# Patient Record
Sex: FEMALE | Race: WHITE | NOT HISPANIC OR LATINO | Employment: OTHER | URBAN - METROPOLITAN AREA
[De-identification: names, ages, dates, MRNs, and addresses within clinical notes are randomized per-mention and may not be internally consistent; named-entity substitution may affect disease eponyms.]

---

## 2017-01-12 ENCOUNTER — OFFICE VISIT (OUTPATIENT)
Dept: CARDIOLOGY | Facility: CLINIC | Age: 67
End: 2017-01-12

## 2017-01-12 VITALS
WEIGHT: 212 LBS | SYSTOLIC BLOOD PRESSURE: 167 MMHG | HEIGHT: 64 IN | DIASTOLIC BLOOD PRESSURE: 83 MMHG | HEART RATE: 72 BPM | BODY MASS INDEX: 36.19 KG/M2

## 2017-01-12 DIAGNOSIS — I10 ESSENTIAL HYPERTENSION: Primary | ICD-10-CM

## 2017-01-12 PROCEDURE — 99212 OFFICE O/P EST SF 10 MIN: CPT | Performed by: INTERNAL MEDICINE

## 2017-01-12 RX ORDER — LISINOPRIL 20 MG/1
20 TABLET ORAL 2 TIMES DAILY
Qty: 60 TABLET | Refills: 11 | Status: SHIPPED | OUTPATIENT
Start: 2017-01-12 | End: 2017-07-25

## 2017-01-12 RX ORDER — DARIFENACIN HYDROBROMIDE 15 MG/1
15 TABLET, EXTENDED RELEASE ORAL DAILY
COMMUNITY
End: 2017-07-25

## 2017-01-12 RX ORDER — FLUOXETINE HYDROCHLORIDE 20 MG/1
20 CAPSULE ORAL DAILY
COMMUNITY
End: 2017-07-25

## 2017-01-12 NOTE — LETTER
"2017     Michael Mays MD  [address]  [facility] [address]    Patient: Vidhi Lopez   YOB: 1950   Date of Visit: 2017       Dear Dr. Davon MD:    Thank you for referring Vidhi Lopez to me for evaluation. Below are the relevant portions of my assessment and plan of care.    If you have questions, please do not hesitate to call me. I look forward to following Vidhi along with you.         Sincerely,        Lito Flores MD        CC: No Recipients  Lito Flores MD  2017  4:26 PM  Incomplete    OFFICE FOLLOW UP     Date of Encounter:2017     Name: Vidhi Lopez  : 1950  Address: [address] [place] [address]  Phone: [phone]    PCP: MD Anand Owens   [facility] [address]    Vidhi Lopez is a 66 y.o. female.      Chief Complaint: PCP referral for hypertension    PROBLEM LIST:   1. Carotid artery disease:  a. Remote CVA, IDB.  b. Left hemispheric TIA, 2011.  c. Emergent LICA stent, 2011.  d. \"Last\" DUS negative,  (SJE).  2. History of myocardial infarction, remote IDB:  a. Normal nuclear myocardial perfusion study, 2011.  b. Echocardiogram,  revealing normal EF.  3. Hypertension.  4. Tobacco use.  5. Diabetes mellitus type 2.  6. Obesity.  7. Dyslipidemia.  8. Sinus arrhythmia.  9. Fibromyalgia.  10. Renal insufficiency, felt secondary to NSAIDS.  11. Interstitial cystitis.  12. Gastroesophageal reflux disease.  13. Status post operations, remote.  14. Anxiety disorder.  15. \"New onset\" lower extremity edema, May 2015:  a. Treated with Lasix 20 mg daily with resolution.    Allergies   Allergen Reactions   • Amlodipine    • Reglan [Metoclopramide]          Current Outpatient Prescriptions:   •  Xanax 0.5 mg po twice daily.  •  atorvastatin 80 mg po daily at bedtime.   •  darifenacin (ENABLEX) 15 MG po daily.   •  Fluoxetine 20 mg po daily.   •  gabapentin 800 " "mg po twice daily.   •  linagliptin (TRADJENTA) 5 mg po daily.   •  lisinopril 10 mg po twice daily.   •  metoprolol succinate XL 25 mg twice daily.   •  Multiple Vitamins po daily.  •  pentosan polysulfate (ELMIRON) 100 mg po three times daily.   •  PREMARIN 0.625 MG/GM vaginal cream as directed  •  Seroquel 25 mg po daily at bedtime.    •  terconazole (TERAZOL 3) 0.8 % vaginal cream as directed.  •  tizanidine 4 mg po twice daily  •  vitamin d 5000 UNITS capsule po daily.    History of Present Illness:         Patient returns today for follow-up.  She is overall doing well.  She has had no symptoms of cardiac or cerebrovascular disease.  Home blood pressures a been trending upward.  Systolic blood pressure is usually in the range of 140-165.       The following portions of the patient's history were reviewed and updated as appropriate: allergies, current medications and problem list.    HPI: Pertinent positives as listed in the HPI.  All other systems reviewed and negative.      Objective:    Vitals:    01/12/17 1539 01/12/17 1541   BP: 151/79 167/83   BP Location: Left arm Left arm   Patient Position: Sitting Standing   Pulse: 73 72   Weight: 212 lb (96.2 kg)    Height: 64\" (162.6 cm)        Physical Exam   Constitutional: She is oriented to person, place, and time.   Neck: No JVD present. No thyromegaly present.   Cardiovascular: Intact distal pulses.    Exam reveals no gallop, murmur, or rub.   Pulmonary/Chest:   No wheezes, crackles, or rhonchi.   Musculoskeletal:   No peripheral edema, no clubbing, or cyanosis    Neurological: She is alert and oriented to person, place, and time.   No focal abnormalities in sensation or strength    Vitals reviewed.     Diagnostic Data:  BUN 28, Cr. 1.00 at       Procedures      Assessment and Plan: At this time we will asked the patient to continue to monitor blood pressures closely and to increase her lisinopril from 10-20 mg by mouth twice daily.  If this is not effective " in keeping systolic blood pressures under about 1 40 mmHg she will call us.  She will otherwise titrate her blood pressure with lisinopril and continue close follow-up with her primary care physician in terms of monitoring renal function.  I will see her back in 6 months, sooner when necessary basis

## 2017-01-12 NOTE — LETTER
"2017     Taran Mcginnis MD  [address]  [address]  [facility] [address]    Patient: Vidhi Lopez   YOB: 1950   Date of Visit: 2017       Dear Dr. Ras MD:    Thank you for referring Vidhi Lopez to me for evaluation. Below are the relevant portions of my assessment and plan of care.    If you have questions, please do not hesitate to call me. I look forward to following Vidhi along with you.         Sincerely,        Lito Flores MD        CC: No Recipients  Lito Flores MD  2017  4:26 PM  Incomplete    OFFICE FOLLOW UP     Date of Encounter:2017     Name: Vidhi Lopez  : 1950  Address: [address] [place], [place] [address]  Phone: [phone]    PCP: Michael Mays MD  [address]  [facility] [address]    Vidhi Lopez is a 66 y.o. female.      Chief Complaint: PCP referral for hypertension    PROBLEM LIST:   1. Carotid artery disease:  a. Remote CVA, IDB.  b. Left hemispheric TIA, 2011.  c. Emergent LICA stent, 2011.  d. \"Last\" DUS negative,  (SJE).  2. History of myocardial infarction, remote IDB:  a. Normal nuclear myocardial perfusion study, 2011.  b. Echocardiogram,  revealing normal EF.  3. Hypertension.  4. Tobacco use.  5. Diabetes mellitus type 2.  6. Obesity.  7. Dyslipidemia.  8. Sinus arrhythmia.  9. Fibromyalgia.  10. Renal insufficiency, felt secondary to NSAIDS.  11. Interstitial cystitis.  12. Gastroesophageal reflux disease.  13. Status post operations, remote.  14. Anxiety disorder.  15. \"New onset\" lower extremity edema, May 2015:  a. Treated with Lasix 20 mg daily with resolution.    Allergies   Allergen Reactions   • Amlodipine    • Reglan [Metoclopramide]          Current Outpatient Prescriptions:   •  Xanax 0.5 mg po twice daily.  •  atorvastatin 80 mg po daily at bedtime.   •  darifenacin (ENABLEX) 15 MG po daily.   •  Fluoxetine 20 mg po daily.   •  " "gabapentin 800 mg po twice daily.   •  linagliptin (TRADJENTA) 5 mg po daily.   •  lisinopril 10 mg po twice daily.   •  metoprolol succinate XL 25 mg twice daily.   •  Multiple Vitamins po daily.  •  pentosan polysulfate (ELMIRON) 100 mg po three times daily.   •  PREMARIN 0.625 MG/GM vaginal cream as directed  •  Seroquel 25 mg po daily at bedtime.    •  terconazole (TERAZOL 3) 0.8 % vaginal cream as directed.  •  tizanidine 4 mg po twice daily  •  vitamin d 5000 UNITS capsule po daily.    History of Present Illness:         Patient returns today for follow-up.  She is overall doing well.  She has had no symptoms of cardiac or cerebrovascular disease.  Home blood pressures a been trending upward.  Systolic blood pressure is usually in the range of 140-165.       The following portions of the patient's history were reviewed and updated as appropriate: allergies, current medications and problem list.    HPI: Pertinent positives as listed in the HPI.  All other systems reviewed and negative.      Objective:    Vitals:    01/12/17 1539 01/12/17 1541   BP: 151/79 167/83   BP Location: Left arm Left arm   Patient Position: Sitting Standing   Pulse: 73 72   Weight: 212 lb (96.2 kg)    Height: 64\" (162.6 cm)        Physical Exam   Constitutional: She is oriented to person, place, and time.   Neck: No JVD present. No thyromegaly present.   Cardiovascular: Intact distal pulses.    Exam reveals no gallop, murmur, or rub.   Pulmonary/Chest:   No wheezes, crackles, or rhonchi.   Musculoskeletal:   No peripheral edema, no clubbing, or cyanosis    Neurological: She is alert and oriented to person, place, and time.   No focal abnormalities in sensation or strength    Vitals reviewed.     Diagnostic Data:  BUN 28, Cr. 1.00 at       Procedures      Assessment and Plan: At this time we will asked the patient to continue to monitor blood pressures closely and to increase her lisinopril from 10-20 mg by mouth twice daily.  If this is " not effective in keeping systolic blood pressures under about 1 40 mmHg she will call us.  She will otherwise titrate her blood pressure with lisinopril and continue close follow-up with her primary care physician in terms of monitoring renal function.  I will see her back in 6 months, sooner when necessary basis

## 2017-01-12 NOTE — PROGRESS NOTES
"  OFFICE FOLLOW UP     Date of Encounter:2017     Name: Vidhi Lopez  : 1950  Address: [facility] [address] [address]  Phone: [phone]    PCP: Michael Mays MD  [address]  [facility] [address]    Vidhi Lopez is a 66 y.o. female.      Chief Complaint: PCP referral for hypertension    PROBLEM LIST:   1. Carotid artery disease:  a. Remote CVA, IDB.  b. Left hemispheric TIA, 2011.  c. Emergent LICA stent, 2011.  d. \"Last\" DUS negative,  (SJE).  2. History of myocardial infarction, remote IDB:  a. Normal nuclear myocardial perfusion study, 2011.  b. Echocardiogram,  revealing normal EF.  3. Hypertension.  4. Tobacco use.  5. Diabetes mellitus type 2.  6. Obesity.  7. Dyslipidemia.  8. Sinus arrhythmia.  9. Fibromyalgia.  10. Renal insufficiency, felt secondary to NSAIDS.  11. Interstitial cystitis.  12. Gastroesophageal reflux disease.  13. Status post operations, remote.  14. Anxiety disorder.  15. \"New onset\" lower extremity edema, May 2015:  a. Treated with Lasix 20 mg daily with resolution.    Allergies   Allergen Reactions   • Amlodipine    • Reglan [Metoclopramide]          Current Outpatient Prescriptions:   •  Xanax 0.5 mg po twice daily.  •  atorvastatin 80 mg po daily at bedtime.   •  darifenacin (ENABLEX) 15 MG po daily.   •  Fluoxetine 20 mg po daily.   •  gabapentin 800 mg po twice daily.   •  linagliptin (TRADJENTA) 5 mg po daily.   •  lisinopril 10 mg po twice daily.   •  metoprolol succinate XL 25 mg twice daily.   •  Multiple Vitamins po daily.  •  pentosan polysulfate (ELMIRON) 100 mg po three times daily.   •  PREMARIN 0.625 MG/GM vaginal cream as directed  •  Seroquel 25 mg po daily at bedtime.    •  terconazole (TERAZOL 3) 0.8 % vaginal cream as directed.  •  tizanidine 4 mg po twice daily  •  vitamin d 5000 UNITS capsule po daily.    History of Present Illness:         Patient returns today for follow-up.  She is " "overall doing well.  She has had no symptoms of cardiac or cerebrovascular disease.  Home blood pressures a been trending upward.  Systolic blood pressure is usually in the range of 140-165.     The following portions of the patient's history were reviewed and updated as appropriate: allergies, current medications and problem list.    HPI: Pertinent positives as listed in the HPI.  All other systems reviewed and negative.    Objective:    Vitals:    01/12/17 1539 01/12/17 1541   BP: 151/79 167/83   BP Location: Left arm Left arm   Patient Position: Sitting Standing   Pulse: 73 72   Weight: 212 lb (96.2 kg)    Height: 64\" (162.6 cm)        Physical Exam   Constitutional: She is oriented to person, place, and time.   Neck: No JVD present. No thyromegaly present.   Cardiovascular: Intact distal pulses.    Exam reveals no gallop, murmur, or rub.   Pulmonary/Chest:   No wheezes, crackles, or rhonchi.   Musculoskeletal:   No peripheral edema, no clubbing, or cyanosis    Neurological: She is alert and oriented to person, place, and time.   No focal abnormalities in sensation or strength    Vitals reviewed.     Diagnostic Data:  BUN 28, Cr. 1.00 in 9/2016 per patient report. She is due to see nephrology again soon.     Procedures    Assessment and Plan: At this time we will asked the patient to continue to monitor blood pressures closely and to increase her lisinopril from 10-20 mg by mouth twice daily.  If this is not effective in keeping systolic blood pressures under about 1 40 mmHg she will call us.  She will otherwise titrate her blood pressure with lisinopril and continue close follow-up with her primary care physician in terms of monitoring renal function.  I will see her back in 6 months, sooner when necessary basis    Scribed for Lito Flores MD by Belkis Meyer RN. 1/12/2017  4:28 PM    I, Lito Flores MD, [facility], [facility], personally performed the services described in this documentation as scribed by " the above named individual in my presence, and it is both accurate and complete. At 4:29 PM on 01/12/2017

## 2017-02-07 ENCOUNTER — TELEPHONE (OUTPATIENT)
Dept: CARDIOLOGY | Facility: CLINIC | Age: 67
End: 2017-02-07

## 2017-02-07 NOTE — TELEPHONE ENCOUNTER
Pt called c/o htn with BP ranging from 180-210/80-98.  HR in the upper 80's to 90's.  She states she has increased her metoprolol to 50 mg BID on her own.  I asked that she increase this to 100 mg BID and please call us back with a BP log in 3 days.  She verbalized understanding at this time.

## 2017-02-17 ENCOUNTER — TELEPHONE (OUTPATIENT)
Dept: CARDIOLOGY | Facility: CLINIC | Age: 67
End: 2017-02-17

## 2017-02-17 NOTE — TELEPHONE ENCOUNTER
Spoke with pt and she continues to have elevated BP readings. She is taking metoprolol tartrate 100 mg BID, lisinopril 20 mg BID and [facility] prescribed triamterene/HCTZ 37.5 mg/25 mg daily. Received med list from [facility] note from 10/2016. Per [facility] have pt reduce metoprolol to 50 mg BID, continue lisinopril and triamterene/HCTZ and schedule f/u with Nephrology for BP control and labs. She has been on Chlorthalidone and amlodipine in the past however she has had hypotensive episodes. Last creatinine was 1.0 in 9/2016. Left message on pt voicemail with these instructions. THAIS

## 2017-02-20 DIAGNOSIS — I10 ESSENTIAL HYPERTENSION: Primary | ICD-10-CM

## 2017-02-21 ENCOUNTER — LAB (OUTPATIENT)
Dept: LAB | Facility: HOSPITAL | Age: 67
End: 2017-02-21

## 2017-02-21 DIAGNOSIS — I10 ESSENTIAL HYPERTENSION: ICD-10-CM

## 2017-02-21 LAB
ALBUMIN SERPL-MCNC: 4.3 G/DL (ref 3.2–4.8)
ANION GAP SERPL CALCULATED.3IONS-SCNC: 4 MMOL/L (ref 3–11)
BUN BLD-MCNC: 18 MG/DL (ref 9–23)
BUN/CREAT SERPL: 25.7 (ref 7–25)
CALCIUM SPEC-SCNC: 10.3 MG/DL (ref 8.7–10.4)
CHLORIDE SERPL-SCNC: 99 MMOL/L (ref 99–109)
CO2 SERPL-SCNC: 36 MMOL/L (ref 20–31)
CREAT BLD-MCNC: 0.7 MG/DL (ref 0.6–1.3)
GFR SERPL CREATININE-BSD FRML MDRD: 84 ML/MIN/1.73
GLUCOSE BLD-MCNC: 144 MG/DL (ref 70–100)
PHOSPHATE SERPL-MCNC: 4.6 MG/DL (ref 2.4–5.1)
POTASSIUM BLD-SCNC: 4.6 MMOL/L (ref 3.5–5.5)
SODIUM BLD-SCNC: 139 MMOL/L (ref 132–146)

## 2017-02-21 PROCEDURE — 80069 RENAL FUNCTION PANEL: CPT | Performed by: INTERNAL MEDICINE

## 2017-02-21 PROCEDURE — 36415 COLL VENOUS BLD VENIPUNCTURE: CPT

## 2017-03-15 ENCOUNTER — APPOINTMENT (OUTPATIENT)
Dept: LAB | Facility: HOSPITAL | Age: 67
End: 2017-03-15

## 2017-03-15 ENCOUNTER — TRANSCRIBE ORDERS (OUTPATIENT)
Dept: LAB | Facility: HOSPITAL | Age: 67
End: 2017-03-15

## 2017-03-15 DIAGNOSIS — I10 ESSENTIAL HYPERTENSION, BENIGN: Primary | ICD-10-CM

## 2017-03-15 DIAGNOSIS — E11.9 TYPE 2 DIABETES MELLITUS WITHOUT COMPLICATION, WITHOUT LONG-TERM CURRENT USE OF INSULIN (HCC): ICD-10-CM

## 2017-03-15 DIAGNOSIS — E78.5 HYPERLIPIDEMIA, UNSPECIFIED HYPERLIPIDEMIA TYPE: ICD-10-CM

## 2017-03-15 LAB
ALBUMIN SERPL-MCNC: 4.3 G/DL (ref 3.2–4.8)
ALBUMIN/GLOB SERPL: 1.4 G/DL (ref 1.5–2.5)
ALP SERPL-CCNC: 122 U/L (ref 25–100)
ALT SERPL W P-5'-P-CCNC: 32 U/L (ref 7–40)
ANION GAP SERPL CALCULATED.3IONS-SCNC: 9 MMOL/L (ref 3–11)
ARTICHOKE IGE QN: 148 MG/DL (ref 0–130)
AST SERPL-CCNC: 16 U/L (ref 0–33)
BASOPHILS # BLD AUTO: 0.04 10*3/MM3 (ref 0–0.2)
BASOPHILS NFR BLD AUTO: 0.4 % (ref 0–1)
BILIRUB SERPL-MCNC: 0.4 MG/DL (ref 0.3–1.2)
BUN BLD-MCNC: 26 MG/DL (ref 9–23)
BUN/CREAT SERPL: 20 (ref 7–25)
CALCIUM SPEC-SCNC: 10.5 MG/DL (ref 8.7–10.4)
CHLORIDE SERPL-SCNC: 94 MMOL/L (ref 99–109)
CHOLEST SERPL-MCNC: 217 MG/DL (ref 0–200)
CO2 SERPL-SCNC: 36 MMOL/L (ref 20–31)
CREAT BLD-MCNC: 1.3 MG/DL (ref 0.6–1.3)
DEPRECATED RDW RBC AUTO: 53.8 FL (ref 37–54)
EOSINOPHIL # BLD AUTO: 0.15 10*3/MM3 (ref 0.1–0.3)
EOSINOPHIL NFR BLD AUTO: 1.3 % (ref 0–3)
ERYTHROCYTE [DISTWIDTH] IN BLOOD BY AUTOMATED COUNT: 14.9 % (ref 11.3–14.5)
GFR SERPL CREATININE-BSD FRML MDRD: 41 ML/MIN/1.73
GLOBULIN UR ELPH-MCNC: 3 GM/DL
GLUCOSE BLD-MCNC: 127 MG/DL (ref 70–100)
HBA1C MFR BLD: 7.6 % (ref 4.8–5.6)
HCT VFR BLD AUTO: 45.4 % (ref 34.5–44)
HDLC SERPL-MCNC: 44 MG/DL (ref 40–60)
HGB BLD-MCNC: 14.3 G/DL (ref 11.5–15.5)
IMM GRANULOCYTES # BLD: 0.05 10*3/MM3 (ref 0–0.03)
IMM GRANULOCYTES NFR BLD: 0.4 % (ref 0–0.6)
LYMPHOCYTES # BLD AUTO: 1.86 10*3/MM3 (ref 0.6–4.8)
LYMPHOCYTES NFR BLD AUTO: 16.7 % (ref 24–44)
MCH RBC QN AUTO: 31 PG (ref 27–31)
MCHC RBC AUTO-ENTMCNC: 31.5 G/DL (ref 32–36)
MCV RBC AUTO: 98.3 FL (ref 80–99)
MONOCYTES # BLD AUTO: 0.57 10*3/MM3 (ref 0–1)
MONOCYTES NFR BLD AUTO: 5.1 % (ref 0–12)
NEUTROPHILS # BLD AUTO: 8.47 10*3/MM3 (ref 1.5–8.3)
NEUTROPHILS NFR BLD AUTO: 76.1 % (ref 41–71)
PLATELET # BLD AUTO: 260 10*3/MM3 (ref 150–450)
PMV BLD AUTO: 9.8 FL (ref 6–12)
POTASSIUM BLD-SCNC: 4.1 MMOL/L (ref 3.5–5.5)
PROT SERPL-MCNC: 7.3 G/DL (ref 5.7–8.2)
RBC # BLD AUTO: 4.62 10*6/MM3 (ref 3.89–5.14)
SODIUM BLD-SCNC: 139 MMOL/L (ref 132–146)
TRIGL SERPL-MCNC: 241 MG/DL (ref 0–150)
WBC NRBC COR # BLD: 11.14 10*3/MM3 (ref 3.5–10.8)

## 2017-03-15 PROCEDURE — 80061 LIPID PANEL: CPT | Performed by: GENERAL PRACTICE

## 2017-03-15 PROCEDURE — 83036 HEMOGLOBIN GLYCOSYLATED A1C: CPT | Performed by: GENERAL PRACTICE

## 2017-03-15 PROCEDURE — 85025 COMPLETE CBC W/AUTO DIFF WBC: CPT | Performed by: GENERAL PRACTICE

## 2017-03-15 PROCEDURE — 80053 COMPREHEN METABOLIC PANEL: CPT | Performed by: GENERAL PRACTICE

## 2017-03-15 PROCEDURE — 36415 COLL VENOUS BLD VENIPUNCTURE: CPT | Performed by: GENERAL PRACTICE

## 2017-04-28 ENCOUNTER — APPOINTMENT (OUTPATIENT)
Dept: LAB | Facility: HOSPITAL | Age: 67
End: 2017-04-28

## 2017-04-28 ENCOUNTER — TRANSCRIBE ORDERS (OUTPATIENT)
Dept: LAB | Facility: HOSPITAL | Age: 67
End: 2017-04-28

## 2017-04-28 DIAGNOSIS — IMO0002 UNCONTROLLED TYPE 2 DIABETES MELLITUS WITH COMPLICATION, WITHOUT LONG-TERM CURRENT USE OF INSULIN: Primary | ICD-10-CM

## 2017-04-28 LAB
ALBUMIN SERPL-MCNC: 4.4 G/DL (ref 3.2–4.8)
ALBUMIN/GLOB SERPL: 1.6 G/DL (ref 1.5–2.5)
ALP SERPL-CCNC: 104 U/L (ref 25–100)
ALT SERPL W P-5'-P-CCNC: 22 U/L (ref 7–40)
ANION GAP SERPL CALCULATED.3IONS-SCNC: 8 MMOL/L (ref 3–11)
AST SERPL-CCNC: 15 U/L (ref 0–33)
BILIRUB SERPL-MCNC: 0.5 MG/DL (ref 0.3–1.2)
BUN BLD-MCNC: 25 MG/DL (ref 9–23)
BUN/CREAT SERPL: 25 (ref 7–25)
CALCIUM SPEC-SCNC: 10.3 MG/DL (ref 8.7–10.4)
CHLORIDE SERPL-SCNC: 98 MMOL/L (ref 99–109)
CO2 SERPL-SCNC: 32 MMOL/L (ref 20–31)
CREAT BLD-MCNC: 1 MG/DL (ref 0.6–1.3)
GFR SERPL CREATININE-BSD FRML MDRD: 55 ML/MIN/1.73
GLOBULIN UR ELPH-MCNC: 2.8 GM/DL
GLUCOSE BLD-MCNC: 146 MG/DL (ref 70–100)
POTASSIUM BLD-SCNC: 4.3 MMOL/L (ref 3.5–5.5)
PROT SERPL-MCNC: 7.2 G/DL (ref 5.7–8.2)
SODIUM BLD-SCNC: 138 MMOL/L (ref 132–146)

## 2017-04-28 PROCEDURE — 80053 COMPREHEN METABOLIC PANEL: CPT | Performed by: INTERNAL MEDICINE

## 2017-04-28 PROCEDURE — 84681 ASSAY OF C-PEPTIDE: CPT | Performed by: INTERNAL MEDICINE

## 2017-04-28 PROCEDURE — 36415 COLL VENOUS BLD VENIPUNCTURE: CPT | Performed by: INTERNAL MEDICINE

## 2017-04-30 LAB — C PEPTIDE SERPL-MCNC: 18.1 NG/ML (ref 1.1–4.4)

## 2017-05-03 ENCOUNTER — TELEPHONE (OUTPATIENT)
Dept: URGENT CARE | Facility: CLINIC | Age: 67
End: 2017-05-03

## 2017-05-30 ENCOUNTER — TELEPHONE (OUTPATIENT)
Dept: CARDIOLOGY | Facility: CLINIC | Age: 67
End: 2017-05-30

## 2017-05-31 RX ORDER — FUROSEMIDE 20 MG/1
20 TABLET ORAL EVERY OTHER DAY
Qty: 45 TABLET | Refills: 0 | Status: SHIPPED | OUTPATIENT
Start: 2017-05-31 | End: 2017-09-19

## 2017-07-03 ENCOUNTER — OFFICE VISIT (OUTPATIENT)
Dept: NEUROLOGY | Facility: CLINIC | Age: 67
End: 2017-07-03

## 2017-07-03 ENCOUNTER — APPOINTMENT (OUTPATIENT)
Dept: LAB | Facility: HOSPITAL | Age: 67
End: 2017-07-03

## 2017-07-03 VITALS
WEIGHT: 200 LBS | SYSTOLIC BLOOD PRESSURE: 110 MMHG | HEIGHT: 64 IN | BODY MASS INDEX: 34.15 KG/M2 | DIASTOLIC BLOOD PRESSURE: 78 MMHG

## 2017-07-03 DIAGNOSIS — R25.1 TREMOR: ICD-10-CM

## 2017-07-03 DIAGNOSIS — R27.9 UNSPECIFIED LACK OF COORDINATION: Primary | ICD-10-CM

## 2017-07-03 LAB
FOLATE SERPL-MCNC: 6.04 NG/ML (ref 3.2–20)
MAGNESIUM SERPL-MCNC: 2 MG/DL (ref 1.3–2.7)
VIT B12 BLD-MCNC: 360 PG/ML (ref 211–911)

## 2017-07-03 PROCEDURE — 82746 ASSAY OF FOLIC ACID SERUM: CPT | Performed by: PHYSICIAN ASSISTANT

## 2017-07-03 PROCEDURE — 36415 COLL VENOUS BLD VENIPUNCTURE: CPT | Performed by: PHYSICIAN ASSISTANT

## 2017-07-03 PROCEDURE — 82607 VITAMIN B-12: CPT | Performed by: PHYSICIAN ASSISTANT

## 2017-07-03 PROCEDURE — 83735 ASSAY OF MAGNESIUM: CPT | Performed by: PHYSICIAN ASSISTANT

## 2017-07-03 PROCEDURE — 99204 OFFICE O/P NEW MOD 45 MIN: CPT | Performed by: PHYSICIAN ASSISTANT

## 2017-07-03 NOTE — PROGRESS NOTES
"Subjective     History of Present Illness   Vidhi Lopez is a 67 y.o. female with a history of diabetes who comes to clinic today for evaluation of episodes of incoordination. She has had three episodes over the last three months during which she notes incoordination in her hands bilaterally. During these episodes, she is more prone to dropping items. She denies any clear associated weakness. She may note some unsteadiness, though denies any ataxia. She also denies any associated numbness, paresthesias, slurred speech, or dysphagia.     She also notes an intermittent mild intentional tremor which is worse in the morning. This is not always associated with the incoordination.       The following portions of the patient's history were reviewed and updated as appropriate: allergies, current medications, past family history, past medical history, past social history, past surgical history and problem list.    Review of Systems   Constitutional: Negative.    HENT: Negative.    Eyes: Negative.    Respiratory: Negative.    Cardiovascular: Negative.    Gastrointestinal: Negative.    Endocrine: Negative.    Genitourinary: Negative.    Musculoskeletal: Negative.    Skin: Negative.    Allergic/Immunologic: Negative.    Neurological:        As noted in HPI   Hematological: Negative.    Psychiatric/Behavioral: Negative.        Objective     /78  Ht 64\" (162.6 cm)  Wt 200 lb (90.7 kg)  BMI 34.33 kg/m2    General appearance today is normal.   Peripheral pulses were present and symmetric.   The ophthalmoscopic exam today is unremarkable. The discs and posterior elements are unremarkable.      Physical Exam   Neurological: She has normal strength. She has a normal Finger-Nose-Finger Test. Gait normal.   Reflex Scores:       Tricep reflexes are 1+ on the right side and 1+ on the left side.       Bicep reflexes are 1+ on the right side and 1+ on the left side.       Brachioradialis reflexes are 1+ on the right side and 1+ " on the left side.       Patellar reflexes are 1+ on the right side and 1+ on the left side.  Psychiatric: Her speech is normal.        Neurologic Exam     Mental Status   Speech: speech is normal   Level of consciousness: alert  Knowledge: good.   Normal comprehension.     Cranial Nerves   Cranial nerves II through XII intact.     Motor Exam   Muscle bulk: normal  Overall muscle tone: normal    Strength   Strength 5/5 throughout.     Sensory Exam   Light touch normal.     Gait, Coordination, and Reflexes     Gait  Gait: normal    Coordination   Finger to nose coordination: normal    Tremor   Resting tremor: absent    Reflexes   Right brachioradialis: 1+  Left brachioradialis: 1+  Right biceps: 1+  Left biceps: 1+  Right triceps: 1+  Left triceps: 1+  Right patellar: 1+  Left patellar: 1+          Assessment/Plan   Vidhi was seen today for tremors, extremity weakness and gait problem.    Diagnoses and all orders for this visit:    Unspecified lack of coordination  -     Vitamin B12  -     Folate  -     Magnesium  -     MRI Brain Without Contrast    Tremor  -     MRI Brain Without Contrast          Discussion/Summary   Vidhi Lopez comes to clinic today for evaluation of episodes of incoordination and tremor. The etiology of her symptoms is unclear. Her neurological examination today is reassuringly normal, which was discussed in detail. I did not see any objective evidence of Parkinson's Disease. It was elected to obtain an MRI of the brain to rule our any structural abnormalities as well as screening bloodwork. She will then follow up in our clinic on an as needed basis.      I spent 35 minutes out of 45 minutes face to face with the patient and discussing diagnosis, prognosis, diagnostic testing, evaluation and current status.    As part of this visit I discussed the history with the patient .      Eusebia Yanez PA-C

## 2017-07-11 ENCOUNTER — HOSPITAL ENCOUNTER (OUTPATIENT)
Dept: MRI IMAGING | Facility: HOSPITAL | Age: 67
End: 2017-07-11

## 2017-07-18 ENCOUNTER — TELEPHONE (OUTPATIENT)
Dept: NEUROLOGY | Facility: CLINIC | Age: 67
End: 2017-07-18

## 2017-07-18 NOTE — TELEPHONE ENCOUNTER
----- Message from Janine Yanez PA-C sent at 7/18/2017  3:44 PM EDT -----  Contact: VISHNU  If it is severe or worsens, she should go to the ED. Otherwise, let's try to wait for the MRI as I am not sure what else I have to offer before this is done. Hope that makes sense. Thanks!     ----- Message -----     From: Aarti Tucker MA     Sent: 7/18/2017   3:34 PM       To: Janine Yanez PA-C    What do you suggest, hasnt had mri  yet   ----- Message -----     From: Ciara Navarro     Sent: 7/18/2017   3:26 PM       To: [facility]    JANINE    THERE WAS A MESSAGE LEFT ON THE ANSWERING MACHINE FROM VISHNU.   VISHNU SAID THAT SHE HAS A MRI Wednesday.  SHE SAID JANINE TOLD HER TO CALL HER IF SHE'S HAVING ISSUES WITH WEAKNESS IN THE HANDS AND DIFFICULTY HOLDING THINGS AGAIN. THIS WAS ON Monday. IT'S NOT AS BAD AS BEFORE, BUT STILL THERE.    VISHNU REQUESTED A CALL BACK  [phone]

## 2017-07-19 ENCOUNTER — HOSPITAL ENCOUNTER (OUTPATIENT)
Dept: MRI IMAGING | Facility: HOSPITAL | Age: 67
Discharge: HOME | End: 2017-07-19
Admitting: PHYSICIAN ASSISTANT

## 2017-07-19 PROCEDURE — 70551 MRI BRAIN STEM W/O DYE: CPT

## 2017-07-21 DIAGNOSIS — Z86.73 HISTORY OF STROKE: Primary | ICD-10-CM

## 2017-07-24 DIAGNOSIS — I63.89 OTHER CEREBRAL INFARCTION (HCC): ICD-10-CM

## 2017-07-24 DIAGNOSIS — I63.9 CEREBROVASCULAR ACCIDENT (CVA), UNSPECIFIED MECHANISM (HCC): Primary | ICD-10-CM

## 2017-07-25 ENCOUNTER — OFFICE VISIT (OUTPATIENT)
Dept: CARDIOLOGY | Facility: CLINIC | Age: 67
End: 2017-07-25

## 2017-07-25 ENCOUNTER — APPOINTMENT (OUTPATIENT)
Dept: LAB | Facility: HOSPITAL | Age: 67
End: 2017-07-25

## 2017-07-25 VITALS
WEIGHT: 221.8 LBS | SYSTOLIC BLOOD PRESSURE: 90 MMHG | OXYGEN SATURATION: 88 % | HEART RATE: 73 BPM | BODY MASS INDEX: 37.87 KG/M2 | DIASTOLIC BLOOD PRESSURE: 62 MMHG | HEIGHT: 64 IN

## 2017-07-25 DIAGNOSIS — E78.5 DYSLIPIDEMIA: ICD-10-CM

## 2017-07-25 DIAGNOSIS — R60.1 GENERALIZED EDEMA: Primary | ICD-10-CM

## 2017-07-25 DIAGNOSIS — I10 ESSENTIAL HYPERTENSION: ICD-10-CM

## 2017-07-25 LAB
ANION GAP SERPL CALCULATED.3IONS-SCNC: 7 MMOL/L (ref 3–11)
BNP SERPL-MCNC: 86 PG/ML (ref 0–100)
BUN BLD-MCNC: 60 MG/DL (ref 9–23)
BUN/CREAT SERPL: 33.3 (ref 7–25)
CALCIUM SPEC-SCNC: 9.7 MG/DL (ref 8.7–10.4)
CHLORIDE SERPL-SCNC: 95 MMOL/L (ref 99–109)
CO2 SERPL-SCNC: 30 MMOL/L (ref 20–31)
CREAT BLD-MCNC: 1.8 MG/DL (ref 0.6–1.3)
GFR SERPL CREATININE-BSD FRML MDRD: 28 ML/MIN/1.73
GLUCOSE BLD-MCNC: 88 MG/DL (ref 70–100)
POTASSIUM BLD-SCNC: 5.9 MMOL/L (ref 3.5–5.5)
SODIUM BLD-SCNC: 132 MMOL/L (ref 132–146)

## 2017-07-25 PROCEDURE — 99214 OFFICE O/P EST MOD 30 MIN: CPT | Performed by: PHYSICIAN ASSISTANT

## 2017-07-25 PROCEDURE — 36415 COLL VENOUS BLD VENIPUNCTURE: CPT | Performed by: PHYSICIAN ASSISTANT

## 2017-07-25 PROCEDURE — 80048 BASIC METABOLIC PNL TOTAL CA: CPT | Performed by: PHYSICIAN ASSISTANT

## 2017-07-25 PROCEDURE — 83880 ASSAY OF NATRIURETIC PEPTIDE: CPT | Performed by: PHYSICIAN ASSISTANT

## 2017-07-25 RX ORDER — TRIAMTERENE AND HYDROCHLOROTHIAZIDE 37.5; 25 MG/1; MG/1
1 TABLET ORAL DAILY
COMMUNITY
End: 2017-09-19

## 2017-07-26 ENCOUNTER — TELEPHONE (OUTPATIENT)
Dept: CARDIOLOGY | Facility: CLINIC | Age: 67
End: 2017-07-26

## 2017-07-26 DIAGNOSIS — I63.9 CEREBROVASCULAR ACCIDENT (CVA), UNSPECIFIED MECHANISM (HCC): Primary | ICD-10-CM

## 2017-07-26 NOTE — TELEPHONE ENCOUNTER
Referral made for followup with nephrology associates ASAP. Appt given to pt for 7/27/17 @ 1045. Pt aware and agreeable. Told to hold BP meds until appt tomorrow. May be related to hypotension. Verified Nephrology received notes and labs. Echo pending. Left message with scheduling to do ASAP. KH

## 2017-07-31 ENCOUNTER — TRANSCRIBE ORDERS (OUTPATIENT)
Dept: LAB | Facility: HOSPITAL | Age: 67
End: 2017-07-31

## 2017-07-31 ENCOUNTER — APPOINTMENT (OUTPATIENT)
Dept: LAB | Facility: HOSPITAL | Age: 67
End: 2017-07-31

## 2017-07-31 DIAGNOSIS — N18.2 CHRONIC KIDNEY DISEASE, STAGE II (MILD): Primary | ICD-10-CM

## 2017-07-31 LAB
ALBUMIN SERPL-MCNC: 3.7 G/DL (ref 3.2–4.8)
ANION GAP SERPL CALCULATED.3IONS-SCNC: 7 MMOL/L (ref 3–11)
BUN BLD-MCNC: 21 MG/DL (ref 9–23)
BUN/CREAT SERPL: 26.3 (ref 7–25)
CALCIUM SPEC-SCNC: 9.5 MG/DL (ref 8.7–10.4)
CHLORIDE SERPL-SCNC: 99 MMOL/L (ref 99–109)
CO2 SERPL-SCNC: 32 MMOL/L (ref 20–31)
CREAT BLD-MCNC: 0.8 MG/DL (ref 0.6–1.3)
GFR SERPL CREATININE-BSD FRML MDRD: 72 ML/MIN/1.73
GLUCOSE BLD-MCNC: 134 MG/DL (ref 70–100)
PHOSPHATE SERPL-MCNC: 4.2 MG/DL (ref 2.4–5.1)
POTASSIUM BLD-SCNC: 4.3 MMOL/L (ref 3.5–5.5)
SODIUM BLD-SCNC: 138 MMOL/L (ref 132–146)

## 2017-07-31 PROCEDURE — 36415 COLL VENOUS BLD VENIPUNCTURE: CPT | Performed by: INTERNAL MEDICINE

## 2017-07-31 PROCEDURE — 80069 RENAL FUNCTION PANEL: CPT | Performed by: INTERNAL MEDICINE

## 2017-08-11 ENCOUNTER — HOSPITAL ENCOUNTER (OUTPATIENT)
Dept: MRI IMAGING | Facility: HOSPITAL | Age: 67
End: 2017-08-11

## 2017-08-11 ENCOUNTER — APPOINTMENT (OUTPATIENT)
Dept: CARDIOLOGY | Facility: HOSPITAL | Age: 67
End: 2017-08-11

## 2017-08-11 ENCOUNTER — APPOINTMENT (OUTPATIENT)
Dept: MRI IMAGING | Facility: HOSPITAL | Age: 67
End: 2017-08-11

## 2017-08-11 ENCOUNTER — HOSPITAL ENCOUNTER (EMERGENCY)
Facility: HOSPITAL | Age: 67
Discharge: HOME | End: 2017-08-11
Attending: EMERGENCY MEDICINE | Admitting: EMERGENCY MEDICINE

## 2017-08-11 VITALS
BODY MASS INDEX: 43.78 KG/M2 | TEMPERATURE: 97.9 F | SYSTOLIC BLOOD PRESSURE: 187 MMHG | HEIGHT: 60 IN | OXYGEN SATURATION: 91 % | RESPIRATION RATE: 17 BRPM | WEIGHT: 223 LBS | HEART RATE: 68 BPM | DIASTOLIC BLOOD PRESSURE: 77 MMHG

## 2017-08-11 DIAGNOSIS — S30.811A ABRASION OF ABDOMINAL WALL, INITIAL ENCOUNTER: ICD-10-CM

## 2017-08-11 DIAGNOSIS — R14.0 DISTENDED ABDOMEN: Primary | ICD-10-CM

## 2017-08-11 PROCEDURE — 25010000002 CEFTRIAXONE PER 250 MG: Performed by: NURSE PRACTITIONER

## 2017-08-11 PROCEDURE — 96372 THER/PROPH/DIAG INJ SC/IM: CPT

## 2017-08-11 PROCEDURE — 99282 EMERGENCY DEPT VISIT SF MDM: CPT

## 2017-08-11 RX ORDER — CEPHALEXIN 500 MG/1
500 CAPSULE ORAL 3 TIMES DAILY
Qty: 30 CAPSULE | Refills: 0 | Status: SHIPPED | OUTPATIENT
Start: 2017-08-11 | End: 2017-09-19

## 2017-08-11 RX ORDER — LIDOCAINE HYDROCHLORIDE 10 MG/ML
2.1 INJECTION, SOLUTION EPIDURAL; INFILTRATION; INTRACAUDAL; PERINEURAL ONCE
Status: COMPLETED | OUTPATIENT
Start: 2017-08-11 | End: 2017-08-11

## 2017-08-11 RX ORDER — CEFTRIAXONE 1 G/1
1000 INJECTION, POWDER, FOR SOLUTION INTRAMUSCULAR; INTRAVENOUS ONCE
Status: COMPLETED | OUTPATIENT
Start: 2017-08-11 | End: 2017-08-11

## 2017-08-11 RX ORDER — BUMETANIDE 0.5 MG/1
1 TABLET ORAL 2 TIMES DAILY
Status: ON HOLD | COMMUNITY
End: 2018-02-12

## 2017-08-11 RX ADMIN — CEFTRIAXONE SODIUM 1000 MG: 1 INJECTION, POWDER, FOR SOLUTION INTRAMUSCULAR; INTRAVENOUS at 18:23

## 2017-08-11 RX ADMIN — LIDOCAINE HYDROCHLORIDE 2.1 ML: 10 INJECTION, SOLUTION EPIDURAL; INFILTRATION; INTRACAUDAL; PERINEURAL at 18:27

## 2017-08-12 NOTE — ED PROVIDER NOTES
Subjective   History of Present Illness  Is a 66 y/o female with multiple chronic comorbidities who presents due to some open sores on her abdomen.  She states she's been having difficulties with edema hanging fluid for the last 6 weeks.  She follows with a nephrologist, neurologist, and cardiologist.  She saw her cardiologist a few weeks ago and they did not feel that it was cardiac in nature.  They did order an echocardiogram which she was supposed to have today but she canceled the appointment because she is on extra doses of Bumex from her nephrologist and she was afraid that she would not be able to hold her urine.  He saw her nephrologist this week and he started her on Bumex which she has taken today and tomorrow she is supposed to start on Metolazone.  She is then supposed to follow up on Monday with lab work.  States she took the Bumex this morning and has had some increased urinary output.  She comes to the emergency Department because due to the extra fluid in her abdomen, she has developed some weeping sores on her lower abdomen. Her Nephrologist was concerned about the development of infection. She had a Rocephin injection at her PCP office this week. If she does not have improvement in her fluid status by Monday, she may be admitted in [place].  Denies chest pain.  She does have increased shortness of breath.  Ice fever or chills.  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  Review of Systems   All other systems reviewed and are negative.      Past Medical History:   Diagnosis Date   • Allergic rhinitis    • Asthma with COPD     Asthma/COPD   • Bilateral ovarian cysts    • Coronary artery disease    • Depression with anxiety     Depression/Anxiety   • Diabetes     pre   • Endometriosis     Dr. Jin; estradiol    • Fatigue    • Headache     Headaches   • High cholesterol    • History of gallstones    • History of myocardial infarction    • Hypertension    • Thyroid  disorder    • Urinary leakage    • UTI (urinary tract infection)    • Yeast dermatitis        Allergies   Allergen Reactions   • Amlodipine Swelling   • Reglan [Metoclopramide]        Past Surgical History:   Procedure Laterality Date   • BREAST BIOPSY Right 1991   • CAROTID STENT      Transcath    • CAROTID STENT Left    • CHOLECYSTECTOMY     • CYSTOSTOMY W/ BLADDER BIOPSY     • DILATATION AND CURETTAGE     • LAPAROSCOPIC CHOLECYSTECTOMY  1994    Lap rolando   • OOPHORECTOMY     • PAP SMEAR  05/10/2016   • PARTIAL HYSTERECTOMY         Family History   Problem Relation Age of Onset   • Cancer Other    • Diabetes Other    • Heart attack Other    • Hyperlipidemia Other    • Hypertension Other    • Osteoporosis Other    • Stroke Other    • Breast cancer Mother    • Osteoporosis Mother    • Colon cancer Father        Social History     Social History   • Marital status:      Spouse name: N/A   • Number of children: N/A   • Years of education: N/A     Social History Main Topics   • Smoking status: Current Every Day Smoker     Packs/day: 0.50     Types: Cigarettes   • Smokeless tobacco: Never Used   • Alcohol use 0.6 oz/week     1 Glasses of wine per week      Comment: occasional   • Drug use: No   • Sexual activity: Defer     Other Topics Concern   • None     Social History Narrative           Objective   Physical Exam   Constitutional: She appears well-developed. No distress.   Appears stated age.   HENT:   Head: Normocephalic and atraumatic.   Eyes: EOM are normal. Pupils are equal, round, and reactive to light.   Neck: Normal range of motion.   Cardiovascular: Normal rate, regular rhythm, normal heart sounds and intact distal pulses.    Pulmonary/Chest: Effort normal and breath sounds normal.   Abdominal: Bowel sounds are normal. She exhibits distension.   Musculoskeletal: Normal range of motion. She exhibits edema.   Significant abdominal distention, 2+ bilateral pitting edema in the lower extremities  bilaterally.   Neurological: She is alert.   Skin: Skin is warm and dry.   Three areas of open sores on the lower abdomen. No evidence of infection.   Psychiatric: She has a normal mood and affect. Her behavior is normal. Judgment and thought content normal.   Nursing note and vitals reviewed.      Procedures         ED Course  ED Course      Given Rocephin 1 gram IM. Will send her home with Cephalexin 500 mg tid for 10 days. She has plenty of dressing supplies to cover the wounds. If her symptoms worsen over the weekend, she will return to the ER.            MDM    Final diagnoses:   Distended abdomen   Abrasion of abdominal wall, initial encounter            Taylor Roa, APRN  08/11/17 2015

## 2017-08-24 ENCOUNTER — HOSPITAL ENCOUNTER (OUTPATIENT)
Dept: MRI IMAGING | Facility: HOSPITAL | Age: 67
Discharge: HOME | End: 2017-08-24

## 2017-08-24 ENCOUNTER — HOSPITAL ENCOUNTER (OUTPATIENT)
Dept: CARDIOLOGY | Facility: HOSPITAL | Age: 67
Discharge: HOME | End: 2017-08-24
Admitting: PHYSICIAN ASSISTANT

## 2017-08-24 ENCOUNTER — HOSPITAL ENCOUNTER (OUTPATIENT)
Dept: CARDIOLOGY | Facility: HOSPITAL | Age: 67
Discharge: HOME | End: 2017-08-24

## 2017-08-24 ENCOUNTER — TRANSCRIBE ORDERS (OUTPATIENT)
Dept: PHYSICAL THERAPY | Facility: HOSPITAL | Age: 67
End: 2017-08-24

## 2017-08-24 DIAGNOSIS — I63.9 CEREBROVASCULAR ACCIDENT (CVA), UNSPECIFIED MECHANISM (HCC): ICD-10-CM

## 2017-08-24 DIAGNOSIS — L30.4 INTERTRIGO: Primary | ICD-10-CM

## 2017-08-24 LAB
BH CV ECHO MEAS - AO MAX PG (FULL): 9.4 MMHG
BH CV ECHO MEAS - AO MAX PG: 16 MMHG
BH CV ECHO MEAS - AO MEAN PG (FULL): 4.7 MMHG
BH CV ECHO MEAS - AO MEAN PG: 8.2 MMHG
BH CV ECHO MEAS - AO V2 MAX: 199.8 CM/SEC
BH CV ECHO MEAS - AO V2 MEAN: 133 CM/SEC
BH CV ECHO MEAS - AO V2 VTI: 40.5 CM
BH CV ECHO MEAS - AVA(I,A): 1.8 CM^2
BH CV ECHO MEAS - AVA(I,D): 1.8 CM^2
BH CV ECHO MEAS - AVA(V,A): 1.6 CM^2
BH CV ECHO MEAS - AVA(V,D): 1.6 CM^2
BH CV ECHO MEAS - BSA(HAYCOCK): 2.1 M^2
BH CV ECHO MEAS - BSA(HAYCOCK): 2.2 M^2
BH CV ECHO MEAS - BSA: 2 M^2
BH CV ECHO MEAS - BSA: 2 M^2
BH CV ECHO MEAS - BZI_BMI: 40.8 KILOGRAMS/M^2
BH CV ECHO MEAS - BZI_BMI: 43.6 KILOGRAMS/M^2
BH CV ECHO MEAS - BZI_METRIC_HEIGHT: 152.4 CM
BH CV ECHO MEAS - BZI_METRIC_HEIGHT: 157.5 CM
BH CV ECHO MEAS - BZI_METRIC_WEIGHT: 101.2 KG
BH CV ECHO MEAS - BZI_METRIC_WEIGHT: 101.2 KG
BH CV ECHO MEAS - CONTRAST EF (2CH): 63 ML/M^2
BH CV ECHO MEAS - CONTRAST EF 4CH: 76.9 ML/M^2
BH CV ECHO MEAS - EDV(CUBED): 62.8 ML
BH CV ECHO MEAS - EDV(MOD-SP2): 81 ML
BH CV ECHO MEAS - EDV(MOD-SP4): 65 ML
BH CV ECHO MEAS - EDV(TEICH): 69 ML
BH CV ECHO MEAS - EF(CUBED): 74.7 %
BH CV ECHO MEAS - EF(MOD-SP2): 63 %
BH CV ECHO MEAS - EF(MOD-SP4): 76.9 %
BH CV ECHO MEAS - EF(TEICH): 67.1 %
BH CV ECHO MEAS - ESV(CUBED): 15.9 ML
BH CV ECHO MEAS - ESV(MOD-SP2): 30 ML
BH CV ECHO MEAS - ESV(MOD-SP4): 15 ML
BH CV ECHO MEAS - ESV(TEICH): 22.7 ML
BH CV ECHO MEAS - FS: 36.7 %
BH CV ECHO MEAS - IVS/LVPW: 0.9
BH CV ECHO MEAS - IVSD: 1.1 CM
BH CV ECHO MEAS - LA DIMENSION: 3.9 CM
BH CV ECHO MEAS - LAT PEAK E' VEL: 7.7 CM/SEC
BH CV ECHO MEAS - LV DIASTOLIC VOL/BSA (35-75): 33.2 ML/M^2
BH CV ECHO MEAS - LV MASS(C)D: 162.5 GRAMS
BH CV ECHO MEAS - LV MASS(C)DI: 83.1 GRAMS/M^2
BH CV ECHO MEAS - LV MAX PG: 6.5 MMHG
BH CV ECHO MEAS - LV MEAN PG: 3.5 MMHG
BH CV ECHO MEAS - LV SYSTOLIC VOL/BSA (12-30): 7.7 ML/M^2
BH CV ECHO MEAS - LV V1 MAX: 127.7 CM/SEC
BH CV ECHO MEAS - LV V1 MEAN: 86.3 CM/SEC
BH CV ECHO MEAS - LV V1 VTI: 28.2 CM
BH CV ECHO MEAS - LVIDD: 4 CM
BH CV ECHO MEAS - LVIDS: 2.5 CM
BH CV ECHO MEAS - LVLD AP2: 7.3 CM
BH CV ECHO MEAS - LVLD AP4: 6.6 CM
BH CV ECHO MEAS - LVLS AP2: 6 CM
BH CV ECHO MEAS - LVLS AP4: 4.9 CM
BH CV ECHO MEAS - LVOT AREA (M): 2.5 CM^2
BH CV ECHO MEAS - LVOT AREA: 2.5 CM^2
BH CV ECHO MEAS - LVOT DIAM: 1.8 CM
BH CV ECHO MEAS - LVPWD: 1.3 CM
BH CV ECHO MEAS - MED PEAK E' VEL: 5.54 CM/SEC
BH CV ECHO MEAS - MV A MAX VEL: 100 CM/SEC
BH CV ECHO MEAS - MV E MAX VEL: 87 CM/SEC
BH CV ECHO MEAS - MV E/A: 0.87
BH CV ECHO MEAS - PA ACC SLOPE: 1937 CM/SEC^2
BH CV ECHO MEAS - PA ACC TIME: 0.07 SEC
BH CV ECHO MEAS - PA MAX PG: 6.7 MMHG
BH CV ECHO MEAS - PA PR(ACCEL): 48.9 MMHG
BH CV ECHO MEAS - PA V2 MAX: 129.2 CM/SEC
BH CV ECHO MEAS - SI(CUBED): 24 ML/M^2
BH CV ECHO MEAS - SI(LVOT): 36.8 ML/M^2
BH CV ECHO MEAS - SI(MOD-SP2): 26.1 ML/M^2
BH CV ECHO MEAS - SI(MOD-SP4): 25.6 ML/M^2
BH CV ECHO MEAS - SI(TEICH): 23.7 ML/M^2
BH CV ECHO MEAS - SV(CUBED): 46.9 ML
BH CV ECHO MEAS - SV(LVOT): 72 ML
BH CV ECHO MEAS - SV(MOD-SP2): 51 ML
BH CV ECHO MEAS - SV(MOD-SP4): 50 ML
BH CV ECHO MEAS - SV(TEICH): 46.3 ML
BH CV ECHO MEAS - TAPSE (>1.6): 2.9 CM2
BH CV VAS BP RIGHT ARM: NORMAL MMHG
BH CV XLRA - RV BASE: 2.8 CM
BH CV XLRA - RV LENGTH: 8.3 CM
BH CV XLRA - RV MID: 2.5 CM
BH CV XLRA - TDI S': 13.3 CM/SEC
BH CV XLRA MEAS LEFT CCA RATIO VEL: 145 CM/SEC
BH CV XLRA MEAS LEFT DIST CCA EDV: 53.4 CM/SEC
BH CV XLRA MEAS LEFT DIST CCA PSV: 145.4 CM/SEC
BH CV XLRA MEAS LEFT DIST ICA EDV: 57.9 CM/SEC
BH CV XLRA MEAS LEFT DIST ICA PSV: 172.9 CM/SEC
BH CV XLRA MEAS LEFT ICA RATIO VEL: 183 CM/SEC
BH CV XLRA MEAS LEFT ICA/CCA RATIO: 1.3
BH CV XLRA MEAS LEFT MID ICA EDV: 65.8 CM/SEC
BH CV XLRA MEAS LEFT MID ICA PSV: 183.7 CM/SEC
BH CV XLRA MEAS LEFT PROX CCA EDV: 34.6 CM/SEC
BH CV XLRA MEAS LEFT PROX CCA PSV: 84.9 CM/SEC
BH CV XLRA MEAS LEFT PROX ECA PSV: 33.3 CM/SEC
BH CV XLRA MEAS LEFT PROX ICA EDV: 38.5 CM/SEC
BH CV XLRA MEAS LEFT PROX ICA PSV: 126.5 CM/SEC
BH CV XLRA MEAS LEFT PROX SCLA PSV: 175.8 CM/SEC
BH CV XLRA MEAS LEFT VERTEBRAL A EDV: 20.6 CM/SEC
BH CV XLRA MEAS LEFT VERTEBRAL A PSV: 87.4 CM/SEC
BH CV XLRA MEAS RIGHT CCA RATIO VEL: 90.4 CM/SEC
BH CV XLRA MEAS RIGHT DIST CCA EDV: 22.8 CM/SEC
BH CV XLRA MEAS RIGHT DIST CCA PSV: 91.1 CM/SEC
BH CV XLRA MEAS RIGHT DIST ICA EDV: 32.4 CM/SEC
BH CV XLRA MEAS RIGHT DIST ICA PSV: 102.1 CM/SEC
BH CV XLRA MEAS RIGHT ICA RATIO VEL: 258 CM/SEC
BH CV XLRA MEAS RIGHT ICA/CCA RATIO: 2.9
BH CV XLRA MEAS RIGHT MID ICA EDV: 53.4 CM/SEC
BH CV XLRA MEAS RIGHT MID ICA PSV: 161.9 CM/SEC
BH CV XLRA MEAS RIGHT PROX CCA EDV: 21.2 CM/SEC
BH CV XLRA MEAS RIGHT PROX CCA PSV: 135.9 CM/SEC
BH CV XLRA MEAS RIGHT PROX ECA PSV: 135.1 CM/SEC
BH CV XLRA MEAS RIGHT PROX ICA EDV: 83.3 CM/SEC
BH CV XLRA MEAS RIGHT PROX ICA PSV: 259.3 CM/SEC
BH CV XLRA MEAS RIGHT PROX SCLA PSV: 135.1 CM/SEC
BH CV XLRA MEAS RIGHT VERTEBRAL A EDV: 24.6 CM/SEC
BH CV XLRA MEAS RIGHT VERTEBRAL A PSV: 76.6 CM/SEC
E/E' RATIO: 11.3
LEFT ATRIUM VOLUME INDEX: 24.6 ML/M2

## 2017-08-24 PROCEDURE — C8929 TTE W OR WO FOL WCON,DOPPLER: HCPCS

## 2017-08-24 PROCEDURE — 93880 EXTRACRANIAL BILAT STUDY: CPT | Performed by: INTERNAL MEDICINE

## 2017-08-24 PROCEDURE — 70547 MR ANGIOGRAPHY NECK W/O DYE: CPT

## 2017-08-24 PROCEDURE — 70544 MR ANGIOGRAPHY HEAD W/O DYE: CPT

## 2017-08-24 PROCEDURE — 93880 EXTRACRANIAL BILAT STUDY: CPT

## 2017-08-24 PROCEDURE — 93306 TTE W/DOPPLER COMPLETE: CPT | Performed by: INTERNAL MEDICINE

## 2017-08-24 PROCEDURE — 25010000002 SULFUR HEXAFLUORIDE MICROSPH 60.7-25 MG RECONSTITUTED SUSPENSION: Performed by: PHYSICIAN ASSISTANT

## 2017-08-24 RX ADMIN — SULFUR HEXAFLUORIDE 2 ML: KIT at 16:15

## 2017-08-25 ENCOUNTER — TELEPHONE (OUTPATIENT)
Dept: NEUROLOGY | Facility: CLINIC | Age: 67
End: 2017-08-25

## 2017-08-25 NOTE — TELEPHONE ENCOUNTER
----- Message from Eusebia Yanez PA-C sent at 8/25/2017  3:21 PM EDT -----  Elizabeth,    Would you care to let Ms. Lopez know that we received the results of her echo and carotids. Her echo was unremarkable. Her carotids were notable for some narrowing. However, we do not feel that this is correlated with her MRI findings or her symptoms. Given this, I will forward the results to her PCP who may or may not want to recheck the carotids every few years. Otherwise, I do not have any new recommendations. Hope that makes sense. Thanks!       ----- Message -----     From: Lito Flores MD     Sent: 8/24/2017   6:39 PM       To: Eusebia Yanez PA-C

## 2017-08-29 DIAGNOSIS — I65.22 INTRACRANIAL CAROTID STENOSIS, LEFT: Primary | ICD-10-CM

## 2017-08-31 ENCOUNTER — HOSPITAL ENCOUNTER (OUTPATIENT)
Dept: PHYSICAL THERAPY | Facility: HOSPITAL | Age: 67
Setting detail: THERAPIES SERIES
Discharge: HOME | End: 2017-08-31

## 2017-08-31 DIAGNOSIS — T81.30XD: Primary | ICD-10-CM

## 2017-08-31 PROCEDURE — 97597 DBRDMT OPN WND 1ST 20 CM/<: CPT

## 2017-08-31 PROCEDURE — G8990 OTHER PT/OT CURRENT STATUS: HCPCS

## 2017-08-31 PROCEDURE — G8991 OTHER PT/OT GOAL STATUS: HCPCS

## 2017-08-31 PROCEDURE — 97162 PT EVAL MOD COMPLEX 30 MIN: CPT

## 2017-09-01 ENCOUNTER — APPOINTMENT (OUTPATIENT)
Dept: LAB | Facility: HOSPITAL | Age: 67
End: 2017-09-01

## 2017-09-01 ENCOUNTER — TRANSCRIBE ORDERS (OUTPATIENT)
Dept: LAB | Facility: HOSPITAL | Age: 67
End: 2017-09-01

## 2017-09-01 DIAGNOSIS — R60.9 EDEMA, UNSPECIFIED TYPE: Primary | ICD-10-CM

## 2017-09-01 LAB
ALBUMIN SERPL-MCNC: 4 G/DL (ref 3.2–4.8)
ALP SERPL-CCNC: 111 U/L (ref 25–100)
ALT SERPL W P-5'-P-CCNC: 25 U/L (ref 7–40)
ANION GAP SERPL CALCULATED.3IONS-SCNC: 11 MMOL/L (ref 3–11)
AST SERPL-CCNC: 25 U/L (ref 0–33)
BACTERIA UR QL AUTO: ABNORMAL /HPF
BILIRUB CONJ SERPL-MCNC: 0.1 MG/DL (ref 0–0.2)
BILIRUB INDIRECT SERPL-MCNC: 0.1 MG/DL (ref 0.1–1.1)
BILIRUB SERPL-MCNC: 0.2 MG/DL (ref 0.3–1.2)
BILIRUB UR QL STRIP: NEGATIVE
BUN BLD-MCNC: 61 MG/DL (ref 9–23)
BUN/CREAT SERPL: 33.9 (ref 7–25)
CALCIUM SPEC-SCNC: 9.6 MG/DL (ref 8.7–10.4)
CHLORIDE SERPL-SCNC: 87 MMOL/L (ref 99–109)
CLARITY UR: ABNORMAL
CO2 SERPL-SCNC: 39 MMOL/L (ref 20–31)
COLOR UR: YELLOW
CREAT BLD-MCNC: 1.8 MG/DL (ref 0.6–1.3)
CREAT UR-MCNC: 77.7 MG/DL
GFR SERPL CREATININE-BSD FRML MDRD: 28 ML/MIN/1.73
GLUCOSE BLD-MCNC: 132 MG/DL (ref 70–100)
GLUCOSE UR STRIP-MCNC: NEGATIVE MG/DL
HGB UR QL STRIP.AUTO: NEGATIVE
HYALINE CASTS UR QL AUTO: ABNORMAL /LPF
KETONES UR QL STRIP: NEGATIVE
LEUKOCYTE ESTERASE UR QL STRIP.AUTO: ABNORMAL
NITRITE UR QL STRIP: NEGATIVE
PH UR STRIP.AUTO: 5.5 [PH] (ref 5–8)
PHOSPHATE SERPL-MCNC: 4.3 MG/DL (ref 2.4–5.1)
POTASSIUM BLD-SCNC: 3.1 MMOL/L (ref 3.5–5.5)
PROT SERPL-MCNC: 6.7 G/DL (ref 5.7–8.2)
PROT UR QL STRIP: NEGATIVE
RBC # UR: ABNORMAL /HPF
REF LAB TEST METHOD: ABNORMAL
SODIUM BLD-SCNC: 137 MMOL/L (ref 132–146)
SP GR UR STRIP: 1.02 (ref 1–1.03)
SQUAMOUS #/AREA URNS HPF: ABNORMAL /HPF
UROBILINOGEN UR QL STRIP: ABNORMAL
WBC UR QL AUTO: ABNORMAL /HPF

## 2017-09-01 PROCEDURE — 87077 CULTURE AEROBIC IDENTIFY: CPT | Performed by: INTERNAL MEDICINE

## 2017-09-01 PROCEDURE — 87086 URINE CULTURE/COLONY COUNT: CPT | Performed by: INTERNAL MEDICINE

## 2017-09-01 PROCEDURE — 87186 SC STD MICRODIL/AGAR DIL: CPT | Performed by: INTERNAL MEDICINE

## 2017-09-01 PROCEDURE — 36415 COLL VENOUS BLD VENIPUNCTURE: CPT | Performed by: INTERNAL MEDICINE

## 2017-09-01 PROCEDURE — 84100 ASSAY OF PHOSPHORUS: CPT | Performed by: INTERNAL MEDICINE

## 2017-09-01 PROCEDURE — 80048 BASIC METABOLIC PNL TOTAL CA: CPT | Performed by: INTERNAL MEDICINE

## 2017-09-01 PROCEDURE — 82043 UR ALBUMIN QUANTITATIVE: CPT | Performed by: INTERNAL MEDICINE

## 2017-09-01 PROCEDURE — 80076 HEPATIC FUNCTION PANEL: CPT | Performed by: INTERNAL MEDICINE

## 2017-09-01 PROCEDURE — 81001 URINALYSIS AUTO W/SCOPE: CPT | Performed by: INTERNAL MEDICINE

## 2017-09-01 PROCEDURE — 82570 ASSAY OF URINE CREATININE: CPT | Performed by: INTERNAL MEDICINE

## 2017-09-03 LAB — MICROALBUMIN UR-MCNC: 8.5 UG/ML

## 2017-09-04 LAB — BACTERIA SPEC AEROBE CULT: ABNORMAL

## 2017-09-06 ENCOUNTER — HOSPITAL ENCOUNTER (OUTPATIENT)
Dept: PHYSICAL THERAPY | Facility: HOSPITAL | Age: 67
Setting detail: THERAPIES SERIES
Discharge: HOME | End: 2017-09-06

## 2017-09-06 DIAGNOSIS — T81.30XD: Primary | ICD-10-CM

## 2017-09-06 PROCEDURE — 97597 DBRDMT OPN WND 1ST 20 CM/<: CPT

## 2017-09-06 NOTE — THERAPY WOUND CARE TREATMENT
Outpatient Rehabilitation - Wound/Debridement Treatment Note  [facility]     Patient Name: Vidhi Lopez  : 1950  MRN: [id number]  Today's Date: 2017            R abdomen      L abdomen      Admit Date: 2017    Visit Dx:    ICD-10-CM ICD-9-CM   1. Abdominal wall dehiscence, subsequent encounter T81.30XD V58.89     998.30       Patient Active Problem List   Diagnosis   • Dyslipidemia   • Hypertension   • Anxiety   • Insomnia   • GERD (gastroesophageal reflux disease)   • Diabetes mellitus   • Fibromyalgia   • RLS (restless legs syndrome)   • Migraines   • Asthma   • COPD (chronic obstructive pulmonary disease)   • Interstitial cystitis   • History of chronic bronchitis   • History of acute myocardial infarction   • History of cardiac murmur   • History of stroke   • CAD (coronary artery disease)   • Essential hypertension        Past Medical History:   Diagnosis Date   • Allergic rhinitis    • Asthma with COPD     Asthma/COPD   • Bilateral ovarian cysts    • Coronary artery disease    • Depression with anxiety     Depression/Anxiety   • Diabetes     pre   • Endometriosis     Dr. Jin; estradiol    • Fatigue    • Headache     Headaches   • High cholesterol    • History of gallstones    • History of myocardial infarction    • Hypertension    • Thyroid disorder    • Urinary leakage    • UTI (urinary tract infection)    • Yeast dermatitis         Past Surgical History:   Procedure Laterality Date   • BREAST BIOPSY Right    • CAROTID STENT      Transcath    • CAROTID STENT Left    • CHOLECYSTECTOMY     • CYSTOSTOMY W/ BLADDER BIOPSY     • DILATATION AND CURETTAGE     • LAPAROSCOPIC CHOLECYSTECTOMY      Lap rolando   • OOPHORECTOMY     • PAP SMEAR  05/10/2016   • PARTIAL HYSTERECTOMY           EVALUATION        PT Ortho       17 1020    Subjective Comments    Subjective Comments Pt reports she didn't change the bandages because she could feel the wounds getting better and they never got  wet. Also reports she has regained most of the fluid retention weight and her doctors don't know why. They did find blockage in both carotid arteries and other arteries in her head/neck via MRA, but they do not consider this related to her fluid retention.  -MC    Subjective Pain    Able to rate subjective pain? yes  -MC    Pre-Treatment Pain Level 0  -MC    Post-Treatment Pain Level 0  -MC    Transfers    Transfers, Sit-Stand [place] independent  -MC    Transfers, Stand-Sit [place] independent  -MC    Transfer, Comment supine on stretcher for tx  -MC    Gait Assessment/Treatment    Gait, [place] Level independent  -      User Key  (r) = Recorded By, (t) = Taken By, (c) = Cosigned By    Initials Name Provider Type     Miracle Jeff, PT Physical Therapist                    [facility] Wound       09/06/17 1020          Wound 08/31/17 0950 Right lateral abdomen skin tear;other (see comments)    Wound - Properties Group Date first assessed: 08/31/17  - Time first assessed: 0950  -MC Side: Right  -MC Orientation: lateral  -MC Location: abdomen  -MC Type: skin tear;other (see comments)  - Additional Comments: dehisced stretch emile  -MC    Wound WDL WDL  -      Dressing Appearance dry;intact  -MC      Base dry;pink;closed/resurfaced   appears closed today  -      Periwound Area intact;pink;dry;redness  -MC      Drainage Amount none  -      Picture taken yes  -      Wound Cleaning cleansed with;other (see comments)   phase one  -      Dressing open to air  -      Periwound Care cleansed with pH balanced cleanser;dry periwound area maintained  -      Wound 08/31/17 0950 Right lower abdomen skin tear;other (see comments)    Wound - Properties Group Date first assessed: 08/31/17  - Time first assessed: 0950  -MC Side: Right  -MC Orientation: lower  -MC Location: abdomen  -MC Type: skin tear;other (see comments)  - Additional Comments: dehisced stretch emile  -MC    Wound WDL ex   periwound  "redness, scarring  -MC      Dressing Appearance dry;intact  -MC      Base clean;moist;pink;white   white fascia v. connective tissue  -MC      Periwound Area intact;pink;dry;redness   scarring, small satellite area with redness  -MC      Edges open  -MC      Length (cm) 0.7  -MC      Width (cm) 1.3  -MC      Depth (cm) 0.1  -MC      Drainage Characteristics/Odor serosanguineous;no odor  -MC      Drainage Amount small  -MC      Picture taken yes  -MC      Wound Cleaning cleansed with;other (see comments)   phase one  -      Wound Interventions debrided  -MC      Dressing Dressing applied;other (see comments);low-adherent;foam   hydrofera blue ready, 6\" optifoam  -MC      Periwound Care cleansed with pH balanced cleanser;dry periwound area maintained  -MC      Wound 08/31/17 0950 Left medial abdomen skin tear;other (see comments)    Wound - Properties Group Date first assessed: 08/31/17  - Time first assessed: 0950  - Side: Left  - Orientation: medial  - Location: abdomen  - Type: skin tear;other (see comments)  - Additional Comments: dehisced stretch emile  -MC    Wound WDL ex   periwound redness, scarring  -MC      Dressing Appearance dry;intact  -MC      Base clean;moist;pink  -MC      Periwound Area intact;pink;dry;redness   scarring  -MC      Edges open;jagged  -MC      Length (cm) 0.2  -MC      Width (cm) 0.9  -MC      Depth (cm) 0.1  -MC      Drainage Characteristics/Odor serosanguineous;no odor  -MC      Drainage Amount scant  -MC      Picture taken yes  -MC      Wound Cleaning cleansed with;other (see comments)   phase one  -      Wound Interventions debrided  -MC      Dressing Dressing applied;other (see comments);low-adherent   hydrofera blue ready, 6\" optifoam gentle both L wounds  -MC      Periwound Care cleansed with pH balanced cleanser;dry periwound area maintained  -MC      Wound 08/31/17 0950 Left lower abdomen skin tear;other (see comments)    Wound - Properties Group Date first " "assessed: 08/31/17  - Time first assessed: 0950  - Side: Left  - Orientation: lower  - Location: abdomen  -MC Type: skin tear;other (see comments)  - Additional Comments: dehisced stretch emile  -MC    Wound WDL ex   periwound redness, scarring  -MC      Dressing Appearance dry;intact  -MC      Base clean;moist;pink;white   connective tissue  -MC      Periwound Area intact;pink;dry;redness   scarring  -MC      Edges jagged;open  -MC      Length (cm) 0.6  -MC      Width (cm) 0.6  -MC      Depth (cm) 0.1  -MC      Drainage Characteristics/Odor serosanguineous;no odor  -MC      Drainage Amount scant  -MC      Picture taken yes  -MC      Wound Cleaning cleansed with;other (see comments)   phase one  -MC      Wound Interventions debrided  -MC      Dressing Dressing applied;other (see comments)   hydrofera blue ready, 6\" optifoam  -MC      Periwound Care cleansed with pH balanced cleanser;dry periwound area maintained  -MC      Wound 08/31/17 0950 Left lateral abdomen skin tear;other (see comments)    Wound - Properties Group Date first assessed: 08/31/17  [facility] [place] first assessed: 0950  - Side: Left  - Orientation: lateral  - Location: abdomen  -MC Type: skin tear;other (see comments)  - Additional Comments: dehisced stretch emile  -MC    Wound WDL WDL  -MC      Dressing Appearance dry;intact  -MC      Base dry;epithelialization;pink;closed/resurfaced   appears closed today  -      Periwound Area intact;pink;dry;redness   scarring  -MC      Edges open  -MC      Drainage Amount none  -MC      Wound Cleaning cleansed with;other (see comments)   phase one  -      Dressing open to air  -MC      Periwound Care cleansed with pH balanced cleanser;dry periwound area maintained  -        User Key  (r) = Recorded By, (t) = Taken By, (c) = Cosigned By    Initials Name Provider Type    FUNMILAYO Jeff PT Physical Therapist            WOUND DEBRIDEMENT  Debridement Site 1  Location- Site 1: ABD " wounds  Selective Debridement- Site 1: Wound Surface <20cmsq (about 3 cm2 total)  Instruments- Site 1: tweezers  Excised Tissue Description- Site 1: minimum, slough, other (comment) (dried exudate)  Bleeding- Site 1: none                   Therapy Education       09/06/17 1020          Therapy Education    Education Details Pt may leave dressings in place until next treatment if not disrupted or soiled. Discussed potential need for MIST, and expectation of slow healing d/t diabetes and fluid retention placing stress on surrounding tissue.  -      Given Symptoms/condition management;Bandaging/dressing change  -      Program Reinforced  -      How Provided Verbal;Demonstration  -MC      Provided to Patient  -      Level of Understanding Verbalized  -        User Key  (r) = Recorded By, (t) = Taken By, (c) = Cosigned By    Initials Name Provider Type     Miracle Jeff, PT Physical Therapist          Recommendation and Plan        PT Assessment/Plan       09/06/17 1020       PT Assessment    Functional Limitations Other (comment)   wound mgmt  -MC     Impairments Edema;Integumentary integrity  -     Assessment Comments Pt demonstrates some improvements, with the most lateral wounds on both sides appearing closed today. The R abd wound has stable measurements, while the L abd wounds have decreased slightly since treatment last week. The lower open wounds both have connective tissue in the base of the wound, which may require MIST to increase blood flow, promote granulation, and promote healing. Pt understands that her diabetes may slow the healing process, and that the retained fluid may place stress on the abd tissue and slow healing as well.  -     Rehab Potential Good  -     Patient/caregiver participated in establishment of treatment plan and goals Yes  -MC     Patient would benefit from skilled therapy intervention Yes  -MC     PT Plan    PT Frequency 1x/week   adjust if MIST initiated  -      Physical Therapy Interventions (Optional Details) patient/family education;wound care  -     PT Plan Comments debridement, dressings management, MIST if clinically warranted  -       User Key  (r) = Recorded By, (t) = Taken By, (c) = Cosigned By    Initials Name Provider Type    FUNMILAYO Jeff, PT Physical Therapist          Goals        PT OP Goals       09/06/17 1020       Time Calculation    PT Goal Re-Cert Due Date 09/30/17  -       User Key  (r) = Recorded By, (t) = Taken By, (c) = Cosigned By    Initials Name Provider Type    FUNMILAYO Jeff, PT Physical Therapist          PT Goal Re-Cert Due Date: 09/30/17            Time Calculation: Start Time: 1020    Therapy Charges for Today     Code Description Service Date Service Provider Modifiers Qty    [id number] HC M[facility] DEBRIDE OPEN WOUND UP TO 20CM 9/6/2017 Miracle Jeff, PT GP 1                Miracle Jeff, PT  9/6/2017

## 2017-09-07 ENCOUNTER — APPOINTMENT (OUTPATIENT)
Dept: LAB | Facility: HOSPITAL | Age: 67
End: 2017-09-07

## 2017-09-07 ENCOUNTER — TRANSCRIBE ORDERS (OUTPATIENT)
Dept: LAB | Facility: HOSPITAL | Age: 67
End: 2017-09-07

## 2017-09-07 DIAGNOSIS — E87.6 HYPOKALEMIA: Primary | ICD-10-CM

## 2017-09-07 LAB
ANION GAP SERPL CALCULATED.3IONS-SCNC: 9 MMOL/L (ref 3–11)
BUN BLD-MCNC: 31 MG/DL (ref 9–23)
BUN/CREAT SERPL: 28.2 (ref 7–25)
CALCIUM SPEC-SCNC: 9.9 MG/DL (ref 8.7–10.4)
CHLORIDE SERPL-SCNC: 90 MMOL/L (ref 99–109)
CO2 SERPL-SCNC: 36 MMOL/L (ref 20–31)
CREAT BLD-MCNC: 1.1 MG/DL (ref 0.6–1.3)
GFR SERPL CREATININE-BSD FRML MDRD: 50 ML/MIN/1.73
GLUCOSE BLD-MCNC: 189 MG/DL (ref 70–100)
POTASSIUM BLD-SCNC: 4.1 MMOL/L (ref 3.5–5.5)
SODIUM BLD-SCNC: 135 MMOL/L (ref 132–146)

## 2017-09-07 PROCEDURE — 36415 COLL VENOUS BLD VENIPUNCTURE: CPT | Performed by: INTERNAL MEDICINE

## 2017-09-07 PROCEDURE — 80048 BASIC METABOLIC PNL TOTAL CA: CPT | Performed by: INTERNAL MEDICINE

## 2017-09-13 ENCOUNTER — HOSPITAL ENCOUNTER (OUTPATIENT)
Dept: PHYSICAL THERAPY | Facility: HOSPITAL | Age: 67
Setting detail: THERAPIES SERIES
Discharge: HOME | End: 2017-09-13

## 2017-09-13 DIAGNOSIS — T81.30XD: Primary | ICD-10-CM

## 2017-09-13 DIAGNOSIS — N28.9 RENAL INSUFFICIENCY: ICD-10-CM

## 2017-09-13 DIAGNOSIS — R60.1 GENERALIZED EDEMA: Primary | ICD-10-CM

## 2017-09-13 PROCEDURE — 97610 LOW FREQUENCY NON-THERMAL US: CPT

## 2017-09-13 PROCEDURE — 97597 DBRDMT OPN WND 1ST 20 CM/<: CPT

## 2017-09-19 ENCOUNTER — OFFICE VISIT (OUTPATIENT)
Dept: CARDIOLOGY | Facility: CLINIC | Age: 67
End: 2017-09-19

## 2017-09-19 VITALS
SYSTOLIC BLOOD PRESSURE: 123 MMHG | WEIGHT: 224.8 LBS | HEART RATE: 68 BPM | DIASTOLIC BLOOD PRESSURE: 68 MMHG | BODY MASS INDEX: 38.38 KG/M2 | HEIGHT: 64 IN

## 2017-09-19 DIAGNOSIS — R60.1 GENERALIZED EDEMA: Primary | ICD-10-CM

## 2017-09-19 DIAGNOSIS — I65.23 BILATERAL CAROTID ARTERY STENOSIS: ICD-10-CM

## 2017-09-19 DIAGNOSIS — N18.2 CKD (CHRONIC KIDNEY DISEASE) STAGE 2, GFR 60-89 ML/MIN: ICD-10-CM

## 2017-09-19 PROCEDURE — 99214 OFFICE O/P EST MOD 30 MIN: CPT | Performed by: INTERNAL MEDICINE

## 2017-09-19 RX ORDER — CLOTRIMAZOLE AND BETAMETHASONE DIPROPIONATE 10; .64 MG/G; MG/G
1 CREAM TOPICAL 2 TIMES DAILY
Status: ON HOLD | COMMUNITY
End: 2018-02-12

## 2017-09-19 NOTE — PROGRESS NOTES
"  OFFICE FOLLOW UP     Date of Encounter:2017     Name: Vidhi Lopez  : 1950  Address: [facility] [address] [place] [id number]  Phone: [phone]    PCP: Michael Mays MD  [address]  [facility] [address]    Vidhi Lopez is a 67 y.o. female.      Chief Complaint:  Edema/kidney disease    Problem List:   1. Carotid artery disease:  a. Remote CVA, IDB.  b. Left hemispheric TIA, 2011.  c. Emergent LICA stent, 2011.  d. \"Last\" DUS negative, 2016 ([facility]).  e. MRA head/neck, 2017: \"severe\" LICA proximal stenosis, moderate AURE stenosis  f. Duplex, 17: 50-69% AURE stenosis, <50% LICA stenosis with stent.  2. History of myocardial infarction, remote IDB:  a. Normal nuclear myocardial perfusion study, 2011.  b. Echocardiogram,  revealing normal EF.  c. Echo, normal LV, no evidence of pulm HTN, 17.  3. Hypertension.  4. Tobacco use.  5. Diabetes mellitus type 2.  6. Obesity.  7. Dyslipidemia.  8. Sinus arrhythmia.  9. Fibromyalgia.  10. Gastroesophageal reflux disease.  11. Status post operations, remote.  12. Anxiety disorder.  13. Chronic kidney disease, Stage 2  a. Chronic cystitis  b. ?Secondary to NSAIDS  c. Diuretic induced per RAMYA Mcginnis, 17.  14. \"New onset\" lower extremity edema, May 2015:    a. Treated with Lasix 20 mg daily with resolution.                          b. Recurrent LE edema summer 2017                                                                                              Allergies:  Allergies   Allergen Reactions   • Amlodipine Swelling   • Reglan [Metoclopramide]        Current Medications:  •  Alprazolam 0.5 MG by mouth 2 (Two) Times a Day As Needed.  •  atorvastatin 80 MG by mouth daily  •  bumetanide 0.5 MG by mouth 2 (Two) Times a Day :   •  gabapentin 800 MG by mouth 2 (two) times a day  •  linagliptin 5 MG by mouth daily.  •  metformin 500 MG by mouth 2 (Two) Times a Day With Meals.   •  Methenamine 1 G " "tablet, Take 1 g by mouth 2 (Two) Times a Day With Meals.  •  metoprolol succinate XL 50 MG by mouth 2 (Two) Times a Day.  •  Multiple Vitamins-Minerals by mouth daily  •  Quetiapine 25 MG  by mouth every night  •  tizanidine 4 MG- 2 mg by mouth 2 (Two) Times a Day As Needed.,  •  vitamin D3 5000 UNITS -10,000 Units by mouth Daily.     History of Present Illness: Over the last several months/weeks the patient has had progressively severe lower extremity edema as well as increasing abdominal girth with weight gain.  She has as well had intermittent elevations in serum creatinine that have temporally been related to trials of diuretics and uncertain doses.  She saw Taran Mcginnis M.D. who asked us to do further studies on her carotids, a right heart catheterization, and renal angiography.  He is well ordered a CT of the abdomen that is not done.    The following portions of the patient's history were reviewed and updated as appropriate: allergies, current medications and problem list.    HPI: Pertinent positives as listed in the HPI.  All other systems reviewed and negative.    Objective:    Vitals:    09/19/17 1101 09/19/17 1103   BP: 109/68 123/68   BP Location: Right arm Right arm   Patient Position: Sitting Standing   Pulse: 72 68   Weight: 224 lb 12.8 oz (102 kg)    Height: 64\" (162.6 cm)        Physical Exam:  GENERAL: Alert, cooperative, in no acute distress.   HEENT: Fundoscopic deferred, otherwise unremarkable.  NECK:  Cannot assess jugular venous distention (neck anatomy); no adenopathy, or thyromegaly noted.   HEART: Regular rhythm, normal rate, and no murmurs, gallops, or rubs.   LUNGS: Clear to auscultation bilaterally. No wheezing, rales or ronchi.  NEUROLOGIC: No focal abnormalities involving strength or sensation are noted.   EXTREMITIES: No clubbing, cyanosis noted. +3 LE edema present.  ABDOMEN: TIGHT with difficult examination but no definite fluid wave or organomegaly. No tenderness.    Diagnostic " "Data:    Lab Results   Component Value Date    GLUCOSE 189 (H) 09/07/2017    CALCIUM 9.9 09/07/2017     09/07/2017    K 4.1 09/07/2017    CO2 36.0 (H) 09/07/2017    CL 90 (L) 09/07/2017    BUN 31 (H) 09/07/2017    CREATININE 1.10 09/07/2017    EGFRIFNONA 50 (L) 09/07/2017    BCR 28.2 (H) 09/07/2017    ANIONGAP 9.0 09/07/2017     Lab Results   Component Value Date    HGBA1C 7.60 (H) 03/15/2017     7/25/2017:  BNP 86    Procedures      Assessment and Plan:       #1: In reference to pulmonary hypertension, the patient's echocardiogram predicts that this is not present.  The echocardiogram is, overall, \"normal\".  She does not have evidence of \"heart failure\" by a BNP or her current echocardiogram.  #2: In reference to the patient's carotid disease, her duplex does not suggest stenosis of her left internal carotid artery.  She remains asymptomatic.  Her radiology report suggest \"severe\" stenosis but does not mention this stent in place in the left internal carotid artery.  We need to review all of this data since it is possible that there has been an error in transcription or patient identification.  #3: I do think that it would be worthwhile to do a right heart catheterization to assess the patient's right heart pressures and left heart filling pressures.  Because of unexplained changes in serum creatinine, a renal angiogram will be carried out to assess renal artery stenosis.  In the meantime we can also obtain a CT of the abdomen is requested by Dr. Mcginnis.    Scribed for Lito Flores MD by Belkis Meyer RN. 9/19/2017  11:42 AM     I, Lito Flores MD, [facility], [facility], personally performed the services described in this documentation as scribed by the above named individual in my presence, and it is both accurate and complete. At 2:10 PM on 09/19/2017    EMR Dragon/Transcription Disclaimer:  Much of this encounter note is an electronic transcription/translation of spoken language to printed text.  " The electronic translation of spoken language may permit erroneous, or at times, nonsensical words or phrases to be inadvertently transcribed.  Although I have reviewed the note for such errors, some may still exist.

## 2017-09-20 ENCOUNTER — APPOINTMENT (OUTPATIENT)
Dept: PHYSICAL THERAPY | Facility: HOSPITAL | Age: 67
End: 2017-09-20

## 2017-09-20 ENCOUNTER — HOSPITAL ENCOUNTER (OUTPATIENT)
Facility: HOSPITAL | Age: 67
Setting detail: HOSPITAL OUTPATIENT SURGERY
End: 2017-09-20
Attending: INTERNAL MEDICINE | Admitting: INTERNAL MEDICINE

## 2017-09-20 DIAGNOSIS — R60.1 GENERALIZED EDEMA: Primary | ICD-10-CM

## 2017-09-20 DIAGNOSIS — I65.23 BILATERAL CAROTID ARTERY STENOSIS: ICD-10-CM

## 2017-09-20 DIAGNOSIS — N18.2 CKD (CHRONIC KIDNEY DISEASE) STAGE 2, GFR 60-89 ML/MIN: ICD-10-CM

## 2017-09-20 DIAGNOSIS — R60.1 GENERALIZED EDEMA: ICD-10-CM

## 2017-09-22 ENCOUNTER — HOSPITAL ENCOUNTER (OUTPATIENT)
Dept: PHYSICAL THERAPY | Facility: HOSPITAL | Age: 67
Setting detail: THERAPIES SERIES
Discharge: HOME | End: 2017-09-22

## 2017-09-22 DIAGNOSIS — T81.30XD: Primary | ICD-10-CM

## 2017-09-22 PROCEDURE — 97597 DBRDMT OPN WND 1ST 20 CM/<: CPT

## 2017-09-22 PROCEDURE — 97610 LOW FREQUENCY NON-THERMAL US: CPT

## 2017-09-22 NOTE — THERAPY WOUND CARE TREATMENT
Outpatient Rehabilitation - Wound/Debridement Treatment Note   [place]     Patient Name: Vidhi Lopez  : 1950  MRN: [id number]  Today's Date: 2017                Admit Date: 2017    Visit Dx:    ICD-10-CM ICD-9-CM   1. Abdominal wall dehiscence, subsequent encounter T81.30XD V58.89     998.30       Patient Active Problem List   Diagnosis   • Dyslipidemia   • Hypertension   • Anxiety   • Insomnia   • GERD (gastroesophageal reflux disease)   • Diabetes mellitus   • Fibromyalgia   • RLS (restless legs syndrome)   • Migraines   • Asthma   • COPD (chronic obstructive pulmonary disease)   • Interstitial cystitis   • History of chronic bronchitis   • History of acute myocardial infarction   • History of cardiac murmur   • History of stroke   • CAD (coronary artery disease)   • Essential hypertension   • Generalized edema   • CKD (chronic kidney disease) stage 2, GFR 60-89 ml/min   • Bilateral carotid artery stenosis        Past Medical History:   Diagnosis Date   • Allergic rhinitis    • Asthma with COPD     Asthma/COPD   • Bilateral ovarian cysts    • Coronary artery disease    • Depression with anxiety     Depression/Anxiety   • Diabetes     pre   • Endometriosis     Dr. Jin; estradiol    • Fatigue    • Headache     Headaches   • High cholesterol    • History of gallstones    • History of myocardial infarction    • Hypertension    • Thyroid disorder    • Urinary leakage    • UTI (urinary tract infection)    • Yeast dermatitis         Past Surgical History:   Procedure Laterality Date   • BREAST BIOPSY Right    • CAROTID STENT      Transcath    • CAROTID STENT Left    • CHOLECYSTECTOMY     • CYSTOSTOMY W/ BLADDER BIOPSY     • DILATATION AND CURETTAGE     • LAPAROSCOPIC CHOLECYSTECTOMY      Lap [place]   • OOPHORECTOMY     • PAP SMEAR  05/10/2016   • PARTIAL HYSTERECTOMY           EVALUATION        PT Ortho       17 [id number]    Subjective Comments    Subjective Comments Pt reports she  "now has 25+ pounds of fluid on her, which is increasing her work of breathing and making her feel more SOA. Pt has a heart cath and another procedure scheduled for Wednesday, but is concerned she may need to be admitted sooner d/t her worsening status.  -MC    Subjective Pain    Able to rate subjective pain? yes  -MC    Pre-Treatment Pain Level 0  -MC    Post-Treatment Pain Level 0  -MC    Transfers    Transfers, Sit-Stand [place] independent  -    Transfers, Stand-Sit [place] independent  -    Transfer, Comment supine on stretcher for tx  -MC    Gait Assessment/Treatment    Gait, [place] Level independent  -      User Key  (r) = Recorded By, (t) = Taken By, (c) = Cosigned By    Initials Name Provider Type     Miracle Jeff, PT Physical Therapist                    [facility] Wound       09/22/17 1445          Wound 08/31/17 0950 Right lower abdomen skin tear;other (see comments)    Wound - Properties Group Date first assessed: 08/31/17  - Time first assessed: 0950  - Side: Right  - Orientation: lower  - Location: abdomen  - Type: skin tear;other (see comments)  - Additional Comments: dehisced stretch emile  -    Wound WDL ex   periwound redness, scarring  -      Dressing Appearance dry;intact  -      Base clean;moist;pink;white   white fascia v. connective tissue  -      Periwound Area intact;pink;dry;redness   scarring, small satellite area with redness  -      Edges open  -      Length (cm) 0.3  -MC      Width (cm) 0.5  -MC      Depth (cm) 0.1  -MC      Drainage Characteristics/Odor serosanguineous;no odor  -      Drainage Amount small  -      Picture taken yes  -MC      Wound Cleaning cleansed with;other (see comments)   phase one  -      Wound Interventions debrided;other (see comments)   MIST 6 minutes  -      Dressing Dressing applied;other (see comments);low-adherent;foam   hydrofera blue ready, 6\" optifoam  -      Periwound Care cleansed with pH balanced " "cleanser;dry periwound area maintained  -      Wound 08/31/17 0950 Left medial abdomen skin tear;other (see comments)    Wound - Properties Group Date first assessed: 08/31/17  - Time first assessed: 0950  - Side: Left  - Orientation: medial  - Location: abdomen  - Type: skin tear;other (see comments)  - Additional Comments: dehisced stretch emile  -    Wound WDL ex   periwound redness, scarring  -      Dressing Appearance dry;intact  -      Base clean;pink;dry;closed/resurfaced  -      Periwound Area intact;pink;dry;redness   scarring  -      Drainage Amount none  -      Dressing Dressing applied;low-adherent   6\" optifoam over bulging stretch marks/previously open areas  -        User Key  (r) = Recorded By, (t) = Taken By, (c) = Cosigned By    Initials Name Provider Type    FUNMILAYO Jeff, PT Physical Therapist            WOUND DEBRIDEMENT  Debridement Site 1  Location- Site 1: ABD wounds  Selective Debridement- Site 1: Wound Surface <20cmsq (1 cm2)  Instruments- Site 1: tweezers  Excised Tissue Description- Site 1: minimum, slough  Bleeding- Site 1: none                   Therapy Education       09/22/17 1445          Therapy Education    Education Details Heavily encouraged pt to seek medical help sooner than Wednesday, given her worsening condition and continual SOA/MOORE. Otherwise continue with POC.  -      Given Symptoms/condition management;Bandaging/dressing change  -      Program Reinforced  -      How Provided Verbal;Demonstration  -MC      Provided to Patient  -      Level of Understanding Verbalized  -        User Key  (r) = Recorded By, (t) = Taken By, (c) = Cosigned By    Initials Name Provider Type    FUNMILAYO Jeff, PT Physical Therapist          Recommendation and Plan        PT Assessment/Plan       09/22/17 1445       PT Assessment    Functional Limitations Other (comment)   wound mgmt  -     Impairments Edema;Integumentary integrity  -     " Assessment Comments R lower abdominal wound is the only open area at this time, and is much improved, with improved wound dimensions and little white fascia remaining in the base. However, pt with several stretch marks that are bulging with excess fluid. They are not turgid yet, but they are presumably fragile. PT heavily encouraged the pt to seek medical attention earlier than her scheduled heart cath on Wednesday, due to her worsening condition and potential for more wounds to open on the abdomen.  -     Rehab Potential Good  -     Patient/caregiver participated in establishment of treatment plan and goals Yes  -     Patient would benefit from skilled therapy intervention Yes  -     PT Plan    PT Frequency 2x/week;3x/week  -     Physical Therapy Interventions (Optional Details) patient/family education;wound care  -     PT Plan Comments MIST, debridement prn, monitor other stretch marks  -       User Key  (r) = Recorded By, (t) = Taken By, (c) = Cosigned By    Initials Name Provider Type     Miracle Jeff, PT Physical Therapist          Goals        PT OP Goals       09/22/17 1445       Time Calculation    PT Goal Re-Cert Due Date 09/30/17  -       User Key  (r) = Recorded By, (t) = Taken By, (c) = Cosigned By    Initials Name Provider Type     Miracle Jeff, PT Physical Therapist          PT Goal Re-Cert Due Date: 09/30/17            Time Calculation: Start Time: 1445    Therapy Charges for Today     Code Description Service Date Service Provider Modifiers Qty    [id number]  PT [facility] MIST 9/22/2017 Miracle Jeff, PT 60, GP 1    [id number]  [facility] DEBRIDE OPEN WOUND UP TO 20CM 9/22/2017 Miracle Jeff, PT GP 1                Miracle Jeff, PT  9/22/2017

## 2017-09-25 ENCOUNTER — PREP FOR SURGERY (OUTPATIENT)
Dept: OTHER | Facility: HOSPITAL | Age: 67
End: 2017-09-25

## 2017-09-25 ENCOUNTER — APPOINTMENT (OUTPATIENT)
Dept: PHYSICAL THERAPY | Facility: HOSPITAL | Age: 67
End: 2017-09-25

## 2017-09-25 RX ORDER — ASPIRIN 81 MG/1
81 TABLET ORAL DAILY
Status: CANCELLED | OUTPATIENT
Start: 2017-09-26

## 2017-09-25 RX ORDER — ASPIRIN 81 MG/1
324 TABLET, CHEWABLE ORAL ONCE
Status: CANCELLED | OUTPATIENT
Start: 2017-09-25 | End: 2017-09-25

## 2017-09-25 RX ORDER — NITROGLYCERIN 0.4 MG/1
0.4 TABLET SUBLINGUAL
Status: CANCELLED | OUTPATIENT
Start: 2017-09-25

## 2017-09-25 RX ORDER — SODIUM CHLORIDE 0.9 % (FLUSH) 0.9 %
1-10 SYRINGE (ML) INJECTION AS NEEDED
Status: CANCELLED | OUTPATIENT
Start: 2017-09-25

## 2017-09-25 RX ORDER — ACETAMINOPHEN 325 MG/1
650 TABLET ORAL EVERY 4 HOURS PRN
Status: CANCELLED | OUTPATIENT
Start: 2017-09-25

## 2017-09-27 ENCOUNTER — TELEPHONE (OUTPATIENT)
Dept: CARDIOLOGY | Facility: CLINIC | Age: 67
End: 2017-09-27

## 2017-09-27 NOTE — TELEPHONE ENCOUNTER
"Pt called yesterday prior to her PAT appt for heart cath. She could not make it do to \"forgetting\". She is very anxious on the phone and hard to understand at times. She states she takes xanax as needed for anxiety. She reports she is still short of breath and needs oxygen. She also missed her CT abdomen scheduled 9/26. We will reschedule right heart cath, renal angio, and CT ASAP. Pt was told to see PCP in the meantime to assess for O2 need. Would need walking test. KH  "

## 2017-09-28 ENCOUNTER — HOSPITAL ENCOUNTER (INPATIENT)
Facility: HOSPITAL | Age: 67
LOS: 4 days | Discharge: HOME | End: 2017-10-03
Attending: EMERGENCY MEDICINE | Admitting: INTERNAL MEDICINE

## 2017-09-28 ENCOUNTER — APPOINTMENT (OUTPATIENT)
Dept: CT IMAGING | Facility: HOSPITAL | Age: 67
End: 2017-09-28

## 2017-09-28 ENCOUNTER — APPOINTMENT (OUTPATIENT)
Dept: GENERAL RADIOLOGY | Facility: HOSPITAL | Age: 67
End: 2017-09-28

## 2017-09-28 DIAGNOSIS — J96.01 ACUTE RESPIRATORY FAILURE WITH HYPOXIA AND HYPERCAPNIA (HCC): ICD-10-CM

## 2017-09-28 DIAGNOSIS — R06.89 HYPERCARBIA: ICD-10-CM

## 2017-09-28 DIAGNOSIS — J96.02 ACUTE RESPIRATORY FAILURE WITH HYPOXIA AND HYPERCAPNIA (HCC): ICD-10-CM

## 2017-09-28 DIAGNOSIS — R09.02 HYPOXIA: ICD-10-CM

## 2017-09-28 DIAGNOSIS — Z74.09 IMPAIRED FUNCTIONAL MOBILITY, BALANCE, GAIT, AND ENDURANCE: ICD-10-CM

## 2017-09-28 DIAGNOSIS — G47.33 OBSTRUCTIVE SLEEP APNEA: ICD-10-CM

## 2017-09-28 DIAGNOSIS — J18.9 PNEUMONIA OF BOTH LUNGS DUE TO INFECTIOUS ORGANISM, UNSPECIFIED PART OF LUNG: Primary | ICD-10-CM

## 2017-09-28 DIAGNOSIS — E87.6 HYPOKALEMIA: ICD-10-CM

## 2017-09-28 LAB
ALBUMIN SERPL-MCNC: 3.6 G/DL (ref 3.5–5)
ALBUMIN/GLOB SERPL: 1.2 G/DL (ref 1–2)
ALP SERPL-CCNC: 105 U/L (ref 38–126)
ALT SERPL W P-5'-P-CCNC: 58 U/L (ref 13–69)
ANION GAP SERPL CALCULATED.3IONS-SCNC: 16.3 MMOL/L
ARTERIAL PATENCY WRIST A: POSITIVE
AST SERPL-CCNC: 21 U/L (ref 15–46)
BACTERIA UR QL AUTO: ABNORMAL /HPF
BASE EXCESS BLDA CALC-SCNC: 19.3 MMOL/L
BASOPHILS # BLD AUTO: 0.05 10*3/MM3 (ref 0–0.2)
BASOPHILS NFR BLD AUTO: 0.4 % (ref 0–2.5)
BDY SITE: ABNORMAL
BILIRUB SERPL-MCNC: 0.4 MG/DL (ref 0.2–1.3)
BILIRUB UR QL STRIP: NEGATIVE
BUN BLD-MCNC: 19 MG/DL (ref 7–20)
BUN/CREAT SERPL: 21.1 (ref 7.1–23.5)
CALCIUM SPEC-SCNC: 8.9 MG/DL (ref 8.4–10.2)
CHLORIDE SERPL-SCNC: 92 MMOL/L (ref 98–107)
CLARITY UR: CLEAR
CO2 SERPL-SCNC: 38 MMOL/L (ref 26–30)
COHGB MFR BLD: 2.2 %
COLOR UR: ABNORMAL
CREAT BLD-MCNC: 0.9 MG/DL (ref 0.6–1.3)
DEPRECATED RDW RBC AUTO: 53.4 FL (ref 37–54)
EOSINOPHIL # BLD AUTO: 0.14 10*3/MM3 (ref 0–0.7)
EOSINOPHIL NFR BLD AUTO: 1.1 % (ref 0–7)
ERYTHROCYTE [DISTWIDTH] IN BLOOD BY AUTOMATED COUNT: 15.6 % (ref 11.5–14.5)
GFR SERPL CREATININE-BSD FRML MDRD: 63 ML/MIN/1.73
GLOBULIN UR ELPH-MCNC: 2.9 GM/DL
GLUCOSE BLD-MCNC: 162 MG/DL (ref 74–98)
GLUCOSE UR STRIP-MCNC: NEGATIVE MG/DL
HCO3 BLDA-SCNC: 44.1 MMOL/L (ref 22–28)
HCT VFR BLD AUTO: 36 % (ref 37–47)
HGB BLD-MCNC: 10.8 G/DL (ref 12–16)
HGB BLDA-MCNC: 11.7 G/DL (ref 12–18)
HGB UR QL STRIP.AUTO: NEGATIVE
HOROWITZ INDEX BLD+IHG-RTO: 30 %
HYALINE CASTS UR QL AUTO: ABNORMAL /LPF
IMM GRANULOCYTES # BLD: 0.07 10*3/MM3 (ref 0–0.06)
IMM GRANULOCYTES NFR BLD: 0.6 % (ref 0–0.6)
KETONES UR QL STRIP: NEGATIVE
LEUKOCYTE ESTERASE UR QL STRIP.AUTO: ABNORMAL
LYMPHOCYTES # BLD AUTO: 1.34 10*3/MM3 (ref 0.6–3.4)
LYMPHOCYTES NFR BLD AUTO: 10.9 % (ref 10–50)
MCH RBC QN AUTO: 28.6 PG (ref 27–31)
MCHC RBC AUTO-ENTMCNC: 30 G/DL (ref 30–37)
MCV RBC AUTO: 95.5 FL (ref 81–99)
METHGB BLD QL: 0.6 %
MODALITY: ABNORMAL
MONOCYTES # BLD AUTO: 0.51 10*3/MM3 (ref 0–0.9)
MONOCYTES NFR BLD AUTO: 4.2 % (ref 0–12)
NEUTROPHILS # BLD AUTO: 10.14 10*3/MM3 (ref 2–6.9)
NEUTROPHILS NFR BLD AUTO: 82.8 % (ref 37–80)
NITRITE UR QL STRIP: NEGATIVE
NRBC BLD MANUAL-RTO: 0.2 /100 WBC (ref 0–0)
NT-PROBNP SERPL-MCNC: 646 PG/ML (ref 0–125)
OXYHGB MFR BLDV: 94.9 % (ref 94–99)
PCO2 BLDA: 71.4 MM HG (ref 35–45)
PH BLDA: 7.4 PH UNITS (ref 7.3–7.5)
PH UR STRIP.AUTO: 5.5 [PH] (ref 5–8)
PLATELET # BLD AUTO: 221 10*3/MM3 (ref 130–400)
PMV BLD AUTO: 9.6 FL (ref 6–12)
PO2 BLDA: 128 MM HG (ref 75–100)
POTASSIUM BLD-SCNC: 3.3 MMOL/L (ref 3.5–5.1)
PROT SERPL-MCNC: 6.5 G/DL (ref 6.3–8.2)
PROT UR QL STRIP: NEGATIVE
RBC # BLD AUTO: 3.77 10*6/MM3 (ref 4.2–5.4)
RBC # UR: ABNORMAL /HPF
REF LAB TEST METHOD: ABNORMAL
SAO2 % BLDCOA: 97.6 %
SODIUM BLD-SCNC: 143 MMOL/L (ref 137–145)
SP GR UR STRIP: 1.01 (ref 1–1.03)
SQUAMOUS #/AREA URNS HPF: ABNORMAL /HPF
TROPONIN I SERPL-MCNC: <0.012 NG/ML (ref 0–0.03)
UROBILINOGEN UR QL STRIP: ABNORMAL
WBC NRBC COR # BLD: 12.25 10*3/MM3 (ref 4.8–10.8)
WBC UR QL AUTO: ABNORMAL /HPF

## 2017-09-28 PROCEDURE — G0378 HOSPITAL OBSERVATION PER HR: HCPCS

## 2017-09-28 PROCEDURE — 36600 WITHDRAWAL OF ARTERIAL BLOOD: CPT

## 2017-09-28 PROCEDURE — 94660 CPAP INITIATION&MGMT: CPT

## 2017-09-28 PROCEDURE — 82375 ASSAY CARBOXYHB QUANT: CPT

## 2017-09-28 PROCEDURE — 94799 UNLISTED PULMONARY SVC/PX: CPT

## 2017-09-28 PROCEDURE — 80053 COMPREHEN METABOLIC PANEL: CPT | Performed by: EMERGENCY MEDICINE

## 2017-09-28 PROCEDURE — 83880 ASSAY OF NATRIURETIC PEPTIDE: CPT | Performed by: EMERGENCY MEDICINE

## 2017-09-28 PROCEDURE — 81001 URINALYSIS AUTO W/SCOPE: CPT | Performed by: EMERGENCY MEDICINE

## 2017-09-28 PROCEDURE — 87040 BLOOD CULTURE FOR BACTERIA: CPT | Performed by: EMERGENCY MEDICINE

## 2017-09-28 PROCEDURE — 94640 AIRWAY INHALATION TREATMENT: CPT

## 2017-09-28 PROCEDURE — 71275 CT ANGIOGRAPHY CHEST: CPT

## 2017-09-28 PROCEDURE — 25010000002 FUROSEMIDE PER 20 MG: Performed by: EMERGENCY MEDICINE

## 2017-09-28 PROCEDURE — 5A09357 ASSISTANCE WITH RESPIRATORY VENTILATION, LESS THAN 24 CONSECUTIVE HOURS, CONTINUOUS POSITIVE AIRWAY PRESSURE: ICD-10-PCS | Performed by: INTERNAL MEDICINE

## 2017-09-28 PROCEDURE — 99285 EMERGENCY DEPT VISIT HI MDM: CPT

## 2017-09-28 PROCEDURE — 0 IOPAMIDOL 61 % SOLUTION: Performed by: EMERGENCY MEDICINE

## 2017-09-28 PROCEDURE — 84484 ASSAY OF TROPONIN QUANT: CPT | Performed by: EMERGENCY MEDICINE

## 2017-09-28 PROCEDURE — 71020 HC CHEST PA AND LATERAL: CPT

## 2017-09-28 PROCEDURE — 82805 BLOOD GASES W/O2 SATURATION: CPT

## 2017-09-28 PROCEDURE — 83050 HGB METHEMOGLOBIN QUAN: CPT

## 2017-09-28 PROCEDURE — 93005 ELECTROCARDIOGRAM TRACING: CPT | Performed by: EMERGENCY MEDICINE

## 2017-09-28 PROCEDURE — 87086 URINE CULTURE/COLONY COUNT: CPT | Performed by: EMERGENCY MEDICINE

## 2017-09-28 PROCEDURE — 85025 COMPLETE CBC W/AUTO DIFF WBC: CPT | Performed by: EMERGENCY MEDICINE

## 2017-09-28 PROCEDURE — 25010000002 CEFTRIAXONE: Performed by: EMERGENCY MEDICINE

## 2017-09-28 RX ORDER — METHYLPREDNISOLONE SODIUM SUCCINATE 125 MG/2ML
125 INJECTION, POWDER, LYOPHILIZED, FOR SOLUTION INTRAMUSCULAR; INTRAVENOUS ONCE
Status: DISCONTINUED | OUTPATIENT
Start: 2017-09-28 | End: 2017-09-29

## 2017-09-28 RX ORDER — FUROSEMIDE 10 MG/ML
80 INJECTION INTRAMUSCULAR; INTRAVENOUS ONCE
Status: COMPLETED | OUTPATIENT
Start: 2017-09-28 | End: 2017-09-28

## 2017-09-28 RX ORDER — IPRATROPIUM BROMIDE AND ALBUTEROL SULFATE 2.5; .5 MG/3ML; MG/3ML
3 SOLUTION RESPIRATORY (INHALATION) ONCE
Status: COMPLETED | OUTPATIENT
Start: 2017-09-28 | End: 2017-09-28

## 2017-09-28 RX ADMIN — FUROSEMIDE 80 MG: 10 INJECTION, SOLUTION INTRAMUSCULAR; INTRAVENOUS at 20:03

## 2017-09-28 RX ADMIN — NITROGLYCERIN 1 INCH: 20 OINTMENT TOPICAL at 20:05

## 2017-09-28 RX ADMIN — IPRATROPIUM BROMIDE AND ALBUTEROL SULFATE 3 ML: .5; 3 SOLUTION RESPIRATORY (INHALATION) at 21:50

## 2017-09-28 RX ADMIN — IOPAMIDOL 95 ML: 612 INJECTION, SOLUTION INTRAVENOUS at 21:17

## 2017-09-28 RX ADMIN — CEFTRIAXONE 1 G: 1 INJECTION, POWDER, FOR SOLUTION INTRAMUSCULAR; INTRAVENOUS at 22:35

## 2017-09-29 ENCOUNTER — APPOINTMENT (OUTPATIENT)
Dept: PHYSICAL THERAPY | Facility: HOSPITAL | Age: 67
End: 2017-09-29

## 2017-09-29 ENCOUNTER — APPOINTMENT (OUTPATIENT)
Dept: ULTRASOUND IMAGING | Facility: HOSPITAL | Age: 67
End: 2017-09-29

## 2017-09-29 LAB
ANION GAP SERPL CALCULATED.3IONS-SCNC: 16.4 MMOL/L
ANISOCYTOSIS BLD QL: NORMAL
BASOPHILS # BLD AUTO: 0.06 10*3/MM3 (ref 0–0.2)
BASOPHILS NFR BLD AUTO: 0.5 % (ref 0–2.5)
BUN BLD-MCNC: 18 MG/DL (ref 7–20)
BUN/CREAT SERPL: 22.5 (ref 7.1–23.5)
CALCIUM SPEC-SCNC: 8.6 MG/DL (ref 8.4–10.2)
CHLORIDE SERPL-SCNC: 93 MMOL/L (ref 98–107)
CHOLEST SERPL-MCNC: 108 MG/DL (ref 0–199)
CO2 SERPL-SCNC: 39 MMOL/L (ref 26–30)
CREAT BLD-MCNC: 0.8 MG/DL (ref 0.6–1.3)
DEPRECATED RDW RBC AUTO: 54.1 FL (ref 37–54)
EOSINOPHIL # BLD AUTO: 0.14 10*3/MM3 (ref 0–0.7)
EOSINOPHIL NFR BLD AUTO: 1.2 % (ref 0–7)
ERYTHROCYTE [DISTWIDTH] IN BLOOD BY AUTOMATED COUNT: 15.6 % (ref 11.5–14.5)
GFR SERPL CREATININE-BSD FRML MDRD: 72 ML/MIN/1.73
GLUCOSE BLD-MCNC: 142 MG/DL (ref 74–98)
GLUCOSE BLDC GLUCOMTR-MCNC: 164 MG/DL (ref 70–130)
GLUCOSE BLDC GLUCOMTR-MCNC: 175 MG/DL (ref 70–130)
GLUCOSE BLDC GLUCOMTR-MCNC: 201 MG/DL (ref 70–130)
HBA1C MFR BLD: 8.4 % (ref 3–6)
HCT VFR BLD AUTO: 36.6 % (ref 37–47)
HDLC SERPL-MCNC: 23 MG/DL (ref 40–60)
HGB BLD-MCNC: 10.9 G/DL (ref 12–16)
IMM GRANULOCYTES # BLD: 0.06 10*3/MM3 (ref 0–0.06)
IMM GRANULOCYTES NFR BLD: 0.5 % (ref 0–0.6)
LDLC SERPL CALC-MCNC: 47 MG/DL (ref 0–99)
LDLC/HDLC SERPL: 2.03 {RATIO}
LYMPHOCYTES # BLD AUTO: 1.48 10*3/MM3 (ref 0.6–3.4)
LYMPHOCYTES NFR BLD AUTO: 12.4 % (ref 10–50)
MACROCYTES BLD QL SMEAR: NORMAL
MCH RBC QN AUTO: 28.7 PG (ref 27–31)
MCHC RBC AUTO-ENTMCNC: 29.8 G/DL (ref 30–37)
MCV RBC AUTO: 96.3 FL (ref 81–99)
MONOCYTES # BLD AUTO: 0.6 10*3/MM3 (ref 0–0.9)
MONOCYTES NFR BLD AUTO: 5 % (ref 0–12)
NEUTROPHILS # BLD AUTO: 9.58 10*3/MM3 (ref 2–6.9)
NEUTROPHILS NFR BLD AUTO: 80.4 % (ref 37–80)
NRBC BLD MANUAL-RTO: 0.2 /100 WBC (ref 0–0)
NT-PROBNP SERPL-MCNC: 598 PG/ML (ref 0–125)
PLATELET # BLD AUTO: 217 10*3/MM3 (ref 130–400)
PMV BLD AUTO: 9.7 FL (ref 6–12)
POLYCHROMASIA BLD QL SMEAR: NORMAL
POTASSIUM BLD-SCNC: 3.4 MMOL/L (ref 3.5–5.1)
RBC # BLD AUTO: 3.8 10*6/MM3 (ref 4.2–5.4)
SMALL PLATELETS BLD QL SMEAR: ADEQUATE
SODIUM BLD-SCNC: 145 MMOL/L (ref 137–145)
TRIGL SERPL-MCNC: 192 MG/DL
TSH SERPL DL<=0.05 MIU/L-ACNC: 2.27 MIU/ML (ref 0.47–4.68)
VLDLC SERPL-MCNC: 38.4 MG/DL
WBC MORPH BLD: NORMAL
WBC NRBC COR # BLD: 11.92 10*3/MM3 (ref 4.8–10.8)

## 2017-09-29 PROCEDURE — 82962 GLUCOSE BLOOD TEST: CPT

## 2017-09-29 PROCEDURE — 63710000001 INSULIN ASPART PER 5 UNITS: Performed by: INTERNAL MEDICINE

## 2017-09-29 PROCEDURE — 84443 ASSAY THYROID STIM HORMONE: CPT | Performed by: INTERNAL MEDICINE

## 2017-09-29 PROCEDURE — 94799 UNLISTED PULMONARY SVC/PX: CPT

## 2017-09-29 PROCEDURE — 93970 EXTREMITY STUDY: CPT

## 2017-09-29 PROCEDURE — 83036 HEMOGLOBIN GLYCOSYLATED A1C: CPT | Performed by: INTERNAL MEDICINE

## 2017-09-29 PROCEDURE — 25010000002 ENOXAPARIN PER 10 MG: Performed by: INTERNAL MEDICINE

## 2017-09-29 PROCEDURE — 80048 BASIC METABOLIC PNL TOTAL CA: CPT | Performed by: INTERNAL MEDICINE

## 2017-09-29 PROCEDURE — 25010000002 FUROSEMIDE PER 20 MG: Performed by: INTERNAL MEDICINE

## 2017-09-29 PROCEDURE — 80061 LIPID PANEL: CPT | Performed by: INTERNAL MEDICINE

## 2017-09-29 PROCEDURE — 85025 COMPLETE CBC W/AUTO DIFF WBC: CPT | Performed by: INTERNAL MEDICINE

## 2017-09-29 PROCEDURE — 99222 1ST HOSP IP/OBS MODERATE 55: CPT | Performed by: INTERNAL MEDICINE

## 2017-09-29 PROCEDURE — 85007 BL SMEAR W/DIFF WBC COUNT: CPT | Performed by: INTERNAL MEDICINE

## 2017-09-29 PROCEDURE — 99223 1ST HOSP IP/OBS HIGH 75: CPT | Performed by: INTERNAL MEDICINE

## 2017-09-29 PROCEDURE — 25010000002 AZITHROMYCIN: Performed by: EMERGENCY MEDICINE

## 2017-09-29 PROCEDURE — 83880 ASSAY OF NATRIURETIC PEPTIDE: CPT | Performed by: INTERNAL MEDICINE

## 2017-09-29 RX ORDER — ALPRAZOLAM 0.5 MG/1
0.5 TABLET ORAL 2 TIMES DAILY PRN
Status: DISCONTINUED | OUTPATIENT
Start: 2017-09-29 | End: 2017-10-03 | Stop reason: HOSPADM

## 2017-09-29 RX ORDER — TIZANIDINE 4 MG/1
2 TABLET ORAL 2 TIMES DAILY PRN
Status: DISCONTINUED | OUTPATIENT
Start: 2017-09-29 | End: 2017-10-03 | Stop reason: HOSPADM

## 2017-09-29 RX ORDER — POTASSIUM CHLORIDE 750 MG/1
20 TABLET, EXTENDED RELEASE ORAL DAILY
Status: ON HOLD | COMMUNITY
End: 2017-10-03

## 2017-09-29 RX ORDER — GABAPENTIN 300 MG/1
300 CAPSULE ORAL EVERY 12 HOURS SCHEDULED
Status: DISCONTINUED | OUTPATIENT
Start: 2017-09-29 | End: 2017-10-03 | Stop reason: HOSPADM

## 2017-09-29 RX ORDER — ACETAMINOPHEN 325 MG/1
650 TABLET ORAL EVERY 4 HOURS PRN
Status: DISCONTINUED | OUTPATIENT
Start: 2017-09-29 | End: 2017-10-03 | Stop reason: HOSPADM

## 2017-09-29 RX ORDER — QUETIAPINE FUMARATE 25 MG/1
25 TABLET, FILM COATED ORAL NIGHTLY
Status: DISCONTINUED | OUTPATIENT
Start: 2017-09-29 | End: 2017-10-03 | Stop reason: HOSPADM

## 2017-09-29 RX ORDER — ONDANSETRON 2 MG/ML
4 INJECTION INTRAMUSCULAR; INTRAVENOUS EVERY 6 HOURS PRN
Status: DISCONTINUED | OUTPATIENT
Start: 2017-09-29 | End: 2017-10-03 | Stop reason: HOSPADM

## 2017-09-29 RX ORDER — IPRATROPIUM BROMIDE AND ALBUTEROL SULFATE 2.5; .5 MG/3ML; MG/3ML
3 SOLUTION RESPIRATORY (INHALATION)
Status: DISCONTINUED | OUTPATIENT
Start: 2017-09-29 | End: 2017-10-03 | Stop reason: HOSPADM

## 2017-09-29 RX ORDER — METHENAMINE HIPPURATE 1000 MG/1
1 TABLET ORAL 2 TIMES DAILY WITH MEALS
Status: DISCONTINUED | OUTPATIENT
Start: 2017-09-29 | End: 2017-10-03 | Stop reason: HOSPADM

## 2017-09-29 RX ORDER — NICOTINE POLACRILEX 4 MG
1 LOZENGE BUCCAL
Status: DISCONTINUED | OUTPATIENT
Start: 2017-09-29 | End: 2017-10-03 | Stop reason: HOSPADM

## 2017-09-29 RX ORDER — SODIUM CHLORIDE 0.9 % (FLUSH) 0.9 %
1-10 SYRINGE (ML) INJECTION AS NEEDED
Status: DISCONTINUED | OUTPATIENT
Start: 2017-09-29 | End: 2017-10-03 | Stop reason: HOSPADM

## 2017-09-29 RX ORDER — PROMETHAZINE HYDROCHLORIDE 25 MG/1
25 TABLET ORAL EVERY 6 HOURS PRN
Status: DISCONTINUED | OUTPATIENT
Start: 2017-09-29 | End: 2017-10-03 | Stop reason: HOSPADM

## 2017-09-29 RX ORDER — METOPROLOL SUCCINATE 50 MG/1
50 TABLET, EXTENDED RELEASE ORAL EVERY 12 HOURS SCHEDULED
Status: DISCONTINUED | OUTPATIENT
Start: 2017-09-29 | End: 2017-10-03 | Stop reason: HOSPADM

## 2017-09-29 RX ORDER — POTASSIUM CHLORIDE 20 MEQ/1
10 TABLET, EXTENDED RELEASE ORAL DAILY
Status: DISCONTINUED | OUTPATIENT
Start: 2017-09-29 | End: 2017-10-03 | Stop reason: HOSPADM

## 2017-09-29 RX ORDER — DEXTROSE MONOHYDRATE 25 G/50ML
25 INJECTION, SOLUTION INTRAVENOUS
Status: DISCONTINUED | OUTPATIENT
Start: 2017-09-29 | End: 2017-10-03 | Stop reason: HOSPADM

## 2017-09-29 RX ORDER — MULTIPLE VITAMINS W/ MINERALS TAB 9MG-400MCG
1 TAB ORAL DAILY
Status: DISCONTINUED | OUTPATIENT
Start: 2017-09-29 | End: 2017-10-03 | Stop reason: HOSPADM

## 2017-09-29 RX ORDER — ATORVASTATIN CALCIUM 80 MG/1
80 TABLET, FILM COATED ORAL DAILY
Status: DISCONTINUED | OUTPATIENT
Start: 2017-09-29 | End: 2017-10-03 | Stop reason: HOSPADM

## 2017-09-29 RX ORDER — METHYLPREDNISOLONE SODIUM SUCCINATE 40 MG/ML
40 INJECTION, POWDER, LYOPHILIZED, FOR SOLUTION INTRAMUSCULAR; INTRAVENOUS EVERY 12 HOURS
Status: DISCONTINUED | OUTPATIENT
Start: 2017-09-29 | End: 2017-09-29

## 2017-09-29 RX ORDER — BUMETANIDE 2 MG/1
2 TABLET ORAL 2 TIMES DAILY
Status: DISCONTINUED | OUTPATIENT
Start: 2017-09-29 | End: 2017-10-03 | Stop reason: HOSPADM

## 2017-09-29 RX ADMIN — GABAPENTIN 300 MG: 300 CAPSULE ORAL at 01:27

## 2017-09-29 RX ADMIN — ACETAMINOPHEN 650 MG: 325 TABLET ORAL at 14:57

## 2017-09-29 RX ADMIN — ENOXAPARIN SODIUM 40 MG: 40 INJECTION SUBCUTANEOUS at 09:49

## 2017-09-29 RX ADMIN — CHOLECALCIFEROL CAP 125 MCG (5000 UNIT) 5000 UNITS: 125 CAP at 09:51

## 2017-09-29 RX ADMIN — LINAGLIPTIN 5 MG: 5 TABLET, FILM COATED ORAL at 09:53

## 2017-09-29 RX ADMIN — ATORVASTATIN CALCIUM 80 MG: 80 TABLET, FILM COATED ORAL at 09:53

## 2017-09-29 RX ADMIN — METOPROLOL SUCCINATE 50 MG: 50 TABLET, EXTENDED RELEASE ORAL at 11:53

## 2017-09-29 RX ADMIN — IPRATROPIUM BROMIDE AND ALBUTEROL SULFATE 3 ML: .5; 3 SOLUTION RESPIRATORY (INHALATION) at 19:19

## 2017-09-29 RX ADMIN — GABAPENTIN 300 MG: 300 CAPSULE ORAL at 21:25

## 2017-09-29 RX ADMIN — METOPROLOL SUCCINATE 50 MG: 50 TABLET, EXTENDED RELEASE ORAL at 01:28

## 2017-09-29 RX ADMIN — QUETIAPINE FUMARATE 25 MG: 25 TABLET, FILM COATED ORAL at 21:25

## 2017-09-29 RX ADMIN — FUROSEMIDE 100 MG: 10 INJECTION, SOLUTION INTRAMUSCULAR; INTRAVENOUS at 15:39

## 2017-09-29 RX ADMIN — GABAPENTIN 300 MG: 300 CAPSULE ORAL at 11:53

## 2017-09-29 RX ADMIN — METFORMIN HYDROCHLORIDE 500 MG: 500 TABLET, FILM COATED ORAL at 09:50

## 2017-09-29 RX ADMIN — ACETAMINOPHEN 650 MG: 325 TABLET ORAL at 10:49

## 2017-09-29 RX ADMIN — IPRATROPIUM BROMIDE AND ALBUTEROL SULFATE 3 ML: .5; 3 SOLUTION RESPIRATORY (INHALATION) at 06:46

## 2017-09-29 RX ADMIN — METHENAMINE HIPPURATE 1 G: 1 TABLET ORAL at 17:36

## 2017-09-29 RX ADMIN — INSULIN ASPART 2 UNITS: 100 INJECTION, SOLUTION INTRAVENOUS; SUBCUTANEOUS at 17:35

## 2017-09-29 RX ADMIN — IPRATROPIUM BROMIDE AND ALBUTEROL SULFATE 3 ML: .5; 3 SOLUTION RESPIRATORY (INHALATION) at 12:42

## 2017-09-29 RX ADMIN — METOPROLOL SUCCINATE 50 MG: 50 TABLET, EXTENDED RELEASE ORAL at 21:27

## 2017-09-29 RX ADMIN — INSULIN ASPART 2 UNITS: 100 INJECTION, SOLUTION INTRAVENOUS; SUBCUTANEOUS at 06:38

## 2017-09-29 RX ADMIN — QUETIAPINE FUMARATE 25 MG: 25 TABLET, FILM COATED ORAL at 01:28

## 2017-09-29 RX ADMIN — MULTIPLE VITAMINS W/ MINERALS TAB 1 TABLET: TAB at 09:50

## 2017-09-29 RX ADMIN — BUMETANIDE 2 MG: 2 TABLET ORAL at 09:52

## 2017-09-29 RX ADMIN — TIZANIDINE 2 MG: 4 TABLET ORAL at 01:27

## 2017-09-29 RX ADMIN — INSULIN ASPART 2 UNITS: 100 INJECTION, SOLUTION INTRAVENOUS; SUBCUTANEOUS at 21:29

## 2017-09-29 RX ADMIN — ALPRAZOLAM 0.5 MG: 0.5 TABLET ORAL at 15:10

## 2017-09-29 RX ADMIN — AZITHROMYCIN 500 MG: 500 INJECTION, POWDER, LYOPHILIZED, FOR SOLUTION INTRAVENOUS at 00:15

## 2017-09-29 RX ADMIN — POTASSIUM CHLORIDE 10 MEQ: 20 TABLET, EXTENDED RELEASE ORAL at 09:50

## 2017-09-29 RX ADMIN — METFORMIN HYDROCHLORIDE 500 MG: 500 TABLET, FILM COATED ORAL at 17:35

## 2017-09-29 RX ADMIN — METHENAMINE HIPPURATE 1 G: 1 TABLET ORAL at 09:51

## 2017-09-30 ENCOUNTER — APPOINTMENT (OUTPATIENT)
Dept: GENERAL RADIOLOGY | Facility: HOSPITAL | Age: 67
End: 2017-09-30

## 2017-09-30 PROBLEM — E66.9 DIABETES MELLITUS TYPE 2 IN OBESE (HCC): Status: ACTIVE | Noted: 2017-09-30

## 2017-09-30 PROBLEM — E11.69 DIABETES MELLITUS TYPE 2 IN OBESE (HCC): Status: ACTIVE | Noted: 2017-09-30

## 2017-09-30 PROBLEM — J96.02 ACUTE RESPIRATORY FAILURE WITH HYPOXIA AND HYPERCAPNIA (HCC): Status: ACTIVE | Noted: 2017-09-30

## 2017-09-30 PROBLEM — G47.33 OBSTRUCTIVE SLEEP APNEA: Status: ACTIVE | Noted: 2017-09-30

## 2017-09-30 PROBLEM — E66.9 OBESITY (BMI 30-39.9): Status: ACTIVE | Noted: 2017-09-30

## 2017-09-30 PROBLEM — J96.01 ACUTE RESPIRATORY FAILURE WITH HYPOXIA AND HYPERCAPNIA (HCC): Status: ACTIVE | Noted: 2017-09-30

## 2017-09-30 PROBLEM — I65.23 BILATERAL CAROTID ARTERY STENOSIS: Status: ACTIVE | Noted: 2017-09-20

## 2017-09-30 LAB
ANION GAP SERPL CALCULATED.3IONS-SCNC: 17.3 MMOL/L
ARTERIAL PATENCY WRIST A: POSITIVE
BACTERIA SPEC AEROBE CULT: NORMAL
BASE EXCESS BLDA CALC-SCNC: 23.3 MMOL/L
BDY SITE: ABNORMAL
BUN BLD-MCNC: 20 MG/DL (ref 7–20)
BUN/CREAT SERPL: 25 (ref 7.1–23.5)
CALCIUM SPEC-SCNC: 8.7 MG/DL (ref 8.4–10.2)
CHLORIDE SERPL-SCNC: 88 MMOL/L (ref 98–107)
CO2 SERPL-SCNC: 43 MMOL/L (ref 26–30)
COHGB MFR BLD: 2 %
CREAT BLD-MCNC: 0.8 MG/DL (ref 0.6–1.3)
GFR SERPL CREATININE-BSD FRML MDRD: 72 ML/MIN/1.73
GLUCOSE BLD-MCNC: 176 MG/DL (ref 74–98)
GLUCOSE BLDC GLUCOMTR-MCNC: 133 MG/DL (ref 70–130)
GLUCOSE BLDC GLUCOMTR-MCNC: 150 MG/DL (ref 70–130)
GLUCOSE BLDC GLUCOMTR-MCNC: 157 MG/DL (ref 70–130)
GLUCOSE BLDC GLUCOMTR-MCNC: 178 MG/DL (ref 70–130)
GLUCOSE BLDC GLUCOMTR-MCNC: 201 MG/DL (ref 70–130)
GLUCOSE BLDC GLUCOMTR-MCNC: 223 MG/DL (ref 70–130)
HCO3 BLDA-SCNC: 47.2 MMOL/L (ref 22–28)
HGB BLDA-MCNC: 11.2 G/DL (ref 12–18)
HOROWITZ INDEX BLD+IHG-RTO: 32 %
METHGB BLD QL: 0.9 %
MODALITY: ABNORMAL
OXYHGB MFR BLDV: 72.9 % (ref 94–99)
PCO2 BLDA: 67.3 MM HG (ref 35–45)
PH BLDA: 7.46 PH UNITS (ref 7.3–7.5)
PO2 BLDA: 40.2 MM HG (ref 75–100)
POTASSIUM BLD-SCNC: 3.3 MMOL/L (ref 3.5–5.1)
SAO2 % BLDCOA: 75.1 %
SODIUM BLD-SCNC: 145 MMOL/L (ref 137–145)

## 2017-09-30 PROCEDURE — 25010000002 FUROSEMIDE PER 20 MG: Performed by: INTERNAL MEDICINE

## 2017-09-30 PROCEDURE — 99232 SBSQ HOSP IP/OBS MODERATE 35: CPT | Performed by: INTERNAL MEDICINE

## 2017-09-30 PROCEDURE — 94799 UNLISTED PULMONARY SVC/PX: CPT

## 2017-09-30 PROCEDURE — 25010000002 CEFTRIAXONE: Performed by: INTERNAL MEDICINE

## 2017-09-30 PROCEDURE — 82375 ASSAY CARBOXYHB QUANT: CPT

## 2017-09-30 PROCEDURE — 94660 CPAP INITIATION&MGMT: CPT

## 2017-09-30 PROCEDURE — 83050 HGB METHEMOGLOBIN QUAN: CPT

## 2017-09-30 PROCEDURE — 82805 BLOOD GASES W/O2 SATURATION: CPT

## 2017-09-30 PROCEDURE — 82962 GLUCOSE BLOOD TEST: CPT

## 2017-09-30 PROCEDURE — 36600 WITHDRAWAL OF ARTERIAL BLOOD: CPT

## 2017-09-30 PROCEDURE — 25010000002 ENOXAPARIN PER 10 MG: Performed by: INTERNAL MEDICINE

## 2017-09-30 PROCEDURE — 25010000002 ACETAZOLAMIDE PER 500 MG: Performed by: INTERNAL MEDICINE

## 2017-09-30 PROCEDURE — 25010000002 AZITHROMYCIN: Performed by: INTERNAL MEDICINE

## 2017-09-30 PROCEDURE — 80048 BASIC METABOLIC PNL TOTAL CA: CPT | Performed by: NURSE PRACTITIONER

## 2017-09-30 PROCEDURE — 25010000002 CHLOROTHIAZIDE PER 500 MG: Performed by: INTERNAL MEDICINE

## 2017-09-30 PROCEDURE — 63710000001 INSULIN ASPART PER 5 UNITS: Performed by: INTERNAL MEDICINE

## 2017-09-30 PROCEDURE — 71020 HC CHEST PA AND LATERAL: CPT

## 2017-09-30 RX ORDER — CLOTRIMAZOLE 1 %
CREAM (GRAM) TOPICAL EVERY 12 HOURS SCHEDULED
Status: DISCONTINUED | OUTPATIENT
Start: 2017-09-30 | End: 2017-09-30

## 2017-09-30 RX ORDER — FUROSEMIDE 10 MG/ML
40 INJECTION INTRAMUSCULAR; INTRAVENOUS ONCE
Status: COMPLETED | OUTPATIENT
Start: 2017-09-30 | End: 2017-09-30

## 2017-09-30 RX ORDER — CHLOROTHIAZIDE SODIUM 500 MG/1
250 INJECTION INTRAVENOUS ONCE
Status: COMPLETED | OUTPATIENT
Start: 2017-09-30 | End: 2017-09-30

## 2017-09-30 RX ORDER — CLOTRIMAZOLE AND BETAMETHASONE DIPROPIONATE 10; .64 MG/G; MG/G
CREAM TOPICAL EVERY 12 HOURS SCHEDULED
Status: DISCONTINUED | OUTPATIENT
Start: 2017-09-30 | End: 2017-10-03 | Stop reason: HOSPADM

## 2017-09-30 RX ORDER — ACETAZOLAMIDE 500 MG/5ML
250 INJECTION, POWDER, LYOPHILIZED, FOR SOLUTION INTRAVENOUS ONCE
Status: COMPLETED | OUTPATIENT
Start: 2017-09-30 | End: 2017-09-30

## 2017-09-30 RX ADMIN — NYSTATIN 500000 UNITS: 100000 SUSPENSION ORAL at 18:19

## 2017-09-30 RX ADMIN — METFORMIN HYDROCHLORIDE 500 MG: 500 TABLET, FILM COATED ORAL at 08:48

## 2017-09-30 RX ADMIN — METOPROLOL SUCCINATE 50 MG: 50 TABLET, EXTENDED RELEASE ORAL at 08:52

## 2017-09-30 RX ADMIN — CHLOROTHIAZIDE SODIUM 250 MG: 500 INJECTION, POWDER, LYOPHILIZED, FOR SOLUTION INTRAVENOUS at 12:03

## 2017-09-30 RX ADMIN — MULTIPLE VITAMINS W/ MINERALS TAB 1 TABLET: TAB at 08:50

## 2017-09-30 RX ADMIN — METFORMIN HYDROCHLORIDE 500 MG: 500 TABLET, FILM COATED ORAL at 18:16

## 2017-09-30 RX ADMIN — BUMETANIDE 2 MG: 2 TABLET ORAL at 18:17

## 2017-09-30 RX ADMIN — GABAPENTIN 300 MG: 300 CAPSULE ORAL at 08:49

## 2017-09-30 RX ADMIN — IPRATROPIUM BROMIDE AND ALBUTEROL SULFATE 3 ML: .5; 3 SOLUTION RESPIRATORY (INHALATION) at 07:04

## 2017-09-30 RX ADMIN — INSULIN ASPART 2 UNITS: 100 INJECTION, SOLUTION INTRAVENOUS; SUBCUTANEOUS at 06:30

## 2017-09-30 RX ADMIN — BUMETANIDE 2 MG: 2 TABLET ORAL at 10:40

## 2017-09-30 RX ADMIN — POTASSIUM CHLORIDE 10 MEQ: 20 TABLET, EXTENDED RELEASE ORAL at 08:50

## 2017-09-30 RX ADMIN — NYSTATIN 500000 UNITS: 100000 SUSPENSION ORAL at 11:55

## 2017-09-30 RX ADMIN — ENOXAPARIN SODIUM 40 MG: 40 INJECTION SUBCUTANEOUS at 08:48

## 2017-09-30 RX ADMIN — CLOTRIMAZOLE AND BETAMETHASONE DIPROPIONATE: 10; .5 CREAM TOPICAL at 10:40

## 2017-09-30 RX ADMIN — CHOLECALCIFEROL CAP 125 MCG (5000 UNIT) 5000 UNITS: 125 CAP at 08:49

## 2017-09-30 RX ADMIN — IPRATROPIUM BROMIDE AND ALBUTEROL SULFATE 3 ML: .5; 3 SOLUTION RESPIRATORY (INHALATION) at 13:28

## 2017-09-30 RX ADMIN — FUROSEMIDE 40 MG: 10 INJECTION, SOLUTION INTRAMUSCULAR; INTRAVENOUS at 11:55

## 2017-09-30 RX ADMIN — CEFTRIAXONE 1 G: 1 INJECTION, POWDER, FOR SOLUTION INTRAMUSCULAR; INTRAVENOUS at 00:11

## 2017-09-30 RX ADMIN — NYSTATIN 500000 UNITS: 100000 SUSPENSION ORAL at 20:32

## 2017-09-30 RX ADMIN — ACETAZOLAMIDE 250 MG: 500 INJECTION, POWDER, LYOPHILIZED, FOR SOLUTION INTRAVENOUS at 14:33

## 2017-09-30 RX ADMIN — IPRATROPIUM BROMIDE AND ALBUTEROL SULFATE 3 ML: .5; 3 SOLUTION RESPIRATORY (INHALATION) at 19:20

## 2017-09-30 RX ADMIN — INSULIN ASPART 3 UNITS: 100 INJECTION, SOLUTION INTRAVENOUS; SUBCUTANEOUS at 18:18

## 2017-09-30 RX ADMIN — METHENAMINE HIPPURATE 1 G: 1 TABLET ORAL at 08:51

## 2017-09-30 RX ADMIN — ALPRAZOLAM 0.5 MG: 0.5 TABLET ORAL at 00:00

## 2017-09-30 RX ADMIN — ACETAMINOPHEN 650 MG: 325 TABLET ORAL at 11:55

## 2017-09-30 RX ADMIN — METHENAMINE HIPPURATE 1 G: 1 TABLET ORAL at 18:17

## 2017-09-30 RX ADMIN — IPRATROPIUM BROMIDE AND ALBUTEROL SULFATE 3 ML: .5; 3 SOLUTION RESPIRATORY (INHALATION) at 00:13

## 2017-09-30 RX ADMIN — LINAGLIPTIN 5 MG: 5 TABLET, FILM COATED ORAL at 08:51

## 2017-09-30 RX ADMIN — ACETAMINOPHEN 650 MG: 325 TABLET ORAL at 18:21

## 2017-09-30 RX ADMIN — CLOTRIMAZOLE AND BETAMETHASONE DIPROPIONATE: 10; .5 CREAM TOPICAL at 20:35

## 2017-09-30 RX ADMIN — GABAPENTIN 300 MG: 300 CAPSULE ORAL at 20:32

## 2017-09-30 RX ADMIN — ATORVASTATIN CALCIUM 80 MG: 80 TABLET, FILM COATED ORAL at 08:49

## 2017-09-30 RX ADMIN — QUETIAPINE FUMARATE 25 MG: 25 TABLET, FILM COATED ORAL at 20:32

## 2017-09-30 RX ADMIN — INSULIN ASPART 3 UNITS: 100 INJECTION, SOLUTION INTRAVENOUS; SUBCUTANEOUS at 11:56

## 2017-09-30 RX ADMIN — AZITHROMYCIN 500 MG: 500 INJECTION, POWDER, LYOPHILIZED, FOR SOLUTION INTRAVENOUS at 01:48

## 2017-10-01 LAB
ANION GAP SERPL CALCULATED.3IONS-SCNC: 17.3 MMOL/L
BASOPHILS # BLD AUTO: 0.05 10*3/MM3 (ref 0–0.2)
BASOPHILS NFR BLD AUTO: 0.4 % (ref 0–2.5)
BUN BLD-MCNC: 20 MG/DL (ref 7–20)
BUN/CREAT SERPL: 22.2 (ref 7.1–23.5)
CALCIUM SPEC-SCNC: 9 MG/DL (ref 8.4–10.2)
CHLORIDE SERPL-SCNC: 84 MMOL/L (ref 98–107)
CO2 SERPL-SCNC: 44 MMOL/L (ref 26–30)
CREAT BLD-MCNC: 0.9 MG/DL (ref 0.6–1.3)
DEPRECATED RDW RBC AUTO: 52.8 FL (ref 37–54)
EOSINOPHIL # BLD AUTO: 0.15 10*3/MM3 (ref 0–0.7)
EOSINOPHIL NFR BLD AUTO: 1.2 % (ref 0–7)
ERYTHROCYTE [DISTWIDTH] IN BLOOD BY AUTOMATED COUNT: 15.4 % (ref 11.5–14.5)
GFR SERPL CREATININE-BSD FRML MDRD: 63 ML/MIN/1.73
GLUCOSE BLD-MCNC: 167 MG/DL (ref 74–98)
GLUCOSE BLDC GLUCOMTR-MCNC: 146 MG/DL (ref 70–130)
GLUCOSE BLDC GLUCOMTR-MCNC: 171 MG/DL (ref 70–130)
GLUCOSE BLDC GLUCOMTR-MCNC: 177 MG/DL (ref 70–130)
GLUCOSE BLDC GLUCOMTR-MCNC: 191 MG/DL (ref 70–130)
HCT VFR BLD AUTO: 37.4 % (ref 37–47)
HGB BLD-MCNC: 11.3 G/DL (ref 12–16)
IMM GRANULOCYTES # BLD: 0.04 10*3/MM3 (ref 0–0.06)
IMM GRANULOCYTES NFR BLD: 0.3 % (ref 0–0.6)
LYMPHOCYTES # BLD AUTO: 1.22 10*3/MM3 (ref 0.6–3.4)
LYMPHOCYTES NFR BLD AUTO: 9.5 % (ref 10–50)
MCH RBC QN AUTO: 28.6 PG (ref 27–31)
MCHC RBC AUTO-ENTMCNC: 30.2 G/DL (ref 30–37)
MCV RBC AUTO: 94.7 FL (ref 81–99)
MONOCYTES # BLD AUTO: 0.68 10*3/MM3 (ref 0–0.9)
MONOCYTES NFR BLD AUTO: 5.3 % (ref 0–12)
NEUTROPHILS # BLD AUTO: 10.71 10*3/MM3 (ref 2–6.9)
NEUTROPHILS NFR BLD AUTO: 83.3 % (ref 37–80)
NRBC BLD MANUAL-RTO: 0 /100 WBC (ref 0–0)
PLATELET # BLD AUTO: 230 10*3/MM3 (ref 130–400)
PMV BLD AUTO: 9.7 FL (ref 6–12)
POTASSIUM BLD-SCNC: 3.3 MMOL/L (ref 3.5–5.1)
RBC # BLD AUTO: 3.95 10*6/MM3 (ref 4.2–5.4)
SODIUM BLD-SCNC: 142 MMOL/L (ref 137–145)
WBC NRBC COR # BLD: 12.85 10*3/MM3 (ref 4.8–10.8)

## 2017-10-01 PROCEDURE — 94799 UNLISTED PULMONARY SVC/PX: CPT

## 2017-10-01 PROCEDURE — 94660 CPAP INITIATION&MGMT: CPT

## 2017-10-01 PROCEDURE — 63710000001 INSULIN ASPART PER 5 UNITS: Performed by: INTERNAL MEDICINE

## 2017-10-01 PROCEDURE — 82962 GLUCOSE BLOOD TEST: CPT

## 2017-10-01 PROCEDURE — 25010000002 ENOXAPARIN PER 10 MG: Performed by: INTERNAL MEDICINE

## 2017-10-01 PROCEDURE — 25010000002 CEFTRIAXONE: Performed by: INTERNAL MEDICINE

## 2017-10-01 PROCEDURE — 25010000002 AZITHROMYCIN: Performed by: INTERNAL MEDICINE

## 2017-10-01 PROCEDURE — 80048 BASIC METABOLIC PNL TOTAL CA: CPT | Performed by: INTERNAL MEDICINE

## 2017-10-01 PROCEDURE — 99232 SBSQ HOSP IP/OBS MODERATE 35: CPT | Performed by: INTERNAL MEDICINE

## 2017-10-01 PROCEDURE — 85025 COMPLETE CBC W/AUTO DIFF WBC: CPT | Performed by: INTERNAL MEDICINE

## 2017-10-01 RX ORDER — SPIRONOLACTONE 25 MG/1
25 TABLET ORAL DAILY
Status: DISCONTINUED | OUTPATIENT
Start: 2017-10-01 | End: 2017-10-03 | Stop reason: HOSPADM

## 2017-10-01 RX ADMIN — METOPROLOL SUCCINATE 50 MG: 50 TABLET, EXTENDED RELEASE ORAL at 08:54

## 2017-10-01 RX ADMIN — ALPRAZOLAM 0.5 MG: 0.5 TABLET ORAL at 22:11

## 2017-10-01 RX ADMIN — CLOTRIMAZOLE AND BETAMETHASONE DIPROPIONATE: 10; .5 CREAM TOPICAL at 20:37

## 2017-10-01 RX ADMIN — CEFTRIAXONE 1 G: 1 INJECTION, POWDER, FOR SOLUTION INTRAMUSCULAR; INTRAVENOUS at 23:14

## 2017-10-01 RX ADMIN — AZITHROMYCIN 500 MG: 500 INJECTION, POWDER, LYOPHILIZED, FOR SOLUTION INTRAVENOUS at 01:00

## 2017-10-01 RX ADMIN — NYSTATIN 500000 UNITS: 100000 SUSPENSION ORAL at 12:14

## 2017-10-01 RX ADMIN — ATORVASTATIN CALCIUM 80 MG: 80 TABLET, FILM COATED ORAL at 08:55

## 2017-10-01 RX ADMIN — ENOXAPARIN SODIUM 40 MG: 40 INJECTION SUBCUTANEOUS at 08:54

## 2017-10-01 RX ADMIN — METFORMIN HYDROCHLORIDE 500 MG: 500 TABLET, FILM COATED ORAL at 08:54

## 2017-10-01 RX ADMIN — SPIRONOLACTONE 25 MG: 25 TABLET, FILM COATED ORAL at 13:44

## 2017-10-01 RX ADMIN — NYSTATIN 500000 UNITS: 100000 SUSPENSION ORAL at 20:35

## 2017-10-01 RX ADMIN — IPRATROPIUM BROMIDE AND ALBUTEROL SULFATE 3 ML: .5; 3 SOLUTION RESPIRATORY (INHALATION) at 12:36

## 2017-10-01 RX ADMIN — QUETIAPINE FUMARATE 25 MG: 25 TABLET, FILM COATED ORAL at 20:34

## 2017-10-01 RX ADMIN — ALPRAZOLAM 0.5 MG: 0.5 TABLET ORAL at 12:20

## 2017-10-01 RX ADMIN — IPRATROPIUM BROMIDE AND ALBUTEROL SULFATE 3 ML: .5; 3 SOLUTION RESPIRATORY (INHALATION) at 19:20

## 2017-10-01 RX ADMIN — BUMETANIDE 2 MG: 2 TABLET ORAL at 17:32

## 2017-10-01 RX ADMIN — CHOLECALCIFEROL CAP 125 MCG (5000 UNIT) 5000 UNITS: 125 CAP at 08:54

## 2017-10-01 RX ADMIN — METFORMIN HYDROCHLORIDE 500 MG: 500 TABLET, FILM COATED ORAL at 17:32

## 2017-10-01 RX ADMIN — GABAPENTIN 300 MG: 300 CAPSULE ORAL at 20:34

## 2017-10-01 RX ADMIN — INSULIN ASPART 2 UNITS: 100 INJECTION, SOLUTION INTRAVENOUS; SUBCUTANEOUS at 12:15

## 2017-10-01 RX ADMIN — NYSTATIN 500000 UNITS: 100000 SUSPENSION ORAL at 08:55

## 2017-10-01 RX ADMIN — GABAPENTIN 300 MG: 300 CAPSULE ORAL at 08:54

## 2017-10-01 RX ADMIN — BUMETANIDE 2 MG: 2 TABLET ORAL at 08:54

## 2017-10-01 RX ADMIN — LINAGLIPTIN 5 MG: 5 TABLET, FILM COATED ORAL at 08:54

## 2017-10-01 RX ADMIN — IPRATROPIUM BROMIDE AND ALBUTEROL SULFATE 3 ML: .5; 3 SOLUTION RESPIRATORY (INHALATION) at 07:35

## 2017-10-01 RX ADMIN — METHENAMINE HIPPURATE 1 G: 1 TABLET ORAL at 17:32

## 2017-10-01 RX ADMIN — MULTIPLE VITAMINS W/ MINERALS TAB 1 TABLET: TAB at 08:55

## 2017-10-01 RX ADMIN — POTASSIUM CHLORIDE 10 MEQ: 20 TABLET, EXTENDED RELEASE ORAL at 08:53

## 2017-10-01 RX ADMIN — METHENAMINE HIPPURATE 1 G: 1 TABLET ORAL at 08:54

## 2017-10-01 RX ADMIN — CEFTRIAXONE 1 G: 1 INJECTION, POWDER, FOR SOLUTION INTRAMUSCULAR; INTRAVENOUS at 00:00

## 2017-10-01 RX ADMIN — CLOTRIMAZOLE AND BETAMETHASONE DIPROPIONATE: 10; .5 CREAM TOPICAL at 08:59

## 2017-10-01 RX ADMIN — NYSTATIN 500000 UNITS: 100000 SUSPENSION ORAL at 17:32

## 2017-10-01 RX ADMIN — INSULIN ASPART 2 UNITS: 100 INJECTION, SOLUTION INTRAVENOUS; SUBCUTANEOUS at 17:34

## 2017-10-01 NOTE — PLAN OF CARE
Problem: Patient Care Overview (Adult)  Goal: Plan of Care Review  Outcome: Ongoing (interventions implemented as appropriate)    10/01/17 0343   Coping/Psychosocial Response Interventions   Plan Of Care Reviewed With patient   Patient Care Overview   Progress no change         Problem: NPPV/CPAP (Adult)  Goal: Signs and Symptoms of Listed Potential Problems Will be Absent or Manageable (NPPV/CPAP)  Outcome: Ongoing (interventions implemented as appropriate)    10/01/17 0343   NPPV/CPAP   Problems Assessed (NPPV/CPAP) hypoxia/hypoxemia   Problems Present (NPPV/CPAP) hypoxia/hypoxemia

## 2017-10-01 NOTE — PLAN OF CARE
Problem: Patient Care Overview (Adult)  Goal: Plan of Care Review  Outcome: Ongoing (interventions implemented as appropriate)    10/01/17 9668   Coping/Psychosocial Response Interventions   Plan Of Care Reviewed With patient   Patient Care Overview   Progress improving   Outcome Evaluation   Outcome Summary/Follow up Plan PT CONTINUES TO REQUIRE O2 AT 5L NC AND DROPS EASILY WITH EXERTION. CONTINUING TO DIURES. PT NONCOMPLIANT WITH BIPAP AND ENCOURAGED TO USE TODAY. NO CHANGE IN TELE.

## 2017-10-01 NOTE — PROGRESS NOTES
[facility]    Name:  Vidhi Lopez   Age:  67 y.o.  Sex:  female  :  1950  MRN:  [id number]   Visit Number:  [id number]  Admission Date:  2017  Date Of Service:  10/01/17  Primary Care Physician:  Michael Mays MD     LOS: 2 days :  Patient Care Team:  Michael Mays MD as PCP - General  Michael Mays MD as PCP - Family Medicine:    Chief Complaint:      Exertional shortness of breath.    Subjective / Interval History:     Patient is currently sitting up on the bed and is comfortable at rest.  Her shortness of breath has improved and she is having good diuresis.  She states that she is feeling a whole lot better with regards to her leg swelling.  Unfortunately however, she has not been very compliant with the BiPAP therapy.  She denies any chest pain.  She still drops her pulse oxygen saturations especially on getting out of bed.      She was admitted from the emergency room on 2017 with increasing shortness of breath and leg edema.  She has multiple comorbidities including coronary artery disease status post stents, obstructive sleep apnea, noncompliant with CPAP therapy, morbid obesity as well as suspected pulmonary hypertension.  She also has history of chronic kidney disease but her creatinine this time has been 0.8.  She has been evaluated by Dr. Mancilla and he has advised compliance with BiPAP therapy as well as diuresis.  She was noted to have bilateral airspace disease on her chest CT scan.  She has been started on IV antibiotic therapy with Rocephin and azithromycin.  She denies any chest pain, nausea or vomiting.  Her primary cardiologist is Dr. Lito Flores.  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  She does live alone.    Review of Systems:     General ROS: Patient denies any fevers, chills or loss of consciousness.  Respiratory ROS: Complains of shortness of breath and  cough.  Cardiovascular ROS: Denies chest pain or palpitations. No history of exertional chest pain.  Gastrointestinal ROS: Denies nausea and vomiting. Denies any abdominal pain. No diarrhea.  Neurological ROS: Denies any focal weakness. No loss of consciousness. Denies any numbness.   Dermatological ROS: Denies any redness or pruritis.    Vital Signs:    Temp:  [97.8 °F (36.6 °C)-99.2 °F (37.3 °C)] 98.8 °F (37.1 °C)  Heart Rate:  [64-75] 71  Resp:  [18] 18  BP: (107-161)/(64-99) 122/99    Intake and output:    I/O last 3 completed shifts:  In: 910 [P.O.:560; IV Piggyback:350]  Out: 3600 [Urine:3600]       Physical Examination:    General Appearance:  Alert and cooperative, not in any acute distress.   Head:  Atraumatic and normocephalic, without obvious abnormality.   Eyes:          PERRLA, conjunctivae and sclerae normal, no Icterus. No pallor. Extra-occular movements are within normal limits.   Neck: Supple, trachea midline, no thyromegaly, no carotid bruit.   Lungs:   Chest shape is normal. Breath sounds heard bilaterally but decreased in the bases.  No crackles or wheezing. No Pleural rub or bronchial breathing.   Heart:  Normal S1 and S2, no murmur, no gallop, no rub. No JVD   Abdomen:   Normal bowel sounds, no masses, no organomegaly. Soft, obese, non-tender, no guarding, no rebound tenderness.   Extremities: Moves all extremities well, 1+ edema, no cyanosis, no            clubbing.  Minimal diffuse erythema noted in the legs which according to the patient is significantly better compared to yesterday.   Skin: No bleeding or bruising.   Neurologic: Awake, alert and oriented times 3. Moves all 4 extremities equally.   Laboratory results:      Results from last 7 days  Lab Units 10/01/17  0640 09/30/17  0611 09/29/17  0510 09/28/17  1947   SODIUM mmol/L 142 145 145 143   POTASSIUM mmol/L 3.3* 3.3* 3.4* 3.3*   CHLORIDE mmol/L 84* 88* 93* 92*   CO2 mmol/L 44.0* 43.0* 39.0* 38.0*   BUN mg/dL 20 20 18 19    CREATININE mg/dL 0.90 0.80 0.80 0.90   CALCIUM mg/dL 9.0 8.7 8.6 8.9   BILIRUBIN mg/dL  --   --   --  0.4   ALK PHOS U/L  --   --   --  105   ALT (SGPT) U/L  --   --   --  58   AST (SGOT) U/L  --   --   --  21   GLUCOSE mg/dL 167* 176* 142* 162*       Results from last 7 days  Lab Units 10/01/17  0640 09/29/17  0510 09/28/17 1947   WBC 10*3/mm3 12.85* 11.92* 12.25*   HEMOGLOBIN g/dL 11.3* 10.9* 10.8*   HEMATOCRIT % 37.4 36.6* 36.0*   PLATELETS 10*3/mm3 230 217 221           Results from last 7 days  Lab Units 09/28/17 1947   TROPONIN I ng/mL <0.012       Results from last 7 days  Lab Units 09/28/17  2201 09/28/17  2141   BLOODCX   --  No growth at 2 days  No growth at 2 days   URINECX  Mixed Susu Isolated  --      I have reviewed the patient's laboratory results.    Radiology results:    Imaging Results (last 24 hours)     ** No results found for the last 24 hours. **        Medication Review:     I have reviewed the patients active and prn medications.     Principal Problem:    Pneumonia of both lungs due to infectious organism  Active Problems:    Acute respiratory failure with hypoxia and hypercapnia    Obstructive sleep apnea    CAD (coronary artery disease)    Bilateral carotid artery stenosis    Diabetes mellitus type 2 in obese    Essential hypertension    Obesity (BMI 30-39.9)      Assessment:    1.  Acute hypoxic and hypercapnic respiratory failure, present on admission.  2.  Bilateral lower lobe bacterial pneumonia, present on admission.  3.  Volume overload, with pulmonary edema currently on diuretic therapy.  4.  Severe obstructive sleep apnea, untreated.  5.  Acute urinary tract infection, present on admission.  6.  Diabetes mellitus type 2 with nephropathy.  7.  Coronary artery disease status post stents.  8.  Bilateral carotid artery disease.  9.  Obesity with a BMI of 37.    Plan:    Patient is currently on IV antibiotic therapy with Rocephin and azithromycin for her bilateral pneumonia as well  as urinary tract infection.  Urine culture And blood cultures have been negative so far.  Patient will be continued on oxygen, bronchodilators as well as BiPAP therapy.  I have strongly advised her to use the BiPAP even during the day.  Blood gases done yesterday did show persistent hypercapnia.  Patient will be continued on Bumex twice daily.  She has had 3 L of negative balance since admission so far.  I will also started on spironolactone low-dose in the morning.  This will also help her hypokalemia.  She is being followed by Dr. Mancilla.  Her home medications have been continued.  Her blood sugars are fairly under control.  We will start her on physical and occupational therapy.  Further recommendations depend upon her clinical course.    Kentrell Whittington MD  10/01/17  12:41 PM    Portions of this note may have been completed with Dragon, a voice recognition program. Not all errors in transcription may have been detected prior to signing.

## 2017-10-02 ENCOUNTER — APPOINTMENT (OUTPATIENT)
Dept: PHYSICAL THERAPY | Facility: HOSPITAL | Age: 67
End: 2017-10-02

## 2017-10-02 LAB
ANION GAP SERPL CALCULATED.3IONS-SCNC: 17.3 MMOL/L
BUN BLD-MCNC: 20 MG/DL (ref 7–20)
BUN/CREAT SERPL: 25 (ref 7.1–23.5)
CALCIUM SPEC-SCNC: 9.3 MG/DL (ref 8.4–10.2)
CHLORIDE SERPL-SCNC: 84 MMOL/L (ref 98–107)
CO2 SERPL-SCNC: 42 MMOL/L (ref 26–30)
CREAT BLD-MCNC: 0.8 MG/DL (ref 0.6–1.3)
GFR SERPL CREATININE-BSD FRML MDRD: 72 ML/MIN/1.73
GLUCOSE BLD-MCNC: 180 MG/DL (ref 74–98)
GLUCOSE BLDC GLUCOMTR-MCNC: 134 MG/DL (ref 70–130)
GLUCOSE BLDC GLUCOMTR-MCNC: 150 MG/DL (ref 70–130)
GLUCOSE BLDC GLUCOMTR-MCNC: 206 MG/DL (ref 70–130)
GLUCOSE BLDC GLUCOMTR-MCNC: 213 MG/DL (ref 70–130)
NT-PROBNP SERPL-MCNC: 138 PG/ML (ref 0–125)
POTASSIUM BLD-SCNC: 3.3 MMOL/L (ref 3.5–5.1)
SODIUM BLD-SCNC: 140 MMOL/L (ref 137–145)

## 2017-10-02 PROCEDURE — 94799 UNLISTED PULMONARY SVC/PX: CPT

## 2017-10-02 PROCEDURE — 25010000002 CEFTRIAXONE: Performed by: INTERNAL MEDICINE

## 2017-10-02 PROCEDURE — 82962 GLUCOSE BLOOD TEST: CPT

## 2017-10-02 PROCEDURE — 99232 SBSQ HOSP IP/OBS MODERATE 35: CPT | Performed by: NURSE PRACTITIONER

## 2017-10-02 PROCEDURE — 99232 SBSQ HOSP IP/OBS MODERATE 35: CPT | Performed by: INTERNAL MEDICINE

## 2017-10-02 PROCEDURE — 25010000002 ACETAZOLAMIDE PER 500 MG: Performed by: INTERNAL MEDICINE

## 2017-10-02 PROCEDURE — 97161 PT EVAL LOW COMPLEX 20 MIN: CPT

## 2017-10-02 PROCEDURE — 25010000002 ENOXAPARIN PER 10 MG: Performed by: INTERNAL MEDICINE

## 2017-10-02 PROCEDURE — 63710000001 INSULIN ASPART PER 5 UNITS: Performed by: INTERNAL MEDICINE

## 2017-10-02 PROCEDURE — 25010000002 AZITHROMYCIN: Performed by: INTERNAL MEDICINE

## 2017-10-02 PROCEDURE — 25010000002 CHLOROTHIAZIDE PER 500 MG: Performed by: INTERNAL MEDICINE

## 2017-10-02 PROCEDURE — 94660 CPAP INITIATION&MGMT: CPT

## 2017-10-02 PROCEDURE — 80048 BASIC METABOLIC PNL TOTAL CA: CPT | Performed by: NURSE PRACTITIONER

## 2017-10-02 PROCEDURE — 83880 ASSAY OF NATRIURETIC PEPTIDE: CPT | Performed by: INTERNAL MEDICINE

## 2017-10-02 RX ORDER — ACETAZOLAMIDE 500 MG/5ML
250 INJECTION, POWDER, LYOPHILIZED, FOR SOLUTION INTRAVENOUS ONCE
Status: COMPLETED | OUTPATIENT
Start: 2017-10-02 | End: 2017-10-02

## 2017-10-02 RX ORDER — CHLOROTHIAZIDE SODIUM 500 MG/1
250 INJECTION INTRAVENOUS ONCE
Status: COMPLETED | OUTPATIENT
Start: 2017-10-02 | End: 2017-10-02

## 2017-10-02 RX ORDER — ACETAZOLAMIDE 500 MG/1
500 CAPSULE, EXTENDED RELEASE ORAL DAILY
Status: DISCONTINUED | OUTPATIENT
Start: 2017-10-03 | End: 2017-10-03 | Stop reason: HOSPADM

## 2017-10-02 RX ORDER — POTASSIUM CHLORIDE 750 MG/1
40 CAPSULE, EXTENDED RELEASE ORAL ONCE
Status: COMPLETED | OUTPATIENT
Start: 2017-10-02 | End: 2017-10-02

## 2017-10-02 RX ORDER — BUTALBITAL, ACETAMINOPHEN AND CAFFEINE 50; 325; 40 MG/1; MG/1; MG/1
1 TABLET ORAL EVERY 4 HOURS PRN
Status: DISCONTINUED | OUTPATIENT
Start: 2017-10-02 | End: 2017-10-03 | Stop reason: HOSPADM

## 2017-10-02 RX ADMIN — BUMETANIDE 2 MG: 2 TABLET ORAL at 09:00

## 2017-10-02 RX ADMIN — NYSTATIN 500000 UNITS: 100000 SUSPENSION ORAL at 17:48

## 2017-10-02 RX ADMIN — BUTALBITAL, ACETAMINOPHEN, AND CAFFEINE 1 TABLET: 50; 325; 40 TABLET ORAL at 21:32

## 2017-10-02 RX ADMIN — SPIRONOLACTONE 25 MG: 25 TABLET, FILM COATED ORAL at 09:00

## 2017-10-02 RX ADMIN — BUMETANIDE 2 MG: 2 TABLET ORAL at 17:48

## 2017-10-02 RX ADMIN — METFORMIN HYDROCHLORIDE 500 MG: 500 TABLET, FILM COATED ORAL at 09:00

## 2017-10-02 RX ADMIN — NYSTATIN 500000 UNITS: 100000 SUSPENSION ORAL at 21:33

## 2017-10-02 RX ADMIN — QUETIAPINE FUMARATE 25 MG: 25 TABLET, FILM COATED ORAL at 21:33

## 2017-10-02 RX ADMIN — ACETAMINOPHEN 650 MG: 325 TABLET ORAL at 17:48

## 2017-10-02 RX ADMIN — METOPROLOL SUCCINATE 50 MG: 50 TABLET, EXTENDED RELEASE ORAL at 09:00

## 2017-10-02 RX ADMIN — IPRATROPIUM BROMIDE AND ALBUTEROL SULFATE 3 ML: .5; 3 SOLUTION RESPIRATORY (INHALATION) at 07:32

## 2017-10-02 RX ADMIN — NYSTATIN 500000 UNITS: 100000 SUSPENSION ORAL at 09:00

## 2017-10-02 RX ADMIN — POTASSIUM CHLORIDE 10 MEQ: 20 TABLET, EXTENDED RELEASE ORAL at 09:00

## 2017-10-02 RX ADMIN — CHOLECALCIFEROL CAP 125 MCG (5000 UNIT) 5000 UNITS: 125 CAP at 09:00

## 2017-10-02 RX ADMIN — AZITHROMYCIN 500 MG: 500 INJECTION, POWDER, LYOPHILIZED, FOR SOLUTION INTRAVENOUS at 01:13

## 2017-10-02 RX ADMIN — IPRATROPIUM BROMIDE AND ALBUTEROL SULFATE 3 ML: .5; 3 SOLUTION RESPIRATORY (INHALATION) at 19:09

## 2017-10-02 RX ADMIN — LINAGLIPTIN 5 MG: 5 TABLET, FILM COATED ORAL at 09:00

## 2017-10-02 RX ADMIN — GABAPENTIN 300 MG: 300 CAPSULE ORAL at 21:32

## 2017-10-02 RX ADMIN — MULTIPLE VITAMINS W/ MINERALS TAB 1 TABLET: TAB at 09:00

## 2017-10-02 RX ADMIN — ATORVASTATIN CALCIUM 80 MG: 80 TABLET, FILM COATED ORAL at 09:00

## 2017-10-02 RX ADMIN — INSULIN ASPART 2 UNITS: 100 INJECTION, SOLUTION INTRAVENOUS; SUBCUTANEOUS at 06:40

## 2017-10-02 RX ADMIN — IPRATROPIUM BROMIDE AND ALBUTEROL SULFATE 3 ML: .5; 3 SOLUTION RESPIRATORY (INHALATION) at 00:48

## 2017-10-02 RX ADMIN — METHENAMINE HIPPURATE 1 G: 1 TABLET ORAL at 09:00

## 2017-10-02 RX ADMIN — METFORMIN HYDROCHLORIDE 500 MG: 500 TABLET, FILM COATED ORAL at 17:48

## 2017-10-02 RX ADMIN — POTASSIUM CHLORIDE 40 MEQ: 750 CAPSULE, EXTENDED RELEASE ORAL at 11:15

## 2017-10-02 RX ADMIN — CHLOROTHIAZIDE SODIUM 250 MG: 500 INJECTION, POWDER, LYOPHILIZED, FOR SOLUTION INTRAVENOUS at 11:53

## 2017-10-02 RX ADMIN — METHENAMINE HIPPURATE 1 G: 1 TABLET ORAL at 17:48

## 2017-10-02 RX ADMIN — ACETAZOLAMIDE SODIUM 250 MG: 500 INJECTION, POWDER, LYOPHILIZED, FOR SOLUTION INTRAVENOUS at 11:50

## 2017-10-02 RX ADMIN — ENOXAPARIN SODIUM 40 MG: 40 INJECTION SUBCUTANEOUS at 09:00

## 2017-10-02 RX ADMIN — CLOTRIMAZOLE AND BETAMETHASONE DIPROPIONATE: 10; .5 CREAM TOPICAL at 21:35

## 2017-10-02 RX ADMIN — ACETAMINOPHEN 650 MG: 325 TABLET ORAL at 06:32

## 2017-10-02 RX ADMIN — CEFTRIAXONE 1 G: 1 INJECTION, POWDER, FOR SOLUTION INTRAMUSCULAR; INTRAVENOUS at 23:33

## 2017-10-02 RX ADMIN — GABAPENTIN 300 MG: 300 CAPSULE ORAL at 09:00

## 2017-10-02 RX ADMIN — NYSTATIN 500000 UNITS: 100000 SUSPENSION ORAL at 11:15

## 2017-10-02 RX ADMIN — CLOTRIMAZOLE AND BETAMETHASONE DIPROPIONATE: 10; .5 CREAM TOPICAL at 09:00

## 2017-10-02 RX ADMIN — IPRATROPIUM BROMIDE AND ALBUTEROL SULFATE 3 ML: .5; 3 SOLUTION RESPIRATORY (INHALATION) at 13:01

## 2017-10-02 NOTE — THERAPY DISCHARGE NOTE
Acute Care - Physical Therapy Initial Eval/Discharge   [facility]     Patient Name: Vidhi Lopez  : 1950  MRN: [id number]  Today's Date: 10/2/2017   Onset of Illness/Injury or Date of Surgery Date: 17  Date of Referral to PT: 10/01/17  Referring Physician: Kentrell Whittington MD      Admit Date: 2017    Visit Dx:    ICD-10-CM ICD-9-CM   1. Pneumonia of both lungs due to infectious organism, unspecified part of lung J18.9 483.8   2. Hypoxia R09.02 799.02   3. Hypercarbia R06.89 786.09   4. Impaired functional mobility, balance, gait, and endurance Z74.09 V49.89     Patient Active Problem List   Diagnosis   • Dyslipidemia   • Hypertension   • Anxiety   • Insomnia   • GERD (gastroesophageal reflux disease)   • Diabetes mellitus   • Fibromyalgia   • RLS (restless legs syndrome)   • Migraines   • Asthma   • COPD (chronic obstructive pulmonary disease)   • Interstitial cystitis   • History of chronic bronchitis   • History of acute myocardial infarction   • History of cardiac murmur   • History of stroke   • CAD (coronary artery disease)   • Essential hypertension   • Generalized edema   • CKD (chronic kidney disease) stage 2, GFR 60-89 ml/min   • Bilateral carotid artery stenosis   • Pneumonia of both lungs due to infectious organism   • Acute respiratory failure with hypoxia and hypercapnia   • Obstructive sleep apnea   • Obesity (BMI 30-39.9)   • Diabetes mellitus type 2 in obese     Past Medical History:   Diagnosis Date   • Allergic rhinitis    • Asthma with COPD     Asthma/COPD   • Bilateral ovarian cysts    • Coronary artery disease    • Depression with anxiety     Depression/Anxiety   • Diabetes     pre   • Endometriosis     Dr. Jin; estradiol    • Fatigue    • Headache     Headaches   • High cholesterol    • History of gallstones    • History of myocardial infarction    • Hypertension    • Stroke    • Thyroid disorder    • Urinary leakage    • UTI (urinary tract infection)    • Yeast dermatitis       Past Surgical History:   Procedure Laterality Date   • BREAST BIOPSY Right 1991   • CAROTID STENT      Transcath    • CAROTID STENT Left    • CHOLECYSTECTOMY     • CYSTOSTOMY W/ BLADDER BIOPSY     • DILATATION AND CURETTAGE     • LAPAROSCOPIC CHOLECYSTECTOMY  1994    Lap rolando   • OOPHORECTOMY     • PAP SMEAR  05/10/2016   • PARTIAL HYSTERECTOMY            PT ASSESSMENT (last 72 hours)      PT Evaluation       10/02/17 1510       Rehab Evaluation    Document Type evaluation  -JR     Subjective Information agree to therapy;complains of;dyspnea  -JR     Patient Effort, Rehab Treatment excellent  -JR     Symptoms Noted During/After Treatment shortness of breath  -JR     Symptoms Noted Comment oxygen sats drop to 85% with 6 LPM when walking and talking  -JR     General Information    Patient Profile Review yes  -JR     Onset of Illness/Injury or Date of Surgery Date 09/28/17  -JR     Referring Physician Kentrell Whittington MD  -JR     General Observations Sitting in recliner, 6 LPM oxygen per nasal cannula, IV in LUE  -JR     Pertinent History Of Current Problem Pneumonia, COPD, DM2, CAD, HTN  -JR     Precautions/Limitations fall precautions;oxygen therapy device and L/min   6 LPM per nasal cannula  -JR     Prior Level of Function independent:;community mobility  -JR     Equipment Currently Used at Home none  -JR     Plans/Goals Discussed With patient;agreed upon  -JR     Risks Reviewed patient:;change in vital signs;increased discomfort  -JR     Benefits Reviewed patient:;increase balance;increase strength;increase independence;improve function  -JR     Living Environment    Lives With alone  -JR     Living Arrangements house  -JR     Home Accessibility no concerns  -JR     Clinical Impression    Date of Referral to PT 10/01/17  -JR     PT Diagnosis SOA with minimal exertion  -JR     Patient/Family Goals Statement Patient wants to be well and go home  -JR     Criteria for Skilled Therapeutic Interventions Met does not  meet criteria for skilled intervention  -JR     Pathology/Pathophysiology Noted (Describe Specifically for Each System) pulmonary  -JR     Impairments Found (describe specific impairments) aerobic capacity/endurance;ventilation and respiration/gas exchange  -JR     Vital Signs    Pre SpO2 (%) 98  -JR     O2 Delivery Pre Treatment supplemental O2   6 LPM per nasal cannula  -JR     Intra SpO2 (%) 85  -JR     O2 Delivery Intra Treatment supplemental O2   6 LPM with walking and talking  -JR     Post SpO2 (%) 95  -JR     O2 Delivery Post Treatment supplemental O2   6LPM per nasal cannula  -JR     Pain Assessment    Pain Assessment No/denies pain  -JR     Cognitive Assessment/Intervention    Current Cognitive/Communication Assessment functional  -JR     Orientation Status oriented x 4  -JR     Follows Commands/Answers Questions able to follow multi-step instructions  -JR     Personal Safety decreased awareness, need for safety  -JR     ROM (Range of Motion)    General ROM no range of motion deficits identified  -JR     MMT (Manual Muscle Testing)    General MMT Assessment no strength deficits identified  -JR     Bed Mobility, Assessment/Treatment    Bed Mob, Supine to Sit, [place] independent  -JR     Bed Mob, Sit to Supine, [place] independent  -JR     Transfer Assessment/Treatment    Transfers, Sit-Stand [place] conditional independence  -JR     Transfers, Stand-Sit [place] conditional independence  -JR     Transfers, Sit-Stand-Sit, Assist Device --   oxygen at 6 LPM  -JR     Gait Assessment/Treatment    Gait, [place] Level conditional independence  -JR     Gait, Assistive Device --   oxygen at 6 LPM  -JR     Gait, Distance (Feet) 385  -JR     Gait, Gait Pattern Analysis swing-through gait  -JR     Gait, Safety Issues supplemental O2   6 LPM  -JR     Gait, Comment sats drop to 85% with walking and talking  -JR     Positioning and Restraints    Pre-Treatment Position sitting in chair/recliner   -JR     Post Treatment Position chair  -JR     In Chair sitting;call light within reach;encouraged to call for assist  -JR       User Key  (r) = Recorded By, (t) = Taken By, (c) = Cosigned By    Initials Name Provider Type    JR Kim Mullen PT Physical Therapist          Physical Therapy Education     Title: PT OT SLP Therapies (Resolved)     Topic: Physical Therapy (Resolved)     Point: Precautions (Resolved)    Learning Progress Summary    Learner Readiness Method Response Comment Documented by Status   Patient Acceptance E VU Patient is instructed on PLB, pacing and not talking while walking  10/02/17 1607 Done                      User Key     Initials Effective Dates Name Provider Type Discipline     10/26/16 -  Kim Mullen PT Physical Therapist PT                PT Recommendation and Plan  Anticipated Discharge Disposition: home  PT Frequency: evaluation only                 Outcome Measures       10/02/17 1510          How much help from another person do you currently need...    Turning from your back to your side while in flat bed without using bedrails? 4  -JR      Moving from lying on back to sitting on the side of a flat bed without bedrails? 4  -JR      Moving to and from a bed to a chair (including a wheelchair)? 4  -JR      Standing up from a chair using your arms (e.g., wheelchair, bedside chair)? 4  -JR      Climbing 3-5 steps with a railing? 4  -JR      To walk in hospital room? 4  -JR      AM-PAC 6 Clicks Score 24  -      Functional Assessment    Outcome Measure Options AM-PAC 6 Clicks Basic Mobility (PT)  -JR        User Key  (r) = Recorded By, (t) = Taken By, (c) = Cosigned By    Initials Name Provider Type    JR Kim Mullen PT Physical Therapist           Time Calculation:         PT Charges       10/02/17 1608          Time Calculation    Start Time 1510  -JR      PT Received On 10/02/17  -        User Key  (r) = Recorded By, (t) = Taken By, (c) = Cosigned By    Initials Name  Provider Type    JR Kim Mullen, PT Physical Therapist          Therapy Charges for Today     Code Description Service Date Service Provider Modifiers Qty    98940821857 HC PT EVAL LOW COMPLEXITY 4 10/2/2017 Kim Mullen, PT GP 1          PT G-Codes  Outcome Measure Options: AM-PAC 6 Clicks Basic Mobility (PT)    PT Discharge Summary  Anticipated Discharge Disposition: home    Kim Mullen, PT  10/2/2017

## 2017-10-02 NOTE — PROGRESS NOTES
PROGRESS NOTE        Date of Admission: 9/28/2017  Length of Stay: 3  Primary Care Physician: Michael Mays MD    Subjective   Chief Complaint: Dyspnea  HPI: This is a 67 year old female admitted this morning by the on call hospitalist for hypoxic/hypercarbic respiratory failure and bilateral pneumonia.  Patient has presented with cough and SOA as well as increased lower extremity edema and weight gain over the past couple months that has recently worsened.  She has a history of chronic kidney diease CKD 3 and is followed by dr. Mcadams in [place].  She is also followed by Dr. Lito Flores with Cardiology.  Patient was placed on Bipap upon admission.  Dr. Mancilla with Pulmonology has been consulted and follow ing along with hospitalist service.  Patient is requiring 4-5 liters of oxygen upon admission.  Dr. Mancilla has been consulted.  Her initial CT chest indicated bilateral pneumonia.  She has been initiated on IV antibiotics in the form of Rocephin and azithromycin.    Patients shortness of breath has improved with diuresis.  She has been diuresing well.  Her lower extremity edema has significantly improved.  She has been compliant with her BiPAP therapy.  Encouraged patient to try to ambulate with assistance today.   Patient states having a history of bilateral carotid artery disease and did have a stent placed in left carotid 7 years ago and now is told that she has disease in the right carotid.         Review Of Systems:   Review of Systems   General ROS: Patient denies any fevers, chills or loss of consciousness.    Psychological ROS: Denies any hallucinations and delusions.  Ophthalmic ROS: Denies any diplopia or transient loss of vision.  ENT ROS: Denies sore throat, nasal congestion or ear pain.   Allergy and Immunology ROS: Denies rash or itching.  Hematological and Lymphatic ROS: Denies neck swelling or easy bleeding.  Endocrine ROS: Denies any recent unintentional weight gain or loss.  Breast  ROS: Denies any pain or swelling.  Respiratory ROS: Complaints of cough and shortness of breath.   Cardiovascular ROS: Denies chest pain or palpitations. No history of exertional chest pain.  Lower extremity edema     Gastrointestinal ROS: Denies nausea and vomiting. Denies any abdominal pain. No diarrhea.  Genito-Urinary ROS: Denies dysuria or hematuria.  Musculoskeletal ROS: Denies back pain. No muscle pain. No calf pain.   Neurological ROS: Denies any focal weakness. No loss of consciousness. Denies any numbness. Denies neck pain.   Dermatological ROS: Denies any redness or pruritis.    Objective      Temp:  [97.7 °F (36.5 °C)-98.8 °F (37.1 °C)] 97.7 °F (36.5 °C)  Heart Rate:  [68-84] 80  Resp:  [16-20] 20  BP: (114-167)/(58-99) 120/58  Physical Exam    General Appearance:  Alert and cooperative, not in any acute distress.   Head:  Atraumatic and normocephalic, without obvious abnormality.   Eyes:          PERRLA, conjunctivae and sclerae normal, no Icterus. No pallor. Extra-occular movements are within normal limits.   Ears:  Ears appear intact with no abnormalities noted.   Throat: No oral lesions, no thrush, oral mucosa moist.   Neck: Supple, trachea midline, no thyromegaly, no carotid bruit.   Back:   No kyphoscoliosis present. No tenderness to palpation,   range of motion normal.   Lungs:   Chest shape is normal. Breath sounds heard bilaterally equally.  crackles bilaterally but no  wheezing. No Pleural rub or bronchial breathing.   Heart:  Normal S1 and S2, no murmur, no gallop, no rub. No JVD   Abdomen:   Normal bowel sounds, no masses, no organomegaly. Soft     non-tender, non-distended, no guarding, no rebound                Tenderness, obese   Extremities: Moves all extremities well, +1 edema, no cyanosis, no clubbing.  Peripheral vascular changes and suspected mild venous dermatitis.     Pulses: Pulses palpable and equal bilaterally   Skin: No bleeding, bruising or rash   Lymph nodes: No palpable  adenopathy   Neurologic:    Psychiatric/Behavior:     Cranial nerves 2 - 12 grossly intact, sensation intact, Motor power is normal and equal bilaterally.  Mood normal, behavior normal       Results Review:    I have reviewed the labs, radiology results and diagnostic studies.      Results from last 7 days  Lab Units 10/01/17  0640   WBC 10*3/mm3 12.85*   HEMOGLOBIN g/dL 11.3*   PLATELETS 10*3/mm3 230       Results from last 7 days  Lab Units 10/02/17  0840   SODIUM mmol/L 140   POTASSIUM mmol/L 3.3*   CO2 mmol/L 42.0*   CREATININE mg/dL 0.80   GLUCOSE mg/dL 180*       Culture Data:   Blood Culture   Date Value Ref Range Status   09/28/2017 No growth at 24 hours  Preliminary   09/28/2017 No growth at 24 hours  Preliminary     Radiology Data:   Cardiology Data:    I have reviewed the medications.    Assessment/Plan     Assessment and Plan:  1.  Acute hypoxic/hypercarbic respiratory failure-  Patient is requiring 4-5 ltiers oxygen for which she is not on oxygen at home.  She was placed on bipap which we will continue.  Dr. Mancilla has been consulted.  She has been encouraged to continue Bipap at night.    2.  bilateral pneumonia POA-  Patient is on IV antibiotics in the form of Rocephin and Azithromycin and breathing treatments.  She was initially placed on IV steroids but this has discontinued by Pulmonology as it is felt not to be required at this time.  3.  Suspected Sleep apnea-  Will need further work up. This can be done on an outpatient basis by Dr. Mancilla.    4.  Suspected pulmonary edema -  Improving with IV diuresis.     5.  Bilateral lower extremity edema/anasarca-  This has much improved with diuresis.   Bilateral venous duplex was negative.  A dose of diuril has been ordered for today.  6.  Hypokalemia- Supplemented today  7.  Acute UTI POA-  Patient is getting IV rocephin.  Secondary to Ecoli.    8.  History of interstitial cystitis  9.  Diabetes Mellitus type II-  Insulin protocol  10.  History of CVA  11.  Coronary artery disease- Medical management  12.  Carotid artery disease-  Patient was noted to have MRI angiogram in August which showed severe atherosclerotic disease with significant stenosis in left carotid artery and moderate stenosis seen in right carotid artery.  Patient states she has had stent placed in left carotid 7 years ago however there is no mention of this on the MRI  She is will most certainly need follow up and possible intervention.  She does however appear to be asymptmatic  13. Chronic essential hypertension-  BP is stable. Continue current medical regimen.          DVT prophylaxis:  Lovenox  Discharge Planning:  Possibly tomorrow.    Izzy Flaherty, JANI 10/02/17 11:56 AM

## 2017-10-02 NOTE — PLAN OF CARE
Problem: Patient Care Overview (Adult)  Goal: Plan of Care Review  Outcome: Ongoing (interventions implemented as appropriate)  Goal: Adult Individualization and Mutuality  Outcome: Ongoing (interventions implemented as appropriate)    Problem: Pneumonia (Adult)  Goal: Signs and Symptoms of Listed Potential Problems Will be Absent or Manageable (Pneumonia)  Outcome: Ongoing (interventions implemented as appropriate)

## 2017-10-02 NOTE — PLAN OF CARE
Problem: NPPV/CPAP (Adult)  Intervention: Prevent Aspiration During Therapy    10/02/17 0200   Positioning   Head Of Bed (HOB) Position HOB elevated       Intervention: Assess/Manage Patient Tolerance of Noninvasive Ventilation    10/02/17 0241   Respiratory Interventions   Airway/Ventilation Management airway patency maintained       Intervention: Prevent/Minimize Device Friction/Shearing and Pressure Points    10/02/17 0241   Respiratory Interventions   NPPV/CPAP Maintenance mask adjusted         Goal: Signs and Symptoms of Listed Potential Problems Will be Absent or Manageable (NPPV/CPAP)  Outcome: Ongoing (interventions implemented as appropriate)    10/02/17 0241   NPPV/CPAP   Problems Assessed (NPPV/CPAP) hypoxia/hypoxemia;situational response;skin breakdown;dry mucous membranes   Problems Present (NPPV/CPAP) situational response

## 2017-10-02 NOTE — PLAN OF CARE
Problem: Patient Care Overview (Adult)  Goal: Plan of Care Review  Outcome: Ongoing (interventions implemented as appropriate)    10/02/17 7486   Coping/Psychosocial Response Interventions   Plan Of Care Reviewed With patient   Patient Care Overview   Progress improving   Outcome Evaluation   Outcome Summary/Follow up Plan Pt wore BiPap for 1hr this morning. CO2 level was 42. Encouraging compliance with continuous wearing of BIPAP at night. WBC trending down. Pt ambulated in hallway today. Desats with activity. NC at 5L att. Will continue to monitor.       Goal: Discharge Needs Assessment  Outcome: Ongoing (interventions implemented as appropriate)    Problem: Pneumonia (Adult)  Goal: Signs and Symptoms of Listed Potential Problems Will be Absent or Manageable (Pneumonia)  Outcome: Ongoing (interventions implemented as appropriate)

## 2017-10-02 NOTE — PLAN OF CARE
Problem: Patient Care Overview (Adult)  Goal: Plan of Care Review  Outcome: Ongoing (interventions implemented as appropriate)    10/02/17 1418   Coping/Psychosocial Response Interventions   Plan Of Care Reviewed With patient   Patient Care Overview   Progress improving         Problem: NPPV/CPAP (Adult)  Goal: Signs and Symptoms of Listed Potential Problems Will be Absent or Manageable (NPPV/CPAP)  Outcome: Ongoing (interventions implemented as appropriate)    10/02/17 1418   NPPV/CPAP   Problems Assessed (NPPV/CPAP) all   Problems Present (NPPV/CPAP) none

## 2017-10-03 VITALS
HEIGHT: 64 IN | BODY MASS INDEX: 36.54 KG/M2 | RESPIRATION RATE: 18 BRPM | HEART RATE: 71 BPM | WEIGHT: 214 LBS | TEMPERATURE: 98.2 F | DIASTOLIC BLOOD PRESSURE: 62 MMHG | SYSTOLIC BLOOD PRESSURE: 119 MMHG | OXYGEN SATURATION: 91 %

## 2017-10-03 LAB
ALBUMIN SERPL-MCNC: 3.9 G/DL (ref 3.5–5)
ALBUMIN/GLOB SERPL: 1.2 G/DL (ref 1–2)
ALP SERPL-CCNC: 104 U/L (ref 38–126)
ALT SERPL W P-5'-P-CCNC: 36 U/L (ref 13–69)
ANION GAP SERPL CALCULATED.3IONS-SCNC: 17.3 MMOL/L
ARTERIAL PATENCY WRIST A: POSITIVE
AST SERPL-CCNC: 21 U/L (ref 15–46)
ATMOSPHERIC PRESS: 743 MMHG
BACTERIA SPEC AEROBE CULT: NORMAL
BACTERIA SPEC AEROBE CULT: NORMAL
BASE EXCESS BLDA CALC-SCNC: 19.1 MMOL/L
BDY SITE: ABNORMAL
BILIRUB SERPL-MCNC: 0.5 MG/DL (ref 0.2–1.3)
BUN BLD-MCNC: 23 MG/DL (ref 7–20)
BUN/CREAT SERPL: 25.6 (ref 7.1–23.5)
CALCIUM SPEC-SCNC: 9.6 MG/DL (ref 8.4–10.2)
CHLORIDE SERPL-SCNC: 84 MMOL/L (ref 98–107)
CO2 SERPL-SCNC: 42 MMOL/L (ref 26–30)
CREAT BLD-MCNC: 0.9 MG/DL (ref 0.6–1.3)
GFR SERPL CREATININE-BSD FRML MDRD: 63 ML/MIN/1.73
GLOBULIN UR ELPH-MCNC: 3.3 GM/DL
GLUCOSE BLD-MCNC: 153 MG/DL (ref 74–98)
GLUCOSE BLDC GLUCOMTR-MCNC: 148 MG/DL (ref 70–130)
GLUCOSE BLDC GLUCOMTR-MCNC: 153 MG/DL (ref 70–130)
HCO3 BLDA-SCNC: 42.8 MMOL/L (ref 22–28)
HGB BLDA-MCNC: 11.6 G/DL (ref 12–18)
HOROWITZ INDEX BLD+IHG-RTO: 40 %
MODALITY: ABNORMAL
NT-PROBNP SERPL-MCNC: 104 PG/ML (ref 0–125)
PCO2 BLDA: 59.5 MM HG (ref 35–45)
PH BLDA: 7.47 PH UNITS (ref 7.3–7.5)
PO2 BLDA: 67.5 MM HG (ref 75–100)
POTASSIUM BLD-SCNC: 3.3 MMOL/L (ref 3.5–5.1)
PROT SERPL-MCNC: 7.2 G/DL (ref 6.3–8.2)
SAO2 % BLDCOA: 93.2 %
SODIUM BLD-SCNC: 140 MMOL/L (ref 137–145)

## 2017-10-03 PROCEDURE — 25010000002 ACETAZOLAMIDE PER 500 MG: Performed by: INTERNAL MEDICINE

## 2017-10-03 PROCEDURE — 94620 HC PULMONARY STRESS TEST SIMPLE: CPT

## 2017-10-03 PROCEDURE — 99239 HOSP IP/OBS DSCHRG MGMT >30: CPT | Performed by: NURSE PRACTITIONER

## 2017-10-03 PROCEDURE — 94799 UNLISTED PULMONARY SVC/PX: CPT

## 2017-10-03 PROCEDURE — 99232 SBSQ HOSP IP/OBS MODERATE 35: CPT | Performed by: INTERNAL MEDICINE

## 2017-10-03 PROCEDURE — 36600 WITHDRAWAL OF ARTERIAL BLOOD: CPT

## 2017-10-03 PROCEDURE — 94660 CPAP INITIATION&MGMT: CPT

## 2017-10-03 PROCEDURE — 83880 ASSAY OF NATRIURETIC PEPTIDE: CPT | Performed by: INTERNAL MEDICINE

## 2017-10-03 PROCEDURE — 82805 BLOOD GASES W/O2 SATURATION: CPT

## 2017-10-03 PROCEDURE — 25010000002 ENOXAPARIN PER 10 MG: Performed by: INTERNAL MEDICINE

## 2017-10-03 PROCEDURE — 25010000002 CHLOROTHIAZIDE PER 500 MG: Performed by: INTERNAL MEDICINE

## 2017-10-03 PROCEDURE — 82962 GLUCOSE BLOOD TEST: CPT

## 2017-10-03 PROCEDURE — 80053 COMPREHEN METABOLIC PANEL: CPT | Performed by: INTERNAL MEDICINE

## 2017-10-03 PROCEDURE — 25010000002 AZITHROMYCIN: Performed by: INTERNAL MEDICINE

## 2017-10-03 RX ORDER — ACETAZOLAMIDE 500 MG/5ML
250 INJECTION, POWDER, LYOPHILIZED, FOR SOLUTION INTRAVENOUS ONCE
Status: COMPLETED | OUTPATIENT
Start: 2017-10-03 | End: 2017-10-03

## 2017-10-03 RX ORDER — ROPINIROLE 0.25 MG/1
0.25 TABLET, FILM COATED ORAL NIGHTLY
COMMUNITY
End: 2019-10-28 | Stop reason: SDUPTHER

## 2017-10-03 RX ORDER — METOLAZONE 5 MG/1
5 TABLET ORAL DAILY
Qty: 30 TABLET | Refills: 0 | Status: ON HOLD | OUTPATIENT
Start: 2017-10-03 | End: 2018-02-12

## 2017-10-03 RX ORDER — SOLIFENACIN SUCCINATE 5 MG/1
5 TABLET, FILM COATED ORAL DAILY
COMMUNITY
End: 2018-01-30 | Stop reason: HOSPADM

## 2017-10-03 RX ORDER — POTASSIUM CHLORIDE 20 MEQ/1
20 TABLET, EXTENDED RELEASE ORAL DAILY
Qty: 30 TABLET | Refills: 0 | Status: SHIPPED | OUTPATIENT
Start: 2017-10-03 | End: 2018-11-09 | Stop reason: HOSPADM

## 2017-10-03 RX ORDER — CHLOROTHIAZIDE SODIUM 500 MG/1
250 INJECTION INTRAVENOUS ONCE
Status: COMPLETED | OUTPATIENT
Start: 2017-10-03 | End: 2017-10-03

## 2017-10-03 RX ORDER — METFORMIN HYDROCHLORIDE 500 MG/1
500 TABLET, EXTENDED RELEASE ORAL 2 TIMES DAILY WITH MEALS
Status: ON HOLD | COMMUNITY
End: 2018-02-12

## 2017-10-03 RX ORDER — POTASSIUM CHLORIDE 750 MG/1
40 CAPSULE, EXTENDED RELEASE ORAL ONCE
Status: COMPLETED | OUTPATIENT
Start: 2017-10-03 | End: 2017-10-03

## 2017-10-03 RX ORDER — CEFUROXIME AXETIL 500 MG/1
500 TABLET ORAL 2 TIMES DAILY
Qty: 8 TABLET | Refills: 0 | Status: SHIPPED | OUTPATIENT
Start: 2017-10-03 | End: 2017-12-23

## 2017-10-03 RX ADMIN — METOPROLOL SUCCINATE 50 MG: 50 TABLET, EXTENDED RELEASE ORAL at 09:54

## 2017-10-03 RX ADMIN — CLOTRIMAZOLE AND BETAMETHASONE DIPROPIONATE: 10; .5 CREAM TOPICAL at 09:00

## 2017-10-03 RX ADMIN — METFORMIN HYDROCHLORIDE 500 MG: 500 TABLET, FILM COATED ORAL at 08:50

## 2017-10-03 RX ADMIN — SPIRONOLACTONE 25 MG: 25 TABLET, FILM COATED ORAL at 08:50

## 2017-10-03 RX ADMIN — ATORVASTATIN CALCIUM 80 MG: 80 TABLET, FILM COATED ORAL at 08:50

## 2017-10-03 RX ADMIN — IPRATROPIUM BROMIDE AND ALBUTEROL SULFATE 3 ML: .5; 3 SOLUTION RESPIRATORY (INHALATION) at 00:31

## 2017-10-03 RX ADMIN — LINAGLIPTIN 5 MG: 5 TABLET, FILM COATED ORAL at 08:50

## 2017-10-03 RX ADMIN — GABAPENTIN 300 MG: 300 CAPSULE ORAL at 08:50

## 2017-10-03 RX ADMIN — ACETAZOLAMIDE 500 MG: 500 CAPSULE, EXTENDED RELEASE ORAL at 08:50

## 2017-10-03 RX ADMIN — POTASSIUM CHLORIDE 40 MEQ: 750 CAPSULE, EXTENDED RELEASE ORAL at 14:00

## 2017-10-03 RX ADMIN — AZITHROMYCIN 500 MG: 500 INJECTION, POWDER, LYOPHILIZED, FOR SOLUTION INTRAVENOUS at 01:02

## 2017-10-03 RX ADMIN — METHENAMINE HIPPURATE 1 G: 1 TABLET ORAL at 08:50

## 2017-10-03 RX ADMIN — IPRATROPIUM BROMIDE AND ALBUTEROL SULFATE 3 ML: .5; 3 SOLUTION RESPIRATORY (INHALATION) at 07:39

## 2017-10-03 RX ADMIN — CHOLECALCIFEROL CAP 125 MCG (5000 UNIT) 5000 UNITS: 125 CAP at 08:50

## 2017-10-03 RX ADMIN — NYSTATIN 500000 UNITS: 100000 SUSPENSION ORAL at 08:50

## 2017-10-03 RX ADMIN — NYSTATIN 500000 UNITS: 100000 SUSPENSION ORAL at 12:45

## 2017-10-03 RX ADMIN — ACETAZOLAMIDE SODIUM 250 MG: 500 INJECTION, POWDER, LYOPHILIZED, FOR SOLUTION INTRAVENOUS at 11:00

## 2017-10-03 RX ADMIN — ENOXAPARIN SODIUM 40 MG: 40 INJECTION SUBCUTANEOUS at 08:50

## 2017-10-03 RX ADMIN — MULTIPLE VITAMINS W/ MINERALS TAB 1 TABLET: TAB at 08:50

## 2017-10-03 RX ADMIN — BUMETANIDE 2 MG: 2 TABLET ORAL at 08:50

## 2017-10-03 RX ADMIN — POTASSIUM CHLORIDE 10 MEQ: 20 TABLET, EXTENDED RELEASE ORAL at 09:55

## 2017-10-03 RX ADMIN — CHLOROTHIAZIDE SODIUM 250 MG: 500 INJECTION, POWDER, LYOPHILIZED, FOR SOLUTION INTRAVENOUS at 10:55

## 2017-10-03 NOTE — PROGRESS NOTES
Continued Stay Note  [facility]     Patient Name: Vidhi Lopez  MRN: [id number]  Today's Date: 10/3/2017    Admit Date: 9/28/2017          Discharge Plan       10/03/17 1252    Case Management/Social Work Plan    Additional Comments From list provided pt choose [facility] called and faxed they will deliver to room Spoke to Dr Bryan PT will have a sleep study to determine need for CPAP              Discharge Codes     None        Expected Discharge Date and Time     Expected Discharge Date Expected Discharge Time    Oct 3, 2017             Gricelda Denson

## 2017-10-03 NOTE — PROGRESS NOTES
"  CC: SOB. Acute Hypoxic Respiratory Failure.     S: Less SOB when she walks. Was able to use CPAP last night for \"few hours\".      O: /82 (BP Location: Right arm, Patient Position: Lying)  Pulse 90  Temp 97.9 °F (36.6 °C) (Oral)   Resp 18  Ht 64\" (162.6 cm)  Wt 214 lb (97.1 kg)  LMP  (LMP Unknown)  SpO2 96%  BMI 36.73 kg/m2    General: No respiratory distress noted.  Neck: ? JVD   Cardiovascular: S1 + S2. Regular.   Respiratory: No wheezing heard. B/L crackles noted  Extremities: Trace edema noted.  Neurologic: AAOx3. Was able to follow commands     Labs: Reviewed.       Results from last 7 days  Lab Units 10/03/17  0618 10/02/17  0840 10/01/17  0640  09/28/17  1947   SODIUM mmol/L 140 140 142  < > 143   POTASSIUM mmol/L 3.3* 3.3* 3.3*  < > 3.3*   CHLORIDE mmol/L 84* 84* 84*  < > 92*   CO2 mmol/L 42.0* 42.0* 44.0*  < > 38.0*   BUN mg/dL 23* 20 20  < > 19   CREATININE mg/dL 0.90 0.80 0.90  < > 0.90   CALCIUM mg/dL 9.6 9.3 9.0  < > 8.9   BILIRUBIN mg/dL 0.5  --   --   --  0.4   ALK PHOS U/L 104  --   --   --  105   ALT (SGPT) U/L 36  --   --   --  58   AST (SGOT) U/L 21  --   --   --  21   GLUCOSE mg/dL 153* 180* 167*  < > 162*   < > = values in this interval not displayed.    Lab Results   Component Value Date    PHART 7.466 10/03/2017    PFZ0DNW 59.5 (C) 10/03/2017    PO2ART 67.5 (L) 10/03/2017    HGBBG 11.6 (L) 10/03/2017    M1BLYHUZ 93.2 10/03/2017    CARBOXYHGB 2.0 09/30/2017           Results from last 7 days  Lab Units 10/01/17  0640 09/29/17 0510 09/28/17  1947   WBC 10*3/mm3 12.85* 11.92* 12.25*   HEMOGLOBIN g/dL 11.3* 10.9* 10.8*   PLATELETS 10*3/mm3 230 217 221       CXRay: No significant change.     Assessment & Recommendations/Plan:   1.  Acute Respiratory Failure.    2.  Dyspnea.    3.  ?URIEL?  - Used CPAP at 10, last night, for 5-6 hours.     4.  Chronic respiratory Failure.  - Last compensated PCO2 was 67.    5.  Smoking.    6.  Likely Pulmonary Edema  - She may benefit from being " discharged on by mouth metolazone.    I will arrange for the patient to have sleep study performed as soon as possible as I highly suspect obstructive sleep apnea.    She will also need outpatient follow-up with PFTs and to discuss sleep study.    I highly recommend that the patient undergoes a right heart catheterization to rule out significant pulmonary hypertension.    We have reviewed patient's current orders and changes, if any, have been suggested to primary care team. Plan was also discussed with nursing staff, as necessary.       Phoebe Mancilla MD  10/03/17  9:13 AM    Dictated utilizing Dragon dictation.

## 2017-10-03 NOTE — DISCHARGE SUMMARY
[facility]   DISCHARGE SUMMARY    Name:  Vidhi Lopez   Age:  67 y.o.  Sex:  female  :  1950  MRN:  [id number]   Visit Number:  [id number]  Primary Care Physician:  Michael Mays MD  Date of Discharge:  10/3/2017  Admission Date:  2017      Presenting Problem:    Pneumonia of both lungs due to infectious organism, unspecified part of lung [J18.9]  Pneumonia of both lungs due to infectious organism, unspecified part of lung [J18.9]       Discharge Diagnosis:     Principal Problem:    Pneumonia of both lungs due to infectious organism  Active Problems:    CAD (coronary artery disease)    Essential hypertension    Bilateral carotid artery stenosis    Acute respiratory failure with hypoxia and hypercapnia    Obstructive sleep apnea    Obesity (BMI 30-39.9)    Diabetes mellitus type 2 in obese        Past Medical History:  Past Medical History:   Diagnosis Date   • Allergic rhinitis    • Asthma with COPD     Asthma/COPD   • Bilateral ovarian cysts    • Coronary artery disease    • Depression with anxiety     Depression/Anxiety   • Diabetes     pre   • Endometriosis     Dr. Jin; estradiol    • Fatigue    • Headache     Headaches   • High cholesterol    • History of gallstones    • History of myocardial infarction    • Hypertension    • Stroke    • Thyroid disorder    • Urinary leakage    • UTI (urinary tract infection)    • Yeast dermatitis          Consults:     Consults     Date and Time Order Name Status Description    2017 0903 Inpatient Consult to Cardiology      2017 0106 Inpatient Consult to Pulmonology            Procedures Performed:  None             History of presenting illness:  This is a 67-year-old female with past medical history as listed above who presented to the emergency room with dyspnea and lower extremity swelling.  According to the patient should been on Lasix and Bumex over the past few weeks.  She does follow with Dr. Flores with  cardiology at [facility] he would ordered an echocardiogram which was recently noted to be normal.  He was setting her up for a right-sided heart catheter as well as a renal angiogram for this week.  However it does appear that she was set up for these last week but she did not keep the appointment and this was actually prior to her hospitalization.  She has been diagnosed with sleep apnea in the past and refused any evaluation for the sleep apnea.  She is followed by nephrology Dr. Mcginnis at [facility] for renal insufficiency as well as urologist for interstitial cystitis.  She has had extensive workup including MRI of the neck and head for which she does have some carotid artery disease.  She was seen and evaluated in the emergency room and noted to have a CT which was suggestive of bilateral pneumonia.  She did have mild leukocytosis.  She was admitted to the hospitalist service for further medical management.    Hospital Course:  Upon admission to the hospitalist service Dr. Mancilla with pulmonology was consulted.  She was started on IV antibodies in the form Rocephin and azithromycin for suspected pneumonia.  She had been requiring 4-5 L of oxygen upon admission.  Pulmonology felt that some of her hypoxia was likely secondary to pulmonary edema.  He did place her on Lasix 100 mg and then had been given her Diuril.  She had some hypercapnia on her ABG as well for which she was started on BiPAP.  Patient will need a sleep evaluation to be done on an outpatient basis.    She was also noted to have lower extremity edema for which a venous duplex was done which was negative.  Her primary cardiologist is Dr. Lito Flores she had last seen him the week prior to admission her 2 weeks prior to admission where he had recommended a right heart catheter and a renal angiogram.  Patient was scheduled for those on the 27th however she did not keep that appointment.  She did have a 2-D echo done in August which showed an  ejection fraction of 76%.  Her was concern of pulmonary hypertension however Dr. Flores felt that her echo done in August did not reflect any of these findings.  She also states having a history of bilateral carotid artery disease and a stent placed in the left carotid 7 years ago.  According to the patient she has been told now that she has disease in her right carotid as well.  This has been addressed by Dr. Terry will defer to him follow-up.      Since admission from the respiratory standpoint patient does appear to be doing better.  She did undergo a 6 minute walk today for which she only required 2 L of oxygen.  Patient is quite anxious about going home on oxygen however she's been tolerating oxygen quite well here without any problems.  Patient is asking for something for anxiety in order to be able to go home on 2 L of oxygen nasal cannula stating that that she takes Xanax twice daily at home as needed does not feel that that will be enough for her anxiety if she has to go home on oxygen.  I have explained to the patient that taking benzodiazepines will only worsen her respiratory failure and worsen her hypercapnia.  She is already been set up for an outpatient sleep evaluation for which she will likely need BiPAP and added a been so to this will certainly not help.    We will plan to discharge patient home today and I will place her on Ceftin to finish the course.  I also had Metolazone as well as Bumex for home diuresis.  She will need to have BMP monitored weekly.  I have called Dr. Flores office to try to get her back and to get arrange for the right heart catheter and renal angiogram.  The office will review this and call the patient with an appointment.  She has been cleared from a pulmonary standpoint for discharge home.  She is stable from the hospitalist standpoint at this time for discharge home patient has reached the maximum benefit of this hospitalization.  Compliance has been discussed with the  patient and the need for compliance and to keep her appointments.  Commentary was notified this morning the patient would not wear the BiPAP last night however patient is adamant that she did wear the BiPAP.  She had only worn it for a couple hours.  In order to get BiPAP at home she will need to be set up on an outpatient basis for sleep evaluation for which Dr. Mancilla is arranging.    Vital Signs:    Temp:  [97.9 °F (36.6 °C)-98.6 °F (37 °C)] 98.2 °F (36.8 °C)  Heart Rate:  [70-90] 71  Resp:  [18-21] 18  BP: (115-157)/(62-90) 119/62    Physical Exam:  General Appearance:    Alert and cooperative, not in any acute distress.   Head:    Atraumatic and normocephalic, without obvious abnormality.   Eyes:            PERRLA, conjunctivae and sclerae normal, no Icterus. No pallor. Extra-occular movements are within normal limits.   Ears:    Ears appear intact with no abnormalities noted.   Throat:   No oral lesions, no thrush, oral mucosa moist.   Neck:   Supple, trachea midline, no thyromegaly, no carotid bruit.   Back:     No kyphoscoliosis present. No tenderness to palpation,   range of motion normal.   Lungs:     Chest shape is normal. Breath sounds heard bilaterally equally.  No crackles or wheezing. No Pleural rub or bronchial breathing.  Decreased in bases    Heart:    Normal S1 and S2, no murmur, no gallop, no rub. No JVD   Abdomen:     Normal bowel sounds, no masses, no organomegaly. Soft        non-tender, non-distended, no guarding, no rebound                Tenderness, obese   Extremities:   Moves all extremities well, trace edema, no cyanosis, no             clubbing   Pulses:   Pulses palpable and equal bilaterally   Skin:   No bleeding, bruising or rash   Lymph nodes:  Psychiatric/Behavior:    No palpable adenopathy    Normal mood, normal behavior   Neurologic:   Cranial nerves 2 - 12 grossly intact, sensation intact, Motor power is normal and equal bilaterally.           Pertinent Lab Results:       Results  from last 7 days  Lab Units 10/03/17  0618 10/02/17  0840 10/01/17  0640  09/28/17 1947   SODIUM mmol/L 140 140 142  < > 143   POTASSIUM mmol/L 3.3* 3.3* 3.3*  < > 3.3*   CHLORIDE mmol/L 84* 84* 84*  < > 92*   CO2 mmol/L 42.0* 42.0* 44.0*  < > 38.0*   BUN mg/dL 23* 20 20  < > 19   CREATININE mg/dL 0.90 0.80 0.90  < > 0.90   CALCIUM mg/dL 9.6 9.3 9.0  < > 8.9   BILIRUBIN mg/dL 0.5  --   --   --  0.4   ALK PHOS U/L 104  --   --   --  105   ALT (SGPT) U/L 36  --   --   --  58   AST (SGOT) U/L 21  --   --   --  21   GLUCOSE mg/dL 153* 180* 167*  < > 162*   < > = values in this interval not displayed.    Results from last 7 days  Lab Units 10/01/17  0640 09/29/17  0510 09/28/17 1947   WBC 10*3/mm3 12.85* 11.92* 12.25*   HEMOGLOBIN g/dL 11.3* 10.9* 10.8*   HEMATOCRIT % 37.4 36.6* 36.0*   PLATELETS 10*3/mm3 230 217 221         Blood Culture   Date Value Ref Range Status   09/28/2017 No growth at 4 days  Preliminary   09/28/2017 No growth at 4 days  Preliminary     Urine Culture   Date Value Ref Range Status   09/28/2017 Mixed Susu Isolated  Final         Pertinent Radiology Results:    Imaging Results (all)     Procedure Component Value Units Date/Time    XR Chest 2 View [[id number]] Collected:  09/28/17 2032     Updated:  09/28/17 2034    Narrative:       FINAL REPORT    CLINICAL HISTORY:  SOA.    FINDINGS:  Interstitial prominence is consistent with chronic lung disease. There is no acute infiltrate, suspicious mass or pleural effusion. Cardiomediastinal structures are within normal limits.      Impression:       Chronic changes but no acute disease    Authenticated by Brandon Golden MD on 09/28/2017 08:32:57 PM    CT Chest Pulmonary Embolism With Contrast [[id number]] Collected:  09/28/17 2124     Updated:  09/28/17 2126    Narrative:       FINAL REPORT    TECHNIQUE:  Multiple contiguous transaxial slices through the chest were obtained after the intravenous administration of contrast with 3-D coronal oblique  reformatted images.    CLINICAL HISTORY:  SOA, HYPOXIA. PE PROTOCOL    FINDINGS:  There is normal opacification of the pulmonary artery and its branches without pulmonary embolism. The aorta opacifies normally without dissection or aneurysm. Heart size is normal. There is bilateral lower lobe airspace disease, greater on the right.   This may be atelectasis or early infiltrate. There is no effusion or adenopathy.    Impression:    Negative for pulmonary embolism    Bilateral airspace disease      Impression:       Authenticated by Brandon Golden MD on 09/28/2017 09:24:28 PM    US Venous Doppler Lower Extremity Bilateral (duplex) [[id number]] Collected:  09/29/17 1106     Updated:  09/29/17 1109    Narrative:       PROCEDURE: US VENOUS DOPPLER LOWER EXTREMITY BILATERAL (DUPLEX)-     HISTORY: edema, erythema; J18.9-Pneumonia, unspecified organism;  R09.02-Hypoxemia; R06.89-Other abnormalities of breathing     PROCEDURE: Multiple transverse and longitudinal scans were performed of  both femoropopliteal deep venous system, with augmentation and  compression maneuvers.     FINDINGS: Normal phasic flow was noted in the visualized deep venous  system. No intraluminal increased echogenicity is noted to suggest  thrombus. There is normal compression and augmentation of the venous  structures.  No abnormal venous collaterals are seen.       Impression:       No evidence of deep venous thrombosis.     This report was finalized on 9/29/2017 11:06 AM by Carissa Markham M.D..    XR Chest 2 View [[id number]] Collected:  09/30/17 0954     Updated:  09/30/17 0957    Narrative:       PROCEDURE: XR CHEST 2 VW-        HISTORY: Acute hypoxic respiratory failure.; J18.9-Pneumonia,  unspecified organism; R09.02-Hypoxemia; R06.89-Other abnormalities of  breathing     COMPARISON: September 28, 2017.     FINDINGS: The heart is normal in size. The mediastinum is unremarkable.  There are increased interstitial markings and small bilateral  pleural  effusions likely reflecting pulmonary edema. There is no pneumothorax.  There are no acute osseous abnormalities.           Impression:       Increased interstitial markings and small bilateral pleural  effusions likely reflecting pulmonary edema.                 This report was finalized on 9/30/2017 9:55 AM by Carissa Markham M.D..          Condition on Discharge:    Stable        Discharge Disposition:    Home or Self Care    Discharge Medication:     Vidhi Lopez   Home Medication Instructions ONEIDA:[id number]    Printed on:10/03/17 1452   Medication Information                      ALPRAZolam (XANAX) 0.5 MG tablet  Take 0.5 mg by mouth 2 (Two) Times a Day As Needed.             atorvastatin (LIPITOR) 80 MG tablet  Take  by mouth daily.             bumetanide (BUMEX) 0.5 MG tablet  Take 1 mg by mouth 2 (Two) Times a Day.             cefuroxime (CEFTIN) 500 MG tablet  Take 1 tablet by mouth 2 (Two) Times a Day.             clotrimazole-betamethasone (LOTRISONE) 1-0.05 % cream  Apply  topically 2 (Two) Times a Day.             gabapentin (NEURONTIN) 800 MG tablet  Take 300 mg by mouth 2 (Two) Times a Day.             linagliptin (TRADJENTA) 5 MG tablet tablet  Take 5 mg by mouth daily.             metFORMIN ER (GLUCOPHAGE-XR) 500 MG 24 hr tablet  Take 500 mg by mouth 2 (Two) Times a Day With Meals.             methenamine (HIPREX) 1 G tablet  Take 1 g by mouth 2 (Two) Times a Day With Meals.             metOLazone (ZAROXOLYN) 5 MG tablet  Take 1 tablet by mouth Daily.             metoprolol tartrate (LOPRESSOR) 25 MG tablet  Take 25 mg by mouth 2 (Two) Times a Day.             Multiple Vitamins-Minerals (MULTIVITAMIN PO)  Take  by mouth daily.             potassium chloride (K-DUR,KLOR-CON) 20 MEQ CR tablet  Take 1 tablet by mouth Daily.             promethazine (PHENERGAN) 25 MG tablet  Take 1 tablet by mouth Every 6 (Six) Hours As Needed for Nausea or Vomiting.             QUEtiapine (SEROQUEL)  25 MG tablet  Take 25 mg by mouth every night.             rOPINIRole (REQUIP) 0.25 MG tablet  Take 0.25 mg by mouth Every Night. Take 1-3 hours before bedtime.             solifenacin (VESICARE) 5 MG tablet  Take 5 mg by mouth Daily.             tiZANidine (ZANAFLEX) 4 MG tablet  Take 2 mg by mouth 2 (Two) Times a Day As Needed.             vitamin d (RA VITAMIN D-3) 5000 UNITS capsule  Take 10,000 Units by mouth Daily.                 Discharge Diet:     Diet Instructions     Diet: Consistent Carbohydrate, Cardiac; Thin       Discharge Diet:   Consistent Carbohydrate  Cardiac      Fluid Consistency:  Thin                 Activity at Discharge:     Activity Instructions     Discharge Activity       As tolerates                 Follow-up Appointments:    Future Appointments  Date Time Provider Department Center   10/25/2017 7:55 PM [facility] SLEEP ROOM 3 [facility] SLEEP [facility]   10/30/2017 11:30 AM JANI Celeste [facility] None   1/30/2018 1:15 PM Lito Flores MD [facility] None     Additional Instructions for the Follow-ups that You Need to Schedule     Discharge Follow-up with Specified Provider    As directed    To:  Nenita Nunez.   Follow Up:  3 Weeks   Follow Up Details:  Will need to do sleep study before follow up.       Basic Metabolic Panel    Oct 08, 2017 (Approximate)    Needs BMP on Monday.  Results to Dr. Michael Mays                 Additional Instructions:  BMP monitoring every month due to diuretics and hypokalemia.  PCP can arrange.        Test Results Pending at Discharge:     Order Current Status    Blood Culture - Blood, Preliminary result    Blood Culture - Blood, Preliminary result             JANI Martin  10/03/17  1:21 PM    Time:  45  minutes were spent reviewing labs, history, evaluating patient and discharge planning.

## 2017-10-03 NOTE — PLAN OF CARE
Problem: Patient Care Overview (Adult)  Goal: Plan of Care Review  Outcome: Ongoing (interventions implemented as appropriate)    10/03/17 1522   Coping/Psychosocial Response Interventions   Plan Of Care Reviewed With patient   Patient Care Overview   Progress improving   Outcome Evaluation   Outcome Summary/Follow up Plan Pt compliant with BIPAP for longer period last HS. Will be DC to home today with oxygen. Follow up atp with Dr. Mancilla and sleep study. Will follow up with PCP also.          Problem: Pneumonia (Adult)  Goal: Signs and Symptoms of Listed Potential Problems Will be Absent or Manageable (Pneumonia)  Outcome: Ongoing (interventions implemented as appropriate)

## 2017-10-03 NOTE — PROGRESS NOTES
Exercise Oximetry    Patient Name:Vidhi Lopez   MRN: [id number]   Date: 10/03/17             ROOM AIR BASELINE   SpO2% 84   Heart Rate 74   Blood Pressure      EXERCISE ON ROOM AIR SpO2% EXERCISE ON O2 @ 2 LPM SpO2%   1 MINUTE  1 MINUTE 90   2 MINUTES  2 MINUTES 91   3 MINUTES  3 MINUTES 92   4 MINUTES  4 MINUTES 93   5 MINUTES  5 MINUTES 93   6 MINUTES - 6 MINUTES 94              Distance Walked   Distance Walked  300   Dyspnea (Meche Scale)  Dyspnea (Meche Scale) 4   Fatigue (Meche Scale)   Fatigue (Meche Scale)   SpO2% Post Exercise   SpO2% Post Exercise  93   HR Post Exercise   HR Post Exercise  82   Time to Recovery   Time to Recovery     Comments: Patient walked in hallway at steady pace with assistance from RT.  Walked started on 2 lpm nasal cannula and only required 2 lpm during walk.  Patient tolerated walk well and gave good effort during walk.

## 2017-10-03 NOTE — DISCHARGE INSTRUCTIONS
BMP monitoring every month due to diuretics and hypokalemia.  PCP can arrange.   Dr. Yip office will call patient with appt.

## 2017-10-04 ENCOUNTER — DOCUMENTATION (OUTPATIENT)
Dept: PHYSICAL THERAPY | Facility: HOSPITAL | Age: 67
End: 2017-10-04

## 2017-10-04 ENCOUNTER — APPOINTMENT (OUTPATIENT)
Dept: PHYSICAL THERAPY | Facility: HOSPITAL | Age: 67
End: 2017-10-04

## 2017-10-04 NOTE — THERAPY DISCHARGE NOTE
Outpatient Rehabilitation - Wound/Debridement Discharge Summary        Patient Name: Vidhi Lopez  : 1950  MRN: [id number]  Today's Date: 10/4/2017                Admit Date: (Not on file)    Visit Dx:  No diagnosis found.    Patient Active Problem List   Diagnosis   • Dyslipidemia   • Hypertension   • Anxiety   • Insomnia   • GERD (gastroesophageal reflux disease)   • Diabetes mellitus   • Fibromyalgia   • RLS (restless legs syndrome)   • Migraines   • Asthma   • COPD (chronic obstructive pulmonary disease)   • Interstitial cystitis   • History of chronic bronchitis   • History of acute myocardial infarction   • History of cardiac murmur   • History of stroke   • CAD (coronary artery disease)   • Essential hypertension   • Generalized edema   • CKD (chronic kidney disease) stage 2, GFR 60-89 ml/min   • Bilateral carotid artery stenosis   • Pneumonia of both lungs due to infectious organism   • Acute respiratory failure with hypoxia and hypercapnia   • Obstructive sleep apnea   • Obesity (BMI 30-39.9)   • Diabetes mellitus type 2 in obese        Past Medical History:   Diagnosis Date   • Allergic rhinitis    • Asthma with COPD     Asthma/COPD   • Bilateral ovarian cysts    • Coronary artery disease    • Depression with anxiety     Depression/Anxiety   • Diabetes     pre   • Endometriosis     Dr. Jin; estradiol    • Fatigue    • Headache     Headaches   • High cholesterol    • History of gallstones    • History of myocardial infarction    • Hypertension    • Stroke    • Thyroid disorder    • Urinary leakage    • UTI (urinary tract infection)    • Yeast dermatitis         Past Surgical History:   Procedure Laterality Date   • BREAST BIOPSY Right    • CAROTID STENT      Transcath    • CAROTID STENT Left    • CHOLECYSTECTOMY     • CYSTOSTOMY W/ BLADDER BIOPSY     • DILATATION AND CURETTAGE     • LAPAROSCOPIC CHOLECYSTECTOMY      Lap rolando   • OOPHORECTOMY     • PAP SMEAR  05/10/2016   • PARTIAL  HYSTERECTOMY           EVALUATION                LDA Wound                  Wound 08/31/17 0950 Right lower abdomen skin tear;other (see comments)    Wound - Properties Group Date first assessed: 08/31/17  - Time first assessed: 0950  -MC Side: Right  -MC Orientation: lower  -MC Location: abdomen  -MC Type: skin tear;other (see comments)  - Additional Comments: dehisced stretch emile  -MC    Wound 08/31/17 0950 Left medial abdomen skin tear;other (see comments)    Wound - Properties Group Date first assessed: 08/31/17  - Time first assessed: 0950  -MC Side: Left  -MC Orientation: medial  -MC Location: abdomen  -MC Type: skin tear;other (see comments)  - Additional Comments: dehisced stretch emile  -MC      User Key  (r) = Recorded By, (t) = Taken By, (c) = Cosigned By    Initials Name Provider Type    FUNMILAYO Jeff PT Physical Therapist            WOUND DEBRIDEMENT                         Recommendation and Plan      Goals        PT OP Goals       10/04/17 0946       PT Short Term Goals    STG 1 Pt will verbalize s/sx of infection.  -MC     STG 1 Progress Met  -MC     STG 2 Pt will demonstrate no necrotic tissue to allow for granulation/epithelialization.  -MC     STG 2 Progress Met  -MC     STG 3 Pt will demonstrate 25% reduction in wound area.  -     STG 3 Progress Met  -MC     STG 4 Pt will demonstrate 50% granulation across all wound beds.  -     STG 4 Progress Met  -MC     Long Term Goals    LTG 1 Pt will be independent with clean home dressing changes.  -MC     LTG 1 Progress Partially Met  -MC     LTG 2 Pt will demonstrate 75% reduction in wound area.  -     LTG 2 Progress Partially Met  -MC     LTG 3 Pt will demonstrate 90% granulation across all wound beds.  -     LTG 3 Progress Met  -MC       User Key  (r) = Recorded By, (t) = Taken By, (c) = Cosigned By    Initials Name Provider Type    FUNMILAYO Jeff PT Physical Therapist          Time Calculation:          PT G-Codes  PT  Professional Judgement Used?: Yes (based on last treatment session)  Functional Limitation: Other PT primary  Other PT Primary Goal Status (): At least 1 percent but less than 20 percent impaired, limited or restricted  Other PT Primary Discharge Status (): At least 20 percent but less than 40 percent impaired, limited or restricted           OP Discharge Summary       10/04/17 0946          OP PT Discharge Summary    Date of Discharge 10/04/17  -      Reason for Discharge Transfer to other facility/level of care   Pt was admitted to an OSH. Pt would require a new MD order to continue OP PT services.   -      Outcomes Achieved Patient able to partially acheive established goals  -      Discharge Destination Unknown  -        User Key  (r) = Recorded By, (t) = Taken By, (c) = Cosigned By    Initials Name Provider Type    FUNMILAYO Jeff, PT Physical Therapist          Miracle Jeff, PT  10/4/2017

## 2017-10-06 ENCOUNTER — APPOINTMENT (OUTPATIENT)
Dept: PHYSICAL THERAPY | Facility: HOSPITAL | Age: 67
End: 2017-10-06

## 2017-10-16 ENCOUNTER — OUTSIDE FACILITY SERVICE (OUTPATIENT)
Dept: INTERNAL MEDICINE | Facility: HOSPITAL | Age: 67
End: 2017-10-16

## 2017-10-19 ENCOUNTER — APPOINTMENT (OUTPATIENT)
Dept: CT IMAGING | Facility: HOSPITAL | Age: 67
End: 2017-10-19

## 2017-10-19 ENCOUNTER — APPOINTMENT (OUTPATIENT)
Dept: GENERAL RADIOLOGY | Facility: HOSPITAL | Age: 67
End: 2017-10-19

## 2017-10-19 ENCOUNTER — HOSPITAL ENCOUNTER (EMERGENCY)
Facility: HOSPITAL | Age: 67
Discharge: HOME | End: 2017-10-19
Attending: EMERGENCY MEDICINE | Admitting: EMERGENCY MEDICINE

## 2017-10-19 VITALS
HEART RATE: 82 BPM | BODY MASS INDEX: 35.85 KG/M2 | TEMPERATURE: 98.2 F | DIASTOLIC BLOOD PRESSURE: 81 MMHG | WEIGHT: 210 LBS | RESPIRATION RATE: 18 BRPM | HEIGHT: 64 IN | SYSTOLIC BLOOD PRESSURE: 146 MMHG | OXYGEN SATURATION: 93 %

## 2017-10-19 DIAGNOSIS — S20.212A CHEST WALL CONTUSION, LEFT, INITIAL ENCOUNTER: ICD-10-CM

## 2017-10-19 DIAGNOSIS — S16.1XXA CERVICAL STRAIN, ACUTE, INITIAL ENCOUNTER: ICD-10-CM

## 2017-10-19 DIAGNOSIS — G44.319 ACUTE POST-TRAUMATIC HEADACHE, NOT INTRACTABLE: ICD-10-CM
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
## 2017-10-19 DIAGNOSIS — S29.019A THORACIC MYOFASCIAL STRAIN, INITIAL ENCOUNTER: ICD-10-CM

## 2017-10-19 PROCEDURE — 70450 CT HEAD/BRAIN W/O DYE: CPT

## 2017-10-19 PROCEDURE — 25010000002 KETOROLAC TROMETHAMINE PER 15 MG: Performed by: PHYSICIAN ASSISTANT

## 2017-10-19 PROCEDURE — 72128 CT CHEST SPINE W/O DYE: CPT

## 2017-10-19 PROCEDURE — 71020 HC CHEST PA AND LATERAL: CPT

## 2017-10-19 PROCEDURE — 72125 CT NECK SPINE W/O DYE: CPT

## 2017-10-19 PROCEDURE — 99283 EMERGENCY DEPT VISIT LOW MDM: CPT

## 2017-10-19 PROCEDURE — 96374 THER/PROPH/DIAG INJ IV PUSH: CPT

## 2017-10-19 RX ORDER — CYCLOBENZAPRINE HCL 10 MG
10 TABLET ORAL NIGHTLY PRN
Qty: 7 TABLET | Refills: 0 | Status: SHIPPED | OUTPATIENT
Start: 2017-10-19 | End: 2017-12-23

## 2017-10-19 RX ORDER — MELOXICAM 7.5 MG/1
7.5 TABLET ORAL DAILY
Qty: 7 TABLET | Refills: 0 | Status: SHIPPED | OUTPATIENT
Start: 2017-10-19 | End: 2017-12-23

## 2017-10-19 RX ORDER — ACETAMINOPHEN 500 MG
1000 TABLET ORAL ONCE
Status: DISCONTINUED | OUTPATIENT
Start: 2017-10-19 | End: 2017-10-19 | Stop reason: HOSPADM

## 2017-10-19 RX ORDER — KETOROLAC TROMETHAMINE 30 MG/ML
15 INJECTION, SOLUTION INTRAMUSCULAR; INTRAVENOUS ONCE
Status: COMPLETED | OUTPATIENT
Start: 2017-10-19 | End: 2017-10-19

## 2017-10-19 RX ADMIN — KETOROLAC TROMETHAMINE 15 MG: 30 INJECTION, SOLUTION INTRAMUSCULAR at 16:15

## 2017-10-19 NOTE — DISCHARGE INSTRUCTIONS
Return to the ER if worse.  Follow-up with your doctor in 2-3 days for further workup, treatment and evaluation if not improving.
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
## 2017-10-25 ENCOUNTER — APPOINTMENT (OUTPATIENT)
Dept: SLEEP MEDICINE | Facility: HOSPITAL | Age: 67
End: 2017-10-25
Attending: INTERNAL MEDICINE

## 2017-11-08 ENCOUNTER — TRANSCRIBE ORDERS (OUTPATIENT)
Dept: LAB | Facility: HOSPITAL | Age: 67
End: 2017-11-08

## 2017-11-08 ENCOUNTER — APPOINTMENT (OUTPATIENT)
Dept: LAB | Facility: HOSPITAL | Age: 67
End: 2017-11-08

## 2017-11-08 ENCOUNTER — TELEPHONE (OUTPATIENT)
Dept: URGENT CARE | Facility: CLINIC | Age: 67
End: 2017-11-08

## 2017-11-08 DIAGNOSIS — N17.9 ACUTE KIDNEY INJURY (HCC): Primary | ICD-10-CM

## 2017-11-08 LAB
ALBUMIN SERPL-MCNC: 4.1 G/DL (ref 3.2–4.8)
ANION GAP SERPL CALCULATED.3IONS-SCNC: 10 MMOL/L (ref 3–11)
BACTERIA UR QL AUTO: ABNORMAL /HPF
BILIRUB UR QL STRIP: NEGATIVE
BUN BLD-MCNC: 70 MG/DL (ref 9–23)
BUN/CREAT SERPL: 50 (ref 7–25)
CALCIUM SPEC-SCNC: 10.3 MG/DL (ref 8.7–10.4)
CHLORIDE SERPL-SCNC: 87 MMOL/L (ref 99–109)
CLARITY UR: CLEAR
CO2 SERPL-SCNC: 39 MMOL/L (ref 20–31)
COLOR UR: YELLOW
CREAT BLD-MCNC: 1.4 MG/DL (ref 0.6–1.3)
GFR SERPL CREATININE-BSD FRML MDRD: 38 ML/MIN/1.73
GLUCOSE BLD-MCNC: 170 MG/DL (ref 70–100)
GLUCOSE UR STRIP-MCNC: NEGATIVE MG/DL
HGB UR QL STRIP.AUTO: NEGATIVE
HYALINE CASTS UR QL AUTO: ABNORMAL /LPF
KETONES UR QL STRIP: NEGATIVE
LEUKOCYTE ESTERASE UR QL STRIP.AUTO: ABNORMAL
NITRITE UR QL STRIP: NEGATIVE
PH UR STRIP.AUTO: 6 [PH] (ref 5–8)
PHOSPHATE SERPL-MCNC: 4.5 MG/DL (ref 2.4–5.1)
POTASSIUM BLD-SCNC: 3.1 MMOL/L (ref 3.5–5.5)
PROT UR QL STRIP: NEGATIVE
RBC # UR: ABNORMAL /HPF
REF LAB TEST METHOD: ABNORMAL
SODIUM BLD-SCNC: 136 MMOL/L (ref 132–146)
SP GR UR STRIP: 1.02 (ref 1–1.03)
SQUAMOUS #/AREA URNS HPF: ABNORMAL /HPF
UROBILINOGEN UR QL STRIP: ABNORMAL
WBC UR QL AUTO: ABNORMAL /HPF

## 2017-11-08 PROCEDURE — 87086 URINE CULTURE/COLONY COUNT: CPT | Performed by: INTERNAL MEDICINE

## 2017-11-08 PROCEDURE — 80069 RENAL FUNCTION PANEL: CPT | Performed by: INTERNAL MEDICINE

## 2017-11-08 PROCEDURE — 81001 URINALYSIS AUTO W/SCOPE: CPT | Performed by: INTERNAL MEDICINE

## 2017-11-08 PROCEDURE — 36415 COLL VENOUS BLD VENIPUNCTURE: CPT | Performed by: INTERNAL MEDICINE

## 2017-11-08 NOTE — TELEPHONE ENCOUNTER
----- Message from JANI Damon sent at 11/8/2017  1:25 PM EST -----  Please call and advise Ms. Lopez that her urine culture grew out Escherichia coli and that it is sensitive to Levaquin.  Have for her to complete her antibiotics as directed and follow-up with her primary care doctor for urine recheck.  Thank you

## 2017-11-08 NOTE — TELEPHONE ENCOUNTER
Spoke to patient. Gave results. Instructed to complete AB and to follow up with PCP for urine recheck. Patient verbally understood.

## 2017-11-10 LAB — BACTERIA SPEC AEROBE CULT: NORMAL

## 2017-12-07 ENCOUNTER — HOSPITAL ENCOUNTER (OUTPATIENT)
Dept: SLEEP MEDICINE | Facility: HOSPITAL | Age: 67
Setting detail: THERAPIES SERIES
End: 2017-12-07
Attending: INTERNAL MEDICINE

## 2018-01-30 ENCOUNTER — OFFICE VISIT (OUTPATIENT)
Dept: CARDIOLOGY | Facility: CLINIC | Age: 68
End: 2018-01-30

## 2018-01-30 VITALS
HEART RATE: 70 BPM | HEIGHT: 64 IN | DIASTOLIC BLOOD PRESSURE: 54 MMHG | BODY MASS INDEX: 36.19 KG/M2 | SYSTOLIC BLOOD PRESSURE: 118 MMHG | WEIGHT: 212 LBS

## 2018-01-30 DIAGNOSIS — I65.23 BILATERAL CAROTID ARTERY STENOSIS: Primary | ICD-10-CM

## 2018-01-30 DIAGNOSIS — R60.1 GENERALIZED EDEMA: ICD-10-CM

## 2018-01-30 DIAGNOSIS — N18.2 CKD (CHRONIC KIDNEY DISEASE) STAGE 2, GFR 60-89 ML/MIN: ICD-10-CM

## 2018-01-30 DIAGNOSIS — E78.5 DYSLIPIDEMIA: ICD-10-CM

## 2018-01-30 PROCEDURE — 99214 OFFICE O/P EST MOD 30 MIN: CPT | Performed by: INTERNAL MEDICINE

## 2018-01-30 NOTE — PROGRESS NOTES
"  OFFICE FOLLOW UP     Date of Encounter:2018     Name: Vidhi Lopez  : 1950  Address: [facility] [address] [place] [address]  Phone: [phone]    PCP: Michael Mays MD  [address]  [facility] [address]    Vidhi Lopez is a 67 y.o. female.      Chief Complaint: Follow up of carotid stenosis, HTN, edema/SOA    Problem List:   1. Carotid artery disease:  a. Remote CVA, IDB.  b. Left hemispheric TIA, 2011.  c. Emergent LICA stent, 2011.  d. \"Last\" DUS negative,  (SJE).  e. MRA head/neck, 2017: \"severe\" LICA proximal stenosis, moderate AURE stenosis  f. Duplex, 17: 50-69% AURE stenosis, <50% LICA stenosis with stent.  2. History of myocardial infarction, remote IDB:  a. Normal nuclear myocardial perfusion study, 2011.  b. Echocardiogram,  revealing normal EF.  c. Echo, normal LV, no evidence of pulm HTN, 17.  3. Hypertension.  4. Tobacco use.  5. Diabetes mellitus type 2.  6. Obesity with probable URIEL and history of CO2 retention.  7. Dyslipidemia.  8. Sinus arrhythmia.  9. Fibromyalgia.  10. Gastroesophageal reflux disease.  11. Status post operations, remote.  12. Anxiety disorder.  13. Pneumonia, bilateral:  2017, steroids, abx  14. Chronic kidney disease, Stage 2  a. Chronic cystitis  b. ?Secondary to NSAIDS  c. Diuretic induced per RAMYA Mcginnis, 17.  15. \"New onset\" lower extremity edema, May 2015:                                              a. Treated with Lasix 20 mg daily with resolution.                                   b. Recurrent LE edema summer 2017    Allergies   Allergen Reactions   • Amlodipine Swelling   • Reglan [Metoclopramide]        Current Medications:  •  albuterol 108 MCG/ACT inhaler, Inhale 2 puffs Every 4 (Four) Hours As Needed for Wheezing.  •  Alprazolam 0.25 mg by mouth 2 (Two) Times a Day.  •  atorvastatin 80 MG by mouth daily.  •  bumetanide 1 mg by mouth 2 (Two) Times a Day  •  " "clotrimazole-betamethasone 1-0.05 % cream, Apply  topically 2 (Two) Times a Day  •  gabapentin 800 mg by mouth 2 (Two) Times a Day  •  guaifenesin-dextromethorphan  MG tablet  2 tablets by mouth 2 (Two) Times a Day As Needed (COUGH CONGESTION)  •  Insulin Glargine Inject 15 units daily under the skin.  •  ipratropium-albuterol 0.5-2.5 mg/mL nebulizer, Nebulize q 4 h prn wheezing / shortness of breath  •  levalbuterol 0.63 MG/3ML nebulizer solution Every 8 (Eight) Hours As Needed   •  Levofloxacin 500 by mouth Daily.  •  linagliptin 5 mg by mouth daily  •  metformin  mg by mouth 2 (Two) Times a Day With Meals  •  metolazone 5 MG by mouth As Needed   •  metoprolol tartrate 50 mg by mouth twice a day   •  Multiple Vitamins-Minerals by mouth daily., Disp: , Rfl:   •  NOVOLOG FLEXPEN 100 UNIT/ML, 10 Units 3 (Three) Times a Day With Meals.  •  nystatin suspension, swish and swallow 1 teaspoonful by mouth four times a day  •  potassium chloride 20 MEQ CR by mouth Daily  •  Prednisone 10 MG (21) tablet pack, Daily taper- 6/5/4/3/2/1  •  Quetiapine 25 mg by mouth every night.  •  ropinirole 0.25 mg by mouth Every Night.   •  SYMBICORT 160-4.5 MCG/ACT inhaler as needed  •  tizanidine 2 MG by mouth daily as needed.    History of Present Illness:  The patient returns for routine follow-up today.  Following her visit to see me on 9/9/17 and after reading notes from Taran Mcginnis M.D., the patient was scheduled for a CT of the abdomen (to evaluate her liver) as well as renal angiography (to exclude renal artery stenosis) and right heart catheterization to further assess her pulmonary artery pressures and right atrial pressure.  The studies were not done.  While planning to undergo studies the patient was admitted to the [facility] with a diagnosis of \"bilateral lower lobe pneumonia\".  From a review of available records including chest x-rays, a proBNP, and a CT scan of the chest (and I have reviewed the " "patient's CT with radiology here), I am not convinced of the diagnosis of \"bilateral lower lobe pneumonia\".  I think that a diagnosis of pulmonary edema is actually just is plausible in probably more likely with her markedly elevated proBNP.  The patient continues to have extremity edema and take diuretics place her in GASPER.  She furthermore tells me this afternoon that she retains CO2 while in [place] and that obstructive sleep apnea is felt to be the culprit with planned CPAP treatment.  She states that she has a pulmonary specialist at [facility] but does not recall his name.  She has not had angina.          Since her last visit to see us in September, we did do a carotid duplex that revealed asymptomatic carotid stenosis and a patent carotid stent and less than 50% stenosis in the left internal carotid artery stent and 50-69% on the right internal carotid artery.    The following portions of the patient's history were reviewed and updated as appropriate: allergies, current medications and problem list.    HPI: Pertinent positives as listed in the HPI.  All other systems reviewed and negative.    Objective:    Vitals:    01/30/18 1318 01/30/18 1330   BP: 120/58 118/54   BP Location: Right arm Right arm   Patient Position: Sitting Standing   Pulse: 68 70   Weight: 96.2 kg (212 lb)    Height: 162.6 cm (64\")        Physical Exam:  GENERAL: Alert, cooperative, in no acute distress.   HEENT: Fundoscopic deferred, otherwise unremarkable.  NECK: No Jugular venous distention, adenopathy, or thyromegaly noted.   HEART: Regular rhythm, normal rate, and no murmurs, gallops, or rubs.   LUNGS: Clear to auscultation bilaterally. No wheezing, rales or ronchi.  ABDOMEN: Flat without evidence of organomegaly, masses, or tenderness. Obesity limits the examination. NEUROLOGIC: No focal abnormalities involving strength or sensation are noted.   EXTREMITIES: No clubbing, cyanosis noted. +4 lower extremity edema.    Diagnostic " "Data:    Lab Results   Component Value Date    GLUCOSE 170 (H) 11/08/2017    BUN 70 (H) 11/08/2017    CREATININE 1.40 (H) 11/08/2017    EGFRIFNONA 38 (L) 11/08/2017    BCR 50.0 (H) 11/08/2017    K 3.1 (L) 11/08/2017    CO2 39.0 (H) 11/08/2017    CALCIUM 10.3 11/08/2017    ALBUMIN 4.10 11/08/2017    LABIL2 1.2 10/03/2017    AST 21 10/03/2017    ALT 36 10/03/2017     Lab Results   Component Value Date    HGBA1C 8.4 (H) 09/29/2017     Lab Results   Component Value Date    WBC 12.85 (H) 10/01/2017    HGB 11.3 (L) 10/01/2017    HCT 37.4 10/01/2017    MCV 94.7 10/01/2017     10/01/2017     Lab Results   Component Value Date    TSH 2.270 09/29/2017     ProBNP: 598 (9/29/2017)  BNP: 104 (10/02/2017)    Procedures      Assessment and Plan: The problem continues to be vexing as outlined in Taran Mcginnis's recent office note.  At this time she needs further evaluation of her edema which may include both pulmonary and right sided pressure elevation.  I think that we should go ahead with studies recommended in September by Taran Mcginnis and these include right heart catheterization, bilateral selective renal angiography, and a CT of the abdomen with attention to the liver.  At the time of her admission for catheterization I would is well liked to obtain arterial blood gases on room air and a BNP as well as other \"baseline\" laboratory values that will include renal function studies.  At the time of catheterization I will have a low threshold to measure left ventricular pressures and, depending on her renal function, to consider further evaluation for structural heart disease that might include coronary angiography.  Final disposition will follow this.    Scribed for Lito Flores MD by Belkis Meyer RN. 01/30/2018 1:47 PM.    I, Lito Flores MD, [facility], [facility], personally performed the services described in this documentation as scribed by the above named individual in my presence, and it is both accurate " and complete. At 3:10 PM on 01/30/2018  EMR Dragon/Transcription Disclaimer:  Much of this encounter note is an electronic transcription/translation of spoken language to printed text.  The electronic translation of spoken language may permit erroneous, or at times, nonsensical words or phrases to be inadvertently transcribed.  Although I have reviewed the note for such errors, some may still exist.

## 2018-02-08 ENCOUNTER — APPOINTMENT (OUTPATIENT)
Dept: PREADMISSION TESTING | Facility: HOSPITAL | Age: 68
End: 2018-02-08

## 2018-02-11 ENCOUNTER — HOSPITAL ENCOUNTER (OUTPATIENT)
Dept: GENERAL RADIOLOGY | Facility: HOSPITAL | Age: 68
Discharge: HOME | End: 2018-02-11
Admitting: PHYSICIAN ASSISTANT

## 2018-02-11 ENCOUNTER — APPOINTMENT (OUTPATIENT)
Dept: PREADMISSION TESTING | Facility: HOSPITAL | Age: 68
End: 2018-02-11

## 2018-02-11 ENCOUNTER — PREP FOR SURGERY (OUTPATIENT)
Dept: OTHER | Facility: HOSPITAL | Age: 68
End: 2018-02-11

## 2018-02-11 DIAGNOSIS — I77.9 BILATERAL CAROTID ARTERY DISEASE (HCC): ICD-10-CM

## 2018-02-11 DIAGNOSIS — R06.02 SHORTNESS OF BREATH: ICD-10-CM

## 2018-02-11 DIAGNOSIS — N18.9 CHRONIC KIDNEY DISEASE, UNSPECIFIED CKD STAGE: ICD-10-CM

## 2018-02-11 DIAGNOSIS — R60.0 LOWER EXTREMITY EDEMA: Primary | ICD-10-CM

## 2018-02-11 DIAGNOSIS — R60.0 LOWER EXTREMITY EDEMA: ICD-10-CM

## 2018-02-11 LAB
ALBUMIN SERPL-MCNC: 3.7 G/DL (ref 3.2–4.8)
ALBUMIN/GLOB SERPL: 1.8 G/DL (ref 1.5–2.5)
ALP SERPL-CCNC: 72 U/L (ref 25–100)
ALT SERPL W P-5'-P-CCNC: 26 U/L (ref 7–40)
ANION GAP SERPL CALCULATED.3IONS-SCNC: 7 MMOL/L (ref 3–11)
ARTICHOKE IGE QN: 84 MG/DL (ref 0–130)
AST SERPL-CCNC: 17 U/L (ref 0–33)
BASOPHILS # BLD AUTO: 0.03 10*3/MM3 (ref 0–0.2)
BASOPHILS NFR BLD AUTO: 0.2 % (ref 0–1)
BILIRUB SERPL-MCNC: 0.4 MG/DL (ref 0.3–1.2)
BUN BLD-MCNC: 22 MG/DL (ref 9–23)
BUN/CREAT SERPL: 24.4 (ref 7–25)
CALCIUM SPEC-SCNC: 9.5 MG/DL (ref 8.7–10.4)
CHLORIDE SERPL-SCNC: 96 MMOL/L (ref 99–109)
CHOLEST SERPL-MCNC: 145 MG/DL (ref 0–200)
CO2 SERPL-SCNC: 38 MMOL/L (ref 20–31)
CREAT BLD-MCNC: 0.9 MG/DL (ref 0.6–1.3)
DEPRECATED RDW RBC AUTO: 54.6 FL (ref 37–54)
EOSINOPHIL # BLD AUTO: 0.11 10*3/MM3 (ref 0–0.3)
EOSINOPHIL NFR BLD AUTO: 0.8 % (ref 0–3)
ERYTHROCYTE [DISTWIDTH] IN BLOOD BY AUTOMATED COUNT: 15.4 % (ref 11.3–14.5)
GFR SERPL CREATININE-BSD FRML MDRD: 63 ML/MIN/1.73
GLOBULIN UR ELPH-MCNC: 2.1 GM/DL
GLUCOSE BLD-MCNC: 196 MG/DL (ref 70–100)
HBA1C MFR BLD: 9.4 % (ref 4.8–5.6)
HCT VFR BLD AUTO: 38.7 % (ref 34.5–44)
HDLC SERPL-MCNC: 39 MG/DL (ref 40–60)
HGB BLD-MCNC: 12 G/DL (ref 11.5–15.5)
IMM GRANULOCYTES # BLD: 0.06 10*3/MM3 (ref 0–0.03)
IMM GRANULOCYTES NFR BLD: 0.4 % (ref 0–0.6)
LYMPHOCYTES # BLD AUTO: 1.58 10*3/MM3 (ref 0.6–4.8)
LYMPHOCYTES NFR BLD AUTO: 11.7 % (ref 24–44)
MCH RBC QN AUTO: 29.9 PG (ref 27–31)
MCHC RBC AUTO-ENTMCNC: 31 G/DL (ref 32–36)
MCV RBC AUTO: 96.5 FL (ref 80–99)
MONOCYTES # BLD AUTO: 0.64 10*3/MM3 (ref 0–1)
MONOCYTES NFR BLD AUTO: 4.7 % (ref 0–12)
NEUTROPHILS # BLD AUTO: 11.11 10*3/MM3 (ref 1.5–8.3)
NEUTROPHILS NFR BLD AUTO: 82.2 % (ref 41–71)
PLATELET # BLD AUTO: 162 10*3/MM3 (ref 150–450)
PMV BLD AUTO: 10.1 FL (ref 6–12)
POTASSIUM BLD-SCNC: 4.1 MMOL/L (ref 3.5–5.5)
PROT SERPL-MCNC: 5.8 G/DL (ref 5.7–8.2)
RBC # BLD AUTO: 4.01 10*6/MM3 (ref 3.89–5.14)
SODIUM BLD-SCNC: 141 MMOL/L (ref 132–146)
TRIGL SERPL-MCNC: 211 MG/DL (ref 0–150)
WBC NRBC COR # BLD: 13.53 10*3/MM3 (ref 3.5–10.8)

## 2018-02-11 PROCEDURE — 71046 X-RAY EXAM CHEST 2 VIEWS: CPT

## 2018-02-11 PROCEDURE — 83036 HEMOGLOBIN GLYCOSYLATED A1C: CPT | Performed by: PHYSICIAN ASSISTANT

## 2018-02-11 PROCEDURE — 36415 COLL VENOUS BLD VENIPUNCTURE: CPT

## 2018-02-11 PROCEDURE — 80061 LIPID PANEL: CPT | Performed by: PHYSICIAN ASSISTANT

## 2018-02-11 PROCEDURE — 93005 ELECTROCARDIOGRAM TRACING: CPT

## 2018-02-11 PROCEDURE — 93010 ELECTROCARDIOGRAM REPORT: CPT | Performed by: INTERNAL MEDICINE

## 2018-02-11 PROCEDURE — 80053 COMPREHEN METABOLIC PANEL: CPT | Performed by: PHYSICIAN ASSISTANT

## 2018-02-11 PROCEDURE — 85025 COMPLETE CBC W/AUTO DIFF WBC: CPT | Performed by: PHYSICIAN ASSISTANT

## 2018-02-11 PROCEDURE — 83880 ASSAY OF NATRIURETIC PEPTIDE: CPT | Performed by: PHYSICIAN ASSISTANT

## 2018-02-11 RX ORDER — METOPROLOL TARTRATE 50 MG/1
50 TABLET, FILM COATED ORAL 2 TIMES DAILY
Status: ON HOLD | COMMUNITY
End: 2018-02-12

## 2018-02-11 RX ORDER — SODIUM CHLORIDE 0.9 % (FLUSH) 0.9 %
1-10 SYRINGE (ML) INJECTION AS NEEDED
Status: CANCELLED | OUTPATIENT
Start: 2018-02-11

## 2018-02-11 RX ORDER — ACETAMINOPHEN 325 MG/1
650 TABLET ORAL EVERY 4 HOURS PRN
Status: CANCELLED | OUTPATIENT
Start: 2018-02-11

## 2018-02-11 RX ORDER — ASPIRIN 81 MG/1
324 TABLET, CHEWABLE ORAL ONCE
Status: CANCELLED | OUTPATIENT
Start: 2018-02-11 | End: 2018-02-11

## 2018-02-11 RX ORDER — ASPIRIN 81 MG/1
81 TABLET ORAL DAILY
Status: CANCELLED | OUTPATIENT
Start: 2018-02-12

## 2018-02-11 RX ORDER — NITROGLYCERIN 0.4 MG/1
0.4 TABLET SUBLINGUAL
Status: CANCELLED | OUTPATIENT
Start: 2018-02-11

## 2018-02-12 ENCOUNTER — HOSPITAL ENCOUNTER (OUTPATIENT)
Facility: HOSPITAL | Age: 68
Discharge: HOME | End: 2018-02-13
Attending: INTERNAL MEDICINE | Admitting: INTERNAL MEDICINE

## 2018-02-12 ENCOUNTER — APPOINTMENT (OUTPATIENT)
Dept: CT IMAGING | Facility: HOSPITAL | Age: 68
End: 2018-02-12

## 2018-02-12 ENCOUNTER — APPOINTMENT (OUTPATIENT)
Dept: GENERAL RADIOLOGY | Facility: HOSPITAL | Age: 68
End: 2018-02-12

## 2018-02-12 DIAGNOSIS — R60.1 GENERALIZED EDEMA: ICD-10-CM

## 2018-02-12 DIAGNOSIS — N18.2 CKD (CHRONIC KIDNEY DISEASE) STAGE 2, GFR 60-89 ML/MIN: ICD-10-CM

## 2018-02-12 LAB
ACT BLD: 230 SECONDS (ref 82–152)
ACT BLD: 263 SECONDS (ref 82–152)
ARTERIAL PATENCY WRIST A: ABNORMAL
ATMOSPHERIC PRESS: ABNORMAL MMHG
BASE EXCESS BLDA CALC-SCNC: 10.3 MMOL/L (ref 0–2)
BDY SITE: ABNORMAL
BNP SERPL-MCNC: 52 PG/ML (ref 0–100)
BNP SERPL-MCNC: 84 PG/ML (ref 0–100)
CO2 BLDA-SCNC: 39.1 MMOL/L (ref 22–33)
COHGB MFR BLD: 3.4 % (ref 0–2)
GLUCOSE BLDC GLUCOMTR-MCNC: 167 MG/DL (ref 70–130)
GLUCOSE BLDC GLUCOMTR-MCNC: 190 MG/DL (ref 70–130)
GLUCOSE BLDC GLUCOMTR-MCNC: 195 MG/DL (ref 70–130)
GLUCOSE BLDC GLUCOMTR-MCNC: 259 MG/DL (ref 70–130)
HCO3 BLDA-SCNC: 37.3 MMOL/L (ref 20–26)
HCT VFR BLD CALC: 37.6 %
HGB BLDA-MCNC: 12.3 G/DL (ref 14–18)
HOROWITZ INDEX BLD+IHG-RTO: 21 %
METHGB BLD QL: 0.8 % (ref 0–1.5)
MODALITY: ABNORMAL
OXYHGB MFR BLDV: 83.7 % (ref 94–99)
PCO2 BLDA: 60.6 MM HG (ref 35–45)
PH BLDA: 7.4 PH UNITS (ref 7.35–7.45)
PO2 BLDA: 56.1 MM HG (ref 83–108)
SAO2 % BLDCOA: 83.7 %

## 2018-02-12 PROCEDURE — G0378 HOSPITAL OBSERVATION PER HR: HCPCS

## 2018-02-12 PROCEDURE — 93460 R&L HRT ART/VENTRICLE ANGIO: CPT | Performed by: INTERNAL MEDICINE

## 2018-02-12 PROCEDURE — C1874 STENT, COATED/COV W/DEL SYS: HCPCS | Performed by: INTERNAL MEDICINE

## 2018-02-12 PROCEDURE — 63710000001 NYSTATIN 100000 UNIT/GM CREAM 15 G TUBE: Performed by: PHYSICIAN ASSISTANT

## 2018-02-12 PROCEDURE — 93568 NJX CAR CTH NSLC P-ART ANGRP: CPT | Performed by: INTERNAL MEDICINE

## 2018-02-12 PROCEDURE — C1894 INTRO/SHEATH, NON-LASER: HCPCS | Performed by: INTERNAL MEDICINE

## 2018-02-12 PROCEDURE — A9270 NON-COVERED ITEM OR SERVICE: HCPCS | Performed by: INTERNAL MEDICINE

## 2018-02-12 PROCEDURE — 94640 AIRWAY INHALATION TREATMENT: CPT

## 2018-02-12 PROCEDURE — 63710000001 BUMETANIDE 1 MG TABLET: Performed by: INTERNAL MEDICINE

## 2018-02-12 PROCEDURE — 92928 PRQ TCAT PLMT NTRAC ST 1 LES: CPT | Performed by: INTERNAL MEDICINE

## 2018-02-12 PROCEDURE — C1769 GUIDE WIRE: HCPCS | Performed by: INTERNAL MEDICINE

## 2018-02-12 PROCEDURE — C1751 CATH, INF, PER/CENT/MIDLINE: HCPCS | Performed by: INTERNAL MEDICINE

## 2018-02-12 PROCEDURE — 82962 GLUCOSE BLOOD TEST: CPT

## 2018-02-12 PROCEDURE — 0 IOPAMIDOL PER 1 ML: Performed by: INTERNAL MEDICINE

## 2018-02-12 PROCEDURE — 74150 CT ABDOMEN W/O CONTRAST: CPT

## 2018-02-12 PROCEDURE — 25010000002 HEPARIN (PORCINE) PER 1000 UNITS: Performed by: INTERNAL MEDICINE

## 2018-02-12 PROCEDURE — 63710000001 TIZANIDINE 4 MG TABLET: Performed by: INTERNAL MEDICINE

## 2018-02-12 PROCEDURE — 36415 COLL VENOUS BLD VENIPUNCTURE: CPT

## 2018-02-12 PROCEDURE — 85347 COAGULATION TIME ACTIVATED: CPT

## 2018-02-12 PROCEDURE — A9270 NON-COVERED ITEM OR SERVICE: HCPCS | Performed by: PHYSICIAN ASSISTANT

## 2018-02-12 PROCEDURE — 25010000002 PROTAMINE SULFATE PER 10 MG: Performed by: INTERNAL MEDICINE

## 2018-02-12 PROCEDURE — C9600 PERC DRUG-EL COR STENT SING: HCPCS | Performed by: INTERNAL MEDICINE

## 2018-02-12 PROCEDURE — 82805 BLOOD GASES W/O2 SATURATION: CPT | Performed by: PHYSICIAN ASSISTANT

## 2018-02-12 PROCEDURE — 75625 CONTRAST EXAM ABDOMINL AORTA: CPT | Performed by: INTERNAL MEDICINE

## 2018-02-12 PROCEDURE — 63710000001 ONDANSETRON PER 8 MG: Performed by: INTERNAL MEDICINE

## 2018-02-12 PROCEDURE — C1760 CLOSURE DEV, VASC: HCPCS | Performed by: INTERNAL MEDICINE

## 2018-02-12 PROCEDURE — 63710000001 QUETIAPINE 25 MG TABLET: Performed by: INTERNAL MEDICINE

## 2018-02-12 PROCEDURE — 63710000001 ACETAMINOPHEN 325 MG TABLET: Performed by: INTERNAL MEDICINE

## 2018-02-12 PROCEDURE — C1725 CATH, TRANSLUMIN NON-LASER: HCPCS | Performed by: INTERNAL MEDICINE

## 2018-02-12 PROCEDURE — 94760 N-INVAS EAR/PLS OXIMETRY 1: CPT

## 2018-02-12 PROCEDURE — 63710000001 BUDESONIDE-FORMOTEROL 160-4.5 MCG/ACT AEROSOL 6 G INHALER: Performed by: INTERNAL MEDICINE

## 2018-02-12 PROCEDURE — C1887 CATHETER, GUIDING: HCPCS | Performed by: INTERNAL MEDICINE

## 2018-02-12 PROCEDURE — 93571 IV DOP VEL&/PRESS C FLO 1ST: CPT | Performed by: INTERNAL MEDICINE

## 2018-02-12 PROCEDURE — G0108 DIAB MANAGE TRN  PER INDIV: HCPCS

## 2018-02-12 PROCEDURE — 63710000001 INSULIN LISPRO (HUMAN) PER 5 UNITS: Performed by: PHYSICIAN ASSISTANT

## 2018-02-12 PROCEDURE — 63710000001 CLOPIDOGREL 75 MG TABLET: Performed by: INTERNAL MEDICINE

## 2018-02-12 PROCEDURE — 94799 UNLISTED PULMONARY SVC/PX: CPT

## 2018-02-12 PROCEDURE — 36600 WITHDRAWAL OF ARTERIAL BLOOD: CPT | Performed by: PHYSICIAN ASSISTANT

## 2018-02-12 PROCEDURE — 63710000001 ROPINIROLE 0.5 MG TABLET: Performed by: INTERNAL MEDICINE

## 2018-02-12 PROCEDURE — 83880 ASSAY OF NATRIURETIC PEPTIDE: CPT | Performed by: PHYSICIAN ASSISTANT

## 2018-02-12 DEVICE — XIENCE ALPINE EVEROLIMUS ELUTING CORONARY STENT SYSTEM 2.25 MM X 12 MM / RAPID-EXCHANGE
Type: IMPLANTABLE DEVICE | Status: FUNCTIONAL
Brand: XIENCE ALPINE

## 2018-02-12 RX ORDER — ASPIRIN 81 MG/1
81 TABLET ORAL DAILY
Status: DISCONTINUED | OUTPATIENT
Start: 2018-02-13 | End: 2018-02-13 | Stop reason: HOSPADM

## 2018-02-12 RX ORDER — BUDESONIDE AND FORMOTEROL FUMARATE DIHYDRATE 160; 4.5 UG/1; UG/1
2 AEROSOL RESPIRATORY (INHALATION) DAILY
Status: DISCONTINUED | OUTPATIENT
Start: 2018-02-12 | End: 2018-02-13 | Stop reason: HOSPADM

## 2018-02-12 RX ORDER — ASPIRIN 81 MG/1
81 TABLET ORAL DAILY
Status: DISCONTINUED | OUTPATIENT
Start: 2018-02-13 | End: 2018-02-12 | Stop reason: SDUPTHER

## 2018-02-12 RX ORDER — ASPIRIN 81 MG/1
324 TABLET, CHEWABLE ORAL ONCE
Status: COMPLETED | OUTPATIENT
Start: 2018-02-12 | End: 2018-02-12

## 2018-02-12 RX ORDER — ACETAMINOPHEN 325 MG/1
650 TABLET ORAL EVERY 4 HOURS PRN
Status: DISCONTINUED | OUTPATIENT
Start: 2018-02-12 | End: 2018-02-12 | Stop reason: SDUPTHER

## 2018-02-12 RX ORDER — PROTAMINE SULFATE 10 MG/ML
INJECTION, SOLUTION INTRAVENOUS AS NEEDED
Status: DISCONTINUED | OUTPATIENT
Start: 2018-02-12 | End: 2018-02-12 | Stop reason: HOSPADM

## 2018-02-12 RX ORDER — ONDANSETRON 4 MG/1
4 TABLET, FILM COATED ORAL EVERY 8 HOURS PRN
Status: DISCONTINUED | OUTPATIENT
Start: 2018-02-12 | End: 2018-02-13 | Stop reason: HOSPADM

## 2018-02-12 RX ORDER — ACETAMINOPHEN 325 MG/1
650 TABLET ORAL EVERY 4 HOURS PRN
Status: DISCONTINUED | OUTPATIENT
Start: 2018-02-12 | End: 2018-02-13 | Stop reason: HOSPADM

## 2018-02-12 RX ORDER — DEXTROSE MONOHYDRATE 25 G/50ML
25 INJECTION, SOLUTION INTRAVENOUS
Status: DISCONTINUED | OUTPATIENT
Start: 2018-02-12 | End: 2018-02-13 | Stop reason: HOSPADM

## 2018-02-12 RX ORDER — NITROGLYCERIN 0.4 MG/1
0.4 TABLET SUBLINGUAL
Status: DISCONTINUED | OUTPATIENT
Start: 2018-02-12 | End: 2018-02-12 | Stop reason: HOSPADM

## 2018-02-12 RX ORDER — NITROGLYCERIN 0.4 MG/1
0.4 TABLET SUBLINGUAL
Status: DISCONTINUED | OUTPATIENT
Start: 2018-02-12 | End: 2018-02-12 | Stop reason: SDUPTHER

## 2018-02-12 RX ORDER — LIDOCAINE HYDROCHLORIDE 10 MG/ML
INJECTION, SOLUTION EPIDURAL; INFILTRATION; INTRACAUDAL; PERINEURAL AS NEEDED
Status: DISCONTINUED | OUTPATIENT
Start: 2018-02-12 | End: 2018-02-12 | Stop reason: HOSPADM

## 2018-02-12 RX ORDER — NYSTATIN 100000 U/G
CREAM TOPICAL EVERY 12 HOURS SCHEDULED
Status: DISCONTINUED | OUTPATIENT
Start: 2018-02-12 | End: 2018-02-13 | Stop reason: HOSPADM

## 2018-02-12 RX ORDER — ATORVASTATIN CALCIUM 40 MG/1
80 TABLET, FILM COATED ORAL NIGHTLY
Status: DISCONTINUED | OUTPATIENT
Start: 2018-02-13 | End: 2018-02-13 | Stop reason: HOSPADM

## 2018-02-12 RX ORDER — NITROGLYCERIN 5 MG/ML
INJECTION, SOLUTION INTRAVENOUS AS NEEDED
Status: DISCONTINUED | OUTPATIENT
Start: 2018-02-12 | End: 2018-02-12 | Stop reason: HOSPADM

## 2018-02-12 RX ORDER — ASPIRIN 81 MG/1
81 TABLET ORAL DAILY
Status: DISCONTINUED | OUTPATIENT
Start: 2018-02-13 | End: 2018-02-12 | Stop reason: HOSPADM

## 2018-02-12 RX ORDER — SODIUM CHLORIDE 0.9 % (FLUSH) 0.9 %
1-10 SYRINGE (ML) INJECTION AS NEEDED
Status: DISCONTINUED | OUTPATIENT
Start: 2018-02-12 | End: 2018-02-12 | Stop reason: HOSPADM

## 2018-02-12 RX ORDER — BUMETANIDE 1 MG/1
1 TABLET ORAL 2 TIMES DAILY
Status: DISCONTINUED | OUTPATIENT
Start: 2018-02-12 | End: 2018-02-13 | Stop reason: HOSPADM

## 2018-02-12 RX ORDER — HEPARIN SODIUM 1000 [USP'U]/ML
INJECTION, SOLUTION INTRAVENOUS; SUBCUTANEOUS AS NEEDED
Status: DISCONTINUED | OUTPATIENT
Start: 2018-02-12 | End: 2018-02-12 | Stop reason: HOSPADM

## 2018-02-12 RX ORDER — PROTAMINE SULFATE 10 MG/ML
50 INJECTION, SOLUTION INTRAVENOUS ONCE
Status: DISCONTINUED | OUTPATIENT
Start: 2018-02-12 | End: 2018-02-13 | Stop reason: HOSPADM

## 2018-02-12 RX ORDER — CLOPIDOGREL BISULFATE 75 MG/1
600 TABLET ORAL ONCE
Status: DISCONTINUED | OUTPATIENT
Start: 2018-02-12 | End: 2018-02-13 | Stop reason: HOSPADM

## 2018-02-12 RX ORDER — ASPIRIN 81 MG/1
324 TABLET, CHEWABLE ORAL ONCE
Status: DISCONTINUED | OUTPATIENT
Start: 2018-02-12 | End: 2018-02-12 | Stop reason: SDUPTHER

## 2018-02-12 RX ORDER — BUMETANIDE 1 MG/1
1 TABLET ORAL 2 TIMES DAILY
COMMUNITY
End: 2018-03-29 | Stop reason: HOSPADM

## 2018-02-12 RX ORDER — PROMETHAZINE HYDROCHLORIDE 25 MG/1
25 TABLET ORAL EVERY 6 HOURS PRN
Status: DISCONTINUED | OUTPATIENT
Start: 2018-02-12 | End: 2018-02-13 | Stop reason: HOSPADM

## 2018-02-12 RX ORDER — CLOPIDOGREL BISULFATE 75 MG/1
75 TABLET ORAL DAILY
Status: DISCONTINUED | OUTPATIENT
Start: 2018-02-13 | End: 2018-02-13 | Stop reason: HOSPADM

## 2018-02-12 RX ORDER — CLOPIDOGREL BISULFATE 75 MG/1
TABLET ORAL AS NEEDED
Status: DISCONTINUED | OUTPATIENT
Start: 2018-02-12 | End: 2018-02-12 | Stop reason: HOSPADM

## 2018-02-12 RX ORDER — ACETAMINOPHEN 325 MG/1
650 TABLET ORAL EVERY 4 HOURS PRN
Status: DISCONTINUED | OUTPATIENT
Start: 2018-02-12 | End: 2018-02-12 | Stop reason: HOSPADM

## 2018-02-12 RX ORDER — TIZANIDINE 4 MG/1
2 TABLET ORAL AS NEEDED
Status: DISCONTINUED | OUTPATIENT
Start: 2018-02-12 | End: 2018-02-13 | Stop reason: HOSPADM

## 2018-02-12 RX ORDER — QUETIAPINE FUMARATE 25 MG/1
25 TABLET, FILM COATED ORAL NIGHTLY
Status: DISCONTINUED | OUTPATIENT
Start: 2018-02-12 | End: 2018-02-13 | Stop reason: HOSPADM

## 2018-02-12 RX ORDER — NICOTINE POLACRILEX 4 MG
15 LOZENGE BUCCAL
Status: DISCONTINUED | OUTPATIENT
Start: 2018-02-12 | End: 2018-02-13 | Stop reason: HOSPADM

## 2018-02-12 RX ORDER — ROPINIROLE 0.5 MG/1
0.25 TABLET, FILM COATED ORAL NIGHTLY
Status: DISCONTINUED | OUTPATIENT
Start: 2018-02-12 | End: 2018-02-13 | Stop reason: HOSPADM

## 2018-02-12 RX ADMIN — QUETIAPINE FUMARATE 25 MG: 25 TABLET ORAL at 22:52

## 2018-02-12 RX ADMIN — NYSTATIN: 100000 CREAM TOPICAL at 21:30

## 2018-02-12 RX ADMIN — TIZANIDINE 2 MG: 4 TABLET ORAL at 17:34

## 2018-02-12 RX ADMIN — ACETAMINOPHEN 650 MG: 325 TABLET, FILM COATED ORAL at 17:21

## 2018-02-12 RX ADMIN — ONDANSETRON 4 MG: 4 TABLET, FILM COATED ORAL at 19:37

## 2018-02-12 RX ADMIN — BUMETANIDE 1 MG: 1 TABLET ORAL at 22:47

## 2018-02-12 RX ADMIN — ROPINIROLE 0.25 MG: 0.5 TABLET, FILM COATED ORAL at 22:48

## 2018-02-12 RX ADMIN — ASPIRIN 81 MG 324 MG: 81 TABLET ORAL at 10:02

## 2018-02-12 RX ADMIN — BUDESONIDE AND FORMOTEROL FUMARATE DIHYDRATE 2 PUFF: 160; 4.5 AEROSOL RESPIRATORY (INHALATION) at 20:43

## 2018-02-12 RX ADMIN — INSULIN LISPRO 8 UNITS: 100 INJECTION, SOLUTION INTRAVENOUS; SUBCUTANEOUS at 22:53

## 2018-02-12 RX ADMIN — TIZANIDINE 2 MG: 4 TABLET ORAL at 22:48

## 2018-02-12 NOTE — H&P (VIEW-ONLY)
"  OFFICE FOLLOW UP     Date of Encounter:2018     Name: Vidhi Lopez  : 1950  Address: [facility] [address] [place] [address]  Phone: [phone]    PCP: Michael Mays MD  [address]  [facility] [address]    Vidhi Lopez is a 67 y.o. female.      Chief Complaint: Follow up of carotid stenosis, HTN, edema/SOA    Problem List:   1. Carotid artery disease:  a. Remote CVA, IDB.  b. Left hemispheric TIA, 2011.  c. Emergent LICA stent, 2011.  d. \"Last\" DUS negative,  (SJE).  e. MRA head/neck, 2017: \"severe\" LICA proximal stenosis, moderate AURE stenosis  f. Duplex, 17: 50-69% AURE stenosis, <50% LICA stenosis with stent.  2. History of myocardial infarction, remote IDB:  a. Normal nuclear myocardial perfusion study, 2011.  b. Echocardiogram,  revealing normal EF.  c. Echo, normal LV, no evidence of pulm HTN, 17.  3. Hypertension.  4. Tobacco use.  5. Diabetes mellitus type 2.  6. Obesity with probable URIEL and history of CO2 retention.  7. Dyslipidemia.  8. Sinus arrhythmia.  9. Fibromyalgia.  10. Gastroesophageal reflux disease.  11. Status post operations, remote.  12. Anxiety disorder.  13. Pneumonia, bilateral:  2017, steroids, abx  14. Chronic kidney disease, Stage 2  a. Chronic cystitis  b. ?Secondary to NSAIDS  c. Diuretic induced per RAMYA Mcginnis, 17.  15. \"New onset\" lower extremity edema, May 2015:                                              a. Treated with Lasix 20 mg daily with resolution.                                   b. Recurrent LE edema summer 2017    Allergies   Allergen Reactions   • Amlodipine Swelling   • Reglan [Metoclopramide]        Current Medications:  •  albuterol 108 MCG/ACT inhaler, Inhale 2 puffs Every 4 (Four) Hours As Needed for Wheezing.  •  Alprazolam 0.25 mg by mouth 2 (Two) Times a Day.  •  atorvastatin 80 MG by mouth daily.  •  bumetanide 1 mg by mouth 2 (Two) Times a Day  •  " "clotrimazole-betamethasone 1-0.05 % cream, Apply  topically 2 (Two) Times a Day  •  gabapentin 800 mg by mouth 2 (Two) Times a Day  •  guaifenesin-dextromethorphan  MG tablet  2 tablets by mouth 2 (Two) Times a Day As Needed (COUGH CONGESTION)  •  Insulin Glargine Inject 15 units daily under the skin.  •  ipratropium-albuterol 0.5-2.5 mg/mL nebulizer, Nebulize q 4 h prn wheezing / shortness of breath  •  levalbuterol 0.63 MG/3ML nebulizer solution Every 8 (Eight) Hours As Needed   •  Levofloxacin 500 by mouth Daily.  •  linagliptin 5 mg by mouth daily  •  metformin  mg by mouth 2 (Two) Times a Day With Meals  •  metolazone 5 MG by mouth As Needed   •  metoprolol tartrate 50 mg by mouth twice a day   •  Multiple Vitamins-Minerals by mouth daily., Disp: , Rfl:   •  NOVOLOG FLEXPEN 100 UNIT/ML, 10 Units 3 (Three) Times a Day With Meals.  •  nystatin suspension, swish and swallow 1 teaspoonful by mouth four times a day  •  potassium chloride 20 MEQ CR by mouth Daily  •  Prednisone 10 MG (21) tablet pack, Daily taper- 6/5/4/3/2/1  •  Quetiapine 25 mg by mouth every night.  •  ropinirole 0.25 mg by mouth Every Night.   •  SYMBICORT 160-4.5 MCG/ACT inhaler as needed  •  tizanidine 2 MG by mouth daily as needed.    History of Present Illness:  The patient returns for routine follow-up today.  Following her visit to see me on 9/9/17 and after reading notes from Taran Mcginnis M.D., the patient was scheduled for a CT of the abdomen (to evaluate her liver) as well as renal angiography (to exclude renal artery stenosis) and right heart catheterization to further assess her pulmonary artery pressures and right atrial pressure.  The studies were not done.  While planning to undergo studies the patient was admitted to the [facility] with a diagnosis of \"bilateral lower lobe pneumonia\".  From a review of available records including chest x-rays, a proBNP, and a CT scan of the chest (and I have reviewed the " "patient's CT with radiology here), I am not convinced of the diagnosis of \"bilateral lower lobe pneumonia\".  I think that a diagnosis of pulmonary edema is actually just is plausible in probably more likely with her markedly elevated proBNP.  The patient continues to have extremity edema and take diuretics place her in [place].  She furthermore tells me this afternoon that she retains CO2 while in [place] and that obstructive sleep apnea is felt to be the culprit with planned CPAP treatment.  She states that she has a pulmonary specialist at [facility] but does not recall his name.  She has not had angina.          Since her last visit to see us in September, we did do a carotid duplex that revealed asymptomatic carotid stenosis and a patent carotid stent and less than 50% stenosis in the left internal carotid artery stent and 50-69% on the right internal carotid artery.    The following portions of the patient's history were reviewed and updated as appropriate: allergies, current medications and problem list.    HPI: Pertinent positives as listed in the HPI.  All other systems reviewed and negative.    Objective:    Vitals:    01/30/18 1318 01/30/18 1330   BP: 120/58 118/54   BP Location: Right arm Right arm   Patient Position: Sitting Standing   Pulse: 68 70   Weight: 96.2 kg (212 lb)    Height: 162.6 cm (64\")        Physical Exam:  GENERAL: Alert, cooperative, in no acute distress.   HEENT: Fundoscopic deferred, otherwise unremarkable.  NECK: No Jugular venous distention, adenopathy, or thyromegaly noted.   HEART: Regular rhythm, normal rate, and no murmurs, gallops, or rubs.   LUNGS: Clear to auscultation bilaterally. No wheezing, rales or ronchi.  ABDOMEN: Flat without evidence of organomegaly, masses, or tenderness. Obesity limits the examination. NEUROLOGIC: No focal abnormalities involving strength or sensation are noted.   EXTREMITIES: No clubbing, cyanosis noted. +4 lower extremity edema.    Diagnostic " "Data:    Lab Results   Component Value Date    GLUCOSE 170 (H) 11/08/2017    BUN 70 (H) 11/08/2017    CREATININE 1.40 (H) 11/08/2017    EGFRIFNONA 38 (L) 11/08/2017    BCR 50.0 (H) 11/08/2017    K 3.1 (L) 11/08/2017    CO2 39.0 (H) 11/08/2017    CALCIUM 10.3 11/08/2017    ALBUMIN 4.10 11/08/2017    LABIL2 1.2 10/03/2017    AST 21 10/03/2017    ALT 36 10/03/2017     Lab Results   Component Value Date    HGBA1C 8.4 (H) 09/29/2017     Lab Results   Component Value Date    WBC 12.85 (H) 10/01/2017    HGB 11.3 (L) 10/01/2017    HCT 37.4 10/01/2017    MCV 94.7 10/01/2017     10/01/2017     Lab Results   Component Value Date    TSH 2.270 09/29/2017     ProBNP: 598 (9/29/2017)  BNP: 104 (10/02/2017)    Procedures      Assessment and Plan: The problem continues to be vexing as outlined in Taran Mcginnis's recent office note.  At this time she needs further evaluation of her edema which may include both pulmonary and right sided pressure elevation.  I think that we should go ahead with studies recommended in September by Taran Mcginnis and these include right heart catheterization, bilateral selective renal angiography, and a CT of the abdomen with attention to the liver.  At the time of her admission for catheterization I would is well liked to obtain arterial blood gases on room air and a BNP as well as other \"baseline\" laboratory values that will include renal function studies.  At the time of catheterization I will have a low threshold to measure left ventricular pressures and, depending on her renal function, to consider further evaluation for structural heart disease that might include coronary angiography.  Final disposition will follow this.    Scribed for Lito Flores MD by Belkis Meyer RN. 01/30/2018 1:47 PM.    I, Lito Flores MD, [facility], [facility], personally performed the services described in this documentation as scribed by the above named individual in my presence, and it is both accurate " and complete. At 3:10 PM on 01/30/2018  EMR Dragon/Transcription Disclaimer:  Much of this encounter note is an electronic transcription/translation of spoken language to printed text.  The electronic translation of spoken language may permit erroneous, or at times, nonsensical words or phrases to be inadvertently transcribed.  Although I have reviewed the note for such errors, some may still exist.

## 2018-02-12 NOTE — CONSULTS
"Diabetes Education  Assessment/Teaching    Patient Name:  Vidhi Lpoez  YOB: 1950  MRN: [id number]  Admit Date:  2/12/2018      Assessment Date:  2/12/2018       Most Recent Value    General Information      Referral From:  A1c, Blood glucose, MD order    Height  162.6 cm (64\")    Height Method  Stated    Weight  96 kg (211 lb 10.3 oz)    Weight Method  Standing scale    Pregnancy Assessment     Diabetes History     What type of diabetes do you have?  Type 2    Length of Diabetes Diagnosis  1 - 5 years    Current DM knowledge  fair    Have you had diabetes education/teaching in the past?  yes    When and where was your diabetes education?  2 years ago at [facility]    Do you test your blood sugar at home?  yes    Frequency of checks  3 times daily    Meter type  freestyle    Who performs the test?  self    Typical readings      Have you had low blood sugar? (<70mg/dl)  no    Have you had high blood sugar? (>140mg/dl)  yes    Education Preferences     What areas of diabetes would you like to learn about?  avoiding high blood sugar, diabetes complications, diet information, exercise information, medications for diabetes, understanding diabetes, testing my blood sugar at home, stress/coping skills, resources on diabetes    Nutrition Information     Assessment Topics     Healthy Eating - Assessment  Needs education    Being Active - Assessment  Needs education    Taking Medication - Assessment  Competent    Problem Solving - Assessment  Needs education    Reducing Risk - Assessment  Competent    Healthy Coping - Assessment  Needs education    Monitoring - Assessment  Competent    DM Goals     Healthy Eating - Goal  0-7 days from discharge    Being Active - Goal  0-30 days from discharge    Problem Solving - Goal  0-7 days from discharge    Healthy Coping - Goal  0-7 days from discharge    Contact Plan  Offered/declined               Most Recent Value    DM Education Needs     Meter  Has own    " Meter Type  Freestyle    Frequency of Testing  3 times a day    Blood Glucose Target Range      Medication  Oral, Insulin    Problem Solving  Hypoglycemia, Hyperglycemia, Sick days, Signs, Symptoms, Treatment    Reducing Risks  A1C testing, Blood pressure, Eye exam, Immunizations, Cardiovascular    Healthy Eating  Reviewed meal plan    Physical Activity  Walking    Physical Activity Frequency  Occasionally    Healthy Coping  Appropriate    Motivation  Moderate    Teaching Method  Explanation, Discussion, Handouts, Teach back    Patient Response  Verbalized understanding            Other Comments:    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 Pt stated that the last 6 months have been bad for her and the stress is probably contributing to high sugar levels. Stated that she was on steroids and does not exercise because of PN and SOA. Stated that she takes Tradjenta, , Novolog, Tojeou, and metformin at home and that she has problems with fluid retention of unknown origin. Pt further stated that she was surprised with her A1C result as the last was about 6.5%.  Discussed and reviewed with patient type 2 diabetes self-management, risk factors, and importance of blood glucose control to reduce complications. Target blood glucose readings and A1c goals per ADA were reviewed. Reviewed with patient current A1c and discussed its significance. Signs, symptoms and treatment of hyperglycemia and hypoglycemia were discussed. Lifestyle changes such as physical activity with MD approval and healthy eating were encouraged. Stressed the importance of strict blood sugar control after surgery to prevent complications such as infection and to promote healing of incision.  Pt instructed to  check blood sugar 4 times per day and to call PCP if  Blood glucose is higher than 180 two times or more.  Patient was encouraged to keep record of blood glucose readings to take to follow up  appointment with PCP.  Pt was offered a free op follow up appointment however declined at this time.          Electronically signed by:  Edward Fuentes RN  02/12/18 4:45 PM

## 2018-02-12 NOTE — PLAN OF CARE
Problem: Patient Care Overview (Adult)  Goal: Plan of Care Review  Outcome: Ongoing (interventions implemented as appropriate)   02/12/18 1524   Coping/Psychosocial Response Interventions   Plan Of Care Reviewed With patient;friend   Patient Care Overview   Progress improving   Outcome Evaluation   Outcome Summary/Follow up Plan VSS. No c/o pain. Femstop to right femoral access site, hematoma marked. Bedrest. Friend at bedside. Will continue to monitor, sinus nidhi/NSR on tele.      Goal: Adult Individualization and Mutuality  Outcome: Ongoing (interventions implemented as appropriate)   02/12/18 1524   Individualization   Patient Specific Preferences diet leslie   Patient Specific Interventions femstop to right femoral access site, bedrest, 2L NC     Goal: Discharge Needs Assessment  Outcome: Ongoing (interventions implemented as appropriate)   02/12/18 0930 02/12/18 1524   Discharge Needs Assessment   Concerns To Be Addressed --  no discharge needs identified;denies needs/concerns at this time   Living Environment   Transportation Available car;family or friend will provide --    Self-Care   Equipment Currently Used at Home glucometer;bipap/ cpap;oxygen --        Problem: Cardiac Catheterization with/without PCI (Adult)  Goal: Signs and Symptoms of Listed Potential Problems Will be Absent or Manageable (Cardiac Catheterization with/without PCI)  Outcome: Ongoing (interventions implemented as appropriate)   02/12/18 1524   Cardiac Catheterization with/without PCI   Problems Assessed (Cardiac Catheterization) all   Problems Present (Cardiac Catheterization) access site complications

## 2018-02-12 NOTE — INTERVAL H&P NOTE
"  H&P reviewed. The patient was examined and there are no changes to the H&P.       Vitals and Labs today:   Vitals:    02/12/18 0930 02/12/18 0945   BP:  151/71   BP Location:  Left arm   Patient Position:  Lying   Pulse:  68   Resp:  18   Temp:  97 °F (36.1 °C)   TempSrc:  Temporal Artery    SpO2:  92%   Weight: 96 kg (211 lb 10.3 oz)    Height: 162.6 cm (64\")        Lab Results   Component Value Date    WBC 13.53 (H) 02/11/2018    HGB 12.0 02/11/2018    HCT 38.7 02/11/2018    MCV 96.5 02/11/2018     02/11/2018     Lab Results   Component Value Date    GLUCOSE 196 (H) 02/11/2018    CALCIUM 9.5 02/11/2018     02/11/2018    K 4.1 02/11/2018    CO2 38.0 (H) 02/11/2018    CL 96 (L) 02/11/2018    BUN 22 02/11/2018    CREATININE 0.90 02/11/2018    EGFRIFNONA 63 02/11/2018    BCR 24.4 02/11/2018    ANIONGAP 7.0 02/11/2018     Lab Results   Component Value Date    HGBA1C 9.40 (H) 02/11/2018     BNP: 52    Lipid Panel: , HDL 39, LDL 84, Trig 211    ABG 2/12/18: pH 7.397, CO2 60.6, O2 56.1, HCO3 37.3    CXR 2/11/2018:  FINDINGS: PA and lateral chest reveals the cardiac and mediastinal  silhouettes to be within normal limits. The lung fields are grossly  clear. Underlying chronic changes are present. No  pleural effusion or  pneumothorax. Degenerative change is seen within the spine. Pulmonary  vascularity is within normal limits. Left carotid stent is in place.          IMPRESSION:  Chronic changes seen within the lung fields with no evidence  of acute parenchymal disease.    I, Lito Flores MD, personally performed the services described as documented by the above named individual. I have made any necessary edits and it is both accurate and complete 2/12/2018  1:07 PM      "

## 2018-02-12 NOTE — PLAN OF CARE
Problem: Patient Care Overview (Adult)  Goal: Plan of Care Review  Outcome: Ongoing (interventions implemented as appropriate)   02/12/18 0990   Coping/Psychosocial Response Interventions   Plan Of Care Reviewed With patient   Patient Care Overview   Progress no change   Outcome Evaluation   Outcome Summary/Follow up Plan PT TO HAVE RHC/RENAL WITH DR. RANGEL TODAY.

## 2018-02-13 VITALS
DIASTOLIC BLOOD PRESSURE: 60 MMHG | HEART RATE: 82 BPM | SYSTOLIC BLOOD PRESSURE: 139 MMHG | BODY MASS INDEX: 36.13 KG/M2 | RESPIRATION RATE: 22 BRPM | WEIGHT: 211.64 LBS | HEIGHT: 64 IN | OXYGEN SATURATION: 93 % | TEMPERATURE: 97 F

## 2018-02-13 PROBLEM — J96.12 CHRONIC RESPIRATORY FAILURE WITH HYPOXIA AND HYPERCAPNIA (HCC): Status: ACTIVE | Noted: 2017-09-30

## 2018-02-13 PROBLEM — J96.11 CHRONIC RESPIRATORY FAILURE WITH HYPOXIA AND HYPERCAPNIA (HCC): Status: ACTIVE | Noted: 2017-09-30

## 2018-02-13 LAB
GLUCOSE BLDC GLUCOMTR-MCNC: 181 MG/DL (ref 70–130)
GLUCOSE BLDC GLUCOMTR-MCNC: 327 MG/DL (ref 70–130)

## 2018-02-13 PROCEDURE — A9270 NON-COVERED ITEM OR SERVICE: HCPCS | Performed by: PHYSICIAN ASSISTANT

## 2018-02-13 PROCEDURE — 63710000001 ACETAMINOPHEN 325 MG TABLET: Performed by: INTERNAL MEDICINE

## 2018-02-13 PROCEDURE — A9270 NON-COVERED ITEM OR SERVICE: HCPCS | Performed by: INTERNAL MEDICINE

## 2018-02-13 PROCEDURE — 63710000001 CLOPIDOGREL 75 MG TABLET: Performed by: INTERNAL MEDICINE

## 2018-02-13 PROCEDURE — 63710000001 BUMETANIDE 1 MG TABLET: Performed by: INTERNAL MEDICINE

## 2018-02-13 PROCEDURE — 99204 OFFICE O/P NEW MOD 45 MIN: CPT | Performed by: INTERNAL MEDICINE

## 2018-02-13 PROCEDURE — 82962 GLUCOSE BLOOD TEST: CPT

## 2018-02-13 PROCEDURE — 63710000001 ASPIRIN 81 MG TABLET DELAYED-RELEASE: Performed by: PHYSICIAN ASSISTANT

## 2018-02-13 PROCEDURE — 94640 AIRWAY INHALATION TREATMENT: CPT

## 2018-02-13 PROCEDURE — 63710000001 LINAGLIPTIN 5 MG TABLET: Performed by: INTERNAL MEDICINE

## 2018-02-13 PROCEDURE — G0378 HOSPITAL OBSERVATION PER HR: HCPCS

## 2018-02-13 PROCEDURE — 63710000001 NYSTATIN 100000 UNIT/GM POWDER 15 G BOTTLE: Performed by: PHYSICIAN ASSISTANT

## 2018-02-13 RX ORDER — CLOPIDOGREL BISULFATE 75 MG/1
75 TABLET ORAL DAILY
Qty: 90 TABLET | Refills: 3 | Status: SHIPPED | OUTPATIENT
Start: 2018-02-14 | End: 2019-02-14

## 2018-02-13 RX ORDER — NYSTATIN 100000 [USP'U]/G
POWDER TOPICAL EVERY 12 HOURS SCHEDULED
Status: DISCONTINUED | OUTPATIENT
Start: 2018-02-13 | End: 2018-02-13 | Stop reason: HOSPADM

## 2018-02-13 RX ORDER — NYSTATIN 100000 U/G
OINTMENT TOPICAL 2 TIMES DAILY
Qty: 15 G | Refills: 1 | Status: ON HOLD | OUTPATIENT
Start: 2018-02-13 | End: 2018-03-20

## 2018-02-13 RX ORDER — ASPIRIN 81 MG/1
81 TABLET ORAL DAILY
Qty: 100 TABLET | Refills: 4 | Status: SHIPPED | OUTPATIENT
Start: 2018-02-14 | End: 2019-02-14

## 2018-02-13 RX ORDER — NYSTATIN 100000 [USP'U]/G
POWDER TOPICAL EVERY 12 HOURS SCHEDULED
Qty: 15 G | Refills: 1 | Status: ON HOLD | OUTPATIENT
Start: 2018-02-13 | End: 2018-03-20

## 2018-02-13 RX ADMIN — ACETAMINOPHEN 650 MG: 325 TABLET, FILM COATED ORAL at 08:05

## 2018-02-13 RX ADMIN — BUDESONIDE AND FORMOTEROL FUMARATE DIHYDRATE 2 PUFF: 160; 4.5 AEROSOL RESPIRATORY (INHALATION) at 08:25

## 2018-02-13 RX ADMIN — CLOPIDOGREL BISULFATE 75 MG: 75 TABLET ORAL at 08:02

## 2018-02-13 RX ADMIN — INSULIN LISPRO 10 UNITS: 100 INJECTION, SOLUTION INTRAVENOUS; SUBCUTANEOUS at 12:51

## 2018-02-13 RX ADMIN — ASPIRIN 81 MG: 81 TABLET, COATED ORAL at 08:02

## 2018-02-13 RX ADMIN — LINAGLIPTIN 5 MG: 5 TABLET, FILM COATED ORAL at 08:02

## 2018-02-13 RX ADMIN — BUMETANIDE 1 MG: 1 TABLET ORAL at 08:02

## 2018-02-13 RX ADMIN — NYSTATIN: 100000 POWDER TOPICAL at 12:52

## 2018-02-13 RX ADMIN — INSULIN LISPRO 3 UNITS: 100 INJECTION, SOLUTION INTRAVENOUS; SUBCUTANEOUS at 08:00

## 2018-02-13 RX ADMIN — NYSTATIN: 100000 CREAM TOPICAL at 08:02

## 2018-02-13 NOTE — PLAN OF CARE
Problem: Patient Care Overview (Adult)  Goal: Plan of Care Review  Outcome: Ongoing (interventions implemented as appropriate)    Goal: Adult Individualization and Mutuality  Outcome: Ongoing (interventions implemented as appropriate)    Goal: Discharge Needs Assessment  Outcome: Ongoing (interventions implemented as appropriate)      Problem: Cardiac Catheterization with/without PCI (Adult)  Goal: Signs and Symptoms of Listed Potential Problems Will be Absent or Manageable (Cardiac Catheterization with/without PCI)  Outcome: Ongoing (interventions implemented as appropriate)      Problem: Respiratory Insufficiency (Adult)  Goal: Identify Related Risk Factors and Signs and Symptoms  Outcome: Ongoing (interventions implemented as appropriate)    Goal: Acid/Base Balance  Outcome: Ongoing (interventions implemented as appropriate)    Goal: Effective Ventilation  Outcome: Ongoing (interventions implemented as appropriate)

## 2018-02-13 NOTE — CONSULTS
"    Referring Provider: Lito Flores M.D.  Reason for Consultation: Respiratory failure    Patient Care Team:  Michael Mays MD as PCP - General  Michael Mays MD as PCP - Family Medicine  Lito Flores MD as Consulting Physician (Cardiology)    Chief complaint:  Shortness of breath    Subjective .     History of present illness:  67-year-old female I am asked to see post catheterization by Dr. Flores.  She underwent cardiac catheterization yesterday.  She has a history of preserved LV function and an LV gram was not performed.  Pulmonary pressures were 36/20 with a wedge pressure of 8.  A subselective right lower lobe angiogram was performed with normal-appearing pulmonary arteries.  A drug-eluting stent was placed in the nondominant circumflex artery.    ABG reveals chronic hypercapnic and hypoxic respiratory failure with pH 7.39/61/56/37 on room air.  BNP normal at 84.    Her history is one of shortness of breath and admission to [place] in December for \"bilateral pneumonia\" versus pulmonary edema.  She was also admitted to [facility] with shortness of breath and was shown to have influenza.  She has seen Dr. Michele there and was placed on a Trilogy home ventilator.  Sleep study was forgone as she qualified for BiPAP/trilogy based upon her arterial blood gases, hypoxemia, and a hospital admission.    She has a history of asthma since her 20s.  She has been on inhaled therapy intermittently in the past.  Currently she is using Symbicort, Spiriva, and Xopenex.  She continues to smoke about one third pack of cigarettes per day.    She is feeling improved greatly since starting on the BiPAP therapy about a week ago.      Review of Systems  Pertinent items are noted in HPI, all other systems reviewed and negative    History  Past Medical History:   Diagnosis Date   • Allergic rhinitis    • Asthma with COPD     Asthma/COPD   • Bilateral ovarian cysts    • Coronary artery disease    • Depression with " anxiety     Depression/Anxiety   • Diabetes     pre   • Endometriosis     Dr. Jin; estradiol    • Fatigue    • Fibromyalgia    • Headache     Headaches   • High cholesterol    • History of gallstones    • History of myocardial infarction    • Hypertension    • Influenza B     DX'D 2/6/2018, TREATED WITH TAMIFLU. LAST DOSE 2/11/2018 AM.  PATIENT STATES DR RANGEL IS AWARE. DENIES FEVERS OR CHILLS.   • Interstitial cystitis    • On home oxygen therapy     2 L NC   • URIEL on CPAP    • PONV (postoperative nausea and vomiting)    • Shortness of breath on exertion    • Stroke     X1 8 YEARS AGO, GENERALIZED UPPER BODY WEAKNESS RESIDUAL PER PT    • Thyroid disorder    • Urinary leakage    • UTI (urinary tract infection)    • Wears glasses    • Yeast dermatitis    ,   Past Surgical History:   Procedure Laterality Date   • BREAST BIOPSY Right 1991   • CAROTID STENT      Transcath    • CAROTID STENT Left    • CHOLECYSTECTOMY     • CYSTOSTOMY W/ BLADDER BIOPSY     • DILATATION AND CURETTAGE     • LAPAROSCOPIC CHOLECYSTECTOMY  1994    Lap rolando   • OOPHORECTOMY     • PAP SMEAR  05/10/2016   • PARTIAL HYSTERECTOMY     ,   Family History   Problem Relation Age of Onset   • Cancer Other    • Diabetes Other    • Heart attack Other    • Hyperlipidemia Other    • Hypertension Other    • Osteoporosis Other    • Stroke Other    • Breast cancer Mother    • Osteoporosis Mother    • Colon cancer Father    ,   Social History   Substance Use Topics   • Smoking status: Current Every Day Smoker     Packs/day: 0.25     Years: 40.00     Types: Cigarettes   • Smokeless tobacco: Never Used      Comment: in process of quiting--AVERAGE 1 PPD, DOWN TO 6 CIG PER DAY X2 WEEKS    • Alcohol use 0.6 oz/week     1 Glasses of wine per week      Comment: occasional   ,   Prescriptions Prior to Admission   Medication Sig Dispense Refill Last Dose   • atorvastatin (LIPITOR) 80 MG tablet Take 80 mg by mouth Daily.   2/12/2018 at Unknown time   • B-D UF III MINI  PEN NEEDLES 31G X 5 MM misc use as directed  0 2/12/2018 at Unknown time   • bumetanide (BUMEX) 1 MG tablet Take 1 mg by mouth 2 (Two) Times a Day.   2/12/2018 at Unknown time   • Lancets (FREESTYLE) lancets TEST once daily as directed  0 2/12/2018 at Unknown time   • levalbuterol (XOPENEX) 0.63 MG/3ML nebulizer solution Take 1 ampule by nebulization Every 8 (Eight) Hours As Needed for Wheezing or Shortness of Air. 30 ampule 1 2/11/2018 at Unknown time   • linagliptin (TRADJENTA) 5 MG tablet tablet Take 5 mg by mouth daily.   2/12/2018 at Unknown time   • Multiple Vitamins-Minerals (MULTIVITAMIN PO) Take 1 tablet by mouth Daily.   2/12/2018 at Unknown time   • NOVOLOG FLEXPEN 100 UNIT/ML solution pen-injector sc pen Inject 10 Units under the skin 3 (Three) Times a Day With Meals.  0 2/11/2018 at Unknown time   • ondansetron (ZOFRAN) 4 MG tablet Take 1 tablet by mouth Every 8 (Eight) Hours As Needed for Nausea or Vomiting. 15 tablet 0 2/11/2018 at Unknown time   • potassium chloride (K-DUR,KLOR-CON) 20 MEQ CR tablet Take 1 tablet by mouth Daily. 30 tablet 0 2/12/2018 at Unknown time   • promethazine (PHENERGAN) 25 MG tablet Take 1 tablet by mouth Every 6 (Six) Hours As Needed for Nausea or Vomiting. 15 tablet 0 Past Week at Unknown time   • QUEtiapine (SEROQUEL) 25 MG tablet Take 25 mg by mouth every night.   2/11/2018 at Unknown time   • rOPINIRole (REQUIP) 0.25 MG tablet Take 0.25 mg by mouth Every Night. Take 1-3 hours before bedtime.   2/11/2018 at Unknown time   • SYMBICORT 160-4.5 MCG/ACT inhaler Inhale 2 puffs Daily.  0 2/12/2018 at Unknown time   • tiZANidine (ZANAFLEX) 2 MG tablet Take 2 mg by mouth As Needed for Muscle Spasms.  0 2/11/2018 at Unknown time    and Allergies:  Amlodipine and Reglan [metoclopramide]    Objective     Vital Signs   Heart Rate:  [50-75] 72  Resp:  [16-24] 22  BP: ()/(41-90) 107/43    Physical Exam:   Objective:  General Appearance:  In no acute distress (Obese).    Vital  "signs: (most recent): Blood pressure 107/43, pulse 72, temperature 97 °F (36.1 °C), temperature source Temporal Artery , resp. rate 22, height 162.6 cm (64\"), weight 96 kg (211 lb 10.3 oz), SpO2 95 %.    HEENT: Normal HEENT exam.  (Decreased pharyngeal space)    Lungs:  Normal respiratory rate and normal effort.  She is not in respiratory distress.  There are decreased breath sounds.  No wheezes, rales or rhonchi.    Heart: Normal rate.  Regular rhythm.  S1 normal and S2 normal.  No murmur, gallop or friction rub.   Chest: Symmetric chest wall expansion.   Abdomen: Abdomen is soft and non-distended.  Bowel sounds are normal.   There is no abdominal tenderness.   There is no mass. There is no splenomegaly. There is no hepatomegaly.   Extremities: There is dependent edema.  There is no deformity.    Neurological: Patient is alert and oriented to person, place and time.    Pupils:  Pupils are equal, round, and reactive to light.    Skin:  Warm and dry.              Results Review:   I reviewed the patient's new clinical results.  I reviewed the patient's new imaging results and agree with the interpretation.      Assessment/Plan     Hospital Problem List     Asthma    COPD (chronic obstructive pulmonary disease)    Generalized edema    Overview Signed 9/20/2017 10:30 AM by Lito Flores MD     Added automatically from request for surgery [id number]         Chronic respiratory failure with hypoxia and hypercapnia    Obstructive sleep apnea    Obesity (BMI 30-39.9)          67-year-old female with chronic compensated hypoventilation likely related to obesity and COPD.  She is being treated appropriately with triple inhaled therapy, supplemental oxygen, and nocturnal noninvasive ventilation with a Trilogy ventilator.  She needs to quit smoking and we discussed this.  Long-term weight loss is also paramount.  Chest x-ray reveals no acute process.  She is [facility] by [facility]hele [facility].    Her treatment is " comprehensive and appropriate and I would expect continued improvement if she is able to comply with all the above therapies and quit smoking.  She will likely need some amount of regular diuretic therapy I am sure.    Will be happy to assist further but little to add at this juncture,   I  Discussed with Ms Quarles with cardiology.    I discussed the patients findings and my recommendations with patient and primary care team    Oliver Garcia MD  Pulmonary and Critical Care Medicine  02/13/18  12:57 PM

## 2018-02-13 NOTE — PLAN OF CARE
"Problem: Patient Care Overview (Adult)  Goal: Plan of Care Review  Outcome: Ongoing (interventions implemented as appropriate)   02/13/18 0243   Coping/Psychosocial Response Interventions   Plan Of Care Reviewed With patient   Patient Care Overview   Progress progress towards functional goals is fair   Outcome Evaluation   Outcome Summary/Follow up Plan pt had episode of \"smothering\" at midnight, sat up on side of bed, hypotensive 77/45, resolved in 10 mins and breathing improved with BP. No change in groin site, hematoma borders unchanged, still soft, no extension to pubis area No c/o CP, NSR in 60-70's. .        Problem: Respiratory Insufficiency (Adult)  Goal: Identify Related Risk Factors and Signs and Symptoms  Outcome: Ongoing (interventions implemented as appropriate)   02/13/18 0243   Respiratory Insufficiency   Related Risk Factors (Respiratory Insufficiency) activity intolerance;obesity;smoking   Signs and Symptoms (Respiratory Insufficiency) abnormal ABGs;dyspnea;shortness of breath     Goal: Effective Ventilation  Outcome: Ongoing (interventions implemented as appropriate)   02/13/18 0243   Respiratory Insufficiency (Adult)   Effective Ventilation making progress toward outcome         "

## 2018-02-13 NOTE — PROGRESS NOTES
"  [facility] at [facility]  PROGRESS NOTE    Date of Admission: 2/12/2018  Length of Stay: 0  Primary Care Physician: Michael Mays MD    Chief Complaint: f/u CAD, elevated PAH  Problem List:   1. Carotid artery disease  a. Remote CVA, IDB.  b. Left hemispheric TIA, 02/05/2011.  c. Emergent LICA stent, 02/05/2011.  d. \"Last\" DUS negative, 2016 ([facility]).  e. MRA head/neck, 8/24/2017: \"severe\" LICA proximal stenosis, moderate AURE stenosis  f. Duplex, 8/24/17: 50-69% AURE stenosis, <50% LICA stenosis with stent.  2. Coronary artery disease  a. History of myocardial infarction, remote IDB  b. Normal nuclear myocardial perfusion study, April 2011.  c. Echocardiogram, 2013 revealing normal EF.  d. Echo, normal LV, no evidence of pulm HTN, 8/24/17.  e. LHC 2/12/2018: severe stenosis by angiography and iFR in distal nondominant circumflex artery, treated with EES  3. Hypertension.  4. Tobacco use.  5. Diabetes mellitus type 2.  6. Obesity with probable URIEL and history of CO2 retention.  a. RHC 2/12/18: mild pulmonary artery hypertension and elevation in right atrial pressure, right lower lobe subselective angiography is normal   7. Dyslipidemia.  8. Sinus arrhythmia.  9. Fibromyalgia.  10. Gastroesophageal reflux disease.  11. Status post operations, remote.  12. Anxiety disorder.  13. Pneumonia, bilateral:  Fall 2017, steroids, abx  14. Chronic kidney disease, Stage 2  a. Chronic cystitis  b. ?Secondary to NSAIDS  c. Diuretic induced per RAMYA Mcginnis, 9/5/17.  d. Bilateral renal angiography NORMAL 2/12/18  15. \"New onset\" lower extremity edema, May 2015:                                              a. Treated with Lasix 20 mg daily with resolution.                                              b. Recurrent LE edema summer 2017    Subjective      She is stable and asymptomatic. Right groin stable, has ambulated with no concerns.     Objective   Vitals: /43  Pulse 72  Temp 97 °F (36.1 °C) " "(Temporal Artery )   Resp 22  Ht 162.6 cm (64\")  Wt 96 kg (211 lb 10.3 oz)  LMP  (LMP Unknown)  SpO2 95%  BMI 36.33 kg/m2    Physical Exam:  GENERAL: Alert, cooperative, in no acute distress.   HEENT: Fundoscopic deferred, otherwise unremarkable.  NECK: No Jugular venous distention, adenopathy, or thyromegaly noted.   HEART: No discrete PMI is noted. Regular rhythm, normal rate, distant  LUNGS: No wheezing, rales or ronchi. On supplemental O2  ABDOMEN: Obese without evidence of organomegaly, masses, or tenderness. Erythema with mild excoriations under panus folds  NEUROLOGIC: No focal abnormalities involving strength or sensation are noted.   EXTREMITIES: No clubbing, cyanosis, or edema noted. Sheath site stable, with no bleeding, or hematoma. Ecchymosis present. Peripheral pulses present bilateral LE    Results:    Results from last 7 days  Lab Units 02/11/18  1538   WBC 10*3/mm3 13.53*   HEMOGLOBIN g/dL 12.0   HEMATOCRIT % 38.7   PLATELETS 10*3/mm3 162       Results from last 7 days  Lab Units 02/11/18  1538   SODIUM mmol/L 141   POTASSIUM mmol/L 4.1   CHLORIDE mmol/L 96*   CO2 mmol/L 38.0*   BUN mg/dL 22   CREATININE mg/dL 0.90   GLUCOSE mg/dL 196*        Results from last 7 days  Lab Units 02/11/18  1538   HEMOGLOBIN A1C % 9.40*     Results from last 7 days  Lab Units 02/11/18  1538   CHOLESTEROL mg/dL 145   TRIGLYCERIDES mg/dL 211*   HDL CHOL mg/dL 39*   LDL CHOL mg/dl 84       Results from last 7 days  Lab Units 02/12/18  1001 02/11/18  1538   BNP pg/mL 84.0 52.0       Intake/Output Summary (Last 24 hours) at 02/13/18 0844  Last data filed at 02/12/18 2200   Gross per 24 hour   Intake              720 ml   Output                0 ml   Net              720 ml     I personally reviewed the patient's EKG/Telemetry data    Radiology Data:   Right/ LHC and renal angio 2/12/18:  Final Impression: #1: Mild pulmonary artery hypertension and elevation in right atrial pressure is noted during this study.           "                    #2: Right lower lobe subselective pulmonary angiography is normal.                              #3: Bilateral renal artery angiography is normal.                              #4: Severe stenosis (by angiographic and physiologic criteria) is noted in the distal nondominant circumflex artery.  This was treated with an everolimus eluting stent, this sitting.    CT Abdomen 2/12/18:  FINDINGS: The most superior images demonstrate no basilar pulmonary  inflammatory process or pleural effusion. The liver is mildly enlarged.  The spleen is normal. There is no adrenal or pancreatic mass. There is  no renal mass, stone or obstruction. There is no ascites, aneurysm or  retroperitoneal lymphadenopathy.      IMPRESSION:  Mild hepatomegaly. There are no focal hepatic abnormalities  and the examination is otherwise normal.       Current Medications:    aspirin 81 mg Oral Daily   atorvastatin 80 mg Oral Nightly   budesonide-formoterol 2 puff Inhalation Daily   bumetanide 1 mg Oral BID   clopidogrel 600 mg Oral Once   clopidogrel 75 mg Oral Daily   insulin lispro 0-14 Units Subcutaneous 4x Daily With Meals & Nightly   insulin lispro 10 Units Subcutaneous TID With Meals   linagliptin 5 mg Oral Daily   nystatin  Topical Q12H   protamine 50 mg Intravenous Once   QUEtiapine 25 mg Oral Nightly   rOPINIRole 0.25 mg Oral Nightly          Assessment and Plan:   1. CAD  - LHC yesterday with severe stenosis in distal nondominant circumflex, treated with EES  - EF normal by echo    2. Obesity with URIEL  - mildly elevated pulmonary artery pressures by right heart catheterization and retained CO2 on ABG yesterday  - Appreciate pulmonary input  - patient has been seen and is followed at [facility] by Dr. Michele     3. CKD   - renal angio normal   - CT Abdomen reviewed and unremarkable.   - followed by [facility] - discussed results of testing by phone today    4. DM2  - A1C 9.4%, Diabetes educator has seen   - probable yeast  infection under panus fold- will try Nystatin powder    5. Tobacco abuse  - discussed smoking cessation     Disposition: Patient stable and ready for discharge to home. Follow up with Dr. Flores in approximately 3 months.     Scribed for Lito Flores MD by Felicity Quarles PA-C.  I, Lito Flores MD, personally performed the services described as documented by the above named individual. I have made any necessary edits and it is both accurate and complete 2/22/2018  9:19 AM

## 2018-02-14 ENCOUNTER — TELEPHONE (OUTPATIENT)
Dept: CARDIOLOGY | Facility: CLINIC | Age: 68
End: 2018-02-14

## 2018-02-14 ENCOUNTER — DOCUMENTATION (OUTPATIENT)
Dept: CARDIAC REHAB | Facility: HOSPITAL | Age: 68
End: 2018-02-14

## 2018-02-14 NOTE — TELEPHONE ENCOUNTER
Pt called to report her edema has worsened since heart cath and wishes to see [facility] for f/u as an outpt. She states she was only seen by [facility] pulmonology while admitted. Scheduled with JANI Orellana at 2/21/18 @ 1100. Left VM with appt details and will mail as well. THAIS

## 2018-02-14 NOTE — PROGRESS NOTES
Pt. Referred for Phase II Cardiac Rehab. Staff discussed benefits of exercise, program protocol, and educational material provided. Teach back verified. Staff to fax referral information to [facility] for scheduling.

## 2018-02-16 ENCOUNTER — TELEPHONE (OUTPATIENT)
Dept: PULMONOLOGY | Facility: CLINIC | Age: 68
End: 2018-02-16

## 2018-02-16 NOTE — TELEPHONE ENCOUNTER
Juliane from [facility] called requesting that pt will be placed in [facility]. Pt has an upcoming apt on 2/21 and will have pt scheduled for PFT and will inform Dr. Garcia to place an order.

## 2018-02-19 ENCOUNTER — TELEPHONE (OUTPATIENT)
Dept: OTHER | Facility: HOSPITAL | Age: 68
End: 2018-02-19

## 2018-02-19 NOTE — TELEPHONE ENCOUNTER
Patient called inquiring about appointments with pulmonary. Provided date, time and location for pulmonary appointment. Patient also asking about lung cancer screening appointment. Advised patient this has not been ordered or scheduled through [facility]. [facility] PCP may have ordered through [facility]. Provided lung navigator contact information and encouraged her to call with further questions or concerns. She v/u.

## 2018-02-21 ENCOUNTER — OFFICE VISIT (OUTPATIENT)
Dept: PULMONOLOGY | Facility: CLINIC | Age: 68
End: 2018-02-21

## 2018-02-21 VITALS
WEIGHT: 208 LBS | BODY MASS INDEX: 38.28 KG/M2 | OXYGEN SATURATION: 96 % | SYSTOLIC BLOOD PRESSURE: 140 MMHG | HEIGHT: 62 IN | TEMPERATURE: 98.5 F | DIASTOLIC BLOOD PRESSURE: 82 MMHG | HEART RATE: 76 BPM

## 2018-02-21 DIAGNOSIS — G47.33 OBSTRUCTIVE SLEEP APNEA: ICD-10-CM

## 2018-02-21 DIAGNOSIS — K21.9 GASTROESOPHAGEAL REFLUX DISEASE, ESOPHAGITIS PRESENCE NOT SPECIFIED: ICD-10-CM

## 2018-02-21 DIAGNOSIS — J96.12 CHRONIC RESPIRATORY FAILURE WITH HYPOXIA AND HYPERCAPNIA (HCC): ICD-10-CM

## 2018-02-21 DIAGNOSIS — J45.909 MODERATE ASTHMA, UNSPECIFIED WHETHER COMPLICATED, UNSPECIFIED WHETHER PERSISTENT: Primary | ICD-10-CM

## 2018-02-21 DIAGNOSIS — J42 CHRONIC BRONCHITIS, UNSPECIFIED CHRONIC BRONCHITIS TYPE (HCC): ICD-10-CM

## 2018-02-21 DIAGNOSIS — J96.11 CHRONIC RESPIRATORY FAILURE WITH HYPOXIA AND HYPERCAPNIA (HCC): ICD-10-CM

## 2018-02-21 PROCEDURE — 94729 DIFFUSING CAPACITY: CPT | Performed by: NURSE PRACTITIONER

## 2018-02-21 PROCEDURE — 99214 OFFICE O/P EST MOD 30 MIN: CPT | Performed by: NURSE PRACTITIONER

## 2018-02-21 PROCEDURE — 94726 PLETHYSMOGRAPHY LUNG VOLUMES: CPT | Performed by: NURSE PRACTITIONER

## 2018-02-21 PROCEDURE — 94375 RESPIRATORY FLOW VOLUME LOOP: CPT | Performed by: NURSE PRACTITIONER

## 2018-02-21 RX ORDER — NITROGLYCERIN 0.4 MG/1
0.4 TABLET SUBLINGUAL
Refills: 0 | COMMUNITY
Start: 2018-02-11

## 2018-02-21 RX ORDER — ALPRAZOLAM 0.25 MG/1
0.25 TABLET ORAL 2 TIMES DAILY
Refills: 0 | COMMUNITY
Start: 2018-02-14 | End: 2018-03-29 | Stop reason: HOSPADM

## 2018-02-21 RX ORDER — INSULIN GLARGINE 300 U/ML
INJECTION, SOLUTION SUBCUTANEOUS
Refills: 0 | COMMUNITY
Start: 2018-02-11 | End: 2018-03-29 | Stop reason: HOSPADM

## 2018-02-21 RX ORDER — METOLAZONE 5 MG/1
5 TABLET ORAL DAILY
COMMUNITY
End: 2018-03-29 | Stop reason: HOSPADM

## 2018-02-21 NOTE — PROGRESS NOTES
[facility], [place] operated by [facility] Follow up    CHIEF COMPLAINT    COPD    HISTORY OF PRESENT ILLNESS    Vidhi Lopez is a 67 y.o.female here today for evaluation of her COPD.  She was seen in the hospital by Dr. Garcia during her heart catheterization in February 13.  She does have chronic hypercapnic hypoxic respiratory failure on her ABG at the hospital she is 7.39, 61, 53 on room air.  She's had some multiple admission in [place] as well as [facility] with shortness of breath and bilateral infiltrates.  She's seen Dr. Darnell and was placed on a Trilogy at 4 at home due to her chronic respiratory failure.  She has been wearing it since it was delivered after her discharge, she states for about a couple weeks.    She has a long-standing history obstructive and restrictive lung disease with having asthma in her 20s.  Chronically she is on Symbicort Spiriva and Xopenex.  She does continue to smoke.    Her only significant complaint during our interview today in the office was of chronic lower extremity edema.  She sees cardiology as well as nephrology to help with her edema.  She's had worsening edema to almost anasarca since July.  She's been on Bumex and metalazone.  Check she ended about wound care for some abdominal fluid leaking and wounds.  She did have a heart catheterization done to evaluate for pulmonary hypertension, her pulmonary pressures were 36/20 with a wedge of 8.  She did get a drug eluding stent in the nondominant circumflex artery.  Her most recent echocardiogram showed a normal ejection fraction.    Patient Active Problem List   Diagnosis   • Dyslipidemia   • Hypertension   • Anxiety   • Insomnia   • GERD (gastroesophageal reflux disease)   • Diabetes mellitus   • Fibromyalgia   • RLS (restless legs syndrome)   • Migraines   • Asthma   • COPD (chronic obstructive pulmonary disease)   • Interstitial cystitis   • History of chronic bronchitis   • History of acute myocardial infarction   • History of  cardiac murmur   • History of stroke   • CAD (coronary artery disease)   • Essential hypertension   • Generalized edema   • CKD (chronic kidney disease) stage 2, GFR 60-89 ml/min   • Bilateral carotid artery stenosis   • Pneumonia of both lungs due to infectious organism   • Chronic respiratory failure with hypoxia and hypercapnia   • Obstructive sleep apnea   • Obesity (BMI 30-39.9)   • Diabetes mellitus type 2 in obese       Allergies   Allergen Reactions   • Amlodipine Swelling   • Reglan [Metoclopramide] Other (See Comments)     DYSTONIC REACTION       Current Outpatient Prescriptions:   •  ALPRAZolam (XANAX) 0.25 MG tablet, , Disp: , Rfl: 0  •  aspirin 81 MG EC tablet, Take 1 tablet by mouth Daily., Disp: 100 tablet, Rfl: 4  •  atorvastatin (LIPITOR) 80 MG tablet, Take 80 mg by mouth Daily., Disp: , Rfl:   •  B-D UF III MINI PEN NEEDLES 31G X 5 MM misc, use as directed, Disp: , Rfl: 0  •  bumetanide (BUMEX) 1 MG tablet, Take 1 mg by mouth 2 (Two) Times a Day., Disp: , Rfl:   •  clopidogrel (PLAVIX) 75 MG tablet, Take 1 tablet by mouth Daily., Disp: 90 tablet, Rfl: 3  •  Lancets (FREESTYLE) lancets, TEST once daily as directed, Disp: , Rfl: 0  •  levalbuterol (XOPENEX) 0.63 MG/3ML nebulizer solution, Take 1 ampule by nebulization Every 8 (Eight) Hours As Needed for Wheezing or Shortness of Air., Disp: 30 ampule, Rfl: 1  •  linagliptin (TRADJENTA) 5 MG tablet tablet, Take 5 mg by mouth daily., Disp: , Rfl:   •  metOLazone (ZAROXOLYN) 5 MG tablet, Take 5 mg by mouth Daily., Disp: , Rfl:   •  Multiple Vitamins-Minerals (MULTIVITAMIN PO), Take 1 tablet by mouth Daily., Disp: , Rfl:   •  nitroglycerin (NITROSTAT) 0.4 MG SL tablet, , Disp: , Rfl: 0  •  NOVOLOG FLEXPEN 100 UNIT/ML solution pen-injector sc pen, Inject 10 Units under the skin 3 (Three) Times a Day With Meals., Disp: , Rfl: 0  •  nystatin (MYCOSTATIN) 448912 UNIT/GM ointment, Apply  topically 2 (Two) Times a Day., Disp: 15 g, Rfl: 1  •  nystatin  (MYCOSTATIN) 017837 UNIT/GM powder, Apply  topically Every 12 (Twelve) Hours., Disp: 15 g, Rfl: 1  •  potassium chloride (K-DUR,KLOR-CON) 20 MEQ CR tablet, Take 1 tablet by mouth Daily., Disp: 30 tablet, Rfl: 0  •  promethazine (PHENERGAN) 25 MG tablet, Take 1 tablet by mouth Every 6 (Six) Hours As Needed for Nausea or Vomiting., Disp: 15 tablet, Rfl: 0  •  QUEtiapine (SEROQUEL) 25 MG tablet, Take 25 mg by mouth every night., Disp: , Rfl:   •  rOPINIRole (REQUIP) 0.25 MG tablet, Take 0.25 mg by mouth Every Night. Take 1-3 hours before bedtime., Disp: , Rfl:   •  SYMBICORT 160-4.5 MCG/ACT inhaler, Inhale 2 puffs Daily., Disp: , Rfl: 0  •  tiZANidine (ZANAFLEX) 2 MG tablet, Take 2 mg by mouth As Needed for Muscle Spasms., Disp: , Rfl: 0  •  TOUJEO SOLOSTAR 300 UNIT/ML solution pen-injector, , Disp: , Rfl: 0  MEDICATION LIST AND ALLERGIES REVIEWED.    Social History   Substance Use Topics   • Smoking status: Current Every Day Smoker     Packs/day: 0.25     Years: 40.00     Types: Cigarettes   • Smokeless tobacco: Never Used      Comment: in process of quiting--AVERAGE 1 PPD, DOWN TO 6 CIG PER DAY X2 WEEKS    • Alcohol use 0.6 oz/week     1 Glasses of wine per week      Comment: occasional       FAMILY AND SOCIAL HISTORY REVIEWED.    Review of Systems   Constitutional: Positive for unexpected weight change. Negative for chills, fatigue and fever.   HENT: Negative for congestion, nosebleeds, postnasal drip, rhinorrhea, sinus pressure and trouble swallowing.    Respiratory: Negative for cough, chest tightness, shortness of breath and wheezing.    Cardiovascular: Positive for leg swelling. Negative for chest pain and palpitations.   Gastrointestinal: Positive for abdominal distention. Negative for abdominal pain, constipation, diarrhea, nausea and vomiting.   Genitourinary: Negative for dysuria, frequency, hematuria and urgency.   Musculoskeletal: Negative for myalgias.   Neurological: Negative for dizziness, weakness,  "numbness and headaches.   All other systems reviewed and are negative.  .    /82 (BP Location: Left arm, Patient Position: Sitting, Cuff Size: Adult)  Pulse 76  Temp 98.5 °F (36.9 °C)  Ht 157.5 cm (62\")  Wt 94.3 kg (208 lb)  LMP  (LMP Unknown) Comment: LAST MAMMOGRAM 2017  SpO2 96%  BMI 38.04 kg/m2    Physical Exam   Constitutional: She is oriented to person, place, and time. She appears well-developed and well-nourished.   HENT:   Head: Normocephalic and atraumatic.   Eyes: EOM are normal. Pupils are equal, round, and reactive to light.   Neck: Normal range of motion. Neck supple.   Cardiovascular: Normal rate and regular rhythm.    No murmur heard.  Pulmonary/Chest: Effort normal and breath sounds normal. No respiratory distress. She has no wheezes. She has no rales.   Abdominal: Soft. Bowel sounds are normal. She exhibits no distension.   Musculoskeletal: Normal range of motion. She exhibits no edema.   Neurological: She is alert and oriented to person, place, and time.   Skin: Skin is warm and dry. No erythema.   Psychiatric: She has a normal mood and affect. Her behavior is normal.   Vitals reviewed.        RESULTS    PFTS in the office today, read by me:  FVC 1.67, 58%.  FEV1 1.23, 57%.  Ratio 74%  Total lung capacity 98%, residual volume 132%.  Normal adjusted diffusion    Moderate objective airway disease with probable restriction due to overestimation of lung volume secondary to air trapping.    PROBLEM LIST    Problem List Items Addressed This Visit        Respiratory    Asthma - Primary    Relevant Orders    Pulmonary Function Test (Completed)    COPD (chronic obstructive pulmonary disease)    Chronic respiratory failure with hypoxia and hypercapnia    Obstructive sleep apnea       Digestive    GERD (gastroesophageal reflux disease)    Overview     Nexium                 DISCUSSION    She is currently on optimal management for her chronic obstructive and restrictive lung disease.  We did " discuss rehabilitation as well as weight loss as a goal in her overall functionality.  She has a noninvasive ventilator, Trilogy at night.  I encouraged her to wear that nightly, I was unsure if she was actually wearing it in the office today.  She has not had a downloaded since her discharge at [facility], she has not followed up with Dr. Michele in the office.  We also discussed smoking cessation efforts.  Initially she stated she'll try to get all of her physicians in one place, with [facility].  Then she said she was going to seek a second opinion at [facility].    I spent 25 minutes with the patient. I spent > 50% percent of this time counseling and discussing diagnostic testing, evaluation, current status and management.    Ana Mena, APRN  02/21/201810:50 AM  Electronically signed     Please note that portions of this note were completed with a voice recognition program. Efforts were made to edit the dictations, but occasionally words are mistranscribed.      CC: Michael Mays MD

## 2018-03-05 ENCOUNTER — TRANSCRIBE ORDERS (OUTPATIENT)
Dept: ADMINISTRATIVE | Facility: HOSPITAL | Age: 68
End: 2018-03-05

## 2018-03-05 ENCOUNTER — APPOINTMENT (OUTPATIENT)
Dept: LAB | Facility: HOSPITAL | Age: 68
End: 2018-03-05

## 2018-03-05 DIAGNOSIS — N17.9 ACUTE RENAL FAILURE, UNSPECIFIED ACUTE RENAL FAILURE TYPE (HCC): Primary | ICD-10-CM

## 2018-03-05 DIAGNOSIS — E83.52 HYPERCALCEMIA: ICD-10-CM

## 2018-03-05 DIAGNOSIS — M79.606 PAIN OF LOWER EXTREMITY, UNSPECIFIED LATERALITY: ICD-10-CM

## 2018-03-05 DIAGNOSIS — E88.81 DYSMETABOLIC SYNDROME X: ICD-10-CM

## 2018-03-05 LAB
25(OH)D3 SERPL-MCNC: 35 NG/ML
ALBUMIN SERPL-MCNC: 4.2 G/DL (ref 3.5–5)
ANION GAP SERPL CALCULATED.3IONS-SCNC: 20.3 MMOL/L
BUN BLD-MCNC: 42 MG/DL (ref 7–20)
BUN/CREAT SERPL: 35 (ref 7.1–23.5)
CALCIUM SPEC-SCNC: 10.2 MG/DL (ref 8.4–10.2)
CHLORIDE SERPL-SCNC: 77 MMOL/L (ref 98–107)
CK SERPL-CCNC: 69 U/L (ref 30–170)
CO2 SERPL-SCNC: 45 MMOL/L (ref 26–30)
CREAT BLD-MCNC: 1.2 MG/DL (ref 0.6–1.3)
CRP SERPL-MCNC: 2.1 MG/DL (ref 0–1)
ERYTHROCYTE [SEDIMENTATION RATE] IN BLOOD: 66 MM/HR (ref 0–20)
GFR SERPL CREATININE-BSD FRML MDRD: 45 ML/MIN/1.73
GLUCOSE BLD-MCNC: 250 MG/DL (ref 74–98)
HBA1C MFR BLD: 9.3 % (ref 3–6)
PHOSPHATE SERPL-MCNC: 4.2 MG/DL (ref 2.5–4.5)
POTASSIUM BLD-SCNC: 3.3 MMOL/L (ref 3.5–5.1)
SODIUM BLD-SCNC: 139 MMOL/L (ref 137–145)

## 2018-03-05 PROCEDURE — 82550 ASSAY OF CK (CPK): CPT | Performed by: INTERNAL MEDICINE

## 2018-03-05 PROCEDURE — 83036 HEMOGLOBIN GLYCOSYLATED A1C: CPT | Performed by: INTERNAL MEDICINE

## 2018-03-05 PROCEDURE — 82306 VITAMIN D 25 HYDROXY: CPT | Performed by: INTERNAL MEDICINE

## 2018-03-05 PROCEDURE — 80069 RENAL FUNCTION PANEL: CPT | Performed by: INTERNAL MEDICINE

## 2018-03-05 PROCEDURE — 86140 C-REACTIVE PROTEIN: CPT | Performed by: INTERNAL MEDICINE

## 2018-03-05 PROCEDURE — 83970 ASSAY OF PARATHORMONE: CPT | Performed by: INTERNAL MEDICINE

## 2018-03-05 PROCEDURE — 36415 COLL VENOUS BLD VENIPUNCTURE: CPT | Performed by: INTERNAL MEDICINE

## 2018-03-05 PROCEDURE — 85651 RBC SED RATE NONAUTOMATED: CPT | Performed by: INTERNAL MEDICINE

## 2018-03-06 LAB — PTH-INTACT SERPL-MCNC: 38 PG/ML (ref 15–65)

## 2018-03-17 ENCOUNTER — HOSPITAL ENCOUNTER (INPATIENT)
Facility: HOSPITAL | Age: 68
LOS: 12 days | Discharge: NURSING, REHAB OR LONG-TERM CARE | End: 2018-03-29
Attending: EMERGENCY MEDICINE | Admitting: FAMILY MEDICINE

## 2018-03-17 ENCOUNTER — APPOINTMENT (OUTPATIENT)
Dept: GENERAL RADIOLOGY | Facility: HOSPITAL | Age: 68
End: 2018-03-17

## 2018-03-17 DIAGNOSIS — N18.9 ACUTE ON CHRONIC RENAL INSUFFICIENCY: ICD-10-CM

## 2018-03-17 DIAGNOSIS — J96.21 ACUTE ON CHRONIC RESPIRATORY FAILURE WITH HYPOXIA AND HYPERCAPNIA (HCC): Primary | ICD-10-CM

## 2018-03-17 DIAGNOSIS — Z74.09 IMPAIRED FUNCTIONAL MOBILITY, BALANCE, GAIT, AND ENDURANCE: ICD-10-CM

## 2018-03-17 DIAGNOSIS — I10 ELEVATED BLOOD PRESSURE READING WITH DIAGNOSIS OF HYPERTENSION: ICD-10-CM

## 2018-03-17 DIAGNOSIS — N28.9 ACUTE ON CHRONIC RENAL INSUFFICIENCY: ICD-10-CM

## 2018-03-17 DIAGNOSIS — E66.9 DIABETES MELLITUS TYPE 2 IN OBESE (HCC): ICD-10-CM

## 2018-03-17 DIAGNOSIS — E11.69 DIABETES MELLITUS TYPE 2 IN OBESE (HCC): ICD-10-CM

## 2018-03-17 DIAGNOSIS — J44.1 COPD EXACERBATION (HCC): ICD-10-CM

## 2018-03-17 DIAGNOSIS — M00.9 INFECTION OF RIGHT KNEE (HCC): ICD-10-CM

## 2018-03-17 DIAGNOSIS — E66.2 OBESITY HYPOVENTILATION SYNDROME (HCC): ICD-10-CM

## 2018-03-17 DIAGNOSIS — J96.22 ACUTE ON CHRONIC RESPIRATORY FAILURE WITH HYPOXIA AND HYPERCAPNIA (HCC): Primary | ICD-10-CM

## 2018-03-17 PROBLEM — N17.9 ACUTE KIDNEY INJURY SUPERIMPOSED ON CHRONIC KIDNEY DISEASE (HCC): Status: ACTIVE | Noted: 2018-03-17

## 2018-03-17 PROBLEM — R79.89 ELEVATED LFTS: Status: ACTIVE | Noted: 2018-03-17

## 2018-03-17 PROBLEM — Z72.0 TOBACCO USE: Status: ACTIVE | Noted: 2018-03-17

## 2018-03-17 PROBLEM — R65.10 SIRS (SYSTEMIC INFLAMMATORY RESPONSE SYNDROME) (HCC): Status: ACTIVE | Noted: 2018-03-17

## 2018-03-17 PROBLEM — G93.41 ACUTE METABOLIC ENCEPHALOPATHY: Status: ACTIVE | Noted: 2018-03-17

## 2018-03-17 LAB
ALBUMIN SERPL-MCNC: 4.2 G/DL (ref 3.2–4.8)
ALBUMIN/GLOB SERPL: 1.2 G/DL (ref 1.5–2.5)
ALP SERPL-CCNC: 153 U/L (ref 25–100)
ALT SERPL W P-5'-P-CCNC: 76 U/L (ref 7–40)
ANION GAP SERPL CALCULATED.3IONS-SCNC: 7 MMOL/L (ref 3–11)
ARTERIAL PATENCY WRIST A: ABNORMAL
AST SERPL-CCNC: 47 U/L (ref 0–33)
ATMOSPHERIC PRESS: ABNORMAL MMHG
BASE EXCESS BLDA CALC-SCNC: 8.9 MMOL/L (ref 0–2)
BASOPHILS # BLD AUTO: 0.03 10*3/MM3 (ref 0–0.2)
BASOPHILS NFR BLD AUTO: 0.2 % (ref 0–1)
BDY SITE: ABNORMAL
BILIRUB SERPL-MCNC: 0.4 MG/DL (ref 0.3–1.2)
BNP SERPL-MCNC: 445 PG/ML (ref 0–100)
BUN BLD-MCNC: 62 MG/DL (ref 9–23)
BUN/CREAT SERPL: 29.5 (ref 7–25)
CALCIUM SPEC-SCNC: 9.3 MG/DL (ref 8.7–10.4)
CHLORIDE SERPL-SCNC: 95 MMOL/L (ref 99–109)
CO2 BLDA-SCNC: 38.8 MMOL/L (ref 22–33)
CO2 SERPL-SCNC: 36 MMOL/L (ref 20–31)
COHGB MFR BLD: 3 % (ref 0–2)
CREAT BLD-MCNC: 2.1 MG/DL (ref 0.6–1.3)
DEPRECATED RDW RBC AUTO: 64.3 FL (ref 37–54)
EOSINOPHIL # BLD AUTO: 0.02 10*3/MM3 (ref 0–0.3)
EOSINOPHIL NFR BLD AUTO: 0.2 % (ref 0–3)
ERYTHROCYTE [DISTWIDTH] IN BLOOD BY AUTOMATED COUNT: 17.8 % (ref 11.3–14.5)
GFR SERPL CREATININE-BSD FRML MDRD: 23 ML/MIN/1.73
GLOBULIN UR ELPH-MCNC: 3.4 GM/DL
GLUCOSE BLD-MCNC: 240 MG/DL (ref 70–100)
HCO3 BLDA-SCNC: 36.7 MMOL/L (ref 20–26)
HCT VFR BLD AUTO: 35.1 % (ref 34.5–44)
HCT VFR BLD CALC: 31.6 %
HGB BLD-MCNC: 10.8 G/DL (ref 11.5–15.5)
HGB BLDA-MCNC: 10.3 G/DL (ref 14–18)
HOLD SPECIMEN: NORMAL
HOLD SPECIMEN: NORMAL
HOROWITZ INDEX BLD+IHG-RTO: 60 %
IMM GRANULOCYTES # BLD: 0.04 10*3/MM3 (ref 0–0.03)
IMM GRANULOCYTES NFR BLD: 0.3 % (ref 0–0.6)
LYMPHOCYTES # BLD AUTO: 1.17 10*3/MM3 (ref 0.6–4.8)
LYMPHOCYTES NFR BLD AUTO: 8.9 % (ref 24–44)
MCH RBC QN AUTO: 31.2 PG (ref 27–31)
MCHC RBC AUTO-ENTMCNC: 30.8 G/DL (ref 32–36)
MCV RBC AUTO: 101.4 FL (ref 80–99)
METHGB BLD QL: 0.8 % (ref 0–1.5)
MODALITY: ABNORMAL
MONOCYTES # BLD AUTO: 0.58 10*3/MM3 (ref 0–1)
MONOCYTES NFR BLD AUTO: 4.4 % (ref 0–12)
NEUTROPHILS # BLD AUTO: 11.28 10*3/MM3 (ref 1.5–8.3)
NEUTROPHILS NFR BLD AUTO: 86 % (ref 41–71)
OXYHGB MFR BLDV: 90.7 % (ref 94–99)
PCO2 BLDA: 68.6 MM HG (ref 35–45)
PH BLDA: 7.34 PH UNITS (ref 7.35–7.45)
PLAT MORPH BLD: NORMAL
PLATELET # BLD AUTO: 293 10*3/MM3 (ref 150–450)
PMV BLD AUTO: 10.5 FL (ref 6–12)
PO2 BLDA: 81.1 MM HG (ref 83–108)
POTASSIUM BLD-SCNC: 4.6 MMOL/L (ref 3.5–5.5)
PROT SERPL-MCNC: 7.6 G/DL (ref 5.7–8.2)
RBC # BLD AUTO: 3.46 10*6/MM3 (ref 3.89–5.14)
RBC MORPH BLD: NORMAL
SODIUM BLD-SCNC: 138 MMOL/L (ref 132–146)
TROPONIN I SERPL-MCNC: 0.04 NG/ML (ref 0–0.07)
WBC MORPH BLD: NORMAL
WBC NRBC COR # BLD: 13.12 10*3/MM3 (ref 3.5–10.8)
WHOLE BLOOD HOLD SPECIMEN: NORMAL
WHOLE BLOOD HOLD SPECIMEN: NORMAL

## 2018-03-17 PROCEDURE — 82805 BLOOD GASES W/O2 SATURATION: CPT | Performed by: EMERGENCY MEDICINE

## 2018-03-17 PROCEDURE — 94760 N-INVAS EAR/PLS OXIMETRY 1: CPT

## 2018-03-17 PROCEDURE — 85007 BL SMEAR W/DIFF WBC COUNT: CPT | Performed by: EMERGENCY MEDICINE

## 2018-03-17 PROCEDURE — 93005 ELECTROCARDIOGRAM TRACING: CPT | Performed by: EMERGENCY MEDICINE

## 2018-03-17 PROCEDURE — 5A09357 ASSISTANCE WITH RESPIRATORY VENTILATION, LESS THAN 24 CONSECUTIVE HOURS, CONTINUOUS POSITIVE AIRWAY PRESSURE: ICD-10-PCS | Performed by: HOSPITALIST

## 2018-03-17 PROCEDURE — 82550 ASSAY OF CK (CPK): CPT | Performed by: HOSPITALIST

## 2018-03-17 PROCEDURE — 36600 WITHDRAWAL OF ARTERIAL BLOOD: CPT | Performed by: EMERGENCY MEDICINE

## 2018-03-17 PROCEDURE — 84145 PROCALCITONIN (PCT): CPT | Performed by: HOSPITALIST

## 2018-03-17 PROCEDURE — 80053 COMPREHEN METABOLIC PANEL: CPT | Performed by: EMERGENCY MEDICINE

## 2018-03-17 PROCEDURE — 94640 AIRWAY INHALATION TREATMENT: CPT

## 2018-03-17 PROCEDURE — G0378 HOSPITAL OBSERVATION PER HR: HCPCS

## 2018-03-17 PROCEDURE — 99291 CRITICAL CARE FIRST HOUR: CPT

## 2018-03-17 PROCEDURE — 84484 ASSAY OF TROPONIN QUANT: CPT

## 2018-03-17 PROCEDURE — 94660 CPAP INITIATION&MGMT: CPT

## 2018-03-17 PROCEDURE — 94799 UNLISTED PULMONARY SVC/PX: CPT

## 2018-03-17 PROCEDURE — 85025 COMPLETE CBC W/AUTO DIFF WBC: CPT | Performed by: EMERGENCY MEDICINE

## 2018-03-17 PROCEDURE — 71045 X-RAY EXAM CHEST 1 VIEW: CPT

## 2018-03-17 PROCEDURE — 83880 ASSAY OF NATRIURETIC PEPTIDE: CPT | Performed by: EMERGENCY MEDICINE

## 2018-03-17 PROCEDURE — 99223 1ST HOSP IP/OBS HIGH 75: CPT | Performed by: HOSPITALIST

## 2018-03-17 RX ORDER — ACETAMINOPHEN 325 MG/1
650 TABLET ORAL EVERY 4 HOURS PRN
Status: DISCONTINUED | OUTPATIENT
Start: 2018-03-17 | End: 2018-03-24

## 2018-03-17 RX ORDER — SODIUM CHLORIDE 9 MG/ML
75 INJECTION, SOLUTION INTRAVENOUS CONTINUOUS
Status: DISCONTINUED | OUTPATIENT
Start: 2018-03-17 | End: 2018-03-19

## 2018-03-17 RX ORDER — INSULIN LISPRO 100 [IU]/ML
16 INJECTION, SOLUTION INTRAVENOUS; SUBCUTANEOUS
Refills: 0 | COMMUNITY
Start: 2018-03-03 | End: 2019-10-28 | Stop reason: ALTCHOICE

## 2018-03-17 RX ORDER — METOPROLOL TARTRATE 50 MG/1
50 TABLET, FILM COATED ORAL EVERY 12 HOURS SCHEDULED
COMMUNITY
End: 2018-05-17 | Stop reason: SINTOL

## 2018-03-17 RX ORDER — IBUPROFEN 800 MG/1
800 TABLET ORAL 3 TIMES DAILY PRN
Refills: 0 | Status: ON HOLD | COMMUNITY
Start: 2018-02-27 | End: 2018-03-23

## 2018-03-17 RX ORDER — ONDANSETRON HYDROCHLORIDE 8 MG/1
8 TABLET, FILM COATED ORAL 2 TIMES DAILY PRN
COMMUNITY
End: 2019-05-09

## 2018-03-17 RX ORDER — SODIUM CHLORIDE 0.9 % (FLUSH) 0.9 %
10 SYRINGE (ML) INJECTION AS NEEDED
Status: DISCONTINUED | OUTPATIENT
Start: 2018-03-17 | End: 2018-03-23 | Stop reason: SDUPTHER

## 2018-03-17 RX ORDER — GABAPENTIN 800 MG/1
800 TABLET ORAL 2 TIMES DAILY
COMMUNITY
End: 2018-09-12

## 2018-03-17 RX ORDER — METHENAMINE HIPPURATE 1000 MG/1
1 TABLET ORAL 2 TIMES DAILY WITH MEALS
COMMUNITY
End: 2018-10-23 | Stop reason: HOSPADM

## 2018-03-17 RX ORDER — MIRTAZAPINE 15 MG/1
15 TABLET, FILM COATED ORAL NIGHTLY
COMMUNITY
End: 2018-10-09 | Stop reason: DRUGHIGH

## 2018-03-17 RX ORDER — SOLIFENACIN SUCCINATE 10 MG/1
10 TABLET, FILM COATED ORAL DAILY
Refills: 0 | COMMUNITY
Start: 2018-02-23 | End: 2018-05-17

## 2018-03-17 RX ORDER — IPRATROPIUM BROMIDE AND ALBUTEROL SULFATE 2.5; .5 MG/3ML; MG/3ML
3 SOLUTION RESPIRATORY (INHALATION) ONCE
Status: COMPLETED | OUTPATIENT
Start: 2018-03-17 | End: 2018-03-17

## 2018-03-17 RX ORDER — DEXTROSE MONOHYDRATE 25 G/50ML
25 INJECTION, SOLUTION INTRAVENOUS
Status: DISCONTINUED | OUTPATIENT
Start: 2018-03-17 | End: 2018-03-29 | Stop reason: HOSPADM

## 2018-03-17 RX ORDER — IPRATROPIUM BROMIDE AND ALBUTEROL SULFATE 2.5; .5 MG/3ML; MG/3ML
3 SOLUTION RESPIRATORY (INHALATION)
Status: DISCONTINUED | OUTPATIENT
Start: 2018-03-18 | End: 2018-03-29 | Stop reason: HOSPADM

## 2018-03-17 RX ORDER — METFORMIN HYDROCHLORIDE 500 MG/1
500 TABLET, EXTENDED RELEASE ORAL 2 TIMES DAILY
Refills: 0 | Status: ON HOLD | COMMUNITY
Start: 2018-02-25 | End: 2018-11-06

## 2018-03-17 RX ORDER — SODIUM CHLORIDE 0.9 % (FLUSH) 0.9 %
1-10 SYRINGE (ML) INJECTION AS NEEDED
Status: DISCONTINUED | OUTPATIENT
Start: 2018-03-17 | End: 2018-03-29 | Stop reason: HOSPADM

## 2018-03-17 RX ORDER — NICOTINE POLACRILEX 4 MG
15 LOZENGE BUCCAL
Status: DISCONTINUED | OUTPATIENT
Start: 2018-03-17 | End: 2018-03-29 | Stop reason: HOSPADM

## 2018-03-17 RX ORDER — HEPARIN SODIUM 5000 [USP'U]/ML
5000 INJECTION, SOLUTION INTRAVENOUS; SUBCUTANEOUS EVERY 8 HOURS SCHEDULED
Status: DISCONTINUED | OUTPATIENT
Start: 2018-03-18 | End: 2018-03-18

## 2018-03-17 RX ADMIN — SODIUM CHLORIDE 500 ML: 9 INJECTION, SOLUTION INTRAVENOUS at 20:05

## 2018-03-17 RX ADMIN — SODIUM CHLORIDE 125 ML/HR: 9 INJECTION, SOLUTION INTRAVENOUS at 23:30

## 2018-03-17 RX ADMIN — SODIUM CHLORIDE 125 ML/HR: 9 INJECTION, SOLUTION INTRAVENOUS at 20:05

## 2018-03-17 RX ADMIN — IPRATROPIUM BROMIDE AND ALBUTEROL SULFATE 3 ML: .5; 2.5 SOLUTION RESPIRATORY (INHALATION) at 19:06

## 2018-03-18 ENCOUNTER — APPOINTMENT (OUTPATIENT)
Dept: CT IMAGING | Facility: HOSPITAL | Age: 68
End: 2018-03-18

## 2018-03-18 ENCOUNTER — APPOINTMENT (OUTPATIENT)
Dept: GENERAL RADIOLOGY | Facility: HOSPITAL | Age: 68
End: 2018-03-18

## 2018-03-18 ENCOUNTER — APPOINTMENT (OUTPATIENT)
Dept: ULTRASOUND IMAGING | Facility: HOSPITAL | Age: 68
End: 2018-03-18

## 2018-03-18 LAB
ALBUMIN SERPL-MCNC: 3.3 G/DL (ref 3.2–4.8)
ALBUMIN/GLOB SERPL: 1.4 G/DL (ref 1.5–2.5)
ALP SERPL-CCNC: 121 U/L (ref 25–100)
ALT SERPL W P-5'-P-CCNC: 59 U/L (ref 7–40)
ANION GAP SERPL CALCULATED.3IONS-SCNC: 10 MMOL/L (ref 3–11)
ARTERIAL PATENCY WRIST A: ABNORMAL
AST SERPL-CCNC: 33 U/L (ref 0–33)
ATMOSPHERIC PRESS: ABNORMAL MMHG
BASE EXCESS BLDA CALC-SCNC: 7.9 MMOL/L (ref 0–2)
BASE EXCESS BLDA CALC-SCNC: 8.2 MMOL/L (ref 0–2)
BASE EXCESS BLDA CALC-SCNC: 8.5 MMOL/L (ref 0–2)
BDY SITE: ABNORMAL
BILIRUB SERPL-MCNC: 0.4 MG/DL (ref 0.3–1.2)
BILIRUB UR QL STRIP: NEGATIVE
BUN BLD-MCNC: 60 MG/DL (ref 9–23)
BUN/CREAT SERPL: 42.9 (ref 7–25)
CALCIUM SPEC-SCNC: 8.4 MG/DL (ref 8.7–10.4)
CHLORIDE SERPL-SCNC: 99 MMOL/L (ref 99–109)
CK SERPL-CCNC: 39 U/L (ref 26–174)
CLARITY UR: CLEAR
CO2 BLDA-SCNC: 36.5 MMOL/L (ref 22–33)
CO2 BLDA-SCNC: 36.8 MMOL/L (ref 22–33)
CO2 BLDA-SCNC: 38 MMOL/L (ref 22–33)
CO2 SERPL-SCNC: 33 MMOL/L (ref 20–31)
COHGB MFR BLD: 2.3 % (ref 0–2)
COHGB MFR BLD: 2.4 % (ref 0–2)
COHGB MFR BLD: 2.6 % (ref 0–2)
COLOR UR: YELLOW
CREAT BLD-MCNC: 1.4 MG/DL (ref 0.6–1.3)
CREAT UR-MCNC: 91.5 MG/DL
D DIMER PPP FEU-MCNC: 2.08 MG/L (FEU) (ref 0–0.5)
DEPRECATED RDW RBC AUTO: 65.6 FL (ref 37–54)
EOSINOPHIL SPEC QL MICRO: 0 % EOS/100 CELLS (ref 0–0)
ERYTHROCYTE [DISTWIDTH] IN BLOOD BY AUTOMATED COUNT: 17.9 % (ref 11.3–14.5)
GFR SERPL CREATININE-BSD FRML MDRD: 38 ML/MIN/1.73
GLOBULIN UR ELPH-MCNC: 2.4 GM/DL
GLUCOSE BLD-MCNC: 169 MG/DL (ref 70–100)
GLUCOSE BLDC GLUCOMTR-MCNC: 189 MG/DL (ref 70–130)
GLUCOSE BLDC GLUCOMTR-MCNC: 209 MG/DL (ref 70–130)
GLUCOSE BLDC GLUCOMTR-MCNC: 222 MG/DL (ref 70–130)
GLUCOSE BLDC GLUCOMTR-MCNC: 238 MG/DL (ref 70–130)
GLUCOSE UR STRIP-MCNC: NEGATIVE MG/DL
HCO3 BLDA-SCNC: 34.8 MMOL/L (ref 20–26)
HCO3 BLDA-SCNC: 34.8 MMOL/L (ref 20–26)
HCO3 BLDA-SCNC: 35.9 MMOL/L (ref 20–26)
HCT VFR BLD AUTO: 29.7 % (ref 34.5–44)
HCT VFR BLD CALC: 28.4 %
HCT VFR BLD CALC: 28.6 %
HCT VFR BLD CALC: 30 %
HGB BLD-MCNC: 9 G/DL (ref 11.5–15.5)
HGB BLDA-MCNC: 9.3 G/DL (ref 14–18)
HGB BLDA-MCNC: 9.3 G/DL (ref 14–18)
HGB BLDA-MCNC: 9.8 G/DL (ref 14–18)
HGB UR QL STRIP.AUTO: NEGATIVE
HOROWITZ INDEX BLD+IHG-RTO: 40 %
KETONES UR QL STRIP: NEGATIVE
LEUKOCYTE ESTERASE UR QL STRIP.AUTO: NEGATIVE
MCH RBC QN AUTO: 31 PG (ref 27–31)
MCHC RBC AUTO-ENTMCNC: 30.3 G/DL (ref 32–36)
MCV RBC AUTO: 102.4 FL (ref 80–99)
METHGB BLD QL: 0.2 % (ref 0–1.5)
METHGB BLD QL: 0.4 % (ref 0–1.5)
METHGB BLD QL: 0.8 % (ref 0–1.5)
MODALITY: ABNORMAL
NITRITE UR QL STRIP: NEGATIVE
OXYHGB MFR BLDV: 90.4 % (ref 94–99)
OXYHGB MFR BLDV: 90.8 % (ref 94–99)
OXYHGB MFR BLDV: 92 % (ref 94–99)
PCO2 BLDA: 57.2 MM HG (ref 35–45)
PCO2 BLDA: 62.3 MM HG (ref 35–45)
PCO2 BLDA: 68.8 MM HG (ref 35–45)
PH BLDA: 7.33 PH UNITS (ref 7.35–7.45)
PH BLDA: 7.36 PH UNITS (ref 7.35–7.45)
PH BLDA: 7.39 PH UNITS (ref 7.35–7.45)
PH UR STRIP.AUTO: 5.5 [PH] (ref 5–8)
PLATELET # BLD AUTO: 261 10*3/MM3 (ref 150–450)
PMV BLD AUTO: 10.1 FL (ref 6–12)
PO2 BLDA: 71.6 MM HG (ref 83–108)
PO2 BLDA: 72.8 MM HG (ref 83–108)
PO2 BLDA: 75.7 MM HG (ref 83–108)
POTASSIUM BLD-SCNC: 4 MMOL/L (ref 3.5–5.5)
PROCALCITONIN SERPL-MCNC: 0.2 NG/ML
PROT SERPL-MCNC: 5.7 G/DL (ref 5.7–8.2)
PROT UR QL STRIP: NEGATIVE
PROT UR-MCNC: 12 MG/DL (ref 1–14)
RBC # BLD AUTO: 2.9 10*6/MM3 (ref 3.89–5.14)
SAO2 % BLDCOA: 92 %
SODIUM BLD-SCNC: 142 MMOL/L (ref 132–146)
SODIUM UR-SCNC: 22 MMOL/L (ref 30–90)
SP GR UR STRIP: 1.02 (ref 1–1.03)
UROBILINOGEN UR QL STRIP: NORMAL
WBC NRBC COR # BLD: 12.23 10*3/MM3 (ref 3.5–10.8)

## 2018-03-18 PROCEDURE — 82805 BLOOD GASES W/O2 SATURATION: CPT | Performed by: INTERNAL MEDICINE

## 2018-03-18 PROCEDURE — 94799 UNLISTED PULMONARY SVC/PX: CPT

## 2018-03-18 PROCEDURE — 25010000002 ENOXAPARIN PER 10 MG: Performed by: INTERNAL MEDICINE

## 2018-03-18 PROCEDURE — 94640 AIRWAY INHALATION TREATMENT: CPT

## 2018-03-18 PROCEDURE — 81003 URINALYSIS AUTO W/O SCOPE: CPT | Performed by: HOSPITALIST

## 2018-03-18 PROCEDURE — 70450 CT HEAD/BRAIN W/O DYE: CPT

## 2018-03-18 PROCEDURE — 82962 GLUCOSE BLOOD TEST: CPT

## 2018-03-18 PROCEDURE — 87205 SMEAR GRAM STAIN: CPT | Performed by: HOSPITALIST

## 2018-03-18 PROCEDURE — 82570 ASSAY OF URINE CREATININE: CPT | Performed by: HOSPITALIST

## 2018-03-18 PROCEDURE — 82805 BLOOD GASES W/O2 SATURATION: CPT | Performed by: HOSPITALIST

## 2018-03-18 PROCEDURE — 63710000001 INSULIN LISPRO (HUMAN) PER 5 UNITS: Performed by: HOSPITALIST

## 2018-03-18 PROCEDURE — 36600 WITHDRAWAL OF ARTERIAL BLOOD: CPT | Performed by: HOSPITALIST

## 2018-03-18 PROCEDURE — 25010000002 HEPARIN (PORCINE) PER 1000 UNITS: Performed by: HOSPITALIST

## 2018-03-18 PROCEDURE — 85027 COMPLETE CBC AUTOMATED: CPT | Performed by: HOSPITALIST

## 2018-03-18 PROCEDURE — 36600 WITHDRAWAL OF ARTERIAL BLOOD: CPT | Performed by: INTERNAL MEDICINE

## 2018-03-18 PROCEDURE — 94760 N-INVAS EAR/PLS OXIMETRY 1: CPT

## 2018-03-18 PROCEDURE — 76775 US EXAM ABDO BACK WALL LIM: CPT

## 2018-03-18 PROCEDURE — 80053 COMPREHEN METABOLIC PANEL: CPT | Performed by: HOSPITALIST

## 2018-03-18 PROCEDURE — 85379 FIBRIN DEGRADATION QUANT: CPT | Performed by: HOSPITALIST

## 2018-03-18 PROCEDURE — 71045 X-RAY EXAM CHEST 1 VIEW: CPT

## 2018-03-18 PROCEDURE — 84300 ASSAY OF URINE SODIUM: CPT | Performed by: HOSPITALIST

## 2018-03-18 PROCEDURE — 94660 CPAP INITIATION&MGMT: CPT

## 2018-03-18 PROCEDURE — 84156 ASSAY OF PROTEIN URINE: CPT | Performed by: HOSPITALIST

## 2018-03-18 PROCEDURE — 99233 SBSQ HOSP IP/OBS HIGH 50: CPT | Performed by: INTERNAL MEDICINE

## 2018-03-18 RX ORDER — BUDESONIDE AND FORMOTEROL FUMARATE DIHYDRATE 160; 4.5 UG/1; UG/1
2 AEROSOL RESPIRATORY (INHALATION)
Status: DISCONTINUED | OUTPATIENT
Start: 2018-03-18 | End: 2018-03-29 | Stop reason: HOSPADM

## 2018-03-18 RX ORDER — ASPIRIN 81 MG/1
81 TABLET ORAL DAILY
Status: DISCONTINUED | OUTPATIENT
Start: 2018-03-18 | End: 2018-03-29 | Stop reason: HOSPADM

## 2018-03-18 RX ORDER — NYSTATIN 100000 U/G
OINTMENT TOPICAL 2 TIMES DAILY
Status: DISCONTINUED | OUTPATIENT
Start: 2018-03-18 | End: 2018-03-29 | Stop reason: HOSPADM

## 2018-03-18 RX ORDER — ROPINIROLE 0.5 MG/1
0.25 TABLET, FILM COATED ORAL NIGHTLY
Status: DISCONTINUED | OUTPATIENT
Start: 2018-03-18 | End: 2018-03-19

## 2018-03-18 RX ORDER — ATORVASTATIN CALCIUM 40 MG/1
80 TABLET, FILM COATED ORAL DAILY
Status: DISCONTINUED | OUTPATIENT
Start: 2018-03-18 | End: 2018-03-29 | Stop reason: HOSPADM

## 2018-03-18 RX ORDER — MULTIPLE VITAMINS W/ MINERALS TAB 9MG-400MCG
1 TAB ORAL DAILY
Status: DISCONTINUED | OUTPATIENT
Start: 2018-03-18 | End: 2018-03-29 | Stop reason: HOSPADM

## 2018-03-18 RX ORDER — CLOPIDOGREL BISULFATE 75 MG/1
75 TABLET ORAL DAILY
Status: DISCONTINUED | OUTPATIENT
Start: 2018-03-18 | End: 2018-03-29 | Stop reason: HOSPADM

## 2018-03-18 RX ADMIN — IPRATROPIUM BROMIDE AND ALBUTEROL SULFATE 3 ML: 2.5; .5 SOLUTION RESPIRATORY (INHALATION) at 00:06

## 2018-03-18 RX ADMIN — ENOXAPARIN SODIUM 100 MG: 100 INJECTION SUBCUTANEOUS at 14:47

## 2018-03-18 RX ADMIN — BUDESONIDE AND FORMOTEROL FUMARATE DIHYDRATE 2 PUFF: 160; 4.5 AEROSOL RESPIRATORY (INHALATION) at 07:56

## 2018-03-18 RX ADMIN — IPRATROPIUM BROMIDE AND ALBUTEROL SULFATE 3 ML: 2.5; .5 SOLUTION RESPIRATORY (INHALATION) at 16:09

## 2018-03-18 RX ADMIN — INSULIN LISPRO 3 UNITS: 100 INJECTION, SOLUTION INTRAVENOUS; SUBCUTANEOUS at 21:41

## 2018-03-18 RX ADMIN — ATORVASTATIN CALCIUM 80 MG: 40 TABLET, FILM COATED ORAL at 21:42

## 2018-03-18 RX ADMIN — HEPARIN SODIUM 5000 UNITS: 5000 INJECTION, SOLUTION INTRAVENOUS; SUBCUTANEOUS at 02:59

## 2018-03-18 RX ADMIN — IPRATROPIUM BROMIDE AND ALBUTEROL SULFATE 3 ML: 2.5; .5 SOLUTION RESPIRATORY (INHALATION) at 12:39

## 2018-03-18 RX ADMIN — IPRATROPIUM BROMIDE AND ALBUTEROL SULFATE 3 ML: 2.5; .5 SOLUTION RESPIRATORY (INHALATION) at 19:34

## 2018-03-18 RX ADMIN — ROPINIROLE 0.25 MG: 0.5 TABLET, FILM COATED ORAL at 21:42

## 2018-03-18 RX ADMIN — NYSTATIN: 100000 OINTMENT TOPICAL at 08:09

## 2018-03-18 RX ADMIN — CLOPIDOGREL BISULFATE 75 MG: 75 TABLET ORAL at 08:09

## 2018-03-18 RX ADMIN — MULTIPLE VITAMINS W/ MINERALS TAB 1 TABLET: TAB ORAL at 08:09

## 2018-03-18 RX ADMIN — IPRATROPIUM BROMIDE AND ALBUTEROL SULFATE 3 ML: 2.5; .5 SOLUTION RESPIRATORY (INHALATION) at 07:56

## 2018-03-18 RX ADMIN — SODIUM CHLORIDE 75 ML/HR: 9 INJECTION, SOLUTION INTRAVENOUS at 14:47

## 2018-03-18 RX ADMIN — ASPIRIN 81 MG: 81 TABLET, COATED ORAL at 08:09

## 2018-03-18 RX ADMIN — NYSTATIN: 100000 OINTMENT TOPICAL at 21:42

## 2018-03-19 ENCOUNTER — APPOINTMENT (OUTPATIENT)
Dept: GENERAL RADIOLOGY | Facility: HOSPITAL | Age: 68
End: 2018-03-19

## 2018-03-19 ENCOUNTER — APPOINTMENT (OUTPATIENT)
Dept: CARDIOLOGY | Facility: HOSPITAL | Age: 68
End: 2018-03-19
Attending: INTERNAL MEDICINE

## 2018-03-19 ENCOUNTER — APPOINTMENT (OUTPATIENT)
Dept: CT IMAGING | Facility: HOSPITAL | Age: 68
End: 2018-03-19

## 2018-03-19 ENCOUNTER — APPOINTMENT (OUTPATIENT)
Dept: MRI IMAGING | Facility: HOSPITAL | Age: 68
End: 2018-03-19

## 2018-03-19 PROBLEM — R79.89 ELEVATED D-DIMER: Status: ACTIVE | Noted: 2018-03-19

## 2018-03-19 PROBLEM — M79.604 RIGHT LEG PAIN: Status: ACTIVE | Noted: 2018-03-19

## 2018-03-19 PROBLEM — N18.2 CKD (CHRONIC KIDNEY DISEASE) STAGE 2, GFR 60-89 ML/MIN: Status: ACTIVE | Noted: 2017-09-20

## 2018-03-19 LAB
AMPHET+METHAMPHET UR QL: NEGATIVE
AMPHETAMINES UR QL: NEGATIVE
ANION GAP SERPL CALCULATED.3IONS-SCNC: 12 MMOL/L (ref 3–11)
ARTERIAL PATENCY WRIST A: ABNORMAL
ATMOSPHERIC PRESS: ABNORMAL MMHG
BARBITURATES UR QL SCN: NEGATIVE
BASE EXCESS BLDA CALC-SCNC: 10.7 MMOL/L (ref 0–2)
BDY SITE: ABNORMAL
BENZODIAZ UR QL SCN: NEGATIVE
BH CV LOWER VASCULAR LEFT COMMON FEMORAL AUGMENT: NORMAL
BH CV LOWER VASCULAR LEFT COMMON FEMORAL COMPRESS: NORMAL
BH CV LOWER VASCULAR LEFT COMMON FEMORAL PHASIC: NORMAL
BH CV LOWER VASCULAR LEFT COMMON FEMORAL SPONT: NORMAL
BH CV LOWER VASCULAR LEFT DISTAL FEMORAL AUGMENT: NORMAL
BH CV LOWER VASCULAR LEFT DISTAL FEMORAL COMPRESS: NORMAL
BH CV LOWER VASCULAR LEFT GASTRONEMIUS AUGMENT: NORMAL
BH CV LOWER VASCULAR LEFT GASTRONEMIUS COMPRESS: NORMAL
BH CV LOWER VASCULAR LEFT GREATER SAPH AK AUGMENT: NORMAL
BH CV LOWER VASCULAR LEFT GREATER SAPH AK COMPRESS: NORMAL
BH CV LOWER VASCULAR LEFT GREATER SAPH BK AUGMENT: NORMAL
BH CV LOWER VASCULAR LEFT GREATER SAPH BK COMPRESS: NORMAL
BH CV LOWER VASCULAR LEFT LESSER SAPH AUGMENT: NORMAL
BH CV LOWER VASCULAR LEFT LESSER SAPH COMPRESS: NORMAL
BH CV LOWER VASCULAR LEFT MID FEMORAL AUGMENT: NORMAL
BH CV LOWER VASCULAR LEFT MID FEMORAL COMPRESS: NORMAL
BH CV LOWER VASCULAR LEFT MID FEMORAL PHASIC: NORMAL
BH CV LOWER VASCULAR LEFT MID FEMORAL SPONT: NORMAL
BH CV LOWER VASCULAR LEFT PERONEAL COMPRESS: NORMAL
BH CV LOWER VASCULAR LEFT POPLITEAL AUGMENT: NORMAL
BH CV LOWER VASCULAR LEFT POPLITEAL COMPRESS: NORMAL
BH CV LOWER VASCULAR LEFT POPLITEAL PHASIC: NORMAL
BH CV LOWER VASCULAR LEFT POPLITEAL SPONT: NORMAL
BH CV LOWER VASCULAR LEFT POSTERIOR TIBIAL AUGMENT: NORMAL
BH CV LOWER VASCULAR LEFT POSTERIOR TIBIAL COMPRESS: NORMAL
BH CV LOWER VASCULAR LEFT PROFUNDA FEMORAL AUGMENT: NORMAL
BH CV LOWER VASCULAR LEFT PROFUNDA FEMORAL COMPRESS: NORMAL
BH CV LOWER VASCULAR LEFT PROFUNDA FEMORAL PHASIC: NORMAL
BH CV LOWER VASCULAR LEFT PROFUNDA FEMORAL SPONT: NORMAL
BH CV LOWER VASCULAR LEFT PROXIMAL FEMORAL AUGMENT: NORMAL
BH CV LOWER VASCULAR LEFT PROXIMAL FEMORAL COMPRESS: NORMAL
BH CV LOWER VASCULAR LEFT SAPHENOFEMORAL JUNCTION AUGMENT: NORMAL
BH CV LOWER VASCULAR LEFT SAPHENOFEMORAL JUNCTION COMPRESS: NORMAL
BH CV LOWER VASCULAR LEFT SAPHENOFEMORAL JUNCTION PHASIC: NORMAL
BH CV LOWER VASCULAR LEFT SAPHENOFEMORAL JUNCTION SPONT: NORMAL
BH CV LOWER VASCULAR RIGHT COMMON FEMORAL AUGMENT: NORMAL
BH CV LOWER VASCULAR RIGHT COMMON FEMORAL COMPRESS: NORMAL
BH CV LOWER VASCULAR RIGHT COMMON FEMORAL PHASIC: NORMAL
BH CV LOWER VASCULAR RIGHT COMMON FEMORAL SPONT: NORMAL
BH CV LOWER VASCULAR RIGHT DISTAL FEMORAL AUGMENT: NORMAL
BH CV LOWER VASCULAR RIGHT DISTAL FEMORAL COMPRESS: NORMAL
BH CV LOWER VASCULAR RIGHT GASTRONEMIUS COMPRESS: NORMAL
BH CV LOWER VASCULAR RIGHT GREATER SAPH AK COMPRESS: NORMAL
BH CV LOWER VASCULAR RIGHT GREATER SAPH BK COMPRESS: NORMAL
BH CV LOWER VASCULAR RIGHT LESSER SAPH COMPRESS: NORMAL
BH CV LOWER VASCULAR RIGHT MID FEMORAL AUGMENT: NORMAL
BH CV LOWER VASCULAR RIGHT MID FEMORAL COMPRESS: NORMAL
BH CV LOWER VASCULAR RIGHT MID FEMORAL PHASIC: NORMAL
BH CV LOWER VASCULAR RIGHT MID FEMORAL SPONT: NORMAL
BH CV LOWER VASCULAR RIGHT PERONEAL COMPRESS: NORMAL
BH CV LOWER VASCULAR RIGHT POPLITEAL AUGMENT: NORMAL
BH CV LOWER VASCULAR RIGHT POPLITEAL COMPRESS: NORMAL
BH CV LOWER VASCULAR RIGHT POPLITEAL PHASIC: NORMAL
BH CV LOWER VASCULAR RIGHT POPLITEAL SPONT: NORMAL
BH CV LOWER VASCULAR RIGHT POSTERIOR TIBIAL COMPRESS: NORMAL
BH CV LOWER VASCULAR RIGHT PROFUNDA FEMORAL AUGMENT: NORMAL
BH CV LOWER VASCULAR RIGHT PROFUNDA FEMORAL COMPRESS: NORMAL
BH CV LOWER VASCULAR RIGHT PROXIMAL FEMORAL AUGMENT: NORMAL
BH CV LOWER VASCULAR RIGHT PROXIMAL FEMORAL COMPRESS: NORMAL
BH CV LOWER VASCULAR RIGHT SAPHENOFEMORAL JUNCTION AUGMENT: NORMAL
BH CV LOWER VASCULAR RIGHT SAPHENOFEMORAL JUNCTION COMPRESS: NORMAL
BH CV LOWER VASCULAR RIGHT SAPHENOFEMORAL JUNCTION PHASIC: NORMAL
BH CV LOWER VASCULAR RIGHT SAPHENOFEMORAL JUNCTION SPONT: NORMAL
BILIRUB UR QL STRIP: NEGATIVE
BUN BLD-MCNC: 28 MG/DL (ref 9–23)
BUN/CREAT SERPL: 40 (ref 7–25)
BUPRENORPHINE SERPL-MCNC: NEGATIVE NG/ML
CALCIUM SPEC-SCNC: 8.8 MG/DL (ref 8.7–10.4)
CANNABINOIDS SERPL QL: NEGATIVE
CHLORIDE SERPL-SCNC: 101 MMOL/L (ref 99–109)
CLARITY UR: CLEAR
CO2 BLDA-SCNC: 37.7 MMOL/L (ref 22–33)
CO2 SERPL-SCNC: 34 MMOL/L (ref 20–31)
COCAINE UR QL: NEGATIVE
COHGB MFR BLD: 2.4 % (ref 0–2)
COLOR UR: YELLOW
CREAT BLD-MCNC: 0.7 MG/DL (ref 0.6–1.3)
GFR SERPL CREATININE-BSD FRML MDRD: 83 ML/MIN/1.73
GLUCOSE BLD-MCNC: 206 MG/DL (ref 70–100)
GLUCOSE BLDC GLUCOMTR-MCNC: 190 MG/DL (ref 70–130)
GLUCOSE BLDC GLUCOMTR-MCNC: 195 MG/DL (ref 70–130)
GLUCOSE BLDC GLUCOMTR-MCNC: 209 MG/DL (ref 70–130)
GLUCOSE BLDC GLUCOMTR-MCNC: 217 MG/DL (ref 70–130)
GLUCOSE UR STRIP-MCNC: NEGATIVE MG/DL
HCO3 BLDA-SCNC: 36.1 MMOL/L (ref 20–26)
HCT VFR BLD CALC: 28.4 %
HGB BLDA-MCNC: 9.3 G/DL (ref 14–18)
HGB UR QL STRIP.AUTO: NEGATIVE
HOROWITZ INDEX BLD+IHG-RTO: 36 %
KETONES UR QL STRIP: ABNORMAL
LEUKOCYTE ESTERASE UR QL STRIP.AUTO: NEGATIVE
METHADONE UR QL SCN: NEGATIVE
METHGB BLD QL: 0.6 % (ref 0–1.5)
MODALITY: ABNORMAL
NITRITE UR QL STRIP: NEGATIVE
OPIATES UR QL: NEGATIVE
OXYCODONE UR QL SCN: NEGATIVE
OXYHGB MFR BLDV: 92.3 % (ref 94–99)
PCO2 BLDA: 51.9 MM HG (ref 35–45)
PCP UR QL SCN: NEGATIVE
PH BLDA: 7.45 PH UNITS (ref 7.35–7.45)
PH UR STRIP.AUTO: 5.5 [PH] (ref 5–8)
PO2 BLDA: 71.1 MM HG (ref 83–108)
POTASSIUM BLD-SCNC: 3.5 MMOL/L (ref 3.5–5.5)
PROPOXYPH UR QL: NEGATIVE
PROT UR QL STRIP: NEGATIVE
SODIUM BLD-SCNC: 147 MMOL/L (ref 132–146)
SP GR UR STRIP: 1.01 (ref 1–1.03)
TRICYCLICS UR QL SCN: NEGATIVE
TSH SERPL DL<=0.05 MIU/L-ACNC: 0.85 MIU/ML (ref 0.35–5.35)
UROBILINOGEN UR QL STRIP: ABNORMAL

## 2018-03-19 PROCEDURE — 94640 AIRWAY INHALATION TREATMENT: CPT

## 2018-03-19 PROCEDURE — 82805 BLOOD GASES W/O2 SATURATION: CPT | Performed by: INTERNAL MEDICINE

## 2018-03-19 PROCEDURE — 93970 EXTREMITY STUDY: CPT

## 2018-03-19 PROCEDURE — 93970 EXTREMITY STUDY: CPT | Performed by: INTERNAL MEDICINE

## 2018-03-19 PROCEDURE — 80048 BASIC METABOLIC PNL TOTAL CA: CPT | Performed by: INTERNAL MEDICINE

## 2018-03-19 PROCEDURE — 82962 GLUCOSE BLOOD TEST: CPT

## 2018-03-19 PROCEDURE — 0 IOPAMIDOL PER 1 ML: Performed by: INTERNAL MEDICINE

## 2018-03-19 PROCEDURE — 80306 DRUG TEST PRSMV INSTRMNT: CPT | Performed by: INTERNAL MEDICINE

## 2018-03-19 PROCEDURE — 71275 CT ANGIOGRAPHY CHEST: CPT

## 2018-03-19 PROCEDURE — 94660 CPAP INITIATION&MGMT: CPT

## 2018-03-19 PROCEDURE — 94799 UNLISTED PULMONARY SVC/PX: CPT

## 2018-03-19 PROCEDURE — 82607 VITAMIN B-12: CPT | Performed by: INTERNAL MEDICINE

## 2018-03-19 PROCEDURE — 84443 ASSAY THYROID STIM HORMONE: CPT | Performed by: INTERNAL MEDICINE

## 2018-03-19 PROCEDURE — 36600 WITHDRAWAL OF ARTERIAL BLOOD: CPT | Performed by: INTERNAL MEDICINE

## 2018-03-19 PROCEDURE — 73552 X-RAY EXAM OF FEMUR 2/>: CPT

## 2018-03-19 PROCEDURE — 73560 X-RAY EXAM OF KNEE 1 OR 2: CPT

## 2018-03-19 PROCEDURE — 25010000002 ENOXAPARIN PER 10 MG: Performed by: INTERNAL MEDICINE

## 2018-03-19 PROCEDURE — 99233 SBSQ HOSP IP/OBS HIGH 50: CPT | Performed by: INTERNAL MEDICINE

## 2018-03-19 PROCEDURE — 70551 MRI BRAIN STEM W/O DYE: CPT

## 2018-03-19 PROCEDURE — 81003 URINALYSIS AUTO W/O SCOPE: CPT | Performed by: INTERNAL MEDICINE

## 2018-03-19 RX ORDER — THIAMINE MONONITRATE (VIT B1) 100 MG
100 TABLET ORAL DAILY
Status: DISCONTINUED | OUTPATIENT
Start: 2018-03-19 | End: 2018-03-29 | Stop reason: HOSPADM

## 2018-03-19 RX ADMIN — INSULIN LISPRO 2 UNITS: 100 INJECTION, SOLUTION INTRAVENOUS; SUBCUTANEOUS at 13:42

## 2018-03-19 RX ADMIN — IPRATROPIUM BROMIDE AND ALBUTEROL SULFATE 3 ML: 2.5; .5 SOLUTION RESPIRATORY (INHALATION) at 06:51

## 2018-03-19 RX ADMIN — ENOXAPARIN SODIUM 100 MG: 100 INJECTION SUBCUTANEOUS at 01:48

## 2018-03-19 RX ADMIN — INSULIN LISPRO 3 UNITS: 100 INJECTION, SOLUTION INTRAVENOUS; SUBCUTANEOUS at 18:07

## 2018-03-19 RX ADMIN — ENOXAPARIN SODIUM 100 MG: 100 INJECTION SUBCUTANEOUS at 14:47

## 2018-03-19 RX ADMIN — MULTIPLE VITAMINS W/ MINERALS TAB 1 TABLET: TAB ORAL at 08:20

## 2018-03-19 RX ADMIN — NYSTATIN: 100000 OINTMENT TOPICAL at 08:20

## 2018-03-19 RX ADMIN — IPRATROPIUM BROMIDE AND ALBUTEROL SULFATE 3 ML: 2.5; .5 SOLUTION RESPIRATORY (INHALATION) at 15:10

## 2018-03-19 RX ADMIN — ASPIRIN 81 MG: 81 TABLET, COATED ORAL at 08:20

## 2018-03-19 RX ADMIN — INSULIN LISPRO 2 UNITS: 100 INJECTION, SOLUTION INTRAVENOUS; SUBCUTANEOUS at 21:23

## 2018-03-19 RX ADMIN — SODIUM CHLORIDE 75 ML/HR: 9 INJECTION, SOLUTION INTRAVENOUS at 04:30

## 2018-03-19 RX ADMIN — BUDESONIDE AND FORMOTEROL FUMARATE DIHYDRATE 2 PUFF: 160; 4.5 AEROSOL RESPIRATORY (INHALATION) at 06:52

## 2018-03-19 RX ADMIN — ATORVASTATIN CALCIUM 80 MG: 40 TABLET, FILM COATED ORAL at 21:22

## 2018-03-19 RX ADMIN — INSULIN LISPRO 3 UNITS: 100 INJECTION, SOLUTION INTRAVENOUS; SUBCUTANEOUS at 08:20

## 2018-03-19 RX ADMIN — IPRATROPIUM BROMIDE AND ALBUTEROL SULFATE 3 ML: 2.5; .5 SOLUTION RESPIRATORY (INHALATION) at 19:50

## 2018-03-19 RX ADMIN — ACETAMINOPHEN 650 MG: 325 TABLET ORAL at 08:32

## 2018-03-19 RX ADMIN — CLOPIDOGREL BISULFATE 75 MG: 75 TABLET ORAL at 08:20

## 2018-03-19 RX ADMIN — NYSTATIN: 100000 OINTMENT TOPICAL at 21:22

## 2018-03-19 RX ADMIN — IPRATROPIUM BROMIDE AND ALBUTEROL SULFATE 3 ML: 2.5; .5 SOLUTION RESPIRATORY (INHALATION) at 11:10

## 2018-03-19 RX ADMIN — IOPAMIDOL 95 ML: 755 INJECTION, SOLUTION INTRAVENOUS at 14:00

## 2018-03-19 RX ADMIN — THIAMINE HCL TAB 100 MG 100 MG: 100 TAB at 13:42

## 2018-03-20 ENCOUNTER — APPOINTMENT (OUTPATIENT)
Dept: CT IMAGING | Facility: HOSPITAL | Age: 68
End: 2018-03-20

## 2018-03-20 ENCOUNTER — APPOINTMENT (OUTPATIENT)
Dept: NEUROLOGY | Facility: HOSPITAL | Age: 68
End: 2018-03-20
Attending: INTERNAL MEDICINE

## 2018-03-20 PROBLEM — R70.0 ESR RAISED: Status: ACTIVE | Noted: 2018-03-20

## 2018-03-20 PROBLEM — G40.901 SEIZURE DISORDER, NONCONVULSIVE, WITH STATUS EPILEPTICUS (HCC): Status: ACTIVE | Noted: 2018-03-20

## 2018-03-20 LAB
ALBUMIN SERPL-MCNC: 3.8 G/DL (ref 3.2–4.8)
ALBUMIN/GLOB SERPL: 1.3 G/DL (ref 1.5–2.5)
ALP SERPL-CCNC: 116 U/L (ref 25–100)
ALT SERPL W P-5'-P-CCNC: 46 U/L (ref 7–40)
AMMONIA BLD-SCNC: 22 UMOL/L (ref 19–60)
ANION GAP SERPL CALCULATED.3IONS-SCNC: 11 MMOL/L (ref 3–11)
APPEARANCE FLD: ABNORMAL
APTT PPP: 24.5 SECONDS (ref 24–31)
ARTERIAL PATENCY WRIST A: POSITIVE
AST SERPL-CCNC: 25 U/L (ref 0–33)
ATMOSPHERIC PRESS: ABNORMAL MMHG
BASE EXCESS BLDA CALC-SCNC: 15.2 MMOL/L (ref 0–2)
BASOPHILS # BLD AUTO: 0.02 10*3/MM3 (ref 0–0.2)
BASOPHILS NFR BLD AUTO: 0.2 % (ref 0–1)
BDY SITE: ABNORMAL
BILIRUB SERPL-MCNC: 1.2 MG/DL (ref 0.3–1.2)
BUN BLD-MCNC: 17 MG/DL (ref 9–23)
BUN/CREAT SERPL: 28.3 (ref 7–25)
CA-I SERPL ISE-MCNC: 1.31 MMOL/L (ref 1.12–1.32)
CALCIUM SPEC-SCNC: 9.8 MG/DL (ref 8.7–10.4)
CHLORIDE SERPL-SCNC: 94 MMOL/L (ref 99–109)
CO2 BLDA-SCNC: 38.5 MMOL/L (ref 22–33)
CO2 SERPL-SCNC: 36 MMOL/L (ref 20–31)
COHGB MFR BLD: 2.3 % (ref 0–2)
COLOR FLD: YELLOW
CREAT BLD-MCNC: 0.6 MG/DL (ref 0.6–1.3)
CRP SERPL-MCNC: 14.92 MG/DL (ref 0–1)
CRYSTALS FLD MICRO: NORMAL
DEPRECATED RDW RBC AUTO: 63.4 FL (ref 37–54)
EOSINOPHIL # BLD AUTO: 0.05 10*3/MM3 (ref 0–0.3)
EOSINOPHIL NFR BLD AUTO: 0.4 % (ref 0–3)
ERYTHROCYTE [DISTWIDTH] IN BLOOD BY AUTOMATED COUNT: 17.1 % (ref 11.3–14.5)
ERYTHROCYTE [SEDIMENTATION RATE] IN BLOOD: 84 MM/HR (ref 0–30)
GFR SERPL CREATININE-BSD FRML MDRD: 100 ML/MIN/1.73
GLOBULIN UR ELPH-MCNC: 2.9 GM/DL
GLUCOSE BLD-MCNC: 178 MG/DL (ref 70–100)
GLUCOSE BLDC GLUCOMTR-MCNC: 162 MG/DL (ref 70–130)
GLUCOSE BLDC GLUCOMTR-MCNC: 169 MG/DL (ref 70–130)
GLUCOSE BLDC GLUCOMTR-MCNC: 198 MG/DL (ref 70–130)
GLUCOSE BLDC GLUCOMTR-MCNC: 198 MG/DL (ref 70–130)
HCO3 BLDA-SCNC: 37.4 MMOL/L (ref 20–26)
HCT VFR BLD AUTO: 32.4 % (ref 34.5–44)
HCT VFR BLD CALC: 29.8 %
HGB BLD-MCNC: 9.8 G/DL (ref 11.5–15.5)
HGB BLDA-MCNC: 9.7 G/DL (ref 14–18)
HOROWITZ INDEX BLD+IHG-RTO: 28 %
IMM GRANULOCYTES # BLD: 0.05 10*3/MM3 (ref 0–0.03)
IMM GRANULOCYTES NFR BLD: 0.4 % (ref 0–0.6)
INR PPP: 1.1 (ref 0.91–1.09)
LYMPHOCYTES # BLD AUTO: 1 10*3/MM3 (ref 0.6–4.8)
LYMPHOCYTES NFR BLD AUTO: 8.5 % (ref 24–44)
LYMPHOCYTES NFR FLD MANUAL: 1 %
MCH RBC QN AUTO: 30.3 PG (ref 27–31)
MCHC RBC AUTO-ENTMCNC: 30.2 G/DL (ref 32–36)
MCV RBC AUTO: 100.3 FL (ref 80–99)
METHGB BLD QL: 0.1 % (ref 0–1.5)
MODALITY: ABNORMAL
MONOCYTES # BLD AUTO: 0.82 10*3/MM3 (ref 0–1)
MONOCYTES NFR BLD AUTO: 7 % (ref 0–12)
MONOCYTES NFR FLD: 21 %
NEUTROPHILS # BLD AUTO: 9.8 10*3/MM3 (ref 1.5–8.3)
NEUTROPHILS NFR BLD AUTO: 83.5 % (ref 41–71)
NEUTROPHILS NFR FLD MANUAL: 78 %
OXYHGB MFR BLDV: 96 % (ref 94–99)
PCO2 BLDA: 35.5 MM HG (ref 35–45)
PH BLDA: 7.63 PH UNITS (ref 7.35–7.45)
PLAT MORPH BLD: NORMAL
PLATELET # BLD AUTO: 268 10*3/MM3 (ref 150–450)
PMV BLD AUTO: 9.4 FL (ref 6–12)
PO2 BLDA: 81.4 MM HG (ref 83–108)
POTASSIUM BLD-SCNC: 2.9 MMOL/L (ref 3.5–5.5)
PROT SERPL-MCNC: 6.7 G/DL (ref 5.7–8.2)
PROTHROMBIN TIME: 11.6 SECONDS (ref 9.6–11.5)
RBC # BLD AUTO: 3.23 10*6/MM3 (ref 3.89–5.14)
RBC # FLD AUTO: 8000 /MM3
RBC MORPH BLD: NORMAL
SODIUM BLD-SCNC: 141 MMOL/L (ref 132–146)
WBC # FLD: ABNORMAL /MM3
WBC MORPH BLD: NORMAL
WBC NRBC COR # BLD: 11.74 10*3/MM3 (ref 3.5–10.8)

## 2018-03-20 PROCEDURE — 95816 EEG AWAKE AND DROWSY: CPT | Performed by: PSYCHIATRY & NEUROLOGY

## 2018-03-20 PROCEDURE — 25010000002 ENOXAPARIN PER 10 MG: Performed by: INTERNAL MEDICINE

## 2018-03-20 PROCEDURE — 94660 CPAP INITIATION&MGMT: CPT

## 2018-03-20 PROCEDURE — 92610 EVALUATE SWALLOWING FUNCTION: CPT

## 2018-03-20 PROCEDURE — 82140 ASSAY OF AMMONIA: CPT | Performed by: INTERNAL MEDICINE

## 2018-03-20 PROCEDURE — 87205 SMEAR GRAM STAIN: CPT | Performed by: ORTHOPAEDIC SURGERY

## 2018-03-20 PROCEDURE — 36600 WITHDRAWAL OF ARTERIAL BLOOD: CPT | Performed by: INTERNAL MEDICINE

## 2018-03-20 PROCEDURE — 25010000002 PIPERACILLIN-TAZOBACTAM: Performed by: INTERNAL MEDICINE

## 2018-03-20 PROCEDURE — 25010000003 POTASSIUM CHLORIDE 20 MEQ/250ML SOLUTION

## 2018-03-20 PROCEDURE — 94799 UNLISTED PULMONARY SVC/PX: CPT

## 2018-03-20 PROCEDURE — 89060 EXAM SYNOVIAL FLUID CRYSTALS: CPT | Performed by: ORTHOPAEDIC SURGERY

## 2018-03-20 PROCEDURE — 87070 CULTURE OTHR SPECIMN AEROBIC: CPT | Performed by: ORTHOPAEDIC SURGERY

## 2018-03-20 PROCEDURE — 82330 ASSAY OF CALCIUM: CPT | Performed by: INTERNAL MEDICINE

## 2018-03-20 PROCEDURE — 85025 COMPLETE CBC W/AUTO DIFF WBC: CPT | Performed by: INTERNAL MEDICINE

## 2018-03-20 PROCEDURE — 85652 RBC SED RATE AUTOMATED: CPT | Performed by: INTERNAL MEDICINE

## 2018-03-20 PROCEDURE — 82805 BLOOD GASES W/O2 SATURATION: CPT | Performed by: INTERNAL MEDICINE

## 2018-03-20 PROCEDURE — 25010000002 LEVETRIRACETAM PER 10 MG: Performed by: PSYCHIATRY & NEUROLOGY

## 2018-03-20 PROCEDURE — 99233 SBSQ HOSP IP/OBS HIGH 50: CPT | Performed by: INTERNAL MEDICINE

## 2018-03-20 PROCEDURE — 94640 AIRWAY INHALATION TREATMENT: CPT

## 2018-03-20 PROCEDURE — 85007 BL SMEAR W/DIFF WBC COUNT: CPT | Performed by: INTERNAL MEDICINE

## 2018-03-20 PROCEDURE — 89051 BODY FLUID CELL COUNT: CPT | Performed by: ORTHOPAEDIC SURGERY

## 2018-03-20 PROCEDURE — 87040 BLOOD CULTURE FOR BACTERIA: CPT | Performed by: INTERNAL MEDICINE

## 2018-03-20 PROCEDURE — 87116 MYCOBACTERIA CULTURE: CPT | Performed by: ORTHOPAEDIC SURGERY

## 2018-03-20 PROCEDURE — 87206 SMEAR FLUORESCENT/ACID STAI: CPT | Performed by: ORTHOPAEDIC SURGERY

## 2018-03-20 PROCEDURE — 86140 C-REACTIVE PROTEIN: CPT | Performed by: INTERNAL MEDICINE

## 2018-03-20 PROCEDURE — 82962 GLUCOSE BLOOD TEST: CPT

## 2018-03-20 PROCEDURE — 99223 1ST HOSP IP/OBS HIGH 75: CPT | Performed by: PSYCHIATRY & NEUROLOGY

## 2018-03-20 PROCEDURE — 25010000002 VANCOMYCIN HCL IN NACL 1.75-0.9 GM/500ML-% SOLUTION

## 2018-03-20 PROCEDURE — 85730 THROMBOPLASTIN TIME PARTIAL: CPT | Performed by: INTERNAL MEDICINE

## 2018-03-20 PROCEDURE — 80053 COMPREHEN METABOLIC PANEL: CPT | Performed by: INTERNAL MEDICINE

## 2018-03-20 PROCEDURE — 74176 CT ABD & PELVIS W/O CONTRAST: CPT

## 2018-03-20 PROCEDURE — 25010000003 POTASSIUM CHLORIDE 10 MEQ/100ML SOLUTION: Performed by: INTERNAL MEDICINE

## 2018-03-20 PROCEDURE — 87015 SPECIMEN INFECT AGNT CONCNTJ: CPT | Performed by: ORTHOPAEDIC SURGERY

## 2018-03-20 PROCEDURE — 95816 EEG AWAKE AND DROWSY: CPT

## 2018-03-20 PROCEDURE — 25010000002 HYDRALAZINE PER 20 MG: Performed by: INTERNAL MEDICINE

## 2018-03-20 PROCEDURE — 25810000003 SODIUM CHLORIDE 0.9 % WITH KCL 20 MEQ 20-0.9 MEQ/L-% SOLUTION: Performed by: INTERNAL MEDICINE

## 2018-03-20 PROCEDURE — 85610 PROTHROMBIN TIME: CPT | Performed by: INTERNAL MEDICINE

## 2018-03-20 RX ORDER — POTASSIUM CHLORIDE 750 MG/1
40 CAPSULE, EXTENDED RELEASE ORAL AS NEEDED
Status: DISCONTINUED | OUTPATIENT
Start: 2018-03-20 | End: 2018-03-29 | Stop reason: HOSPADM

## 2018-03-20 RX ORDER — POTASSIUM CHLORIDE 1.5 G/1.77G
40 POWDER, FOR SOLUTION ORAL AS NEEDED
Status: DISCONTINUED | OUTPATIENT
Start: 2018-03-20 | End: 2018-03-29 | Stop reason: HOSPADM

## 2018-03-20 RX ORDER — SODIUM CHLORIDE AND POTASSIUM CHLORIDE 150; 900 MG/100ML; MG/100ML
50 INJECTION, SOLUTION INTRAVENOUS CONTINUOUS
Status: DISCONTINUED | OUTPATIENT
Start: 2018-03-20 | End: 2018-03-21

## 2018-03-20 RX ORDER — MAGNESIUM SULFATE HEPTAHYDRATE 40 MG/ML
4 INJECTION, SOLUTION INTRAVENOUS AS NEEDED
Status: DISCONTINUED | OUTPATIENT
Start: 2018-03-20 | End: 2018-03-29 | Stop reason: HOSPADM

## 2018-03-20 RX ORDER — LEVETIRACETAM 10 MG/ML
1000 INJECTION INTRAVASCULAR EVERY 12 HOURS SCHEDULED
Status: DISCONTINUED | OUTPATIENT
Start: 2018-03-21 | End: 2018-03-28

## 2018-03-20 RX ORDER — POTASSIUM CHLORIDE 7.45 MG/ML
10 INJECTION INTRAVENOUS
Status: DISCONTINUED | OUTPATIENT
Start: 2018-03-20 | End: 2018-03-20

## 2018-03-20 RX ORDER — VANCOMYCIN 1.75 GRAM/500 ML IN 0.9 % SODIUM CHLORIDE INTRAVENOUS
1750 ONCE
Status: COMPLETED | OUTPATIENT
Start: 2018-03-20 | End: 2018-03-20

## 2018-03-20 RX ORDER — TIZANIDINE 2 MG/1
1 TABLET ORAL EVERY MORNING
COMMUNITY
End: 2018-10-23 | Stop reason: HOSPADM

## 2018-03-20 RX ORDER — MAGNESIUM SULFATE HEPTAHYDRATE 40 MG/ML
2 INJECTION, SOLUTION INTRAVENOUS AS NEEDED
Status: DISCONTINUED | OUTPATIENT
Start: 2018-03-20 | End: 2018-03-29 | Stop reason: HOSPADM

## 2018-03-20 RX ORDER — HYDRALAZINE HYDROCHLORIDE 20 MG/ML
10 INJECTION INTRAMUSCULAR; INTRAVENOUS EVERY 4 HOURS PRN
Status: DISCONTINUED | OUTPATIENT
Start: 2018-03-20 | End: 2018-03-29 | Stop reason: HOSPADM

## 2018-03-20 RX ORDER — MAGNESIUM SULFATE 1 G/100ML
1 INJECTION INTRAVENOUS AS NEEDED
Status: DISCONTINUED | OUTPATIENT
Start: 2018-03-20 | End: 2018-03-29 | Stop reason: HOSPADM

## 2018-03-20 RX ADMIN — INSULIN LISPRO 2 UNITS: 100 INJECTION, SOLUTION INTRAVENOUS; SUBCUTANEOUS at 13:09

## 2018-03-20 RX ADMIN — NYSTATIN: 100000 OINTMENT TOPICAL at 22:17

## 2018-03-20 RX ADMIN — NICARDIPINE HYDROCHLORIDE 5 MG/HR: 0.1 INJECTION, SOLUTION INTRAVENOUS at 21:23

## 2018-03-20 RX ADMIN — LEVETIRACETAM 1000 MG: 10 INJECTION INTRAVENOUS at 23:02

## 2018-03-20 RX ADMIN — PIPERACILLIN SODIUM,TAZOBACTAM SODIUM 3.38 G: 3; .375 INJECTION, POWDER, FOR SOLUTION INTRAVENOUS at 10:51

## 2018-03-20 RX ADMIN — POTASSIUM CHLORIDE 10 MEQ: 7.46 INJECTION, SOLUTION INTRAVENOUS at 08:56

## 2018-03-20 RX ADMIN — PIPERACILLIN SODIUM,TAZOBACTAM SODIUM 3.38 G: 3; .375 INJECTION, POWDER, FOR SOLUTION INTRAVENOUS at 17:52

## 2018-03-20 RX ADMIN — BUDESONIDE AND FORMOTEROL FUMARATE DIHYDRATE 2 PUFF: 160; 4.5 AEROSOL RESPIRATORY (INHALATION) at 08:13

## 2018-03-20 RX ADMIN — IPRATROPIUM BROMIDE AND ALBUTEROL SULFATE 3 ML: 2.5; .5 SOLUTION RESPIRATORY (INHALATION) at 08:13

## 2018-03-20 RX ADMIN — IPRATROPIUM BROMIDE AND ALBUTEROL SULFATE 3 ML: 2.5; .5 SOLUTION RESPIRATORY (INHALATION) at 19:54

## 2018-03-20 RX ADMIN — INSULIN LISPRO 2 UNITS: 100 INJECTION, SOLUTION INTRAVENOUS; SUBCUTANEOUS at 21:26

## 2018-03-20 RX ADMIN — POTASSIUM CHLORIDE 10 MEQ: 7.46 INJECTION, SOLUTION INTRAVENOUS at 11:48

## 2018-03-20 RX ADMIN — POTASSIUM CHLORIDE AND SODIUM CHLORIDE 50 ML/HR: 900; 150 INJECTION, SOLUTION INTRAVENOUS at 10:11

## 2018-03-20 RX ADMIN — NICARDIPINE HYDROCHLORIDE 5 MG/HR: 0.1 INJECTION, SOLUTION INTRAVENOUS at 15:19

## 2018-03-20 RX ADMIN — Medication 10 MG: at 08:56

## 2018-03-20 RX ADMIN — IPRATROPIUM BROMIDE AND ALBUTEROL SULFATE 3 ML: 2.5; .5 SOLUTION RESPIRATORY (INHALATION) at 12:49

## 2018-03-20 RX ADMIN — ENOXAPARIN SODIUM 40 MG: 40 INJECTION SUBCUTANEOUS at 05:47

## 2018-03-20 RX ADMIN — INSULIN LISPRO 2 UNITS: 100 INJECTION, SOLUTION INTRAVENOUS; SUBCUTANEOUS at 17:53

## 2018-03-20 RX ADMIN — POTASSIUM CHLORIDE 20 MEQ: 149 INJECTION, SOLUTION, CONCENTRATE INTRAVENOUS at 14:25

## 2018-03-20 RX ADMIN — IPRATROPIUM BROMIDE AND ALBUTEROL SULFATE 3 ML: 2.5; .5 SOLUTION RESPIRATORY (INHALATION) at 16:00

## 2018-03-20 RX ADMIN — LEVETIRACETAM 3000 MG: 100 INJECTION, SOLUTION INTRAVENOUS at 14:25

## 2018-03-20 RX ADMIN — VANCOMYCIN HYDROCHLORIDE 1250 MG: 10 INJECTION, POWDER, LYOPHILIZED, FOR SOLUTION INTRAVENOUS at 23:02

## 2018-03-20 RX ADMIN — Medication 1750 MG: at 11:50

## 2018-03-20 RX ADMIN — POTASSIUM CHLORIDE 10 MEQ: 7.46 INJECTION, SOLUTION INTRAVENOUS at 10:11

## 2018-03-20 RX ADMIN — INSULIN LISPRO 2 UNITS: 100 INJECTION, SOLUTION INTRAVENOUS; SUBCUTANEOUS at 09:04

## 2018-03-20 RX ADMIN — POTASSIUM CHLORIDE 10 MEQ: 149 INJECTION, SOLUTION, CONCENTRATE INTRAVENOUS at 21:24

## 2018-03-20 RX ADMIN — PIPERACILLIN SODIUM,TAZOBACTAM SODIUM 3.38 G: 3; .375 INJECTION, POWDER, FOR SOLUTION INTRAVENOUS at 23:02

## 2018-03-21 ENCOUNTER — APPOINTMENT (OUTPATIENT)
Dept: NEUROLOGY | Facility: HOSPITAL | Age: 68
End: 2018-03-21
Attending: PSYCHIATRY & NEUROLOGY

## 2018-03-21 PROBLEM — M00.9 SEPTIC JOINT OF RIGHT KNEE JOINT (HCC): Status: ACTIVE | Noted: 2018-03-21

## 2018-03-21 LAB
ANION GAP SERPL CALCULATED.3IONS-SCNC: 9 MMOL/L (ref 3–11)
BUN BLD-MCNC: 14 MG/DL (ref 9–23)
BUN/CREAT SERPL: 23.3 (ref 7–25)
CALCIUM SPEC-SCNC: 9.4 MG/DL (ref 8.7–10.4)
CHLORIDE SERPL-SCNC: 104 MMOL/L (ref 99–109)
CO2 SERPL-SCNC: 30 MMOL/L (ref 20–31)
CREAT BLD-MCNC: 0.6 MG/DL (ref 0.6–1.3)
DEPRECATED RDW RBC AUTO: 63.5 FL (ref 37–54)
ERYTHROCYTE [DISTWIDTH] IN BLOOD BY AUTOMATED COUNT: 17.5 % (ref 11.3–14.5)
GFR SERPL CREATININE-BSD FRML MDRD: 100 ML/MIN/1.73
GLUCOSE BLD-MCNC: 127 MG/DL (ref 70–100)
GLUCOSE BLDC GLUCOMTR-MCNC: 129 MG/DL (ref 70–130)
GLUCOSE BLDC GLUCOMTR-MCNC: 150 MG/DL (ref 70–130)
GLUCOSE BLDC GLUCOMTR-MCNC: 150 MG/DL (ref 70–130)
GLUCOSE BLDC GLUCOMTR-MCNC: 175 MG/DL (ref 70–130)
HCT VFR BLD AUTO: 30.9 % (ref 34.5–44)
HGB BLD-MCNC: 9.3 G/DL (ref 11.5–15.5)
MAGNESIUM SERPL-MCNC: 2 MG/DL (ref 1.3–2.7)
MCH RBC QN AUTO: 30 PG (ref 27–31)
MCHC RBC AUTO-ENTMCNC: 30.1 G/DL (ref 32–36)
MCV RBC AUTO: 99.7 FL (ref 80–99)
PLATELET # BLD AUTO: 265 10*3/MM3 (ref 150–450)
PMV BLD AUTO: 9.6 FL (ref 6–12)
POTASSIUM BLD-SCNC: 2.9 MMOL/L (ref 3.5–5.5)
PROCALCITONIN SERPL-MCNC: 0.21 NG/ML
RBC # BLD AUTO: 3.1 10*6/MM3 (ref 3.89–5.14)
SODIUM BLD-SCNC: 143 MMOL/L (ref 132–146)
WBC NRBC COR # BLD: 9.78 10*3/MM3 (ref 3.5–10.8)

## 2018-03-21 PROCEDURE — 94799 UNLISTED PULMONARY SVC/PX: CPT

## 2018-03-21 PROCEDURE — 25010000003 LEVETIRACETAM IN NACL 0.75% 1000 MG/100ML SOLUTION: Performed by: PSYCHIATRY & NEUROLOGY

## 2018-03-21 PROCEDURE — 25010000002 VANCOMYCIN

## 2018-03-21 PROCEDURE — 84145 PROCALCITONIN (PCT): CPT | Performed by: INTERNAL MEDICINE

## 2018-03-21 PROCEDURE — 95816 EEG AWAKE AND DROWSY: CPT

## 2018-03-21 PROCEDURE — 25010000002 ENOXAPARIN PER 10 MG: Performed by: INTERNAL MEDICINE

## 2018-03-21 PROCEDURE — 94640 AIRWAY INHALATION TREATMENT: CPT

## 2018-03-21 PROCEDURE — 82962 GLUCOSE BLOOD TEST: CPT

## 2018-03-21 PROCEDURE — 25010000002 PIPERACILLIN-TAZOBACTAM: Performed by: INTERNAL MEDICINE

## 2018-03-21 PROCEDURE — 83735 ASSAY OF MAGNESIUM: CPT | Performed by: INTERNAL MEDICINE

## 2018-03-21 PROCEDURE — 99233 SBSQ HOSP IP/OBS HIGH 50: CPT | Performed by: INTERNAL MEDICINE

## 2018-03-21 PROCEDURE — 95816 EEG AWAKE AND DROWSY: CPT | Performed by: PSYCHIATRY & NEUROLOGY

## 2018-03-21 PROCEDURE — 99221 1ST HOSP IP/OBS SF/LOW 40: CPT | Performed by: INTERNAL MEDICINE

## 2018-03-21 PROCEDURE — 25010000003 CEFTRIAXONE PER 250 MG: Performed by: INTERNAL MEDICINE

## 2018-03-21 PROCEDURE — 85027 COMPLETE CBC AUTOMATED: CPT | Performed by: INTERNAL MEDICINE

## 2018-03-21 PROCEDURE — 99222 1ST HOSP IP/OBS MODERATE 55: CPT | Performed by: INTERNAL MEDICINE

## 2018-03-21 PROCEDURE — 80048 BASIC METABOLIC PNL TOTAL CA: CPT | Performed by: INTERNAL MEDICINE

## 2018-03-21 RX ORDER — METOPROLOL TARTRATE 50 MG/1
50 TABLET, FILM COATED ORAL EVERY 12 HOURS SCHEDULED
Status: DISCONTINUED | OUTPATIENT
Start: 2018-03-21 | End: 2018-03-23 | Stop reason: SDUPTHER

## 2018-03-21 RX ORDER — CEFTRIAXONE SODIUM 2 G/50ML
2 INJECTION, SOLUTION INTRAVENOUS EVERY 24 HOURS
Status: DISCONTINUED | OUTPATIENT
Start: 2018-03-21 | End: 2018-03-26

## 2018-03-21 RX ORDER — POTASSIUM CHLORIDE 750 MG/1
20 CAPSULE, EXTENDED RELEASE ORAL DAILY
Status: DISCONTINUED | OUTPATIENT
Start: 2018-03-21 | End: 2018-03-29 | Stop reason: HOSPADM

## 2018-03-21 RX ORDER — BUMETANIDE 1 MG/1
1 TABLET ORAL DAILY
Status: DISCONTINUED | OUTPATIENT
Start: 2018-03-21 | End: 2018-03-29 | Stop reason: HOSPADM

## 2018-03-21 RX ADMIN — BUMETANIDE 1 MG: 1 TABLET ORAL at 12:36

## 2018-03-21 RX ADMIN — IPRATROPIUM BROMIDE AND ALBUTEROL SULFATE 3 ML: 2.5; .5 SOLUTION RESPIRATORY (INHALATION) at 15:55

## 2018-03-21 RX ADMIN — POTASSIUM CHLORIDE 40 MEQ: 1.5 POWDER, FOR SOLUTION ORAL at 16:15

## 2018-03-21 RX ADMIN — PIPERACILLIN SODIUM,TAZOBACTAM SODIUM 3.38 G: 3; .375 INJECTION, POWDER, FOR SOLUTION INTRAVENOUS at 09:34

## 2018-03-21 RX ADMIN — CEFTRIAXONE SODIUM 2 G: 2 INJECTION, SOLUTION INTRAVENOUS at 20:50

## 2018-03-21 RX ADMIN — METOPROLOL TARTRATE 50 MG: 50 TABLET ORAL at 11:51

## 2018-03-21 RX ADMIN — PIPERACILLIN SODIUM,TAZOBACTAM SODIUM 3.38 G: 3; .375 INJECTION, POWDER, FOR SOLUTION INTRAVENOUS at 16:15

## 2018-03-21 RX ADMIN — METOPROLOL TARTRATE 50 MG: 50 TABLET ORAL at 20:50

## 2018-03-21 RX ADMIN — INSULIN LISPRO 2 UNITS: 100 INJECTION, SOLUTION INTRAVENOUS; SUBCUTANEOUS at 20:50

## 2018-03-21 RX ADMIN — LEVETIRACETAM 1000 MG: 10 INJECTION INTRAVENOUS at 11:34

## 2018-03-21 RX ADMIN — POTASSIUM CHLORIDE 40 MEQ: 1.5 POWDER, FOR SOLUTION ORAL at 11:51

## 2018-03-21 RX ADMIN — ENOXAPARIN SODIUM 40 MG: 40 INJECTION SUBCUTANEOUS at 05:48

## 2018-03-21 RX ADMIN — IPRATROPIUM BROMIDE AND ALBUTEROL SULFATE 3 ML: 2.5; .5 SOLUTION RESPIRATORY (INHALATION) at 07:50

## 2018-03-21 RX ADMIN — NYSTATIN: 100000 OINTMENT TOPICAL at 20:51

## 2018-03-21 RX ADMIN — NYSTATIN: 100000 OINTMENT TOPICAL at 09:28

## 2018-03-21 RX ADMIN — LEVETIRACETAM 1000 MG: 10 INJECTION INTRAVENOUS at 23:56

## 2018-03-21 RX ADMIN — VANCOMYCIN HYDROCHLORIDE 1250 MG: 10 INJECTION, POWDER, LYOPHILIZED, FOR SOLUTION INTRAVENOUS at 23:56

## 2018-03-21 RX ADMIN — BUDESONIDE AND FORMOTEROL FUMARATE DIHYDRATE 2 PUFF: 160; 4.5 AEROSOL RESPIRATORY (INHALATION) at 07:50

## 2018-03-21 RX ADMIN — INSULIN LISPRO 2 UNITS: 100 INJECTION, SOLUTION INTRAVENOUS; SUBCUTANEOUS at 17:57

## 2018-03-21 RX ADMIN — IPRATROPIUM BROMIDE AND ALBUTEROL SULFATE 3 ML: 2.5; .5 SOLUTION RESPIRATORY (INHALATION) at 19:36

## 2018-03-21 RX ADMIN — VANCOMYCIN HYDROCHLORIDE 1250 MG: 10 INJECTION, POWDER, LYOPHILIZED, FOR SOLUTION INTRAVENOUS at 11:34

## 2018-03-21 RX ADMIN — NICARDIPINE HYDROCHLORIDE 5 MG/HR: 0.1 INJECTION, SOLUTION INTRAVENOUS at 07:32

## 2018-03-22 ENCOUNTER — APPOINTMENT (OUTPATIENT)
Dept: PULMONOLOGY | Facility: HOSPITAL | Age: 68
End: 2018-03-22
Attending: INTERNAL MEDICINE

## 2018-03-22 LAB
ANION GAP SERPL CALCULATED.3IONS-SCNC: 6 MMOL/L (ref 3–11)
BUN BLD-MCNC: 14 MG/DL (ref 9–23)
BUN/CREAT SERPL: 23.3 (ref 7–25)
CALCIUM SPEC-SCNC: 8.6 MG/DL (ref 8.7–10.4)
CHLORIDE SERPL-SCNC: 105 MMOL/L (ref 99–109)
CO2 SERPL-SCNC: 29 MMOL/L (ref 20–31)
CREAT BLD-MCNC: 0.6 MG/DL (ref 0.6–1.3)
DEPRECATED RDW RBC AUTO: 63.6 FL (ref 37–54)
ERYTHROCYTE [DISTWIDTH] IN BLOOD BY AUTOMATED COUNT: 17.4 % (ref 11.3–14.5)
GFR SERPL CREATININE-BSD FRML MDRD: 100 ML/MIN/1.73
GLUCOSE BLD-MCNC: 118 MG/DL (ref 70–100)
GLUCOSE BLDC GLUCOMTR-MCNC: 148 MG/DL (ref 70–130)
GLUCOSE BLDC GLUCOMTR-MCNC: 155 MG/DL (ref 70–130)
GLUCOSE BLDC GLUCOMTR-MCNC: 162 MG/DL (ref 70–130)
GLUCOSE BLDC GLUCOMTR-MCNC: 174 MG/DL (ref 70–130)
HCT VFR BLD AUTO: 29.9 % (ref 34.5–44)
HGB BLD-MCNC: 9 G/DL (ref 11.5–15.5)
MAGNESIUM SERPL-MCNC: 2 MG/DL (ref 1.3–2.7)
MCH RBC QN AUTO: 30.2 PG (ref 27–31)
MCHC RBC AUTO-ENTMCNC: 30.1 G/DL (ref 32–36)
MCV RBC AUTO: 100.3 FL (ref 80–99)
PLATELET # BLD AUTO: 268 10*3/MM3 (ref 150–450)
PMV BLD AUTO: 9.6 FL (ref 6–12)
POTASSIUM BLD-SCNC: 3.4 MMOL/L (ref 3.5–5.5)
POTASSIUM BLD-SCNC: 3.4 MMOL/L (ref 3.5–5.5)
POTASSIUM BLD-SCNC: 4.1 MMOL/L (ref 3.5–5.5)
RBC # BLD AUTO: 2.98 10*6/MM3 (ref 3.89–5.14)
RHEUMATOID FACT SERPL-ACNC: NEGATIVE [IU]/ML
SODIUM BLD-SCNC: 140 MMOL/L (ref 132–146)
VANCOMYCIN TROUGH SERPL-MCNC: 17.8 MCG/ML (ref 10–20)
VIT B12 BLD-MCNC: 613 PG/ML (ref 211–911)
WBC NRBC COR # BLD: 9.99 10*3/MM3 (ref 3.5–10.8)

## 2018-03-22 PROCEDURE — 99231 SBSQ HOSP IP/OBS SF/LOW 25: CPT | Performed by: PSYCHIATRY & NEUROLOGY

## 2018-03-22 PROCEDURE — 94640 AIRWAY INHALATION TREATMENT: CPT

## 2018-03-22 PROCEDURE — 94060 EVALUATION OF WHEEZING: CPT | Performed by: INTERNAL MEDICINE

## 2018-03-22 PROCEDURE — 99232 SBSQ HOSP IP/OBS MODERATE 35: CPT | Performed by: INTERNAL MEDICINE

## 2018-03-22 PROCEDURE — 25010000002 VANCOMYCIN

## 2018-03-22 PROCEDURE — 84132 ASSAY OF SERUM POTASSIUM: CPT | Performed by: INTERNAL MEDICINE

## 2018-03-22 PROCEDURE — 80048 BASIC METABOLIC PNL TOTAL CA: CPT | Performed by: INTERNAL MEDICINE

## 2018-03-22 PROCEDURE — 83735 ASSAY OF MAGNESIUM: CPT | Performed by: INTERNAL MEDICINE

## 2018-03-22 PROCEDURE — 99233 SBSQ HOSP IP/OBS HIGH 50: CPT | Performed by: INTERNAL MEDICINE

## 2018-03-22 PROCEDURE — 85027 COMPLETE CBC AUTOMATED: CPT | Performed by: INTERNAL MEDICINE

## 2018-03-22 PROCEDURE — 94799 UNLISTED PULMONARY SVC/PX: CPT

## 2018-03-22 PROCEDURE — 80202 ASSAY OF VANCOMYCIN: CPT

## 2018-03-22 PROCEDURE — 25010000003 CEFTRIAXONE PER 250 MG: Performed by: INTERNAL MEDICINE

## 2018-03-22 PROCEDURE — 25010000003 LEVETIRACETAM IN NACL 0.75% 1000 MG/100ML SOLUTION: Performed by: PSYCHIATRY & NEUROLOGY

## 2018-03-22 PROCEDURE — 86200 CCP ANTIBODY: CPT | Performed by: INTERNAL MEDICINE

## 2018-03-22 PROCEDURE — 25010000002 ENOXAPARIN PER 10 MG: Performed by: INTERNAL MEDICINE

## 2018-03-22 PROCEDURE — 86431 RHEUMATOID FACTOR QUANT: CPT | Performed by: INTERNAL MEDICINE

## 2018-03-22 PROCEDURE — 94060 EVALUATION OF WHEEZING: CPT

## 2018-03-22 PROCEDURE — 82962 GLUCOSE BLOOD TEST: CPT

## 2018-03-22 PROCEDURE — 99231 SBSQ HOSP IP/OBS SF/LOW 25: CPT | Performed by: INTERNAL MEDICINE

## 2018-03-22 RX ORDER — BUMETANIDE 1 MG/1
1 TABLET ORAL ONCE
Status: COMPLETED | OUTPATIENT
Start: 2018-03-22 | End: 2018-03-22

## 2018-03-22 RX ORDER — HYDROCODONE BITARTRATE AND ACETAMINOPHEN 5; 325 MG/1; MG/1
1 TABLET ORAL EVERY 6 HOURS PRN
Status: DISCONTINUED | OUTPATIENT
Start: 2018-03-22 | End: 2018-03-24

## 2018-03-22 RX ORDER — POTASSIUM CHLORIDE 750 MG/1
40 CAPSULE, EXTENDED RELEASE ORAL ONCE
Status: COMPLETED | OUTPATIENT
Start: 2018-03-22 | End: 2018-03-22

## 2018-03-22 RX ORDER — VANCOMYCIN HYDROCHLORIDE 1 G/200ML
1000 INJECTION, SOLUTION INTRAVENOUS EVERY 12 HOURS
Status: DISCONTINUED | OUTPATIENT
Start: 2018-03-23 | End: 2018-03-26

## 2018-03-22 RX ADMIN — CLOPIDOGREL BISULFATE 75 MG: 75 TABLET ORAL at 09:00

## 2018-03-22 RX ADMIN — MULTIPLE VITAMINS W/ MINERALS TAB 1 TABLET: TAB ORAL at 08:59

## 2018-03-22 RX ADMIN — BUDESONIDE AND FORMOTEROL FUMARATE DIHYDRATE 2 PUFF: 160; 4.5 AEROSOL RESPIRATORY (INHALATION) at 08:47

## 2018-03-22 RX ADMIN — METOPROLOL TARTRATE 50 MG: 50 TABLET ORAL at 20:52

## 2018-03-22 RX ADMIN — HYDROCODONE BITARTRATE AND ACETAMINOPHEN 1 TABLET: 5; 325 TABLET ORAL at 17:52

## 2018-03-22 RX ADMIN — THIAMINE HCL TAB 100 MG 100 MG: 100 TAB at 09:00

## 2018-03-22 RX ADMIN — POTASSIUM CHLORIDE 40 MEQ: 750 CAPSULE, EXTENDED RELEASE ORAL at 00:30

## 2018-03-22 RX ADMIN — IPRATROPIUM BROMIDE AND ALBUTEROL SULFATE 3 ML: 2.5; .5 SOLUTION RESPIRATORY (INHALATION) at 16:30

## 2018-03-22 RX ADMIN — INSULIN LISPRO 2 UNITS: 100 INJECTION, SOLUTION INTRAVENOUS; SUBCUTANEOUS at 13:57

## 2018-03-22 RX ADMIN — ENOXAPARIN SODIUM 40 MG: 40 INJECTION SUBCUTANEOUS at 05:52

## 2018-03-22 RX ADMIN — POTASSIUM CHLORIDE 20 MEQ: 750 CAPSULE, EXTENDED RELEASE ORAL at 08:59

## 2018-03-22 RX ADMIN — BUMETANIDE 1 MG: 1 TABLET ORAL at 09:00

## 2018-03-22 RX ADMIN — POTASSIUM CHLORIDE 40 MEQ: 750 CAPSULE, EXTENDED RELEASE ORAL at 17:52

## 2018-03-22 RX ADMIN — CEFTRIAXONE SODIUM 2 G: 2 INJECTION, SOLUTION INTRAVENOUS at 20:50

## 2018-03-22 RX ADMIN — ATORVASTATIN CALCIUM 80 MG: 40 TABLET, FILM COATED ORAL at 09:00

## 2018-03-22 RX ADMIN — LEVETIRACETAM 1000 MG: 10 INJECTION INTRAVENOUS at 12:58

## 2018-03-22 RX ADMIN — INSULIN LISPRO 2 UNITS: 100 INJECTION, SOLUTION INTRAVENOUS; SUBCUTANEOUS at 20:51

## 2018-03-22 RX ADMIN — ASPIRIN 81 MG: 81 TABLET, COATED ORAL at 09:00

## 2018-03-22 RX ADMIN — NYSTATIN: 100000 OINTMENT TOPICAL at 20:51

## 2018-03-22 RX ADMIN — INSULIN LISPRO 2 UNITS: 100 INJECTION, SOLUTION INTRAVENOUS; SUBCUTANEOUS at 09:04

## 2018-03-22 RX ADMIN — METOPROLOL TARTRATE 50 MG: 50 TABLET ORAL at 09:00

## 2018-03-22 RX ADMIN — VANCOMYCIN HYDROCHLORIDE 1250 MG: 10 INJECTION, POWDER, LYOPHILIZED, FOR SOLUTION INTRAVENOUS at 13:43

## 2018-03-22 RX ADMIN — IPRATROPIUM BROMIDE AND ALBUTEROL SULFATE 3 ML: 2.5; .5 SOLUTION RESPIRATORY (INHALATION) at 20:02

## 2018-03-22 RX ADMIN — IPRATROPIUM BROMIDE AND ALBUTEROL SULFATE 3 ML: 2.5; .5 SOLUTION RESPIRATORY (INHALATION) at 08:47

## 2018-03-22 RX ADMIN — NYSTATIN: 100000 OINTMENT TOPICAL at 09:07

## 2018-03-22 RX ADMIN — POTASSIUM CHLORIDE 40 MEQ: 750 CAPSULE, EXTENDED RELEASE ORAL at 14:46

## 2018-03-22 RX ADMIN — ACETAMINOPHEN 650 MG: 325 TABLET ORAL at 03:48

## 2018-03-22 RX ADMIN — BUMETANIDE 1 MG: 1 TABLET ORAL at 14:47

## 2018-03-23 ENCOUNTER — APPOINTMENT (OUTPATIENT)
Dept: CARDIOLOGY | Facility: HOSPITAL | Age: 68
End: 2018-03-23
Attending: INTERNAL MEDICINE

## 2018-03-23 ENCOUNTER — ANESTHESIA (OUTPATIENT)
Dept: PERIOP | Facility: HOSPITAL | Age: 68
End: 2018-03-23

## 2018-03-23 ENCOUNTER — ANESTHESIA EVENT (OUTPATIENT)
Dept: PERIOP | Facility: HOSPITAL | Age: 68
End: 2018-03-23

## 2018-03-23 LAB
ANION GAP SERPL CALCULATED.3IONS-SCNC: 9 MMOL/L (ref 3–11)
BH CV ECHO MEAS - AO MAX PG (FULL): 3.6 MMHG
BH CV ECHO MEAS - AO MAX PG: 12 MMHG
BH CV ECHO MEAS - AO MEAN PG (FULL): 1.7 MMHG
BH CV ECHO MEAS - AO MEAN PG: 5.6 MMHG
BH CV ECHO MEAS - AO ROOT AREA (BSA CORRECTED): 1.2
BH CV ECHO MEAS - AO ROOT AREA: 4.7 CM^2
BH CV ECHO MEAS - AO ROOT DIAM: 2.4 CM
BH CV ECHO MEAS - AO V2 MAX: 170.5 CM/SEC
BH CV ECHO MEAS - AO V2 MEAN: 103 CM/SEC
BH CV ECHO MEAS - AO V2 VTI: 35.5 CM
BH CV ECHO MEAS - ASC AORTA: 3 CM
BH CV ECHO MEAS - AVA(I,A): 2.9 CM^2
BH CV ECHO MEAS - AVA(I,D): 2.9 CM^2
BH CV ECHO MEAS - AVA(V,A): 2.6 CM^2
BH CV ECHO MEAS - AVA(V,D): 2.6 CM^2
BH CV ECHO MEAS - BSA(HAYCOCK): 2.1 M^2
BH CV ECHO MEAS - BSA: 2 M^2
BH CV ECHO MEAS - BZI_BMI: 36.2 KILOGRAMS/M^2
BH CV ECHO MEAS - BZI_METRIC_HEIGHT: 162.6 CM
BH CV ECHO MEAS - BZI_METRIC_WEIGHT: 95.7 KG
BH CV ECHO MEAS - EDV(CUBED): 150.3 ML
BH CV ECHO MEAS - EDV(TEICH): 136.4 ML
BH CV ECHO MEAS - EF(CUBED): 83.4 %
BH CV ECHO MEAS - EF(TEICH): 75.9 %
BH CV ECHO MEAS - ESV(CUBED): 25 ML
BH CV ECHO MEAS - ESV(TEICH): 32.9 ML
BH CV ECHO MEAS - FS: 45 %
BH CV ECHO MEAS - IVS/LVPW: 0.93
BH CV ECHO MEAS - IVSD: 1 CM
BH CV ECHO MEAS - LA DIMENSION: 3.8 CM
BH CV ECHO MEAS - LA/AO: 1.6
BH CV ECHO MEAS - LV MASS(C)D: 224.4 GRAMS
BH CV ECHO MEAS - LV MASS(C)DI: 112.1 GRAMS/M^2
BH CV ECHO MEAS - LV MAX PG: 8.4 MMHG
BH CV ECHO MEAS - LV MEAN PG: 3.9 MMHG
BH CV ECHO MEAS - LV V1 MAX: 145 CM/SEC
BH CV ECHO MEAS - LV V1 MEAN: 88.3 CM/SEC
BH CV ECHO MEAS - LV V1 VTI: 33.9 CM
BH CV ECHO MEAS - LVIDD: 5.3 CM
BH CV ECHO MEAS - LVIDS: 2.9 CM
BH CV ECHO MEAS - LVOT AREA (M): 3.1 CM^2
BH CV ECHO MEAS - LVOT AREA: 3.1 CM^2
BH CV ECHO MEAS - LVOT DIAM: 2 CM
BH CV ECHO MEAS - LVPWD: 1.1 CM
BH CV ECHO MEAS - MV A MAX VEL: 105.5 CM/SEC
BH CV ECHO MEAS - MV DEC TIME: 0.3 SEC
BH CV ECHO MEAS - MV E MAX VEL: 83.9 CM/SEC
BH CV ECHO MEAS - MV E/A: 0.8
BH CV ECHO MEAS - PA ACC SLOPE: 886.2 CM/SEC^2
BH CV ECHO MEAS - PA ACC TIME: 0.11 SEC
BH CV ECHO MEAS - PA MAX PG: 7.9 MMHG
BH CV ECHO MEAS - PA PR(ACCEL): 29.1 MMHG
BH CV ECHO MEAS - PA V2 MAX: 140.1 CM/SEC
BH CV ECHO MEAS - RAP SYSTOLE: 8 MMHG
BH CV ECHO MEAS - RVDD: 1.6 CM
BH CV ECHO MEAS - RVSP: 47 MMHG
BH CV ECHO MEAS - SI(AO): 82.6 ML/M^2
BH CV ECHO MEAS - SI(CUBED): 62.6 ML/M^2
BH CV ECHO MEAS - SI(LVOT): 51.7 ML/M^2
BH CV ECHO MEAS - SI(TEICH): 51.7 ML/M^2
BH CV ECHO MEAS - SV(AO): 165.3 ML
BH CV ECHO MEAS - SV(CUBED): 125.3 ML
BH CV ECHO MEAS - SV(LVOT): 103.5 ML
BH CV ECHO MEAS - SV(TEICH): 103.5 ML
BH CV ECHO MEAS - TAPSE (>1.6): 2.5 CM2
BH CV ECHO MEAS - TR MAX VEL: 302.6 CM/SEC
BH CV ECHO MEAS - TV MAX PG: 0.93 MMHG
BH CV ECHO MEAS - TV V2 MAX: 48.1 CM/SEC
BH CV XLRA - RV BASE: 3.5 CM
BH CV XLRA - RV LENGTH: 7.7 CM
BH CV XLRA - RV MID: 2.8 CM
BH CV XLRA - TDI S': 15.4 CM/SEC
BUN BLD-MCNC: 12 MG/DL (ref 9–23)
BUN/CREAT SERPL: 24 (ref 7–25)
CALCIUM SPEC-SCNC: 8.5 MG/DL (ref 8.7–10.4)
CHLORIDE SERPL-SCNC: 105 MMOL/L (ref 99–109)
CO2 SERPL-SCNC: 29 MMOL/L (ref 20–31)
CREAT BLD-MCNC: 0.5 MG/DL (ref 0.6–1.3)
DEPRECATED RDW RBC AUTO: 61.7 FL (ref 37–54)
ERYTHROCYTE [DISTWIDTH] IN BLOOD BY AUTOMATED COUNT: 16.8 % (ref 11.3–14.5)
GFR SERPL CREATININE-BSD FRML MDRD: 123 ML/MIN/1.73
GLUCOSE BLD-MCNC: 116 MG/DL (ref 70–100)
GLUCOSE BLDC GLUCOMTR-MCNC: 111 MG/DL (ref 70–130)
GLUCOSE BLDC GLUCOMTR-MCNC: 115 MG/DL (ref 70–130)
GLUCOSE BLDC GLUCOMTR-MCNC: 130 MG/DL (ref 70–130)
GLUCOSE BLDC GLUCOMTR-MCNC: 130 MG/DL (ref 70–130)
GLUCOSE BLDC GLUCOMTR-MCNC: 140 MG/DL (ref 70–130)
HCT VFR BLD AUTO: 32.2 % (ref 34.5–44)
HGB BLD-MCNC: 9.5 G/DL (ref 11.5–15.5)
MAXIMAL PREDICTED HEART RATE: 153 BPM
MCH RBC QN AUTO: 29.6 PG (ref 27–31)
MCHC RBC AUTO-ENTMCNC: 29.5 G/DL (ref 32–36)
MCV RBC AUTO: 100.3 FL (ref 80–99)
PLATELET # BLD AUTO: 286 10*3/MM3 (ref 150–450)
PMV BLD AUTO: 9.5 FL (ref 6–12)
POTASSIUM BLD-SCNC: 4.3 MMOL/L (ref 3.5–5.5)
RBC # BLD AUTO: 3.21 10*6/MM3 (ref 3.89–5.14)
SODIUM BLD-SCNC: 143 MMOL/L (ref 132–146)
STRESS TARGET HR: 130 BPM
WBC NRBC COR # BLD: 11.29 10*3/MM3 (ref 3.5–10.8)

## 2018-03-23 PROCEDURE — 25010000002 VANCOMYCIN PER 500 MG

## 2018-03-23 PROCEDURE — 87102 FUNGUS ISOLATION CULTURE: CPT | Performed by: ORTHOPAEDIC SURGERY

## 2018-03-23 PROCEDURE — 80048 BASIC METABOLIC PNL TOTAL CA: CPT | Performed by: INTERNAL MEDICINE

## 2018-03-23 PROCEDURE — 87205 SMEAR GRAM STAIN: CPT | Performed by: ORTHOPAEDIC SURGERY

## 2018-03-23 PROCEDURE — 25010000002 EPINEPHRINE PER 0.1 MG: Performed by: ORTHOPAEDIC SURGERY

## 2018-03-23 PROCEDURE — 99232 SBSQ HOSP IP/OBS MODERATE 35: CPT | Performed by: HOSPITALIST

## 2018-03-23 PROCEDURE — 0SBC4ZX EXCISION OF RIGHT KNEE JOINT, PERCUTANEOUS ENDOSCOPIC APPROACH, DIAGNOSTIC: ICD-10-PCS | Performed by: ORTHOPAEDIC SURGERY

## 2018-03-23 PROCEDURE — 94760 N-INVAS EAR/PLS OXIMETRY 1: CPT

## 2018-03-23 PROCEDURE — 63710000001 INSULIN LISPRO (HUMAN) PER 5 UNITS: Performed by: ORTHOPAEDIC SURGERY

## 2018-03-23 PROCEDURE — 94640 AIRWAY INHALATION TREATMENT: CPT

## 2018-03-23 PROCEDURE — 87075 CULTR BACTERIA EXCEPT BLOOD: CPT | Performed by: ORTHOPAEDIC SURGERY

## 2018-03-23 PROCEDURE — 25010000002 PROPOFOL 10 MG/ML EMULSION: Performed by: NURSE ANESTHETIST, CERTIFIED REGISTERED

## 2018-03-23 PROCEDURE — 25010000002 NEOSTIGMINE PER 0.5 MG: Performed by: NURSE ANESTHETIST, CERTIFIED REGISTERED

## 2018-03-23 PROCEDURE — 99232 SBSQ HOSP IP/OBS MODERATE 35: CPT | Performed by: INTERNAL MEDICINE

## 2018-03-23 PROCEDURE — 85027 COMPLETE CBC AUTOMATED: CPT | Performed by: INTERNAL MEDICINE

## 2018-03-23 PROCEDURE — 25010000003 LEVETIRACETAM IN NACL 0.75% 1000 MG/100ML SOLUTION: Performed by: PSYCHIATRY & NEUROLOGY

## 2018-03-23 PROCEDURE — 0SCC4ZZ EXTIRPATION OF MATTER FROM RIGHT KNEE JOINT, PERCUTANEOUS ENDOSCOPIC APPROACH: ICD-10-PCS | Performed by: ORTHOPAEDIC SURGERY

## 2018-03-23 PROCEDURE — 82962 GLUCOSE BLOOD TEST: CPT

## 2018-03-23 PROCEDURE — 25010000002 FENTANYL CITRATE (PF) 100 MCG/2ML SOLUTION: Performed by: NURSE ANESTHETIST, CERTIFIED REGISTERED

## 2018-03-23 PROCEDURE — 87176 TISSUE HOMOGENIZATION CULTR: CPT | Performed by: ORTHOPAEDIC SURGERY

## 2018-03-23 PROCEDURE — 25010000002 VANCOMYCIN PER 500 MG: Performed by: ORTHOPAEDIC SURGERY

## 2018-03-23 PROCEDURE — 25010000003 CEFTRIAXONE PER 250 MG: Performed by: INTERNAL MEDICINE

## 2018-03-23 PROCEDURE — 94799 UNLISTED PULMONARY SVC/PX: CPT

## 2018-03-23 PROCEDURE — 87206 SMEAR FLUORESCENT/ACID STAI: CPT | Performed by: ORTHOPAEDIC SURGERY

## 2018-03-23 PROCEDURE — 87070 CULTURE OTHR SPECIMN AEROBIC: CPT | Performed by: ORTHOPAEDIC SURGERY

## 2018-03-23 PROCEDURE — 25010000002 ONDANSETRON PER 1 MG: Performed by: NURSE ANESTHETIST, CERTIFIED REGISTERED

## 2018-03-23 PROCEDURE — 87116 MYCOBACTERIA CULTURE: CPT | Performed by: ORTHOPAEDIC SURGERY

## 2018-03-23 PROCEDURE — 93306 TTE W/DOPPLER COMPLETE: CPT

## 2018-03-23 PROCEDURE — 93306 TTE W/DOPPLER COMPLETE: CPT | Performed by: INTERNAL MEDICINE

## 2018-03-23 RX ORDER — SODIUM CHLORIDE, SODIUM LACTATE, POTASSIUM CHLORIDE, CALCIUM CHLORIDE 600; 310; 30; 20 MG/100ML; MG/100ML; MG/100ML; MG/100ML
9 INJECTION, SOLUTION INTRAVENOUS CONTINUOUS PRN
Status: DISCONTINUED | OUTPATIENT
Start: 2018-03-23 | End: 2018-03-23 | Stop reason: HOSPADM

## 2018-03-23 RX ORDER — FAMOTIDINE 20 MG/1
20 TABLET, FILM COATED ORAL
Status: DISCONTINUED | OUTPATIENT
Start: 2018-03-23 | End: 2018-03-23 | Stop reason: HOSPADM

## 2018-03-23 RX ORDER — METFORMIN HYDROCHLORIDE 500 MG/1
500 TABLET, EXTENDED RELEASE ORAL 2 TIMES DAILY
Status: DISCONTINUED | OUTPATIENT
Start: 2018-03-23 | End: 2018-03-29 | Stop reason: HOSPADM

## 2018-03-23 RX ORDER — BUPIVACAINE HYDROCHLORIDE 5 MG/ML
INJECTION, SOLUTION EPIDURAL; INTRACAUDAL AS NEEDED
Status: DISCONTINUED | OUTPATIENT
Start: 2018-03-23 | End: 2018-03-23 | Stop reason: HOSPADM

## 2018-03-23 RX ORDER — LIDOCAINE HYDROCHLORIDE 10 MG/ML
INJECTION, SOLUTION INFILTRATION; PERINEURAL AS NEEDED
Status: DISCONTINUED | OUTPATIENT
Start: 2018-03-23 | End: 2018-03-23 | Stop reason: SURG

## 2018-03-23 RX ORDER — MAGNESIUM HYDROXIDE 1200 MG/15ML
LIQUID ORAL AS NEEDED
Status: DISCONTINUED | OUTPATIENT
Start: 2018-03-23 | End: 2018-03-23 | Stop reason: HOSPADM

## 2018-03-23 RX ORDER — ATRACURIUM BESYLATE 10 MG/ML
INJECTION, SOLUTION INTRAVENOUS AS NEEDED
Status: DISCONTINUED | OUTPATIENT
Start: 2018-03-23 | End: 2018-03-23 | Stop reason: SURG

## 2018-03-23 RX ORDER — GABAPENTIN 400 MG/1
800 CAPSULE ORAL EVERY 12 HOURS SCHEDULED
Status: DISCONTINUED | OUTPATIENT
Start: 2018-03-23 | End: 2018-03-29 | Stop reason: HOSPADM

## 2018-03-23 RX ORDER — TIZANIDINE 4 MG/1
2 TABLET ORAL EVERY EVENING
Status: DISCONTINUED | OUTPATIENT
Start: 2018-03-23 | End: 2018-03-29 | Stop reason: HOSPADM

## 2018-03-23 RX ORDER — FENTANYL CITRATE 50 UG/ML
50 INJECTION, SOLUTION INTRAMUSCULAR; INTRAVENOUS
Status: DISCONTINUED | OUTPATIENT
Start: 2018-03-23 | End: 2018-03-23 | Stop reason: HOSPADM

## 2018-03-23 RX ORDER — METOPROLOL TARTRATE 50 MG/1
50 TABLET, FILM COATED ORAL EVERY 12 HOURS SCHEDULED
Status: DISCONTINUED | OUTPATIENT
Start: 2018-03-23 | End: 2018-03-29 | Stop reason: HOSPADM

## 2018-03-23 RX ORDER — ONDANSETRON 2 MG/ML
INJECTION INTRAMUSCULAR; INTRAVENOUS AS NEEDED
Status: DISCONTINUED | OUTPATIENT
Start: 2018-03-23 | End: 2018-03-23 | Stop reason: SURG

## 2018-03-23 RX ORDER — PROMETHAZINE HYDROCHLORIDE 25 MG/ML
6.25 INJECTION, SOLUTION INTRAMUSCULAR; INTRAVENOUS ONCE AS NEEDED
Status: DISCONTINUED | OUTPATIENT
Start: 2018-03-23 | End: 2018-03-23 | Stop reason: HOSPADM

## 2018-03-23 RX ORDER — GLYCOPYRROLATE 0.2 MG/ML
INJECTION INTRAMUSCULAR; INTRAVENOUS AS NEEDED
Status: DISCONTINUED | OUTPATIENT
Start: 2018-03-23 | End: 2018-03-23 | Stop reason: SURG

## 2018-03-23 RX ORDER — METHENAMINE HIPPURATE 1000 MG/1
1 TABLET ORAL 2 TIMES DAILY WITH MEALS
Status: DISCONTINUED | OUTPATIENT
Start: 2018-03-23 | End: 2018-03-29 | Stop reason: HOSPADM

## 2018-03-23 RX ORDER — PROMETHAZINE HYDROCHLORIDE 25 MG/1
25 SUPPOSITORY RECTAL ONCE AS NEEDED
Status: DISCONTINUED | OUTPATIENT
Start: 2018-03-23 | End: 2018-03-23 | Stop reason: HOSPADM

## 2018-03-23 RX ORDER — PROMETHAZINE HYDROCHLORIDE 25 MG/1
25 TABLET ORAL ONCE AS NEEDED
Status: DISCONTINUED | OUTPATIENT
Start: 2018-03-23 | End: 2018-03-23 | Stop reason: HOSPADM

## 2018-03-23 RX ORDER — FENTANYL CITRATE 50 UG/ML
INJECTION, SOLUTION INTRAMUSCULAR; INTRAVENOUS AS NEEDED
Status: DISCONTINUED | OUTPATIENT
Start: 2018-03-23 | End: 2018-03-23 | Stop reason: SURG

## 2018-03-23 RX ORDER — ONDANSETRON 4 MG/1
8 TABLET, FILM COATED ORAL EVERY 8 HOURS PRN
Status: DISCONTINUED | OUTPATIENT
Start: 2018-03-23 | End: 2018-03-29 | Stop reason: HOSPADM

## 2018-03-23 RX ORDER — OXYBUTYNIN CHLORIDE 5 MG/1
10 TABLET, EXTENDED RELEASE ORAL DAILY
Status: DISCONTINUED | OUTPATIENT
Start: 2018-03-23 | End: 2018-03-29 | Stop reason: HOSPADM

## 2018-03-23 RX ORDER — HYDROMORPHONE HYDROCHLORIDE 1 MG/ML
0.5 INJECTION, SOLUTION INTRAMUSCULAR; INTRAVENOUS; SUBCUTANEOUS
Status: DISCONTINUED | OUTPATIENT
Start: 2018-03-23 | End: 2018-03-23 | Stop reason: HOSPADM

## 2018-03-23 RX ORDER — VANCOMYCIN HYDROCHLORIDE 1 G/200ML
1000 INJECTION, SOLUTION INTRAVENOUS ONCE
Status: COMPLETED | OUTPATIENT
Start: 2018-03-23 | End: 2018-03-23

## 2018-03-23 RX ORDER — MIRTAZAPINE 15 MG/1
15 TABLET, FILM COATED ORAL NIGHTLY
Status: DISCONTINUED | OUTPATIENT
Start: 2018-03-23 | End: 2018-03-29 | Stop reason: HOSPADM

## 2018-03-23 RX ORDER — PROPOFOL 10 MG/ML
VIAL (ML) INTRAVENOUS AS NEEDED
Status: DISCONTINUED | OUTPATIENT
Start: 2018-03-23 | End: 2018-03-23 | Stop reason: SURG

## 2018-03-23 RX ADMIN — INSULIN LISPRO 10 UNITS: 100 INJECTION, SOLUTION INTRAVENOUS; SUBCUTANEOUS at 18:19

## 2018-03-23 RX ADMIN — SODIUM CHLORIDE, POTASSIUM CHLORIDE, SODIUM LACTATE AND CALCIUM CHLORIDE 9 ML/HR: 600; 310; 30; 20 INJECTION, SOLUTION INTRAVENOUS at 12:11

## 2018-03-23 RX ADMIN — MULTIPLE VITAMINS W/ MINERALS TAB 1 TABLET: TAB ORAL at 09:28

## 2018-03-23 RX ADMIN — FAMOTIDINE 20 MG: 20 TABLET, FILM COATED ORAL at 12:11

## 2018-03-23 RX ADMIN — BUMETANIDE 1 MG: 1 TABLET ORAL at 09:28

## 2018-03-23 RX ADMIN — Medication 3 MG: at 13:08

## 2018-03-23 RX ADMIN — GLYCOPYRROLATE 0.4 MG: 0.2 INJECTION, SOLUTION INTRAMUSCULAR; INTRAVENOUS at 13:08

## 2018-03-23 RX ADMIN — IPRATROPIUM BROMIDE AND ALBUTEROL SULFATE 3 ML: 2.5; .5 SOLUTION RESPIRATORY (INHALATION) at 17:03

## 2018-03-23 RX ADMIN — POTASSIUM CHLORIDE 20 MEQ: 750 CAPSULE, EXTENDED RELEASE ORAL at 09:28

## 2018-03-23 RX ADMIN — METOPROLOL TARTRATE 50 MG: 50 TABLET ORAL at 20:12

## 2018-03-23 RX ADMIN — ATRACURIUM BESYLATE 30 MG: 10 INJECTION, SOLUTION INTRAVENOUS at 12:37

## 2018-03-23 RX ADMIN — VANCOMYCIN HYDROCHLORIDE 1000 MG: 1 INJECTION, SOLUTION INTRAVENOUS at 00:46

## 2018-03-23 RX ADMIN — PROPOFOL 200 MG: 10 INJECTION, EMULSION INTRAVENOUS at 12:37

## 2018-03-23 RX ADMIN — METOPROLOL TARTRATE 50 MG: 50 TABLET ORAL at 09:29

## 2018-03-23 RX ADMIN — ACETAMINOPHEN 650 MG: 325 TABLET ORAL at 23:29

## 2018-03-23 RX ADMIN — TIZANIDINE 2 MG: 4 TABLET ORAL at 18:19

## 2018-03-23 RX ADMIN — ATORVASTATIN CALCIUM 80 MG: 40 TABLET, FILM COATED ORAL at 09:28

## 2018-03-23 RX ADMIN — VANCOMYCIN HYDROCHLORIDE 1 G: 1 INJECTION, SOLUTION INTRAVENOUS at 12:39

## 2018-03-23 RX ADMIN — VANCOMYCIN HYDROCHLORIDE 1000 MG: 1 INJECTION, SOLUTION INTRAVENOUS at 22:44

## 2018-03-23 RX ADMIN — FENTANYL CITRATE 150 MCG: 50 INJECTION, SOLUTION INTRAMUSCULAR; INTRAVENOUS at 12:37

## 2018-03-23 RX ADMIN — MIRTAZAPINE 15 MG: 15 TABLET, FILM COATED ORAL at 20:12

## 2018-03-23 RX ADMIN — IPRATROPIUM BROMIDE AND ALBUTEROL SULFATE 3 ML: 2.5; .5 SOLUTION RESPIRATORY (INHALATION) at 20:17

## 2018-03-23 RX ADMIN — ASPIRIN 81 MG: 81 TABLET, COATED ORAL at 09:29

## 2018-03-23 RX ADMIN — LIDOCAINE HYDROCHLORIDE 100 MG: 10 INJECTION, SOLUTION INFILTRATION; PERINEURAL at 12:37

## 2018-03-23 RX ADMIN — METFORMIN HYDROCHLORIDE 500 MG: 500 TABLET, EXTENDED RELEASE ORAL at 20:12

## 2018-03-23 RX ADMIN — BUDESONIDE AND FORMOTEROL FUMARATE DIHYDRATE 2 PUFF: 160; 4.5 AEROSOL RESPIRATORY (INHALATION) at 07:23

## 2018-03-23 RX ADMIN — LEVETIRACETAM 1000 MG: 10 INJECTION INTRAVENOUS at 00:16

## 2018-03-23 RX ADMIN — NYSTATIN: 100000 OINTMENT TOPICAL at 09:34

## 2018-03-23 RX ADMIN — ONDANSETRON 4 MG: 2 INJECTION INTRAMUSCULAR; INTRAVENOUS at 12:57

## 2018-03-23 RX ADMIN — EPHEDRINE SULFATE 20 MG: 50 INJECTION INTRAMUSCULAR; INTRAVENOUS; SUBCUTANEOUS at 12:42

## 2018-03-23 RX ADMIN — HYDROCODONE BITARTRATE AND ACETAMINOPHEN 1 TABLET: 5; 325 TABLET ORAL at 20:11

## 2018-03-23 RX ADMIN — FENTANYL CITRATE 50 MCG: 50 INJECTION, SOLUTION INTRAMUSCULAR; INTRAVENOUS at 13:38

## 2018-03-23 RX ADMIN — THIAMINE HCL TAB 100 MG 100 MG: 100 TAB at 09:29

## 2018-03-23 RX ADMIN — CEFTRIAXONE SODIUM 2 G: 2 INJECTION, SOLUTION INTRAVENOUS at 21:14

## 2018-03-23 RX ADMIN — LEVETIRACETAM 1000 MG: 10 INJECTION INTRAVENOUS at 22:44

## 2018-03-23 RX ADMIN — OXYBUTYNIN CHLORIDE 10 MG: 5 TABLET, EXTENDED RELEASE ORAL at 18:19

## 2018-03-23 RX ADMIN — IPRATROPIUM BROMIDE AND ALBUTEROL SULFATE 3 ML: 2.5; .5 SOLUTION RESPIRATORY (INHALATION) at 07:23

## 2018-03-23 RX ADMIN — GABAPENTIN 800 MG: 400 CAPSULE ORAL at 20:12

## 2018-03-23 RX ADMIN — METHENAMINE HIPPURATE 1 G: 1 TABLET ORAL at 18:19

## 2018-03-23 NOTE — ANESTHESIA POSTPROCEDURE EVALUATION
Patient: Vidhi Lopez    Procedure Summary     Date:  03/23/18 Room / Location:   [facility] OR 14 /  [facility] OR    Anesthesia Start:  1224 Anesthesia Stop:      Procedure:  ARTHROSCOPY, RIGHT KNEE  INCISION AND DRAINAGE LOWER EXTREMITY with synovial biopsy (Right Knee) Diagnosis:      Surgeon:  Dilip Giron MD Provider:  Juwan Shafer MD    Anesthesia Type:  general ASA Status:  3          Anesthesia Type: general  Last vitals 3/23/18 @ 1330  BP   97/43   Temp   97.1   Pulse   66   Resp   20   SpO2   93%     Post Anesthesia Care and Evaluation    Patient location during evaluation: PACU  Patient participation: complete - patient cannot participate  Level of consciousness: responsive to physical stimuli  Pain score: 0  Pain management: adequate  Airway patency: patent  Anesthetic complications: No anesthetic complications    Cardiovascular status: stable  Respiratory status: acceptable, oral airway, spontaneous ventilation and face mask  Hydration status: stable    Comments: Pt transferred to PACU with O2. Vital signs stable. Report to PACU RN and care accepted.

## 2018-03-23 NOTE — ANESTHESIA PROCEDURE NOTES
Peripheral IV    Patient location during procedure: OR  Line placed for Fluids/Medication Admin.  Performed By   CRNA: MERVAT ROMERO  Preanesthetic Checklist  Completed: patient identified, site marked, surgical consent, pre-op evaluation, timeout performed, IV checked, risks and benefits discussed and monitors and equipment checked  Peripheral IV Prep   Patient position: supine   Prep: alcohol swabs  Patient monitoring: heart rate, cardiac monitor and continuous pulse ox  Peripheral IV Procedure   Laterality:right  Location:  Wrist (Inner wrist)  Catheter size: 20 G         Post Assessment   Dressing Type: transparent.    IV Dressing/Site: clean, dry and intact

## 2018-03-23 NOTE — ANESTHESIA PREPROCEDURE EVALUATION
Anesthesia Evaluation     Patient summary reviewed and Nursing notes reviewed   history of anesthetic complications: PONV  NPO Solid Status: > 8 hours  NPO Liquid Status: > 8 hours           Airway   Mallampati: II  TM distance: >3 FB  Neck ROM: full  No difficulty expected  Dental      Pulmonary     breath sounds clear to auscultation  (+) pneumonia , COPD, shortness of breath,   Cardiovascular     ECG reviewed  Rhythm: regular  Rate: normal    (+) hypertension, CAD, cardiac stents Drug eluting stent within the past 12 months PVD, hyperlipidemia,       Neuro/Psych  (+) seizures,     GI/Hepatic/Renal/Endo    (+) obesity,   diabetes mellitus,     Musculoskeletal     Abdominal    Substance History      OB/GYN          Other   (+) arthritis     ROS/Med Hx Other: TTE: no EF without significant valvular dz;                   Anesthesia Plan    ASA 3     general     intravenous induction   Anesthetic plan and risks discussed with patient.    Plan discussed with CRNA.

## 2018-03-23 NOTE — ANESTHESIA PROCEDURE NOTES
Airway  Urgency: elective    Airway not difficult    General Information and Staff    Patient location during procedure: OR  CRNA: MERVAT ROMERO    Indications and Patient Condition  Indications for airway management: airway protection    Preoxygenated: yes  MILS maintained throughout  Mask difficulty assessment: 2 - vent by mask + OA or adjuvant +/- NMBA    Final Airway Details  Final airway type: endotracheal airway      Successful airway: ETT  Cuffed: yes   Successful intubation technique: direct laryngoscopy  Facilitating devices/methods: Bougie and cricoid pressure (Cricoid pressure for view)  Endotracheal tube insertion site: oral  Blade: Rivera  Blade size: #2  ETT size: 7.0 mm  Cormack-Lehane Classification: grade IIa - partial view of glottis  Placement verified by: chest auscultation and capnometry   Cuff volume (mL): 5  Measured from: lips  ETT to lips (cm): 21  Number of attempts at approach: 1    Additional Comments  Pt to OR 14. Pt moved self to OR table. ASA monitors placed. Pre-O2 with 100% Oxygen. Upon attempting induction, existing IV not working, unable to flush. Right inner wrist 20 g PIV started, attempt x 1. SIVI. Atraumatic intubation.

## 2018-03-24 LAB
CCP IGA+IGG SERPL IA-ACNC: 6 UNITS (ref 0–19)
GLUCOSE BLDC GLUCOMTR-MCNC: 116 MG/DL (ref 70–130)
GLUCOSE BLDC GLUCOMTR-MCNC: 162 MG/DL (ref 70–130)
GLUCOSE BLDC GLUCOMTR-MCNC: 178 MG/DL (ref 70–130)
GLUCOSE BLDC GLUCOMTR-MCNC: 189 MG/DL (ref 70–130)

## 2018-03-24 PROCEDURE — 82962 GLUCOSE BLOOD TEST: CPT

## 2018-03-24 PROCEDURE — 25010000002 VANCOMYCIN PER 500 MG

## 2018-03-24 PROCEDURE — 25010000002 ENOXAPARIN PER 10 MG

## 2018-03-24 PROCEDURE — 25010000003 CEFTRIAXONE PER 250 MG: Performed by: INTERNAL MEDICINE

## 2018-03-24 PROCEDURE — 94640 AIRWAY INHALATION TREATMENT: CPT

## 2018-03-24 PROCEDURE — 94760 N-INVAS EAR/PLS OXIMETRY 1: CPT

## 2018-03-24 PROCEDURE — 99232 SBSQ HOSP IP/OBS MODERATE 35: CPT | Performed by: HOSPITALIST

## 2018-03-24 PROCEDURE — 94799 UNLISTED PULMONARY SVC/PX: CPT

## 2018-03-24 PROCEDURE — 97162 PT EVAL MOD COMPLEX 30 MIN: CPT

## 2018-03-24 PROCEDURE — 97116 GAIT TRAINING THERAPY: CPT

## 2018-03-24 PROCEDURE — 97110 THERAPEUTIC EXERCISES: CPT

## 2018-03-24 PROCEDURE — 25010000003 LEVETIRACETAM IN NACL 0.75% 1000 MG/100ML SOLUTION: Performed by: PSYCHIATRY & NEUROLOGY

## 2018-03-24 RX ORDER — OXYCODONE HYDROCHLORIDE AND ACETAMINOPHEN 5; 325 MG/1; MG/1
1 TABLET ORAL EVERY 4 HOURS PRN
Status: DISCONTINUED | OUTPATIENT
Start: 2018-03-24 | End: 2018-03-24

## 2018-03-24 RX ORDER — OXYCODONE HYDROCHLORIDE AND ACETAMINOPHEN 5; 325 MG/1; MG/1
1 TABLET ORAL EVERY 4 HOURS PRN
Status: DISCONTINUED | OUTPATIENT
Start: 2018-03-24 | End: 2018-03-25

## 2018-03-24 RX ADMIN — NYSTATIN: 100000 OINTMENT TOPICAL at 09:51

## 2018-03-24 RX ADMIN — METFORMIN HYDROCHLORIDE 500 MG: 500 TABLET, EXTENDED RELEASE ORAL at 09:49

## 2018-03-24 RX ADMIN — THIAMINE HCL TAB 100 MG 100 MG: 100 TAB at 09:48

## 2018-03-24 RX ADMIN — Medication: at 20:02

## 2018-03-24 RX ADMIN — LEVETIRACETAM 1000 MG: 10 INJECTION INTRAVENOUS at 12:57

## 2018-03-24 RX ADMIN — METFORMIN HYDROCHLORIDE 500 MG: 500 TABLET, EXTENDED RELEASE ORAL at 19:59

## 2018-03-24 RX ADMIN — MULTIPLE VITAMINS W/ MINERALS TAB 1 TABLET: TAB ORAL at 09:50

## 2018-03-24 RX ADMIN — INSULIN LISPRO 10 UNITS: 100 INJECTION, SOLUTION INTRAVENOUS; SUBCUTANEOUS at 12:57

## 2018-03-24 RX ADMIN — OXYCODONE AND ACETAMINOPHEN 1 TABLET: 5; 325 TABLET ORAL at 15:24

## 2018-03-24 RX ADMIN — GABAPENTIN 800 MG: 400 CAPSULE ORAL at 09:49

## 2018-03-24 RX ADMIN — METHENAMINE HIPPURATE 1 G: 1 TABLET ORAL at 09:49

## 2018-03-24 RX ADMIN — INSULIN LISPRO 2 UNITS: 100 INJECTION, SOLUTION INTRAVENOUS; SUBCUTANEOUS at 22:26

## 2018-03-24 RX ADMIN — INSULIN LISPRO 2 UNITS: 100 INJECTION, SOLUTION INTRAVENOUS; SUBCUTANEOUS at 12:57

## 2018-03-24 RX ADMIN — BUDESONIDE AND FORMOTEROL FUMARATE DIHYDRATE 2 PUFF: 160; 4.5 AEROSOL RESPIRATORY (INHALATION) at 08:09

## 2018-03-24 RX ADMIN — METOPROLOL TARTRATE 50 MG: 50 TABLET ORAL at 09:49

## 2018-03-24 RX ADMIN — ASPIRIN 81 MG: 81 TABLET, COATED ORAL at 09:49

## 2018-03-24 RX ADMIN — OXYCODONE AND ACETAMINOPHEN 1 TABLET: 5; 325 TABLET ORAL at 23:47

## 2018-03-24 RX ADMIN — INSULIN LISPRO 10 UNITS: 100 INJECTION, SOLUTION INTRAVENOUS; SUBCUTANEOUS at 16:56

## 2018-03-24 RX ADMIN — CEFTRIAXONE SODIUM 2 G: 2 INJECTION, SOLUTION INTRAVENOUS at 19:58

## 2018-03-24 RX ADMIN — IPRATROPIUM BROMIDE AND ALBUTEROL SULFATE 3 ML: 2.5; .5 SOLUTION RESPIRATORY (INHALATION) at 12:43

## 2018-03-24 RX ADMIN — ATORVASTATIN CALCIUM 80 MG: 40 TABLET, FILM COATED ORAL at 09:48

## 2018-03-24 RX ADMIN — ACETAMINOPHEN 650 MG: 325 TABLET ORAL at 14:21

## 2018-03-24 RX ADMIN — GABAPENTIN 800 MG: 400 CAPSULE ORAL at 19:59

## 2018-03-24 RX ADMIN — METHENAMINE HIPPURATE 1 G: 1 TABLET ORAL at 16:55

## 2018-03-24 RX ADMIN — POTASSIUM CHLORIDE 20 MEQ: 750 CAPSULE, EXTENDED RELEASE ORAL at 09:49

## 2018-03-24 RX ADMIN — HYDROCODONE BITARTRATE AND ACETAMINOPHEN 1 TABLET: 5; 325 TABLET ORAL at 09:50

## 2018-03-24 RX ADMIN — TIZANIDINE 2 MG: 4 TABLET ORAL at 16:56

## 2018-03-24 RX ADMIN — HYDROCODONE BITARTRATE AND ACETAMINOPHEN 1 TABLET: 5; 325 TABLET ORAL at 06:05

## 2018-03-24 RX ADMIN — VANCOMYCIN HYDROCHLORIDE 1000 MG: 1 INJECTION, SOLUTION INTRAVENOUS at 12:57

## 2018-03-24 RX ADMIN — ENOXAPARIN SODIUM 40 MG: 40 INJECTION SUBCUTANEOUS at 19:58

## 2018-03-24 RX ADMIN — BUMETANIDE 1 MG: 1 TABLET ORAL at 09:49

## 2018-03-24 RX ADMIN — CLOPIDOGREL BISULFATE 75 MG: 75 TABLET ORAL at 09:49

## 2018-03-24 RX ADMIN — LEVETIRACETAM 1000 MG: 10 INJECTION INTRAVENOUS at 23:47

## 2018-03-24 RX ADMIN — IPRATROPIUM BROMIDE AND ALBUTEROL SULFATE 3 ML: 2.5; .5 SOLUTION RESPIRATORY (INHALATION) at 15:59

## 2018-03-24 RX ADMIN — METOPROLOL TARTRATE 50 MG: 50 TABLET ORAL at 19:59

## 2018-03-24 RX ADMIN — INSULIN LISPRO 10 UNITS: 100 INJECTION, SOLUTION INTRAVENOUS; SUBCUTANEOUS at 09:51

## 2018-03-24 RX ADMIN — IPRATROPIUM BROMIDE AND ALBUTEROL SULFATE 3 ML: 2.5; .5 SOLUTION RESPIRATORY (INHALATION) at 19:36

## 2018-03-24 RX ADMIN — INSULIN LISPRO 2 UNITS: 100 INJECTION, SOLUTION INTRAVENOUS; SUBCUTANEOUS at 16:56

## 2018-03-24 RX ADMIN — OXYBUTYNIN CHLORIDE 10 MG: 5 TABLET, EXTENDED RELEASE ORAL at 09:49

## 2018-03-24 RX ADMIN — IPRATROPIUM BROMIDE AND ALBUTEROL SULFATE 3 ML: 2.5; .5 SOLUTION RESPIRATORY (INHALATION) at 08:07

## 2018-03-25 LAB
BACTERIA SPEC AEROBE CULT: NORMAL
BACTERIA SPEC AEROBE CULT: NORMAL
GLUCOSE BLDC GLUCOMTR-MCNC: 126 MG/DL (ref 70–130)
GLUCOSE BLDC GLUCOMTR-MCNC: 154 MG/DL (ref 70–130)
GLUCOSE BLDC GLUCOMTR-MCNC: 180 MG/DL (ref 70–130)
GLUCOSE BLDC GLUCOMTR-MCNC: 184 MG/DL (ref 70–130)

## 2018-03-25 PROCEDURE — 94640 AIRWAY INHALATION TREATMENT: CPT

## 2018-03-25 PROCEDURE — 97110 THERAPEUTIC EXERCISES: CPT

## 2018-03-25 PROCEDURE — 25010000002 ENOXAPARIN PER 10 MG

## 2018-03-25 PROCEDURE — 94799 UNLISTED PULMONARY SVC/PX: CPT

## 2018-03-25 PROCEDURE — 25010000002 VANCOMYCIN PER 500 MG

## 2018-03-25 PROCEDURE — 25010000003 LEVETIRACETAM IN NACL 0.75% 1000 MG/100ML SOLUTION: Performed by: PSYCHIATRY & NEUROLOGY

## 2018-03-25 PROCEDURE — 25010000003 CEFTRIAXONE PER 250 MG: Performed by: INTERNAL MEDICINE

## 2018-03-25 PROCEDURE — 99233 SBSQ HOSP IP/OBS HIGH 50: CPT | Performed by: HOSPITALIST

## 2018-03-25 PROCEDURE — 97116 GAIT TRAINING THERAPY: CPT

## 2018-03-25 PROCEDURE — 82962 GLUCOSE BLOOD TEST: CPT

## 2018-03-25 RX ORDER — HYDROCODONE BITARTRATE AND ACETAMINOPHEN 5; 325 MG/1; MG/1
1 TABLET ORAL EVERY 4 HOURS PRN
Status: DISCONTINUED | OUTPATIENT
Start: 2018-03-25 | End: 2018-03-29 | Stop reason: HOSPADM

## 2018-03-25 RX ADMIN — HYDROCODONE BITARTRATE AND ACETAMINOPHEN 1 TABLET: 5; 325 TABLET ORAL at 13:43

## 2018-03-25 RX ADMIN — IPRATROPIUM BROMIDE AND ALBUTEROL SULFATE 3 ML: 2.5; .5 SOLUTION RESPIRATORY (INHALATION) at 16:14

## 2018-03-25 RX ADMIN — HYDROCODONE BITARTRATE AND ACETAMINOPHEN 1 TABLET: 5; 325 TABLET ORAL at 18:49

## 2018-03-25 RX ADMIN — BUMETANIDE 1 MG: 1 TABLET ORAL at 08:49

## 2018-03-25 RX ADMIN — METHENAMINE HIPPURATE 1 G: 1 TABLET ORAL at 08:50

## 2018-03-25 RX ADMIN — METFORMIN HYDROCHLORIDE 500 MG: 500 TABLET, EXTENDED RELEASE ORAL at 08:49

## 2018-03-25 RX ADMIN — GABAPENTIN 800 MG: 400 CAPSULE ORAL at 21:58

## 2018-03-25 RX ADMIN — METOPROLOL TARTRATE 50 MG: 50 TABLET ORAL at 08:49

## 2018-03-25 RX ADMIN — OXYCODONE AND ACETAMINOPHEN 1 TABLET: 5; 325 TABLET ORAL at 03:24

## 2018-03-25 RX ADMIN — GABAPENTIN 800 MG: 400 CAPSULE ORAL at 08:49

## 2018-03-25 RX ADMIN — INSULIN LISPRO 10 UNITS: 100 INJECTION, SOLUTION INTRAVENOUS; SUBCUTANEOUS at 17:14

## 2018-03-25 RX ADMIN — IPRATROPIUM BROMIDE AND ALBUTEROL SULFATE 3 ML: 2.5; .5 SOLUTION RESPIRATORY (INHALATION) at 13:22

## 2018-03-25 RX ADMIN — POTASSIUM CHLORIDE 20 MEQ: 750 CAPSULE, EXTENDED RELEASE ORAL at 08:49

## 2018-03-25 RX ADMIN — INSULIN LISPRO 2 UNITS: 100 INJECTION, SOLUTION INTRAVENOUS; SUBCUTANEOUS at 12:02

## 2018-03-25 RX ADMIN — METFORMIN HYDROCHLORIDE 500 MG: 500 TABLET, EXTENDED RELEASE ORAL at 21:58

## 2018-03-25 RX ADMIN — METOPROLOL TARTRATE 50 MG: 50 TABLET ORAL at 21:59

## 2018-03-25 RX ADMIN — INSULIN LISPRO 2 UNITS: 100 INJECTION, SOLUTION INTRAVENOUS; SUBCUTANEOUS at 17:15

## 2018-03-25 RX ADMIN — HYDROCODONE BITARTRATE AND ACETAMINOPHEN 1 TABLET: 5; 325 TABLET ORAL at 23:01

## 2018-03-25 RX ADMIN — LEVETIRACETAM 1000 MG: 10 INJECTION INTRAVENOUS at 12:01

## 2018-03-25 RX ADMIN — OXYCODONE AND ACETAMINOPHEN 1 TABLET: 5; 325 TABLET ORAL at 08:49

## 2018-03-25 RX ADMIN — VANCOMYCIN HYDROCHLORIDE 1000 MG: 1 INJECTION, SOLUTION INTRAVENOUS at 00:20

## 2018-03-25 RX ADMIN — INSULIN LISPRO 10 UNITS: 100 INJECTION, SOLUTION INTRAVENOUS; SUBCUTANEOUS at 08:51

## 2018-03-25 RX ADMIN — OXYBUTYNIN CHLORIDE 10 MG: 5 TABLET, EXTENDED RELEASE ORAL at 08:50

## 2018-03-25 RX ADMIN — LEVETIRACETAM 1000 MG: 10 INJECTION INTRAVENOUS at 23:01

## 2018-03-25 RX ADMIN — ASPIRIN 81 MG: 81 TABLET, COATED ORAL at 08:49

## 2018-03-25 RX ADMIN — INSULIN LISPRO 10 UNITS: 100 INJECTION, SOLUTION INTRAVENOUS; SUBCUTANEOUS at 12:02

## 2018-03-25 RX ADMIN — TIZANIDINE 2 MG: 4 TABLET ORAL at 17:14

## 2018-03-25 RX ADMIN — THIAMINE HCL TAB 100 MG 100 MG: 100 TAB at 08:49

## 2018-03-25 RX ADMIN — ATORVASTATIN CALCIUM 80 MG: 40 TABLET, FILM COATED ORAL at 08:49

## 2018-03-25 RX ADMIN — ENOXAPARIN SODIUM 40 MG: 40 INJECTION SUBCUTANEOUS at 21:59

## 2018-03-25 RX ADMIN — MIRTAZAPINE 15 MG: 15 TABLET, FILM COATED ORAL at 21:59

## 2018-03-25 RX ADMIN — NYSTATIN: 100000 OINTMENT TOPICAL at 21:59

## 2018-03-25 RX ADMIN — BUDESONIDE AND FORMOTEROL FUMARATE DIHYDRATE 2 PUFF: 160; 4.5 AEROSOL RESPIRATORY (INHALATION) at 08:32

## 2018-03-25 RX ADMIN — IPRATROPIUM BROMIDE AND ALBUTEROL SULFATE 3 ML: 2.5; .5 SOLUTION RESPIRATORY (INHALATION) at 08:32

## 2018-03-25 RX ADMIN — INSULIN LISPRO 2 UNITS: 100 INJECTION, SOLUTION INTRAVENOUS; SUBCUTANEOUS at 22:14

## 2018-03-25 RX ADMIN — VANCOMYCIN HYDROCHLORIDE 1000 MG: 1 INJECTION, SOLUTION INTRAVENOUS at 12:02

## 2018-03-25 RX ADMIN — NYSTATIN: 100000 OINTMENT TOPICAL at 08:50

## 2018-03-25 RX ADMIN — METHENAMINE HIPPURATE 1 G: 1 TABLET ORAL at 17:14

## 2018-03-25 RX ADMIN — CLOPIDOGREL BISULFATE 75 MG: 75 TABLET ORAL at 08:49

## 2018-03-25 RX ADMIN — MULTIPLE VITAMINS W/ MINERALS TAB 1 TABLET: TAB ORAL at 08:49

## 2018-03-25 RX ADMIN — CEFTRIAXONE SODIUM 2 G: 2 INJECTION, SOLUTION INTRAVENOUS at 21:58

## 2018-03-26 LAB
ANION GAP SERPL CALCULATED.3IONS-SCNC: 8 MMOL/L (ref 3–11)
BUN BLD-MCNC: 16 MG/DL (ref 9–23)
BUN/CREAT SERPL: 17.8 (ref 7–25)
CALCIUM SPEC-SCNC: 9.1 MG/DL (ref 8.7–10.4)
CHLORIDE SERPL-SCNC: 102 MMOL/L (ref 99–109)
CO2 SERPL-SCNC: 28 MMOL/L (ref 20–31)
CREAT BLD-MCNC: 0.9 MG/DL (ref 0.6–1.3)
DEPRECATED RDW RBC AUTO: 59.7 FL (ref 37–54)
ERYTHROCYTE [DISTWIDTH] IN BLOOD BY AUTOMATED COUNT: 16.3 % (ref 11.3–14.5)
GFR SERPL CREATININE-BSD FRML MDRD: 62 ML/MIN/1.73
GLUCOSE BLD-MCNC: 134 MG/DL (ref 70–100)
GLUCOSE BLDC GLUCOMTR-MCNC: 157 MG/DL (ref 70–130)
GLUCOSE BLDC GLUCOMTR-MCNC: 163 MG/DL (ref 70–130)
GLUCOSE BLDC GLUCOMTR-MCNC: 182 MG/DL (ref 70–130)
GLUCOSE BLDC GLUCOMTR-MCNC: 97 MG/DL (ref 70–130)
HCT VFR BLD AUTO: 28.2 % (ref 34.5–44)
HGB BLD-MCNC: 8.6 G/DL (ref 11.5–15.5)
MCH RBC QN AUTO: 30.3 PG (ref 27–31)
MCHC RBC AUTO-ENTMCNC: 30.5 G/DL (ref 32–36)
MCV RBC AUTO: 99.3 FL (ref 80–99)
PLATELET # BLD AUTO: 322 10*3/MM3 (ref 150–450)
PMV BLD AUTO: 9.4 FL (ref 6–12)
POTASSIUM BLD-SCNC: 4.2 MMOL/L (ref 3.5–5.5)
RBC # BLD AUTO: 2.84 10*6/MM3 (ref 3.89–5.14)
SODIUM BLD-SCNC: 138 MMOL/L (ref 132–146)
URATE SERPL-MCNC: 8.1 MG/DL (ref 3.1–7.8)
WBC NRBC COR # BLD: 10.94 10*3/MM3 (ref 3.5–10.8)

## 2018-03-26 PROCEDURE — 85027 COMPLETE CBC AUTOMATED: CPT | Performed by: HOSPITALIST

## 2018-03-26 PROCEDURE — 82962 GLUCOSE BLOOD TEST: CPT

## 2018-03-26 PROCEDURE — 94799 UNLISTED PULMONARY SVC/PX: CPT

## 2018-03-26 PROCEDURE — 25010000002 ENOXAPARIN PER 10 MG

## 2018-03-26 PROCEDURE — 97116 GAIT TRAINING THERAPY: CPT

## 2018-03-26 PROCEDURE — 99233 SBSQ HOSP IP/OBS HIGH 50: CPT | Performed by: INTERNAL MEDICINE

## 2018-03-26 PROCEDURE — 94640 AIRWAY INHALATION TREATMENT: CPT

## 2018-03-26 PROCEDURE — 80048 BASIC METABOLIC PNL TOTAL CA: CPT | Performed by: HOSPITALIST

## 2018-03-26 PROCEDURE — 97110 THERAPEUTIC EXERCISES: CPT

## 2018-03-26 PROCEDURE — 25010000002 VANCOMYCIN PER 500 MG

## 2018-03-26 PROCEDURE — 99231 SBSQ HOSP IP/OBS SF/LOW 25: CPT | Performed by: PSYCHIATRY & NEUROLOGY

## 2018-03-26 PROCEDURE — 84550 ASSAY OF BLOOD/URIC ACID: CPT | Performed by: INTERNAL MEDICINE

## 2018-03-26 PROCEDURE — 25010000003 LEVETIRACETAM IN NACL 0.75% 1000 MG/100ML SOLUTION: Performed by: PSYCHIATRY & NEUROLOGY

## 2018-03-26 RX ORDER — INDOMETHACIN 25 MG/1
50 CAPSULE ORAL
Status: DISCONTINUED | OUTPATIENT
Start: 2018-03-26 | End: 2018-03-26

## 2018-03-26 RX ORDER — COLCHICINE 0.6 MG/1
0.6 TABLET ORAL EVERY EVENING
Status: COMPLETED | OUTPATIENT
Start: 2018-03-26 | End: 2018-03-26

## 2018-03-26 RX ORDER — COLCHICINE 0.6 MG/1
1.2 TABLET ORAL ONCE
Status: COMPLETED | OUTPATIENT
Start: 2018-03-26 | End: 2018-03-26

## 2018-03-26 RX ADMIN — THIAMINE HCL TAB 100 MG 100 MG: 100 TAB at 08:43

## 2018-03-26 RX ADMIN — METOPROLOL TARTRATE 50 MG: 50 TABLET ORAL at 08:41

## 2018-03-26 RX ADMIN — TIZANIDINE 2 MG: 4 TABLET ORAL at 17:44

## 2018-03-26 RX ADMIN — POTASSIUM CHLORIDE 20 MEQ: 750 CAPSULE, EXTENDED RELEASE ORAL at 08:41

## 2018-03-26 RX ADMIN — HYDROCODONE BITARTRATE AND ACETAMINOPHEN 1 TABLET: 5; 325 TABLET ORAL at 03:02

## 2018-03-26 RX ADMIN — COLCHICINE 0.6 MG: 0.6 TABLET, FILM COATED ORAL at 17:43

## 2018-03-26 RX ADMIN — IPRATROPIUM BROMIDE AND ALBUTEROL SULFATE 3 ML: 2.5; .5 SOLUTION RESPIRATORY (INHALATION) at 15:36

## 2018-03-26 RX ADMIN — METOPROLOL TARTRATE 50 MG: 50 TABLET ORAL at 21:37

## 2018-03-26 RX ADMIN — IPRATROPIUM BROMIDE AND ALBUTEROL SULFATE 3 ML: 2.5; .5 SOLUTION RESPIRATORY (INHALATION) at 20:21

## 2018-03-26 RX ADMIN — HYDROCODONE BITARTRATE AND ACETAMINOPHEN 1 TABLET: 5; 325 TABLET ORAL at 08:40

## 2018-03-26 RX ADMIN — METHENAMINE HIPPURATE 1 G: 1 TABLET ORAL at 17:43

## 2018-03-26 RX ADMIN — METHENAMINE HIPPURATE 1 G: 1 TABLET ORAL at 08:43

## 2018-03-26 RX ADMIN — IPRATROPIUM BROMIDE AND ALBUTEROL SULFATE 3 ML: 2.5; .5 SOLUTION RESPIRATORY (INHALATION) at 08:59

## 2018-03-26 RX ADMIN — HYDROCODONE BITARTRATE AND ACETAMINOPHEN 1 TABLET: 5; 325 TABLET ORAL at 18:48

## 2018-03-26 RX ADMIN — OXYBUTYNIN CHLORIDE 10 MG: 5 TABLET, EXTENDED RELEASE ORAL at 08:43

## 2018-03-26 RX ADMIN — GABAPENTIN 800 MG: 400 CAPSULE ORAL at 08:41

## 2018-03-26 RX ADMIN — LEVETIRACETAM 1000 MG: 10 INJECTION INTRAVENOUS at 12:15

## 2018-03-26 RX ADMIN — INSULIN LISPRO 2 UNITS: 100 INJECTION, SOLUTION INTRAVENOUS; SUBCUTANEOUS at 21:37

## 2018-03-26 RX ADMIN — ATORVASTATIN CALCIUM 80 MG: 40 TABLET, FILM COATED ORAL at 08:41

## 2018-03-26 RX ADMIN — ENOXAPARIN SODIUM 40 MG: 40 INJECTION SUBCUTANEOUS at 21:37

## 2018-03-26 RX ADMIN — MIRTAZAPINE 15 MG: 15 TABLET, FILM COATED ORAL at 21:37

## 2018-03-26 RX ADMIN — GABAPENTIN 800 MG: 400 CAPSULE ORAL at 21:37

## 2018-03-26 RX ADMIN — BUMETANIDE 1 MG: 1 TABLET ORAL at 08:41

## 2018-03-26 RX ADMIN — COLCHICINE 1.2 MG: 0.6 TABLET, FILM COATED ORAL at 12:15

## 2018-03-26 RX ADMIN — CLOPIDOGREL BISULFATE 75 MG: 75 TABLET ORAL at 08:42

## 2018-03-26 RX ADMIN — METFORMIN HYDROCHLORIDE 500 MG: 500 TABLET, EXTENDED RELEASE ORAL at 08:41

## 2018-03-26 RX ADMIN — METFORMIN HYDROCHLORIDE 500 MG: 500 TABLET, EXTENDED RELEASE ORAL at 21:37

## 2018-03-26 RX ADMIN — INSULIN LISPRO 2 UNITS: 100 INJECTION, SOLUTION INTRAVENOUS; SUBCUTANEOUS at 08:53

## 2018-03-26 RX ADMIN — BUDESONIDE AND FORMOTEROL FUMARATE DIHYDRATE 2 PUFF: 160; 4.5 AEROSOL RESPIRATORY (INHALATION) at 08:59

## 2018-03-26 RX ADMIN — INSULIN LISPRO 2 UNITS: 100 INJECTION, SOLUTION INTRAVENOUS; SUBCUTANEOUS at 17:46

## 2018-03-26 RX ADMIN — ASPIRIN 81 MG: 81 TABLET, COATED ORAL at 08:41

## 2018-03-26 RX ADMIN — MULTIPLE VITAMINS W/ MINERALS TAB 1 TABLET: TAB ORAL at 08:42

## 2018-03-26 RX ADMIN — INSULIN LISPRO 10 UNITS: 100 INJECTION, SOLUTION INTRAVENOUS; SUBCUTANEOUS at 17:45

## 2018-03-26 RX ADMIN — INSULIN LISPRO 10 UNITS: 100 INJECTION, SOLUTION INTRAVENOUS; SUBCUTANEOUS at 08:53

## 2018-03-26 RX ADMIN — VANCOMYCIN HYDROCHLORIDE 1000 MG: 1 INJECTION, SOLUTION INTRAVENOUS at 00:11

## 2018-03-26 RX ADMIN — HYDROCODONE BITARTRATE AND ACETAMINOPHEN 1 TABLET: 5; 325 TABLET ORAL at 23:06

## 2018-03-26 RX ADMIN — INSULIN LISPRO 10 UNITS: 100 INJECTION, SOLUTION INTRAVENOUS; SUBCUTANEOUS at 12:18

## 2018-03-26 RX ADMIN — HYDROCODONE BITARTRATE AND ACETAMINOPHEN 1 TABLET: 5; 325 TABLET ORAL at 13:38

## 2018-03-27 LAB
ANION GAP SERPL CALCULATED.3IONS-SCNC: 8 MMOL/L (ref 3–11)
BACTERIA SPEC AEROBE CULT: NORMAL
BUN BLD-MCNC: 13 MG/DL (ref 9–23)
BUN/CREAT SERPL: 14.4 (ref 7–25)
CALCIUM SPEC-SCNC: 9.6 MG/DL (ref 8.7–10.4)
CHLORIDE SERPL-SCNC: 105 MMOL/L (ref 99–109)
CO2 SERPL-SCNC: 28 MMOL/L (ref 20–31)
CREAT BLD-MCNC: 0.9 MG/DL (ref 0.6–1.3)
DEPRECATED RDW RBC AUTO: 60.2 FL (ref 37–54)
ERYTHROCYTE [DISTWIDTH] IN BLOOD BY AUTOMATED COUNT: 16.6 % (ref 11.3–14.5)
GFR SERPL CREATININE-BSD FRML MDRD: 62 ML/MIN/1.73
GLUCOSE BLD-MCNC: 159 MG/DL (ref 70–100)
GLUCOSE BLDC GLUCOMTR-MCNC: 129 MG/DL (ref 70–130)
GLUCOSE BLDC GLUCOMTR-MCNC: 164 MG/DL (ref 70–130)
GLUCOSE BLDC GLUCOMTR-MCNC: 181 MG/DL (ref 70–130)
GLUCOSE BLDC GLUCOMTR-MCNC: 189 MG/DL (ref 70–130)
GRAM STN SPEC: NORMAL
GRAM STN SPEC: NORMAL
HCT VFR BLD AUTO: 30.1 % (ref 34.5–44)
HGB BLD-MCNC: 9 G/DL (ref 11.5–15.5)
MCH RBC QN AUTO: 29.6 PG (ref 27–31)
MCHC RBC AUTO-ENTMCNC: 29.9 G/DL (ref 32–36)
MCV RBC AUTO: 99 FL (ref 80–99)
PLATELET # BLD AUTO: 349 10*3/MM3 (ref 150–450)
PMV BLD AUTO: 9.3 FL (ref 6–12)
POTASSIUM BLD-SCNC: 4 MMOL/L (ref 3.5–5.5)
RBC # BLD AUTO: 3.04 10*6/MM3 (ref 3.89–5.14)
SODIUM BLD-SCNC: 141 MMOL/L (ref 132–146)
WBC NRBC COR # BLD: 7.13 10*3/MM3 (ref 3.5–10.8)

## 2018-03-27 PROCEDURE — 85027 COMPLETE CBC AUTOMATED: CPT | Performed by: INTERNAL MEDICINE

## 2018-03-27 PROCEDURE — 94640 AIRWAY INHALATION TREATMENT: CPT

## 2018-03-27 PROCEDURE — 25010000003 LEVETIRACETAM IN NACL 0.75% 1000 MG/100ML SOLUTION: Performed by: PSYCHIATRY & NEUROLOGY

## 2018-03-27 PROCEDURE — 82962 GLUCOSE BLOOD TEST: CPT

## 2018-03-27 PROCEDURE — 94799 UNLISTED PULMONARY SVC/PX: CPT

## 2018-03-27 PROCEDURE — 25010000002 ENOXAPARIN PER 10 MG

## 2018-03-27 PROCEDURE — 97110 THERAPEUTIC EXERCISES: CPT

## 2018-03-27 PROCEDURE — 97116 GAIT TRAINING THERAPY: CPT

## 2018-03-27 PROCEDURE — 80048 BASIC METABOLIC PNL TOTAL CA: CPT | Performed by: INTERNAL MEDICINE

## 2018-03-27 PROCEDURE — 99232 SBSQ HOSP IP/OBS MODERATE 35: CPT | Performed by: INTERNAL MEDICINE

## 2018-03-27 RX ADMIN — INSULIN LISPRO 2 UNITS: 100 INJECTION, SOLUTION INTRAVENOUS; SUBCUTANEOUS at 21:10

## 2018-03-27 RX ADMIN — INSULIN LISPRO 2 UNITS: 100 INJECTION, SOLUTION INTRAVENOUS; SUBCUTANEOUS at 08:43

## 2018-03-27 RX ADMIN — INSULIN LISPRO 10 UNITS: 100 INJECTION, SOLUTION INTRAVENOUS; SUBCUTANEOUS at 12:50

## 2018-03-27 RX ADMIN — MULTIPLE VITAMINS W/ MINERALS TAB: TAB ORAL at 08:36

## 2018-03-27 RX ADMIN — METFORMIN HYDROCHLORIDE 500 MG: 500 TABLET, EXTENDED RELEASE ORAL at 08:37

## 2018-03-27 RX ADMIN — TIZANIDINE 2 MG: 4 TABLET ORAL at 16:32

## 2018-03-27 RX ADMIN — GABAPENTIN 800 MG: 400 CAPSULE ORAL at 08:37

## 2018-03-27 RX ADMIN — MIRTAZAPINE 15 MG: 15 TABLET, FILM COATED ORAL at 21:08

## 2018-03-27 RX ADMIN — IPRATROPIUM BROMIDE AND ALBUTEROL SULFATE 3 ML: 2.5; .5 SOLUTION RESPIRATORY (INHALATION) at 16:52

## 2018-03-27 RX ADMIN — IPRATROPIUM BROMIDE AND ALBUTEROL SULFATE 3 ML: 2.5; .5 SOLUTION RESPIRATORY (INHALATION) at 12:39

## 2018-03-27 RX ADMIN — BUDESONIDE AND FORMOTEROL FUMARATE DIHYDRATE 2 PUFF: 160; 4.5 AEROSOL RESPIRATORY (INHALATION) at 07:21

## 2018-03-27 RX ADMIN — METFORMIN HYDROCHLORIDE 500 MG: 500 TABLET, EXTENDED RELEASE ORAL at 21:08

## 2018-03-27 RX ADMIN — HYDROCODONE BITARTRATE AND ACETAMINOPHEN 1 TABLET: 5; 325 TABLET ORAL at 05:47

## 2018-03-27 RX ADMIN — ENOXAPARIN SODIUM 40 MG: 40 INJECTION SUBCUTANEOUS at 21:08

## 2018-03-27 RX ADMIN — CLOPIDOGREL BISULFATE 75 MG: 75 TABLET ORAL at 08:36

## 2018-03-27 RX ADMIN — METOPROLOL TARTRATE 50 MG: 50 TABLET ORAL at 21:08

## 2018-03-27 RX ADMIN — POTASSIUM CHLORIDE 20 MEQ: 750 CAPSULE, EXTENDED RELEASE ORAL at 08:37

## 2018-03-27 RX ADMIN — INSULIN LISPRO 10 UNITS: 100 INJECTION, SOLUTION INTRAVENOUS; SUBCUTANEOUS at 17:11

## 2018-03-27 RX ADMIN — BUMETANIDE 1 MG: 1 TABLET ORAL at 08:37

## 2018-03-27 RX ADMIN — LEVETIRACETAM 1000 MG: 10 INJECTION INTRAVENOUS at 12:44

## 2018-03-27 RX ADMIN — INSULIN LISPRO 2 UNITS: 100 INJECTION, SOLUTION INTRAVENOUS; SUBCUTANEOUS at 17:11

## 2018-03-27 RX ADMIN — HYDROCODONE BITARTRATE AND ACETAMINOPHEN 1 TABLET: 5; 325 TABLET ORAL at 21:08

## 2018-03-27 RX ADMIN — GABAPENTIN 800 MG: 400 CAPSULE ORAL at 21:08

## 2018-03-27 RX ADMIN — IPRATROPIUM BROMIDE AND ALBUTEROL SULFATE 3 ML: 2.5; .5 SOLUTION RESPIRATORY (INHALATION) at 07:21

## 2018-03-27 RX ADMIN — HYDROCODONE BITARTRATE AND ACETAMINOPHEN 1 TABLET: 5; 325 TABLET ORAL at 10:10

## 2018-03-27 RX ADMIN — OXYBUTYNIN CHLORIDE 10 MG: 5 TABLET, EXTENDED RELEASE ORAL at 08:37

## 2018-03-27 RX ADMIN — LEVETIRACETAM 1000 MG: 10 INJECTION INTRAVENOUS at 01:01

## 2018-03-27 RX ADMIN — METHENAMINE HIPPURATE 1 G: 1 TABLET ORAL at 08:36

## 2018-03-27 RX ADMIN — METHENAMINE HIPPURATE 1 G: 1 TABLET ORAL at 17:10

## 2018-03-27 RX ADMIN — ATORVASTATIN CALCIUM 80 MG: 40 TABLET, FILM COATED ORAL at 08:36

## 2018-03-27 RX ADMIN — ASPIRIN 81 MG: 81 TABLET, COATED ORAL at 08:37

## 2018-03-27 RX ADMIN — HYDROCODONE BITARTRATE AND ACETAMINOPHEN 1 TABLET: 5; 325 TABLET ORAL at 15:23

## 2018-03-27 RX ADMIN — INSULIN LISPRO 10 UNITS: 100 INJECTION, SOLUTION INTRAVENOUS; SUBCUTANEOUS at 08:42

## 2018-03-27 RX ADMIN — METOPROLOL TARTRATE 50 MG: 50 TABLET ORAL at 08:37

## 2018-03-27 RX ADMIN — THIAMINE HCL TAB 100 MG 100 MG: 100 TAB at 08:37

## 2018-03-28 LAB
C DIFF TOX GENS STL QL NAA+PROBE: NOT DETECTED
GLUCOSE BLDC GLUCOMTR-MCNC: 107 MG/DL (ref 70–130)
GLUCOSE BLDC GLUCOMTR-MCNC: 141 MG/DL (ref 70–130)
GLUCOSE BLDC GLUCOMTR-MCNC: 152 MG/DL (ref 70–130)
GLUCOSE BLDC GLUCOMTR-MCNC: 172 MG/DL (ref 70–130)

## 2018-03-28 PROCEDURE — 25010000002 ENOXAPARIN PER 10 MG

## 2018-03-28 PROCEDURE — 87493 C DIFF AMPLIFIED PROBE: CPT | Performed by: NURSE PRACTITIONER

## 2018-03-28 PROCEDURE — 94799 UNLISTED PULMONARY SVC/PX: CPT

## 2018-03-28 PROCEDURE — 97110 THERAPEUTIC EXERCISES: CPT

## 2018-03-28 PROCEDURE — 25010000002 HYDRALAZINE PER 20 MG: Performed by: INTERNAL MEDICINE

## 2018-03-28 PROCEDURE — 25010000003 LEVETIRACETAM IN NACL 0.75% 1000 MG/100ML SOLUTION: Performed by: PSYCHIATRY & NEUROLOGY

## 2018-03-28 PROCEDURE — 99233 SBSQ HOSP IP/OBS HIGH 50: CPT | Performed by: NURSE PRACTITIONER

## 2018-03-28 PROCEDURE — 97116 GAIT TRAINING THERAPY: CPT

## 2018-03-28 PROCEDURE — 94640 AIRWAY INHALATION TREATMENT: CPT

## 2018-03-28 PROCEDURE — 82962 GLUCOSE BLOOD TEST: CPT

## 2018-03-28 RX ORDER — ALLOPURINOL 100 MG/1
100 TABLET ORAL DAILY
Status: DISCONTINUED | OUTPATIENT
Start: 2018-03-28 | End: 2018-03-29 | Stop reason: HOSPADM

## 2018-03-28 RX ORDER — LEVETIRACETAM 500 MG/1
1000 TABLET ORAL EVERY 12 HOURS SCHEDULED
Status: DISCONTINUED | OUTPATIENT
Start: 2018-03-28 | End: 2018-03-29 | Stop reason: HOSPADM

## 2018-03-28 RX ADMIN — BUDESONIDE AND FORMOTEROL FUMARATE DIHYDRATE 2 PUFF: 160; 4.5 AEROSOL RESPIRATORY (INHALATION) at 07:22

## 2018-03-28 RX ADMIN — GABAPENTIN 800 MG: 400 CAPSULE ORAL at 08:31

## 2018-03-28 RX ADMIN — HYDROCODONE BITARTRATE AND ACETAMINOPHEN 1 TABLET: 5; 325 TABLET ORAL at 05:35

## 2018-03-28 RX ADMIN — METFORMIN HYDROCHLORIDE 500 MG: 500 TABLET, EXTENDED RELEASE ORAL at 20:53

## 2018-03-28 RX ADMIN — MIRTAZAPINE 15 MG: 15 TABLET, FILM COATED ORAL at 22:41

## 2018-03-28 RX ADMIN — HYDROCODONE BITARTRATE AND ACETAMINOPHEN 1 TABLET: 5; 325 TABLET ORAL at 10:13

## 2018-03-28 RX ADMIN — ALLOPURINOL 100 MG: 100 TABLET ORAL at 11:59

## 2018-03-28 RX ADMIN — Medication 10 MG: at 05:36

## 2018-03-28 RX ADMIN — IPRATROPIUM BROMIDE AND ALBUTEROL SULFATE 3 ML: 2.5; .5 SOLUTION RESPIRATORY (INHALATION) at 07:22

## 2018-03-28 RX ADMIN — INSULIN LISPRO 10 UNITS: 100 INJECTION, SOLUTION INTRAVENOUS; SUBCUTANEOUS at 11:58

## 2018-03-28 RX ADMIN — THIAMINE HCL TAB 100 MG 100 MG: 100 TAB at 08:30

## 2018-03-28 RX ADMIN — LEVETIRACETAM 1000 MG: 500 TABLET, FILM COATED ORAL at 20:53

## 2018-03-28 RX ADMIN — NYSTATIN 1 APPLICATION: 100000 OINTMENT TOPICAL at 08:33

## 2018-03-28 RX ADMIN — ASPIRIN 81 MG: 81 TABLET, COATED ORAL at 08:30

## 2018-03-28 RX ADMIN — INSULIN LISPRO 2 UNITS: 100 INJECTION, SOLUTION INTRAVENOUS; SUBCUTANEOUS at 08:34

## 2018-03-28 RX ADMIN — HYDROCODONE BITARTRATE AND ACETAMINOPHEN 1 TABLET: 5; 325 TABLET ORAL at 22:41

## 2018-03-28 RX ADMIN — MULTIPLE VITAMINS W/ MINERALS TAB 1 TABLET: TAB ORAL at 08:32

## 2018-03-28 RX ADMIN — IPRATROPIUM BROMIDE AND ALBUTEROL SULFATE 3 ML: 2.5; .5 SOLUTION RESPIRATORY (INHALATION) at 17:05

## 2018-03-28 RX ADMIN — BUMETANIDE 1 MG: 1 TABLET ORAL at 08:30

## 2018-03-28 RX ADMIN — ATORVASTATIN CALCIUM 80 MG: 40 TABLET, FILM COATED ORAL at 08:31

## 2018-03-28 RX ADMIN — LEVETIRACETAM 1000 MG: 10 INJECTION INTRAVENOUS at 00:46

## 2018-03-28 RX ADMIN — INSULIN LISPRO 2 UNITS: 100 INJECTION, SOLUTION INTRAVENOUS; SUBCUTANEOUS at 20:52

## 2018-03-28 RX ADMIN — METOPROLOL TARTRATE 50 MG: 50 TABLET ORAL at 20:53

## 2018-03-28 RX ADMIN — IPRATROPIUM BROMIDE AND ALBUTEROL SULFATE 3 ML: 2.5; .5 SOLUTION RESPIRATORY (INHALATION) at 21:41

## 2018-03-28 RX ADMIN — METOPROLOL TARTRATE 50 MG: 50 TABLET ORAL at 08:32

## 2018-03-28 RX ADMIN — HYDROCODONE BITARTRATE AND ACETAMINOPHEN 1 TABLET: 5; 325 TABLET ORAL at 17:58

## 2018-03-28 RX ADMIN — METHENAMINE HIPPURATE 1 G: 1 TABLET ORAL at 08:31

## 2018-03-28 RX ADMIN — CLOPIDOGREL BISULFATE 75 MG: 75 TABLET ORAL at 08:30

## 2018-03-28 RX ADMIN — POTASSIUM CHLORIDE 20 MEQ: 750 CAPSULE, EXTENDED RELEASE ORAL at 08:28

## 2018-03-28 RX ADMIN — METHENAMINE HIPPURATE 1 G: 1 TABLET ORAL at 17:58

## 2018-03-28 RX ADMIN — ENOXAPARIN SODIUM 40 MG: 40 INJECTION SUBCUTANEOUS at 20:52

## 2018-03-28 RX ADMIN — LEVETIRACETAM 1000 MG: 500 TABLET, FILM COATED ORAL at 11:59

## 2018-03-28 RX ADMIN — INSULIN LISPRO 10 UNITS: 100 INJECTION, SOLUTION INTRAVENOUS; SUBCUTANEOUS at 08:35

## 2018-03-28 RX ADMIN — INSULIN LISPRO 10 UNITS: 100 INJECTION, SOLUTION INTRAVENOUS; SUBCUTANEOUS at 17:58

## 2018-03-28 RX ADMIN — IPRATROPIUM BROMIDE AND ALBUTEROL SULFATE 3 ML: 2.5; .5 SOLUTION RESPIRATORY (INHALATION) at 13:08

## 2018-03-28 RX ADMIN — OXYBUTYNIN CHLORIDE 10 MG: 5 TABLET, EXTENDED RELEASE ORAL at 08:29

## 2018-03-28 RX ADMIN — HYDROCODONE BITARTRATE AND ACETAMINOPHEN 1 TABLET: 5; 325 TABLET ORAL at 00:46

## 2018-03-28 RX ADMIN — GABAPENTIN 800 MG: 400 CAPSULE ORAL at 20:53

## 2018-03-28 RX ADMIN — TIZANIDINE 2 MG: 4 TABLET ORAL at 17:58

## 2018-03-28 RX ADMIN — METFORMIN HYDROCHLORIDE 500 MG: 500 TABLET, EXTENDED RELEASE ORAL at 08:32

## 2018-03-29 VITALS
RESPIRATION RATE: 16 BRPM | OXYGEN SATURATION: 98 % | HEIGHT: 64 IN | TEMPERATURE: 98.7 F | BODY MASS INDEX: 36.11 KG/M2 | SYSTOLIC BLOOD PRESSURE: 169 MMHG | DIASTOLIC BLOOD PRESSURE: 73 MMHG | WEIGHT: 211.5 LBS | HEART RATE: 69 BPM

## 2018-03-29 LAB
GLUCOSE BLDC GLUCOMTR-MCNC: 135 MG/DL (ref 70–130)
GLUCOSE BLDC GLUCOMTR-MCNC: 135 MG/DL (ref 70–130)
GLUCOSE BLDC GLUCOMTR-MCNC: 158 MG/DL (ref 70–130)

## 2018-03-29 PROCEDURE — 94640 AIRWAY INHALATION TREATMENT: CPT

## 2018-03-29 PROCEDURE — 82962 GLUCOSE BLOOD TEST: CPT

## 2018-03-29 PROCEDURE — 94799 UNLISTED PULMONARY SVC/PX: CPT

## 2018-03-29 PROCEDURE — 97110 THERAPEUTIC EXERCISES: CPT

## 2018-03-29 PROCEDURE — 97116 GAIT TRAINING THERAPY: CPT

## 2018-03-29 PROCEDURE — 99239 HOSP IP/OBS DSCHRG MGMT >30: CPT | Performed by: NURSE PRACTITIONER

## 2018-03-29 RX ORDER — NYSTATIN 100000 U/G
OINTMENT TOPICAL 2 TIMES DAILY
Qty: 15 G | Refills: 0 | Status: ON HOLD
Start: 2018-03-29 | End: 2018-10-15

## 2018-03-29 RX ORDER — LANOLIN ALCOHOL/MO/W.PET/CERES
100 CREAM (GRAM) TOPICAL DAILY
Qty: 30 TABLET | Refills: 0
Start: 2018-03-30 | End: 2018-09-12

## 2018-03-29 RX ORDER — BUMETANIDE 1 MG/1
1 TABLET ORAL DAILY
Qty: 30 TABLET | Refills: 0
Start: 2018-03-30 | End: 2018-05-17 | Stop reason: SINTOL

## 2018-03-29 RX ORDER — HYDROCODONE BITARTRATE AND ACETAMINOPHEN 5; 325 MG/1; MG/1
1 TABLET ORAL EVERY 4 HOURS PRN
Qty: 18 TABLET | Refills: 0 | Status: SHIPPED | OUTPATIENT
Start: 2018-03-29 | End: 2018-04-04

## 2018-03-29 RX ORDER — COLCHICINE 0.6 MG/1
1.2 TABLET ORAL ONCE
Status: COMPLETED | OUTPATIENT
Start: 2018-03-29 | End: 2018-03-29

## 2018-03-29 RX ORDER — LEVETIRACETAM 1000 MG/1
1000 TABLET ORAL EVERY 12 HOURS SCHEDULED
Qty: 60 TABLET | Refills: 0
Start: 2018-03-29 | End: 2018-05-03 | Stop reason: SDUPTHER

## 2018-03-29 RX ORDER — ALLOPURINOL 100 MG/1
100 TABLET ORAL DAILY
Qty: 30 TABLET | Refills: 0
Start: 2018-03-30 | End: 2018-09-12

## 2018-03-29 RX ADMIN — HYDROCODONE BITARTRATE AND ACETAMINOPHEN 1 TABLET: 5; 325 TABLET ORAL at 08:35

## 2018-03-29 RX ADMIN — ALLOPURINOL 100 MG: 100 TABLET ORAL at 08:35

## 2018-03-29 RX ADMIN — BUMETANIDE 1 MG: 1 TABLET ORAL at 08:33

## 2018-03-29 RX ADMIN — THIAMINE HCL TAB 100 MG 100 MG: 100 TAB at 08:33

## 2018-03-29 RX ADMIN — ATORVASTATIN CALCIUM 80 MG: 40 TABLET, FILM COATED ORAL at 08:33

## 2018-03-29 RX ADMIN — COLCHICINE 1.2 MG: 0.6 TABLET, FILM COATED ORAL at 12:30

## 2018-03-29 RX ADMIN — GABAPENTIN 800 MG: 400 CAPSULE ORAL at 08:33

## 2018-03-29 RX ADMIN — INSULIN LISPRO 10 UNITS: 100 INJECTION, SOLUTION INTRAVENOUS; SUBCUTANEOUS at 08:36

## 2018-03-29 RX ADMIN — IPRATROPIUM BROMIDE AND ALBUTEROL SULFATE 3 ML: 2.5; .5 SOLUTION RESPIRATORY (INHALATION) at 08:26

## 2018-03-29 RX ADMIN — ASPIRIN 81 MG: 81 TABLET, COATED ORAL at 08:34

## 2018-03-29 RX ADMIN — METOPROLOL TARTRATE 50 MG: 50 TABLET ORAL at 08:35

## 2018-03-29 RX ADMIN — POTASSIUM CHLORIDE 20 MEQ: 750 CAPSULE, EXTENDED RELEASE ORAL at 08:41

## 2018-03-29 RX ADMIN — MULTIPLE VITAMINS W/ MINERALS TAB 1 TABLET: TAB ORAL at 08:33

## 2018-03-29 RX ADMIN — BUDESONIDE AND FORMOTEROL FUMARATE DIHYDRATE 2 PUFF: 160; 4.5 AEROSOL RESPIRATORY (INHALATION) at 08:27

## 2018-03-29 RX ADMIN — LEVETIRACETAM 1000 MG: 500 TABLET, FILM COATED ORAL at 08:34

## 2018-03-29 RX ADMIN — HYDROCODONE BITARTRATE AND ACETAMINOPHEN 1 TABLET: 5; 325 TABLET ORAL at 04:48

## 2018-03-29 RX ADMIN — OXYBUTYNIN CHLORIDE 10 MG: 5 TABLET, EXTENDED RELEASE ORAL at 08:35

## 2018-03-29 RX ADMIN — METHENAMINE HIPPURATE 1 G: 1 TABLET ORAL at 08:33

## 2018-03-29 RX ADMIN — HYDROCODONE BITARTRATE AND ACETAMINOPHEN 1 TABLET: 5; 325 TABLET ORAL at 14:35

## 2018-03-29 RX ADMIN — METFORMIN HYDROCHLORIDE 500 MG: 500 TABLET, EXTENDED RELEASE ORAL at 08:33

## 2018-03-29 RX ADMIN — INSULIN LISPRO 10 UNITS: 100 INJECTION, SOLUTION INTRAVENOUS; SUBCUTANEOUS at 12:30

## 2018-03-29 RX ADMIN — IPRATROPIUM BROMIDE AND ALBUTEROL SULFATE 3 ML: 2.5; .5 SOLUTION RESPIRATORY (INHALATION) at 14:02

## 2018-03-29 RX ADMIN — CLOPIDOGREL BISULFATE 75 MG: 75 TABLET ORAL at 08:34

## 2018-04-01 ENCOUNTER — LAB REQUISITION (OUTPATIENT)
Dept: LAB | Facility: HOSPITAL | Age: 68
End: 2018-04-01

## 2018-04-01 DIAGNOSIS — Z00.00 ROUTINE GENERAL MEDICAL EXAMINATION AT A HEALTH CARE FACILITY: ICD-10-CM

## 2018-04-01 LAB
ANION GAP SERPL CALCULATED.3IONS-SCNC: 14.9 MMOL/L
BASOPHILS # BLD AUTO: 0.05 10*3/MM3 (ref 0–0.2)
BASOPHILS NFR BLD AUTO: 0.5 % (ref 0–1.5)
BUN BLD-MCNC: 31 MG/DL (ref 8–23)
BUN/CREAT SERPL: 16 (ref 7–25)
CALCIUM SPEC-SCNC: 9.8 MG/DL (ref 8.6–10.5)
CHLORIDE SERPL-SCNC: 97 MMOL/L (ref 98–107)
CO2 SERPL-SCNC: 27.1 MMOL/L (ref 22–29)
CREAT BLD-MCNC: 1.94 MG/DL (ref 0.57–1)
DEPRECATED RDW RBC AUTO: 60.2 FL (ref 37–54)
EOSINOPHIL # BLD AUTO: 0.23 10*3/MM3 (ref 0–0.7)
EOSINOPHIL NFR BLD AUTO: 2.1 % (ref 0.3–6.2)
ERYTHROCYTE [DISTWIDTH] IN BLOOD BY AUTOMATED COUNT: 16.3 % (ref 11.7–13)
GFR SERPL CREATININE-BSD FRML MDRD: 26 ML/MIN/1.73
GLUCOSE BLD-MCNC: 127 MG/DL (ref 65–99)
HCT VFR BLD AUTO: 32.2 % (ref 35.6–45.5)
HGB BLD-MCNC: 9.5 G/DL (ref 11.9–15.5)
IMM GRANULOCYTES # BLD: 0.03 10*3/MM3 (ref 0–0.03)
IMM GRANULOCYTES NFR BLD: 0.3 % (ref 0–0.5)
LYMPHOCYTES # BLD AUTO: 1.95 10*3/MM3 (ref 0.9–4.8)
LYMPHOCYTES NFR BLD AUTO: 18.1 % (ref 19.6–45.3)
MCH RBC QN AUTO: 29.8 PG (ref 26.9–32)
MCHC RBC AUTO-ENTMCNC: 29.5 G/DL (ref 32.4–36.3)
MCV RBC AUTO: 100.9 FL (ref 80.5–98.2)
MONOCYTES # BLD AUTO: 0.57 10*3/MM3 (ref 0.2–1.2)
MONOCYTES NFR BLD AUTO: 5.3 % (ref 5–12)
NEUTROPHILS # BLD AUTO: 7.97 10*3/MM3 (ref 1.9–8.1)
NEUTROPHILS NFR BLD AUTO: 73.7 % (ref 42.7–76)
NRBC BLD MANUAL-RTO: 0 /100 WBC (ref 0–0)
PLATELET # BLD AUTO: 412 10*3/MM3 (ref 140–500)
PMV BLD AUTO: 9.3 FL (ref 6–12)
POTASSIUM BLD-SCNC: 4.7 MMOL/L (ref 3.5–5.2)
RBC # BLD AUTO: 3.19 10*6/MM3 (ref 3.9–5.2)
SODIUM BLD-SCNC: 139 MMOL/L (ref 136–145)
WBC NRBC COR # BLD: 10.8 10*3/MM3 (ref 4.5–10.7)

## 2018-04-01 PROCEDURE — 85025 COMPLETE CBC W/AUTO DIFF WBC: CPT

## 2018-04-01 PROCEDURE — 80048 BASIC METABOLIC PNL TOTAL CA: CPT

## 2018-04-02 ENCOUNTER — APPOINTMENT (OUTPATIENT)
Dept: GENERAL RADIOLOGY | Facility: HOSPITAL | Age: 68
End: 2018-04-02

## 2018-04-02 ENCOUNTER — APPOINTMENT (OUTPATIENT)
Dept: CARDIOLOGY | Facility: HOSPITAL | Age: 68
End: 2018-04-02
Attending: EMERGENCY MEDICINE

## 2018-04-02 ENCOUNTER — HOSPITAL ENCOUNTER (EMERGENCY)
Facility: HOSPITAL | Age: 68
Discharge: HOME | End: 2018-04-02
Attending: EMERGENCY MEDICINE | Admitting: EMERGENCY MEDICINE

## 2018-04-02 VITALS
SYSTOLIC BLOOD PRESSURE: 120 MMHG | HEART RATE: 71 BPM | OXYGEN SATURATION: 94 % | RESPIRATION RATE: 14 BRPM | WEIGHT: 200 LBS | BODY MASS INDEX: 36.8 KG/M2 | HEIGHT: 62 IN | TEMPERATURE: 98.2 F | DIASTOLIC BLOOD PRESSURE: 60 MMHG

## 2018-04-02 DIAGNOSIS — E87.29 RESPIRATORY ACIDOSIS: ICD-10-CM

## 2018-04-02 DIAGNOSIS — R06.89 HYPOVENTILATION SYNDROME: Primary | ICD-10-CM

## 2018-04-02 LAB
ALBUMIN SERPL-MCNC: 3.4 G/DL (ref 3.5–5.2)
ALBUMIN/GLOB SERPL: 1.1 G/DL
ALP SERPL-CCNC: 81 U/L (ref 39–117)
ALT SERPL W P-5'-P-CCNC: 16 U/L (ref 1–33)
ANION GAP SERPL CALCULATED.3IONS-SCNC: 10.2 MMOL/L
ARTERIAL PATENCY WRIST A: ABNORMAL
AST SERPL-CCNC: 12 U/L (ref 1–32)
ATMOSPHERIC PRESS: 755.1 MMHG
BASE EXCESS BLDA CALC-SCNC: 0.2 MMOL/L (ref 0–2)
BASOPHILS # BLD AUTO: 0.05 10*3/MM3 (ref 0–0.2)
BASOPHILS NFR BLD AUTO: 0.6 % (ref 0–1.5)
BDY SITE: ABNORMAL
BH CV LOWER VASCULAR LEFT COMMON FEMORAL AUGMENT: NORMAL
BH CV LOWER VASCULAR LEFT COMMON FEMORAL COMPETENT: NORMAL
BH CV LOWER VASCULAR LEFT COMMON FEMORAL COMPRESS: NORMAL
BH CV LOWER VASCULAR LEFT COMMON FEMORAL PHASIC: NORMAL
BH CV LOWER VASCULAR LEFT COMMON FEMORAL SPONT: NORMAL
BH CV LOWER VASCULAR RIGHT COMMON FEMORAL AUGMENT: NORMAL
BH CV LOWER VASCULAR RIGHT COMMON FEMORAL COMPETENT: NORMAL
BH CV LOWER VASCULAR RIGHT COMMON FEMORAL COMPRESS: NORMAL
BH CV LOWER VASCULAR RIGHT COMMON FEMORAL PHASIC: NORMAL
BH CV LOWER VASCULAR RIGHT COMMON FEMORAL SPONT: NORMAL
BH CV LOWER VASCULAR RIGHT DISTAL FEMORAL COMPRESS: NORMAL
BH CV LOWER VASCULAR RIGHT GASTRONEMIUS COMPRESS: NORMAL
BH CV LOWER VASCULAR RIGHT GREATER SAPH AK COMPRESS: NORMAL
BH CV LOWER VASCULAR RIGHT GREATER SAPH BK COMPRESS: NORMAL
BH CV LOWER VASCULAR RIGHT LESSER SAPH COMPRESS: NORMAL
BH CV LOWER VASCULAR RIGHT MID FEMORAL AUGMENT: NORMAL
BH CV LOWER VASCULAR RIGHT MID FEMORAL COMPETENT: NORMAL
BH CV LOWER VASCULAR RIGHT MID FEMORAL COMPRESS: NORMAL
BH CV LOWER VASCULAR RIGHT MID FEMORAL PHASIC: NORMAL
BH CV LOWER VASCULAR RIGHT MID FEMORAL SPONT: NORMAL
BH CV LOWER VASCULAR RIGHT PERONEAL COMPRESS: NORMAL
BH CV LOWER VASCULAR RIGHT POPLITEAL AUGMENT: NORMAL
BH CV LOWER VASCULAR RIGHT POPLITEAL COMPETENT: NORMAL
BH CV LOWER VASCULAR RIGHT POPLITEAL COMPRESS: NORMAL
BH CV LOWER VASCULAR RIGHT POPLITEAL PHASIC: NORMAL
BH CV LOWER VASCULAR RIGHT POPLITEAL SPONT: NORMAL
BH CV LOWER VASCULAR RIGHT POSTERIOR TIBIAL COMPRESS: NORMAL
BH CV LOWER VASCULAR RIGHT PROXIMAL FEMORAL COMPRESS: NORMAL
BH CV LOWER VASCULAR RIGHT SAPHENOFEMORAL JUNCTION COMPRESS: NORMAL
BH CV LOWER VASCULAR RIGHT SAPHENOFEMORAL JUNCTION PHASIC: NORMAL
BH CV LOWER VASCULAR RIGHT SAPHENOFEMORAL JUNCTION SPONT: NORMAL
BILIRUB SERPL-MCNC: 0.2 MG/DL (ref 0.1–1.2)
BUN BLD-MCNC: 32 MG/DL (ref 8–23)
BUN/CREAT SERPL: 20 (ref 7–25)
CALCIUM SPEC-SCNC: 9.5 MG/DL (ref 8.6–10.5)
CHLORIDE SERPL-SCNC: 101 MMOL/L (ref 98–107)
CO2 SERPL-SCNC: 28.8 MMOL/L (ref 22–29)
CREAT BLD-MCNC: 1.6 MG/DL (ref 0.57–1)
DEPRECATED RDW RBC AUTO: 59 FL (ref 37–54)
EOSINOPHIL # BLD AUTO: 0.21 10*3/MM3 (ref 0–0.7)
EOSINOPHIL NFR BLD AUTO: 2.7 % (ref 0.3–6.2)
ERYTHROCYTE [DISTWIDTH] IN BLOOD BY AUTOMATED COUNT: 16.3 % (ref 11.7–13)
GAS FLOW AIRWAY: 3 LPM
GFR SERPL CREATININE-BSD FRML MDRD: 32 ML/MIN/1.73
GLOBULIN UR ELPH-MCNC: 3.2 GM/DL
GLUCOSE BLD-MCNC: 126 MG/DL (ref 65–99)
HCO3 BLDA-SCNC: 27.1 MMOL/L (ref 22–28)
HCT VFR BLD AUTO: 28 % (ref 35.6–45.5)
HGB BLD-MCNC: 8.5 G/DL (ref 11.9–15.5)
IMM GRANULOCYTES # BLD: 0.02 10*3/MM3 (ref 0–0.03)
IMM GRANULOCYTES NFR BLD: 0.3 % (ref 0–0.5)
LYMPHOCYTES # BLD AUTO: 1.69 10*3/MM3 (ref 0.9–4.8)
LYMPHOCYTES NFR BLD AUTO: 21.5 % (ref 19.6–45.3)
MCH RBC QN AUTO: 29.9 PG (ref 26.9–32)
MCHC RBC AUTO-ENTMCNC: 30.4 G/DL (ref 32.4–36.3)
MCV RBC AUTO: 98.6 FL (ref 80.5–98.2)
MODALITY: ABNORMAL
MONOCYTES # BLD AUTO: 0.31 10*3/MM3 (ref 0.2–1.2)
MONOCYTES NFR BLD AUTO: 3.9 % (ref 5–12)
NEUTROPHILS # BLD AUTO: 5.59 10*3/MM3 (ref 1.9–8.1)
NEUTROPHILS NFR BLD AUTO: 71 % (ref 42.7–76)
NT-PROBNP SERPL-MCNC: 700.1 PG/ML (ref 0–900)
PCO2 BLDA: 55.7 MM HG (ref 35–45)
PH BLDA: 7.3 PH UNITS (ref 7.35–7.45)
PLATELET # BLD AUTO: 319 10*3/MM3 (ref 140–500)
PMV BLD AUTO: 9.1 FL (ref 6–12)
PO2 BLDA: 90.5 MM HG (ref 80–100)
POTASSIUM BLD-SCNC: 4.4 MMOL/L (ref 3.5–5.2)
PROT SERPL-MCNC: 6.6 G/DL (ref 6–8.5)
RBC # BLD AUTO: 2.84 10*6/MM3 (ref 3.9–5.2)
SAO2 % BLDCOA: 95.7 % (ref 92–99)
SODIUM BLD-SCNC: 140 MMOL/L (ref 136–145)
TOTAL RATE: 16 BREATHS/MINUTE
TROPONIN T SERPL-MCNC: <0.01 NG/ML (ref 0–0.03)
WBC NRBC COR # BLD: 7.87 10*3/MM3 (ref 4.5–10.7)

## 2018-04-02 PROCEDURE — 99284 EMERGENCY DEPT VISIT MOD MDM: CPT

## 2018-04-02 PROCEDURE — 83880 ASSAY OF NATRIURETIC PEPTIDE: CPT | Performed by: EMERGENCY MEDICINE

## 2018-04-02 PROCEDURE — 93005 ELECTROCARDIOGRAM TRACING: CPT | Performed by: EMERGENCY MEDICINE

## 2018-04-02 PROCEDURE — 93971 EXTREMITY STUDY: CPT

## 2018-04-02 PROCEDURE — 82803 BLOOD GASES ANY COMBINATION: CPT

## 2018-04-02 PROCEDURE — 36600 WITHDRAWAL OF ARTERIAL BLOOD: CPT

## 2018-04-02 PROCEDURE — 85025 COMPLETE CBC W/AUTO DIFF WBC: CPT | Performed by: EMERGENCY MEDICINE

## 2018-04-02 PROCEDURE — 80053 COMPREHEN METABOLIC PANEL: CPT | Performed by: EMERGENCY MEDICINE

## 2018-04-02 PROCEDURE — 84484 ASSAY OF TROPONIN QUANT: CPT | Performed by: EMERGENCY MEDICINE

## 2018-04-02 PROCEDURE — 93010 ELECTROCARDIOGRAM REPORT: CPT | Performed by: INTERNAL MEDICINE

## 2018-04-02 PROCEDURE — 71046 X-RAY EXAM CHEST 2 VIEWS: CPT

## 2018-04-02 NOTE — ED PROVIDER NOTES
EMERGENCY DEPARTMENT ENCOUNTER    CHIEF COMPLAINT  Chief Complaint: SOA per NH - Pt has no complaints of SOA  History given by: pt   History limited by: none  Room Number: 42/42  PMD: Michael Mays MD      HPI:  Pt is a 68 y.o. female who presents to the ED from her nursing home with reports of SOA. Per the pt, she was sent to the ED for lowered O2 sats, she has no complaints of SOA. Pt also c/o leg swelling and R knee pain s/p recent surgery. Pt denies SOA at this time, as well as CP, fever, HA, sore throat, abd pain, N/V/D, and dysuria. Pt states that she is normally on 2L O2 while asleep, and that she was tired this morning (did not use Trilogy machine last night). Pt states that she has not taken pain medication this AM. Per the pt, she has a Hx of PE, and is currently on ASA and plavix.     Duration/Onset/Timing: since this AM  Quality: reported SOA   Intensity/Severity: none currently   Associated Symptoms: R knee pain, leg swelling  Aggravating or Alleviating Factors: none   Previous Episodes: Pt reports a Hx of PE.       PAST MEDICAL HISTORY  Active Ambulatory Problems     Diagnosis Date Noted   • Dyslipidemia 07/14/2016   • Hypertension 07/14/2016   • Anxiety 07/14/2016   • Insomnia 07/14/2016   • GERD (gastroesophageal reflux disease) 07/14/2016   • Diabetes mellitus 07/14/2016   • Fibromyalgia 07/14/2016   • RLS (restless legs syndrome) 07/14/2016   • Migraines 07/14/2016   • Asthma 07/14/2016   • COPD (chronic obstructive pulmonary disease) 07/14/2016   • Interstitial cystitis 07/14/2016   • History of chronic bronchitis 07/14/2016   • History of acute myocardial infarction 07/14/2016   • History of cardiac murmur 07/14/2016   • History of stroke 07/14/2016   • CAD (coronary artery disease) 07/14/2016   • Essential hypertension 01/12/2017   • Generalized edema 09/20/2017   • CKD (chronic kidney disease) stage 2, GFR 60-89 ml/min 09/20/2017   • Bilateral carotid artery stenosis 09/20/2017   •  Pneumonia of both lungs due to infectious organism 09/28/2017   • Acute on chronic respiratory failure with hypoxia and hypercapnia 09/30/2017   • Obstructive sleep apnea 09/30/2017   • Obesity (BMI 30-39.9) 09/30/2017   • Diabetes mellitus type 2 in obese 09/30/2017   • Obesity hypoventilation syndrome 03/17/2018   • Tobacco use 03/17/2018   • Acute metabolic encephalopathy 03/17/2018   • Acute kidney injury superimposed on chronic kidney disease 03/17/2018   • SIRS (systemic inflammatory response syndrome) 03/17/2018   • Elevated LFTs 03/17/2018   • Right leg pain 03/19/2018   • Elevated d-dimer 03/19/2018   • ESR raised 03/20/2018   • Seizure disorder, nonconvulsive, with status epilepticus 03/20/2018   • Septic joint of right knee joint 03/21/2018     Resolved Ambulatory Problems     Diagnosis Date Noted   • No Resolved Ambulatory Problems     Past Medical History:   Diagnosis Date   • Allergic rhinitis    • Asthma with COPD    • Bilateral ovarian cysts    • Coronary artery disease    • Depression with anxiety    • Diabetes    • Endometriosis    • Fatigue    • Fibromyalgia    • Headache    • High cholesterol    • History of gallstones    • History of myocardial infarction    • Hypertension    • Influenza B    • Interstitial cystitis    • On home oxygen therapy    • URIEL on CPAP    • PONV (postoperative nausea and vomiting)    • Shortness of breath on exertion    • Stroke    • Thyroid disorder    • Urinary leakage    • UTI (urinary tract infection)    • Wears glasses    • Yeast dermatitis        PAST SURGICAL HISTORY  Past Surgical History:   Procedure Laterality Date   • ARTERIOGRAM N/A 2/12/2018    Procedure: Renal Arteriogram;  Surgeon: Lito Flores MD;  Location:  [facility];  Service:    • BREAST BIOPSY Right 1991   • CARDIAC CATHETERIZATION N/A 2/12/2018    Procedure: Right Heart Cath;  Surgeon: Lito Flores MD;  Location:  [facility];  Service:    • CAROTID STENT       Transcath    • CAROTID STENT Left    • CHOLECYSTECTOMY     • CYSTOSTOMY W/ BLADDER BIOPSY     • DILATATION AND CURETTAGE     • KNEE ARTHROSCOPY Right 3/23/2018    Procedure: ARTHROSCOPY, RIGHT KNEE  INCISION AND DRAINAGE LOWER EXTREMITY WITH SYNOVIAL BIOPSY;  Surgeon: Dilip Giron MD;  Location: [facility];  Service: Orthopedics   • LAPAROSCOPIC CHOLECYSTECTOMY  1994    Lap rolando   • OOPHORECTOMY     • PAP SMEAR  05/10/2016   • PARTIAL HYSTERECTOMY         FAMILY HISTORY  Family History   Problem Relation Age of Onset   • Cancer Other    • Diabetes Other    • Heart attack Other    • Hyperlipidemia Other    • Hypertension Other    • Osteoporosis Other    • Stroke Other    • Breast cancer Mother    • Osteoporosis Mother    • Colon cancer Father        SOCIAL HISTORY  Social History     Social History   • Marital status:      Spouse name: N/A   • Number of children: N/A   • Years of education: N/A     Occupational History   • Not on file.     Social History Main Topics   • Smoking status: Current Every Day Smoker     Packs/day: 0.25     Years: 40.00     Types: Cigarettes   • Smokeless tobacco: Never Used      Comment: in process of quiting--AVERAGE 1 PPD, DOWN TO 6 CIG PER DAY X2 WEEKS    • Alcohol use 0.6 oz/week     1 Glasses of wine per week      Comment: occasional   • Drug use: No   • Sexual activity: Defer     Other Topics Concern   • Not on file     Social History Narrative    Lives alone.        ALLERGIES  Amlodipine and Reglan [metoclopramide]    REVIEW OF SYSTEMS  Review of Systems   Constitutional: Negative.  Negative for chills and fever.   HENT: Negative.  Negative for sore throat.    Eyes: Negative.    Respiratory: Negative.  Negative for cough.    Cardiovascular: Positive for leg swelling. Negative for chest pain.   Gastrointestinal: Negative.    Genitourinary: Negative.  Negative for dysuria.   Musculoskeletal: Positive for myalgias (R knee). Negative for back pain.   Skin: Negative.   Negative for rash.   Neurological: Negative.  Negative for headaches.       PHYSICAL EXAM  ED Triage Vitals [04/02/18 0914]   Temp Heart Rate Resp BP SpO2   -- 74 -- -- (!) 89 %      Temp src Heart Rate Source Patient Position BP Location FiO2 (%)   -- -- -- -- --       Physical Exam   Constitutional: No distress.   Pt is drowsy and falls asleep during exam and while providing history   HENT:   Head: Normocephalic and atraumatic.   Mouth/Throat: Oropharynx is clear and moist.   Eyes:   Unremarkable   Cardiovascular: Normal rate and regular rhythm.    Pulmonary/Chest: Breath sounds normal. No respiratory distress.   Abdominal: There is no tenderness.   Musculoskeletal: She exhibits edema (moderate R knee, L calf). She exhibits no tenderness.   Neurological: She is alert.   Skin: No rash noted.   Surgical bandage to R knee   Nursing note and vitals reviewed.      LAB RESULTS  Lab Results (last 24 hours)     Procedure Component Value Units Date/Time    CBC & Differential [[id number]] Collected:  04/01/18 1648    Specimen:  Blood Updated:  04/01/18 1822    Narrative:       The following orders were created for panel order CBC & Differential.  Procedure                               Abnormality         Status                     ---------                               -----------         ------                     CBC Auto Differential[[id number]]        Abnormal            Final result                 Please view results for these tests on the individual orders.    Basic Metabolic Panel [[id number]]  (Abnormal) Collected:  04/01/18 1648    Specimen:  Blood Updated:  04/01/18 1820     Glucose 127 (H) mg/dL      BUN 31 (H) mg/dL      Creatinine 1.94 (H) mg/dL      Sodium 139 mmol/L      Potassium 4.7 mmol/L      Chloride 97 (L) mmol/L      CO2 27.1 mmol/L      Calcium 9.8 mg/dL      eGFR Non African Amer 26 (L) mL/min/1.73      BUN/Creatinine Ratio 16.0     Anion Gap 14.9 mmol/L     Narrative:       GFR Normal >60  Chronic  Kidney Disease <60  Kidney Failure <15    CBC Auto Differential [[id number]]  (Abnormal) Collected:  04/01/18 1648    Specimen:  Blood Updated:  04/01/18 1822     WBC 10.80 (H) 10*3/mm3      RBC 3.19 (L) 10*6/mm3      Hemoglobin 9.5 (L) g/dL      Hematocrit 32.2 (L) %      .9 (H) fL      MCH 29.8 pg      MCHC 29.5 (L) g/dL      RDW 16.3 (H) %      RDW-SD 60.2 (H) fl      MPV 9.3 fL      Platelets 412 10*3/mm3      Neutrophil % 73.7 %      Lymphocyte % 18.1 (L) %      Monocyte % 5.3 %      Eosinophil % 2.1 %      Basophil % 0.5 %      Immature Grans % 0.3 %      Neutrophils, Absolute 7.97 10*3/mm3      Lymphocytes, Absolute 1.95 10*3/mm3      Monocytes, Absolute 0.57 10*3/mm3      Eosinophils, Absolute 0.23 10*3/mm3      Basophils, Absolute 0.05 10*3/mm3      Immature Grans, Absolute 0.03 10*3/mm3      nRBC 0.0 /100 WBC     CBC & Differential [[id number]] Collected:  04/02/18 0949    Specimen:  Blood Updated:  04/02/18 1007    Narrative:       The following orders were created for panel order CBC & Differential.  Procedure                               Abnormality         Status                     ---------                               -----------         ------                     CBC Auto Differential[[id number]]        Abnormal            Final result                 Please view results for these tests on the individual orders.    Comprehensive Metabolic Panel [[id number]]  (Abnormal) Collected:  04/02/18 0949    Specimen:  Blood Updated:  04/02/18 1029     Glucose 126 (H) mg/dL      BUN 32 (H) mg/dL      Creatinine 1.60 (H) mg/dL      Sodium 140 mmol/L      Potassium 4.4 mmol/L      Chloride 101 mmol/L      CO2 28.8 mmol/L      Calcium 9.5 mg/dL      Total Protein 6.6 g/dL      Albumin 3.40 (L) g/dL      ALT (SGPT) 16 U/L      AST (SGOT) 12 U/L      Alkaline Phosphatase 81 U/L      Total Bilirubin 0.2 mg/dL      eGFR Non African Amer 32 (L) mL/min/1.73      Globulin 3.2 gm/dL      A/G Ratio 1.1 g/dL       BUN/Creatinine Ratio 20.0     Anion Gap 10.2 mmol/L     Troponin [[id number]]  (Normal) Collected:  04/02/18 0949    Specimen:  Blood Updated:  04/02/18 1019     Troponin T <0.010 ng/mL     Narrative:       Troponin T Reference Ranges:  Less than 0.03 ng/mL:    Negative for AMI  0.03 to 0.09 ng/mL:      Indeterminant for AMI  Greater than 0.09 ng/mL: Positive for AMI    BNP [[id number]]  (Normal) Collected:  04/02/18 0949    Specimen:  Blood Updated:  04/02/18 1018     proBNP 700.1 pg/mL     Narrative:       Among patients with dyspnea, NT-proBNP is highly sensitive for the detection of acute congestive heart failure. In addition NT-proBNP of <300 pg/ml effectively rules out acute congestive heart failure with 99% negative predictive value.    CBC Auto Differential [[id number]]  (Abnormal) Collected:  04/02/18 0949    Specimen:  Blood Updated:  04/02/18 1007     WBC 7.87 10*3/mm3      RBC 2.84 (L) 10*6/mm3      Hemoglobin 8.5 (L) g/dL      Hematocrit 28.0 (L) %      MCV 98.6 (H) fL      MCH 29.9 pg      MCHC 30.4 (L) g/dL      RDW 16.3 (H) %      RDW-SD 59.0 (H) fl      MPV 9.1 fL      Platelets 319 10*3/mm3      Neutrophil % 71.0 %      Lymphocyte % 21.5 %      Monocyte % 3.9 (L) %      Eosinophil % 2.7 %      Basophil % 0.6 %      Immature Grans % 0.3 %      Neutrophils, Absolute 5.59 10*3/mm3      Lymphocytes, Absolute 1.69 10*3/mm3      Monocytes, Absolute 0.31 10*3/mm3      Eosinophils, Absolute 0.21 10*3/mm3      Basophils, Absolute 0.05 10*3/mm3      Immature Grans, Absolute 0.02 10*3/mm3     Blood Gas, Arterial [[id number]]  (Abnormal) Collected:  04/02/18 0955    Specimen:  Arterial Blood Updated:  04/02/18 1003     Site Arterial: right brachial     John's Test N/A     pH, Arterial 7.296 (C) pH units      Comment: Critical:Notify JANE cardona (02-Apr-18 10:02:19)Read back ok        pCO2, Arterial 55.7 (C) mm Hg      Comment: Critical:Notify JANE cardona (02-Apr-18 10:02:19)Read back ok        pO2,  Arterial 90.5 mm Hg      HCO3, Arterial 27.1 mmol/L      Base Excess, Arterial 0.2 mmol/L      O2 Saturation Calculated 95.7 %      Barometric Pressure for Blood Gas 755.1 mmHg      Modality Cannula     Flow Rate 3 lpm      Rate 16 Breaths/minute     Narrative:       SATS 93 Meter: [id number] : [id number] Lexa Palma          I ordered the above labs and reviewed the results    RADIOLOGY  XR Chest 2 View    (Results Pending)   CXR: atelectasis/infiltrate at R base, uncertain chronicity.   Duplex Venous RLE: negative     I ordered the above noted radiological studies. Interpreted by radiologist. Reviewed by me in PACS.       PROCEDURES  Procedures  EKG         EKG time: 0940   Rhythm/Rate: sinus rhythm, rate: 65  Normal P waves and normal WI interval   Normal QRS, Normal axis  Normal ST and T waves    Interpreted Contemporaneously by me, independently viewed  No old EKG      PROGRESS AND CONSULTS  ED Course   0919  Ordered EKG for further evaluation.  0931  Ordered labs, CXR, and duplex venous RLE for further evaluation.   0933  Ordered ABG for further evaluation.   1034  Received a call from the [facility] with the report that the pt's doppler exam was negative for DVT.   1106  Rechecked pt, who is resting comfortably. Pt states that she has a trilogy machine at home, but did not use this last night. Discussed the pt's ABG with low pH, as well as pt's labs including improving creatinine and normal WBC, and negative doppler. Plan to d/c the pt. Advised pt to use her trilogy machine and use caution with sedative medication. Pt understands and agrees with the plan, and all questions were answered.   1121  Rechecked pt, who is resting comfortably. Pt's O2 sats are 93% while sleeping on 1.5L. Discussed the pt's CXR with atelectasis or infiltrate at the RLL. Offered to repeat pt's ABG. Pt declines. Plan to d/c the pt as discussed. Pt understands and agrees with the plan, and all questions were answered.     MEDICAL  DECISION MAKING  Results were reviewed/discussed with the patient and they were also made aware of online access. Pt also made aware that some labs, such as cultures, will not be resulted during ER visit and follow up with PMD is necessary.     MDM  Number of Diagnoses or Management Options  Hypoventilation syndrome:   Respiratory acidosis:      Amount and/or Complexity of Data Reviewed  Clinical lab tests: reviewed and ordered (PH ART 7.296, pCO2 ART 55.7, WBBC 7.87, .1, Troponin <0.010)  Tests in the radiology section of CPT®: reviewed and ordered (Duplex Venous RLE: negative. CXR: atelectasis/infiltrate at R base, uncertain chronicity. )  Tests in the medicine section of CPT®: reviewed and ordered (See procedures section for EKG)  Decide to obtain previous medical records or to obtain history from someone other than the patient: yes (Pt's records in EPIC.)  Review and summarize past medical records: yes (Pt was hospitalized in [place] for hypercapnic and hypoxic respiratory failure from 3/17-3/29. )    Patient Progress  Patient progress: stable         DIAGNOSIS  Final diagnoses:   Respiratory acidosis   Hypoventilation syndrome       DISPOSITION  DISCHARGE    Patient discharged in stable condition.    Reviewed implications of results, diagnosis, meds, responsibility to follow up, warning signs and symptoms of possible worsening, potential complications and reasons to return to ER.    Patient/Family voiced understanding of above instructions.    Discussed plan for discharge, as there is no emergent indication for admission. Patient referred to primary care provider for BP management due to today's BP. Pt/family is agreeable and understands need for follow up and repeat testing.  Pt is aware that discharge does not mean that nothing is wrong but it indicates no emergency is present that requires admission and they must continue care with follow-up as given below or physician of their choice.      FOLLOW-UP  No follow-up provider specified.       Medication List      No changes were made to your prescriptions during this visit.           Latest Documented Vital Signs:  As of 11:14 AM  BP- 130/64 HR- 68 Temp- 98.2 °F (36.8 °C) (Oral) O2 sat- 93%    --  Documentation assistance provided by slime Nunez for Dr. Rapp.  Information recorded by the slime was done at my direction and has been verified and validated by me.         Luis Angel Nunez  04/02/18 1127       Edward Rapp MD  04/02/18 1125

## 2018-04-03 LAB
BACTERIA FLD CULT: NORMAL
GRAM STN SPEC: NORMAL
GRAM STN SPEC: NORMAL

## 2018-04-06 LAB — BACTERIA SPEC ANAEROBE CULT: NORMAL

## 2018-04-13 ENCOUNTER — TELEPHONE (OUTPATIENT)
Dept: ORTHOPEDIC SURGERY | Facility: CLINIC | Age: 68
End: 2018-04-13

## 2018-04-17 ENCOUNTER — OFFICE VISIT (OUTPATIENT)
Dept: ORTHOPEDIC SURGERY | Facility: CLINIC | Age: 68
End: 2018-04-17

## 2018-04-17 VITALS — HEIGHT: 62 IN | WEIGHT: 205 LBS | TEMPERATURE: 97.8 F | BODY MASS INDEX: 37.73 KG/M2

## 2018-04-17 DIAGNOSIS — Z98.890 S/P KNEE SURGERY: Primary | ICD-10-CM

## 2018-04-17 PROCEDURE — 73562 X-RAY EXAM OF KNEE 3: CPT | Performed by: NURSE PRACTITIONER

## 2018-04-17 PROCEDURE — 99213 OFFICE O/P EST LOW 20 MIN: CPT | Performed by: NURSE PRACTITIONER

## 2018-04-17 RX ORDER — PNEUMOCOCCAL VACCINE POLYVALENT 25; 25; 25; 25; 25; 25; 25; 25; 25; 25; 25; 25; 25; 25; 25; 25; 25; 25; 25; 25; 25; 25; 25 UG/.5ML; UG/.5ML; UG/.5ML; UG/.5ML; UG/.5ML; UG/.5ML; UG/.5ML; UG/.5ML; UG/.5ML; UG/.5ML; UG/.5ML; UG/.5ML; UG/.5ML; UG/.5ML; UG/.5ML; UG/.5ML; UG/.5ML; UG/.5ML; UG/.5ML; UG/.5ML; UG/.5ML; UG/.5ML; UG/.5ML
INJECTION, SOLUTION INTRAMUSCULAR; SUBCUTANEOUS
Refills: 0 | COMMUNITY
Start: 2018-03-11 | End: 2018-05-17

## 2018-05-01 LAB
MYCOBACTERIUM SPEC CULT: NORMAL
NIGHT BLUE STAIN TISS: NORMAL

## 2018-05-03 ENCOUNTER — OFFICE VISIT (OUTPATIENT)
Dept: NEUROLOGY | Facility: CLINIC | Age: 68
End: 2018-05-03

## 2018-05-03 VITALS
BODY MASS INDEX: 37.73 KG/M2 | DIASTOLIC BLOOD PRESSURE: 60 MMHG | HEIGHT: 62 IN | WEIGHT: 205 LBS | SYSTOLIC BLOOD PRESSURE: 128 MMHG

## 2018-05-03 DIAGNOSIS — G40.901 SEIZURE DISORDER, NONCONVULSIVE, WITH STATUS EPILEPTICUS (HCC): Primary | ICD-10-CM

## 2018-05-03 PROCEDURE — 99214 OFFICE O/P EST MOD 30 MIN: CPT | Performed by: PHYSICIAN ASSISTANT

## 2018-05-03 RX ORDER — CLOTRIMAZOLE AND BETAMETHASONE DIPROPIONATE 10; .64 MG/G; MG/G
CREAM TOPICAL
Refills: 1 | Status: ON HOLD | COMMUNITY
Start: 2018-04-26 | End: 2018-10-15

## 2018-05-03 RX ORDER — LEVETIRACETAM 1000 MG/1
1000 TABLET ORAL EVERY 12 HOURS SCHEDULED
Qty: 180 TABLET | Refills: 3
Start: 2018-05-03 | End: 2019-06-03 | Stop reason: SDUPTHER

## 2018-05-03 RX ORDER — INSULIN GLARGINE 300 U/ML
20 INJECTION, SOLUTION SUBCUTANEOUS DAILY
Refills: 0 | COMMUNITY
Start: 2018-04-26 | End: 2019-10-30 | Stop reason: SDUPTHER

## 2018-05-03 RX ORDER — IBUPROFEN 800 MG/1
TABLET ORAL
Refills: 0 | COMMUNITY
Start: 2018-04-26 | End: 2018-09-12

## 2018-05-03 NOTE — PROGRESS NOTES
"Subjective     Chief Complaint: seizure      History of Present Illness   Vidhi Lopez is a 68 y.o. female who with a history of diabetes who returns to clinic today following a hospitalization at [facility] in 3/18 for multiple issues, including seizure. She was noted to have significant confusion prior to her admission, though the history otherwise is a bit unclear. She was diagnosed with acute mixed respiratory failure and acute renal failure. Workup including an MRI of the brain, which was unremarkable for any acute abnormalities. An EEG was notable for status epilepticus.  She was subsequently treated with Keppra 1000mg BID. Additionally, after undergoing a right knee arthroscopy with incision and drainage and was diagnosed with an acute gout flare.    Since her hospitalization, she feels that she has returned to her prior cognitive baseline. It is noted that she is unaccompanied today. For several months prior to her hospitalization, she noted occasional episodes in the evening during which she would \"zone out\" for several seconds. She denies any unresponsiveness or loss of consciousness as well as any urinary incontinence, tongue biting or confusion during these episodes. Since she was started on Keppra, she notes that these spells have resolved.      It is noted that she was seen in my clinic in 7/17 for episodes of incoordination. She has had three episodes in 2017 during which she notes incoordination in her hands bilaterally. During these episodes, she is more prone to dropping items. She denies any clear associated weakness. She may note some unsteadiness, though denies any ataxia. She also denies any associated numbness, paresthesias, slurred speech, or dysphagia. 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 A MRA of the head and neck was notable for significant stenosis in the LICA and moderate stenosis in the AURE. She is currently taking ASA81, Plavix, and Lipitor " "80mg daily.       I have reviewed and confirmed the past family, social and medical history as accurate on 5/3/18.     Review of Systems   Constitutional: Negative.    HENT: Negative.    Eyes: Negative.    Respiratory: Negative.    Cardiovascular: Negative.    Gastrointestinal: Negative.    Endocrine: Negative.    Genitourinary: Negative.    Musculoskeletal: Negative.    Skin: Negative.    Allergic/Immunologic: Negative.    Neurological:        As noted in HPI   Hematological: Negative.    Psychiatric/Behavioral: Negative.        Objective     /60   Ht 157.5 cm (62.01\")   Wt 93 kg (205 lb)   LMP  (LMP Unknown) Comment: LAST MAMMOGRAM 2017  BMI 37.49 kg/m²     General appearance today is normal.       Physical Exam   Neurological: She has normal strength. She has a normal Finger-Nose-Finger Test.   Psychiatric: Her speech is normal.        Neurologic Exam     Mental Status   Speech: speech is normal   Level of consciousness: alert  Normal comprehension.     Cranial Nerves   Cranial nerves II through XII intact.     Motor Exam   Muscle bulk: normal  Overall muscle tone: normal    Strength   Strength 5/5 throughout.     Sensory Exam   Light touch normal.     Gait, Coordination, and Reflexes     Coordination   Finger to nose coordination: normal    Tremor   Resting tremor: absent        Assessment/Plan   Vidhi was seen today for unspecified lack of coordination.    Diagnoses and all orders for this visit:    Seizure disorder, nonconvulsive, with status epilepticus    Other orders  -     levETIRAcetam (KEPPRA) 1000 MG tablet; Take 1 tablet by mouth Every 12 (Twelve) Hours.          Discussion/Summary   Vidhi Lopez comes to clinic today with a history of seizure. Her workup has been complete and appropriate. Therefore, I do not have any further recommendations concerning this. After discussing potential treatment options, it was elected to continue on Keppra unchanged as she is doing well overall. She will " then follow up in 6 months, or sooner if needed.       I spent 20 minutes out of 25 minutes face to face with the patient and discussing diagnosis, prognosis, diagnostic testing, evaluation, current status, treatment options and management.    As part of this visit I reviewed prior lab results, reviewed radiology results and reviewed records from prior hospitalizations which is incorporated in the HPI.      Eusebia Yanez PA-C

## 2018-05-04 LAB
FUNGUS WND CULT: NORMAL
MYCOBACTERIUM SPEC CULT: NORMAL
NIGHT BLUE STAIN TISS: NORMAL

## 2018-05-17 ENCOUNTER — OFFICE VISIT (OUTPATIENT)
Dept: CARDIOLOGY | Facility: CLINIC | Age: 68
End: 2018-05-17

## 2018-05-17 VITALS
BODY MASS INDEX: 37.54 KG/M2 | WEIGHT: 204 LBS | DIASTOLIC BLOOD PRESSURE: 55 MMHG | HEART RATE: 54 BPM | SYSTOLIC BLOOD PRESSURE: 87 MMHG | HEIGHT: 62 IN

## 2018-05-17 DIAGNOSIS — I25.10 CORONARY ARTERY DISEASE INVOLVING NATIVE CORONARY ARTERY OF NATIVE HEART WITHOUT ANGINA PECTORIS: Primary | ICD-10-CM

## 2018-05-17 DIAGNOSIS — E78.5 DYSLIPIDEMIA: ICD-10-CM

## 2018-05-17 DIAGNOSIS — I65.23 BILATERAL CAROTID ARTERY STENOSIS: ICD-10-CM

## 2018-05-17 PROCEDURE — 99214 OFFICE O/P EST MOD 30 MIN: CPT | Performed by: INTERNAL MEDICINE

## 2018-05-17 RX ORDER — FERROUS SULFATE 325(65) MG
325 TABLET ORAL
COMMUNITY
End: 2018-09-12

## 2018-05-17 RX ORDER — CHOLECALCIFEROL (VITAMIN D3) 125 MCG
5 CAPSULE ORAL NIGHTLY PRN
COMMUNITY
End: 2018-10-23 | Stop reason: HOSPADM

## 2018-05-17 RX ORDER — ALPRAZOLAM 0.25 MG/1
0.25 TABLET ORAL 2 TIMES DAILY PRN
COMMUNITY
End: 2019-05-09

## 2018-05-17 NOTE — PROGRESS NOTES
"  OFFICE FOLLOW UP     Date of Encounter:2018     Name: Vidhi Lopez  : 1950  Address: [facility] [address] [place] [id number]  Phone: [phone]    PCP: Michael Mays MD  [address]  [facility] [address]    Vidhi Lopez is a 68 y.o. female.      Chief Complaint: Hospital follow up CAD, HTN, HLP    Problem List:   1. Carotid artery disease  a. Remote CVA, IDB.  b. Left hemispheric TIA, 2011.  c. Emergent LICA stent, 2011.  d. \"Last\" DUS negative,  (SJE).  e. MRA head/neck, 2017: \"severe\" LICA proximal stenosis, moderate AURE stenosis  f. Duplex, 17: 50-69% AURE stenosis, <50% LICA stenosis with stent.  2. Coronary artery disease  a. History of myocardial infarction, remote IDB  b. Echo, normal LV, no evidence of pulm HTN, 17.  c. LHC 2018: distal nondominant circumflex artery, treated with EES  3. Hypertension.                       A. Relative hypotension, 2018.  4. Lung disease.  a. URIEL, history of CO2 retention - CPAP use  b. Moderate asthma  c. Bilateral pneumonia, 2017.  d. RHC, 18: mild pulm HTN, right lower lobe subselective angiography is normal  e. Acute/chronic hypoxic respiratory failure, 3/2018.  5. Diabetes mellitus type 2.  6. Obesity   7. Dyslipidemia.  8. Fibromyalgia.  9. Hepatic steatosis.  10. Non convulsive status epilepticus by EEG  a. Keppra initiated, 3/20/2018  11. Gastroesophageal reflux disease.  12. Status post operations, remote.  13. Anxiety disorder.  14. Chronic kidney disease, Stage 2  a. Chronic cystitis  b. ?Secondary to NSAIDS  c. Diuretic induced per RAMYA Mcginnis, 17.  d. Bilateral renal angiography NORMAL 18  15. \"New onset\" lower extremity edema, May 2015:  a. Treated with Lasix 20 mg daily with resolution.  b. Recurrent LE edema summer 2017  16. Right knee septic arthritis 3/21/2018    Allergies   Allergen Reactions   • Amlodipine Swelling   • Reglan [Metoclopramide] Other (See " "Comments)     DYSTONIC REACTION     Current Medications:  •  allopurinol (ZYLOPRIM) 100 MG by mouth Daily.  •  ALPRAZolam 0.25 mg by mouth 2 (Two) Times a Day As Needed for Anxiety  •  aspirin 81 MG EC by mouth Daily.  •  atorvastatin 80 mg by mouth Daily  •  bumetanide 1 MG by mouth Daily.  •  Clopidogrel 75 MG by mouth Daily.  •  ferrous sulfate 325 mg by mouth Daily With Breakfast  •  Gabapentin 800 mg by mouth 2 (Two) Times a Day  •  HUMALOG KWIKPEN, Inject 10 units under the skin 3 (Three) Times a Day With Meals.  •  levalbuterol 0.63 MG/3ML nebulizer solution, Take 1 ampule by nebulization Every 8 (Eight) Hours As Needed   •  levETIRAcetam 1000 MG by mouth Every 12 (Twelve) Hours.  •  metFORMIN  MG 24 hr tablet, Take 500 mg by mouth 2 (Two) Times a Day.  •  methenamine 1 g by mouth 2 (Two) Times a Day With Meals.  •  metoprolol tartrate 50 mg by mouth Every 12 (Twelve) Hours  •  Mirabegron ER 50 mg by mouth Daily  •  mirtazapine 15 mg by mouth Every Night.  •  Multiple Vitamins-Minerals by mouth Daily  •  nitroglycerin (NITROSTAT) 0.4 MG SL prn   •  potassium chloride 20 MEQ CR by mouth Daily.  •  rOPINIRole 0.25 mg by mouth Every Night. Take 1-3 hours before bedtime  •  SYMBICORT 160-4.5 MCG/ACT, Inhale 2 puffs Daily  •  thiamine 100 MG by mouth Daily  •  tiZANidine 2 mg by mouth Every Evening. In addition to hs prn doses  •  TOUJEO SOLOSTAR 15 Units under the skin Daily.    History of Present Illness:  The patient returns for elective follow-up today.  She feels generally unwell stating that \"I am wiped out\".  She also says that while running errands, for example [employer], she frequently feels extremely fatigued and \"dizzy\" without presyncope or syncope.  She has had no angina or heart failure symptoms.  She has not had recurrent seizures.  She has an appointment to see Dr. Mays in 1 week (blood work is planned) and [facility] in one month.  She notes \"easy bruising\" while on " "aspirin and Plavix.  She asks if there are dietary restraints on people taking Plavix.          The following portions of the patient's history were reviewed and updated as appropriate: allergies, current medications and problem list.    ROS: Pertinent positives as listed in the HPI.  All other systems reviewed and negative.    Objective:    Vitals:    05/17/18 1101 05/17/18 1113   BP: (!) 81/62 (!) 87/55   BP Location: Right arm Right arm   Patient Position: Sitting Standing   Pulse: 53 54   Weight: 92.5 kg (204 lb)    Height: 157.5 cm (62\")      Physical Exam:  GENERAL: Alert, cooperative, in no acute distress. Obese.  HEENT: Fundoscopic deferred, otherwise unremarkable.  NECK: No Jugular venous distention, adenopathy, or thyromegaly noted.   HEART: Regular rhythm, normal rate, and no murmurs, gallops, or rubs.   LUNGS: Clear to auscultation bilaterally. No wheezing, rales or ronchi.  ABDOMEN: Flat without evidence of organomegaly, masses, or tenderness.  NEUROLOGIC: No focal abnormalities involving strength or sensation are noted.   EXTREMITIES: No clubbing, cyanosis, or edema noted. There is a moderate-sized bruise on the lateral aspect of the right arm. No ecchymosis.    Diagnostic Data:                           Last Arterial Blood Gas  Lab Results   Component Value Date    PHART 7.296 (C) 04/02/2018    GPQ8BSD 55.7 (C) 04/02/2018    PO2ART 90.5 04/02/2018    PWD2PNR 27.1 04/02/2018    BASEEXCESS 0.2 04/02/2018    B0HNSWZS 92.0 03/18/2018     Lab Results   Component Value Date    GLUCOSE 126 (H) 04/02/2018    BUN 32 (H) 04/02/2018    CREATININE 1.60 (H) 04/02/2018    EGFRIFNONA 32 (L) 04/02/2018    BCR 20.0 04/02/2018    K 4.4 04/02/2018    CO2 28.8 04/02/2018    CALCIUM 9.5 04/02/2018    ALBUMIN 3.40 (L) 04/02/2018    LABIL2 1.1 04/02/2018    AST 12 04/02/2018    ALT 16 04/02/2018     Lab Results   Component Value Date    WBC 7.87 04/02/2018    HGB 8.5 (L) 04/02/2018    HCT 28.0 (L) 04/02/2018    MCV 98.6 (H) " 04/02/2018     04/02/2018     Lab Results   Component Value Date    HGBA1C 9.3 (H) 03/05/2018     Lab Results   Component Value Date    CHOL 145 02/11/2018    TRIG 211 (H) 02/11/2018    HDL 39 (L) 02/11/2018    LDL 84 02/11/2018       Procedures    Assessment and Plan:   1.  CAD: She remains asymptomatic.  The importance of dual antiplatelet therapy for at least one year is reiterated.  She understands that there are no dietary restrictions with the use of Plavix.  2.  HTN: Her blood pressure is too low.  I have asked her to stop her Bumex.  I've asked her to stop her metoprolol.  The latter drug should be reinstituted only if and when a systolic blood pressure of at least one 110 mmHg is consistently recorded at home. Followup with MILA Mays M.D. and [facility]U during the next month is planned.  3.  HLP: Close followup with target LDL of less than 70 is suggested.    I, iLto Flores MD, [facility], [facility], personally performed the services described in this documentation as scribed by the above named individual in my presence, and it is both accurate and complete. At 11:59 AM on 05/17/2018     I will see Vidhi Lopez back in one year or sooner on an as needed basis.        Scribed for Lito Flores MD by Belkis Meyer RN. 05/17/2018 11:10 AM.        EMR Dragon/Transcription Disclaimer:  Much of this encounter note is an electronic transcription/translation of spoken language to printed text.  The electronic translation of spoken language may permit erroneous, or at times, nonsensical words or phrases to be inadvertently transcribed.  Although I have reviewed the note for such errors, some may still exist.

## 2018-05-23 ENCOUNTER — APPOINTMENT (OUTPATIENT)
Dept: LAB | Facility: HOSPITAL | Age: 68
End: 2018-05-23

## 2018-05-23 ENCOUNTER — TRANSCRIBE ORDERS (OUTPATIENT)
Dept: ADMINISTRATIVE | Facility: HOSPITAL | Age: 68
End: 2018-05-23

## 2018-05-23 DIAGNOSIS — E04.1 NONTOXIC UNINODULAR GOITER: ICD-10-CM

## 2018-05-23 DIAGNOSIS — IMO0002 TYPE II DIABETES MELLITUS WITH COMA, UNCONTROLLED: Primary | ICD-10-CM

## 2018-05-23 LAB
ALBUMIN SERPL-MCNC: 3.7 G/DL (ref 3.5–5)
ALBUMIN/GLOB SERPL: 1.2 G/DL (ref 1–2)
ALP SERPL-CCNC: 99 U/L (ref 38–126)
ALT SERPL W P-5'-P-CCNC: 36 U/L (ref 13–69)
ANION GAP SERPL CALCULATED.3IONS-SCNC: 14 MMOL/L (ref 10–20)
AST SERPL-CCNC: 21 U/L (ref 15–46)
BILIRUB SERPL-MCNC: 0.4 MG/DL (ref 0.2–1.3)
BUN BLD-MCNC: 16 MG/DL (ref 7–20)
BUN/CREAT SERPL: 22.9 (ref 7.1–23.5)
CALCIUM SPEC-SCNC: 9.6 MG/DL (ref 8.4–10.2)
CHLORIDE SERPL-SCNC: 99 MMOL/L (ref 98–107)
CHOLEST SERPL-MCNC: 139 MG/DL (ref 0–199)
CO2 SERPL-SCNC: 33 MMOL/L (ref 26–30)
CREAT BLD-MCNC: 0.7 MG/DL (ref 0.6–1.3)
GFR SERPL CREATININE-BSD FRML MDRD: 83 ML/MIN/1.73
GLOBULIN UR ELPH-MCNC: 3.2 GM/DL
GLUCOSE BLD-MCNC: 145 MG/DL (ref 74–98)
HDLC SERPL-MCNC: 26 MG/DL (ref 40–60)
LDLC SERPL CALC-MCNC: 63 MG/DL (ref 0–99)
LDLC/HDLC SERPL: 2.42 {RATIO}
POTASSIUM BLD-SCNC: 4 MMOL/L (ref 3.5–5.1)
PROT SERPL-MCNC: 6.9 G/DL (ref 6.3–8.2)
SODIUM BLD-SCNC: 142 MMOL/L (ref 137–145)
T4 FREE SERPL-MCNC: 1.09 NG/DL (ref 0.78–2.19)
TRIGL SERPL-MCNC: 251 MG/DL
TSH SERPL DL<=0.05 MIU/L-ACNC: 1.71 MIU/ML (ref 0.47–4.68)
VLDLC SERPL-MCNC: 50.2 MG/DL

## 2018-05-23 PROCEDURE — 36415 COLL VENOUS BLD VENIPUNCTURE: CPT | Performed by: INTERNAL MEDICINE

## 2018-05-23 PROCEDURE — 82043 UR ALBUMIN QUANTITATIVE: CPT | Performed by: INTERNAL MEDICINE

## 2018-05-23 PROCEDURE — 84439 ASSAY OF FREE THYROXINE: CPT | Performed by: INTERNAL MEDICINE

## 2018-05-23 PROCEDURE — 82570 ASSAY OF URINE CREATININE: CPT | Performed by: INTERNAL MEDICINE

## 2018-05-23 PROCEDURE — 80053 COMPREHEN METABOLIC PANEL: CPT | Performed by: INTERNAL MEDICINE

## 2018-05-23 PROCEDURE — 84443 ASSAY THYROID STIM HORMONE: CPT | Performed by: INTERNAL MEDICINE

## 2018-05-23 PROCEDURE — 80061 LIPID PANEL: CPT | Performed by: INTERNAL MEDICINE

## 2018-05-24 LAB
CREAT 24H UR-MCNC: 117 MG/DL
MICROALBUMIN UR-MCNC: 21.6 UG/ML
MICROALBUMIN/CREAT UR: 18.5 MG/G CREAT (ref 0–30)

## 2018-05-25 ENCOUNTER — TELEPHONE (OUTPATIENT)
Dept: NEUROLOGY | Facility: CLINIC | Age: 68
End: 2018-05-25

## 2018-05-25 NOTE — TELEPHONE ENCOUNTER
Called patient and leave message to return my call. A new script of Keppa 1000 mg has been called in to patient pharmacy on file. [facility] in [place]

## 2018-06-01 ENCOUNTER — OFFICE VISIT (OUTPATIENT)
Dept: PULMONOLOGY | Facility: CLINIC | Age: 68
End: 2018-06-01

## 2018-06-01 VITALS
WEIGHT: 204 LBS | SYSTOLIC BLOOD PRESSURE: 130 MMHG | DIASTOLIC BLOOD PRESSURE: 82 MMHG | HEART RATE: 84 BPM | TEMPERATURE: 98.7 F | BODY MASS INDEX: 37.54 KG/M2 | HEIGHT: 62 IN | OXYGEN SATURATION: 90 %

## 2018-06-01 DIAGNOSIS — J45.909 MODERATE ASTHMA, UNSPECIFIED WHETHER COMPLICATED, UNSPECIFIED WHETHER PERSISTENT: ICD-10-CM

## 2018-06-01 DIAGNOSIS — E66.9 OBESITY (BMI 30-39.9): ICD-10-CM

## 2018-06-01 DIAGNOSIS — E66.2 OBESITY HYPOVENTILATION SYNDROME (HCC): ICD-10-CM

## 2018-06-01 DIAGNOSIS — J96.12 CHRONIC RESPIRATORY FAILURE WITH HYPOXIA AND HYPERCAPNIA (HCC): ICD-10-CM

## 2018-06-01 DIAGNOSIS — J96.11 CHRONIC RESPIRATORY FAILURE WITH HYPOXIA AND HYPERCAPNIA (HCC): ICD-10-CM

## 2018-06-01 DIAGNOSIS — G47.33 OBSTRUCTIVE SLEEP APNEA: ICD-10-CM

## 2018-06-01 DIAGNOSIS — J44.9 CHRONIC OBSTRUCTIVE PULMONARY DISEASE, UNSPECIFIED COPD TYPE (HCC): Primary | ICD-10-CM

## 2018-06-01 PROBLEM — J18.9 PNEUMONIA OF BOTH LUNGS DUE TO INFECTIOUS ORGANISM: Status: RESOLVED | Noted: 2017-09-28 | Resolved: 2018-06-01

## 2018-06-01 PROCEDURE — 99214 OFFICE O/P EST MOD 30 MIN: CPT | Performed by: INTERNAL MEDICINE

## 2018-06-01 RX ORDER — LEVALBUTEROL TARTRATE 45 UG/1
2 AEROSOL, METERED ORAL 4 TIMES DAILY PRN
Qty: 15 G | Refills: 11 | Status: SHIPPED | OUTPATIENT
Start: 2018-06-01 | End: 2018-09-12

## 2018-06-01 NOTE — PROGRESS NOTES
Subjective:     Chief Complaint:   Chief Complaint   Patient presents with   • Asthma       HPI:    Vidhi Lopez is a 68 y.o. female here for follow-up of Her COPD and chronic respiratory failure.    I originally saw her in consultation in February of this year after cardiac catheterization.  She had preserved LV function and PEA pressures of 36/20 with a wedge pressure of 8.  A subselective right lower lobe angiogram was negative for PE and with normal-appearing pulmonary arteries.  A drug-eluting stent was placed in the nondominant circumflex artery.  Atrophic gas that time revealed chronic hypercapnic and hypoxic respiratory failure.    She has been treated with a Trilogy home ventilator at night with a full facemask.  She thinks this has helped her significantly.  From an inhaled standpoint she is on Symbicort and as needed Xopenex.  She has been interested in weight loss.    At the current time her excise tolerance is 160 block and 1 flight of stairs.  She has chronic problem with her right knee which limits her as well.    Current medications are:   Current Outpatient Prescriptions:   •  allopurinol (ZYLOPRIM) 100 MG tablet, Take 1 tablet by mouth Daily., Disp: 30 tablet, Rfl: 0  •  ALPRAZolam (XANAX) 0.25 MG tablet, Take 0.25 mg by mouth 2 (Two) Times a Day As Needed for Anxiety., Disp: , Rfl:   •  aspirin 81 MG EC tablet, Take 1 tablet by mouth Daily., Disp: 100 tablet, Rfl: 4  •  atorvastatin (LIPITOR) 80 MG tablet, Take 80 mg by mouth Daily., Disp: , Rfl:   •  B-D UF III MINI PEN NEEDLES 31G X 5 MM misc, use as directed, Disp: , Rfl: 0  •  clopidogrel (PLAVIX) 75 MG tablet, Take 1 tablet by mouth Daily., Disp: 90 tablet, Rfl: 3  •  clotrimazole-betamethasone (LOTRISONE) 1-0.05 % cream, , Disp: , Rfl: 1  •  ferrous sulfate 325 (65 FE) MG tablet, Take 325 mg by mouth Daily With Breakfast., Disp: , Rfl:   •  gabapentin (NEURONTIN) 800 MG tablet, Take 800 mg by mouth 2 (Two) Times a Day., Disp: , Rfl:   •   HUMALOG KWIKPEN 100 UNIT/ML solution pen-injector, Inject 10 pens under the skin 3 (Three) Times a Day With Meals., Disp: , Rfl: 0  •   MG tablet, , Disp: , Rfl: 0  •  Lancets (FREESTYLE) lancets, TEST once daily as directed, Disp: , Rfl: 0  •  levalbuterol (XOPENEX) 0.63 MG/3ML nebulizer solution, Take 1 ampule by nebulization Every 8 (Eight) Hours As Needed for Wheezing or Shortness of Air., Disp: 30 ampule, Rfl: 1  •  levETIRAcetam (KEPPRA) 1000 MG tablet, Take 1 tablet by mouth Every 12 (Twelve) Hours., Disp: 180 tablet, Rfl: 3  •  melatonin 5 MG tablet tablet, Take 5 mg by mouth., Disp: , Rfl:   •  metFORMIN ER (GLUCOPHAGE-XR) 500 MG 24 hr tablet, Take 500 mg by mouth 2 (Two) Times a Day., Disp: , Rfl: 0  •  methenamine (HIPREX) 1 g tablet, Take 1 g by mouth 2 (Two) Times a Day With Meals., Disp: , Rfl:   •  Mirabegron ER (MYRBETRIQ) 50 MG tablet sustained-release 24 hour 24 hr tablet, Take 50 mg by mouth Daily., Disp: , Rfl:   •  mirtazapine (REMERON) 15 MG tablet, Take 15 mg by mouth Every Night., Disp: , Rfl:   •  Multiple Vitamins-Minerals (MULTIVITAMIN PO), Take 1 tablet by mouth Daily., Disp: , Rfl:   •  nitroglycerin (NITROSTAT) 0.4 MG SL tablet, , Disp: , Rfl: 0  •  nystatin (MYCOSTATIN) 426662 UNIT/GM ointment, Apply  topically 2 (Two) Times a Day., Disp: 15 g, Rfl: 0  •  ondansetron (ZOFRAN) 8 MG tablet, Take 8 mg by mouth Every 8 (Eight) Hours As Needed for Nausea or Vomiting., Disp: , Rfl:   •  potassium chloride (K-DUR,KLOR-CON) 20 MEQ CR tablet, Take 1 tablet by mouth Daily., Disp: 30 tablet, Rfl: 0  •  rOPINIRole (REQUIP) 0.25 MG tablet, Take 0.25 mg by mouth Every Night. Take 1-3 hours before bedtime., Disp: , Rfl:   •  SYMBICORT 160-4.5 MCG/ACT inhaler, Inhale 2 puffs Daily., Disp: , Rfl: 0  •  thiamine (VITAMIN B1) 100 MG tablet, Take 1 tablet by mouth Daily., Disp: 30 tablet, Rfl: 0  •  tiZANidine (ZANAFLEX) 2 MG tablet, Take 2 mg by mouth Every Evening. In addition to hs prn doses,  Disp: , Rfl:   •  TOUJEO SOLOSTAR 300 UNIT/ML solution pen-injector, Inject 15 Units under the skin Daily., Disp: , Rfl: 0  •  levalbuterol (XOPENEX HFA) 45 MCG/ACT inhaler, Inhale 2 puffs 4 (Four) Times a Day As Needed for Wheezing., Disp: 15 g, Rfl: 11.      The patient's relevant past medical, surgical, family and social history were reviewed and updated in Epic as appropriate.     ROS:    Review of Systems   Constitutional: Negative for unexpected weight change.   HENT: Positive for postnasal drip, sinus pressure and sore throat.    Respiratory: Positive for shortness of breath.          Objective:    Physical Exam   Constitutional: She is oriented to person, place, and time. She appears well-developed and well-nourished.   HENT:   Head: Normocephalic and atraumatic.   Mouth/Throat: Oropharynx is clear and moist.   Neck: Neck supple. No thyromegaly present.   Cardiovascular: Normal rate and regular rhythm.  Exam reveals no gallop and no friction rub.    No murmur heard.  Pulmonary/Chest: Effort normal. No respiratory distress. She has no wheezes. She has no rales.   Decreased BS   Musculoskeletal: She exhibits edema.   Neurological: She is alert and oriented to person, place, and time.   Skin: Skin is warm and dry.   Psychiatric: She has a normal mood and affect. Her behavior is normal.   Vitals reviewed.      Diagnostics:     No additional    Assessment:    Problem List Items Addressed This Visit        Pulmonary Problems    Asthma    Relevant Medications    levalbuterol (XOPENEX HFA) 45 MCG/ACT inhaler    COPD (chronic obstructive pulmonary disease) - Primary    Relevant Medications    levalbuterol (XOPENEX HFA) 45 MCG/ACT inhaler    Chronic respiratory failure with hypoxia and hypercapnia    Overview     Trilog nocturnal ventilator with full face mask         Obstructive sleep apnea       Other    Obesity (BMI 30-39.9)    Obesity hypoventilation syndrome          68-year-old female with COPD, chronic  respiratory failure, and obstructive sleep apnea managed with a Trilogy nocturnal ventilator and bronchodilator therapy with Symbicort and as needed Xopenex    Plan:     1. Continue Symbicort and as needed Xopenex  2. Continued not internal Trilogy ventilator with nocturnal oxygen  3. Continued efforts at weight loss and a graded exercise regimen  4. Routine follow-up    Discussed in detail with the patient.  She will call prior to her follow up visit for any new problems.    Signed by  Oliver Garcia MD

## 2018-07-24 ENCOUNTER — LAB (OUTPATIENT)
Dept: LAB | Facility: HOSPITAL | Age: 68
End: 2018-07-24

## 2018-07-24 ENCOUNTER — TRANSCRIBE ORDERS (OUTPATIENT)
Dept: LAB | Facility: HOSPITAL | Age: 68
End: 2018-07-24

## 2018-07-24 DIAGNOSIS — E88.81 DYSMETABOLIC SYNDROME X: ICD-10-CM

## 2018-07-24 DIAGNOSIS — N17.9 ACUTE RENAL FAILURE, UNSPECIFIED ACUTE RENAL FAILURE TYPE (HCC): Primary | ICD-10-CM

## 2018-07-24 DIAGNOSIS — E83.52 HYPERCALCEMIA: ICD-10-CM

## 2018-07-24 DIAGNOSIS — N17.9 ACUTE RENAL FAILURE, UNSPECIFIED ACUTE RENAL FAILURE TYPE (HCC): ICD-10-CM

## 2018-07-24 LAB
ALBUMIN SERPL-MCNC: 3.9 G/DL (ref 3.5–5)
ANION GAP SERPL CALCULATED.3IONS-SCNC: 12.6 MMOL/L (ref 10–20)
BACTERIA UR QL AUTO: ABNORMAL /HPF
BASOPHILS # BLD AUTO: 0.04 10*3/MM3 (ref 0–0.2)
BASOPHILS NFR BLD AUTO: 0.4 % (ref 0–2.5)
BILIRUB UR QL STRIP: NEGATIVE
BUN BLD-MCNC: 26 MG/DL (ref 7–20)
BUN/CREAT SERPL: 32.5 (ref 7.1–23.5)
CALCIUM SPEC-SCNC: 9.8 MG/DL (ref 8.4–10.2)
CHLORIDE SERPL-SCNC: 88 MMOL/L (ref 98–107)
CLARITY UR: CLEAR
CO2 SERPL-SCNC: 41 MMOL/L (ref 26–30)
COLOR UR: YELLOW
CREAT BLD-MCNC: 0.8 MG/DL (ref 0.6–1.3)
DEPRECATED RDW RBC AUTO: 47.2 FL (ref 37–54)
EOSINOPHIL # BLD AUTO: 0.22 10*3/MM3 (ref 0–0.7)
EOSINOPHIL NFR BLD AUTO: 2.4 % (ref 0–7)
ERYTHROCYTE [DISTWIDTH] IN BLOOD BY AUTOMATED COUNT: 13.8 % (ref 11.5–14.5)
GFR SERPL CREATININE-BSD FRML MDRD: 72 ML/MIN/1.73
GLUCOSE BLD-MCNC: 222 MG/DL (ref 74–98)
GLUCOSE UR STRIP-MCNC: NEGATIVE MG/DL
HBA1C MFR BLD: 9.3 % (ref 3–6)
HCT VFR BLD AUTO: 37.6 % (ref 37–47)
HGB BLD-MCNC: 12.3 G/DL (ref 12–16)
HGB UR QL STRIP.AUTO: NEGATIVE
HYALINE CASTS UR QL AUTO: ABNORMAL /LPF
IMM GRANULOCYTES # BLD: 0.03 10*3/MM3 (ref 0–0.06)
IMM GRANULOCYTES NFR BLD: 0.3 % (ref 0–0.6)
KETONES UR QL STRIP: NEGATIVE
LEUKOCYTE ESTERASE UR QL STRIP.AUTO: ABNORMAL
LYMPHOCYTES # BLD AUTO: 1.4 10*3/MM3 (ref 0.6–3.4)
LYMPHOCYTES NFR BLD AUTO: 15.4 % (ref 10–50)
MCH RBC QN AUTO: 30.5 PG (ref 27–31)
MCHC RBC AUTO-ENTMCNC: 32.7 G/DL (ref 30–37)
MCV RBC AUTO: 93.3 FL (ref 81–99)
MONOCYTES # BLD AUTO: 0.43 10*3/MM3 (ref 0–0.9)
MONOCYTES NFR BLD AUTO: 4.7 % (ref 0–12)
NEUTROPHILS # BLD AUTO: 6.99 10*3/MM3 (ref 2–6.9)
NEUTROPHILS NFR BLD AUTO: 76.8 % (ref 37–80)
NITRITE UR QL STRIP: NEGATIVE
NRBC BLD MANUAL-RTO: 0 /100 WBC (ref 0–0)
PH UR STRIP.AUTO: 6 [PH] (ref 5–8)
PHOSPHATE SERPL-MCNC: 2.9 MG/DL (ref 2.5–4.5)
PLATELET # BLD AUTO: 219 10*3/MM3 (ref 130–400)
PMV BLD AUTO: 10.1 FL (ref 6–12)
POTASSIUM BLD-SCNC: 3.6 MMOL/L (ref 3.5–5.1)
PROT UR QL STRIP: NEGATIVE
RBC # BLD AUTO: 4.03 10*6/MM3 (ref 4.2–5.4)
RBC # UR: ABNORMAL /HPF
REF LAB TEST METHOD: ABNORMAL
SODIUM BLD-SCNC: 138 MMOL/L (ref 137–145)
SP GR UR STRIP: 1.01 (ref 1–1.03)
SQUAMOUS #/AREA URNS HPF: ABNORMAL /HPF
UROBILINOGEN UR QL STRIP: ABNORMAL
WBC NRBC COR # BLD: 9.11 10*3/MM3 (ref 4.8–10.8)
WBC UR QL AUTO: ABNORMAL /HPF

## 2018-07-24 PROCEDURE — 82652 VIT D 1 25-DIHYDROXY: CPT

## 2018-07-24 PROCEDURE — 80069 RENAL FUNCTION PANEL: CPT

## 2018-07-24 PROCEDURE — 85025 COMPLETE CBC W/AUTO DIFF WBC: CPT

## 2018-07-24 PROCEDURE — 36415 COLL VENOUS BLD VENIPUNCTURE: CPT

## 2018-07-24 PROCEDURE — 83970 ASSAY OF PARATHORMONE: CPT

## 2018-07-24 PROCEDURE — 81001 URINALYSIS AUTO W/SCOPE: CPT

## 2018-07-24 PROCEDURE — 83036 HEMOGLOBIN GLYCOSYLATED A1C: CPT

## 2018-07-24 PROCEDURE — 87086 URINE CULTURE/COLONY COUNT: CPT

## 2018-07-25 LAB — PTH-INTACT SERPL-MCNC: 42 PG/ML (ref 15–65)

## 2018-07-26 LAB
1,25(OH)2D3 SERPL-MCNC: 37.3 PG/ML (ref 19.9–79.3)
BACTERIA SPEC AEROBE CULT: NORMAL

## 2018-08-13 ENCOUNTER — HOSPITAL ENCOUNTER (OUTPATIENT)
Dept: CT IMAGING | Facility: HOSPITAL | Age: 68
Discharge: HOME | End: 2018-08-13
Admitting: NURSE PRACTITIONER

## 2018-08-13 DIAGNOSIS — R10.11 RIGHT UPPER QUADRANT ABDOMINAL PAIN: ICD-10-CM

## 2018-08-13 PROCEDURE — 74176 CT ABD & PELVIS W/O CONTRAST: CPT

## 2018-08-14 ENCOUNTER — TELEPHONE (OUTPATIENT)
Dept: URGENT CARE | Facility: CLINIC | Age: 68
End: 2018-08-14

## 2018-08-14 NOTE — TELEPHONE ENCOUNTER
Patient called for CT result, she delayed getting it done but completed it yesterday. Pain better but continues. Advised patient that if she has continued or worsening symptoms she needs to see family doctor for further work up(she already had an appointment ). Verbalized understanding.

## 2018-09-04 ENCOUNTER — HOSPITAL ENCOUNTER (OUTPATIENT)
Dept: GENERAL RADIOLOGY | Facility: HOSPITAL | Age: 68
Discharge: HOME | End: 2018-09-04
Admitting: INTERNAL MEDICINE

## 2018-09-04 ENCOUNTER — LAB (OUTPATIENT)
Dept: LAB | Facility: HOSPITAL | Age: 68
End: 2018-09-04

## 2018-09-04 ENCOUNTER — TRANSCRIBE ORDERS (OUTPATIENT)
Dept: ADMINISTRATIVE | Facility: HOSPITAL | Age: 68
End: 2018-09-04

## 2018-09-04 DIAGNOSIS — J32.9 CHRONIC SINUSITIS, UNSPECIFIED LOCATION: Primary | ICD-10-CM

## 2018-09-04 DIAGNOSIS — J32.9 CHRONIC SINUSITIS, UNSPECIFIED LOCATION: ICD-10-CM

## 2018-09-04 PROCEDURE — 70220 X-RAY EXAM OF SINUSES: CPT

## 2018-09-04 PROCEDURE — 86360 T CELL ABSOLUTE COUNT/RATIO: CPT

## 2018-09-04 PROCEDURE — 86359 T CELLS TOTAL COUNT: CPT

## 2018-09-04 PROCEDURE — 36415 COLL VENOUS BLD VENIPUNCTURE: CPT

## 2018-09-04 PROCEDURE — 82784 ASSAY IGA/IGD/IGG/IGM EACH: CPT

## 2018-09-04 PROCEDURE — 86355 B CELLS TOTAL COUNT: CPT

## 2018-09-05 LAB
BASOPHILS # BLD AUTO: 0 X10E3/UL (ref 0–0.2)
BASOPHILS NFR BLD AUTO: 0 %
CD19 CELLS # BLD: 112 /UL (ref 12–645)
CD19 CELLS NFR BLD: 6.6 % (ref 3.3–25.4)
CD3 CELLS # BLD: 1416 /UL (ref 622–2402)
CD3 CELLS NFR BLD: 83.3 % (ref 57.5–86.2)
CD3+CD4+ CELLS # BLD: 1159 /UL (ref 359–1519)
CD3+CD4+ CELLS NFR BLD: 68.2 % (ref 30.8–58.5)
CD3+CD4+ CELLS/CD3+CD8+ CLL BLD: 4.24 % (ref 0.92–3.72)
CD3+CD8+ CELLS # BLD: 274 /UL (ref 109–897)
CD3+CD8+ CELLS NFR BLD: 16.1 % (ref 12–35.5)
EOSINOPHIL # BLD AUTO: 0.1 X10E3/UL (ref 0–0.4)
EOSINOPHIL # BLD AUTO: 1 %
ERYTHROCYTE [DISTWIDTH] IN BLOOD BY AUTOMATED COUNT: 15.7 % (ref 12.3–15.4)
HCT VFR BLD AUTO: 39.4 % (ref 34–46.6)
HGB BLD-MCNC: 12.8 G/DL (ref 11.1–15.9)
IGA SERPL-MCNC: 368 MG/DL (ref 87–352)
IGG SERPL-MCNC: 680 MG/DL (ref 700–1600)
IGM SERPL-MCNC: 33 MG/DL (ref 26–217)
IMM GRANULOCYTES # BLD: 0 X10E3/UL (ref 0–0.1)
IMM GRANULOCYTES NFR BLD: 0 %
LYMPHOCYTES # BLD AUTO: 1.7 X10E3/UL (ref 0.7–3.1)
LYMPHOCYTES NFR BLD AUTO: 17 %
MCH RBC QN AUTO: 29 PG (ref 26.6–33)
MCHC RBC AUTO-ENTMCNC: 32.5 G/DL (ref 31.5–35.7)
MCV RBC AUTO: 89 FL (ref 79–97)
MONOCYTES # BLD AUTO: 0.4 X10E3/UL (ref 0.1–0.9)
MONOCYTES NFR BLD AUTO: 4 %
NEUTROPHILS # BLD AUTO: 7.9 X10E3/UL (ref 1.4–7)
NEUTROPHILS NFR BLD AUTO: 78 %
PLATELET # BLD AUTO: 177 X10E3/UL (ref 150–379)
RBC # BLD AUTO: 4.42 X10E6/UL (ref 3.77–5.28)
WBC # BLD AUTO: 10.2 X10E3/UL (ref 3.4–10.8)

## 2018-10-09 ENCOUNTER — OFFICE VISIT (OUTPATIENT)
Dept: NEUROLOGY | Facility: CLINIC | Age: 68
End: 2018-10-09

## 2018-10-09 VITALS
WEIGHT: 193 LBS | SYSTOLIC BLOOD PRESSURE: 112 MMHG | BODY MASS INDEX: 34.2 KG/M2 | HEIGHT: 63 IN | DIASTOLIC BLOOD PRESSURE: 72 MMHG

## 2018-10-09 DIAGNOSIS — G40.901 SEIZURE DISORDER, NONCONVULSIVE, WITH STATUS EPILEPTICUS (HCC): Primary | ICD-10-CM

## 2018-10-09 DIAGNOSIS — G25.0 ESSENTIAL TREMOR: ICD-10-CM

## 2018-10-09 PROCEDURE — 99214 OFFICE O/P EST MOD 30 MIN: CPT | Performed by: PHYSICIAN ASSISTANT

## 2018-10-09 RX ORDER — CELECOXIB 100 MG/1
CAPSULE ORAL EVERY 12 HOURS SCHEDULED
COMMUNITY
End: 2020-05-21 | Stop reason: HOSPADM

## 2018-10-09 RX ORDER — MIRTAZAPINE 30 MG/1
30 TABLET, FILM COATED ORAL
Refills: 0 | COMMUNITY
Start: 2018-09-27 | End: 2018-10-23 | Stop reason: HOSPADM

## 2018-10-09 RX ORDER — VANCOMYCIN HYDROCHLORIDE 125 MG/1
125 CAPSULE ORAL 4 TIMES DAILY
COMMUNITY
End: 2018-10-13 | Stop reason: SDUPTHER

## 2018-10-09 NOTE — PROGRESS NOTES
"Subjective     Chief Complaint: seizure      History of Present Illness   Vidhi Lopez is a 68 y.o. female who with a history of diabetes who returns to clinic today following a hospitalization at [facility] in 3/18 for multiple issues, including seizure. She was noted to have significant confusion prior to her admission, though the history otherwise is a bit unclear. She was diagnosed with acute mixed respiratory failure and acute renal failure. Workup including an MRI of the brain, which was unremarkable for any acute abnormalities. An EEG was notable for status epilepticus.  She was subsequently treated with Keppra 1000mg BID. Additionally, after undergoing a right knee arthroscopy with incision and drainage and was diagnosed with an acute gout flare. Since her hospitalization, she feels that she has returned to her prior cognitive baseline.    For several months prior to her hospitalization, she noted occasional episodes in the evening during which she would \"zone out\" for several seconds. She denies any unresponsiveness or loss of consciousness as well as any urinary incontinence, tongue biting or confusion during these episodes. Since she was started on Keppra, she notes that these spells have resolved.      Additionally, she previously noted episodes of incoordination. She has had three episodes in 2017 during which she notes incoordination in her hands bilaterally. During these episodes, she is more prone to dropping items. She denies any clear associated weakness. She may note some unsteadiness, though denies any ataxia. 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 A MRA of the head and neck was notable for significant stenosis in the LICA and moderate stenosis in the AURE. She is currently taking ASA 81mg, Plavix, and Lipitor 80mg daily.     Since her last visit in 5/18, she denies any further seizures or spells. She also denies any episodes of incoordination. She has " "noted an intention tremor in her hands bilaterally primarily noted while writing and eating. This is worse in the morning as well as with fatigue. She denies any additional associated neurologic symptoms.       I have reviewed and confirmed the past family, social and medical history as accurate on 10/9/18.     Review of Systems   Constitutional: Negative.    HENT: Negative.    Eyes: Negative.    Respiratory: Negative.    Cardiovascular: Negative.    Gastrointestinal: Negative.    Endocrine: Negative.    Genitourinary: Negative.    Musculoskeletal: Negative.    Skin: Negative.    Allergic/Immunologic: Negative.    Neurological: Positive for tremors and seizures.            Hematological: Negative.    Psychiatric/Behavioral: Negative.        Objective     /72   Ht 160 cm (63\")   Wt 87.5 kg (193 lb)   LMP  (LMP Unknown) Comment: LAST MAMMOGRAM 2017  BMI 34.19 kg/m²     General appearance today is normal.     Physical Exam   Neurological: She has normal strength. She has a normal Finger-Nose-Finger Test. Gait normal.   Psychiatric: Her speech is normal.        Neurologic Exam     Mental Status   Speech: speech is normal   Level of consciousness: alert  Normal comprehension.     Cranial Nerves   Cranial nerves II through XII intact.     Motor Exam   Muscle bulk: normal  Overall muscle tone: normal    Strength   Strength 5/5 throughout.     Sensory Exam   Light touch normal.     Gait, Coordination, and Reflexes     Gait  Gait: normal    Coordination   Finger to nose coordination: normal    Tremor   Resting tremor: absent      Assessment/Plan   Vidhi was seen today for seizures.    Diagnoses and all orders for this visit:    Seizure disorder, nonconvulsive, with status epilepticus (CMS/HCC)    Essential tremor          Discussion/Summary   Vidhi Lopez returns to clinic today with a history of seizure. Her history is also consistent with Essential Tremor. This was discussed in detail with the patient. I again " reviewed her current status and treatment options. After discussing potential treatment options, it was elected to continue on Keppra unchanged. We discussed potential treatment options for the tremor, including adding primidone though this was reasonably declined for now. She will then follow up in 6 months, or sooner if needed.   I spent 40 minutes minutes face to face with the patient and family with 30 minutes spent on discussing diagnosis, prognosis, diagnostic testing, evaluation, current status, treatment options and management as discussed above.       As part of this visit I discussed the history with the patient .      Eusebia Yanez PA-C

## 2018-10-14 ENCOUNTER — APPOINTMENT (OUTPATIENT)
Dept: CT IMAGING | Facility: HOSPITAL | Age: 68
End: 2018-10-14

## 2018-10-14 ENCOUNTER — APPOINTMENT (OUTPATIENT)
Dept: GENERAL RADIOLOGY | Facility: HOSPITAL | Age: 68
End: 2018-10-14

## 2018-10-14 ENCOUNTER — HOSPITAL ENCOUNTER (INPATIENT)
Facility: HOSPITAL | Age: 68
LOS: 8 days | Discharge: HOME HEALTH | End: 2018-10-23
Attending: EMERGENCY MEDICINE | Admitting: INTERNAL MEDICINE

## 2018-10-14 DIAGNOSIS — J40 BRONCHITIS: ICD-10-CM

## 2018-10-14 DIAGNOSIS — Z78.9 IMPAIRED MOBILITY AND ADLS: ICD-10-CM

## 2018-10-14 DIAGNOSIS — J96.01 ACUTE RESPIRATORY FAILURE WITH HYPOXIA AND HYPERCAPNIA (HCC): Primary | ICD-10-CM

## 2018-10-14 DIAGNOSIS — J96.02 ACUTE RESPIRATORY FAILURE WITH HYPOXIA AND HYPERCAPNIA (HCC): Primary | ICD-10-CM

## 2018-10-14 DIAGNOSIS — Z74.09 IMPAIRED MOBILITY AND ADLS: ICD-10-CM

## 2018-10-14 DIAGNOSIS — R13.10 DYSPHAGIA, UNSPECIFIED TYPE: ICD-10-CM

## 2018-10-14 DIAGNOSIS — J44.1 COPD EXACERBATION (HCC): ICD-10-CM

## 2018-10-14 DIAGNOSIS — Z74.09 IMPAIRED FUNCTIONAL MOBILITY, BALANCE, GAIT, AND ENDURANCE: ICD-10-CM

## 2018-10-14 DIAGNOSIS — R40.1 STUPOR: ICD-10-CM

## 2018-10-14 LAB
ALBUMIN SERPL-MCNC: 4.16 G/DL (ref 3.2–4.8)
ALBUMIN/GLOB SERPL: 1.8 G/DL (ref 1.5–2.5)
ALP SERPL-CCNC: 142 U/L (ref 25–100)
ALT SERPL W P-5'-P-CCNC: 100 U/L (ref 7–40)
AMPHET+METHAMPHET UR QL: NEGATIVE
AMPHETAMINES UR QL: NEGATIVE
ANION GAP SERPL CALCULATED.3IONS-SCNC: 6 MMOL/L (ref 3–11)
APTT PPP: 26 SECONDS (ref 24–31)
ARTERIAL PATENCY WRIST A: POSITIVE
ARTERIAL PATENCY WRIST A: POSITIVE
AST SERPL-CCNC: 44 U/L (ref 0–33)
ATMOSPHERIC PRESS: ABNORMAL MMHG
ATMOSPHERIC PRESS: ABNORMAL MMHG
BARBITURATES UR QL SCN: NEGATIVE
BASE EXCESS BLDA CALC-SCNC: 3 MMOL/L (ref 0–2)
BASE EXCESS BLDA CALC-SCNC: 3.5 MMOL/L (ref 0–2)
BASOPHILS # BLD AUTO: 0.02 10*3/MM3 (ref 0–0.2)
BASOPHILS NFR BLD AUTO: 0.2 % (ref 0–1)
BDY SITE: ABNORMAL
BDY SITE: ABNORMAL
BENZODIAZ UR QL SCN: POSITIVE
BILIRUB SERPL-MCNC: 0.4 MG/DL (ref 0.3–1.2)
BILIRUB UR QL STRIP: NEGATIVE
BUN BLD-MCNC: 25 MG/DL (ref 9–23)
BUN BLDA-MCNC: 34 MG/DL (ref 8–26)
BUN/CREAT SERPL: 23.6 (ref 7–25)
BUPRENORPHINE SERPL-MCNC: NEGATIVE NG/ML
CA-I BLDA-SCNC: 1.23 MMOL/L (ref 1.2–1.32)
CALCIUM SPEC-SCNC: 9.2 MG/DL (ref 8.7–10.4)
CANNABINOIDS SERPL QL: NEGATIVE
CHLORIDE BLDA-SCNC: 97 MMOL/L (ref 98–109)
CHLORIDE SERPL-SCNC: 98 MMOL/L (ref 99–109)
CLARITY UR: CLEAR
CO2 BLDA-SCNC: 34.7 MMOL/L (ref 22–33)
CO2 BLDA-SCNC: 35 MMOL/L (ref 24–29)
CO2 BLDA-SCNC: 35.4 MMOL/L (ref 22–33)
CO2 SERPL-SCNC: 30 MMOL/L (ref 20–31)
COCAINE UR QL: NEGATIVE
COHGB MFR BLD: 3.3 % (ref 0–2)
COHGB MFR BLD: 3.4 % (ref 0–2)
COLOR UR: YELLOW
CREAT BLD-MCNC: 1.06 MG/DL (ref 0.6–1.3)
CREAT BLDA-MCNC: 1.2 MG/DL (ref 0.6–1.3)
D-LACTATE SERPL-SCNC: 0.8 MMOL/L (ref 0.5–2)
DEPRECATED RDW RBC AUTO: 59.2 FL (ref 37–54)
EOSINOPHIL # BLD AUTO: 0.03 10*3/MM3 (ref 0–0.3)
EOSINOPHIL NFR BLD AUTO: 0.3 % (ref 0–3)
ERYTHROCYTE [DISTWIDTH] IN BLOOD BY AUTOMATED COUNT: 16.7 % (ref 11.3–14.5)
GFR SERPL CREATININE-BSD FRML MDRD: 52 ML/MIN/1.73
GLOBULIN UR ELPH-MCNC: 2.3 GM/DL
GLUCOSE BLD-MCNC: 163 MG/DL (ref 70–100)
GLUCOSE BLDC GLUCOMTR-MCNC: 168 MG/DL (ref 70–130)
GLUCOSE UR STRIP-MCNC: NEGATIVE MG/DL
HCO3 BLDA-SCNC: 32.4 MMOL/L (ref 20–26)
HCO3 BLDA-SCNC: 32.9 MMOL/L (ref 20–26)
HCT VFR BLD AUTO: 35.2 % (ref 34.5–44)
HCT VFR BLD CALC: 33.1 %
HCT VFR BLD CALC: 33.4 %
HCT VFR BLDA CALC: 35 % (ref 38–51)
HGB BLD-MCNC: 10.8 G/DL (ref 11.5–15.5)
HGB BLDA-MCNC: 10.8 G/DL (ref 14–18)
HGB BLDA-MCNC: 10.9 G/DL (ref 14–18)
HGB BLDA-MCNC: 11.9 G/DL (ref 12–17)
HGB UR QL STRIP.AUTO: NEGATIVE
HOLD SPECIMEN: NORMAL
HOLD SPECIMEN: NORMAL
HOROWITZ INDEX BLD+IHG-RTO: 40 %
HOROWITZ INDEX BLD+IHG-RTO: 75 %
IMM GRANULOCYTES # BLD: 0.05 10*3/MM3 (ref 0–0.03)
IMM GRANULOCYTES NFR BLD: 0.5 % (ref 0–0.6)
INR PPP: 1.02 (ref 0.91–1.09)
INR PPP: 1.1 (ref 0.8–1.2)
KETONES UR QL STRIP: NEGATIVE
LEUKOCYTE ESTERASE UR QL STRIP.AUTO: NEGATIVE
LYMPHOCYTES # BLD AUTO: 0.68 10*3/MM3 (ref 0.6–4.8)
LYMPHOCYTES NFR BLD AUTO: 6.2 % (ref 24–44)
MCH RBC QN AUTO: 29.6 PG (ref 27–31)
MCHC RBC AUTO-ENTMCNC: 30.7 G/DL (ref 32–36)
MCV RBC AUTO: 96.4 FL (ref 80–99)
METHADONE UR QL SCN: NEGATIVE
METHGB BLD QL: 1.1 % (ref 0–1.5)
METHGB BLD QL: 1.3 % (ref 0–1.5)
MODALITY: ABNORMAL
MODALITY: ABNORMAL
MONOCYTES # BLD AUTO: 0.74 10*3/MM3 (ref 0–1)
MONOCYTES NFR BLD AUTO: 6.8 % (ref 0–12)
NEUTROPHILS # BLD AUTO: 9.49 10*3/MM3 (ref 1.5–8.3)
NEUTROPHILS NFR BLD AUTO: 86.5 % (ref 41–71)
NITRITE UR QL STRIP: NEGATIVE
OPIATES UR QL: NEGATIVE
OXYCODONE UR QL SCN: NEGATIVE
OXYHGB MFR BLDV: 91.1 % (ref 94–99)
OXYHGB MFR BLDV: 95.2 % (ref 94–99)
PCO2 BLDA: 74.6 MM HG (ref 35–45)
PCO2 BLDA: 83.3 MM HG (ref 35–45)
PCP UR QL SCN: NEGATIVE
PH BLDA: 7.2 PH UNITS (ref 7.35–7.45)
PH BLDA: 7.25 PH UNITS (ref 7.35–7.45)
PH UR STRIP.AUTO: 5.5 [PH] (ref 5–8)
PLATELET # BLD AUTO: 212 10*3/MM3 (ref 150–450)
PMV BLD AUTO: 9.6 FL (ref 6–12)
PO2 BLDA: 241 MM HG (ref 83–108)
PO2 BLDA: 85.3 MM HG (ref 83–108)
POTASSIUM BLD-SCNC: 4.9 MMOL/L (ref 3.5–5.5)
POTASSIUM BLDA-SCNC: 5 MMOL/L (ref 3.5–4.9)
PROPOXYPH UR QL: NEGATIVE
PROT SERPL-MCNC: 6.5 G/DL (ref 5.7–8.2)
PROT UR QL STRIP: NEGATIVE
PROTHROMBIN TIME: 10.7 SECONDS (ref 9.6–11.5)
PROTHROMBIN TIME: 13 SECONDS (ref 12.8–15.2)
RBC # BLD AUTO: 3.65 10*6/MM3 (ref 3.89–5.14)
SODIUM BLD-SCNC: 134 MMOL/L (ref 132–146)
SODIUM BLDA-SCNC: 134 MMOL/L (ref 138–146)
SP GR UR STRIP: 1.05 (ref 1–1.03)
TRICYCLICS UR QL SCN: NEGATIVE
TROPONIN I SERPL-MCNC: 0.01 NG/ML (ref 0–0.07)
TROPONIN I SERPL-MCNC: 0.02 NG/ML (ref 0–0.07)
TSH SERPL DL<=0.05 MIU/L-ACNC: 0.56 MIU/ML (ref 0.35–5.35)
UROBILINOGEN UR QL STRIP: ABNORMAL
WBC NRBC COR # BLD: 10.96 10*3/MM3 (ref 3.5–10.8)
WHOLE BLOOD HOLD SPECIMEN: NORMAL
WHOLE BLOOD HOLD SPECIMEN: NORMAL

## 2018-10-14 PROCEDURE — 0042T HC CT CEREBRAL PERFUSION W/WO CONTRAST: CPT

## 2018-10-14 PROCEDURE — 85610 PROTHROMBIN TIME: CPT | Performed by: EMERGENCY MEDICINE

## 2018-10-14 PROCEDURE — 81003 URINALYSIS AUTO W/O SCOPE: CPT | Performed by: EMERGENCY MEDICINE

## 2018-10-14 PROCEDURE — 99285 EMERGENCY DEPT VISIT HI MDM: CPT

## 2018-10-14 PROCEDURE — 93005 ELECTROCARDIOGRAM TRACING: CPT | Performed by: EMERGENCY MEDICINE

## 2018-10-14 PROCEDURE — 85730 THROMBOPLASTIN TIME PARTIAL: CPT | Performed by: EMERGENCY MEDICINE

## 2018-10-14 PROCEDURE — 94799 UNLISTED PULMONARY SVC/PX: CPT

## 2018-10-14 PROCEDURE — 70450 CT HEAD/BRAIN W/O DYE: CPT

## 2018-10-14 PROCEDURE — 82805 BLOOD GASES W/O2 SATURATION: CPT | Performed by: EMERGENCY MEDICINE

## 2018-10-14 PROCEDURE — 86140 C-REACTIVE PROTEIN: CPT | Performed by: EMERGENCY MEDICINE

## 2018-10-14 PROCEDURE — 85014 HEMATOCRIT: CPT

## 2018-10-14 PROCEDURE — 71045 X-RAY EXAM CHEST 1 VIEW: CPT

## 2018-10-14 PROCEDURE — 85025 COMPLETE CBC W/AUTO DIFF WBC: CPT | Performed by: EMERGENCY MEDICINE

## 2018-10-14 PROCEDURE — 84484 ASSAY OF TROPONIN QUANT: CPT

## 2018-10-14 PROCEDURE — 94760 N-INVAS EAR/PLS OXIMETRY 1: CPT

## 2018-10-14 PROCEDURE — 0 IOPAMIDOL PER 1 ML: Performed by: EMERGENCY MEDICINE

## 2018-10-14 PROCEDURE — 84443 ASSAY THYROID STIM HORMONE: CPT | Performed by: EMERGENCY MEDICINE

## 2018-10-14 PROCEDURE — 80053 COMPREHEN METABOLIC PANEL: CPT | Performed by: EMERGENCY MEDICINE

## 2018-10-14 PROCEDURE — 84145 PROCALCITONIN (PCT): CPT | Performed by: EMERGENCY MEDICINE

## 2018-10-14 PROCEDURE — 83605 ASSAY OF LACTIC ACID: CPT | Performed by: EMERGENCY MEDICINE

## 2018-10-14 PROCEDURE — 87040 BLOOD CULTURE FOR BACTERIA: CPT | Performed by: EMERGENCY MEDICINE

## 2018-10-14 PROCEDURE — 36600 WITHDRAWAL OF ARTERIAL BLOOD: CPT | Performed by: EMERGENCY MEDICINE

## 2018-10-14 PROCEDURE — 83735 ASSAY OF MAGNESIUM: CPT | Performed by: EMERGENCY MEDICINE

## 2018-10-14 PROCEDURE — 80306 DRUG TEST PRSMV INSTRMNT: CPT | Performed by: EMERGENCY MEDICINE

## 2018-10-14 PROCEDURE — 25010000002 PIPERACILLIN SOD-TAZOBACTAM PER 1 G: Performed by: EMERGENCY MEDICINE

## 2018-10-14 PROCEDURE — 94640 AIRWAY INHALATION TREATMENT: CPT

## 2018-10-14 PROCEDURE — 80047 BASIC METABLC PNL IONIZED CA: CPT

## 2018-10-14 PROCEDURE — 94660 CPAP INITIATION&MGMT: CPT

## 2018-10-14 PROCEDURE — 85610 PROTHROMBIN TIME: CPT

## 2018-10-14 RX ORDER — SODIUM CHLORIDE 0.9 % (FLUSH) 0.9 %
10 SYRINGE (ML) INJECTION AS NEEDED
Status: DISCONTINUED | OUTPATIENT
Start: 2018-10-14 | End: 2018-10-23 | Stop reason: HOSPADM

## 2018-10-14 RX ORDER — QUETIAPINE FUMARATE 25 MG/1
25 TABLET, FILM COATED ORAL NIGHTLY
COMMUNITY
End: 2018-10-23 | Stop reason: HOSPADM

## 2018-10-14 RX ORDER — METOLAZONE 5 MG/1
5 TABLET ORAL DAILY
COMMUNITY
End: 2018-10-23 | Stop reason: HOSPADM

## 2018-10-14 RX ORDER — PROMETHAZINE HYDROCHLORIDE 25 MG/1
25 TABLET ORAL EVERY 8 HOURS PRN
COMMUNITY
End: 2019-12-09 | Stop reason: HOSPADM

## 2018-10-14 RX ORDER — LEVOCETIRIZINE DIHYDROCHLORIDE 5 MG/1
5 TABLET, FILM COATED ORAL EVERY EVENING
COMMUNITY
End: 2019-05-20 | Stop reason: ALTCHOICE

## 2018-10-14 RX ORDER — LOPERAMIDE HYDROCHLORIDE 2 MG/1
2 CAPSULE ORAL 4 TIMES DAILY PRN
COMMUNITY
End: 2018-10-23 | Stop reason: HOSPADM

## 2018-10-14 RX ORDER — FEBUXOSTAT 40 MG/1
80 TABLET, FILM COATED ORAL DAILY
Status: ON HOLD | COMMUNITY
End: 2018-11-06

## 2018-10-14 RX ORDER — IPRATROPIUM BROMIDE AND ALBUTEROL SULFATE 2.5; .5 MG/3ML; MG/3ML
3 SOLUTION RESPIRATORY (INHALATION) ONCE
Status: COMPLETED | OUTPATIENT
Start: 2018-10-14 | End: 2018-10-14

## 2018-10-14 RX ORDER — GABAPENTIN 800 MG/1
800 TABLET ORAL 3 TIMES DAILY PRN
COMMUNITY
End: 2018-10-23 | Stop reason: HOSPADM

## 2018-10-14 RX ORDER — OXYCODONE HYDROCHLORIDE AND ACETAMINOPHEN 5; 325 MG/1; MG/1
1 TABLET ORAL EVERY 4 HOURS PRN
COMMUNITY
End: 2018-10-23 | Stop reason: HOSPADM

## 2018-10-14 RX ADMIN — IPRATROPIUM BROMIDE AND ALBUTEROL SULFATE 3 ML: 2.5; .5 SOLUTION RESPIRATORY (INHALATION) at 22:49

## 2018-10-14 RX ADMIN — TAZOBACTAM SODIUM AND PIPERACILLIN SODIUM 4.5 G: 500; 4 INJECTION, SOLUTION INTRAVENOUS at 23:54

## 2018-10-14 RX ADMIN — IOPAMIDOL 40 ML: 755 INJECTION, SOLUTION INTRAVENOUS at 22:31

## 2018-10-15 ENCOUNTER — APPOINTMENT (OUTPATIENT)
Dept: CARDIOLOGY | Facility: HOSPITAL | Age: 68
End: 2018-10-15
Attending: INTERNAL MEDICINE

## 2018-10-15 PROBLEM — R79.89 ELEVATED D-DIMER: Status: RESOLVED | Noted: 2018-03-19 | Resolved: 2018-10-15

## 2018-10-15 PROBLEM — J18.9 CAP (COMMUNITY ACQUIRED PNEUMONIA): Status: ACTIVE | Noted: 2018-10-15

## 2018-10-15 PROBLEM — R79.89 ELEVATED LFTS: Status: RESOLVED | Noted: 2018-03-17 | Resolved: 2018-10-15

## 2018-10-15 PROBLEM — N18.9 ACUTE KIDNEY INJURY SUPERIMPOSED ON CHRONIC KIDNEY DISEASE (HCC): Status: RESOLVED | Noted: 2018-03-17 | Resolved: 2018-10-15

## 2018-10-15 PROBLEM — N17.9 ACUTE KIDNEY INJURY SUPERIMPOSED ON CHRONIC KIDNEY DISEASE (HCC): Status: RESOLVED | Noted: 2018-03-17 | Resolved: 2018-10-15

## 2018-10-15 PROBLEM — G40.901 SEIZURE DISORDER, NONCONVULSIVE, WITH STATUS EPILEPTICUS: Status: RESOLVED | Noted: 2018-03-20 | Resolved: 2018-10-15

## 2018-10-15 PROBLEM — R65.10 SIRS (SYSTEMIC INFLAMMATORY RESPONSE SYNDROME) (HCC): Status: RESOLVED | Noted: 2018-03-17 | Resolved: 2018-10-15

## 2018-10-15 PROBLEM — J96.20 RESPIRATORY FAILURE, ACUTE AND CHRONIC (HCC): Status: ACTIVE | Noted: 2018-10-15

## 2018-10-15 PROBLEM — G89.29 CHRONIC PAIN: Status: ACTIVE | Noted: 2018-10-15

## 2018-10-15 PROBLEM — N39.0 RECURRENT UTI: Status: ACTIVE | Noted: 2018-10-15

## 2018-10-15 PROBLEM — R60.1 GENERALIZED EDEMA: Status: RESOLVED | Noted: 2017-09-20 | Resolved: 2018-10-15

## 2018-10-15 PROBLEM — R70.0 ESR RAISED: Status: RESOLVED | Noted: 2018-03-20 | Resolved: 2018-10-15

## 2018-10-15 PROBLEM — M00.9 SEPTIC JOINT OF RIGHT KNEE JOINT (HCC): Status: RESOLVED | Noted: 2018-03-21 | Resolved: 2018-10-15

## 2018-10-15 PROBLEM — M79.604 RIGHT LEG PAIN: Status: RESOLVED | Noted: 2018-03-19 | Resolved: 2018-10-15

## 2018-10-15 LAB
ALBUMIN SERPL-MCNC: 3.89 G/DL (ref 3.2–4.8)
ALBUMIN/GLOB SERPL: 1.9 G/DL (ref 1.5–2.5)
ALP SERPL-CCNC: 138 U/L (ref 25–100)
ALT SERPL W P-5'-P-CCNC: 93 U/L (ref 7–40)
AMMONIA BLD-SCNC: 23 UMOL/L (ref 19–60)
ANION GAP SERPL CALCULATED.3IONS-SCNC: 5 MMOL/L (ref 3–11)
ARTERIAL PATENCY WRIST A: ABNORMAL
AST SERPL-CCNC: 36 U/L (ref 0–33)
ATMOSPHERIC PRESS: ABNORMAL MMHG
BASE EXCESS BLDA CALC-SCNC: 4.1 MMOL/L (ref 0–2)
BDY SITE: ABNORMAL
BH CV ECHO MEAS - AO MAX PG (FULL): 6.4 MMHG
BH CV ECHO MEAS - AO MAX PG: 15.1 MMHG
BH CV ECHO MEAS - AO MEAN PG (FULL): 5.3 MMHG
BH CV ECHO MEAS - AO MEAN PG: 8.8 MMHG
BH CV ECHO MEAS - AO ROOT AREA (BSA CORRECTED): 1.2
BH CV ECHO MEAS - AO ROOT AREA: 4.8 CM^2
BH CV ECHO MEAS - AO ROOT DIAM: 2.5 CM
BH CV ECHO MEAS - AO V2 MAX: 194.1 CM/SEC
BH CV ECHO MEAS - AO V2 MEAN: 140.4 CM/SEC
BH CV ECHO MEAS - AO V2 VTI: 39.7 CM
BH CV ECHO MEAS - AVA(I,A): 1.4 CM^2
BH CV ECHO MEAS - AVA(I,D): 1.4 CM^2
BH CV ECHO MEAS - AVA(V,A): 1.6 CM^2
BH CV ECHO MEAS - AVA(V,D): 1.6 CM^2
BH CV ECHO MEAS - BSA(HAYCOCK): 2.1 M^2
BH CV ECHO MEAS - BSA: 2 M^2
BH CV ECHO MEAS - BZI_BMI: 36 KILOGRAMS/M^2
BH CV ECHO MEAS - BZI_METRIC_HEIGHT: 162.6 CM
BH CV ECHO MEAS - BZI_METRIC_WEIGHT: 95.3 KG
BH CV ECHO MEAS - EDV(CUBED): 82.1 ML
BH CV ECHO MEAS - EDV(MOD-SP2): 102 ML
BH CV ECHO MEAS - EDV(MOD-SP4): 151 ML
BH CV ECHO MEAS - EDV(TEICH): 85.2 ML
BH CV ECHO MEAS - EF(CUBED): 48.7 %
BH CV ECHO MEAS - EF(MOD-SP2): 37.3 %
BH CV ECHO MEAS - EF(MOD-SP4): 58.9 %
BH CV ECHO MEAS - EF(TEICH): 41.1 %
BH CV ECHO MEAS - ESV(CUBED): 42.1 ML
BH CV ECHO MEAS - ESV(MOD-SP2): 64 ML
BH CV ECHO MEAS - ESV(MOD-SP4): 62 ML
BH CV ECHO MEAS - ESV(TEICH): 50.2 ML
BH CV ECHO MEAS - FS: 19.9 %
BH CV ECHO MEAS - IVS/LVPW: 0.71
BH CV ECHO MEAS - IVSD: 0.88 CM
BH CV ECHO MEAS - LA DIMENSION: 4 CM
BH CV ECHO MEAS - LA/AO: 1.6
BH CV ECHO MEAS - LAD MAJOR: 5.8 CM
BH CV ECHO MEAS - LAT PEAK E' VEL: 8.7 CM/SEC
BH CV ECHO MEAS - LATERAL E/E' RATIO: 9.2
BH CV ECHO MEAS - LV DIASTOLIC VOL/BSA (35-75): 75.6 ML/M^2
BH CV ECHO MEAS - LV MASS(C)D: 156.4 GRAMS
BH CV ECHO MEAS - LV MASS(C)DI: 78.3 GRAMS/M^2
BH CV ECHO MEAS - LV MAX PG: 8.7 MMHG
BH CV ECHO MEAS - LV MEAN PG: 3.5 MMHG
BH CV ECHO MEAS - LV SYSTOLIC VOL/BSA (12-30): 31 ML/M^2
BH CV ECHO MEAS - LV V1 MAX: 147.6 CM/SEC
BH CV ECHO MEAS - LV V1 MEAN: 80.5 CM/SEC
BH CV ECHO MEAS - LV V1 VTI: 27.4 CM
BH CV ECHO MEAS - LVIDD: 4.3 CM
BH CV ECHO MEAS - LVIDS: 3.5 CM
BH CV ECHO MEAS - LVLD AP2: 9.2 CM
BH CV ECHO MEAS - LVLD AP4: 9 CM
BH CV ECHO MEAS - LVLS AP2: 7.6 CM
BH CV ECHO MEAS - LVLS AP4: 7.9 CM
BH CV ECHO MEAS - LVOT AREA (M): 2 CM^2
BH CV ECHO MEAS - LVOT AREA: 2.1 CM^2
BH CV ECHO MEAS - LVOT DIAM: 1.6 CM
BH CV ECHO MEAS - LVPWD: 1.2 CM
BH CV ECHO MEAS - MED PEAK E' VEL: 8 CM/SEC
BH CV ECHO MEAS - MEDIAL E/E' RATIO: 9.7
BH CV ECHO MEAS - MV A MAX VEL: 119 CM/SEC
BH CV ECHO MEAS - MV E MAX VEL: 79 CM/SEC
BH CV ECHO MEAS - MV E/A: 0.66
BH CV ECHO MEAS - PA ACC SLOPE: 1721 CM/SEC^2
BH CV ECHO MEAS - PA ACC TIME: 0.06 SEC
BH CV ECHO MEAS - PA PR(ACCEL): 50.5 MMHG
BH CV ECHO MEAS - RVDD: 2.5 CM
BH CV ECHO MEAS - SI(AO): 95.8 ML/M^2
BH CV ECHO MEAS - SI(CUBED): 20 ML/M^2
BH CV ECHO MEAS - SI(LVOT): 28.6 ML/M^2
BH CV ECHO MEAS - SI(MOD-SP2): 19 ML/M^2
BH CV ECHO MEAS - SI(MOD-SP4): 44.6 ML/M^2
BH CV ECHO MEAS - SI(TEICH): 17.6 ML/M^2
BH CV ECHO MEAS - SV(AO): 191.2 ML
BH CV ECHO MEAS - SV(CUBED): 40 ML
BH CV ECHO MEAS - SV(LVOT): 57.2 ML
BH CV ECHO MEAS - SV(MOD-SP2): 38 ML
BH CV ECHO MEAS - SV(MOD-SP4): 89 ML
BH CV ECHO MEAS - SV(TEICH): 35.1 ML
BH CV ECHO MEAS - TAPSE (>1.6): 2.5 CM2
BH CV ECHO MEASUREMENTS AVERAGE E/E' RATIO: 9.46
BH CV VAS BP LEFT ARM: NORMAL MMHG
BH CV XLRA - RV BASE: 4.3 CM
BH CV XLRA - RV LENGTH: 6.1 CM
BH CV XLRA - RV MID: 3.4 CM
BH CV XLRA - TDI S': 9.1 CM/SEC
BILIRUB SERPL-MCNC: 0.5 MG/DL (ref 0.3–1.2)
BUN BLD-MCNC: 28 MG/DL (ref 9–23)
BUN/CREAT SERPL: 25.9 (ref 7–25)
CALCIUM SPEC-SCNC: 8.8 MG/DL (ref 8.7–10.4)
CHLORIDE SERPL-SCNC: 97 MMOL/L (ref 99–109)
CO2 BLDA-SCNC: 35.7 MMOL/L (ref 22–33)
CO2 SERPL-SCNC: 32 MMOL/L (ref 20–31)
COHGB MFR BLD: 3.1 % (ref 0–2)
CREAT BLD-MCNC: 1.08 MG/DL (ref 0.6–1.3)
CRP SERPL-MCNC: 7.59 MG/DL (ref 0–1)
GFR SERPL CREATININE-BSD FRML MDRD: 51 ML/MIN/1.73
GLOBULIN UR ELPH-MCNC: 2 GM/DL
GLUCOSE BLD-MCNC: 161 MG/DL (ref 70–100)
GLUCOSE BLDC GLUCOMTR-MCNC: 120 MG/DL (ref 70–130)
GLUCOSE BLDC GLUCOMTR-MCNC: 144 MG/DL (ref 70–130)
GLUCOSE BLDC GLUCOMTR-MCNC: 153 MG/DL (ref 70–130)
GLUCOSE BLDC GLUCOMTR-MCNC: 174 MG/DL (ref 70–130)
GLUCOSE BLDC GLUCOMTR-MCNC: 198 MG/DL (ref 70–130)
HCO3 BLDA-SCNC: 33.3 MMOL/L (ref 20–26)
HCT VFR BLD CALC: 35.2 %
HGB BLDA-MCNC: 11.5 G/DL (ref 14–18)
HOROWITZ INDEX BLD+IHG-RTO: 40 %
LEFT ATRIUM VOLUME INDEX: 38.1 ML/M^2
LV EF 2D ECHO EST: 55 %
MAGNESIUM SERPL-MCNC: 1.9 MG/DL (ref 1.3–2.7)
MAXIMAL PREDICTED HEART RATE: 153 BPM
METHGB BLD QL: 0.9 % (ref 0–1.5)
MODALITY: ABNORMAL
OXYHGB MFR BLDV: 89.4 % (ref 94–99)
PCO2 BLDA: 76.2 MM HG (ref 35–45)
PH BLDA: 7.25 PH UNITS (ref 7.35–7.45)
PO2 BLDA: 74 MM HG (ref 83–108)
POTASSIUM BLD-SCNC: 5 MMOL/L (ref 3.5–5.5)
PROCALCITONIN SERPL-MCNC: 0.23 NG/ML
PROT SERPL-MCNC: 5.9 G/DL (ref 5.7–8.2)
SAO2 % BLDCOA: 89 %
SODIUM BLD-SCNC: 134 MMOL/L (ref 132–146)
STRESS TARGET HR: 130 BPM

## 2018-10-15 PROCEDURE — 93306 TTE W/DOPPLER COMPLETE: CPT

## 2018-10-15 PROCEDURE — 82962 GLUCOSE BLOOD TEST: CPT

## 2018-10-15 PROCEDURE — 94799 UNLISTED PULMONARY SVC/PX: CPT

## 2018-10-15 PROCEDURE — 82140 ASSAY OF AMMONIA: CPT | Performed by: INTERNAL MEDICINE

## 2018-10-15 PROCEDURE — 82805 BLOOD GASES W/O2 SATURATION: CPT | Performed by: INTERNAL MEDICINE

## 2018-10-15 PROCEDURE — 25010000002 ENOXAPARIN PER 10 MG: Performed by: INTERNAL MEDICINE

## 2018-10-15 PROCEDURE — 25010000002 CEFTRIAXONE PER 250 MG: Performed by: INTERNAL MEDICINE

## 2018-10-15 PROCEDURE — 93005 ELECTROCARDIOGRAM TRACING: CPT | Performed by: NURSE PRACTITIONER

## 2018-10-15 PROCEDURE — 92610 EVALUATE SWALLOWING FUNCTION: CPT

## 2018-10-15 PROCEDURE — 93010 ELECTROCARDIOGRAM REPORT: CPT | Performed by: INTERNAL MEDICINE

## 2018-10-15 PROCEDURE — 25010000002 AZITHROMYCIN: Performed by: EMERGENCY MEDICINE

## 2018-10-15 PROCEDURE — 25010000002 SULFUR HEXAFLUORIDE MICROSPH 60.7-25 MG RECONSTITUTED SUSPENSION: Performed by: INTERNAL MEDICINE

## 2018-10-15 PROCEDURE — 94640 AIRWAY INHALATION TREATMENT: CPT

## 2018-10-15 PROCEDURE — 99292 CRITICAL CARE ADDL 30 MIN: CPT | Performed by: INTERNAL MEDICINE

## 2018-10-15 PROCEDURE — 63710000001 INSULIN LISPRO (HUMAN) PER 5 UNITS: Performed by: INTERNAL MEDICINE

## 2018-10-15 PROCEDURE — 94760 N-INVAS EAR/PLS OXIMETRY 1: CPT

## 2018-10-15 PROCEDURE — 99291 CRITICAL CARE FIRST HOUR: CPT | Performed by: INTERNAL MEDICINE

## 2018-10-15 PROCEDURE — 36600 WITHDRAWAL OF ARTERIAL BLOOD: CPT | Performed by: INTERNAL MEDICINE

## 2018-10-15 PROCEDURE — 93306 TTE W/DOPPLER COMPLETE: CPT | Performed by: INTERNAL MEDICINE

## 2018-10-15 PROCEDURE — 25010000003 LEVETIRACETAM IN NACL 0.75% 1000 MG/100ML SOLUTION: Performed by: NURSE PRACTITIONER

## 2018-10-15 PROCEDURE — 80053 COMPREHEN METABOLIC PANEL: CPT | Performed by: INTERNAL MEDICINE

## 2018-10-15 RX ORDER — IPRATROPIUM BROMIDE AND ALBUTEROL SULFATE 2.5; .5 MG/3ML; MG/3ML
3 SOLUTION RESPIRATORY (INHALATION)
Status: DISCONTINUED | OUTPATIENT
Start: 2018-10-15 | End: 2018-10-17

## 2018-10-15 RX ORDER — TIZANIDINE 2 MG/1
4-6 TABLET ORAL NIGHTLY
COMMUNITY
End: 2018-10-23 | Stop reason: HOSPADM

## 2018-10-15 RX ORDER — NICOTINE POLACRILEX 4 MG
15 LOZENGE BUCCAL
Status: DISCONTINUED | OUTPATIENT
Start: 2018-10-15 | End: 2018-10-23 | Stop reason: HOSPADM

## 2018-10-15 RX ORDER — FAMOTIDINE 10 MG/ML
20 INJECTION, SOLUTION INTRAVENOUS EVERY 12 HOURS SCHEDULED
Status: DISCONTINUED | OUTPATIENT
Start: 2018-10-15 | End: 2018-10-16 | Stop reason: ALTCHOICE

## 2018-10-15 RX ORDER — DEXTROSE MONOHYDRATE 25 G/50ML
25 INJECTION, SOLUTION INTRAVENOUS
Status: DISCONTINUED | OUTPATIENT
Start: 2018-10-15 | End: 2018-10-23 | Stop reason: HOSPADM

## 2018-10-15 RX ORDER — ENALAPRILAT 2.5 MG/2ML
1.25 INJECTION INTRAVENOUS EVERY 6 HOURS PRN
Status: DISCONTINUED | OUTPATIENT
Start: 2018-10-15 | End: 2018-10-23 | Stop reason: HOSPADM

## 2018-10-15 RX ORDER — CLOPIDOGREL BISULFATE 75 MG/1
75 TABLET ORAL DAILY
Status: DISCONTINUED | OUTPATIENT
Start: 2018-10-15 | End: 2018-10-23 | Stop reason: HOSPADM

## 2018-10-15 RX ORDER — MAGNESIUM SULFATE HEPTAHYDRATE 40 MG/ML
2 INJECTION, SOLUTION INTRAVENOUS AS NEEDED
Status: DISCONTINUED | OUTPATIENT
Start: 2018-10-15 | End: 2018-10-23 | Stop reason: HOSPADM

## 2018-10-15 RX ORDER — SODIUM CHLORIDE 9 MG/ML
75 INJECTION, SOLUTION INTRAVENOUS CONTINUOUS
Status: DISCONTINUED | OUTPATIENT
Start: 2018-10-15 | End: 2018-10-16

## 2018-10-15 RX ORDER — CEFTRIAXONE SODIUM 1 G/50ML
1 INJECTION, SOLUTION INTRAVENOUS
Status: DISCONTINUED | OUTPATIENT
Start: 2018-10-15 | End: 2018-10-18

## 2018-10-15 RX ORDER — LEVETIRACETAM 500 MG/1
1000 TABLET ORAL EVERY 12 HOURS SCHEDULED
Status: DISCONTINUED | OUTPATIENT
Start: 2018-10-15 | End: 2018-10-15 | Stop reason: ALTCHOICE

## 2018-10-15 RX ORDER — SACCHAROMYCES BOULARDII 250 MG
250 CAPSULE ORAL 2 TIMES DAILY
Status: DISCONTINUED | OUTPATIENT
Start: 2018-10-15 | End: 2018-10-23 | Stop reason: HOSPADM

## 2018-10-15 RX ORDER — NITROGLYCERIN 0.4 MG/1
0.4 TABLET SUBLINGUAL
Status: DISCONTINUED | OUTPATIENT
Start: 2018-10-15 | End: 2018-10-23 | Stop reason: HOSPADM

## 2018-10-15 RX ORDER — VANCOMYCIN HYDROCHLORIDE 125 MG/1
125 CAPSULE ORAL 4 TIMES DAILY
COMMUNITY
Start: 2018-10-05 | End: 2018-10-23 | Stop reason: HOSPADM

## 2018-10-15 RX ORDER — MAGNESIUM SULFATE HEPTAHYDRATE 40 MG/ML
4 INJECTION, SOLUTION INTRAVENOUS AS NEEDED
Status: DISCONTINUED | OUTPATIENT
Start: 2018-10-15 | End: 2018-10-23 | Stop reason: HOSPADM

## 2018-10-15 RX ORDER — BUMETANIDE 0.25 MG/ML
1 INJECTION INTRAMUSCULAR; INTRAVENOUS ONCE
Status: COMPLETED | OUTPATIENT
Start: 2018-10-15 | End: 2018-10-15

## 2018-10-15 RX ORDER — ASPIRIN 81 MG/1
81 TABLET ORAL DAILY
Status: DISCONTINUED | OUTPATIENT
Start: 2018-10-15 | End: 2018-10-23 | Stop reason: HOSPADM

## 2018-10-15 RX ORDER — GABAPENTIN 400 MG/1
400 CAPSULE ORAL EVERY 8 HOURS SCHEDULED
Status: DISCONTINUED | OUTPATIENT
Start: 2018-10-15 | End: 2018-10-16

## 2018-10-15 RX ORDER — LEVETIRACETAM 10 MG/ML
1000 INJECTION INTRAVASCULAR EVERY 12 HOURS SCHEDULED
Status: DISCONTINUED | OUTPATIENT
Start: 2018-10-15 | End: 2018-10-16

## 2018-10-15 RX ORDER — POTASSIUM CHLORIDE 7.45 MG/ML
10 INJECTION INTRAVENOUS
Status: DISCONTINUED | OUTPATIENT
Start: 2018-10-15 | End: 2018-10-23 | Stop reason: HOSPADM

## 2018-10-15 RX ADMIN — NITROGLYCERIN 0.4 MG: 0.4 TABLET SUBLINGUAL at 23:06

## 2018-10-15 RX ADMIN — SODIUM CHLORIDE 75 ML/HR: 9 INJECTION, SOLUTION INTRAVENOUS at 14:17

## 2018-10-15 RX ADMIN — ENALAPRILAT 1.25 MG: 1.25 INJECTION INTRAVENOUS at 04:27

## 2018-10-15 RX ADMIN — ENOXAPARIN SODIUM 40 MG: 40 INJECTION SUBCUTANEOUS at 08:56

## 2018-10-15 RX ADMIN — BUMETANIDE 1 MG: 0.25 INJECTION INTRAMUSCULAR; INTRAVENOUS at 10:42

## 2018-10-15 RX ADMIN — Medication 250 MG: at 10:42

## 2018-10-15 RX ADMIN — INSULIN LISPRO 2 UNITS: 100 INJECTION, SOLUTION INTRAVENOUS; SUBCUTANEOUS at 20:22

## 2018-10-15 RX ADMIN — CEFTRIAXONE SODIUM 1 G: 1 INJECTION, SOLUTION INTRAVENOUS at 01:45

## 2018-10-15 RX ADMIN — INSULIN LISPRO 2 UNITS: 100 INJECTION, SOLUTION INTRAVENOUS; SUBCUTANEOUS at 12:54

## 2018-10-15 RX ADMIN — IPRATROPIUM BROMIDE AND ALBUTEROL SULFATE 3 ML: 2.5; .5 SOLUTION RESPIRATORY (INHALATION) at 12:29

## 2018-10-15 RX ADMIN — IPRATROPIUM BROMIDE AND ALBUTEROL SULFATE 3 ML: 2.5; .5 SOLUTION RESPIRATORY (INHALATION) at 20:41

## 2018-10-15 RX ADMIN — AZITHROMYCIN MONOHYDRATE 500 MG: 500 INJECTION, POWDER, LYOPHILIZED, FOR SOLUTION INTRAVENOUS at 23:41

## 2018-10-15 RX ADMIN — FAMOTIDINE 20 MG: 10 INJECTION, SOLUTION INTRAVENOUS at 19:52

## 2018-10-15 RX ADMIN — CEFTRIAXONE SODIUM 1 G: 1 INJECTION, SOLUTION INTRAVENOUS at 19:52

## 2018-10-15 RX ADMIN — LEVETIRACETAM 1000 MG: 10 INJECTION INTRAVENOUS at 01:06

## 2018-10-15 RX ADMIN — AZITHROMYCIN MONOHYDRATE 500 MG: 500 INJECTION, POWDER, LYOPHILIZED, FOR SOLUTION INTRAVENOUS at 00:32

## 2018-10-15 RX ADMIN — ASPIRIN 81 MG: 81 TABLET, COATED ORAL at 10:43

## 2018-10-15 RX ADMIN — CLOPIDOGREL BISULFATE 75 MG: 75 TABLET ORAL at 10:43

## 2018-10-15 RX ADMIN — SULFUR HEXAFLUORIDE 3 ML: KIT at 15:55

## 2018-10-15 RX ADMIN — ENALAPRILAT 1.25 MG: 1.25 INJECTION INTRAVENOUS at 23:41

## 2018-10-15 RX ADMIN — Medication 250 MG: at 19:52

## 2018-10-15 RX ADMIN — IPRATROPIUM BROMIDE AND ALBUTEROL SULFATE 3 ML: 2.5; .5 SOLUTION RESPIRATORY (INHALATION) at 08:46

## 2018-10-15 RX ADMIN — GABAPENTIN 400 MG: 400 CAPSULE ORAL at 22:00

## 2018-10-15 RX ADMIN — FAMOTIDINE 20 MG: 10 INJECTION, SOLUTION INTRAVENOUS at 00:48

## 2018-10-15 RX ADMIN — FAMOTIDINE 20 MG: 10 INJECTION, SOLUTION INTRAVENOUS at 08:56

## 2018-10-15 RX ADMIN — LEVETIRACETAM 1000 MG: 10 INJECTION INTRAVENOUS at 17:00

## 2018-10-15 RX ADMIN — SODIUM CHLORIDE 75 ML/HR: 9 INJECTION, SOLUTION INTRAVENOUS at 00:38

## 2018-10-15 RX ADMIN — IPRATROPIUM BROMIDE AND ALBUTEROL SULFATE 3 ML: 2.5; .5 SOLUTION RESPIRATORY (INHALATION) at 16:36

## 2018-10-15 RX ADMIN — GABAPENTIN 400 MG: 400 CAPSULE ORAL at 15:00

## 2018-10-16 ENCOUNTER — APPOINTMENT (OUTPATIENT)
Dept: PULMONOLOGY | Facility: HOSPITAL | Age: 68
End: 2018-10-16
Attending: INTERNAL MEDICINE

## 2018-10-16 ENCOUNTER — APPOINTMENT (OUTPATIENT)
Dept: GENERAL RADIOLOGY | Facility: HOSPITAL | Age: 68
End: 2018-10-16

## 2018-10-16 PROBLEM — A04.72 COLITIS, CLOSTRIDIUM DIFFICILE: Status: ACTIVE | Noted: 2018-10-16

## 2018-10-16 LAB
ARTERIAL PATENCY WRIST A: POSITIVE
ATMOSPHERIC PRESS: ABNORMAL MMHG
BASE EXCESS BLDA CALC-SCNC: 10.1 MMOL/L (ref 0–2)
BDY SITE: ABNORMAL
CA-I SERPL ISE-MCNC: 1.33 MMOL/L (ref 1.12–1.32)
CO2 BLDA-SCNC: 38 MMOL/L (ref 22–33)
COHGB MFR BLD: 2 % (ref 0–2)
D-LACTATE SERPL-SCNC: 0.7 MMOL/L (ref 0.5–2)
DEPRECATED RDW RBC AUTO: 60.2 FL (ref 37–54)
ERYTHROCYTE [DISTWIDTH] IN BLOOD BY AUTOMATED COUNT: 16.9 % (ref 11.3–14.5)
GLUCOSE BLDC GLUCOMTR-MCNC: 113 MG/DL (ref 70–130)
GLUCOSE BLDC GLUCOMTR-MCNC: 152 MG/DL (ref 70–130)
GLUCOSE BLDC GLUCOMTR-MCNC: 164 MG/DL (ref 70–130)
GLUCOSE BLDC GLUCOMTR-MCNC: 210 MG/DL (ref 70–130)
HCO3 BLDA-SCNC: 36.3 MMOL/L (ref 20–26)
HCT VFR BLD AUTO: 35.1 % (ref 34.5–44)
HCT VFR BLD CALC: 32.7 %
HGB BLD-MCNC: 10.5 G/DL (ref 11.5–15.5)
HGB BLDA-MCNC: 10.7 G/DL (ref 14–18)
HOROWITZ INDEX BLD+IHG-RTO: 44 %
MAGNESIUM SERPL-MCNC: 1.9 MG/DL (ref 1.3–2.7)
MCH RBC QN AUTO: 29.5 PG (ref 27–31)
MCHC RBC AUTO-ENTMCNC: 29.9 G/DL (ref 32–36)
MCV RBC AUTO: 98.6 FL (ref 80–99)
METHGB BLD QL: 0.9 % (ref 0–1.5)
MODALITY: ABNORMAL
OXYHGB MFR BLDV: 93.5 % (ref 94–99)
PCO2 BLDA: 55.9 MM HG (ref 35–45)
PH BLDA: 7.42 PH UNITS (ref 7.35–7.45)
PHOSPHATE SERPL-MCNC: 2.3 MG/DL (ref 2.4–5.1)
PLATELET # BLD AUTO: 173 10*3/MM3 (ref 150–450)
PMV BLD AUTO: 9.3 FL (ref 6–12)
PO2 BLDA: 75.8 MM HG (ref 83–108)
RBC # BLD AUTO: 3.56 10*6/MM3 (ref 3.89–5.14)
TSH SERPL DL<=0.05 MIU/L-ACNC: 0.38 MIU/ML (ref 0.35–5.35)
WBC NRBC COR # BLD: 6.9 10*3/MM3 (ref 3.5–10.8)

## 2018-10-16 PROCEDURE — 94799 UNLISTED PULMONARY SVC/PX: CPT

## 2018-10-16 PROCEDURE — 83605 ASSAY OF LACTIC ACID: CPT | Performed by: INTERNAL MEDICINE

## 2018-10-16 PROCEDURE — 94760 N-INVAS EAR/PLS OXIMETRY 1: CPT

## 2018-10-16 PROCEDURE — 25010000002 AZITHROMYCIN: Performed by: INTERNAL MEDICINE

## 2018-10-16 PROCEDURE — 36600 WITHDRAWAL OF ARTERIAL BLOOD: CPT | Performed by: INTERNAL MEDICINE

## 2018-10-16 PROCEDURE — 85027 COMPLETE CBC AUTOMATED: CPT | Performed by: INTERNAL MEDICINE

## 2018-10-16 PROCEDURE — 92610 EVALUATE SWALLOWING FUNCTION: CPT

## 2018-10-16 PROCEDURE — 71045 X-RAY EXAM CHEST 1 VIEW: CPT

## 2018-10-16 PROCEDURE — 99233 SBSQ HOSP IP/OBS HIGH 50: CPT | Performed by: INTERNAL MEDICINE

## 2018-10-16 PROCEDURE — 94010 BREATHING CAPACITY TEST: CPT

## 2018-10-16 PROCEDURE — 94640 AIRWAY INHALATION TREATMENT: CPT

## 2018-10-16 PROCEDURE — 83735 ASSAY OF MAGNESIUM: CPT | Performed by: INTERNAL MEDICINE

## 2018-10-16 PROCEDURE — 84443 ASSAY THYROID STIM HORMONE: CPT | Performed by: INTERNAL MEDICINE

## 2018-10-16 PROCEDURE — 25010000003 LEVETIRACETAM IN NACL 0.75% 1000 MG/100ML SOLUTION: Performed by: NURSE PRACTITIONER

## 2018-10-16 PROCEDURE — 82962 GLUCOSE BLOOD TEST: CPT

## 2018-10-16 PROCEDURE — 25010000002 CEFTRIAXONE PER 250 MG: Performed by: INTERNAL MEDICINE

## 2018-10-16 PROCEDURE — 82330 ASSAY OF CALCIUM: CPT | Performed by: INTERNAL MEDICINE

## 2018-10-16 PROCEDURE — 84100 ASSAY OF PHOSPHORUS: CPT | Performed by: INTERNAL MEDICINE

## 2018-10-16 PROCEDURE — 25010000002 ENOXAPARIN PER 10 MG: Performed by: INTERNAL MEDICINE

## 2018-10-16 PROCEDURE — 94010 BREATHING CAPACITY TEST: CPT | Performed by: INTERNAL MEDICINE

## 2018-10-16 PROCEDURE — 94660 CPAP INITIATION&MGMT: CPT

## 2018-10-16 PROCEDURE — 82805 BLOOD GASES W/O2 SATURATION: CPT | Performed by: INTERNAL MEDICINE

## 2018-10-16 RX ORDER — GABAPENTIN 400 MG/1
800 CAPSULE ORAL EVERY 8 HOURS SCHEDULED
Status: DISCONTINUED | OUTPATIENT
Start: 2018-10-16 | End: 2018-10-17

## 2018-10-16 RX ORDER — LIDOCAINE 50 MG/G
1 PATCH TOPICAL
Status: DISCONTINUED | OUTPATIENT
Start: 2018-10-16 | End: 2018-10-23 | Stop reason: HOSPADM

## 2018-10-16 RX ORDER — METOPROLOL TARTRATE 50 MG/1
50 TABLET, FILM COATED ORAL EVERY 12 HOURS SCHEDULED
Status: DISCONTINUED | OUTPATIENT
Start: 2018-10-16 | End: 2018-10-23 | Stop reason: HOSPADM

## 2018-10-16 RX ORDER — ALPRAZOLAM 0.25 MG/1
0.25 TABLET ORAL 2 TIMES DAILY PRN
Status: DISCONTINUED | OUTPATIENT
Start: 2018-10-16 | End: 2018-10-23 | Stop reason: HOSPADM

## 2018-10-16 RX ORDER — MIRTAZAPINE 15 MG/1
15 TABLET, FILM COATED ORAL NIGHTLY
Status: DISCONTINUED | OUTPATIENT
Start: 2018-10-16 | End: 2018-10-23 | Stop reason: HOSPADM

## 2018-10-16 RX ORDER — FAMOTIDINE 20 MG/1
20 TABLET, FILM COATED ORAL 2 TIMES DAILY
Status: DISCONTINUED | OUTPATIENT
Start: 2018-10-16 | End: 2018-10-23 | Stop reason: HOSPADM

## 2018-10-16 RX ORDER — IBUPROFEN 400 MG/1
600 TABLET ORAL EVERY 6 HOURS PRN
Status: DISCONTINUED | OUTPATIENT
Start: 2018-10-16 | End: 2018-10-23 | Stop reason: HOSPADM

## 2018-10-16 RX ORDER — ACETAMINOPHEN 325 MG/1
650 TABLET ORAL EVERY 6 HOURS PRN
Status: DISCONTINUED | OUTPATIENT
Start: 2018-10-16 | End: 2018-10-23 | Stop reason: HOSPADM

## 2018-10-16 RX ORDER — LEVETIRACETAM 500 MG/1
1000 TABLET ORAL EVERY 12 HOURS SCHEDULED
Status: DISCONTINUED | OUTPATIENT
Start: 2018-10-16 | End: 2018-10-23 | Stop reason: HOSPADM

## 2018-10-16 RX ADMIN — LEVETIRACETAM 1000 MG: 10 INJECTION INTRAVENOUS at 06:11

## 2018-10-16 RX ADMIN — INSULIN LISPRO 2 UNITS: 100 INJECTION, SOLUTION INTRAVENOUS; SUBCUTANEOUS at 17:00

## 2018-10-16 RX ADMIN — ENOXAPARIN SODIUM 40 MG: 40 INJECTION SUBCUTANEOUS at 08:42

## 2018-10-16 RX ADMIN — ALPRAZOLAM 0.25 MG: 0.25 TABLET ORAL at 21:24

## 2018-10-16 RX ADMIN — CEFTRIAXONE SODIUM 1 G: 1 INJECTION, SOLUTION INTRAVENOUS at 21:00

## 2018-10-16 RX ADMIN — FAMOTIDINE 20 MG: 20 TABLET ORAL at 20:33

## 2018-10-16 RX ADMIN — FAMOTIDINE 20 MG: 20 TABLET ORAL at 08:43

## 2018-10-16 RX ADMIN — ACETAMINOPHEN 650 MG: 325 TABLET ORAL at 12:02

## 2018-10-16 RX ADMIN — LEVETIRACETAM 1000 MG: 500 TABLET, FILM COATED ORAL at 17:01

## 2018-10-16 RX ADMIN — INSULIN LISPRO 3 UNITS: 100 INJECTION, SOLUTION INTRAVENOUS; SUBCUTANEOUS at 21:23

## 2018-10-16 RX ADMIN — CLOPIDOGREL BISULFATE 75 MG: 75 TABLET ORAL at 08:43

## 2018-10-16 RX ADMIN — SODIUM CHLORIDE 75 ML/HR: 9 INJECTION, SOLUTION INTRAVENOUS at 06:12

## 2018-10-16 RX ADMIN — GABAPENTIN 400 MG: 400 CAPSULE ORAL at 06:11

## 2018-10-16 RX ADMIN — IBUPROFEN 600 MG: 400 TABLET ORAL at 17:01

## 2018-10-16 RX ADMIN — MIRTAZAPINE 15 MG: 15 TABLET, FILM COATED ORAL at 20:37

## 2018-10-16 RX ADMIN — METOPROLOL TARTRATE 50 MG: 50 TABLET ORAL at 13:17

## 2018-10-16 RX ADMIN — GABAPENTIN 800 MG: 400 CAPSULE ORAL at 13:17

## 2018-10-16 RX ADMIN — ASPIRIN 81 MG: 81 TABLET, COATED ORAL at 08:43

## 2018-10-16 RX ADMIN — LIDOCAINE 1 PATCH: 50 PATCH CUTANEOUS at 13:17

## 2018-10-16 RX ADMIN — GABAPENTIN 800 MG: 400 CAPSULE ORAL at 20:34

## 2018-10-16 RX ADMIN — INSULIN LISPRO 2 UNITS: 100 INJECTION, SOLUTION INTRAVENOUS; SUBCUTANEOUS at 12:02

## 2018-10-16 RX ADMIN — MAGNESIUM SULFATE HEPTAHYDRATE 4 G: 40 INJECTION, SOLUTION INTRAVENOUS at 08:43

## 2018-10-16 RX ADMIN — IPRATROPIUM BROMIDE AND ALBUTEROL SULFATE 3 ML: 2.5; .5 SOLUTION RESPIRATORY (INHALATION) at 19:49

## 2018-10-16 RX ADMIN — Medication 250 MG: at 20:37

## 2018-10-16 RX ADMIN — METOPROLOL TARTRATE 50 MG: 50 TABLET ORAL at 20:34

## 2018-10-16 RX ADMIN — Medication 250 MG: at 08:43

## 2018-10-16 RX ADMIN — ENALAPRILAT 1.25 MG: 1.25 INJECTION INTRAVENOUS at 08:12

## 2018-10-16 RX ADMIN — IPRATROPIUM BROMIDE AND ALBUTEROL SULFATE 3 ML: 2.5; .5 SOLUTION RESPIRATORY (INHALATION) at 09:00

## 2018-10-17 LAB
ANION GAP SERPL CALCULATED.3IONS-SCNC: 3 MMOL/L (ref 3–11)
BUN BLD-MCNC: 17 MG/DL (ref 9–23)
BUN/CREAT SERPL: 22.4 (ref 7–25)
CALCIUM SPEC-SCNC: 9 MG/DL (ref 8.7–10.4)
CHLORIDE SERPL-SCNC: 101 MMOL/L (ref 99–109)
CO2 SERPL-SCNC: 38 MMOL/L (ref 20–31)
CREAT BLD-MCNC: 0.76 MG/DL (ref 0.6–1.3)
DEPRECATED RDW RBC AUTO: 59.1 FL (ref 37–54)
ERYTHROCYTE [DISTWIDTH] IN BLOOD BY AUTOMATED COUNT: 17.1 % (ref 11.3–14.5)
GFR SERPL CREATININE-BSD FRML MDRD: 76 ML/MIN/1.73
GLUCOSE BLD-MCNC: 175 MG/DL (ref 70–100)
GLUCOSE BLDC GLUCOMTR-MCNC: 125 MG/DL (ref 70–130)
GLUCOSE BLDC GLUCOMTR-MCNC: 170 MG/DL (ref 70–130)
GLUCOSE BLDC GLUCOMTR-MCNC: 172 MG/DL (ref 70–130)
GLUCOSE BLDC GLUCOMTR-MCNC: 182 MG/DL (ref 70–130)
HCT VFR BLD AUTO: 33.9 % (ref 34.5–44)
HGB BLD-MCNC: 10.2 G/DL (ref 11.5–15.5)
MAGNESIUM SERPL-MCNC: 2.3 MG/DL (ref 1.3–2.7)
MCH RBC QN AUTO: 28.9 PG (ref 27–31)
MCHC RBC AUTO-ENTMCNC: 30.1 G/DL (ref 32–36)
MCV RBC AUTO: 96 FL (ref 80–99)
PHOSPHATE SERPL-MCNC: 2.7 MG/DL (ref 2.4–5.1)
PLATELET # BLD AUTO: 191 10*3/MM3 (ref 150–450)
PMV BLD AUTO: 9.1 FL (ref 6–12)
POTASSIUM BLD-SCNC: 4.2 MMOL/L (ref 3.5–5.5)
RBC # BLD AUTO: 3.53 10*6/MM3 (ref 3.89–5.14)
SODIUM BLD-SCNC: 142 MMOL/L (ref 132–146)
WBC NRBC COR # BLD: 8.2 10*3/MM3 (ref 3.5–10.8)

## 2018-10-17 PROCEDURE — 80048 BASIC METABOLIC PNL TOTAL CA: CPT | Performed by: INTERNAL MEDICINE

## 2018-10-17 PROCEDURE — 84100 ASSAY OF PHOSPHORUS: CPT | Performed by: INTERNAL MEDICINE

## 2018-10-17 PROCEDURE — 25010000002 ACETAZOLAMIDE PER 500 MG: Performed by: INTERNAL MEDICINE

## 2018-10-17 PROCEDURE — 94799 UNLISTED PULMONARY SVC/PX: CPT

## 2018-10-17 PROCEDURE — 94640 AIRWAY INHALATION TREATMENT: CPT

## 2018-10-17 PROCEDURE — 25010000002 ENOXAPARIN PER 10 MG: Performed by: INTERNAL MEDICINE

## 2018-10-17 PROCEDURE — 25010000002 CEFTRIAXONE PER 250 MG: Performed by: INTERNAL MEDICINE

## 2018-10-17 PROCEDURE — 83735 ASSAY OF MAGNESIUM: CPT | Performed by: INTERNAL MEDICINE

## 2018-10-17 PROCEDURE — 85027 COMPLETE CBC AUTOMATED: CPT | Performed by: INTERNAL MEDICINE

## 2018-10-17 PROCEDURE — 82962 GLUCOSE BLOOD TEST: CPT

## 2018-10-17 PROCEDURE — 99233 SBSQ HOSP IP/OBS HIGH 50: CPT | Performed by: INTERNAL MEDICINE

## 2018-10-17 PROCEDURE — 94660 CPAP INITIATION&MGMT: CPT

## 2018-10-17 PROCEDURE — 99232 SBSQ HOSP IP/OBS MODERATE 35: CPT | Performed by: INTERNAL MEDICINE

## 2018-10-17 PROCEDURE — 25010000002 AZITHROMYCIN: Performed by: INTERNAL MEDICINE

## 2018-10-17 PROCEDURE — 94760 N-INVAS EAR/PLS OXIMETRY 1: CPT

## 2018-10-17 RX ORDER — POTASSIUM CHLORIDE 750 MG/1
40 CAPSULE, EXTENDED RELEASE ORAL ONCE
Status: COMPLETED | OUTPATIENT
Start: 2018-10-17 | End: 2018-10-17

## 2018-10-17 RX ORDER — GABAPENTIN 300 MG/1
600 CAPSULE ORAL EVERY 8 HOURS SCHEDULED
Status: DISCONTINUED | OUTPATIENT
Start: 2018-10-17 | End: 2018-10-23 | Stop reason: HOSPADM

## 2018-10-17 RX ORDER — IPRATROPIUM BROMIDE AND ALBUTEROL SULFATE 2.5; .5 MG/3ML; MG/3ML
3 SOLUTION RESPIRATORY (INHALATION) EVERY 4 HOURS PRN
Status: DISCONTINUED | OUTPATIENT
Start: 2018-10-17 | End: 2018-10-23 | Stop reason: HOSPADM

## 2018-10-17 RX ORDER — IPRATROPIUM BROMIDE AND ALBUTEROL SULFATE 2.5; .5 MG/3ML; MG/3ML
3 SOLUTION RESPIRATORY (INHALATION)
Status: DISCONTINUED | OUTPATIENT
Start: 2018-10-17 | End: 2018-10-23 | Stop reason: HOSPADM

## 2018-10-17 RX ORDER — BUMETANIDE 1 MG/1
1 TABLET ORAL
Status: DISCONTINUED | OUTPATIENT
Start: 2018-10-17 | End: 2018-10-23 | Stop reason: HOSPADM

## 2018-10-17 RX ORDER — ACETAZOLAMIDE SODIUM 500 MG/5ML
500 INJECTION, POWDER, LYOPHILIZED, FOR SOLUTION INTRAVENOUS ONCE
Status: COMPLETED | OUTPATIENT
Start: 2018-10-17 | End: 2018-10-17

## 2018-10-17 RX ADMIN — IPRATROPIUM BROMIDE AND ALBUTEROL SULFATE 3 ML: 2.5; .5 SOLUTION RESPIRATORY (INHALATION) at 23:28

## 2018-10-17 RX ADMIN — AZITHROMYCIN MONOHYDRATE 500 MG: 500 INJECTION, POWDER, LYOPHILIZED, FOR SOLUTION INTRAVENOUS at 01:59

## 2018-10-17 RX ADMIN — ACETAZOLAMIDE 500 MG: 500 INJECTION, POWDER, LYOPHILIZED, FOR SOLUTION INTRAVENOUS at 18:29

## 2018-10-17 RX ADMIN — IPRATROPIUM BROMIDE AND ALBUTEROL SULFATE 3 ML: 2.5; .5 SOLUTION RESPIRATORY (INHALATION) at 19:13

## 2018-10-17 RX ADMIN — GABAPENTIN 600 MG: 300 CAPSULE ORAL at 21:31

## 2018-10-17 RX ADMIN — IPRATROPIUM BROMIDE AND ALBUTEROL SULFATE 3 ML: 2.5; .5 SOLUTION RESPIRATORY (INHALATION) at 09:18

## 2018-10-17 RX ADMIN — BUMETANIDE 1 MG: 1 TABLET ORAL at 18:33

## 2018-10-17 RX ADMIN — IPRATROPIUM BROMIDE AND ALBUTEROL SULFATE 3 ML: 2.5; .5 SOLUTION RESPIRATORY (INHALATION) at 15:33

## 2018-10-17 RX ADMIN — LEVETIRACETAM 1000 MG: 500 TABLET, FILM COATED ORAL at 05:02

## 2018-10-17 RX ADMIN — MIRTAZAPINE 15 MG: 15 TABLET, FILM COATED ORAL at 21:31

## 2018-10-17 RX ADMIN — GABAPENTIN 600 MG: 300 CAPSULE ORAL at 13:12

## 2018-10-17 RX ADMIN — FAMOTIDINE 20 MG: 20 TABLET ORAL at 09:40

## 2018-10-17 RX ADMIN — VANCOMYCIN HYDROCHLORIDE 125 MG: KIT at 18:33

## 2018-10-17 RX ADMIN — ASPIRIN 81 MG: 81 TABLET, COATED ORAL at 09:40

## 2018-10-17 RX ADMIN — ALPRAZOLAM 0.25 MG: 0.25 TABLET ORAL at 23:59

## 2018-10-17 RX ADMIN — CEFTRIAXONE SODIUM 1 G: 1 INJECTION, SOLUTION INTRAVENOUS at 21:30

## 2018-10-17 RX ADMIN — Medication 250 MG: at 21:31

## 2018-10-17 RX ADMIN — ENOXAPARIN SODIUM 40 MG: 40 INJECTION SUBCUTANEOUS at 09:40

## 2018-10-17 RX ADMIN — LEVETIRACETAM 1000 MG: 500 TABLET, FILM COATED ORAL at 18:30

## 2018-10-17 RX ADMIN — LIDOCAINE 1 PATCH: 50 PATCH CUTANEOUS at 09:39

## 2018-10-17 RX ADMIN — Medication 250 MG: at 09:39

## 2018-10-17 RX ADMIN — INSULIN LISPRO 2 UNITS: 100 INJECTION, SOLUTION INTRAVENOUS; SUBCUTANEOUS at 12:32

## 2018-10-17 RX ADMIN — POTASSIUM CHLORIDE 40 MEQ: 750 CAPSULE, EXTENDED RELEASE ORAL at 18:29

## 2018-10-17 RX ADMIN — GABAPENTIN 800 MG: 400 CAPSULE ORAL at 05:02

## 2018-10-17 RX ADMIN — INSULIN LISPRO 2 UNITS: 100 INJECTION, SOLUTION INTRAVENOUS; SUBCUTANEOUS at 18:33

## 2018-10-17 RX ADMIN — METOPROLOL TARTRATE 50 MG: 50 TABLET ORAL at 21:31

## 2018-10-17 RX ADMIN — FAMOTIDINE 20 MG: 20 TABLET ORAL at 21:31

## 2018-10-17 RX ADMIN — METOPROLOL TARTRATE 50 MG: 50 TABLET ORAL at 09:40

## 2018-10-17 RX ADMIN — Medication 10 ML: at 18:30

## 2018-10-17 RX ADMIN — VANCOMYCIN HYDROCHLORIDE 125 MG: KIT at 12:32

## 2018-10-17 RX ADMIN — CLOPIDOGREL BISULFATE 75 MG: 75 TABLET ORAL at 09:40

## 2018-10-18 ENCOUNTER — APPOINTMENT (OUTPATIENT)
Dept: GENERAL RADIOLOGY | Facility: HOSPITAL | Age: 68
End: 2018-10-18

## 2018-10-18 LAB
ANION GAP SERPL CALCULATED.3IONS-SCNC: 4 MMOL/L (ref 3–11)
BUN BLD-MCNC: 13 MG/DL (ref 9–23)
BUN/CREAT SERPL: 21 (ref 7–25)
CALCIUM SPEC-SCNC: 9.6 MG/DL (ref 8.7–10.4)
CHLORIDE SERPL-SCNC: 99 MMOL/L (ref 99–109)
CO2 SERPL-SCNC: 34 MMOL/L (ref 20–31)
CREAT BLD-MCNC: 0.62 MG/DL (ref 0.6–1.3)
DEPRECATED RDW RBC AUTO: 60.1 FL (ref 37–54)
ERYTHROCYTE [DISTWIDTH] IN BLOOD BY AUTOMATED COUNT: 16.9 % (ref 11.3–14.5)
GFR SERPL CREATININE-BSD FRML MDRD: 96 ML/MIN/1.73
GLUCOSE BLD-MCNC: 120 MG/DL (ref 70–100)
GLUCOSE BLDC GLUCOMTR-MCNC: 128 MG/DL (ref 70–130)
GLUCOSE BLDC GLUCOMTR-MCNC: 141 MG/DL (ref 70–130)
GLUCOSE BLDC GLUCOMTR-MCNC: 207 MG/DL (ref 70–130)
GLUCOSE BLDC GLUCOMTR-MCNC: 265 MG/DL (ref 70–130)
HCT VFR BLD AUTO: 33.9 % (ref 34.5–44)
HGB BLD-MCNC: 10.5 G/DL (ref 11.5–15.5)
MAGNESIUM SERPL-MCNC: 2.5 MG/DL (ref 1.3–2.7)
MCH RBC QN AUTO: 30.2 PG (ref 27–31)
MCHC RBC AUTO-ENTMCNC: 31 G/DL (ref 32–36)
MCV RBC AUTO: 97.4 FL (ref 80–99)
PLATELET # BLD AUTO: 192 10*3/MM3 (ref 150–450)
PMV BLD AUTO: 9 FL (ref 6–12)
POTASSIUM BLD-SCNC: 4.1 MMOL/L (ref 3.5–5.5)
RBC # BLD AUTO: 3.48 10*6/MM3 (ref 3.89–5.14)
SODIUM BLD-SCNC: 137 MMOL/L (ref 132–146)
WBC NRBC COR # BLD: 7.28 10*3/MM3 (ref 3.5–10.8)

## 2018-10-18 PROCEDURE — 82962 GLUCOSE BLOOD TEST: CPT

## 2018-10-18 PROCEDURE — 80048 BASIC METABOLIC PNL TOTAL CA: CPT | Performed by: INTERNAL MEDICINE

## 2018-10-18 PROCEDURE — 97162 PT EVAL MOD COMPLEX 30 MIN: CPT

## 2018-10-18 PROCEDURE — 71045 X-RAY EXAM CHEST 1 VIEW: CPT

## 2018-10-18 PROCEDURE — 97165 OT EVAL LOW COMPLEX 30 MIN: CPT

## 2018-10-18 PROCEDURE — 94799 UNLISTED PULMONARY SVC/PX: CPT

## 2018-10-18 PROCEDURE — 85027 COMPLETE CBC AUTOMATED: CPT | Performed by: INTERNAL MEDICINE

## 2018-10-18 PROCEDURE — 25010000002 ENOXAPARIN PER 10 MG: Performed by: INTERNAL MEDICINE

## 2018-10-18 PROCEDURE — 25010000002 AZITHROMYCIN: Performed by: INTERNAL MEDICINE

## 2018-10-18 PROCEDURE — 94640 AIRWAY INHALATION TREATMENT: CPT

## 2018-10-18 PROCEDURE — 94660 CPAP INITIATION&MGMT: CPT

## 2018-10-18 PROCEDURE — 99232 SBSQ HOSP IP/OBS MODERATE 35: CPT | Performed by: INTERNAL MEDICINE

## 2018-10-18 PROCEDURE — 94760 N-INVAS EAR/PLS OXIMETRY 1: CPT

## 2018-10-18 PROCEDURE — 83735 ASSAY OF MAGNESIUM: CPT | Performed by: INTERNAL MEDICINE

## 2018-10-18 RX ORDER — POTASSIUM CHLORIDE 750 MG/1
20 CAPSULE, EXTENDED RELEASE ORAL ONCE
Status: COMPLETED | OUTPATIENT
Start: 2018-10-18 | End: 2018-10-18

## 2018-10-18 RX ORDER — POTASSIUM CHLORIDE 750 MG/1
20 CAPSULE, EXTENDED RELEASE ORAL ONCE
Status: DISCONTINUED | OUTPATIENT
Start: 2018-10-18 | End: 2018-10-18

## 2018-10-18 RX ORDER — POTASSIUM CHLORIDE 1.5 G/1.77G
40 POWDER, FOR SOLUTION ORAL AS NEEDED
Status: DISCONTINUED | OUTPATIENT
Start: 2018-10-18 | End: 2018-10-23 | Stop reason: HOSPADM

## 2018-10-18 RX ORDER — BUDESONIDE AND FORMOTEROL FUMARATE DIHYDRATE 160; 4.5 UG/1; UG/1
2 AEROSOL RESPIRATORY (INHALATION)
Status: DISCONTINUED | OUTPATIENT
Start: 2018-10-18 | End: 2018-10-23 | Stop reason: HOSPADM

## 2018-10-18 RX ORDER — ATORVASTATIN CALCIUM 40 MG/1
80 TABLET, FILM COATED ORAL NIGHTLY
Status: DISCONTINUED | OUTPATIENT
Start: 2018-10-18 | End: 2018-10-23 | Stop reason: HOSPADM

## 2018-10-18 RX ORDER — POTASSIUM CHLORIDE 750 MG/1
40 CAPSULE, EXTENDED RELEASE ORAL AS NEEDED
Status: DISCONTINUED | OUTPATIENT
Start: 2018-10-18 | End: 2018-10-23 | Stop reason: HOSPADM

## 2018-10-18 RX ADMIN — GABAPENTIN 600 MG: 300 CAPSULE ORAL at 20:45

## 2018-10-18 RX ADMIN — ENOXAPARIN SODIUM 40 MG: 40 INJECTION SUBCUTANEOUS at 10:27

## 2018-10-18 RX ADMIN — GABAPENTIN 600 MG: 300 CAPSULE ORAL at 06:00

## 2018-10-18 RX ADMIN — ATORVASTATIN CALCIUM 80 MG: 40 TABLET, FILM COATED ORAL at 20:44

## 2018-10-18 RX ADMIN — FAMOTIDINE 20 MG: 20 TABLET ORAL at 20:44

## 2018-10-18 RX ADMIN — INSULIN LISPRO 3 UNITS: 100 INJECTION, SOLUTION INTRAVENOUS; SUBCUTANEOUS at 20:46

## 2018-10-18 RX ADMIN — VANCOMYCIN HYDROCHLORIDE 125 MG: KIT at 18:40

## 2018-10-18 RX ADMIN — METOPROLOL TARTRATE 50 MG: 50 TABLET ORAL at 20:45

## 2018-10-18 RX ADMIN — Medication 250 MG: at 20:45

## 2018-10-18 RX ADMIN — BUMETANIDE 1 MG: 1 TABLET ORAL at 10:28

## 2018-10-18 RX ADMIN — VANCOMYCIN HYDROCHLORIDE 125 MG: KIT at 06:00

## 2018-10-18 RX ADMIN — POTASSIUM CHLORIDE 20 MEQ: 750 CAPSULE, EXTENDED RELEASE ORAL at 12:57

## 2018-10-18 RX ADMIN — FAMOTIDINE 20 MG: 20 TABLET ORAL at 10:28

## 2018-10-18 RX ADMIN — IPRATROPIUM BROMIDE AND ALBUTEROL SULFATE 3 ML: 2.5; .5 SOLUTION RESPIRATORY (INHALATION) at 12:01

## 2018-10-18 RX ADMIN — ACETAMINOPHEN 650 MG: 325 TABLET ORAL at 23:26

## 2018-10-18 RX ADMIN — LEVETIRACETAM 1000 MG: 500 TABLET, FILM COATED ORAL at 18:40

## 2018-10-18 RX ADMIN — ASPIRIN 81 MG: 81 TABLET, COATED ORAL at 10:28

## 2018-10-18 RX ADMIN — GABAPENTIN 600 MG: 300 CAPSULE ORAL at 13:05

## 2018-10-18 RX ADMIN — AZITHROMYCIN MONOHYDRATE 500 MG: 500 INJECTION, POWDER, LYOPHILIZED, FOR SOLUTION INTRAVENOUS at 01:08

## 2018-10-18 RX ADMIN — INSULIN LISPRO 4 UNITS: 100 INJECTION, SOLUTION INTRAVENOUS; SUBCUTANEOUS at 12:56

## 2018-10-18 RX ADMIN — BUDESONIDE AND FORMOTEROL FUMARATE DIHYDRATE 2 PUFF: 160; 4.5 AEROSOL RESPIRATORY (INHALATION) at 19:20

## 2018-10-18 RX ADMIN — IPRATROPIUM BROMIDE AND ALBUTEROL SULFATE 3 ML: 2.5; .5 SOLUTION RESPIRATORY (INHALATION) at 07:37

## 2018-10-18 RX ADMIN — MIRTAZAPINE 15 MG: 15 TABLET, FILM COATED ORAL at 20:44

## 2018-10-18 RX ADMIN — Medication 250 MG: at 10:26

## 2018-10-18 RX ADMIN — IPRATROPIUM BROMIDE AND ALBUTEROL SULFATE 3 ML: 2.5; .5 SOLUTION RESPIRATORY (INHALATION) at 03:49

## 2018-10-18 RX ADMIN — CLOPIDOGREL BISULFATE 75 MG: 75 TABLET ORAL at 10:28

## 2018-10-18 RX ADMIN — IPRATROPIUM BROMIDE AND ALBUTEROL SULFATE 3 ML: 2.5; .5 SOLUTION RESPIRATORY (INHALATION) at 15:42

## 2018-10-18 RX ADMIN — VANCOMYCIN HYDROCHLORIDE 125 MG: KIT at 01:23

## 2018-10-18 RX ADMIN — LIDOCAINE 1 PATCH: 50 PATCH CUTANEOUS at 10:26

## 2018-10-18 RX ADMIN — VANCOMYCIN HYDROCHLORIDE 125 MG: KIT at 13:04

## 2018-10-18 RX ADMIN — IPRATROPIUM BROMIDE AND ALBUTEROL SULFATE 3 ML: 2.5; .5 SOLUTION RESPIRATORY (INHALATION) at 19:20

## 2018-10-18 RX ADMIN — LEVETIRACETAM 1000 MG: 500 TABLET, FILM COATED ORAL at 06:00

## 2018-10-18 RX ADMIN — METOPROLOL TARTRATE 50 MG: 50 TABLET ORAL at 10:27

## 2018-10-18 RX ADMIN — ALPRAZOLAM 0.25 MG: 0.25 TABLET ORAL at 23:26

## 2018-10-19 LAB
ANION GAP SERPL CALCULATED.3IONS-SCNC: 5 MMOL/L (ref 3–11)
BUN BLD-MCNC: 21 MG/DL (ref 9–23)
BUN/CREAT SERPL: 33.3 (ref 7–25)
CALCIUM SPEC-SCNC: 9.3 MG/DL (ref 8.7–10.4)
CHLORIDE SERPL-SCNC: 100 MMOL/L (ref 99–109)
CO2 SERPL-SCNC: 36 MMOL/L (ref 20–31)
CREAT BLD-MCNC: 0.63 MG/DL (ref 0.6–1.3)
DEPRECATED RDW RBC AUTO: 58.3 FL (ref 37–54)
ERYTHROCYTE [DISTWIDTH] IN BLOOD BY AUTOMATED COUNT: 16.4 % (ref 11.3–14.5)
GFR SERPL CREATININE-BSD FRML MDRD: 94 ML/MIN/1.73
GLUCOSE BLD-MCNC: 148 MG/DL (ref 70–100)
GLUCOSE BLDC GLUCOMTR-MCNC: 147 MG/DL (ref 70–130)
GLUCOSE BLDC GLUCOMTR-MCNC: 224 MG/DL (ref 70–130)
GLUCOSE BLDC GLUCOMTR-MCNC: 228 MG/DL (ref 70–130)
GLUCOSE BLDC GLUCOMTR-MCNC: 289 MG/DL (ref 70–130)
HCT VFR BLD AUTO: 33.1 % (ref 34.5–44)
HGB BLD-MCNC: 10 G/DL (ref 11.5–15.5)
MAGNESIUM SERPL-MCNC: 2.4 MG/DL (ref 1.3–2.7)
MCH RBC QN AUTO: 29.6 PG (ref 27–31)
MCHC RBC AUTO-ENTMCNC: 30.2 G/DL (ref 32–36)
MCV RBC AUTO: 97.9 FL (ref 80–99)
PLATELET # BLD AUTO: 177 10*3/MM3 (ref 150–450)
PMV BLD AUTO: 9.1 FL (ref 6–12)
POTASSIUM BLD-SCNC: 4.6 MMOL/L (ref 3.5–5.5)
RBC # BLD AUTO: 3.38 10*6/MM3 (ref 3.89–5.14)
SODIUM BLD-SCNC: 141 MMOL/L (ref 132–146)
WBC NRBC COR # BLD: 6.68 10*3/MM3 (ref 3.5–10.8)

## 2018-10-19 PROCEDURE — 94799 UNLISTED PULMONARY SVC/PX: CPT

## 2018-10-19 PROCEDURE — 94760 N-INVAS EAR/PLS OXIMETRY 1: CPT

## 2018-10-19 PROCEDURE — 94660 CPAP INITIATION&MGMT: CPT

## 2018-10-19 PROCEDURE — 25010000002 ENOXAPARIN PER 10 MG: Performed by: INTERNAL MEDICINE

## 2018-10-19 PROCEDURE — 83735 ASSAY OF MAGNESIUM: CPT | Performed by: INTERNAL MEDICINE

## 2018-10-19 PROCEDURE — 82962 GLUCOSE BLOOD TEST: CPT

## 2018-10-19 PROCEDURE — 99233 SBSQ HOSP IP/OBS HIGH 50: CPT | Performed by: INTERNAL MEDICINE

## 2018-10-19 PROCEDURE — 94640 AIRWAY INHALATION TREATMENT: CPT

## 2018-10-19 PROCEDURE — 97530 THERAPEUTIC ACTIVITIES: CPT

## 2018-10-19 PROCEDURE — 85027 COMPLETE CBC AUTOMATED: CPT | Performed by: INTERNAL MEDICINE

## 2018-10-19 PROCEDURE — 80048 BASIC METABOLIC PNL TOTAL CA: CPT | Performed by: INTERNAL MEDICINE

## 2018-10-19 RX ADMIN — ALPRAZOLAM 0.25 MG: 0.25 TABLET ORAL at 23:25

## 2018-10-19 RX ADMIN — GABAPENTIN 600 MG: 300 CAPSULE ORAL at 20:41

## 2018-10-19 RX ADMIN — IPRATROPIUM BROMIDE AND ALBUTEROL SULFATE 3 ML: 2.5; .5 SOLUTION RESPIRATORY (INHALATION) at 12:32

## 2018-10-19 RX ADMIN — VANCOMYCIN HYDROCHLORIDE 125 MG: KIT at 00:04

## 2018-10-19 RX ADMIN — INSULIN LISPRO 3 UNITS: 100 INJECTION, SOLUTION INTRAVENOUS; SUBCUTANEOUS at 11:10

## 2018-10-19 RX ADMIN — Medication 250 MG: at 20:42

## 2018-10-19 RX ADMIN — IPRATROPIUM BROMIDE AND ALBUTEROL SULFATE 3 ML: 2.5; .5 SOLUTION RESPIRATORY (INHALATION) at 00:03

## 2018-10-19 RX ADMIN — IPRATROPIUM BROMIDE AND ALBUTEROL SULFATE 3 ML: 2.5; .5 SOLUTION RESPIRATORY (INHALATION) at 23:19

## 2018-10-19 RX ADMIN — LIDOCAINE 1 PATCH: 50 PATCH CUTANEOUS at 11:11

## 2018-10-19 RX ADMIN — BUDESONIDE AND FORMOTEROL FUMARATE DIHYDRATE 2 PUFF: 160; 4.5 AEROSOL RESPIRATORY (INHALATION) at 19:23

## 2018-10-19 RX ADMIN — GABAPENTIN 600 MG: 300 CAPSULE ORAL at 06:38

## 2018-10-19 RX ADMIN — IPRATROPIUM BROMIDE AND ALBUTEROL SULFATE 3 ML: 2.5; .5 SOLUTION RESPIRATORY (INHALATION) at 02:22

## 2018-10-19 RX ADMIN — VANCOMYCIN HYDROCHLORIDE 250 MG: KIT at 11:24

## 2018-10-19 RX ADMIN — LEVETIRACETAM 1000 MG: 500 TABLET, FILM COATED ORAL at 06:38

## 2018-10-19 RX ADMIN — METOPROLOL TARTRATE 50 MG: 50 TABLET ORAL at 11:18

## 2018-10-19 RX ADMIN — ATORVASTATIN CALCIUM 80 MG: 40 TABLET, FILM COATED ORAL at 20:42

## 2018-10-19 RX ADMIN — BUDESONIDE AND FORMOTEROL FUMARATE DIHYDRATE 2 PUFF: 160; 4.5 AEROSOL RESPIRATORY (INHALATION) at 07:36

## 2018-10-19 RX ADMIN — VANCOMYCIN HYDROCHLORIDE 125 MG: KIT at 06:39

## 2018-10-19 RX ADMIN — IPRATROPIUM BROMIDE AND ALBUTEROL SULFATE 3 ML: 2.5; .5 SOLUTION RESPIRATORY (INHALATION) at 19:23

## 2018-10-19 RX ADMIN — FAMOTIDINE 20 MG: 20 TABLET ORAL at 11:12

## 2018-10-19 RX ADMIN — MIRTAZAPINE 15 MG: 15 TABLET, FILM COATED ORAL at 20:42

## 2018-10-19 RX ADMIN — INSULIN LISPRO 3 UNITS: 100 INJECTION, SOLUTION INTRAVENOUS; SUBCUTANEOUS at 18:39

## 2018-10-19 RX ADMIN — BUMETANIDE 1 MG: 1 TABLET ORAL at 11:13

## 2018-10-19 RX ADMIN — IPRATROPIUM BROMIDE AND ALBUTEROL SULFATE 3 ML: 2.5; .5 SOLUTION RESPIRATORY (INHALATION) at 16:46

## 2018-10-19 RX ADMIN — ENOXAPARIN SODIUM 40 MG: 40 INJECTION SUBCUTANEOUS at 11:13

## 2018-10-19 RX ADMIN — IPRATROPIUM BROMIDE AND ALBUTEROL SULFATE 3 ML: 2.5; .5 SOLUTION RESPIRATORY (INHALATION) at 07:36

## 2018-10-19 RX ADMIN — ACETAMINOPHEN 650 MG: 325 TABLET ORAL at 11:24

## 2018-10-19 RX ADMIN — FAMOTIDINE 20 MG: 20 TABLET ORAL at 20:41

## 2018-10-19 RX ADMIN — Medication 250 MG: at 11:12

## 2018-10-19 RX ADMIN — METOPROLOL TARTRATE 50 MG: 50 TABLET ORAL at 20:41

## 2018-10-19 RX ADMIN — VANCOMYCIN HYDROCHLORIDE 250 MG: KIT at 18:39

## 2018-10-19 RX ADMIN — LEVETIRACETAM 1000 MG: 500 TABLET, FILM COATED ORAL at 18:39

## 2018-10-19 RX ADMIN — INSULIN LISPRO 4 UNITS: 100 INJECTION, SOLUTION INTRAVENOUS; SUBCUTANEOUS at 20:42

## 2018-10-19 RX ADMIN — GABAPENTIN 600 MG: 300 CAPSULE ORAL at 14:18

## 2018-10-19 RX ADMIN — ASPIRIN 81 MG: 81 TABLET, COATED ORAL at 11:18

## 2018-10-19 RX ADMIN — CLOPIDOGREL BISULFATE 75 MG: 75 TABLET ORAL at 11:13

## 2018-10-19 RX ADMIN — VANCOMYCIN HYDROCHLORIDE 250 MG: KIT at 23:18

## 2018-10-20 LAB
ANION GAP SERPL CALCULATED.3IONS-SCNC: 4 MMOL/L (ref 3–11)
BACTERIA SPEC AEROBE CULT: NORMAL
BACTERIA SPEC AEROBE CULT: NORMAL
BUN BLD-MCNC: 21 MG/DL (ref 9–23)
BUN/CREAT SERPL: 31.8 (ref 7–25)
CALCIUM SPEC-SCNC: 9.4 MG/DL (ref 8.7–10.4)
CHLORIDE SERPL-SCNC: 101 MMOL/L (ref 99–109)
CO2 SERPL-SCNC: 38 MMOL/L (ref 20–31)
CREAT BLD-MCNC: 0.66 MG/DL (ref 0.6–1.3)
DEPRECATED RDW RBC AUTO: 56.1 FL (ref 37–54)
ERYTHROCYTE [DISTWIDTH] IN BLOOD BY AUTOMATED COUNT: 16 % (ref 11.3–14.5)
GFR SERPL CREATININE-BSD FRML MDRD: 89 ML/MIN/1.73
GLUCOSE BLD-MCNC: 158 MG/DL (ref 70–100)
GLUCOSE BLDC GLUCOMTR-MCNC: 221 MG/DL (ref 70–130)
GLUCOSE BLDC GLUCOMTR-MCNC: 225 MG/DL (ref 70–130)
GLUCOSE BLDC GLUCOMTR-MCNC: 226 MG/DL (ref 70–130)
GLUCOSE BLDC GLUCOMTR-MCNC: 292 MG/DL (ref 70–130)
HCT VFR BLD AUTO: 36.7 % (ref 34.5–44)
HGB BLD-MCNC: 11.2 G/DL (ref 11.5–15.5)
MCH RBC QN AUTO: 29.6 PG (ref 27–31)
MCHC RBC AUTO-ENTMCNC: 30.5 G/DL (ref 32–36)
MCV RBC AUTO: 97.1 FL (ref 80–99)
PLATELET # BLD AUTO: 199 10*3/MM3 (ref 150–450)
PMV BLD AUTO: 9.3 FL (ref 6–12)
POTASSIUM BLD-SCNC: 4.6 MMOL/L (ref 3.5–5.5)
RBC # BLD AUTO: 3.78 10*6/MM3 (ref 3.89–5.14)
SODIUM BLD-SCNC: 143 MMOL/L (ref 132–146)
WBC NRBC COR # BLD: 7.62 10*3/MM3 (ref 3.5–10.8)

## 2018-10-20 PROCEDURE — 82962 GLUCOSE BLOOD TEST: CPT

## 2018-10-20 PROCEDURE — 94799 UNLISTED PULMONARY SVC/PX: CPT

## 2018-10-20 PROCEDURE — 94760 N-INVAS EAR/PLS OXIMETRY 1: CPT

## 2018-10-20 PROCEDURE — 80048 BASIC METABOLIC PNL TOTAL CA: CPT | Performed by: INTERNAL MEDICINE

## 2018-10-20 PROCEDURE — 97110 THERAPEUTIC EXERCISES: CPT

## 2018-10-20 PROCEDURE — 99232 SBSQ HOSP IP/OBS MODERATE 35: CPT | Performed by: INTERNAL MEDICINE

## 2018-10-20 PROCEDURE — 25010000002 ENOXAPARIN PER 10 MG: Performed by: INTERNAL MEDICINE

## 2018-10-20 PROCEDURE — 85027 COMPLETE CBC AUTOMATED: CPT | Performed by: INTERNAL MEDICINE

## 2018-10-20 PROCEDURE — 94660 CPAP INITIATION&MGMT: CPT

## 2018-10-20 PROCEDURE — 97535 SELF CARE MNGMENT TRAINING: CPT

## 2018-10-20 PROCEDURE — 94640 AIRWAY INHALATION TREATMENT: CPT

## 2018-10-20 RX ADMIN — INSULIN LISPRO 4 UNITS: 100 INJECTION, SOLUTION INTRAVENOUS; SUBCUTANEOUS at 12:05

## 2018-10-20 RX ADMIN — INSULIN LISPRO 3 UNITS: 100 INJECTION, SOLUTION INTRAVENOUS; SUBCUTANEOUS at 08:57

## 2018-10-20 RX ADMIN — VANCOMYCIN HYDROCHLORIDE 250 MG: KIT at 05:07

## 2018-10-20 RX ADMIN — LEVETIRACETAM 1000 MG: 500 TABLET, FILM COATED ORAL at 17:42

## 2018-10-20 RX ADMIN — ALPRAZOLAM 0.25 MG: 0.25 TABLET ORAL at 23:24

## 2018-10-20 RX ADMIN — GABAPENTIN 600 MG: 300 CAPSULE ORAL at 05:07

## 2018-10-20 RX ADMIN — IPRATROPIUM BROMIDE AND ALBUTEROL SULFATE 3 ML: 2.5; .5 SOLUTION RESPIRATORY (INHALATION) at 19:26

## 2018-10-20 RX ADMIN — LIDOCAINE 1 PATCH: 50 PATCH CUTANEOUS at 08:12

## 2018-10-20 RX ADMIN — ACETAMINOPHEN 650 MG: 325 TABLET ORAL at 08:11

## 2018-10-20 RX ADMIN — LEVETIRACETAM 1000 MG: 500 TABLET, FILM COATED ORAL at 05:07

## 2018-10-20 RX ADMIN — BUMETANIDE 1 MG: 1 TABLET ORAL at 08:11

## 2018-10-20 RX ADMIN — METOPROLOL TARTRATE 50 MG: 50 TABLET ORAL at 21:11

## 2018-10-20 RX ADMIN — Medication 250 MG: at 08:10

## 2018-10-20 RX ADMIN — IPRATROPIUM BROMIDE AND ALBUTEROL SULFATE 3 ML: 2.5; .5 SOLUTION RESPIRATORY (INHALATION) at 11:19

## 2018-10-20 RX ADMIN — VANCOMYCIN HYDROCHLORIDE 250 MG: KIT at 17:42

## 2018-10-20 RX ADMIN — VANCOMYCIN HYDROCHLORIDE 250 MG: KIT at 12:05

## 2018-10-20 RX ADMIN — IPRATROPIUM BROMIDE AND ALBUTEROL SULFATE 3 ML: 2.5; .5 SOLUTION RESPIRATORY (INHALATION) at 15:19

## 2018-10-20 RX ADMIN — VANCOMYCIN HYDROCHLORIDE 250 MG: KIT at 23:23

## 2018-10-20 RX ADMIN — INSULIN LISPRO 3 UNITS: 100 INJECTION, SOLUTION INTRAVENOUS; SUBCUTANEOUS at 21:10

## 2018-10-20 RX ADMIN — Medication 250 MG: at 21:11

## 2018-10-20 RX ADMIN — ACETAMINOPHEN 650 MG: 325 TABLET ORAL at 21:11

## 2018-10-20 RX ADMIN — IPRATROPIUM BROMIDE AND ALBUTEROL SULFATE 3 ML: 2.5; .5 SOLUTION RESPIRATORY (INHALATION) at 06:52

## 2018-10-20 RX ADMIN — ENALAPRILAT 1.25 MG: 1.25 INJECTION INTRAVENOUS at 17:59

## 2018-10-20 RX ADMIN — CLOPIDOGREL BISULFATE 75 MG: 75 TABLET ORAL at 08:12

## 2018-10-20 RX ADMIN — GABAPENTIN 600 MG: 300 CAPSULE ORAL at 21:11

## 2018-10-20 RX ADMIN — BUDESONIDE AND FORMOTEROL FUMARATE DIHYDRATE 2 PUFF: 160; 4.5 AEROSOL RESPIRATORY (INHALATION) at 07:10

## 2018-10-20 RX ADMIN — INSULIN LISPRO 3 UNITS: 100 INJECTION, SOLUTION INTRAVENOUS; SUBCUTANEOUS at 17:42

## 2018-10-20 RX ADMIN — ATORVASTATIN CALCIUM 80 MG: 40 TABLET, FILM COATED ORAL at 21:11

## 2018-10-20 RX ADMIN — FAMOTIDINE 20 MG: 20 TABLET ORAL at 08:11

## 2018-10-20 RX ADMIN — FAMOTIDINE 20 MG: 20 TABLET ORAL at 21:11

## 2018-10-20 RX ADMIN — BUDESONIDE AND FORMOTEROL FUMARATE DIHYDRATE 2 PUFF: 160; 4.5 AEROSOL RESPIRATORY (INHALATION) at 19:27

## 2018-10-20 RX ADMIN — GABAPENTIN 600 MG: 300 CAPSULE ORAL at 15:41

## 2018-10-20 RX ADMIN — IPRATROPIUM BROMIDE AND ALBUTEROL SULFATE 3 ML: 2.5; .5 SOLUTION RESPIRATORY (INHALATION) at 02:31

## 2018-10-20 RX ADMIN — ENOXAPARIN SODIUM 40 MG: 40 INJECTION SUBCUTANEOUS at 08:12

## 2018-10-20 RX ADMIN — ASPIRIN 81 MG: 81 TABLET, COATED ORAL at 08:12

## 2018-10-20 RX ADMIN — METOPROLOL TARTRATE 50 MG: 50 TABLET ORAL at 08:11

## 2018-10-20 RX ADMIN — MIRTAZAPINE 15 MG: 15 TABLET, FILM COATED ORAL at 21:11

## 2018-10-20 RX ADMIN — IPRATROPIUM BROMIDE AND ALBUTEROL SULFATE 3 ML: 2.5; .5 SOLUTION RESPIRATORY (INHALATION) at 23:18

## 2018-10-21 LAB
GLUCOSE BLDC GLUCOMTR-MCNC: 225 MG/DL (ref 70–130)
GLUCOSE BLDC GLUCOMTR-MCNC: 244 MG/DL (ref 70–130)
GLUCOSE BLDC GLUCOMTR-MCNC: 257 MG/DL (ref 70–130)
GLUCOSE BLDC GLUCOMTR-MCNC: 371 MG/DL (ref 70–130)

## 2018-10-21 PROCEDURE — 94640 AIRWAY INHALATION TREATMENT: CPT

## 2018-10-21 PROCEDURE — 82962 GLUCOSE BLOOD TEST: CPT

## 2018-10-21 PROCEDURE — 94799 UNLISTED PULMONARY SVC/PX: CPT

## 2018-10-21 PROCEDURE — 94760 N-INVAS EAR/PLS OXIMETRY 1: CPT

## 2018-10-21 PROCEDURE — 99232 SBSQ HOSP IP/OBS MODERATE 35: CPT | Performed by: INTERNAL MEDICINE

## 2018-10-21 PROCEDURE — 25010000002 ENOXAPARIN PER 10 MG: Performed by: INTERNAL MEDICINE

## 2018-10-21 PROCEDURE — 94660 CPAP INITIATION&MGMT: CPT

## 2018-10-21 RX ADMIN — BUDESONIDE AND FORMOTEROL FUMARATE DIHYDRATE 2 PUFF: 160; 4.5 AEROSOL RESPIRATORY (INHALATION) at 07:54

## 2018-10-21 RX ADMIN — IPRATROPIUM BROMIDE AND ALBUTEROL SULFATE 3 ML: 2.5; .5 SOLUTION RESPIRATORY (INHALATION) at 15:52

## 2018-10-21 RX ADMIN — GABAPENTIN 600 MG: 300 CAPSULE ORAL at 13:51

## 2018-10-21 RX ADMIN — GABAPENTIN 600 MG: 300 CAPSULE ORAL at 05:13

## 2018-10-21 RX ADMIN — LIDOCAINE 1 PATCH: 50 PATCH CUTANEOUS at 08:16

## 2018-10-21 RX ADMIN — INSULIN LISPRO 6 UNITS: 100 INJECTION, SOLUTION INTRAVENOUS; SUBCUTANEOUS at 21:09

## 2018-10-21 RX ADMIN — Medication 250 MG: at 21:08

## 2018-10-21 RX ADMIN — ENOXAPARIN SODIUM 40 MG: 40 INJECTION SUBCUTANEOUS at 08:17

## 2018-10-21 RX ADMIN — CLOPIDOGREL BISULFATE 75 MG: 75 TABLET ORAL at 08:16

## 2018-10-21 RX ADMIN — BUDESONIDE AND FORMOTEROL FUMARATE DIHYDRATE 2 PUFF: 160; 4.5 AEROSOL RESPIRATORY (INHALATION) at 19:28

## 2018-10-21 RX ADMIN — FAMOTIDINE 20 MG: 20 TABLET ORAL at 08:17

## 2018-10-21 RX ADMIN — GABAPENTIN 600 MG: 300 CAPSULE ORAL at 21:08

## 2018-10-21 RX ADMIN — LEVETIRACETAM 1000 MG: 500 TABLET, FILM COATED ORAL at 05:13

## 2018-10-21 RX ADMIN — VANCOMYCIN HYDROCHLORIDE 250 MG: KIT at 05:13

## 2018-10-21 RX ADMIN — Medication 250 MG: at 08:16

## 2018-10-21 RX ADMIN — IPRATROPIUM BROMIDE AND ALBUTEROL SULFATE 3 ML: 2.5; .5 SOLUTION RESPIRATORY (INHALATION) at 07:54

## 2018-10-21 RX ADMIN — IPRATROPIUM BROMIDE AND ALBUTEROL SULFATE 3 ML: 2.5; .5 SOLUTION RESPIRATORY (INHALATION) at 03:11

## 2018-10-21 RX ADMIN — METOPROLOL TARTRATE 50 MG: 50 TABLET ORAL at 21:08

## 2018-10-21 RX ADMIN — VANCOMYCIN HYDROCHLORIDE 250 MG: KIT at 11:55

## 2018-10-21 RX ADMIN — VANCOMYCIN HYDROCHLORIDE 250 MG: KIT at 17:30

## 2018-10-21 RX ADMIN — INSULIN LISPRO 4 UNITS: 100 INJECTION, SOLUTION INTRAVENOUS; SUBCUTANEOUS at 17:30

## 2018-10-21 RX ADMIN — INSULIN LISPRO 3 UNITS: 100 INJECTION, SOLUTION INTRAVENOUS; SUBCUTANEOUS at 08:17

## 2018-10-21 RX ADMIN — METOPROLOL TARTRATE 50 MG: 50 TABLET ORAL at 08:17

## 2018-10-21 RX ADMIN — BUMETANIDE 1 MG: 1 TABLET ORAL at 08:16

## 2018-10-21 RX ADMIN — MIRTAZAPINE 15 MG: 15 TABLET, FILM COATED ORAL at 21:08

## 2018-10-21 RX ADMIN — IPRATROPIUM BROMIDE AND ALBUTEROL SULFATE 3 ML: 2.5; .5 SOLUTION RESPIRATORY (INHALATION) at 12:30

## 2018-10-21 RX ADMIN — INSULIN LISPRO 3 UNITS: 100 INJECTION, SOLUTION INTRAVENOUS; SUBCUTANEOUS at 11:55

## 2018-10-21 RX ADMIN — LEVETIRACETAM 1000 MG: 500 TABLET, FILM COATED ORAL at 17:30

## 2018-10-21 RX ADMIN — FAMOTIDINE 20 MG: 20 TABLET ORAL at 21:08

## 2018-10-21 RX ADMIN — ASPIRIN 81 MG: 81 TABLET, COATED ORAL at 08:16

## 2018-10-21 RX ADMIN — ACETAMINOPHEN 650 MG: 325 TABLET ORAL at 13:51

## 2018-10-21 RX ADMIN — VANCOMYCIN HYDROCHLORIDE 250 MG: KIT at 21:09

## 2018-10-21 RX ADMIN — ATORVASTATIN CALCIUM 80 MG: 40 TABLET, FILM COATED ORAL at 21:08

## 2018-10-21 RX ADMIN — IPRATROPIUM BROMIDE AND ALBUTEROL SULFATE 3 ML: 2.5; .5 SOLUTION RESPIRATORY (INHALATION) at 23:50

## 2018-10-21 RX ADMIN — IPRATROPIUM BROMIDE AND ALBUTEROL SULFATE 3 ML: 2.5; .5 SOLUTION RESPIRATORY (INHALATION) at 19:27

## 2018-10-22 LAB
ANION GAP SERPL CALCULATED.3IONS-SCNC: 6 MMOL/L (ref 3–11)
BUN BLD-MCNC: 26 MG/DL (ref 9–23)
BUN/CREAT SERPL: 34.7 (ref 7–25)
CALCIUM SPEC-SCNC: 9.6 MG/DL (ref 8.7–10.4)
CHLORIDE SERPL-SCNC: 99 MMOL/L (ref 99–109)
CO2 SERPL-SCNC: 37 MMOL/L (ref 20–31)
CREAT BLD-MCNC: 0.75 MG/DL (ref 0.6–1.3)
DEPRECATED RDW RBC AUTO: 55.8 FL (ref 37–54)
ERYTHROCYTE [DISTWIDTH] IN BLOOD BY AUTOMATED COUNT: 16 % (ref 11.3–14.5)
GFR SERPL CREATININE-BSD FRML MDRD: 77 ML/MIN/1.73
GLUCOSE BLD-MCNC: 176 MG/DL (ref 70–100)
GLUCOSE BLDC GLUCOMTR-MCNC: 197 MG/DL (ref 70–130)
GLUCOSE BLDC GLUCOMTR-MCNC: 207 MG/DL (ref 70–130)
GLUCOSE BLDC GLUCOMTR-MCNC: 265 MG/DL (ref 70–130)
GLUCOSE BLDC GLUCOMTR-MCNC: 295 MG/DL (ref 70–130)
HCT VFR BLD AUTO: 37.1 % (ref 34.5–44)
HGB BLD-MCNC: 11.5 G/DL (ref 11.5–15.5)
MCH RBC QN AUTO: 29.8 PG (ref 27–31)
MCHC RBC AUTO-ENTMCNC: 31 G/DL (ref 32–36)
MCV RBC AUTO: 96.1 FL (ref 80–99)
PLATELET # BLD AUTO: 208 10*3/MM3 (ref 150–450)
PMV BLD AUTO: 9.6 FL (ref 6–12)
POTASSIUM BLD-SCNC: 4.5 MMOL/L (ref 3.5–5.5)
RBC # BLD AUTO: 3.86 10*6/MM3 (ref 3.89–5.14)
SODIUM BLD-SCNC: 142 MMOL/L (ref 132–146)
WBC NRBC COR # BLD: 9.89 10*3/MM3 (ref 3.5–10.8)

## 2018-10-22 PROCEDURE — 63710000001 INSULIN DETEMIR PER 5 UNITS: Performed by: INTERNAL MEDICINE

## 2018-10-22 PROCEDURE — 82962 GLUCOSE BLOOD TEST: CPT

## 2018-10-22 PROCEDURE — 99232 SBSQ HOSP IP/OBS MODERATE 35: CPT | Performed by: INTERNAL MEDICINE

## 2018-10-22 PROCEDURE — 94799 UNLISTED PULMONARY SVC/PX: CPT

## 2018-10-22 PROCEDURE — 97535 SELF CARE MNGMENT TRAINING: CPT

## 2018-10-22 PROCEDURE — 80048 BASIC METABOLIC PNL TOTAL CA: CPT | Performed by: INTERNAL MEDICINE

## 2018-10-22 PROCEDURE — 97110 THERAPEUTIC EXERCISES: CPT

## 2018-10-22 PROCEDURE — 94640 AIRWAY INHALATION TREATMENT: CPT

## 2018-10-22 PROCEDURE — 25010000002 ENOXAPARIN PER 10 MG: Performed by: INTERNAL MEDICINE

## 2018-10-22 PROCEDURE — 85027 COMPLETE CBC AUTOMATED: CPT | Performed by: INTERNAL MEDICINE

## 2018-10-22 RX ADMIN — INSULIN LISPRO 2 UNITS: 100 INJECTION, SOLUTION INTRAVENOUS; SUBCUTANEOUS at 08:48

## 2018-10-22 RX ADMIN — INSULIN LISPRO 4 UNITS: 100 INJECTION, SOLUTION INTRAVENOUS; SUBCUTANEOUS at 12:51

## 2018-10-22 RX ADMIN — ASPIRIN 81 MG: 81 TABLET, COATED ORAL at 08:49

## 2018-10-22 RX ADMIN — GABAPENTIN 600 MG: 300 CAPSULE ORAL at 06:23

## 2018-10-22 RX ADMIN — CLOPIDOGREL BISULFATE 75 MG: 75 TABLET ORAL at 08:49

## 2018-10-22 RX ADMIN — GABAPENTIN 600 MG: 300 CAPSULE ORAL at 20:51

## 2018-10-22 RX ADMIN — Medication: at 20:55

## 2018-10-22 RX ADMIN — INSULIN DETEMIR 15 UNITS: 100 INJECTION, SOLUTION SUBCUTANEOUS at 21:04

## 2018-10-22 RX ADMIN — BUDESONIDE AND FORMOTEROL FUMARATE DIHYDRATE 2 PUFF: 160; 4.5 AEROSOL RESPIRATORY (INHALATION) at 08:08

## 2018-10-22 RX ADMIN — BUMETANIDE 1 MG: 1 TABLET ORAL at 08:48

## 2018-10-22 RX ADMIN — BUDESONIDE AND FORMOTEROL FUMARATE DIHYDRATE 2 PUFF: 160; 4.5 AEROSOL RESPIRATORY (INHALATION) at 18:42

## 2018-10-22 RX ADMIN — MIRTAZAPINE 15 MG: 15 TABLET, FILM COATED ORAL at 20:51

## 2018-10-22 RX ADMIN — IPRATROPIUM BROMIDE AND ALBUTEROL SULFATE 3 ML: 2.5; .5 SOLUTION RESPIRATORY (INHALATION) at 16:25

## 2018-10-22 RX ADMIN — VANCOMYCIN HYDROCHLORIDE 250 MG: KIT at 11:16

## 2018-10-22 RX ADMIN — VANCOMYCIN HYDROCHLORIDE 250 MG: KIT at 16:53

## 2018-10-22 RX ADMIN — GABAPENTIN 600 MG: 300 CAPSULE ORAL at 14:34

## 2018-10-22 RX ADMIN — IPRATROPIUM BROMIDE AND ALBUTEROL SULFATE 3 ML: 2.5; .5 SOLUTION RESPIRATORY (INHALATION) at 18:41

## 2018-10-22 RX ADMIN — Medication 250 MG: at 20:50

## 2018-10-22 RX ADMIN — FAMOTIDINE 20 MG: 20 TABLET ORAL at 20:51

## 2018-10-22 RX ADMIN — LEVETIRACETAM 1000 MG: 500 TABLET, FILM COATED ORAL at 06:23

## 2018-10-22 RX ADMIN — INSULIN LISPRO 3 UNITS: 100 INJECTION, SOLUTION INTRAVENOUS; SUBCUTANEOUS at 20:52

## 2018-10-22 RX ADMIN — ATORVASTATIN CALCIUM 80 MG: 40 TABLET, FILM COATED ORAL at 20:50

## 2018-10-22 RX ADMIN — LEVETIRACETAM 1000 MG: 500 TABLET, FILM COATED ORAL at 16:53

## 2018-10-22 RX ADMIN — Medication 250 MG: at 08:48

## 2018-10-22 RX ADMIN — IPRATROPIUM BROMIDE AND ALBUTEROL SULFATE 3 ML: 2.5; .5 SOLUTION RESPIRATORY (INHALATION) at 23:26

## 2018-10-22 RX ADMIN — METOPROLOL TARTRATE 50 MG: 50 TABLET ORAL at 20:51

## 2018-10-22 RX ADMIN — FAMOTIDINE 20 MG: 20 TABLET ORAL at 08:49

## 2018-10-22 RX ADMIN — METOPROLOL TARTRATE 50 MG: 50 TABLET ORAL at 08:49

## 2018-10-22 RX ADMIN — VANCOMYCIN HYDROCHLORIDE 250 MG: KIT at 20:51

## 2018-10-22 RX ADMIN — LIDOCAINE 1 PATCH: 50 PATCH CUTANEOUS at 08:47

## 2018-10-22 RX ADMIN — INSULIN LISPRO 3 UNITS: 100 INJECTION, SOLUTION INTRAVENOUS; SUBCUTANEOUS at 16:53

## 2018-10-22 RX ADMIN — IPRATROPIUM BROMIDE AND ALBUTEROL SULFATE 3 ML: 2.5; .5 SOLUTION RESPIRATORY (INHALATION) at 08:07

## 2018-10-22 RX ADMIN — IPRATROPIUM BROMIDE AND ALBUTEROL SULFATE 3 ML: 2.5; .5 SOLUTION RESPIRATORY (INHALATION) at 12:47

## 2018-10-22 RX ADMIN — VANCOMYCIN HYDROCHLORIDE 250 MG: KIT at 06:23

## 2018-10-22 RX ADMIN — ENOXAPARIN SODIUM 40 MG: 40 INJECTION SUBCUTANEOUS at 08:48

## 2018-10-23 VITALS
OXYGEN SATURATION: 97 % | HEART RATE: 72 BPM | RESPIRATION RATE: 18 BRPM | DIASTOLIC BLOOD PRESSURE: 73 MMHG | BODY MASS INDEX: 34.08 KG/M2 | SYSTOLIC BLOOD PRESSURE: 130 MMHG | WEIGHT: 199.6 LBS | TEMPERATURE: 98.1 F | HEIGHT: 64 IN

## 2018-10-23 PROBLEM — J96.12 CHRONIC RESPIRATORY FAILURE WITH HYPOXIA AND HYPERCAPNIA (HCC): Status: RESOLVED | Noted: 2017-09-30 | Resolved: 2018-10-23

## 2018-10-23 PROBLEM — J96.11 CHRONIC RESPIRATORY FAILURE WITH HYPOXIA AND HYPERCAPNIA (HCC): Status: RESOLVED | Noted: 2017-09-30 | Resolved: 2018-10-23

## 2018-10-23 PROBLEM — G93.41 ACUTE METABOLIC ENCEPHALOPATHY: Status: RESOLVED | Noted: 2018-03-17 | Resolved: 2018-10-23

## 2018-10-23 LAB
GLUCOSE BLDC GLUCOMTR-MCNC: 235 MG/DL (ref 70–130)
GLUCOSE BLDC GLUCOMTR-MCNC: 389 MG/DL (ref 70–130)

## 2018-10-23 PROCEDURE — 94640 AIRWAY INHALATION TREATMENT: CPT

## 2018-10-23 PROCEDURE — 25010000002 ENOXAPARIN PER 10 MG: Performed by: INTERNAL MEDICINE

## 2018-10-23 PROCEDURE — 99239 HOSP IP/OBS DSCHRG MGMT >30: CPT | Performed by: NURSE PRACTITIONER

## 2018-10-23 PROCEDURE — 94760 N-INVAS EAR/PLS OXIMETRY 1: CPT

## 2018-10-23 PROCEDURE — 94799 UNLISTED PULMONARY SVC/PX: CPT

## 2018-10-23 PROCEDURE — 82962 GLUCOSE BLOOD TEST: CPT

## 2018-10-23 PROCEDURE — 97530 THERAPEUTIC ACTIVITIES: CPT

## 2018-10-23 RX ORDER — MIRTAZAPINE 15 MG/1
15 TABLET, FILM COATED ORAL NIGHTLY
Qty: 30 TABLET | Refills: 0 | Status: SHIPPED | OUTPATIENT
Start: 2018-10-23 | End: 2019-05-09

## 2018-10-23 RX ORDER — BUMETANIDE 1 MG/1
1 TABLET ORAL
Qty: 30 TABLET | Refills: 0 | Status: SHIPPED | OUTPATIENT
Start: 2018-10-24 | End: 2019-10-28 | Stop reason: SDUPTHER

## 2018-10-23 RX ORDER — GABAPENTIN 600 MG/1
600 TABLET ORAL 3 TIMES DAILY
Qty: 21 TABLET | Refills: 0 | Status: SHIPPED | OUTPATIENT
Start: 2018-10-23 | End: 2019-01-25 | Stop reason: DRUGHIGH

## 2018-10-23 RX ORDER — METOPROLOL TARTRATE 50 MG/1
50 TABLET, FILM COATED ORAL EVERY 12 HOURS SCHEDULED
Qty: 60 TABLET | Refills: 0 | Status: SHIPPED | OUTPATIENT
Start: 2018-10-23 | End: 2019-05-09

## 2018-10-23 RX ORDER — FAMOTIDINE 20 MG/1
20 TABLET, FILM COATED ORAL 2 TIMES DAILY
Qty: 60 TABLET | Refills: 0 | Status: SHIPPED | OUTPATIENT
Start: 2018-10-23 | End: 2019-05-09

## 2018-10-23 RX ADMIN — ACETAMINOPHEN 650 MG: 325 TABLET ORAL at 00:47

## 2018-10-23 RX ADMIN — ENOXAPARIN SODIUM 40 MG: 40 INJECTION SUBCUTANEOUS at 09:32

## 2018-10-23 RX ADMIN — FAMOTIDINE 20 MG: 20 TABLET ORAL at 09:32

## 2018-10-23 RX ADMIN — BUDESONIDE AND FORMOTEROL FUMARATE DIHYDRATE 2 PUFF: 160; 4.5 AEROSOL RESPIRATORY (INHALATION) at 07:33

## 2018-10-23 RX ADMIN — METOPROLOL TARTRATE 50 MG: 50 TABLET ORAL at 09:31

## 2018-10-23 RX ADMIN — LEVETIRACETAM 1000 MG: 500 TABLET, FILM COATED ORAL at 06:17

## 2018-10-23 RX ADMIN — INSULIN LISPRO 7 UNITS: 100 INJECTION, SOLUTION INTRAVENOUS; SUBCUTANEOUS at 13:05

## 2018-10-23 RX ADMIN — CLOPIDOGREL BISULFATE 75 MG: 75 TABLET ORAL at 09:32

## 2018-10-23 RX ADMIN — Medication 250 MG: at 09:31

## 2018-10-23 RX ADMIN — IPRATROPIUM BROMIDE AND ALBUTEROL SULFATE 3 ML: 2.5; .5 SOLUTION RESPIRATORY (INHALATION) at 07:29

## 2018-10-23 RX ADMIN — IPRATROPIUM BROMIDE AND ALBUTEROL SULFATE 3 ML: 2.5; .5 SOLUTION RESPIRATORY (INHALATION) at 02:47

## 2018-10-23 RX ADMIN — VANCOMYCIN HYDROCHLORIDE 250 MG: KIT at 13:06

## 2018-10-23 RX ADMIN — LIDOCAINE 1 PATCH: 50 PATCH CUTANEOUS at 09:33

## 2018-10-23 RX ADMIN — IPRATROPIUM BROMIDE AND ALBUTEROL SULFATE 3 ML: 2.5; .5 SOLUTION RESPIRATORY (INHALATION) at 11:00

## 2018-10-23 RX ADMIN — ACETAMINOPHEN 650 MG: 325 TABLET ORAL at 09:44

## 2018-10-23 RX ADMIN — VANCOMYCIN HYDROCHLORIDE 250 MG: KIT at 06:17

## 2018-10-23 RX ADMIN — INSULIN LISPRO 3 UNITS: 100 INJECTION, SOLUTION INTRAVENOUS; SUBCUTANEOUS at 09:33

## 2018-10-23 RX ADMIN — BUMETANIDE 1 MG: 1 TABLET ORAL at 09:32

## 2018-10-23 RX ADMIN — ASPIRIN 81 MG: 81 TABLET, COATED ORAL at 09:32

## 2018-10-23 RX ADMIN — ALPRAZOLAM 0.25 MG: 0.25 TABLET ORAL at 00:47

## 2018-10-23 RX ADMIN — GABAPENTIN 600 MG: 300 CAPSULE ORAL at 06:17

## 2018-10-24 ENCOUNTER — READMISSION MANAGEMENT (OUTPATIENT)
Dept: CALL CENTER | Facility: HOSPITAL | Age: 68
End: 2018-10-24

## 2018-10-24 NOTE — OUTREACH NOTE
Prep Survey      Responses   Facility patient discharged from?  [facility]   Is patient eligible?  Yes   Discharge diagnosis  Respiratory failure, acute and chronic    Does the patient have one of the following disease processes/diagnoses(primary or secondary)?  COPD/Pneumonia   Does the patient have Home health ordered?  Yes   What is the Home health agency?   [facility]   Is there a DME ordered?  No   Prep survey completed?  Yes          Nettie Apodaca RN

## 2018-10-25 ENCOUNTER — READMISSION MANAGEMENT (OUTPATIENT)
Dept: CALL CENTER | Facility: HOSPITAL | Age: 68
End: 2018-10-25

## 2018-10-25 NOTE — OUTREACH NOTE
COPD/PN Week 1 Survey      Responses   Facility patient discharged from?  [facility]   Does the patient have one of the following disease processes/diagnoses(primary or secondary)?  COPD/Pneumonia   Is there a successful TCM telephone encounter documented?  No   Week 1 attempt successful?  No   Unsuccessful attempts  Attempt 1          Kimberlee Sheppard RN

## 2018-10-28 ENCOUNTER — READMISSION MANAGEMENT (OUTPATIENT)
Dept: CALL CENTER | Facility: HOSPITAL | Age: 68
End: 2018-10-28

## 2018-10-28 NOTE — OUTREACH NOTE
COPD/PN Week 1 Survey      Responses   Facility patient discharged from?  [place]   Does the patient have one of the following disease processes/diagnoses(primary or secondary)?  COPD/Pneumonia   Is there a successful TCM telephone encounter documented?  No   Was the primary reason for admission:  COPD exacerbation   Week 1 attempt successful?  No   Unsuccessful attempts  Attempt 2          Zari Cunningham, RN

## 2018-10-30 ENCOUNTER — READMISSION MANAGEMENT (OUTPATIENT)
Dept: CALL CENTER | Facility: HOSPITAL | Age: 68
End: 2018-10-30

## 2018-10-30 NOTE — OUTREACH NOTE
COPD/PN Week 1 Survey      Responses   Facility patient discharged from?  [place]   Does the patient have one of the following disease processes/diagnoses(primary or secondary)?  COPD/Pneumonia   Is there a successful TCM telephone encounter documented?  No   Was the primary reason for admission:  COPD exacerbation   Week 1 attempt successful?  No   Unsuccessful attempts  Attempt 3          Evelyn Chirinos RN

## 2018-11-01 ENCOUNTER — READMISSION MANAGEMENT (OUTPATIENT)
Dept: CALL CENTER | Facility: HOSPITAL | Age: 68
End: 2018-11-01

## 2018-11-01 NOTE — OUTREACH NOTE
COPD/PN Week 1 Survey      Responses   Facility patient discharged from?  [place]   Does the patient have one of the following disease processes/diagnoses(primary or secondary)?  COPD/Pneumonia   Is there a successful TCM telephone encounter documented?  No   Was the primary reason for admission:  COPD exacerbation   Unsuccessful attempts  Attempt 4          Radha Schaffer RN

## 2018-11-02 ENCOUNTER — READMISSION MANAGEMENT (OUTPATIENT)
Dept: CALL CENTER | Facility: HOSPITAL | Age: 68
End: 2018-11-02

## 2018-11-02 ENCOUNTER — TELEPHONE (OUTPATIENT)
Dept: URGENT CARE | Facility: CLINIC | Age: 68
End: 2018-11-02

## 2018-11-02 NOTE — OUTREACH NOTE
Medical Week 3 Survey      Responses   Facility patient discharged from?  [facility]   Does the patient have one of the following disease processes/diagnoses(primary or secondary)?  COPD/Pneumonia   Week 3 attempt successful?  No   Unsuccessful attempts  Attempt 1          Juliane Vanessa RN

## 2018-11-02 NOTE — TELEPHONE ENCOUNTER
Recent hospitalization 10/15, with altered mental status and resp status. Recent discharge and states still having problems with cough. Ask if we could prescribe the phenergan cough med, stated will consult Dr. Power but she will proibably need to come in. Verbalized understanding.

## 2018-11-05 ENCOUNTER — APPOINTMENT (OUTPATIENT)
Dept: GENERAL RADIOLOGY | Facility: HOSPITAL | Age: 68
End: 2018-11-05

## 2018-11-05 ENCOUNTER — HOSPITAL ENCOUNTER (INPATIENT)
Facility: HOSPITAL | Age: 68
LOS: 3 days | Discharge: HOME | End: 2018-11-09
Attending: STUDENT IN AN ORGANIZED HEALTH CARE EDUCATION/TRAINING PROGRAM | Admitting: INTERNAL MEDICINE

## 2018-11-05 DIAGNOSIS — I10 ESSENTIAL HYPERTENSION: ICD-10-CM

## 2018-11-05 DIAGNOSIS — I24.8 DEMAND ISCHEMIA (HCC): ICD-10-CM

## 2018-11-05 DIAGNOSIS — J44.9 CHRONIC OBSTRUCTIVE PULMONARY DISEASE, UNSPECIFIED COPD TYPE (HCC): ICD-10-CM

## 2018-11-05 DIAGNOSIS — J18.9 PNEUMONIA OF RIGHT LOWER LOBE DUE TO INFECTIOUS ORGANISM: ICD-10-CM

## 2018-11-05 DIAGNOSIS — E66.9 DIABETES MELLITUS TYPE 2 IN OBESE (HCC): ICD-10-CM

## 2018-11-05 DIAGNOSIS — Z78.9 IMPAIRED MOBILITY AND ADLS: ICD-10-CM

## 2018-11-05 DIAGNOSIS — Z74.09 IMPAIRED FUNCTIONAL MOBILITY, BALANCE, GAIT, AND ENDURANCE: ICD-10-CM

## 2018-11-05 DIAGNOSIS — J96.22 ACUTE AND CHRONIC RESPIRATORY FAILURE WITH HYPERCAPNIA (HCC): Primary | ICD-10-CM

## 2018-11-05 DIAGNOSIS — Z74.09 IMPAIRED MOBILITY AND ADLS: ICD-10-CM

## 2018-11-05 DIAGNOSIS — E11.69 DIABETES MELLITUS TYPE 2 IN OBESE (HCC): ICD-10-CM

## 2018-11-05 LAB
ALBUMIN SERPL-MCNC: 3.9 G/DL (ref 3.5–5)
ALBUMIN/GLOB SERPL: 1.1 G/DL (ref 1–2)
ALP SERPL-CCNC: 132 U/L (ref 38–126)
ALT SERPL W P-5'-P-CCNC: 79 U/L (ref 13–69)
ANION GAP SERPL CALCULATED.3IONS-SCNC: 15.3 MMOL/L (ref 10–20)
ANISOCYTOSIS BLD QL: NORMAL
ARTERIAL PATENCY WRIST A: POSITIVE
AST SERPL-CCNC: 52 U/L (ref 15–46)
ATMOSPHERIC PRESS: 729 MMHG
BACTERIA UR QL AUTO: ABNORMAL /HPF
BASE EXCESS BLDA CALC-SCNC: 7.5 MMOL/L (ref 0–2)
BASO STIPL COARSE BLD QL SMEAR: NORMAL
BASOPHILS # BLD AUTO: 0.03 10*3/MM3 (ref 0–0.2)
BASOPHILS NFR BLD AUTO: 0.2 % (ref 0–2.5)
BDY SITE: ABNORMAL
BILIRUB SERPL-MCNC: 0.6 MG/DL (ref 0.2–1.3)
BILIRUB UR QL STRIP: NEGATIVE
BUN BLD-MCNC: 42 MG/DL (ref 7–20)
BUN/CREAT SERPL: 22.1 (ref 7.1–23.5)
CALCIUM SPEC-SCNC: 9.1 MG/DL (ref 8.4–10.2)
CHLORIDE SERPL-SCNC: 90 MMOL/L (ref 98–107)
CLARITY UR: CLEAR
CO2 SERPL-SCNC: 37 MMOL/L (ref 26–30)
COHGB MFR BLD: 2.6 % (ref 0–2)
COLOR UR: YELLOW
CREAT BLD-MCNC: 1.9 MG/DL (ref 0.6–1.3)
D-LACTATE SERPL-SCNC: 1.2 MMOL/L (ref 0.5–2)
DEPRECATED RDW RBC AUTO: 58.2 FL (ref 37–54)
EOSINOPHIL # BLD AUTO: 0.01 10*3/MM3 (ref 0–0.7)
EOSINOPHIL NFR BLD AUTO: 0.1 % (ref 0–7)
EPAP: 5
ERYTHROCYTE [DISTWIDTH] IN BLOOD BY AUTOMATED COUNT: 15.5 % (ref 11.5–14.5)
GFR SERPL CREATININE-BSD FRML MDRD: 26 ML/MIN/1.73
GLOBULIN UR ELPH-MCNC: 3.6 GM/DL
GLUCOSE BLD-MCNC: 189 MG/DL (ref 74–98)
GLUCOSE UR STRIP-MCNC: NEGATIVE MG/DL
HCO3 BLDA-SCNC: 38.6 MMOL/L (ref 22–28)
HCT VFR BLD AUTO: 40 % (ref 37–47)
HCT VFR BLD CALC: 36.9 %
HGB BLD-MCNC: 11.7 G/DL (ref 12–16)
HGB BLDA-MCNC: 12 G/DL (ref 12–18)
HGB UR QL STRIP.AUTO: ABNORMAL
HOLD SPECIMEN: NORMAL
HOLD SPECIMEN: NORMAL
HOROWITZ INDEX BLD+IHG-RTO: 40 %
HYALINE CASTS UR QL AUTO: ABNORMAL /LPF
IMM GRANULOCYTES # BLD: 0.14 10*3/MM3 (ref 0–0.06)
IMM GRANULOCYTES NFR BLD: 1.1 % (ref 0–0.6)
IPAP: 18
KETONES UR QL STRIP: NEGATIVE
LEUKOCYTE ESTERASE UR QL STRIP.AUTO: NEGATIVE
LYMPHOCYTES # BLD AUTO: 0.83 10*3/MM3 (ref 0.6–3.4)
LYMPHOCYTES NFR BLD AUTO: 6.7 % (ref 10–50)
MACROCYTES BLD QL SMEAR: NORMAL
MCH RBC QN AUTO: 29.5 PG (ref 27–31)
MCHC RBC AUTO-ENTMCNC: 29.3 G/DL (ref 30–37)
MCV RBC AUTO: 101 FL (ref 81–99)
METHGB BLD QL: 1 % (ref 0–1.5)
MODALITY: ABNORMAL
MONOCYTES # BLD AUTO: 0.83 10*3/MM3 (ref 0–0.9)
MONOCYTES NFR BLD AUTO: 6.7 % (ref 0–12)
MUCOUS THREADS URNS QL MICRO: ABNORMAL /HPF
NEUTROPHILS # BLD AUTO: 10.5 10*3/MM3 (ref 2–6.9)
NEUTROPHILS NFR BLD AUTO: 85.2 % (ref 37–80)
NITRITE UR QL STRIP: NEGATIVE
NOTE: ABNORMAL
NRBC BLD MANUAL-RTO: 0.6 /100 WBC (ref 0–0)
NT-PROBNP SERPL-MCNC: 4340 PG/ML (ref 0–125)
OXYHGB MFR BLDV: 87.9 % (ref 94–99)
PCO2 BLDA: 97.1 MM HG (ref 35–45)
PCO2 TEMP ADJ BLD: ABNORMAL MM HG (ref 35–45)
PH BLDA: 7.21 PH UNITS (ref 7.3–7.5)
PH UR STRIP.AUTO: <=5 [PH] (ref 5–8)
PH, TEMP CORRECTED: ABNORMAL PH UNITS
PLATELET # BLD AUTO: 232 10*3/MM3 (ref 130–400)
PMV BLD AUTO: 9.8 FL (ref 6–12)
PO2 BLDA: 68.9 MM HG (ref 75–100)
PO2 TEMP ADJ BLD: ABNORMAL MM HG (ref 83–108)
POTASSIUM BLD-SCNC: 5.3 MMOL/L (ref 3.5–5.1)
PROT SERPL-MCNC: 7.5 G/DL (ref 6.3–8.2)
PROT UR QL STRIP: ABNORMAL
RBC # BLD AUTO: 3.96 10*6/MM3 (ref 4.2–5.4)
RBC # UR: ABNORMAL /HPF
REF LAB TEST METHOD: ABNORMAL
SAO2 % BLDCOA: 91.2 % (ref 94–100)
SMALL PLATELETS BLD QL SMEAR: ADEQUATE
SODIUM BLD-SCNC: 137 MMOL/L (ref 137–145)
SP GR UR STRIP: >=1.03 (ref 1–1.03)
SQUAMOUS #/AREA URNS HPF: ABNORMAL /HPF
TROPONIN I SERPL-MCNC: 0.19 NG/ML (ref 0–0.03)
UROBILINOGEN UR QL STRIP: ABNORMAL
VENTILATOR MODE: ABNORMAL
WBC CLUMPS # UR AUTO: ABNORMAL /HPF
WBC MORPH BLD: NORMAL
WBC NRBC COR # BLD: 12.34 10*3/MM3 (ref 4.8–10.8)
WBC UR QL AUTO: ABNORMAL /HPF
WHOLE BLOOD HOLD SPECIMEN: NORMAL
WHOLE BLOOD HOLD SPECIMEN: NORMAL

## 2018-11-05 PROCEDURE — 93005 ELECTROCARDIOGRAM TRACING: CPT

## 2018-11-05 PROCEDURE — 85007 BL SMEAR W/DIFF WBC COUNT: CPT

## 2018-11-05 PROCEDURE — 94799 UNLISTED PULMONARY SVC/PX: CPT

## 2018-11-05 PROCEDURE — 51702 INSERT TEMP BLADDER CATH: CPT

## 2018-11-05 PROCEDURE — 81001 URINALYSIS AUTO W/SCOPE: CPT | Performed by: STUDENT IN AN ORGANIZED HEALTH CARE EDUCATION/TRAINING PROGRAM

## 2018-11-05 PROCEDURE — 25010000002 VANCOMYCIN 5 G RECONSTITUTED SOLUTION 5,000 MG VIAL: Performed by: STUDENT IN AN ORGANIZED HEALTH CARE EDUCATION/TRAINING PROGRAM

## 2018-11-05 PROCEDURE — 85025 COMPLETE CBC W/AUTO DIFF WBC: CPT

## 2018-11-05 PROCEDURE — 87077 CULTURE AEROBIC IDENTIFY: CPT | Performed by: STUDENT IN AN ORGANIZED HEALTH CARE EDUCATION/TRAINING PROGRAM

## 2018-11-05 PROCEDURE — 83050 HGB METHEMOGLOBIN QUAN: CPT

## 2018-11-05 PROCEDURE — 93005 ELECTROCARDIOGRAM TRACING: CPT | Performed by: STUDENT IN AN ORGANIZED HEALTH CARE EDUCATION/TRAINING PROGRAM

## 2018-11-05 PROCEDURE — 99285 EMERGENCY DEPT VISIT HI MDM: CPT

## 2018-11-05 PROCEDURE — 83880 ASSAY OF NATRIURETIC PEPTIDE: CPT

## 2018-11-05 PROCEDURE — 87040 BLOOD CULTURE FOR BACTERIA: CPT | Performed by: STUDENT IN AN ORGANIZED HEALTH CARE EDUCATION/TRAINING PROGRAM

## 2018-11-05 PROCEDURE — 87186 SC STD MICRODIL/AGAR DIL: CPT | Performed by: STUDENT IN AN ORGANIZED HEALTH CARE EDUCATION/TRAINING PROGRAM

## 2018-11-05 PROCEDURE — 87147 CULTURE TYPE IMMUNOLOGIC: CPT | Performed by: STUDENT IN AN ORGANIZED HEALTH CARE EDUCATION/TRAINING PROGRAM

## 2018-11-05 PROCEDURE — 25010000002 PIPERACILLIN SOD-TAZOBACTAM PER 1 G: Performed by: STUDENT IN AN ORGANIZED HEALTH CARE EDUCATION/TRAINING PROGRAM

## 2018-11-05 PROCEDURE — 83605 ASSAY OF LACTIC ACID: CPT

## 2018-11-05 PROCEDURE — 82805 BLOOD GASES W/O2 SATURATION: CPT

## 2018-11-05 PROCEDURE — 84484 ASSAY OF TROPONIN QUANT: CPT

## 2018-11-05 PROCEDURE — 36600 WITHDRAWAL OF ARTERIAL BLOOD: CPT

## 2018-11-05 PROCEDURE — 94660 CPAP INITIATION&MGMT: CPT

## 2018-11-05 PROCEDURE — 82375 ASSAY CARBOXYHB QUANT: CPT

## 2018-11-05 PROCEDURE — 80053 COMPREHEN METABOLIC PANEL: CPT

## 2018-11-05 PROCEDURE — 71045 X-RAY EXAM CHEST 1 VIEW: CPT

## 2018-11-05 RX ORDER — SODIUM CHLORIDE 0.9 % (FLUSH) 0.9 %
10 SYRINGE (ML) INJECTION AS NEEDED
Status: DISCONTINUED | OUTPATIENT
Start: 2018-11-05 | End: 2018-11-09 | Stop reason: HOSPADM

## 2018-11-05 RX ADMIN — TAZOBACTAM SODIUM AND PIPERACILLIN SODIUM 3.38 G: 375; 3 INJECTION, SOLUTION INTRAVENOUS at 22:55

## 2018-11-05 RX ADMIN — VANCOMYCIN HYDROCHLORIDE 2000 MG: 500 INJECTION, POWDER, LYOPHILIZED, FOR SOLUTION INTRAVENOUS at 23:47

## 2018-11-06 ENCOUNTER — READMISSION MANAGEMENT (OUTPATIENT)
Dept: CALL CENTER | Facility: HOSPITAL | Age: 68
End: 2018-11-06

## 2018-11-06 PROBLEM — I24.8 DEMAND ISCHEMIA: Status: ACTIVE | Noted: 2018-11-06

## 2018-11-06 PROBLEM — J96.22 ACUTE AND CHRONIC RESPIRATORY FAILURE WITH HYPERCAPNIA (HCC): Status: ACTIVE | Noted: 2018-11-06

## 2018-11-06 PROBLEM — I24.89 DEMAND ISCHEMIA: Status: ACTIVE | Noted: 2018-11-06

## 2018-11-06 LAB
ANION GAP SERPL CALCULATED.3IONS-SCNC: 9.2 MMOL/L (ref 10–20)
ANISOCYTOSIS BLD QL: NORMAL
APTT PPP: 24 SECONDS (ref 25–36)
ARTERIAL PATENCY WRIST A: POSITIVE
ARTERIAL PATENCY WRIST A: POSITIVE
ATMOSPHERIC PRESS: 726 MMHG
ATMOSPHERIC PRESS: 729 MMHG
BASE EXCESS BLDA CALC-SCNC: 10.6 MMOL/L (ref 0–2)
BASE EXCESS BLDA CALC-SCNC: 8.3 MMOL/L (ref 0–2)
BASOPHILS # BLD AUTO: 0.03 10*3/MM3 (ref 0–0.2)
BASOPHILS NFR BLD AUTO: 0.3 % (ref 0–2.5)
BDY SITE: ABNORMAL
BDY SITE: ABNORMAL
BUN BLD-MCNC: 40 MG/DL (ref 7–20)
BUN/CREAT SERPL: 28.6 (ref 7.1–23.5)
CALCIUM SPEC-SCNC: 8.5 MG/DL (ref 8.4–10.2)
CHLORIDE SERPL-SCNC: 96 MMOL/L (ref 98–107)
CO2 SERPL-SCNC: 35 MMOL/L (ref 26–30)
COHGB MFR BLD: 2.1 % (ref 0–2)
COHGB MFR BLD: 2.5 % (ref 0–2)
CREAT BLD-MCNC: 1.4 MG/DL (ref 0.6–1.3)
DEPRECATED RDW RBC AUTO: 57.1 FL (ref 37–54)
EOSINOPHIL # BLD AUTO: 0.01 10*3/MM3 (ref 0–0.7)
EOSINOPHIL NFR BLD AUTO: 0.1 % (ref 0–7)
EPAP: 5
ERYTHROCYTE [DISTWIDTH] IN BLOOD BY AUTOMATED COUNT: 15.5 % (ref 11.5–14.5)
GAS FLOW AIRWAY: 12 LPM
GFR SERPL CREATININE-BSD FRML MDRD: 38 ML/MIN/1.73
GLUCOSE BLD-MCNC: 149 MG/DL (ref 74–98)
GLUCOSE BLDC GLUCOMTR-MCNC: 160 MG/DL (ref 70–130)
GLUCOSE BLDC GLUCOMTR-MCNC: 285 MG/DL (ref 70–130)
GLUCOSE BLDC GLUCOMTR-MCNC: 287 MG/DL (ref 70–130)
GLUCOSE BLDC GLUCOMTR-MCNC: 304 MG/DL (ref 70–130)
GLUCOSE BLDC GLUCOMTR-MCNC: 359 MG/DL (ref 70–130)
HBA1C MFR BLD: 8.3 % (ref 3–6)
HCO3 BLDA-SCNC: 37.7 MMOL/L (ref 22–28)
HCO3 BLDA-SCNC: 38.5 MMOL/L (ref 22–28)
HCT VFR BLD AUTO: 38 % (ref 37–47)
HCT VFR BLD CALC: 31.2 %
HCT VFR BLD CALC: 35.9 %
HGB BLD-MCNC: 11.3 G/DL (ref 12–16)
HGB BLDA-MCNC: 10.2 G/DL (ref 12–18)
HGB BLDA-MCNC: 11.7 G/DL (ref 12–18)
HOROWITZ INDEX BLD+IHG-RTO: 40 %
HYPOCHROMIA BLD QL: NORMAL
IMM GRANULOCYTES # BLD: 0.11 10*3/MM3 (ref 0–0.06)
IMM GRANULOCYTES NFR BLD: 1 % (ref 0–0.6)
INR PPP: 1.25 (ref 0.9–1.1)
IPAP: 18
LYMPHOCYTES # BLD AUTO: 0.71 10*3/MM3 (ref 0.6–3.4)
LYMPHOCYTES NFR BLD AUTO: 6.2 % (ref 10–50)
MACROCYTES BLD QL SMEAR: NORMAL
MCH RBC QN AUTO: 30 PG (ref 27–31)
MCHC RBC AUTO-ENTMCNC: 29.7 G/DL (ref 30–37)
MCV RBC AUTO: 100.8 FL (ref 81–99)
METHGB BLD QL: 1 % (ref 0–1.5)
METHGB BLD QL: <0 % (ref 0–1.5)
MODALITY: ABNORMAL
MODALITY: ABNORMAL
MONOCYTES # BLD AUTO: 0.68 10*3/MM3 (ref 0–0.9)
MONOCYTES NFR BLD AUTO: 6 % (ref 0–12)
NEUTROPHILS # BLD AUTO: 9.87 10*3/MM3 (ref 2–6.9)
NEUTROPHILS NFR BLD AUTO: 86.4 % (ref 37–80)
NOTE: ABNORMAL
NOTE: ABNORMAL
NRBC BLD MANUAL-RTO: 0.4 /100 WBC (ref 0–0)
OXYHGB MFR BLDV: 87.3 % (ref 94–99)
OXYHGB MFR BLDV: 95.1 % (ref 94–99)
PCO2 BLDA: 64.2 MM HG (ref 35–45)
PCO2 BLDA: 88.4 MM HG (ref 35–45)
PCO2 TEMP ADJ BLD: ABNORMAL MM HG (ref 35–45)
PCO2 TEMP ADJ BLD: ABNORMAL MM HG (ref 35–45)
PH BLDA: 7.25 PH UNITS (ref 7.3–7.5)
PH BLDA: 7.38 PH UNITS (ref 7.3–7.5)
PH, TEMP CORRECTED: ABNORMAL PH UNITS
PH, TEMP CORRECTED: ABNORMAL PH UNITS
PLATELET # BLD AUTO: 202 10*3/MM3 (ref 130–400)
PMV BLD AUTO: 9.7 FL (ref 6–12)
PO2 BLDA: 65.3 MM HG (ref 75–100)
PO2 BLDA: 78.9 MM HG (ref 75–100)
PO2 TEMP ADJ BLD: ABNORMAL MM HG (ref 83–108)
PO2 TEMP ADJ BLD: ABNORMAL MM HG (ref 83–108)
POTASSIUM BLD-SCNC: 5.2 MMOL/L (ref 3.5–5.1)
PROCALCITONIN SERPL-MCNC: 0.1 NG/ML
PROTHROMBIN TIME: 13.9 SECONDS (ref 9.3–12.1)
RBC # BLD AUTO: 3.77 10*6/MM3 (ref 4.2–5.4)
SAO2 % BLDCOA: 90.5 % (ref 94–100)
SAO2 % BLDCOA: 96.7 % (ref 94–100)
SET MECH RESP RATE: 18
SMALL PLATELETS BLD QL SMEAR: ADEQUATE
SODIUM BLD-SCNC: 135 MMOL/L (ref 137–145)
TROPONIN I SERPL-MCNC: 0.17 NG/ML (ref 0–0.03)
TROPONIN I SERPL-MCNC: 0.18 NG/ML (ref 0–0.03)
TROPONIN I SERPL-MCNC: 0.22 NG/ML (ref 0–0.03)
VENTILATOR MODE: ABNORMAL
VENTILATOR MODE: ABNORMAL
WBC MORPH BLD: NORMAL
WBC NRBC COR # BLD: 11.41 10*3/MM3 (ref 4.8–10.8)

## 2018-11-06 PROCEDURE — 82962 GLUCOSE BLOOD TEST: CPT

## 2018-11-06 PROCEDURE — 25010000002 ENOXAPARIN PER 10 MG: Performed by: INTERNAL MEDICINE

## 2018-11-06 PROCEDURE — 36600 WITHDRAWAL OF ARTERIAL BLOOD: CPT

## 2018-11-06 PROCEDURE — 63710000001 INSULIN ASPART PER 5 UNITS: Performed by: INTERNAL MEDICINE

## 2018-11-06 PROCEDURE — 82805 BLOOD GASES W/O2 SATURATION: CPT

## 2018-11-06 PROCEDURE — 83050 HGB METHEMOGLOBIN QUAN: CPT

## 2018-11-06 PROCEDURE — 94799 UNLISTED PULMONARY SVC/PX: CPT

## 2018-11-06 PROCEDURE — 80048 BASIC METABOLIC PNL TOTAL CA: CPT | Performed by: INTERNAL MEDICINE

## 2018-11-06 PROCEDURE — 84145 PROCALCITONIN (PCT): CPT | Performed by: INTERNAL MEDICINE

## 2018-11-06 PROCEDURE — 85730 THROMBOPLASTIN TIME PARTIAL: CPT | Performed by: INTERNAL MEDICINE

## 2018-11-06 PROCEDURE — 25010000002 METHYLPREDNISOLONE PER 125 MG: Performed by: INTERNAL MEDICINE

## 2018-11-06 PROCEDURE — 63710000001 INSULIN DETEMIR PER 5 UNITS: Performed by: INTERNAL MEDICINE

## 2018-11-06 PROCEDURE — 83036 HEMOGLOBIN GLYCOSYLATED A1C: CPT | Performed by: INTERNAL MEDICINE

## 2018-11-06 PROCEDURE — 94640 AIRWAY INHALATION TREATMENT: CPT

## 2018-11-06 PROCEDURE — 85610 PROTHROMBIN TIME: CPT | Performed by: INTERNAL MEDICINE

## 2018-11-06 PROCEDURE — 94660 CPAP INITIATION&MGMT: CPT

## 2018-11-06 PROCEDURE — 82375 ASSAY CARBOXYHB QUANT: CPT

## 2018-11-06 PROCEDURE — 99223 1ST HOSP IP/OBS HIGH 75: CPT | Performed by: INTERNAL MEDICINE

## 2018-11-06 PROCEDURE — 25010000002 PIPERACILLIN SOD-TAZOBACTAM PER 1 G: Performed by: INTERNAL MEDICINE

## 2018-11-06 PROCEDURE — 25010000002 LORAZEPAM PER 2 MG: Performed by: INTERNAL MEDICINE

## 2018-11-06 PROCEDURE — 25010000002 METHYLPREDNISOLONE PER 40 MG: Performed by: INTERNAL MEDICINE

## 2018-11-06 PROCEDURE — 85007 BL SMEAR W/DIFF WBC COUNT: CPT | Performed by: INTERNAL MEDICINE

## 2018-11-06 PROCEDURE — C1752 CATH,HEMODIALYSIS,SHORT-TERM: HCPCS

## 2018-11-06 PROCEDURE — 84484 ASSAY OF TROPONIN QUANT: CPT | Performed by: INTERNAL MEDICINE

## 2018-11-06 PROCEDURE — 63710000001 INSULIN REGULAR HUMAN PER 5 UNITS: Performed by: INTERNAL MEDICINE

## 2018-11-06 PROCEDURE — 85025 COMPLETE CBC W/AUTO DIFF WBC: CPT | Performed by: INTERNAL MEDICINE

## 2018-11-06 PROCEDURE — 25010000002 HEPARIN (PORCINE) PER 1000 UNITS: Performed by: INTERNAL MEDICINE

## 2018-11-06 RX ORDER — FAMOTIDINE 20 MG/1
20 TABLET, FILM COATED ORAL 2 TIMES DAILY
Status: DISCONTINUED | OUTPATIENT
Start: 2018-11-06 | End: 2018-11-09 | Stop reason: HOSPADM

## 2018-11-06 RX ORDER — ATORVASTATIN CALCIUM 80 MG/1
80 TABLET, FILM COATED ORAL DAILY
Status: DISCONTINUED | OUTPATIENT
Start: 2018-11-06 | End: 2018-11-09 | Stop reason: HOSPADM

## 2018-11-06 RX ORDER — NICOTINE POLACRILEX 4 MG
1 LOZENGE BUCCAL
Status: DISCONTINUED | OUTPATIENT
Start: 2018-11-06 | End: 2018-11-09 | Stop reason: HOSPADM

## 2018-11-06 RX ORDER — HEPARIN SODIUM 1000 [USP'U]/ML
30 INJECTION INTRAVENOUS; SUBCUTANEOUS AS NEEDED
Status: DISCONTINUED | OUTPATIENT
Start: 2018-11-06 | End: 2018-11-06

## 2018-11-06 RX ORDER — LORAZEPAM 2 MG/ML
1 INJECTION INTRAMUSCULAR EVERY 4 HOURS PRN
Status: DISPENSED | OUTPATIENT
Start: 2018-11-06 | End: 2018-11-09

## 2018-11-06 RX ORDER — METHYLPREDNISOLONE SODIUM SUCCINATE 125 MG/2ML
60 INJECTION, POWDER, LYOPHILIZED, FOR SOLUTION INTRAMUSCULAR; INTRAVENOUS EVERY 6 HOURS
Status: DISCONTINUED | OUTPATIENT
Start: 2018-11-06 | End: 2018-11-06

## 2018-11-06 RX ORDER — SODIUM CHLORIDE 9 MG/ML
75 INJECTION, SOLUTION INTRAVENOUS CONTINUOUS
Status: DISCONTINUED | OUTPATIENT
Start: 2018-11-06 | End: 2018-11-06

## 2018-11-06 RX ORDER — SODIUM CHLORIDE FOR INHALATION 3 %
4 VIAL, NEBULIZER (ML) INHALATION ONCE
Status: COMPLETED | OUTPATIENT
Start: 2018-11-06 | End: 2018-11-06

## 2018-11-06 RX ORDER — SODIUM CHLORIDE 0.9 % (FLUSH) 0.9 %
3 SYRINGE (ML) INJECTION EVERY 12 HOURS SCHEDULED
Status: DISCONTINUED | OUTPATIENT
Start: 2018-11-06 | End: 2018-11-09 | Stop reason: HOSPADM

## 2018-11-06 RX ORDER — ONDANSETRON 4 MG/1
4 TABLET, FILM COATED ORAL EVERY 6 HOURS PRN
Status: DISCONTINUED | OUTPATIENT
Start: 2018-11-06 | End: 2018-11-09 | Stop reason: HOSPADM

## 2018-11-06 RX ORDER — DEXTROSE MONOHYDRATE 25 G/50ML
25 INJECTION, SOLUTION INTRAVENOUS
Status: DISCONTINUED | OUTPATIENT
Start: 2018-11-06 | End: 2018-11-09 | Stop reason: HOSPADM

## 2018-11-06 RX ORDER — VANCOMYCIN HYDROCHLORIDE 125 MG/1
125 CAPSULE ORAL EVERY 6 HOURS SCHEDULED
Status: DISCONTINUED | OUTPATIENT
Start: 2018-11-06 | End: 2018-11-09 | Stop reason: HOSPADM

## 2018-11-06 RX ORDER — LEVETIRACETAM 500 MG/1
1000 TABLET ORAL EVERY 12 HOURS SCHEDULED
Status: DISCONTINUED | OUTPATIENT
Start: 2018-11-06 | End: 2018-11-09 | Stop reason: HOSPADM

## 2018-11-06 RX ORDER — SODIUM CHLORIDE 0.9 % (FLUSH) 0.9 %
3-10 SYRINGE (ML) INJECTION AS NEEDED
Status: DISCONTINUED | OUTPATIENT
Start: 2018-11-06 | End: 2018-11-09 | Stop reason: HOSPADM

## 2018-11-06 RX ORDER — HEPARIN SODIUM 10000 [USP'U]/100ML
11 INJECTION, SOLUTION INTRAVENOUS
Status: DISCONTINUED | OUTPATIENT
Start: 2018-11-06 | End: 2018-11-06

## 2018-11-06 RX ORDER — HEPARIN SODIUM 1000 [USP'U]/ML
5000 INJECTION, SOLUTION INTRAVENOUS; SUBCUTANEOUS ONCE
Status: COMPLETED | OUTPATIENT
Start: 2018-11-06 | End: 2018-11-06

## 2018-11-06 RX ORDER — L.ACID,PARA/B.BIFIDUM/S.THERM 8B CELL
1 CAPSULE ORAL DAILY
Status: DISCONTINUED | OUTPATIENT
Start: 2018-11-06 | End: 2018-11-09 | Stop reason: HOSPADM

## 2018-11-06 RX ORDER — IPRATROPIUM BROMIDE AND ALBUTEROL SULFATE 2.5; .5 MG/3ML; MG/3ML
3 SOLUTION RESPIRATORY (INHALATION) EVERY 4 HOURS PRN
Status: DISCONTINUED | OUTPATIENT
Start: 2018-11-06 | End: 2018-11-09 | Stop reason: HOSPADM

## 2018-11-06 RX ORDER — HEPARIN SODIUM 1000 [USP'U]/ML
5000 INJECTION INTRAVENOUS; SUBCUTANEOUS AS NEEDED
Status: DISCONTINUED | OUTPATIENT
Start: 2018-11-06 | End: 2018-11-06

## 2018-11-06 RX ORDER — GABAPENTIN 800 MG/1
800 TABLET ORAL 3 TIMES DAILY
Status: ON HOLD | COMMUNITY
End: 2018-11-06

## 2018-11-06 RX ORDER — METHYLPREDNISOLONE SODIUM SUCCINATE 40 MG/ML
40 INJECTION, POWDER, LYOPHILIZED, FOR SOLUTION INTRAMUSCULAR; INTRAVENOUS EVERY 6 HOURS
Status: DISCONTINUED | OUTPATIENT
Start: 2018-11-06 | End: 2018-11-07

## 2018-11-06 RX ORDER — FEBUXOSTAT 40 MG/1
80 TABLET, FILM COATED ORAL DAILY
Status: DISCONTINUED | OUTPATIENT
Start: 2018-11-06 | End: 2018-11-09 | Stop reason: HOSPADM

## 2018-11-06 RX ORDER — ASPIRIN 81 MG/1
324 TABLET, CHEWABLE ORAL ONCE
Status: COMPLETED | OUTPATIENT
Start: 2018-11-06 | End: 2018-11-06

## 2018-11-06 RX ORDER — ROPINIROLE 0.25 MG/1
0.25 TABLET, FILM COATED ORAL NIGHTLY
Status: DISCONTINUED | OUTPATIENT
Start: 2018-11-06 | End: 2018-11-09 | Stop reason: HOSPADM

## 2018-11-06 RX ORDER — IPRATROPIUM BROMIDE AND ALBUTEROL SULFATE 2.5; .5 MG/3ML; MG/3ML
3 SOLUTION RESPIRATORY (INHALATION)
Status: DISCONTINUED | OUTPATIENT
Start: 2018-11-06 | End: 2018-11-09 | Stop reason: HOSPADM

## 2018-11-06 RX ORDER — ONDANSETRON 4 MG/1
4 TABLET, ORALLY DISINTEGRATING ORAL EVERY 6 HOURS PRN
Status: DISCONTINUED | OUTPATIENT
Start: 2018-11-06 | End: 2018-11-09 | Stop reason: HOSPADM

## 2018-11-06 RX ORDER — BUDESONIDE 0.5 MG/2ML
0.5 INHALANT ORAL
Status: DISCONTINUED | OUTPATIENT
Start: 2018-11-06 | End: 2018-11-09 | Stop reason: HOSPADM

## 2018-11-06 RX ORDER — METOPROLOL TARTRATE 50 MG/1
50 TABLET, FILM COATED ORAL EVERY 12 HOURS SCHEDULED
Status: DISCONTINUED | OUTPATIENT
Start: 2018-11-06 | End: 2018-11-07

## 2018-11-06 RX ORDER — ASPIRIN 81 MG/1
81 TABLET ORAL DAILY
Status: DISCONTINUED | OUTPATIENT
Start: 2018-11-06 | End: 2018-11-09 | Stop reason: HOSPADM

## 2018-11-06 RX ORDER — SODIUM CHLORIDE 9 MG/ML
75 INJECTION, SOLUTION INTRAVENOUS CONTINUOUS
Status: DISCONTINUED | OUTPATIENT
Start: 2018-11-06 | End: 2018-11-07

## 2018-11-06 RX ORDER — BENZONATATE 100 MG/1
200 CAPSULE ORAL 3 TIMES DAILY PRN
Status: DISCONTINUED | OUTPATIENT
Start: 2018-11-06 | End: 2018-11-09 | Stop reason: HOSPADM

## 2018-11-06 RX ORDER — CETIRIZINE HYDROCHLORIDE 10 MG/1
5 TABLET ORAL DAILY
Status: DISCONTINUED | OUTPATIENT
Start: 2018-11-06 | End: 2018-11-09 | Stop reason: HOSPADM

## 2018-11-06 RX ORDER — ONDANSETRON 2 MG/ML
4 INJECTION INTRAMUSCULAR; INTRAVENOUS EVERY 6 HOURS PRN
Status: DISCONTINUED | OUTPATIENT
Start: 2018-11-06 | End: 2018-11-09 | Stop reason: HOSPADM

## 2018-11-06 RX ORDER — CLOPIDOGREL BISULFATE 75 MG/1
75 TABLET ORAL DAILY
Status: DISCONTINUED | OUTPATIENT
Start: 2018-11-06 | End: 2018-11-09 | Stop reason: HOSPADM

## 2018-11-06 RX ADMIN — METOPROLOL TARTRATE 50 MG: 50 TABLET ORAL at 20:34

## 2018-11-06 RX ADMIN — Medication 3 ML: at 20:35

## 2018-11-06 RX ADMIN — INSULIN ASPART 5 UNITS: 100 INJECTION, SOLUTION INTRAVENOUS; SUBCUTANEOUS at 17:03

## 2018-11-06 RX ADMIN — INSULIN DETEMIR 20 UNITS: 100 INJECTION, SOLUTION SUBCUTANEOUS at 20:33

## 2018-11-06 RX ADMIN — LEVETIRACETAM 1000 MG: 500 TABLET, FILM COATED ORAL at 08:34

## 2018-11-06 RX ADMIN — INSULIN DETEMIR 20 UNITS: 100 INJECTION, SOLUTION SUBCUTANEOUS at 13:02

## 2018-11-06 RX ADMIN — IPRATROPIUM BROMIDE AND ALBUTEROL SULFATE 3 ML: .5; 3 SOLUTION RESPIRATORY (INHALATION) at 23:32

## 2018-11-06 RX ADMIN — BUDESONIDE 0.5 MG: 0.5 INHALANT RESPIRATORY (INHALATION) at 19:32

## 2018-11-06 RX ADMIN — SODIUM CHLORIDE 1000 ML: 9 INJECTION, SOLUTION INTRAVENOUS at 00:01

## 2018-11-06 RX ADMIN — ROPINIROLE 0.25 MG: 0.25 TABLET, FILM COATED ORAL at 03:10

## 2018-11-06 RX ADMIN — INSULIN ASPART 6 UNITS: 100 INJECTION, SOLUTION INTRAVENOUS; SUBCUTANEOUS at 20:33

## 2018-11-06 RX ADMIN — VANCOMYCIN HYDROCHLORIDE 125 MG: 125 CAPSULE ORAL at 23:04

## 2018-11-06 RX ADMIN — TAZOBACTAM SODIUM AND PIPERACILLIN SODIUM 3.38 G: 375; 3 INJECTION, SOLUTION INTRAVENOUS at 22:04

## 2018-11-06 RX ADMIN — VANCOMYCIN HYDROCHLORIDE 125 MG: 125 CAPSULE ORAL at 13:02

## 2018-11-06 RX ADMIN — ROPINIROLE 0.25 MG: 0.25 TABLET, FILM COATED ORAL at 20:35

## 2018-11-06 RX ADMIN — BUDESONIDE 0.5 MG: 0.5 INHALANT RESPIRATORY (INHALATION) at 07:37

## 2018-11-06 RX ADMIN — INSULIN DETEMIR 20 UNITS: 100 INJECTION, SOLUTION SUBCUTANEOUS at 03:07

## 2018-11-06 RX ADMIN — HUMAN INSULIN 10 UNITS: 100 INJECTION, SOLUTION SUBCUTANEOUS at 20:33

## 2018-11-06 RX ADMIN — IPRATROPIUM BROMIDE AND ALBUTEROL SULFATE 3 ML: .5; 3 SOLUTION RESPIRATORY (INHALATION) at 19:32

## 2018-11-06 RX ADMIN — METHYLPREDNISOLONE SODIUM SUCCINATE 40 MG: 40 INJECTION, POWDER, FOR SOLUTION INTRAMUSCULAR; INTRAVENOUS at 17:02

## 2018-11-06 RX ADMIN — ATORVASTATIN CALCIUM 80 MG: 80 TABLET, FILM COATED ORAL at 08:34

## 2018-11-06 RX ADMIN — METOPROLOL TARTRATE 50 MG: 50 TABLET ORAL at 08:35

## 2018-11-06 RX ADMIN — ASPIRIN 81 MG: 81 TABLET, COATED ORAL at 08:34

## 2018-11-06 RX ADMIN — ASPIRIN 81 MG 324 MG: 81 TABLET ORAL at 03:05

## 2018-11-06 RX ADMIN — FAMOTIDINE 20 MG: 20 TABLET ORAL at 20:34

## 2018-11-06 RX ADMIN — CETIRIZINE HYDROCHLORIDE 5 MG: 10 TABLET, FILM COATED ORAL at 08:35

## 2018-11-06 RX ADMIN — HEPARIN SODIUM 11 UNITS/KG/HR: 10000 INJECTION, SOLUTION INTRAVENOUS at 03:24

## 2018-11-06 RX ADMIN — VANCOMYCIN HYDROCHLORIDE 125 MG: 125 CAPSULE ORAL at 05:15

## 2018-11-06 RX ADMIN — IPRATROPIUM BROMIDE AND ALBUTEROL SULFATE 3 ML: .5; 3 SOLUTION RESPIRATORY (INHALATION) at 11:12

## 2018-11-06 RX ADMIN — METHYLPREDNISOLONE SODIUM SUCCINATE 60 MG: 125 INJECTION, POWDER, FOR SOLUTION INTRAMUSCULAR; INTRAVENOUS at 03:06

## 2018-11-06 RX ADMIN — ENOXAPARIN SODIUM 40 MG: 40 INJECTION SUBCUTANEOUS at 17:01

## 2018-11-06 RX ADMIN — INSULIN ASPART 20 UNITS: 100 INJECTION, SOLUTION INTRAVENOUS; SUBCUTANEOUS at 13:02

## 2018-11-06 RX ADMIN — IPRATROPIUM BROMIDE AND ALBUTEROL SULFATE 3 ML: .5; 3 SOLUTION RESPIRATORY (INHALATION) at 07:37

## 2018-11-06 RX ADMIN — TAZOBACTAM SODIUM AND PIPERACILLIN SODIUM 3.38 G: 375; 3 INJECTION, SOLUTION INTRAVENOUS at 13:33

## 2018-11-06 RX ADMIN — Medication 1 CAPSULE: at 08:34

## 2018-11-06 RX ADMIN — HEPARIN SODIUM 5000 UNITS: 1000 INJECTION, SOLUTION INTRAVENOUS; SUBCUTANEOUS at 03:26

## 2018-11-06 RX ADMIN — LEVETIRACETAM 1000 MG: 500 TABLET, FILM COATED ORAL at 20:34

## 2018-11-06 RX ADMIN — IPRATROPIUM BROMIDE AND ALBUTEROL SULFATE 3 ML: .5; 3 SOLUTION RESPIRATORY (INHALATION) at 16:11

## 2018-11-06 RX ADMIN — TAZOBACTAM SODIUM AND PIPERACILLIN SODIUM 3.38 G: 375; 3 INJECTION, SOLUTION INTRAVENOUS at 05:08

## 2018-11-06 RX ADMIN — INSULIN ASPART 7 UNITS: 100 INJECTION, SOLUTION INTRAVENOUS; SUBCUTANEOUS at 17:02

## 2018-11-06 RX ADMIN — VANCOMYCIN HYDROCHLORIDE 125 MG: 125 CAPSULE ORAL at 17:02

## 2018-11-06 RX ADMIN — SODIUM CHLORIDE SOLN NEBU 3% 4 ML: 3 NEBU SOLN at 19:32

## 2018-11-06 RX ADMIN — FAMOTIDINE 20 MG: 20 TABLET ORAL at 08:34

## 2018-11-06 RX ADMIN — SODIUM CHLORIDE 75 ML/HR: 9 INJECTION, SOLUTION INTRAVENOUS at 03:04

## 2018-11-06 RX ADMIN — SODIUM CHLORIDE 75 ML/HR: 9 INJECTION, SOLUTION INTRAVENOUS at 20:35

## 2018-11-06 RX ADMIN — Medication 3 ML: at 08:35

## 2018-11-06 RX ADMIN — CLOPIDOGREL BISULFATE 75 MG: 75 TABLET ORAL at 08:35

## 2018-11-06 RX ADMIN — IPRATROPIUM BROMIDE AND ALBUTEROL SULFATE 3 ML: .5; 3 SOLUTION RESPIRATORY (INHALATION) at 03:39

## 2018-11-06 RX ADMIN — LORAZEPAM 1 MG: 2 INJECTION INTRAMUSCULAR; INTRAVENOUS at 23:04

## 2018-11-06 RX ADMIN — SODIUM CHLORIDE SOLN NEBU 3% 4 ML: 3 NEBU SOLN at 07:37

## 2018-11-06 RX ADMIN — METHYLPREDNISOLONE SODIUM SUCCINATE 40 MG: 40 INJECTION, POWDER, FOR SOLUTION INTRAMUSCULAR; INTRAVENOUS at 20:34

## 2018-11-06 RX ADMIN — METHYLPREDNISOLONE SODIUM SUCCINATE 60 MG: 125 INJECTION, POWDER, FOR SOLUTION INTRAMUSCULAR; INTRAVENOUS at 08:35

## 2018-11-06 RX ADMIN — INSULIN ASPART 5 UNITS: 100 INJECTION, SOLUTION INTRAVENOUS; SUBCUTANEOUS at 08:35

## 2018-11-06 NOTE — PROGRESS NOTES
Adult Nutrition  Assessment/PES    Patient Name:  Vidhi Lopez  YOB: 1950  MRN: [id number]  Admit Date:  11/5/2018    Assessment Date:  11/6/2018    Comments:  Rec #1: Continue current diet order; Maintaining intake. Pt receiving 100% PO intake over 2 meals. Rec #2: Consider MVI with minerals daily. Rec #3: Continue to monitor/replace electrolytes PRN.  Consult RD PRN.             Reason for Assessment     Row Name 11/06/18 133          Reason for Assessment    Reason For Assessment diagnosis/disease state;identified at risk by screening criteria     Diagnosis diabetes diagnosis/complications;cardiac disease;pulmonary disease   DM2, COPD, CAD, HLD, HTN, URIEL, A/C Resp Fz with hypercapnea     Identified At Risk by Screening Criteria BMI                 Labs/Tests/Procedures/Meds     Row Name 11/06/18 3741          Labs/Procedures/Meds    Lab Results Reviewed reviewed, pertinent     Lab Results Comments High: K, Glu, BUN, Creat, ALT  Low: Na, Cl        Medications    Pertinent Medications Reviewed reviewed               Estimated/Assessed Needs     Row Name 11/06/18 7914          Calculation Measurements    Weight Used For Calculations 52.7 kg (116 lb 3.6 oz)   IBW        Estimated/Assessed Needs    Additional Documentation Protein Requirements (Group);Calorie Requirements (Group);Myrtle-St. Jeor Equation (Group);Fluid Requirements (Group)        Calorie Requirements    Weight Used For Calorie Calculations --   AF 1.3     Estimated Calorie Need Method Myrtle-St Jeor     Estimated Calorie Requirement Comment ~4781-3372        KCAL/KG    14 Kcal/Kg (kcal) 738.08     15 Kcal/Kg (kcal) 790.8     18 Kcal/Kg (kcal) 948.96     20 Kcal/Kg (kcal) 1054.4     25 Kcal/Kg (kcal) 1318     30 Kcal/Kg (kcal) 1581.6     35 Kcal/Kg (kcal) 1845.2     40 Kcal/Kg (kcal) 2108.8     45 Kcal/Kg (kcal) 2372.4     50 Kcal/Kg (kcal) 2636        Myrtle-St. Jeor Equation    RMR (Myrtle-St. Jeor Equation) 1031.33         Protein Requirements    Est Protein Requirement Amount (gms/kg) 1.4 gm protein   63-74 gm     Estimated Protein Requirements (gms/day) 73.81        Fluid Requirements    Estimated Fluid Requirement Method Salvador-Segar Formula     Salvador-Segar Method (over 20 kg) 2554.4             Nutrition Prescription Ordered     Row Name 11/06/18 1337          Nutrition Prescription PO    Current PO Diet Regular     Common Modifiers Cardiac;Consistent Carbohydrate             Evaluation of Received Nutrient/Fluid Intake     Row Name 11/06/18 1336          Calculation Measurements    Weight Used For Calculations 52.7 kg (116 lb 3.6 oz)   IBW        PO Evaluation    Number of Days PO Intake Evaluated 1 day     Number of Meals 2     % PO Intake 100             Evaluation of Prescribed Nutrient/Fluid Intake     Row Name 11/06/18 1336          Calculation Measurements    Weight Used For Calculations 52.7 kg (116 lb 3.6 oz)   IBW           Problem/Interventions:        Problem 1     Row Name 11/06/18 1338          Nutrition Diagnoses Problem 1    Problem 1 Overweight/Obesity     Etiology (related to) Factors Affecting Nutrition     Food Habit/Preferences Large Meals     Signs/Symptoms (evidenced by) BMI     BMI 35 - 39.9             Problem 2     Row Name 11/06/18 1338          Nutrition Diagnoses Problem 2    Problem 2 Impaired Nutrient Utilization     Etiology (related to) Medical Diagnosis     Endocrine DM Type 2     Signs/Symptoms (evidenced by) Biochemical     Specific Labs Noted Glucose;BUN;HgbA1C;Creatinine;K+;Na+                   Intervention Goal     Row Name 11/06/18 1340          Intervention Goal    General Meet nutritional needs for age/condition     PO Meet estimated needs;Maintain intake;PO intake (%);Continue positive trend     PO Intake % 100 %     Weight No significant weight loss             Nutrition Intervention     Row Name 11/06/18 1341          Nutrition Intervention    RD/Tech Action Follow Tx  progress;Care plan reviewd             Nutrition Prescription     Row Name 11/06/18 1341          Nutrition Prescription PO    PO Prescription Other (comment)   Continue current diet order     New PO Prescription Ordered? No, recommended        Other Orders    Obtain Weight Daily     Obtain Weight Ordered? No, recommended     Supplement Vitamin mineral supplement     Supplement Ordered? No, recommended             Education/Evaluation     Row Name 11/06/18 1341          Education    Education Will Instruct as appropriate        Monitor/Evaluation    Monitor Per protocol;I&O;PO intake;Pertinent labs;Weight         Electronically signed by:  Julianne Smith RD  11/06/18 1:43 PM

## 2018-11-06 NOTE — CONSULTS
Date of consultation:   November 6, 2018    Requested by:   Hospitalist Service.     PCP: Michael Mays MD    Reason:  Acute Respiratory Failure.     History of Present Illness:  68 y.o. female   who apparently is on noninvasive ventilation device at home for?  Obstructive sleep apnea and apparently had been using her device on compliant basis, as per the patient who was brought into the ER after the neighbors informed to please as they had not seen her in a few days.  The patient was brought to the emergency room and was found to have elevated carbon dioxide.  She was placed on BiPAP and was transferred to ICU.      Her history is limited since she is on BiPAP at this time but she says that she is compliant with her noninvasive ventilation unit.  Upon questioning she could not tell me when the sleep study was performed    She currently smokes 1/2 PPD and has been smoking for 45-50 years.  Denies any Family history of URIEL or COPD.     She also insists that she does not have COPD although upon review of her home medications she is on Symbicort as well as nebulized treatments.    Pulmonary consultation was requested for further recommendations.     Review of System: Could not be obtained, as the patient is on BiPAP.     Past Medical History:  Past Medical History:   Diagnosis Date   • Allergic rhinitis    • Asthma with COPD (CMS/[facility])     Asthma/COPD   • Bilateral ovarian cysts    • Coronary artery disease    • Depression with anxiety     Depression/Anxiety   • Diabetes (CMS/[facility])     pre   • Endometriosis     Dr. Jin; estradiol    • ESR raised 3/20/2018   • Fatigue    • Fibromyalgia    • Headache     Headaches   • High cholesterol    • History of gallstones    • History of myocardial infarction    • Hypertension    • Influenza B     DX'D 2/6/2018, TREATED WITH TAMIFLU. LAST DOSE 2/11/2018 AM.  PATIENT STATES DR RANGEL IS AWARE. DENIES FEVERS OR CHILLS.   • Interstitial cystitis    • On home oxygen therapy     2 L  NC   • URIEL on CPAP    • PONV (postoperative nausea and vomiting)    • Seizure disorder, nonconvulsive, with status epilepticus (CMS/[facility]) 3/20/2018   • Septic joint of right knee joint (CMS/[facility]) 3/21/2018   • Shortness of breath on exertion    • Stroke (CMS/[facility])     X1 8 YEARS AGO, GENERALIZED UPPER BODY WEAKNESS RESIDUAL PER PT    • Thyroid disorder    • Urinary leakage    • UTI (urinary tract infection)    • Wears glasses    • Yeast dermatitis          Past Surgical History:  Past Surgical History:   Procedure Laterality Date   • ARTERIOGRAM N/A 2/12/2018    Procedure: Renal Arteriogram;  Surgeon: Lito Flores MD;  Location:  [facility] CATH INVASIVE LOCATION;  Service:    • BREAST BIOPSY Right 1991   • CARDIAC CATHETERIZATION N/A 2/12/2018    Procedure: Right Heart Cath;  Surgeon: Lito Flores MD;  Location:  [facility] CATH INVASIVE LOCATION;  Service:    • CAROTID STENT      Transcath    • CAROTID STENT Left    • CHOLECYSTECTOMY     • CYSTOSTOMY W/ BLADDER BIOPSY     • DILATATION AND CURETTAGE     • KNEE ARTHROSCOPY Right 3/23/2018    Procedure: ARTHROSCOPY, RIGHT KNEE  INCISION AND DRAINAGE LOWER EXTREMITY WITH SYNOVIAL BIOPSY;  Surgeon: Dilip Giron MD;  Location:  [facility] OR;  Service: Orthopedics   • LAPAROSCOPIC CHOLECYSTECTOMY  1994    Lap rolando   • OOPHORECTOMY     • PAP SMEAR  05/10/2016   • PARTIAL HYSTERECTOMY           Family History:  Family History   Problem Relation Age of Onset   • Cancer Other    • Diabetes Other    • Heart attack Other    • Hyperlipidemia Other    • Hypertension Other    • Osteoporosis Other    • Stroke Other    • Breast cancer Mother    • Osteoporosis Mother    • Colon cancer Father          Social History:  Social History     Social History   • Marital status:      Social History Main Topics   • Smoking status: Current Every Day Smoker     Packs/day: 0.25     Years: 40.00     Types: Cigarettes   • Smokeless tobacco: Never Used      Comment: in process of quiting--AVERAGE  "1 PPD, DOWN TO 6 CIG PER DAY X2 WEEKS    • Alcohol use 0.6 oz/week     1 Glasses of wine per week      Comment: occasional   • Drug use: No   • Sexual activity: Defer     Other Topics Concern   • Not on file     Social History Narrative    Lives alone.          Physical Exam:  /64   Pulse 56   Temp 98.1 °F (36.7 °C) (Oral)   Resp 20   Ht 160 cm (63\")   Wt 92.4 kg (203 lb 11.2 oz)   LMP  (LMP Unknown) Comment: LAST MAMMOGRAM 2017  SpO2 98%   BMI 36.08 kg/m²     Constitutional:            Vital signs reviewed            Patient is currently on BiPAP    Head/Face/Eyes:            Pupils appeared equal and reactive to light    ENT:             Patient was on the BiPAP      Neck:             Supple. No JVD noted.     Cardiovascular:              S1 + S2. Regular.     Respiratory:            Transmitted breath sounds bilaterally with good air entry             Percussion could not be performed at this time.    Abdomen:            Soft.  Bowel sounds sluggishly positive. No obvious organomegaly noted.    Musculoskeletal/Extremities:             Gait could not be assessed at this time.              No clubbing in the upper extremities             No cyanosis noted in the upper extremities.             No edema noted in the lower extremities bilaterally.    Neurologic/Psychiatric:             Was able to follow simple commands              Exam was limited since the patient was on BiPAP.    Skin:             No obvious rash noted.             Warm and dry.        Labs:   Reviewed. Pertinent labs were noted.     Lab Results   Component Value Date    WBC 11.41 (H) 11/06/2018    HGB 11.3 (L) 11/06/2018    HCT 38.0 11/06/2018    .8 (H) 11/06/2018     11/06/2018       Lab Results   Component Value Date    GLUCOSE 149 (H) 11/06/2018    CALCIUM 8.5 11/06/2018     (L) 11/06/2018    K 5.2 (H) 11/06/2018    CO2 35.0 (H) 11/06/2018    CL 96 (L) 11/06/2018    BUN 40 (H) 11/06/2018    CREATININE 1.40 (H) " 11/06/2018    EGFRIFNONA 38 (L) 11/06/2018    BCR 28.6 (H) 11/06/2018    ANIONGAP 9.2 (L) 11/06/2018         ABG:  Lab Results   Component Value Date    PHART 7.377 11/06/2018    YQX7YWA 64.2 (C) 11/06/2018    PO2ART 78.9 11/06/2018    SO2 19.1 (L) 02/05/2015    HGBBG 10.2 (L) 11/06/2018    D9QCKWOZ 96.7 11/06/2018    CARBOXYHGB 2.1 (H) 11/06/2018           Imaging Study: Images reviewed personally     Imaging Results (last 72 hours)     Procedure Component Value Units Date/Time    XR Chest 1 View [[id number]] Collected:  11/05/18 2156     Updated:  11/05/18 2155    Narrative:       FINAL REPORT    CLINICAL HISTORY:  ams    FINDINGS:  There are low lung volumes with elevation right hemidiaphragm.  There is right lower lobe airspace disease.  The heart is  enlarged.  Aortic knob is calcified.  Impression: Right lower  lobe airspace disease      Impression:       Authenticated by Brandon Golden MD on 11/05/2018 09:56:44 PM            (10/15/2018)ECHO:  · Left ventricular systolic function is normal. Estimated EF = 55%.  · Left ventricular diastolic dysfunction (grade I) consistent with impaired relaxation.  · Moderately reduced right ventricular systolic function noted.      Assessment:  1.  Acute Respiratory Failure.  2.  Acute exacerbation of COPD  3.?  Right lower lobe pneumonia  4.  C. difficile infection  5.  Smoking  6.  Obstructive sleep apnea hypoventilation syndrome.  Currently on noninvasive ventilation device at home.  7.  Obesity  8.  Grade 1 diastolic dysfunction.  Appears compensated at this time  9.  Acute renal failure    Discussion/Recommendations:   I have adjusted the BiPAP settings. ABG and Chest X Ray will be ordered as appropriate.     Will continue IV steroids but since her blood glucose level has remained significantly elevated throughout her stay, I will ask the nursing staff to administer 10 units of regular insulin subcutaneously with each dose of Solu-Medrol.    In reviewing the patient's  chest x-ray does suggest the possibility of right-sided pneumonia although her symptoms were not consistent with it.  In order to further aid in the diagnosis or otherwise, I will order pro-calcitonin level.    It is imperative that we decrease the use of antibiotics if appropriate, given her recent history of C. difficile colitis and the fact that she had to be started on vancomycin by mouth.    I will definitely de-escalate antibiotics in a rapid fashion, if clinically appropriate.    I will discontinue IV fluids once her creatinine is less than 1.2 or so.    Some parts of history, ROS & physical exam were obtained by and with the APRN.     All the relevant labs, radiology images, echocardiograms etc were reviewed with the APRN. Plan was also discussed in detail with APRN.     The plan was discussed with the patient .  I have also discussed the case with the nursing staff.    Recommendations were also discussed with the referring provider.     I would like to thank you for the opportunity to participate in the care of this patient.  We will communicate changes and recommendations, if and when necessary.      Phoebe Mancilla MD  11/06/18  11:56 AM    Dictated utilizing Dragon dictation.

## 2018-11-06 NOTE — ED PROVIDER NOTES
Subjective   68-year-old female that presents via EMS after someone called for a welfare check.  Police did find the patient altered in her home.  Follow no at this time is according to EMS she is supposed to be wearing BiPAP continuously and she is noncompliant.  Reviewing medical records patient was recently admitted for respiratory failure to [facility].  Patient can answer no questions.  Past medical history, social history, allergies and review of systems cannot be properly completed due to the patient's altered mental status            Review of Systems   All other systems reviewed and are negative.      Past Medical History:   Diagnosis Date   • Allergic rhinitis    • Asthma with COPD (CMS/[facility])     Asthma/COPD   • Bilateral ovarian cysts    • Coronary artery disease    • Depression with anxiety     Depression/Anxiety   • Diabetes (CMS/[facility])     pre   • Endometriosis     Dr. Jin; estradiol    • ESR raised 3/20/2018   • Fatigue    • Fibromyalgia    • Headache     Headaches   • High cholesterol    • History of gallstones    • History of myocardial infarction    • Hypertension    • Influenza B     DX'D 2/6/2018, TREATED WITH TAMIFLU. LAST DOSE 2/11/2018 AM.  PATIENT STATES DR RANGEL IS AWARE. DENIES FEVERS OR CHILLS.   • Interstitial cystitis    • On home oxygen therapy     2 L NC   • URIEL on CPAP    • PONV (postoperative nausea and vomiting)    • Seizure disorder, nonconvulsive, with status epilepticus (CMS/[facility]) 3/20/2018   • Septic joint of right knee joint (CMS/[facility]) 3/21/2018   • Shortness of breath on exertion    • Stroke (CMS/[facility])     X1 8 YEARS AGO, GENERALIZED UPPER BODY WEAKNESS RESIDUAL PER PT    • Thyroid disorder    • Urinary leakage    • UTI (urinary tract infection)    • Wears glasses    • Yeast dermatitis        Allergies   Allergen Reactions   • Amlodipine Swelling   • Reglan [Metoclopramide] Other (See Comments)     DYSTONIC REACTION       Past Surgical History:   Procedure Laterality Date    • ARTERIOGRAM N/A 2/12/2018    Procedure: Renal Arteriogram;  Surgeon: Lito Flores MD;  Location:  [facility] CATH INVASIVE LOCATION;  Service:    • BREAST BIOPSY Right 1991   • CARDIAC CATHETERIZATION N/A 2/12/2018    Procedure: Right Heart Cath;  Surgeon: Lito Flores MD;  Location:  [facility] CATH INVASIVE LOCATION;  Service:    • CAROTID STENT      Transcath    • CAROTID STENT Left    • CHOLECYSTECTOMY     • CYSTOSTOMY W/ BLADDER BIOPSY     • DILATATION AND CURETTAGE     • KNEE ARTHROSCOPY Right 3/23/2018    Procedure: ARTHROSCOPY, RIGHT KNEE  INCISION AND DRAINAGE LOWER EXTREMITY WITH SYNOVIAL BIOPSY;  Surgeon: Dilip Giron MD;  Location:  [facility] OR;  Service: Orthopedics   • LAPAROSCOPIC CHOLECYSTECTOMY  1994    Lap rolando   • OOPHORECTOMY     • PAP SMEAR  05/10/2016   • PARTIAL HYSTERECTOMY         Family History   Problem Relation Age of Onset   • Cancer Other    • Diabetes Other    • Heart attack Other    • Hyperlipidemia Other    • Hypertension Other    • Osteoporosis Other    • Stroke Other    • Breast cancer Mother    • Osteoporosis Mother    • Colon cancer Father        Social History     Social History   • Marital status:      Social History Main Topics   • Smoking status: Current Every Day Smoker     Packs/day: 0.25     Years: 40.00     Types: Cigarettes   • Smokeless tobacco: Never Used      Comment: in process of quiting--AVERAGE 1 PPD, DOWN TO 6 CIG PER DAY X2 WEEKS    • Alcohol use 0.6 oz/week     1 Glasses of wine per week      Comment: occasional   • Drug use: No   • Sexual activity: Defer     Other Topics Concern   • Not on file     Social History Narrative    Lives alone.            Objective   Physical Exam   Nursing note and vitals reviewed.    GEN: An obese, lethargic  Head: Normocephalic, atraumatic  Eyes: Pupils equal round reactive to light  ENT: Posterior pharynx normal in appearance, oral mucosa is dry  Chest: Nontender to palpation  Cardiovascular: Regular rate  Lungs: Clear  to auscultation bilaterally  Abdomen: Soft, nontender, nondistended, no peritoneal signs  Neuro: Patient will open eyes to loud voice, she does attempt to communicate verbally and simple answers but does seem very confused, patient will follow commands when forcefully prompted      Procedures           ED Course  ED Course as of Nov 06 0109   Mon Nov 05, 2018   2312 EKG shows sinus rhythm with a rate of 65.  Low qrs voltage in the chest leads.  Non-specific T waves throughout the majority of this EKG.  Abnormal EKG.  Interpreted by me.  [DT]      ED Course User Index  [DT] Edward Johnson MD                  MDM  Number of Diagnoses or Management Options  Acute and chronic respiratory failure with hypercapnia (CMS/HCC):   Chronic obstructive pulmonary disease, unspecified COPD type (CMS/HCC):   Pneumonia of right lower lobe due to infectious organism (CMS/HCC):   Diagnosis management comments: Patient was started on BiPAP for hypercapnic respiratory failure.  Patient has been hemodynamically stable entire time.  Chest x-ray suggested a right lower lobe pneumonia.  Blood cultures drawn and antibiotics initiated.  Patient will be admitted to the ICU.    Greater than 35 minutes ago care time was spent by the attending physician excluding separately billable procedures.       Amount and/or Complexity of Data Reviewed  Clinical lab tests: ordered and reviewed  Discussion of test results with the performing providers: yes  Decide to obtain previous medical records or to obtain history from someone other than the patient: yes  Obtain history from someone other than the patient: yes  Review and summarize past medical records: yes  Discuss the patient with other providers: yes  Independent visualization of images, tracings, or specimens: yes          Final diagnoses:   Acute and chronic respiratory failure with hypercapnia (CMS/HCC)   Pneumonia of right lower lobe due to infectious organism (CMS/HCC)   Chronic obstructive  pulmonary disease, unspecified COPD type (CMS/[facility])            Edward Johnson MD  11/06/18 0116

## 2018-11-06 NOTE — PLAN OF CARE
Problem: Patient Care Overview  Goal: Plan of Care Review  Outcome: Ongoing (interventions implemented as appropriate)   11/06/18 0653   Coping/Psychosocial   Plan of Care Reviewed With patient   Plan of Care Review   Progress no change   OTHER   Outcome Summary Pt refusing to wear bipap and following orders. Pt being argumentive with staff. VSS except for oxygen desating from refusal of bipap. ANA Mcnulty RN     Goal: Individualization and Mutuality  Outcome: Ongoing (interventions implemented as appropriate)    Goal: Discharge Needs Assessment  Outcome: Ongoing (interventions implemented as appropriate)

## 2018-11-06 NOTE — OUTREACH NOTE
Medical Week 3 Survey      Responses   Facility patient discharged from?  [facility]   Does the patient have one of the following disease processes/diagnoses(primary or secondary)?  COPD/Pneumonia   Week 3 attempt successful?  No   Revoke  Readmitted          Bing Jacome RN

## 2018-11-06 NOTE — PLAN OF CARE
Problem: Patient Care Overview  Goal: Plan of Care Review  Outcome: Ongoing (interventions implemented as appropriate)   11/06/18 1410   Coping/Psychosocial   Plan of Care Reviewed With patient   Plan of Care Review   Progress improving       Problem: NPPV/CPAP (Adult)  Goal: Signs and Symptoms of Listed Potential Problems Will be Absent, Minimized or Managed (NPPV/CPAP)  Outcome: Ongoing (interventions implemented as appropriate)

## 2018-11-06 NOTE — PROGRESS NOTES
[facility] HOSPITALIST    PROGRESS NOTE    Name:  Vidhi Lopez   Age:  68 y.o.  Sex:  female  :  1950  MRN:  [id number]   Visit Number:  [id number]  Admission Date:  2018  Date Of Service:  18  Primary Care Physician:  Michael Mays MD     LOS: 0 days :  Patient Care Team:  Michael Mays MD as PCP - General  Michael Mays MD as PCP - Family Medicine  Michael Mays MD as PCP - Claims Lito Jones MD as Consulting Physician (Cardiology):    Chief Complaint:      Generalized weakness and confusion.    Subjective / Interval History:     Ms. Lopez is currently sitting up on the bed and is comfortable at rest.  She is slightly drowsy but has significantly improved with regards to her mental status.  She was admitted from the emergency room on 2018 after she was found by her neighbor at home unresponsive.  She was noted to have hypercapnic respiratory failure and was placed on BiPAP therapy overnight.  Her CO2 levels have improved in this morning CABG.  Unfortunately, she continues to smoke cigarettes and is noncompliant with her Trilogy NIV unit at home.  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  She states that she does live alone.  She was noted to have elevated troponin levels were denies any chest pain.  She does have history of coronary artery disease status post stenting in May 2018.  She states that she sees Dr. Flores in [place] who is her cardiologist.  Previous physician documentation, laboratory and imaging data have been reviewed.    Review of Systems:     General ROS: Patient denies any fevers, chills or loss of consciousness.  Generalized weakness.  Respiratory ROS: Cough and chest congestion.  Cardiovascular ROS: Denies chest pain or palpitations. No history of exertional chest pain.  Gastrointestinal ROS: Denies nausea and vomiting. Denies any abdominal pain. No diarrhea.  Neurological  ROS: Denies any focal weakness. No loss of consciousness. Denies any numbness.  History of confusion.  Dermatological ROS: Denies any redness or pruritis.    Vital Signs:    Temp:  [97.6 °F (36.4 °C)-99 °F (37.2 °C)] 99 °F (37.2 °C)  Heart Rate:  [53-76] 53  Resp:  [16-26] 21  BP: ()/() 157/70  FiO2 (%):  [35 %-60 %] 35 %    Intake and output:    I/O last 3 completed shifts:  In: 1338 [P.O.:600; I.V.:738]  Out: 800 [Urine:800]  I/O this shift:  In: 800 [P.O.:800]  Out: -     Physical Examination:    General Appearance:  Alert and cooperative, not in any acute distress.   Head:  Atraumatic and normocephalic, without obvious abnormality.   Eyes:          PERRLA, conjunctivae and sclerae normal, no Icterus. No pallor. Extra-occular movements are within normal limits.   Neck: Supple, trachea midline, no thyromegaly, no carotid bruit.   Lungs:   Chest shape is normal. Breath sounds heard bilaterally equally.  No wheezing.  Occasional basal crackles heard. No Pleural rub or bronchial breathing.   Heart:  Normal S1 and S2, no murmur, no gallop, no rub. No JVD   Abdomen:   Normal bowel sounds, no masses, no organomegaly. Soft, non-tender, non-distended, no guarding, no rebound tenderness.  Melissa catheter is in place.   Extremities: Moves all extremities well, no edema, no cyanosis, no            clubbing.   Skin: No bleeding, bruising or rash.   Neurologic: Awake, alert and oriented times 3. Moves all 4 extremities equally.  Asterixis noted on the outstretched hands.   Laboratory results:      Results from last 7 days  Lab Units 11/06/18 0224 11/05/18  2121   SODIUM mmol/L 135* 137   POTASSIUM mmol/L 5.2* 5.3*   CHLORIDE mmol/L 96* 90*   CO2 mmol/L 35.0* 37.0*   BUN mg/dL 40* 42*   CREATININE mg/dL 1.40* 1.90*   CALCIUM mg/dL 8.5 9.1   BILIRUBIN mg/dL  --  0.6   ALK PHOS U/L  --  132*   ALT (SGPT) U/L  --  79*   AST (SGOT) U/L  --  52*   GLUCOSE mg/dL 149* 189*       Results from last 7 days  Lab Units  11/06/18  0220 11/05/18  2121   WBC 10*3/mm3 11.41* 12.34*   HEMOGLOBIN g/dL 11.3* 11.7*   HEMATOCRIT % 38.0 40.0   PLATELETS 10*3/mm3 202 232       Results from last 7 days  Lab Units 11/06/18  0220   INR  1.25*       Results from last 7 days  Lab Units 11/06/18  1315 11/06/18  0813 11/06/18  0224   TROPONIN I ng/mL 0.173* 0.182* 0.216*       Results from last 7 days  Lab Units 11/05/18  2248 11/05/18  2238   BLOODCX  No growth at less than 24 hours No growth at less than 24 hours     I have reviewed the patient's laboratory results.    Radiology results:    Imaging Results (last 24 hours)     Procedure Component Value Units Date/Time    XR Chest 1 View [[id number]] Collected:  11/05/18 2156     Updated:  11/05/18 2155    Narrative:       FINAL REPORT    CLINICAL HISTORY:  ams    FINDINGS:  There are low lung volumes with elevation right hemidiaphragm.  There is right lower lobe airspace disease.  The heart is  enlarged.  Aortic knob is calcified.  Impression: Right lower  lobe airspace disease      Impression:       Authenticated by Brandon Golden MD on 11/05/2018 09:56:44 PM        I have reviewed the patient's radiology reports.    Medication Review:     I have reviewed the patients active and prn medications.       Obstructive sleep apnea    CAD (coronary artery disease)    Dyslipidemia    Hypertension    Anxiety (Chronic benzos)    GERD (gastroesophageal reflux disease)    Diabetes mellitus (CMS/HCC)    History of acute myocardial infarction    Obesity hypoventilation syndrome (CMS/HCC)    Clostridium difficile colitis diagnosed September 2018. Persistent diarrhea despite oral vancomycin    Acute and chronic respiratory failure with hypercapnia (CMS/HCC)    Assessment:    1.  Acute metabolic encephalopathy secondary to #2, present on admission.  2.  Acute on chronic hypercapnic and hypoxic respiratory failure, present on admission.  3.  Right lower lobe bacterial pneumonia, present on admission.  4.  Acute  COPD exacerbation, present on admission.  5.  Acute renal failure, present on admission.  6.  Demand ischemia with elevated troponin level secondary to #2.  7.  Obstructive sleep apnea, noncompliant with BiPAP therapy at home.  8.  Coronary artery disease status post stenting.  9.  Recent history of C. difficile colitis status post a course of oral vancomycin.  10.  Diabetes mellitus type 2.  11.  Essential hypertension.  12.  Generalized anxiety disorder.  13.  Medical noncompliance.  14.  Chronic tobacco dependence.    Plan:    Ms. Lopez is currently doing better after she used the BiPAP through the night.  Hypercapnia has improved.  She is currently on nasal cannula oxygen saturating in the mid 90s.  She does have right lower lobe pneumonia and will be continued on IV antibiotic therapy with Zosyn and vancomycin since she had a recent hospital stay.  Blood cultures have been sent and are currently pending.  She will be continued on bronchodilators, budesonide, IV Solu-Medrol and mucolytic agents.  I have strongly advised her to discontinue smoking and be compliant with her Trilogy NIV unit.    Patient does have elevated troponin levels which are trending down.  She denies any chest pain and did not have any ST changes on her initial EKG.  I think this is secondary to demand ischemia/type II myocardial infarction and also contribution by her renal failure.  Patient does not want to see any cardiologist here.  In view of this, I discussed the patient's condition with Dr. Salmon was on call for her primary cardiologist Dr. Lito Flores. Dr. Salmon felt that the patient's troponin levels at this time did not require any interventions and he recommended to continue her home medications including dual antiplatelet agents.  He recommended outpatient follow-up with Dr. Flores.    Patient will be continued on BiPAP at night as well as 4 hours during the day.  She will be transferred to the medical floor with telemetry.   She was initially on heparin drip, and I will discontinue that and place her on Lovenox for DVT prophylaxis.    Kentrell Whittington MD  11/06/18  4:15 PM    Dictated utilizing Dragon dictation.

## 2018-11-06 NOTE — PLAN OF CARE
Problem: Fall Risk (Adult)  Goal: Identify Related Risk Factors and Signs and Symptoms  Outcome: Outcome(s) achieved Date Met: 11/06/18    Goal: Absence of Fall  Outcome: Ongoing (interventions implemented as appropriate)      Problem: Breathing Pattern Ineffective (Adult)  Goal: Identify Related Risk Factors and Signs and Symptoms  Outcome: Outcome(s) achieved Date Met: 11/06/18    Goal: Effective Oxygenation/Ventilation  Outcome: Ongoing (interventions implemented as appropriate)    Goal: Anxiety/Fear Reduction  Outcome: Ongoing (interventions implemented as appropriate)      Problem: NPPV/CPAP (Adult)  Goal: Signs and Symptoms of Listed Potential Problems Will be Absent, Minimized or Managed (NPPV/CPAP)  Outcome: Ongoing (interventions implemented as appropriate)

## 2018-11-06 NOTE — PROGRESS NOTES
Discharge Planning Assessment  [facility]     Patient Name: Vidhi Lopez  MRN: [id number]  Today's Date: 11/6/2018    Admit Date: 11/5/2018          Discharge Needs Assessment     Row Name 11/06/18 1313       Living Environment    Lives With alone    Name(s) of Who Lives With Patient 3 kids can assist CarmenInez bowers and David.    Unique Family Situation Lives alone, neighbor called after not seeing her.    Primary Care Provided by self;child(sekou)    Provides Primary Care For no one    Caregiving Concerns lives alone.    Family Caregiver if Needed child(sekou), adult    Family Caregiver Names inez peraza and david.       Resource/Environmental Concerns    Transportation Concerns car, none       Discharge Needs Assessment    Equipment Currently Used at Home oxygen;noninvasive ventilator;wheelchair;walker, rolling    Anticipated Changes Related to Illness other (see comments)    Outpatient/Agency/Support Group Needs homecare agency    Discharge Coordination/Progress home vs home with home health.            Discharge Plan     Row Name 11/06/18 6082       Plan    Plan SW spoke to pt., verified address and PCP. Lives alone, kids are next of kin. Has walker, wheelchair. O2 at 2l nc all the time. Trilogy from [facility]. 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 Has had some discussions with kids on her medical care wishes.         Destination     No service coordination in this encounter.      Durable Medical Equipment     No service coordination in this encounter.      Dialysis/Infusion     No service coordination in this encounter.      Home Medical Care     No service coordination in this encounter.      Social Care     No service coordination in this encounter.        Expected Discharge Date and Time     Expected Discharge Date Expected Discharge Time    Nov 9, 2018               Demographic Summary     Row Name 11/06/18 1239       General Information    Admission Type inpatient    Arrived From home     Expected Length of Stay (LOS) 3    Referral Source admission list    Reason for Consult discharge planning    Preferred Language English            Functional Status     Row Name 11/06/18 8707       Functional Status, IADL    Medications other (see comments)    Meal Preparation other (see comments)    Housekeeping other (see comments)    Laundry other (see comments)    Shopping other (see comments)    IADL Comments lives alone, has walker and wheelchair.       Employment/    Employment Status retired    Current or Previous Occupation healthcare            Psychosocial    No documentation.           Abuse/Neglect    No documentation.           Legal    No documentation.           Substance Abuse    No documentation.           Patient Forms    No documentation.         Jennifer Khoury

## 2018-11-06 NOTE — DISCHARGE INSTR - OTHER ORDERS
If you have any questions about your recovery, please call the [facility] Nurse Call Center at [phone]. A registered nurse is available 24 hours a day 7 days a week to assist you.

## 2018-11-06 NOTE — H&P
[facility] Medicine Services  HISTORY AND PHYSICAL    Primary Care Physician: Michael Mays MD    Subjective     Chief Complaint:    Chief Complaint   Patient presents with   • Shortness of Breath       History of Present Illness:     I have reviewed labs/imaging/records from this hospitalization, including ER staff to establish a comprehensive understanding of this patient's clinical issues, as well as to establish plan of care appropriately.     Patient is a 68 year old female with medical history of COPD, URIEL, OHS, noncompliant on BiPAP and oxygen therapy, type 2 DM, CAD s/p 1 stent, hypertension, hyperlipidemia and anxiety, who was brought in from home by EMS for evaluation of altered mental status. During my initial encounter, patient was on BiPAP, unable to provide any history, however, later on patient was able to have a conversation and tell me that she lives at home alone and is supposed to wear BiPAP at night and oxygen during the day. She did not do this prior to going to bed on Friday. She cannot recall much of what happened over the last 2 days, but denies any complaints of significant short of breath or cough, though, she was visibly coughing heavily when I was interviewing her. She had no complaints of chest pain, pressure, palpitations, abdominal pain, diarrhea or dysuria. She denies any fevers or chills. Of note, patient was discharged from [facility] on on 10/18 after being admitted for very similar episode after which she was discharged home. She was treated for COPD exacerbation and C. Diff at the time, she is still taking oral vancomycin.     Her initial vitals on arrival were within normal limits. Labs are notable for wbc 12k, H&H 11.7/40, . BUN/Cr 42/1.9 (baseline 0.75). ALT/AST 79/52. Troponin 0.191. BNP 4340. PH 7.2, CO2 97. Urinalysis was rather unremarkable other than 3-5 wbcs. Negative for leuk esterase and nitrite. After about 2 hours  of BiPAP her pH improved to 7.247 and CO2 improved to 88. EKG did not reveal any acute ischemic changes. CXR showed a RLL airspace disease. She was given a liter of IVF and started on Vancomycin and Zosyn.     Soon after arriving to the ICU, patient took herself off BiPAP. She was fully alert and oriented. She started to demand to be given food or that she would leave against medical advise. She then stated that she would leave against medical advice no matter what we tell her, even though her oxygen was in the mid 70s. I had multiple conversations with the patient's daughters over the phone to notify them of patient's request. They subsequently spoke to the patient over the phone multiple times and she finally decided to stay, but still refusing to wear BiPAP. I have asked nursing staff to place her on high-flow for the time being. I advised the patient that her heart enzymes were elevated and that she may need evaluation by cardiology, she then stated that if that was the case that she wants us to call her cardiologist at [facility], Dr. Twin Flores and that she would want to be transferred there if she needed any cardiac interventions. She did not want to see anyone here.     Review of Systems   1. Constitutional: Negative for fever. Negative for chills, diaphoresis, fatigue and unexpected weight change.   2. HENT: Negative for congestion and hearing loss.   3. Eyes: Negative for redness and visual disturbance.   4. Respiratory: negative for shortness of breath. Negative for chest pain . Negative for cough and chest tightness.   5. Cardiovascular: Negative for chest pain and palpitations.   6. Gastrointestinal: Negative for abdominal distention, abdominal pain and blood in stool.   7. Endocrine: Negative for cold intolerance and heat intolerance.   8. Genitourinary: Negative for difficulty urinating, dysuria and frequency.   9. Musculoskeletal: Negative for arthralgias, back pain and myalgias.   10. Skin: Negative for  color change, rash and wound.   11. Neurological: Negative for syncope, weakness and headaches.   12. Hematological: Negative for adenopathy. Does not bruise/bleed easily.   13. Psychiatric/Behavioral: Negative for confusion. The patient is not nervous/anxious.     Past Medical History:   Past Medical History:   Diagnosis Date   • Allergic rhinitis    • Asthma with COPD (CMS/Formerly [facility])     Asthma/COPD   • Bilateral ovarian cysts    • Coronary artery disease    • Depression with anxiety     Depression/Anxiety   • Diabetes (CMS/Formerly [facility])     pre   • Endometriosis     Dr. Jin; estradiol    • ESR raised 3/20/2018   • Fatigue    • Fibromyalgia    • Headache     Headaches   • High cholesterol    • History of gallstones    • History of myocardial infarction    • Hypertension    • Influenza B     DX'D 2/6/2018, TREATED WITH TAMIFLU. LAST DOSE 2/11/2018 AM.  PATIENT STATES DR FLORES IS AWARE. DENIES FEVERS OR CHILLS.   • Interstitial cystitis    • On home oxygen therapy     2 L NC   • URIEL on CPAP    • PONV (postoperative nausea and vomiting)    • Seizure disorder, nonconvulsive, with status epilepticus (CMS/Formerly [facility]) 3/20/2018   • Septic joint of right knee joint (CMS/Formerly [facility]) 3/21/2018   • Shortness of breath on exertion    • Stroke (CMS/Formerly [facility])     X1 8 YEARS AGO, GENERALIZED UPPER BODY WEAKNESS RESIDUAL PER PT    • Thyroid disorder    • Urinary leakage    • UTI (urinary tract infection)    • Wears glasses    • Yeast dermatitis        Past Surgical History:  Past Surgical History:   Procedure Laterality Date   • ARTERIOGRAM N/A 2/12/2018    Procedure: Renal Arteriogram;  Surgeon: Lito Flores MD;  Location:  [facility] CATH INVASIVE LOCATION;  Service:    • BREAST BIOPSY Right 1991   • CARDIAC CATHETERIZATION N/A 2/12/2018    Procedure: Right Heart Cath;  Surgeon: Lito Flores MD;  Location:  [facility] CATH INVASIVE LOCATION;  Service:    • CAROTID STENT      Transcath    • CAROTID STENT Left    • CHOLECYSTECTOMY     • CYSTOSTOMY W/ BLADDER  BIOPSY     • DILATATION AND CURETTAGE     • KNEE ARTHROSCOPY Right 3/23/2018    Procedure: ARTHROSCOPY, RIGHT KNEE  INCISION AND DRAINAGE LOWER EXTREMITY WITH SYNOVIAL BIOPSY;  Surgeon: Dilip Giron MD;  Location: [facility];  Service: Orthopedics   • LAPAROSCOPIC CHOLECYSTECTOMY  1994    Lap [place]   • OOPHORECTOMY     • PAP SMEAR  05/10/2016   • PARTIAL HYSTERECTOMY         Family History: family history includes Breast cancer in her mother; Cancer in her other; Colon cancer in her father; Diabetes in her other; Heart attack in her other; Hyperlipidemia in her other; Hypertension in her other; Osteoporosis in her mother and other; Stroke in her other.    Social History:  reports that she has been smoking Cigarettes.  She has a 10.00 pack-year smoking history. She has never used smokeless tobacco. She reports that she drinks about 0.6 oz of alcohol per week . She reports that she does not use drugs.    Medications:  Prescriptions Prior to Admission   Medication Sig Dispense Refill Last Dose   • ALPRAZolam (XANAX) 0.25 MG tablet Take 0.25 mg by mouth 2 (Two) Times a Day As Needed for Anxiety.   10/13/2018 at Unknown time   • aspirin 81 MG EC tablet Take 1 tablet by mouth Daily. 100 tablet 4 10/13/2018 at Unknown time   • atorvastatin (LIPITOR) 80 MG tablet Take 80 mg by mouth Daily.   10/13/2018 at Unknown time   • bumetanide (BUMEX) 1 MG tablet Take 1 tablet by mouth Daily With Breakfast. 30 tablet 0    • celecoxib (CELEBREX) 100 MG capsule Every 12 (Twelve) Hours.   10/13/2018 at Unknown time   • clopidogrel (PLAVIX) 75 MG tablet Take 1 tablet by mouth Daily. 90 tablet 3 10/13/2018 at Unknown time   • colchicine 0.6 MG tablet take 1 tablet by mouth once daily if needed  0 10/13/2018 at Unknown time   • famotidine (PEPCID) 20 MG tablet Take 1 tablet by mouth 2 (Two) Times a Day. 60 tablet 0    • febuxostat (ULORIC) 40 MG tablet Take 80 mg by mouth Daily.      • gabapentin (NEURONTIN) 600 MG tablet Take 1 tablet by  mouth 3 (Three) Times a Day. 21 tablet 0    • HUMALOG KWIKPEN 100 UNIT/ML solution pen-injector Inject 14 Units under the skin into the appropriate area as directed 3 (Three) Times a Day With Meals. Max 56 units daily  0 10/13/2018 at Unknown time   • levETIRAcetam (KEPPRA) 1000 MG tablet Take 1 tablet by mouth Every 12 (Twelve) Hours. 180 tablet 3 10/13/2018 at Unknown time   • levocetirizine (XYZAL) 5 MG tablet Take 5 mg by mouth Every Evening.      • metFORMIN ER (GLUCOPHAGE-XR) 500 MG 24 hr tablet Take 500 mg by mouth 2 (Two) Times a Day.  0 10/13/2018 at Unknown time   • metoprolol tartrate (LOPRESSOR) 50 MG tablet Take 1 tablet by mouth Every 12 (Twelve) Hours. 60 tablet 0    • mirtazapine (REMERON) 15 MG tablet Take 1 tablet by mouth Every Night. 30 tablet 0    • nitroglycerin (NITROSTAT) 0.4 MG SL tablet Place 0.4 mg under the tongue Every 5 (Five) Minutes As Needed.  0 Unknown at Unknown time   • ondansetron (ZOFRAN) 8 MG tablet Take 8 mg by mouth 2 (Two) Times a Day As Needed for Nausea or Vomiting.   10/13/2018 at Unknown time   • potassium chloride (K-DUR,KLOR-CON) 20 MEQ CR tablet Take 1 tablet by mouth Daily. 30 tablet 0 10/13/2018 at Unknown time   • Probiotic Product (PROBIOTIC DAILY PO) Take 1 tablet by mouth Daily.      • promethazine (PHENERGAN) 25 MG tablet Take 25 mg by mouth Every 8 (Eight) Hours As Needed for Nausea or Vomiting.      • rOPINIRole (REQUIP) 0.25 MG tablet Take 0.25 mg by mouth Every Night. Take 1-3 hours before bedtime.   10/13/2018 at Unknown time   • SYMBICORT 160-4.5 MCG/ACT inhaler Inhale 2 puffs 2 (Two) Times a Day.  0 10/13/2018 at Unknown time   • TOUJEO SOLOSTAR 300 UNIT/ML solution pen-injector Inject 20 Units under the skin into the appropriate area as directed Daily.  0 10/13/2018 at Unknown time   • vancomycin 50 MG/ML reconstituted solution oral solution reconstituted 125mg QID x 2 weeks, then 125mg BID x 2 weeks 160 mL 0        Allergies:  Allergies   Allergen  "Reactions   • Amlodipine Swelling   • Reglan [Metoclopramide] Other (See Comments)     DYSTONIC REACTION         Objective     Physical Exam:  Vital Signs: /75   Pulse 71   Temp 99 °F (37.2 °C) (Oral)   Resp 26   Ht 160 cm (63\")   Wt 92.4 kg (203 lb 11.2 oz)   LMP  (LMP Unknown) Comment: LAST MAMMOGRAM 2017  SpO2 94%   BMI 36.08 kg/m²      Physical Exam:     General Appearance:   Alert, cooperative, in no acute distress.     Head:   Normocephalic, without obvious abnormality, atraumatic.     Eyes:       Normal, conjunctivae and sclerae, no icterus, no pallor, corneas clear, PERRLA        Throat:   Oral mucosa dry      Neck:  No adenopathy, supple, trachea midline, no thyromegaly, no carotid bruit, no JVD      Back:   No CVA tenderness on Percussion.     Lungs:    Clear to auscultation and fair air movement noted.      Heart:   Regular rhythm and normal rate, normal S1 and S2.       Abdomen:   Obese. Normal bowel sounds, no masses, no organomegaly, soft non-tender, non-distended, no guarding, no rebound tenderness        Extremities:  Moves all extremities, no edema, no cyanosis, no redness.     Pulses:  Pulses palpable and equal bilaterally but weak.     Skin:  No bleeding, bruising or rash        Neurologic:  Cranial nerves grossly intact, move all extremities         Results Review:  Lab Results (last 7 days)     Procedure Component Value Units Date/Time    Troponin [[id number]]  (Abnormal) Collected:  11/06/18 0224    Specimen:  Blood Updated:  11/06/18 0317     Troponin I 0.216 (C) ng/mL     Narrative:       Normal Patient Upper Reference Limit (URL) (99th Percentile)=0.03 ng/mL   Non-AMI Illness Reference Limit=0.03-0.11 ng/mL   AMI Confirmation=0.12 ng/mL and above    aPTT [[id number]]  (Abnormal) Collected:  11/06/18 0220    Specimen:  Blood Updated:  11/06/18 0258     PTT 24.0 (L) seconds     Protime-INR [[id number]]  (Abnormal) Collected:  11/06/18 0220    Specimen:  Blood Updated:  11/06/18 0258 "     Protime 13.9 (H) Seconds      INR 1.25 (H)    CBC & Differential [[id number]] Collected:  11/06/18 0220    Specimen:  Blood Updated:  11/06/18 0255    Narrative:       The following orders were created for panel order CBC & Differential.  Procedure                               Abnormality         Status                     ---------                               -----------         ------                     Scan Slide[[id number]]                                       Final result               CBC Auto Differential[[id number]]        Abnormal            Final result                 Please view results for these tests on the individual orders.    CBC Auto Differential [[id number]]  (Abnormal) Collected:  11/06/18 0220    Specimen:  Blood Updated:  11/06/18 0255     WBC 11.41 (H) 10*3/mm3      RBC 3.77 (L) 10*6/mm3      Hemoglobin 11.3 (L) g/dL      Hematocrit 38.0 %      .8 (H) fL      MCH 30.0 pg      MCHC 29.7 (L) g/dL      RDW 15.5 (H) %      RDW-SD 57.1 (H) fl      MPV 9.7 fL      Platelets 202 10*3/mm3      Neutrophil % 86.4 (H) %      Lymphocyte % 6.2 (L) %      Monocyte % 6.0 %      Eosinophil % 0.1 %      Basophil % 0.3 %      Immature Grans % 1.0 (H) %      Neutrophils, Absolute 9.87 (H) 10*3/mm3      Lymphocytes, Absolute 0.71 10*3/mm3      Monocytes, Absolute 0.68 10*3/mm3      Eosinophils, Absolute 0.01 10*3/mm3      Basophils, Absolute 0.03 10*3/mm3      Immature Grans, Absolute 0.11 (H) 10*3/mm3      nRBC 0.4 (H) /100 WBC     Scan Slide [[id number]] Collected:  11/06/18 0220    Specimen:  Blood Updated:  11/06/18 0255     Anisocytosis Slight/1+     Hypochromia Slight/1+     Macrocytes Slight/1+     WBC Morphology Normal     Platelet Estimate Adequate    Basic Metabolic Panel [[id number]]  (Abnormal) Collected:  11/06/18 0224    Specimen:  Blood Updated:  11/06/18 0252     Glucose 149 (H) mg/dL      BUN 40 (H) mg/dL      Creatinine 1.40 (H) mg/dL      Sodium 135 (L) mmol/L      Potassium 5.2 (H)  mmol/L      Chloride 96 (L) mmol/L      CO2 35.0 (H) mmol/L      Calcium 8.5 mg/dL      eGFR Non African Amer 38 (L) mL/min/1.73      BUN/Creatinine Ratio 28.6 (H)     Anion Gap 9.2 (L) mmol/L     Narrative:       GFR Normal >60  Chronic Kidney Disease <60  Kidney Failure <15    POC Glucose Once [[id number]]  (Abnormal) Collected:  11/06/18 0203    Specimen:  Blood Updated:  11/06/18 0210     Glucose 160 (H) mg/dL      Comment: Serial Number: [id number]:  [id number]       Blood Gas, Arterial With Co-Ox [[id number]]  (Abnormal) Collected:  11/06/18 0023    Specimen:  Arterial Blood Updated:  11/06/18 0024     Site Right Radial     John's Test Positive     pH, Arterial 7.247 (C) pH units      pCO2, Arterial 88.4 (C) mm Hg      pO2, Arterial 65.3 (L) mm Hg      HCO3, Arterial 38.5 (H) mmol/L      Base Excess, Arterial 8.3 (H) mmol/L      O2 Saturation, Arterial 90.5 (L) %      Hemoglobin, Blood Gas 11.7 (L) g/dL      Hematocrit, Blood Gas 35.9 %      Oxyhemoglobin 87.3 (L) %      Methemoglobin 1.00 %      Carboxyhemoglobin 2.5 (H) %      Barometric Pressure for Blood Gas 726 mmHg      Modality N/A     FIO2 40 %      Ventilator Mode NA     Set Mech Resp Rate 18.0     IPAP 18     EPAP 5     Note --     pH, Temp Corrected -- pH Units      pCO2, Temperature Corrected -- mm Hg      pO2, Temperature Corrected -- mm Hg     Blood Culture - Blood, [[id number]] Collected:  11/05/18 2248    Specimen:  Blood from Blood, Venous Line Updated:  11/05/18 2322    Blood Culture - Blood, [[id number]] Collected:  11/05/18 2238    Specimen:  Blood from Arm, Left Updated:  11/05/18 2321    [place] Draw [[id number]] Collected:  11/05/18 2121    Specimen:  Blood Updated:  11/05/18 2231    Narrative:       The following orders were created for panel order [place] Draw.  Procedure                               Abnormality         Status                     ---------                               -----------         ------                      Light Blue Top[[id number]]                                   Final result               Lavender Top[[id number]]                                     Final result               Gold Top - SST[[id number]]                                   Final result               Green Top (No Gel)[[id number]]                               Final result                 Please view results for these tests on the individual orders.    Light Blue Top [[id number]] Collected:  11/05/18 2121    Specimen:  Blood Updated:  11/05/18 2231     Extra Tube hold for add-on     Comment: Auto resulted       Lavender Top [[id number]] Collected:  11/05/18 2121    Specimen:  Blood Updated:  11/05/18 2231     Extra Tube hold for add-on     Comment: Auto resulted       Gold Top - SST [[id number]] Collected:  11/05/18 2121    Specimen:  Blood Updated:  11/05/18 2231     Extra Tube Hold for add-ons.     Comment: Auto resulted.       Green Top (No Gel) [[id number]] Collected:  11/05/18 2121    Specimen:  Blood Updated:  11/05/18 2231     Extra Tube Hold for add-ons.     Comment: Auto resulted.       Troponin [[id number]]  (Abnormal) Collected:  11/05/18 2121    Specimen:  Blood Updated:  11/05/18 2225     Troponin I 0.191 (C) ng/mL      Comment: Modified report. Previous result was 0.187 ng/mL on 11/5/2018 at 2209 EST.       Narrative:       Normal Patient Upper Reference Limit (URL) (99th Percentile)=0.03 ng/mL   Non-AMI Illness Reference Limit=0.03-0.11 ng/mL   AMI Confirmation=0.12 ng/mL and above    Urinalysis, Microscopic Only - Urine, Clean Catch [[id number]]  (Abnormal) Collected:  11/05/18 2154    Specimen:  Urine from Urine, Catheter Updated:  11/05/18 2214     RBC, UA 6-12 (A) /HPF      WBC, UA 3-5 (A) /HPF      Bacteria, UA 1+ (A) /HPF      Squamous Epithelial Cells, UA 0-2 /HPF      Hyaline Casts, UA 3-6 /LPF      Mucus, UA Small/1+ (A) /HPF      WBC Clumps, UA Small/1+ /HPF      Methodology Manual Light Microscopy    CBC & Differential [[id number]]  Collected:  11/05/18 2121    Specimen:  Blood Updated:  11/05/18 2210    Narrative:       The following orders were created for panel order CBC & Differential.  Procedure                               Abnormality         Status                     ---------                               -----------         ------                     Scan Slide[[id number]]                                       Final result               CBC Auto Differential[[id number]]        Abnormal            Final result                 Please view results for these tests on the individual orders.    Scan Slide [[id number]] Collected:  11/05/18 2121    Specimen:  Blood Updated:  11/05/18 2210     Anisocytosis Slight/1+     Basophilic Stippling Slight/1+     Macrocytes Slight/1+     WBC Morphology Normal     Platelet Estimate Adequate    Comprehensive Metabolic Panel [[id number]]  (Abnormal) Collected:  11/05/18 2121    Specimen:  Blood Updated:  11/05/18 2208     Glucose 189 (H) mg/dL      Comment: Glucose >180, Hemoglobin A1C recommended.        BUN 42 (H) mg/dL      Creatinine 1.90 (H) mg/dL      Sodium 137 mmol/L      Potassium 5.3 (H) mmol/L      Chloride 90 (L) mmol/L      CO2 37.0 (H) mmol/L      Calcium 9.1 mg/dL      Total Protein 7.5 g/dL      Albumin 3.90 g/dL      ALT (SGPT) 79 (H) U/L      AST (SGOT) 52 (H) U/L      Alkaline Phosphatase 132 (H) U/L      Total Bilirubin 0.6 mg/dL      eGFR Non African Amer 26 (L) mL/min/1.73      Globulin 3.6 gm/dL      A/G Ratio 1.1 g/dL      BUN/Creatinine Ratio 22.1     Anion Gap 15.3 mmol/L     Narrative:       GFR Normal >60  Chronic Kidney Disease <60  Kidney Failure <15    BNP [[id number]]  (Abnormal) Collected:  11/05/18 2121    Specimen:  Blood Updated:  11/05/18 2208     proBNP 4,340.0 (C) pg/mL     Urinalysis With Culture If Indicated - Urine, Catheter [[id number]]  (Abnormal) Collected:  11/05/18 2154    Specimen:  Urine from Urine, Catheter Updated:  11/05/18 2200     Color, UA Yellow      Appearance, UA Clear     pH, UA <=5.0     Specific Gravity, UA >=1.030     Glucose, UA Negative     Ketones, UA Negative     Bilirubin, UA Negative     Blood, UA Moderate (2+) (A)     Protein, UA Trace (A)     Leuk Esterase, UA Negative     Nitrite, UA Negative     Urobilinogen, UA 0.2 E.U./dL    Lactic Acid, Plasma [[id number]]  (Normal) Collected:  11/05/18 2123    Specimen:  Blood Updated:  11/05/18 2152     Lactate 1.2 mmol/L     Blood Gas, Arterial With Co-Ox [[id number]]  (Abnormal) Collected:  11/05/18 2139    Specimen:  Arterial Blood Updated:  11/05/18 2141     Site Left Radial     John's Test Positive     pH, Arterial 7.207 (C) pH units      pCO2, Arterial 97.1 (C) mm Hg      pO2, Arterial 68.9 (L) mm Hg      HCO3, Arterial 38.6 (H) mmol/L      Base Excess, Arterial 7.5 (H) mmol/L      O2 Saturation, Arterial 91.2 (L) %      Hemoglobin, Blood Gas 12.0 g/dL      Hematocrit, Blood Gas 36.9 %      Oxyhemoglobin 87.9 (L) %      Methemoglobin 1.00 %      Carboxyhemoglobin 2.6 (H) %      Barometric Pressure for Blood Gas 729 mmHg      Modality N/A     FIO2 40 %      Ventilator Mode BiPAP     IPAP 18     EPAP 5     Note --     pH, Temp Corrected -- pH Units      pCO2, Temperature Corrected -- mm Hg      pO2, Temperature Corrected -- mm Hg     CBC Auto Differential [[id number]]  (Abnormal) Collected:  11/05/18 2121    Specimen:  Blood Updated:  11/05/18 2139     WBC 12.34 (H) 10*3/mm3      RBC 3.96 (L) 10*6/mm3      Hemoglobin 11.7 (L) g/dL      Hematocrit 40.0 %      .0 (H) fL      MCH 29.5 pg      MCHC 29.3 (L) g/dL      RDW 15.5 (H) %      RDW-SD 58.2 (H) fl      MPV 9.8 fL      Platelets 232 10*3/mm3      Neutrophil % 85.2 (H) %      Lymphocyte % 6.7 (L) %      Monocyte % 6.7 %      Eosinophil % 0.1 %      Basophil % 0.2 %      Immature Grans % 1.1 (H) %      Neutrophils, Absolute 10.50 (H) 10*3/mm3      Lymphocytes, Absolute 0.83 10*3/mm3      Monocytes, Absolute 0.83 10*3/mm3      Eosinophils, Absolute  0.01 10*3/mm3      Basophils, Absolute 0.03 10*3/mm3      Immature Grans, Absolute 0.14 (H) 10*3/mm3      nRBC 0.6 (H) /100 WBC         Imaging Results (last 72 hours)     Procedure Component Value Units Date/Time    XR Chest 1 View [[id number]] Collected:  11/05/18 2156     Updated:  11/05/18 2155    Narrative:       FINAL REPORT    CLINICAL HISTORY:  ams    FINDINGS:  There are low lung volumes with elevation right hemidiaphragm.  There is right lower lobe airspace disease.  The heart is  enlarged.  Aortic knob is calcified.  Impression: Right lower  lobe airspace disease      Impression:       Authenticated by Brandon Golden MD on 11/05/2018 09:56:44 PM        I have personally reviewed and interpreted available lab data, radiology studies and ECG obtained at time of admission.     Assessment / Plan     Assessment/Problem List:     Dyslipidemia    Hypertension    Anxiety (Chronic benzos)    GERD (gastroesophageal reflux disease)    Diabetes mellitus (CMS/HCC)    History of acute myocardial infarction    CAD (coronary artery disease)    Obstructive sleep apnea    Obesity hypoventilation syndrome (CMS/HCC)    Clostridium difficile colitis diagnosed September 2018. Persistent diarrhea despite oral vancomycin    Acute and chronic respiratory failure with hypercapnia (CMS/HCC)    1. Acute metabolic encephalopathy, due to # 2  2. Acute COPD exacerbation, due to # 3  3. RLL pneumonia, possible HCAP due to recent hospitalization  4. Acute renal failure, POA  5. Elevated troponin level, likely type 2 MI in the setting of severe hypoxia and hypercarbia  6. URIEL and OHS, non-compliant on BiPAP and oxygen therapy  7. Coronary artery disease s/p 1 stent  8. Recent C. Diff infection, currently on oral vancomycin to finish a total of 4 week course  9. Type 2 DM, insulin dependent  10. Hypertension  11. Hyperlipidemia  12. Anxiety  13. Debility  14. Medical non-compliance    Plan:  Patient admitted to the ICU. She is refusing  to wear BiPAP at this time. She is maintaining adequate saturation on high-flow. She is mentating normally and answering all questions appropriately. AM ABG ordered, however, doubt that she'll allow for us to obtain this. Continue with IV broad spectrum antibiotics - Vancomycin and Zosyn, IV steroids, bronchodilators and nebulized steroids. F/u blood cultures and sputum culture.     Patient denies any chest pain, pressure or SOA. However, her cardiac enzymes are elevated and trending up. I did load her with aspirin and initiate her on a heparin drip. I was going to consult cardiology with Dr. Davies, however, this morning, patient stated that if she needed any intervention then she wants to be transferred to [facility] under the care of her cardiologist Dr. Flores. She wants us to speak to him before doing anything further.     Of note, patient's current presentation was very similar to her recent hospitalization at [facility]. It appears that the patient lives alone and does not take good care of herself. She did not want us to speak to her daughters regarding her medical care anymore and only requested for her son to be contacted if needed.     During my multiple encounters with her, she stated that she does not want to be intubated or placed on a mechanical ventilator at any point. I did make her daughters aware of patient's request prior to the patient's request to not speak to them any further regarding her medical care.     Further recommendations will depend on clinical course of the patient during the current hospitalization.    I also discussed the details with the nursing staff.  Rest as ordered.    Anticipated stay is greater than 2 midnights.    Anders Ulloa MD 11/06/18 6:21 AM    Dictated using Dragon.

## 2018-11-06 NOTE — ED NOTES
Report given to Eliana in ICU. She asked to hold the pt for a few more minutes because they are getting two admissions. Will return call when ready for transport.     Barbara De Luna, RN  11/06/18 0059

## 2018-11-06 NOTE — PHARMACY RECOMMENDATION
" [Ht: 160 cm (63\"); Wt: 92.4 kg (203 lb 11.2 oz)]  Body mass index is 36.08 kg/m².  Estimated Creatinine Clearance: 42.1 mL/min (A) (by C-G formula based on SCr of 1.4 mg/dL (H)).    Results from last 7 days     Lab Units 11/06/18  0220 11/05/18  2121   HEMOGLOBIN g/dL 11.3* 11.7*   HEMATOCRIT % 38.0 40.0   PLATELETS 10*3/mm3 202 232     Pharmacy adjusted Enoxaparin 30 to 40 mg subq q 24 hrs for CrCl > 30 mL/min, female patient > 45 kg, platelets > 100 ×10³/mm³.    Kit Osman, Pharm.D.  11/06/18  4:41 PM    "

## 2018-11-07 ENCOUNTER — APPOINTMENT (OUTPATIENT)
Dept: GENERAL RADIOLOGY | Facility: HOSPITAL | Age: 68
End: 2018-11-07

## 2018-11-07 LAB
ANION GAP SERPL CALCULATED.3IONS-SCNC: 11.7 MMOL/L (ref 10–20)
ARTERIAL PATENCY WRIST A: ABNORMAL
ATMOSPHERIC PRESS: 736 MMHG
BASE EXCESS BLDA CALC-SCNC: 11.7 MMOL/L (ref 0–2)
BASOPHILS # BLD AUTO: 0.01 10*3/MM3 (ref 0–0.2)
BASOPHILS NFR BLD AUTO: 0.1 % (ref 0–2.5)
BDY SITE: ABNORMAL
BUN BLD-MCNC: 37 MG/DL (ref 7–20)
BUN/CREAT SERPL: 46.3 (ref 7.1–23.5)
CALCIUM SPEC-SCNC: 9.1 MG/DL (ref 8.4–10.2)
CHLORIDE SERPL-SCNC: 96 MMOL/L (ref 98–107)
CO2 SERPL-SCNC: 35 MMOL/L (ref 26–30)
COHGB MFR BLD: 1.8 % (ref 0–2)
CREAT BLD-MCNC: 0.8 MG/DL (ref 0.6–1.3)
DEPRECATED RDW RBC AUTO: 54.6 FL (ref 37–54)
EOSINOPHIL # BLD AUTO: 0 10*3/MM3 (ref 0–0.7)
EOSINOPHIL NFR BLD AUTO: 0 % (ref 0–7)
ERYTHROCYTE [DISTWIDTH] IN BLOOD BY AUTOMATED COUNT: 15.4 % (ref 11.5–14.5)
GFR SERPL CREATININE-BSD FRML MDRD: 72 ML/MIN/1.73
GLUCOSE BLD-MCNC: 269 MG/DL (ref 74–98)
GLUCOSE BLDC GLUCOMTR-MCNC: 174 MG/DL (ref 70–130)
GLUCOSE BLDC GLUCOMTR-MCNC: 235 MG/DL (ref 70–130)
GLUCOSE BLDC GLUCOMTR-MCNC: 247 MG/DL (ref 70–130)
GLUCOSE BLDC GLUCOMTR-MCNC: 359 MG/DL (ref 70–130)
HCO3 BLDA-SCNC: 37.4 MMOL/L (ref 22–28)
HCT VFR BLD AUTO: 35.3 % (ref 37–47)
HCT VFR BLD CALC: 30.9 %
HGB BLD-MCNC: 10.8 G/DL (ref 12–16)
HGB BLDA-MCNC: 10.1 G/DL (ref 12–18)
HOROWITZ INDEX BLD+IHG-RTO: 35 %
IMM GRANULOCYTES # BLD: 0.08 10*3/MM3 (ref 0–0.06)
IMM GRANULOCYTES NFR BLD: 0.8 % (ref 0–0.6)
LYMPHOCYTES # BLD AUTO: 0.9 10*3/MM3 (ref 0.6–3.4)
LYMPHOCYTES NFR BLD AUTO: 9.5 % (ref 10–50)
MAGNESIUM SERPL-MCNC: 2.9 MG/DL (ref 1.6–2.3)
MCH RBC QN AUTO: 29.7 PG (ref 27–31)
MCHC RBC AUTO-ENTMCNC: 30.6 G/DL (ref 30–37)
MCV RBC AUTO: 97 FL (ref 81–99)
METHGB BLD QL: 0.1 % (ref 0–1.5)
MODALITY: ABNORMAL
MONOCYTES # BLD AUTO: 0.58 10*3/MM3 (ref 0–0.9)
MONOCYTES NFR BLD AUTO: 6.1 % (ref 0–12)
NEUTROPHILS # BLD AUTO: 7.89 10*3/MM3 (ref 2–6.9)
NEUTROPHILS NFR BLD AUTO: 83.5 % (ref 37–80)
NOTE: ABNORMAL
NRBC BLD MANUAL-RTO: 0 /100 WBC (ref 0–0)
OXYHGB MFR BLDV: 93.5 % (ref 94–99)
PCO2 BLDA: 54.2 MM HG (ref 35–45)
PCO2 TEMP ADJ BLD: ABNORMAL MM HG (ref 35–45)
PH BLDA: 7.45 PH UNITS (ref 7.3–7.5)
PH, TEMP CORRECTED: ABNORMAL PH UNITS
PLATELET # BLD AUTO: 162 10*3/MM3 (ref 130–400)
PMV BLD AUTO: 10.1 FL (ref 6–12)
PO2 BLDA: 70.4 MM HG (ref 75–100)
PO2 TEMP ADJ BLD: ABNORMAL MM HG (ref 83–108)
POTASSIUM BLD-SCNC: 4.7 MMOL/L (ref 3.5–5.1)
RBC # BLD AUTO: 3.64 10*6/MM3 (ref 4.2–5.4)
SAO2 % BLDCOA: 95.3 % (ref 94–100)
SODIUM BLD-SCNC: 138 MMOL/L (ref 137–145)
TROPONIN I SERPL-MCNC: 0.12 NG/ML (ref 0–0.03)
VENTILATOR MODE: ABNORMAL
WBC NRBC COR # BLD: 9.46 10*3/MM3 (ref 4.8–10.8)

## 2018-11-07 PROCEDURE — 83735 ASSAY OF MAGNESIUM: CPT | Performed by: INTERNAL MEDICINE

## 2018-11-07 PROCEDURE — 99233 SBSQ HOSP IP/OBS HIGH 50: CPT | Performed by: INTERNAL MEDICINE

## 2018-11-07 PROCEDURE — 63710000001 INSULIN REGULAR HUMAN PER 5 UNITS: Performed by: INTERNAL MEDICINE

## 2018-11-07 PROCEDURE — 63710000001 INSULIN ASPART PER 5 UNITS: Performed by: INTERNAL MEDICINE

## 2018-11-07 PROCEDURE — 94799 UNLISTED PULMONARY SVC/PX: CPT

## 2018-11-07 PROCEDURE — 25010000002 HYDRALAZINE PER 20 MG: Performed by: INTERNAL MEDICINE

## 2018-11-07 PROCEDURE — 82962 GLUCOSE BLOOD TEST: CPT

## 2018-11-07 PROCEDURE — 82375 ASSAY CARBOXYHB QUANT: CPT

## 2018-11-07 PROCEDURE — 25010000002 ZIPRASIDONE MESYLATE PER 10 MG: Performed by: INTERNAL MEDICINE

## 2018-11-07 PROCEDURE — 36600 WITHDRAWAL OF ARTERIAL BLOOD: CPT

## 2018-11-07 PROCEDURE — 99232 SBSQ HOSP IP/OBS MODERATE 35: CPT | Performed by: INTERNAL MEDICINE

## 2018-11-07 PROCEDURE — 25010000002 LORAZEPAM PER 2 MG: Performed by: INTERNAL MEDICINE

## 2018-11-07 PROCEDURE — 25010000002 HALOPERIDOL LACTATE PER 5 MG: Performed by: INTERNAL MEDICINE

## 2018-11-07 PROCEDURE — 63710000001 INSULIN DETEMIR PER 5 UNITS: Performed by: INTERNAL MEDICINE

## 2018-11-07 PROCEDURE — 25010000002 METHYLPREDNISOLONE PER 40 MG: Performed by: INTERNAL MEDICINE

## 2018-11-07 PROCEDURE — 25010000002 VANCOMYCIN 5 G RECONSTITUTED SOLUTION 5,000 MG VIAL: Performed by: INTERNAL MEDICINE

## 2018-11-07 PROCEDURE — 84484 ASSAY OF TROPONIN QUANT: CPT | Performed by: INTERNAL MEDICINE

## 2018-11-07 PROCEDURE — 80048 BASIC METABOLIC PNL TOTAL CA: CPT | Performed by: INTERNAL MEDICINE

## 2018-11-07 PROCEDURE — 25010000002 ENOXAPARIN PER 10 MG: Performed by: INTERNAL MEDICINE

## 2018-11-07 PROCEDURE — 82805 BLOOD GASES W/O2 SATURATION: CPT

## 2018-11-07 PROCEDURE — 25010000002 PIPERACILLIN SOD-TAZOBACTAM PER 1 G: Performed by: INTERNAL MEDICINE

## 2018-11-07 PROCEDURE — 71045 X-RAY EXAM CHEST 1 VIEW: CPT

## 2018-11-07 PROCEDURE — 93005 ELECTROCARDIOGRAM TRACING: CPT | Performed by: INTERNAL MEDICINE

## 2018-11-07 PROCEDURE — 25010000002 DIAZEPAM PER 5 MG: Performed by: INTERNAL MEDICINE

## 2018-11-07 PROCEDURE — 83050 HGB METHEMOGLOBIN QUAN: CPT

## 2018-11-07 PROCEDURE — 85025 COMPLETE CBC W/AUTO DIFF WBC: CPT | Performed by: INTERNAL MEDICINE

## 2018-11-07 RX ORDER — DIAZEPAM 5 MG/ML
5 INJECTION, SOLUTION INTRAMUSCULAR; INTRAVENOUS ONCE
Status: DISCONTINUED | OUTPATIENT
Start: 2018-11-07 | End: 2018-11-07

## 2018-11-07 RX ORDER — CARVEDILOL 6.25 MG/1
6.25 TABLET ORAL 2 TIMES DAILY WITH MEALS
Status: DISCONTINUED | OUTPATIENT
Start: 2018-11-07 | End: 2018-11-09 | Stop reason: HOSPADM

## 2018-11-07 RX ORDER — PREDNISONE 20 MG/1
20 TABLET ORAL
Status: DISCONTINUED | OUTPATIENT
Start: 2018-11-08 | End: 2018-11-09 | Stop reason: HOSPADM

## 2018-11-07 RX ORDER — HYDRALAZINE HYDROCHLORIDE 20 MG/ML
10 INJECTION INTRAMUSCULAR; INTRAVENOUS EVERY 6 HOURS PRN
Status: DISCONTINUED | OUTPATIENT
Start: 2018-11-07 | End: 2018-11-09 | Stop reason: HOSPADM

## 2018-11-07 RX ORDER — DIAZEPAM 5 MG/ML
5 INJECTION, SOLUTION INTRAMUSCULAR; INTRAVENOUS ONCE
Status: COMPLETED | OUTPATIENT
Start: 2018-11-07 | End: 2018-11-07

## 2018-11-07 RX ORDER — VALSARTAN 80 MG/1
80 TABLET ORAL
Status: DISCONTINUED | OUTPATIENT
Start: 2018-11-07 | End: 2018-11-09 | Stop reason: HOSPADM

## 2018-11-07 RX ORDER — HALOPERIDOL 5 MG/ML
1 INJECTION INTRAMUSCULAR EVERY 6 HOURS PRN
Status: DISCONTINUED | OUTPATIENT
Start: 2018-11-07 | End: 2018-11-09 | Stop reason: HOSPADM

## 2018-11-07 RX ORDER — ZIPRASIDONE MESYLATE 20 MG/ML
INJECTION, POWDER, LYOPHILIZED, FOR SOLUTION INTRAMUSCULAR
Status: DISPENSED
Start: 2018-11-07 | End: 2018-11-07

## 2018-11-07 RX ORDER — ZIPRASIDONE MESYLATE 20 MG/ML
10 INJECTION, POWDER, LYOPHILIZED, FOR SOLUTION INTRAMUSCULAR ONCE
Status: COMPLETED | OUTPATIENT
Start: 2018-11-07 | End: 2018-11-07

## 2018-11-07 RX ORDER — METHYLPREDNISOLONE SODIUM SUCCINATE 40 MG/ML
40 INJECTION, POWDER, LYOPHILIZED, FOR SOLUTION INTRAMUSCULAR; INTRAVENOUS EVERY 12 HOURS
Status: DISCONTINUED | OUTPATIENT
Start: 2018-11-07 | End: 2018-11-07

## 2018-11-07 RX ADMIN — ENOXAPARIN SODIUM 40 MG: 40 INJECTION SUBCUTANEOUS at 18:01

## 2018-11-07 RX ADMIN — VALSARTAN 80 MG: 80 TABLET, FILM COATED ORAL at 16:14

## 2018-11-07 RX ADMIN — IPRATROPIUM BROMIDE AND ALBUTEROL SULFATE 3 ML: .5; 3 SOLUTION RESPIRATORY (INHALATION) at 16:21

## 2018-11-07 RX ADMIN — VANCOMYCIN HYDROCHLORIDE 1500 MG: 500 INJECTION, POWDER, LYOPHILIZED, FOR SOLUTION INTRAVENOUS at 02:47

## 2018-11-07 RX ADMIN — INSULIN ASPART 4 UNITS: 100 INJECTION, SOLUTION INTRAVENOUS; SUBCUTANEOUS at 18:01

## 2018-11-07 RX ADMIN — IPRATROPIUM BROMIDE AND ALBUTEROL SULFATE 3 ML: .5; 3 SOLUTION RESPIRATORY (INHALATION) at 04:20

## 2018-11-07 RX ADMIN — METOPROLOL TARTRATE 50 MG: 50 TABLET ORAL at 08:35

## 2018-11-07 RX ADMIN — HYDRALAZINE HYDROCHLORIDE 10 MG: 20 INJECTION INTRAMUSCULAR; INTRAVENOUS at 10:26

## 2018-11-07 RX ADMIN — INSULIN ASPART 2 UNITS: 100 INJECTION, SOLUTION INTRAVENOUS; SUBCUTANEOUS at 06:56

## 2018-11-07 RX ADMIN — CETIRIZINE HYDROCHLORIDE 5 MG: 10 TABLET, FILM COATED ORAL at 08:35

## 2018-11-07 RX ADMIN — IPRATROPIUM BROMIDE AND ALBUTEROL SULFATE 3 ML: .5; 3 SOLUTION RESPIRATORY (INHALATION) at 07:10

## 2018-11-07 RX ADMIN — CLOPIDOGREL BISULFATE 75 MG: 75 TABLET ORAL at 08:34

## 2018-11-07 RX ADMIN — TAZOBACTAM SODIUM AND PIPERACILLIN SODIUM 3.38 G: 375; 3 INJECTION, SOLUTION INTRAVENOUS at 05:10

## 2018-11-07 RX ADMIN — VANCOMYCIN HYDROCHLORIDE 125 MG: 125 CAPSULE ORAL at 23:13

## 2018-11-07 RX ADMIN — HYDRALAZINE HYDROCHLORIDE 10 MG: 20 INJECTION INTRAMUSCULAR; INTRAVENOUS at 04:11

## 2018-11-07 RX ADMIN — ASPIRIN 81 MG: 81 TABLET, COATED ORAL at 08:35

## 2018-11-07 RX ADMIN — ZIPRASIDONE MESYLATE 10 MG: 20 INJECTION, POWDER, LYOPHILIZED, FOR SOLUTION INTRAMUSCULAR at 05:04

## 2018-11-07 RX ADMIN — LEVETIRACETAM 1000 MG: 500 TABLET, FILM COATED ORAL at 08:35

## 2018-11-07 RX ADMIN — INSULIN ASPART 5 UNITS: 100 INJECTION, SOLUTION INTRAVENOUS; SUBCUTANEOUS at 12:19

## 2018-11-07 RX ADMIN — INSULIN ASPART 5 UNITS: 100 INJECTION, SOLUTION INTRAVENOUS; SUBCUTANEOUS at 08:34

## 2018-11-07 RX ADMIN — HUMAN INSULIN 10 UNITS: 100 INJECTION, SOLUTION SUBCUTANEOUS at 02:47

## 2018-11-07 RX ADMIN — Medication 5 MG: at 00:39

## 2018-11-07 RX ADMIN — VANCOMYCIN HYDROCHLORIDE 125 MG: 125 CAPSULE ORAL at 18:01

## 2018-11-07 RX ADMIN — VANCOMYCIN HYDROCHLORIDE 125 MG: 125 CAPSULE ORAL at 12:18

## 2018-11-07 RX ADMIN — BUDESONIDE 0.5 MG: 0.5 INHALANT RESPIRATORY (INHALATION) at 07:11

## 2018-11-07 RX ADMIN — INSULIN DETEMIR 20 UNITS: 100 INJECTION, SOLUTION SUBCUTANEOUS at 21:50

## 2018-11-07 RX ADMIN — ATORVASTATIN CALCIUM 80 MG: 80 TABLET, FILM COATED ORAL at 08:35

## 2018-11-07 RX ADMIN — FEBUXOSTAT 80 MG: 40 TABLET ORAL at 08:35

## 2018-11-07 RX ADMIN — ROPINIROLE 0.25 MG: 0.25 TABLET, FILM COATED ORAL at 21:49

## 2018-11-07 RX ADMIN — IPRATROPIUM BROMIDE AND ALBUTEROL SULFATE 3 ML: .5; 3 SOLUTION RESPIRATORY (INHALATION) at 12:31

## 2018-11-07 RX ADMIN — BUDESONIDE 0.5 MG: 0.5 INHALANT RESPIRATORY (INHALATION) at 19:44

## 2018-11-07 RX ADMIN — IPRATROPIUM BROMIDE AND ALBUTEROL SULFATE 3 ML: .5; 3 SOLUTION RESPIRATORY (INHALATION) at 23:13

## 2018-11-07 RX ADMIN — LORAZEPAM 1 MG: 2 INJECTION INTRAMUSCULAR; INTRAVENOUS at 21:50

## 2018-11-07 RX ADMIN — Medication 1 CAPSULE: at 08:34

## 2018-11-07 RX ADMIN — FAMOTIDINE 20 MG: 20 TABLET ORAL at 08:35

## 2018-11-07 RX ADMIN — FAMOTIDINE 20 MG: 20 TABLET ORAL at 21:49

## 2018-11-07 RX ADMIN — HALOPERIDOL LACTATE 2 MG: 5 INJECTION, SOLUTION INTRAMUSCULAR at 00:30

## 2018-11-07 RX ADMIN — CARVEDILOL 6.25 MG: 6.25 TABLET, FILM COATED ORAL at 18:01

## 2018-11-07 RX ADMIN — HUMAN INSULIN 10 UNITS: 100 INJECTION, SOLUTION SUBCUTANEOUS at 16:14

## 2018-11-07 RX ADMIN — VANCOMYCIN HYDROCHLORIDE 125 MG: 125 CAPSULE ORAL at 06:57

## 2018-11-07 RX ADMIN — Medication 3 ML: at 08:35

## 2018-11-07 RX ADMIN — LEVETIRACETAM 1000 MG: 500 TABLET, FILM COATED ORAL at 21:49

## 2018-11-07 RX ADMIN — METHYLPREDNISOLONE SODIUM SUCCINATE 40 MG: 40 INJECTION, POWDER, FOR SOLUTION INTRAMUSCULAR; INTRAVENOUS at 08:35

## 2018-11-07 RX ADMIN — INSULIN ASPART 5 UNITS: 100 INJECTION, SOLUTION INTRAVENOUS; SUBCUTANEOUS at 18:02

## 2018-11-07 RX ADMIN — IPRATROPIUM BROMIDE AND ALBUTEROL SULFATE 3 ML: .5; 3 SOLUTION RESPIRATORY (INHALATION) at 19:44

## 2018-11-07 RX ADMIN — INSULIN ASPART 4 UNITS: 100 INJECTION, SOLUTION INTRAVENOUS; SUBCUTANEOUS at 12:18

## 2018-11-07 NOTE — PROGRESS NOTES
[facility] HOSPITALIST    PROGRESS NOTE    Name:  Vidhi Lopez   Age:  68 y.o.  Sex:  female  :  1950  MRN:  [id number]   Visit Number:  [id number]  Admission Date:  2018  Date Of Service:  18  Primary Care Physician:  Michael Mays MD     LOS: 1 day :  Patient Care Team:  Michael Mays MD as PCP - General  Michael Mays MD as PCP - Family Medicine  Michael Mays MD as PCP - [facility]  Lito Flores MD as Consulting Physician (Cardiology):    Chief Complaint:      Generalized weakness and confusion.    Subjective / Interval History:     Ms. Lopez is currently sitting up on the bed and is comfortable at rest.  According to the nurse, she was very confused and agitated at night almost requiring restraints.  This morning she is very pleasant and is answering questions appropriately.  She is oriented to place time and person.  She states that she got upset because she was forced to have the BiPAP even during the day.  She is more alert today compared to yesterday and has less asterixis.  She denies any chest pain in her troponins are trending down.      She was admitted from the emergency room on 2018 after she was found by her neighbor at home unresponsive.  She was noted to have hypercapnic respiratory failure and was placed on BiPAP therapy.  Unfortunately, she continues to smoke cigarettes and is noncompliant with her Trilogy NIV unit at home.  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  She states that she does live alone.  She states that her son lives in [place] and apparently she is in the process of making a plan to move into assisted living facility in [facility].  She denies any alcohol use at home.      She was noted to have elevated troponin levels on admission but denies any chest pain.  She does have history of coronary artery disease status post stenting in May 2018.  She states  that she sees Dr. Flores in [place] who is her cardiologist.  Her treatment plan was discussed with Dr. Salmon was on call for Dr. Flores yesterday and he recommended conservative management at this time with continuation of her cardiac medications including dual antiplatelet agents.      Review of Systems:     General ROS: Patient denies any fevers, chills or loss of consciousness.  Generalized weakness.  Respiratory ROS: Cough and chest congestion.  Cardiovascular ROS: Denies chest pain or palpitations. No history of exertional chest pain.  Gastrointestinal ROS: Denies nausea and vomiting. Denies any abdominal pain. No diarrhea.  Neurological ROS: Denies any focal weakness. No loss of consciousness. Denies any numbness.  History of confusion.  Dermatological ROS: Denies any redness or pruritis.    Vital Signs:    Temp:  [97.9 °F (36.6 °C)-98.4 °F (36.9 °C)] 98.2 °F (36.8 °C)  Heart Rate:  [46-68] 54  Resp:  [15-32] 18  BP: (104-225)/() 176/77  FiO2 (%):  [35 %] 35 %    Intake and output:    I/O last 3 completed shifts:  In: 4464 [P.O.:2520; I.V.:1944]  Out: 1550 [Urine:1550]  I/O this shift:  In: 560 [P.O.:560]  Out: -     Physical Examination:    General Appearance:  Alert and cooperative, not in any acute distress.   Head:  Atraumatic and normocephalic, without obvious abnormality.   Eyes:          PERRLA, conjunctivae and sclerae normal, no Icterus. No pallor. Extra-occular movements are within normal limits.   Neck: Supple, trachea midline, no thyromegaly, no carotid bruit.   Lungs:   Chest shape is normal. Breath sounds heard bilaterally equally.  No wheezing.  Occasional basal crackles heard. No Pleural rub or bronchial breathing.   Heart:  Normal S1 and S2, no murmur, no gallop, no rub. No JVD   Abdomen:   Normal bowel sounds, no masses, no organomegaly. Soft, non-tender, non-distended, no guarding, no rebound tenderness.   Extremities: Moves all extremities well, no edema, no cyanosis, no             clubbing.   Skin: No bleeding, bruising or rash.   Neurologic: Awake, alert and oriented times 3. Moves all 4 extremities equally.  Asterixis noted on the outstretched hands.   Laboratory results:      Results from last 7 days  Lab Units 11/07/18 0415 11/06/18 0224 11/05/18 2121   SODIUM mmol/L 138 135* 137   POTASSIUM mmol/L 4.7 5.2* 5.3*   CHLORIDE mmol/L 96* 96* 90*   CO2 mmol/L 35.0* 35.0* 37.0*   BUN mg/dL 37* 40* 42*   CREATININE mg/dL 0.80 1.40* 1.90*   CALCIUM mg/dL 9.1 8.5 9.1   BILIRUBIN mg/dL  --   --  0.6   ALK PHOS U/L  --   --  132*   ALT (SGPT) U/L  --   --  79*   AST (SGOT) U/L  --   --  52*   GLUCOSE mg/dL 269* 149* 189*       Results from last 7 days  Lab Units 11/07/18 0415 11/06/18 0220 11/05/18 2121   WBC 10*3/mm3 9.46 11.41* 12.34*   HEMOGLOBIN g/dL 10.8* 11.3* 11.7*   HEMATOCRIT % 35.3* 38.0 40.0   PLATELETS 10*3/mm3 162 202 232       Results from last 7 days  Lab Units 11/06/18 0220   INR  1.25*       Results from last 7 days  Lab Units 11/07/18 0415 11/06/18  1315 11/06/18  0813   TROPONIN I ng/mL 0.117* 0.173* 0.182*       Results from last 7 days  Lab Units 11/05/18  2248 11/05/18  2238   BLOODCX  Abnormal Stain* No growth at 24 hours     I have reviewed the patient's laboratory results.    Radiology results:    Imaging Results (last 24 hours)     Procedure Component Value Units Date/Time    XR Chest 1 View [[id number]] Collected:  11/07/18 0852     Updated:  11/07/18 0855    Narrative:       PROCEDURE: XR CHEST 1 VW-     HISTORY: RLL pneumonia.; J96.22-Acute and chronic respiratory failure  with hypercapnia; J18.1-Lobar pneumonia, unspecified organism;  J44.9-Chronic obstructive pulmonary disease, unspecified     COMPARISON: November 5, 2018.     FINDINGS: The heart is normal in size. The mediastinum is unremarkable.  There is diffuse bilateral bronchial wall thickening. There is improved  aeration of the right lung base compared to the prior exam. There is no  pneumothorax.  There  are no acute osseous abnormalities.           Impression:       Improved aeration of the right lung base compared to the  prior exam.     Continued followup is recommended.     This report was finalized on 11/7/2018 8:53 AM by Carissa Markham M.D..        I have reviewed the patient's radiology reports.    Medication Review:     I have reviewed the patients active and prn medications.       Acute and chronic respiratory failure with hypercapnia (CMS/HCC)    Pneumonia of right lower lobe due to infectious organism (CMS/HCC)    Obstructive sleep apnea    CAD (coronary artery disease)    Essential hypertension    Dyslipidemia    Anxiety (Chronic benzos)    GERD (gastroesophageal reflux disease)    Diabetes mellitus (CMS/HCC)    Acute exacerbation of chronic obstructive pulmonary disease (COPD) (CMS/[facility])    History of acute myocardial infarction    Acute kidney injury (CMS/[facility])    Clostridium difficile colitis diagnosed September 2018. Persistent diarrhea despite oral vancomycin    Demand ischemia (CMS/[facility])    Assessment:    1.  Acute metabolic encephalopathy secondary to #2, present on admission.  2.  Acute on chronic hypercapnic and hypoxic respiratory failure, present on admission.  3.  Right lower lobe bacterial pneumonia, present on admission.  4.  Acute COPD exacerbation, present on admission.  5.  Acute renal failure, present on admission, resolved.  6.  Demand ischemia with elevated troponin level secondary to #2.  7.  Obstructive sleep apnea, noncompliant with BiPAP therapy at home.  8.  Coronary artery disease status post stenting.  9.  Recent history of C. difficile colitis status post a course of oral vancomycin.  10.  Diabetes mellitus type 2.  11.  Essential hypertension.  12.  Generalized anxiety disorder.  13.  Medical noncompliance.  14.  Chronic tobacco dependence.    Plan:    Ms. Lopez is currently doing better and her hypercapnia is improving.  She is being followed by Dr. Mancilla from pulmonology  and since her pro-calcitonin was negative, he has discontinued her IV antibiotic therapy especially since she has had recent C. difficile infection.  Unfortunately one of her blood culture is positive for gram-positive cocci which may be a contaminant as she is afebrile and does not seem to be in any sepsis.  She is currently on nasal cannula oxygen saturating in the mid 90s.  She will be continued on bronchodilators, budesonide, IV Solu-Medrol and mucolytic agents.  I have strongly advised her to discontinue smoking and be compliant with her Trilogy NIV unit.    Patient unfortunately develops increased confusion, hallucinations and agitated behavior especially in the evening.  She is currently alert and oriented ×3.  It seems that the patient may have underlying dementia/mild cognitive deficit.  This may be compounded by hospitalization, hypercapnia as well as medication use including benzodiazepines and Neurontin.  We will place her on low-dose Seroquel at night improve her agitation.    Patient does have elevated troponin levels which are trending down.  She denies any chest pain and did not have any ST changes on her initial EKG.  I think this is secondary to demand ischemia/type II myocardial infarction and also contribution by her renal failure.  Primary cardiologist on-call recommended continuation of her home medications and conservative management at this time.  No cardiac interventions are indicated at this time as per primary cardiology.    Patient will be continued on BiPAP at night as well as 4 hours during the day.  She will be started on physical therapy.  She is on Lovenox for DVT prophylaxis.  I have discussed the patient's condition with case management services.  I have recommended if possible the patient may be transitioned from the hospital to the assisted living facility at discharge.    Kentrell Whittington MD  11/07/18  2:39 PM    Dictated utilizing Dragon dictation.

## 2018-11-07 NOTE — PROGRESS NOTES
Continued Stay Note   [facility]     Patient Name: Vidhi Lopez  MRN: [id number]  Today's Date: 11/7/2018    Admit Date: 11/5/2018          Discharge Plan     Row Name 11/07/18 1153       Plan    Plan Heather is following pt. for rehab. Son prefers this facility close to him and they are looking at assisted living facilities near him.               Discharge Codes    No documentation.       Expected Discharge Date and Time     Expected Discharge Date Expected Discharge Time    Nov 9, 2018             Jennifer Khoury

## 2018-11-07 NOTE — PLAN OF CARE
Problem: Patient Care Overview  Goal: Plan of Care Review  Outcome: Ongoing (interventions implemented as appropriate)   11/07/18 1245   Coping/Psychosocial   Plan of Care Reviewed With patient   Plan of Care Review   Progress improving       Problem: NPPV/CPAP (Adult)  Goal: Signs and Symptoms of Listed Potential Problems Will be Absent, Minimized or Managed (NPPV/CPAP)   11/07/18 1245   Goal/Outcome Evaluation   Problems Assessed (NPPV/CPAP) all   Problems Present (NPPV/CPAP) none

## 2018-11-07 NOTE — PROGRESS NOTES
Patient has been extremely agitated, combative and disoriented throughout the night. She appeared to have been hallucinating - talking to people in her room when there was no one.   She refused to keep BiPAP on. Pulled out her IV line.   She had to be given haldol and valium. Security was called.   BiPAP placed back on.   Unable to obtain ABG at this time due to patient's lack of cooperation. Will reattempt.

## 2018-11-07 NOTE — PROGRESS NOTES
"  CC: Acute respiratory failure.    S: On BiPAP. Awake and Alert.     ROS: Positive for diarrhea, shortness of breath, mild anxiety, & headache. Denies chest pain, or fever.    O:Vital signs reviewed. O2Sat: 98 % on BiPAP   /72   Pulse 56   Temp 98.2 °F (36.8 °C) (Oral)   Resp 22   Ht 160 cm (63\")   Wt 92.5 kg (204 lb)   LMP  (LMP Unknown) Comment: LAST MAMMOGRAM   SpO2 97%   BMI 36.14 kg/m²     Temp (24hrs), Av.3 °F (36.8 °C), Min:97.9 °F (36.6 °C), Max:99 °F (37.2 °C)        I & Os reviewed.   Intake/Output       18 0700 - 18 0659 18 0700 - 18 0659    Intake (ml) 3126 320    Output (ml) 750 --    Net (ml) 2376 320    Last Weight  92.5 kg (204 lb)  --          General: On BiPAP. No acute distress noted.  Eyes: PERRL. EOM Sluggish   Neck: Supple with out JVD. No obvious masses noted.   Cardiovascular: S1 + S2. Regular.   Respiratory: On BiPAP. No respiratory distress noted. Scattered wheezing heard. No crackles noted  GI: Soft. Bowel sounds somewhat hyperactive   Extremities: No edema noted.  Neurologic: AAOx3. Was able to follow commands.   Skin: Appeared somewhat dry and without any overt rashes    Labs: Reviewed.       Results from last 7 days  Lab Units 18  0415 18  0224 18  2121   SODIUM mmol/L 138 135* 137   POTASSIUM mmol/L 4.7 5.2* 5.3*   CHLORIDE mmol/L 96* 96* 90*   CO2 mmol/L 35.0* 35.0* 37.0*   BUN mg/dL 37* 40* 42*   CREATININE mg/dL 0.80 1.40* 1.90*   CALCIUM mg/dL 9.1 8.5 9.1   BILIRUBIN mg/dL  --   --  0.6   ALK PHOS U/L  --   --  132*   ALT (SGPT) U/L  --   --  79*   AST (SGOT) U/L  --   --  52*   GLUCOSE mg/dL 269* 149* 189*         Results from last 7 days  Lab Units 18  0415   MAGNESIUM mg/dL 2.9*           Results from last 7 days  Lab Units 18  0415 18  0220 18  2121   WBC 10*3/mm3 9.46 11.41* 12.34*   HEMOGLOBIN g/dL 10.8* 11.3* 11.7*   PLATELETS 10*3/mm3 162 202 232         Results from last 7 days  Lab " Units 11/06/18  0220   INR  1.25*           Pharmacy to dose vancomycin    Pharmacy to Dose Zosyn    [START ON 11/8/2018] Pharmacy Consult        ABG: Reviewed  Lab Results   Component Value Date    PHART 7.447 11/07/2018    RQY2YOK 54.2 (H) 11/07/2018    PO2ART 70.4 (L) 11/07/2018    HGBBG 10.1 (L) 11/07/2018    P0YRAYTH 95.3 11/07/2018    CARBOXYHGB 1.8 11/07/2018         CXRay: Reviewed.  Imaging Results (last 24 hours)     Procedure Component Value Units Date/Time    XR Chest 1 View [[id number]] Collected:  11/07/18 0852     Updated:  11/07/18 0855    Narrative:       PROCEDURE: XR CHEST 1 VW-     HISTORY: RLL pneumonia.; J96.22-Acute and chronic respiratory failure  with hypercapnia; J18.1-Lobar pneumonia, unspecified organism;  J44.9-Chronic obstructive pulmonary disease, unspecified     COMPARISON: November 5, 2018.     FINDINGS: The heart is normal in size. The mediastinum is unremarkable.  There is diffuse bilateral bronchial wall thickening. There is improved  aeration of the right lung base compared to the prior exam. There is no  pneumothorax.  There are no acute osseous abnormalities.           Impression:       Improved aeration of the right lung base compared to the  prior exam.     Continued followup is recommended.     This report was finalized on 11/7/2018 8:53 AM by Carissa Markham M.D..            Assessment & Recommendations/Plan:   1.  Acute Respiratory Failure.  BiPAP can be used PRN during the daytime and all night please.   ABG has improved.     2.  Acute exacerbation of COPD  Will consider switching her to by mouth prednisone.    3.?  Right lower lobe pneumonia  Normal Pro Calcitonin.  CXR has improved.  Due to recent CDI, will stop all antibiotics for now.    4.  C. difficile infection  On by mouth vancomycin    5.  Smoking  Advised to quit smoking    6.  Obstructive sleep apnea hypoventilation syndrome.    Currently on noninvasive ventilation device at home.    7.  Obesity    8.  Grade  1 diastolic dysfunction.    Appears compensated at this time    9.  Acute renal failure  Creatinine normal  Will D/C IVF.    We have reviewed patient's current orders and changes, if any, have been suggested to primary care team. Plan was also discussed with nursing staff, as necessary.     Phoebe Mancilla MD  11/07/18  11:34 AM    Dictated utilizing Dragon dictation.

## 2018-11-08 LAB
ANION GAP SERPL CALCULATED.3IONS-SCNC: 8.7 MMOL/L (ref 10–20)
ARTERIAL PATENCY WRIST A: POSITIVE
ATMOSPHERIC PRESS: 739 MMHG
BASE EXCESS BLDA CALC-SCNC: 10.3 MMOL/L (ref 0–2)
BASOPHILS # BLD AUTO: 0.04 10*3/MM3 (ref 0–0.2)
BASOPHILS NFR BLD AUTO: 0.3 % (ref 0–2.5)
BDY SITE: ABNORMAL
BUN BLD-MCNC: 20 MG/DL (ref 7–20)
BUN/CREAT SERPL: 28.6 (ref 7.1–23.5)
CALCIUM SPEC-SCNC: 9.9 MG/DL (ref 8.4–10.2)
CHLORIDE SERPL-SCNC: 97 MMOL/L (ref 98–107)
CO2 SERPL-SCNC: 37 MMOL/L (ref 26–30)
COHGB MFR BLD: 1.8 % (ref 0–2)
CREAT BLD-MCNC: 0.7 MG/DL (ref 0.6–1.3)
DEPRECATED RDW RBC AUTO: 53.8 FL (ref 37–54)
EOSINOPHIL # BLD AUTO: 0.08 10*3/MM3 (ref 0–0.7)
EOSINOPHIL NFR BLD AUTO: 0.6 % (ref 0–7)
ERYTHROCYTE [DISTWIDTH] IN BLOOD BY AUTOMATED COUNT: 15.9 % (ref 11.5–14.5)
GAS FLOW AIRWAY: 3 LPM
GFR SERPL CREATININE-BSD FRML MDRD: 83 ML/MIN/1.73
GLUCOSE BLD-MCNC: 112 MG/DL (ref 74–98)
GLUCOSE BLDC GLUCOMTR-MCNC: 183 MG/DL (ref 70–130)
GLUCOSE BLDC GLUCOMTR-MCNC: 256 MG/DL (ref 70–130)
HCO3 BLDA-SCNC: 35 MMOL/L (ref 22–28)
HCT VFR BLD AUTO: 38.4 % (ref 37–47)
HCT VFR BLD CALC: 37.7 %
HGB BLD-MCNC: 12.1 G/DL (ref 12–16)
HGB BLDA-MCNC: 12.3 G/DL (ref 12–18)
HOROWITZ INDEX BLD+IHG-RTO: 34 %
IMM GRANULOCYTES # BLD: 0.04 10*3/MM3 (ref 0–0.06)
IMM GRANULOCYTES NFR BLD: 0.3 % (ref 0–0.6)
LYMPHOCYTES # BLD AUTO: 1.51 10*3/MM3 (ref 0.6–3.4)
LYMPHOCYTES NFR BLD AUTO: 12.2 % (ref 10–50)
MCH RBC QN AUTO: 29.7 PG (ref 27–31)
MCHC RBC AUTO-ENTMCNC: 31.5 G/DL (ref 30–37)
MCV RBC AUTO: 94.3 FL (ref 81–99)
METHGB BLD QL: 0.5 % (ref 0–1.5)
MODALITY: ABNORMAL
MONOCYTES # BLD AUTO: 0.92 10*3/MM3 (ref 0–0.9)
MONOCYTES NFR BLD AUTO: 7.5 % (ref 0–12)
NEUTROPHILS # BLD AUTO: 9.74 10*3/MM3 (ref 2–6.9)
NEUTROPHILS NFR BLD AUTO: 79.1 % (ref 37–80)
NOTE: ABNORMAL
NRBC BLD MANUAL-RTO: 0 /100 WBC (ref 0–0)
OXYHGB MFR BLDV: 95.1 % (ref 94–99)
PCO2 BLDA: 45.9 MM HG (ref 35–45)
PCO2 TEMP ADJ BLD: ABNORMAL MM HG (ref 35–45)
PH BLDA: 7.49 PH UNITS (ref 7.3–7.5)
PH, TEMP CORRECTED: ABNORMAL PH UNITS
PLATELET # BLD AUTO: 204 10*3/MM3 (ref 130–400)
PMV BLD AUTO: 9.2 FL (ref 6–12)
PO2 BLDA: 81.1 MM HG (ref 75–100)
PO2 TEMP ADJ BLD: ABNORMAL MM HG (ref 83–108)
POTASSIUM BLD-SCNC: 3.7 MMOL/L (ref 3.5–5.1)
RBC # BLD AUTO: 4.07 10*6/MM3 (ref 4.2–5.4)
SAO2 % BLDCOA: 97.3 % (ref 94–100)
SODIUM BLD-SCNC: 139 MMOL/L (ref 137–145)
TROPONIN I SERPL-MCNC: 0.11 NG/ML (ref 0–0.03)
VENTILATOR MODE: ABNORMAL
WBC NRBC COR # BLD: 12.33 10*3/MM3 (ref 4.8–10.8)

## 2018-11-08 PROCEDURE — 94799 UNLISTED PULMONARY SVC/PX: CPT

## 2018-11-08 PROCEDURE — 36600 WITHDRAWAL OF ARTERIAL BLOOD: CPT

## 2018-11-08 PROCEDURE — 80048 BASIC METABOLIC PNL TOTAL CA: CPT | Performed by: INTERNAL MEDICINE

## 2018-11-08 PROCEDURE — 63710000001 INSULIN ASPART PER 5 UNITS: Performed by: INTERNAL MEDICINE

## 2018-11-08 PROCEDURE — 97165 OT EVAL LOW COMPLEX 30 MIN: CPT

## 2018-11-08 PROCEDURE — 63710000001 INSULIN DETEMIR PER 5 UNITS: Performed by: INTERNAL MEDICINE

## 2018-11-08 PROCEDURE — 25010000002 ENOXAPARIN PER 10 MG: Performed by: INTERNAL MEDICINE

## 2018-11-08 PROCEDURE — 84484 ASSAY OF TROPONIN QUANT: CPT | Performed by: INTERNAL MEDICINE

## 2018-11-08 PROCEDURE — 83050 HGB METHEMOGLOBIN QUAN: CPT

## 2018-11-08 PROCEDURE — 82375 ASSAY CARBOXYHB QUANT: CPT

## 2018-11-08 PROCEDURE — 82805 BLOOD GASES W/O2 SATURATION: CPT

## 2018-11-08 PROCEDURE — 82962 GLUCOSE BLOOD TEST: CPT

## 2018-11-08 PROCEDURE — 25010000002 HYDRALAZINE PER 20 MG: Performed by: INTERNAL MEDICINE

## 2018-11-08 PROCEDURE — 99232 SBSQ HOSP IP/OBS MODERATE 35: CPT | Performed by: INTERNAL MEDICINE

## 2018-11-08 PROCEDURE — 97162 PT EVAL MOD COMPLEX 30 MIN: CPT

## 2018-11-08 PROCEDURE — 85025 COMPLETE CBC W/AUTO DIFF WBC: CPT | Performed by: INTERNAL MEDICINE

## 2018-11-08 PROCEDURE — 63710000001 PREDNISONE PER 1 MG: Performed by: INTERNAL MEDICINE

## 2018-11-08 RX ORDER — VALSARTAN 80 MG/1
80 TABLET ORAL
Qty: 30 TABLET | Refills: 0 | Status: SHIPPED | OUTPATIENT
Start: 2018-11-09 | End: 2019-05-09

## 2018-11-08 RX ADMIN — VALSARTAN 80 MG: 80 TABLET, FILM COATED ORAL at 09:40

## 2018-11-08 RX ADMIN — INSULIN ASPART 4 UNITS: 100 INJECTION, SOLUTION INTRAVENOUS; SUBCUTANEOUS at 17:28

## 2018-11-08 RX ADMIN — VANCOMYCIN HYDROCHLORIDE 125 MG: 125 CAPSULE ORAL at 12:55

## 2018-11-08 RX ADMIN — FAMOTIDINE 20 MG: 20 TABLET ORAL at 21:25

## 2018-11-08 RX ADMIN — IPRATROPIUM BROMIDE AND ALBUTEROL SULFATE 3 ML: .5; 3 SOLUTION RESPIRATORY (INHALATION) at 13:21

## 2018-11-08 RX ADMIN — VANCOMYCIN HYDROCHLORIDE 125 MG: 125 CAPSULE ORAL at 17:28

## 2018-11-08 RX ADMIN — Medication 1 CAPSULE: at 09:39

## 2018-11-08 RX ADMIN — FAMOTIDINE 20 MG: 20 TABLET ORAL at 09:39

## 2018-11-08 RX ADMIN — INSULIN ASPART 5 UNITS: 100 INJECTION, SOLUTION INTRAVENOUS; SUBCUTANEOUS at 17:29

## 2018-11-08 RX ADMIN — IPRATROPIUM BROMIDE AND ALBUTEROL SULFATE 3 ML: .5; 3 SOLUTION RESPIRATORY (INHALATION) at 19:50

## 2018-11-08 RX ADMIN — ASPIRIN 81 MG: 81 TABLET, COATED ORAL at 09:38

## 2018-11-08 RX ADMIN — LEVETIRACETAM 1000 MG: 500 TABLET, FILM COATED ORAL at 21:25

## 2018-11-08 RX ADMIN — PREDNISONE 20 MG: 20 TABLET ORAL at 09:39

## 2018-11-08 RX ADMIN — BUDESONIDE 0.5 MG: 0.5 INHALANT RESPIRATORY (INHALATION) at 07:34

## 2018-11-08 RX ADMIN — HYDRALAZINE HYDROCHLORIDE 10 MG: 20 INJECTION INTRAMUSCULAR; INTRAVENOUS at 00:09

## 2018-11-08 RX ADMIN — INSULIN DETEMIR 20 UNITS: 100 INJECTION, SOLUTION SUBCUTANEOUS at 21:27

## 2018-11-08 RX ADMIN — CARVEDILOL 6.25 MG: 6.25 TABLET, FILM COATED ORAL at 09:39

## 2018-11-08 RX ADMIN — ENOXAPARIN SODIUM 40 MG: 40 INJECTION SUBCUTANEOUS at 21:24

## 2018-11-08 RX ADMIN — CLOPIDOGREL BISULFATE 75 MG: 75 TABLET ORAL at 09:38

## 2018-11-08 RX ADMIN — LEVETIRACETAM 1000 MG: 500 TABLET, FILM COATED ORAL at 09:39

## 2018-11-08 RX ADMIN — CETIRIZINE HYDROCHLORIDE 5 MG: 10 TABLET, FILM COATED ORAL at 09:39

## 2018-11-08 RX ADMIN — HYDRALAZINE HYDROCHLORIDE 10 MG: 20 INJECTION INTRAMUSCULAR; INTRAVENOUS at 07:53

## 2018-11-08 RX ADMIN — ATORVASTATIN CALCIUM 80 MG: 80 TABLET, FILM COATED ORAL at 09:39

## 2018-11-08 RX ADMIN — FEBUXOSTAT 80 MG: 40 TABLET ORAL at 09:38

## 2018-11-08 RX ADMIN — CARVEDILOL 6.25 MG: 6.25 TABLET, FILM COATED ORAL at 17:28

## 2018-11-08 RX ADMIN — BUDESONIDE 0.5 MG: 0.5 INHALANT RESPIRATORY (INHALATION) at 19:50

## 2018-11-08 RX ADMIN — ROPINIROLE 0.25 MG: 0.25 TABLET, FILM COATED ORAL at 21:25

## 2018-11-08 RX ADMIN — VANCOMYCIN HYDROCHLORIDE 125 MG: 125 CAPSULE ORAL at 05:38

## 2018-11-08 RX ADMIN — Medication 3 ML: at 21:29

## 2018-11-08 RX ADMIN — INSULIN ASPART 5 UNITS: 100 INJECTION, SOLUTION INTRAVENOUS; SUBCUTANEOUS at 12:55

## 2018-11-08 RX ADMIN — IPRATROPIUM BROMIDE AND ALBUTEROL SULFATE 3 ML: .5; 3 SOLUTION RESPIRATORY (INHALATION) at 23:31

## 2018-11-08 RX ADMIN — Medication 3 ML: at 09:40

## 2018-11-08 RX ADMIN — IPRATROPIUM BROMIDE AND ALBUTEROL SULFATE 3 ML: .5; 3 SOLUTION RESPIRATORY (INHALATION) at 07:34

## 2018-11-08 NOTE — DISCHARGE SUMMARY
[facility]   DISCHARGE SUMMARY      Name:  Vidhi Lopez   Age:  68 y.o.  Sex:  female  :  1950  MRN:  [id number]   Visit Number:  [id number]    Admission Date:  2018  Date of Discharge:  2018  Primary Care Physician:  Michael Mays MD    Discharge Diagnoses:     1.  Acute metabolic encephalopathy secondary to #2, present on admission, improved.  2.  Acute on chronic hypercapnic and hypoxic respiratory failure, present on admission, improved.  3.  Right lower lobe bacterial pneumonia, present on admission, improved.  4.  Acute COPD exacerbation, present on admission, improved.  5.  Acute renal failure, present on admission, resolved.  6.  Demand ischemia with elevated troponin level secondary to #2.  7.  Obstructive sleep apnea, noncompliant with BiPAP therapy at home.  8.  Coronary artery disease status post stenting.  9.  Recent history of C. difficile colitis status post a course of oral vancomycin.  10.  Diabetes mellitus type 2.  11.  Essential hypertension.  12.  Generalized anxiety disorder.  13.  Medical noncompliance.  14.  Chronic tobacco dependence.      Acute and chronic respiratory failure with hypercapnia (CMS/HCC)    Pneumonia of right lower lobe due to infectious organism (CMS/HCC)    Obstructive sleep apnea    CAD (coronary artery disease)    Essential hypertension    Dyslipidemia    Anxiety (Chronic benzos)    GERD (gastroesophageal reflux disease)    Diabetes mellitus (CMS/HCC)    Acute exacerbation of chronic obstructive pulmonary disease (COPD) (CMS/HCC)    History of acute myocardial infarction    Acute kidney injury (CMS/HCC)    Clostridium difficile colitis diagnosed 2018. Persistent diarrhea despite oral vancomycin    Demand ischemia (CMS/HCC)      Presenting Problem:    Acute and chronic respiratory failure with hypercapnia (CMS/HCC) [J96.22]     Consults:     Consults     Date and Time Order Name Status Description     11/6/2018 0215 Inpatient Pulmonology Consult Completed     10/18/2018 1109 Inpatient Infectious Diseases Consult Completed     10/18/2018 1033 Inpatient Infectious Diseases Consult Completed         Consulting Physician(s)     Provider Relationship Specialty    Phoebe Mancilla MD Consulting Physician Pulmonary Disease        Procedures Performed:    None.    History of presenting illness:    Patient is a 68 year old female with medical history of COPD, URIEL, OHS, noncompliant on BiPAP and oxygen therapy, type 2 DM, CAD s/p 1 stent, hypertension, hyperlipidemia and anxiety, who was brought in from home by EMS for evaluation of altered mental status. During my initial encounter, patient was on BiPAP, unable to provide any history, however, later on patient was able to have a conversation and tell me that she lives at home alone and is supposed to wear BiPAP at night and oxygen during the day. She did not do this prior to going to bed on Friday. She cannot recall much of what happened over the last 2 days, but denies any complaints of significant short of breath or cough, though, she was visibly coughing heavily when I was interviewing her. She had no complaints of chest pain, pressure, palpitations, abdominal pain, diarrhea or dysuria. She denies any fevers or chills. Of note, patient was discharged from [facility] on on 10/18 after being admitted for very similar episode after which she was discharged home. She was treated for COPD exacerbation and C. Diff at the time, she is still taking oral vancomycin.      Her initial vitals on arrival were within normal limits. Labs are notable for wbc 12k, H&H 11.7/40, . BUN/Cr 42/1.9 (baseline 0.75). ALT/AST 79/52. Troponin 0.191. BNP 4340. PH 7.2, CO2 97. Urinalysis was rather unremarkable other than 3-5 wbcs. Negative for leuk esterase and nitrite. After about 2 hours of BiPAP her pH improved to 7.247 and CO2 improved to 88. EKG did not reveal any  acute ischemic changes. CXR showed a RLL airspace disease. She was given a liter of IVF and started on Vancomycin and Zosyn.      Soon after arriving to the ICU, patient took herself off BiPAP. She was fully alert and oriented. She started to demand to be given food or that she would leave against medical advise. She then stated that she would leave against medical advice no matter what we tell her, even though her oxygen was in the mid 70s. I had multiple conversations with the patient's daughters over the phone to notify them of patient's request. They subsequently spoke to the patient over the phone multiple times and she finally decided to stay, but still refusing to wear BiPAP.    Hospital Course:    Patient was admitted to the medical ICU and subsequently required Haldol and Ativan to calm her down.  She was continued on BiPAP therapy and improved with regards to her CO2 retention.  She did have elevated troponin levels but did not have any chest pain or EKG changes.  Her troponins were trending down.  She was initially placed on Zosyn and vancomycin and was continued on oral vancomycin for her history of C. difficile colitis.  She was evaluated by Dr. Mancilla from pulmonology.    Patient did have episodes of agitation and aggressive behavior requiring restraints at one point.  It seems that the patient may have underlying dementia and may be having sundowning phenomena.  During the morning hours she is very pleasant and answering questions appropriately.  Patient improved significantly with regards to her respiratory distress and was subsequently transferred to the medical floor with telemetry.  Her pro-calcitonin level was negative and Dr. Mancilla recommended discontinuation of antibiotic therapy especially in view of her recent C. difficile colitis.  One of her blood cultures did come back positive but they are growing coagulase negative Staphylococcus which is likely a contaminant.  The other blood culture  bottle has been negative so far.  She did not have any evidence of sepsis, fever or significant leukocytosis.    Patient was noted to have elevated blood pressures and was continued on metoprolol.  Diovan 1 was subsequently added to her regimen.  She did have elevated troponin levels which was thought to be secondary to demand ischemia.  I discussed this with the on-call cardiologist for Dr. Lito Flores and he recommended conservative management.  He did not think that the patient needed any acute intervention.  He recommended discharge on dual antiplatelet agents and current cardiac medications with subsequent outpatient follow-up with Dr. Flores.  Patient states that she already has an appointment in a couple weeks and will follow-up with Dr. Flores.  She is advised to complete the course of her C. difficile treatment.    Patient was evaluated by physical therapy and was able to walk in the hallway with walker.  She is currently eager to go home but does live alone.  I had a long discussion with case management in the room and the patient states that she is planning to arrange private sitters to be with her.  She states that her son lives in [place], [place] and she is eventually planning to go to an assisted living facility over there.  For now, she will be discharged home with home health.  She already has home oxygen and home Trilogy NIV unit.  She has been strongly advised to be compliant with her Trilogy unit especially when she feels drowsy or shaky which is a sign of CO2 buildup.  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  She denies any home equipment needs.  She states that she already has a walker as well as a bedside commode.    Patient will be discharged home today with home health and she is advised to follow-up with her primary care physician in one week and Dr. Flores in 2-3 weeks.  I have strongly advised the patient to discontinue smoking which she is planning to do.    Vital Signs:    Temp:   [97.6 °F (36.4 °C)-98.5 °F (36.9 °C)] 98 °F (36.7 °C)  Heart Rate:  [56-85] 75  Resp:  [16-20] 18  BP: (122-205)/() 122/64  FiO2 (%):  [35 %] 35 %    Physical Exam:    General Appearance:  Alert and cooperative, not in any acute distress.   Head:  Atraumatic and normocephalic, without obvious abnormality.   Eyes:          PERRLA, conjunctivae and sclerae normal, no Icterus. No pallor. Extra-occular movements are within normal limits.   Ears:  Ears appear intact with no abnormalities noted.   Throat: No oral lesions, no thrush, oral mucosa moist.   Neck: Supple, trachea midline, no thyromegaly, no carotid bruit.   Back:   No kyphoscoliosis present. No tenderness to palpation,   range of motion normal.   Lungs:   Chest shape is normal. Breath sounds heard bilaterally equally.  No wheezing.  Occasional basal crackles heard. No Pleural rub or bronchial breathing.   Heart:  Normal S1 and S2, no murmur, no gallop, no rub. No JVD.   Abdomen:   Normal bowel sounds, no masses, no organomegaly. Soft, non-tender, obese, no guarding, no rebound tenderness.   Extremities: Moves all extremities well, no edema, no cyanosis, no clubbing.   Pulses: Pulses palpable and equal bilaterally.   Skin: No bleeding, bruising or rash.   Neurologic: Alert and oriented x 3. Moves all four limbs equally. No flapping tremors. No facial asymetry.     Pertinent Lab Results:       Results from last 7 days  Lab Units 11/08/18  0550 11/07/18  0415 11/06/18  0224 11/05/18  2121   SODIUM mmol/L 139 138 135* 137   POTASSIUM mmol/L 3.7 4.7 5.2* 5.3*   CHLORIDE mmol/L 97* 96* 96* 90*   CO2 mmol/L 37.0* 35.0* 35.0* 37.0*   BUN mg/dL 20 37* 40* 42*   CREATININE mg/dL 0.70 0.80 1.40* 1.90*   CALCIUM mg/dL 9.9 9.1 8.5 9.1   BILIRUBIN mg/dL  --   --   --  0.6   ALK PHOS U/L  --   --   --  132*   ALT (SGPT) U/L  --   --   --  79*   AST (SGOT) U/L  --   --   --  52*   GLUCOSE mg/dL 112* 269* 149* 189*       Results from last 7 days  Lab Units 11/08/18  0550  11/07/18  0415 11/06/18  0220   WBC 10*3/mm3 12.33* 9.46 11.41*   HEMOGLOBIN g/dL 12.1 10.8* 11.3*   HEMATOCRIT % 38.4 35.3* 38.0   PLATELETS 10*3/mm3 204 162 202       Results from last 7 days  Lab Units 11/06/18  0220   INR  1.25*       Results from last 7 days  Lab Units 11/08/18  0550 11/07/18  0415 11/06/18  1315   TROPONIN I ng/mL 0.112* 0.117* 0.173*       Results from last 7 days  Lab Units 11/05/18  2121   PROBNP pg/mL 4,340.0*               Results from last 7 days  Lab Units 11/08/18  0520   PH, ARTERIAL pH units 7.490   PO2 ART mm Hg 81.1   PCO2, ARTERIAL mm Hg 45.9*   HCO3 ART mmol/L 35.0*       Results from last 7 days  Lab Units 11/05/18  2154   COLOR UA  Yellow   GLUCOSE UA  Negative   KETONES UA  Negative   LEUKOCYTES UA  Negative   PH, URINE  <=5.0   BILIRUBIN UA  Negative   UROBILINOGEN UA  0.2 E.U./dL       Results from last 7 days  Lab Units 11/05/18  2248 11/05/18  2238   BLOODCX  Staphylococcus, coagulase negative* No growth at 2 days     Pertinent Radiology Results:    Imaging Results (all)     Procedure Component Value Units Date/Time    XR Chest 1 View [[id number]] Collected:  11/07/18 0852     Updated:  11/07/18 0855    Narrative:       PROCEDURE: XR CHEST 1 VW-     HISTORY: RLL pneumonia.; J96.22-Acute and chronic respiratory failure  with hypercapnia; J18.1-Lobar pneumonia, unspecified organism;  J44.9-Chronic obstructive pulmonary disease, unspecified     COMPARISON: November 5, 2018.     FINDINGS: The heart is normal in size. The mediastinum is unremarkable.  There is diffuse bilateral bronchial wall thickening. There is improved  aeration of the right lung base compared to the prior exam. There is no  pneumothorax.  There are no acute osseous abnormalities.           Impression:       Improved aeration of the right lung base compared to the  prior exam.     Continued followup is recommended.     This report was finalized on 11/7/2018 8:53 AM by Carissa Markham M.D..    XR Chest 1  View [[id number]] Collected:  11/05/18 2156     Updated:  11/05/18 2155    Narrative:       FINAL REPORT    CLINICAL HISTORY:  ams    FINDINGS:  There are low lung volumes with elevation right hemidiaphragm.  There is right lower lobe airspace disease.  The heart is  enlarged.  Aortic knob is calcified.  Impression: Right lower  lobe airspace disease      Impression:       Authenticated by Brandon Golden MD on 11/05/2018 09:56:44 PM        Condition on Discharge:      Stable.    Code status during the hospital stay:    Code Status and Medical Interventions:   Ordered at: 11/06/18 0255     Limited Support to NOT Include:    Intubation     Code Status:    CPR     Medical Interventions (Level of Support Prior to Arrest):    Limited     Discharge Disposition:    [place] [facility] [place]    Discharge Medications:       Discharge Medications      New Medications      Instructions Start Date   valsartan 80 MG tablet  Commonly known as:  DIOVAN   80 mg, Oral, Every 24 Hours Scheduled         Changes to Medications      Instructions Start Date   gabapentin 600 MG tablet  Commonly known as:  NEURONTIN  What changed:  how much to take   600 mg, Oral, 3 Times Daily         Continue These Medications      Instructions Start Date   ALPRAZolam 0.25 MG tablet  Commonly known as:  XANAX   0.25 mg, Oral, 2 Times Daily PRN      ASPIRIN LOW DOSE 81 MG EC tablet  Generic drug:  aspirin   81 mg, Oral, Daily      atorvastatin 80 MG tablet  Commonly known as:  LIPITOR   80 mg, Oral, Daily      bumetanide 1 MG tablet  Commonly known as:  BUMEX   1 mg, Oral, Daily With Breakfast      CELEBREX 100 MG capsule  Generic drug:  celecoxib   Every 12 Hours Scheduled      clopidogrel 75 MG tablet  Commonly known as:  PLAVIX   75 mg, Oral, Daily      colchicine 0.6 MG tablet   take 1 tablet by mouth once daily if needed      famotidine 20 MG tablet  Commonly known as:  PEPCID   20 mg, Oral, 2 Times Daily      HUMALOG KWIKPEN 100 UNIT/ML solution  pen-injector  Generic drug:  Insulin Lispro   30 Units, Subcutaneous, 3 Times Daily With Meals, Max 56 units daily      levETIRAcetam 1000 MG tablet  Commonly known as:  KEPPRA   1,000 mg, Oral, Every 12 Hours Scheduled      levocetirizine 5 MG tablet  Commonly known as:  XYZAL   5 mg, Oral, Every Evening      metoprolol tartrate 50 MG tablet  Commonly known as:  LOPRESSOR   50 mg, Oral, Every 12 Hours Scheduled      mirtazapine 15 MG tablet  Commonly known as:  REMERON   15 mg, Oral, Nightly      nitroglycerin 0.4 MG SL tablet  Commonly known as:  NITROSTAT   0.4 mg, Sublingual, Every 5 Minutes PRN      ondansetron 8 MG tablet  Commonly known as:  ZOFRAN   8 mg, Oral, 2 Times Daily PRN      PROBIOTIC DAILY PO   1 tablet, Oral, Daily      promethazine 25 MG tablet  Commonly known as:  PHENERGAN   25 mg, Oral, Every 8 Hours PRN      rOPINIRole 0.25 MG tablet  Commonly known as:  REQUIP   0.25 mg, Oral, Nightly, Take 1-3 hours before bedtime.      SYMBICORT 160-4.5 MCG/ACT inhaler  Generic drug:  budesonide-formoterol   2 puffs, Inhalation, 2 Times Daily - RT      TOUJEO SOLOSTAR 300 UNIT/ML solution pen-injector  Generic drug:  Insulin Glargine   20 Units, Subcutaneous, Daily      vancomycin 50 MG/ML reconstituted solution oral solution reconstituted   125mg QID x 2 weeks, then 125mg BID x 2 weeks         Stop These Medications    potassium chloride 20 MEQ CR tablet  Commonly known as:  K-DUR,KLOR-CON          Discharge Diet:     Diet Instructions     Diet: Consistent Carbohydrate, Cardiac; Thin       Discharge Diet:   Consistent Carbohydrate  Cardiac       Fluid Consistency:  Thin        Activity at Discharge:     Activity Instructions     Activity as Tolerated           Follow-up Appointments:    Additional Instructions for the Follow-ups that You Need to Schedule     Ambulatory Referral to Home Health    As directed      Face to Face Visit Date:  11/8/2018    Follow-up Provider for Plan of Care?:  I treated the  patient in an acute care facility and will not continue treatment after discharge.    Follow-up Provider:  ARABELLA ABRAHAM [[id number]]    Reason/Clinical Findings:  COPD, Respiratory failure, CAD, DM, HTN    Describe mobility limitations that make leaving home difficult:  Generalized weakness, Fall risk    Nursing/Therapeutic Services Requested:  Skilled Nursing Physical Therapy Occupational Therapy    Skilled nursing orders:  Medication education COPD management O2 instruction Cardiopulmonary assessments    PT orders:  Gait Training Transfer training Strengthening Therapeutic exercise    Weight Bearing Status:  As Tolerated    Occupational orders:  Activities of daily living Strengthening    Frequency:  1 Week 1           Follow-up Information     Arabella Abraham MD Follow up in 1 week(s).    Specialty:  General Practice  Contact information:  [address]  [address] [address]  [phone]                 Future Appointments  Date Time Provider Department Center   11/29/2018 1:15 PM Lito Flores MD [facility] None   11/30/2018 12:45 PM RAD TECH [facility] None   11/30/2018 1:00 PM [facility], PFT LAB 1 [facility] None   11/30/2018 1:30 PM Oliver Garcia MD [facility] None   4/9/2019 1:00 PM Eusebia Yanez PA-C [facility] None       Additional Instructions for the Follow-ups that You Need to Schedule     Ambulatory Referral to Home Health    As directed      Face to Face Visit Date:  11/8/2018    Follow-up Provider for Plan of Care?:  I treated the patient in an acute care facility and will not continue treatment after discharge.    Follow-up Provider:  ARABELLA ABRAHAM [[id number]]    Reason/Clinical Findings:  COPD, Respiratory failure, CAD, DM, HTN    Describe mobility limitations that make leaving home difficult:  Generalized weakness, Fall risk    Nursing/Therapeutic Services Requested:  Skilled Nursing Physical Therapy Occupational Therapy     Skilled nursing orders:  Medication education COPD management O2 instruction Cardiopulmonary assessments    PT orders:  Gait Training Transfer training Strengthening Therapeutic exercise    Weight Bearing Status:  As Tolerated    Occupational orders:  Activities of daily living Strengthening    Frequency:  1 Week 1             Test Results Pending at Discharge:     Order Current Status    Blood Culture - Blood, Preliminary result    Blood Culture - Blood, Preliminary result             Kentrell Whittington MD  11/08/18  5:15 PM    Time spent: 25 min.    Dictated utilizing Dragon dictation.

## 2018-11-08 NOTE — PROGRESS NOTES
Continued Stay Note   [facility]     Patient Name: Vidhi Lopez  MRN: [id number]  Today's Date: 11/8/2018    Admit Date: 11/5/2018          Discharge Plan     Row Name 11/08/18 8252       Plan    Plan Home with HH    Patient/Family in Agreement with Plan yes    Plan Comments Received notification that pt would like to see CM re DCP.  Per pt, she was told that if she could find someone to be with her, she would be able to discharge.  Pt states that she has 2 nurse friends that will pick her up and her neighbor can be with her tomorrow until her son is able to get to her house.  Dr. Whittington updated and will discharge pt.  Pt updated.  Pt will call nurse friends for ride and have them to come to her room to verify she has help.  Pt confirms that she has selected [facility] and verifies address/phone numbers.  JANE Espitia updated.    Called [facility], but office closed.  Left msg for [facility] of referral.  CM will f/u with HH in the morning.    Called sonTavo,  msg left that pt was requesting to be discharged and was being discharged this date.                Discharge Codes    No documentation.       Expected Discharge Date and Time     Expected Discharge Date Expected Discharge Time    Nov 8, 2018             Gricelda Chirinos

## 2018-11-08 NOTE — PROGRESS NOTES
[facility] HOSPITALIST    PROGRESS NOTE    Name:  Vidhi Lopez   Age:  68 y.o.  Sex:  female  :  1950  MRN:  [id number]   Visit Number:  [id number]  Admission Date:  2018  Date Of Service:  18  Primary Care Physician:  Michael Mays MD     LOS: 2 days :  Patient Care Team:  Michael Mays MD as PCP - General  Michael Mays MD as PCP - Family Medicine  Michael Mays MD as PCP - Claims Attributed  Lito Flores MD as Consulting Physician (Cardiology):    Chief Complaint:      Generalized weakness and confusion.    Subjective / Interval History:     Ms. Lopez is currently sitting up on the chair and is comfortable at rest.  She did walk with physical therapy in the hallway.  She was transferred out of ICU on 2018.  She did not have any further episodes of confusion.  She is using the BiPAP at night.  Denies any chest pain or shortness of breath at this time.  Her troponins are trending down.      She was admitted from the emergency room on 2018 after she was found by her neighbor at home unresponsive.  She was noted to have hypercapnic respiratory failure and was placed on BiPAP therapy.  Unfortunately, she continues to smoke cigarettes and is noncompliant with her Trilogy NIV unit at home.  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  She states that she does live alone.  She states that her son lives in [place] and apparently she is in the process of making a plan to move into assisted living facility in [facility].  She denies any alcohol use at home.      She was noted to have elevated troponin levels on admission but denies any chest pain.  She does have history of coronary artery disease status post stenting in May 2018.  She states that she sees Dr. Flores in [place] who is her cardiologist.  Her treatment plan was discussed with Dr. Salmon was on call for Dr. Flores on 2018 and he  recommended conservative management at this time with continuation of her cardiac medications including dual antiplatelet agents.      Review of Systems:     General ROS: Patient denies any fevers, chills or loss of consciousness.  Generalized weakness.  Respiratory ROS: Cough and chest congestion.  Cardiovascular ROS: Denies chest pain or palpitations. No history of exertional chest pain.  Gastrointestinal ROS: Denies nausea and vomiting. Denies any abdominal pain. No diarrhea.  Neurological ROS: Denies any focal weakness. No loss of consciousness. Denies any numbness.  History of confusion.  Dermatological ROS: Denies any redness or pruritis.    Vital Signs:    Temp:  [97.6 °F (36.4 °C)-98.5 °F (36.9 °C)] 98 °F (36.7 °C)  Heart Rate:  [56-85] 75  Resp:  [16-20] 18  BP: (122-205)/() 122/64  FiO2 (%):  [35 %] 35 %    Intake and output:    I/O last 3 completed shifts:  In: 2090 [P.O.:1360; I.V.:730]  Out: -   No intake/output data recorded.    Physical Examination:    General Appearance:  Alert and cooperative, not in any acute distress.   Head:  Atraumatic and normocephalic, without obvious abnormality.   Eyes:          PERRLA, conjunctivae and sclerae normal, no Icterus. No pallor. Extra-occular movements are within normal limits.   Neck: Supple, trachea midline, no thyromegaly, no carotid bruit.   Lungs:   Chest shape is normal. Breath sounds heard bilaterally equally.  No wheezing.  Occasional basal crackles heard. No Pleural rub or bronchial breathing.   Heart:  Normal S1 and S2, no murmur, no gallop, no rub. No JVD   Abdomen:   Normal bowel sounds, no masses, no organomegaly. Soft, non-tender, non-distended, no guarding, no rebound tenderness.   Extremities: Moves all extremities well, no edema, no cyanosis, no            clubbing.   Skin: No bleeding, bruising or rash.   Neurologic: Awake, alert and oriented times 3. Moves all 4 extremities equally.  Asterixis noted on the outstretched hands.    Laboratory results:      Results from last 7 days  Lab Units 11/08/18  0550 11/07/18  0415 11/06/18  0224 11/05/18  2121   SODIUM mmol/L 139 138 135* 137   POTASSIUM mmol/L 3.7 4.7 5.2* 5.3*   CHLORIDE mmol/L 97* 96* 96* 90*   CO2 mmol/L 37.0* 35.0* 35.0* 37.0*   BUN mg/dL 20 37* 40* 42*   CREATININE mg/dL 0.70 0.80 1.40* 1.90*   CALCIUM mg/dL 9.9 9.1 8.5 9.1   BILIRUBIN mg/dL  --   --   --  0.6   ALK PHOS U/L  --   --   --  132*   ALT (SGPT) U/L  --   --   --  79*   AST (SGOT) U/L  --   --   --  52*   GLUCOSE mg/dL 112* 269* 149* 189*       Results from last 7 days  Lab Units 11/08/18  0550 11/07/18  0415 11/06/18  0220   WBC 10*3/mm3 12.33* 9.46 11.41*   HEMOGLOBIN g/dL 12.1 10.8* 11.3*   HEMATOCRIT % 38.4 35.3* 38.0   PLATELETS 10*3/mm3 204 162 202       Results from last 7 days  Lab Units 11/06/18  0220   INR  1.25*       Results from last 7 days  Lab Units 11/08/18  0550 11/07/18  0415 11/06/18  1315   TROPONIN I ng/mL 0.112* 0.117* 0.173*       Results from last 7 days  Lab Units 11/05/18  2248 11/05/18  2238   BLOODCX  Staphylococcus, coagulase negative* No growth at 2 days     I have reviewed the patient's laboratory results.    Radiology results:    Imaging Results (last 24 hours)     ** No results found for the last 24 hours. **        Medication Review:     I have reviewed the patients active and prn medications.       Acute and chronic respiratory failure with hypercapnia (CMS/HCC)    Pneumonia of right lower lobe due to infectious organism (CMS/HCC)    Obstructive sleep apnea    CAD (coronary artery disease)    Essential hypertension    Dyslipidemia    Anxiety (Chronic benzos)    GERD (gastroesophageal reflux disease)    Diabetes mellitus (CMS/HCC)    Acute exacerbation of chronic obstructive pulmonary disease (COPD) (CMS/HCC)    History of acute myocardial infarction    Acute kidney injury (CMS/[facility])    Clostridium difficile colitis diagnosed September 2018. Persistent diarrhea despite oral  vancomycin    Demand ischemia (CMS/[facility])    Assessment:    1.  Acute metabolic encephalopathy secondary to #2, present on admission.  2.  Acute on chronic hypercapnic and hypoxic respiratory failure, present on admission.  3.  Right lower lobe bacterial pneumonia, present on admission.  4.  Acute COPD exacerbation, present on admission.  5.  Acute renal failure, present on admission, resolved.  6.  Demand ischemia with elevated troponin level secondary to #2.  7.  Obstructive sleep apnea, noncompliant with BiPAP therapy at home.  8.  Coronary artery disease status post stenting.  9.  Recent history of C. difficile colitis status post a course of oral vancomycin.  10.  Diabetes mellitus type 2.  11.  Essential hypertension.  12.  Generalized anxiety disorder.  13.  Medical noncompliance.  14.  Chronic tobacco dependence.    Plan:    Ms. Lopez is currently doing better and her hypercapnia is improving.  She is being followed by Dr. Mancilla from pulmonology and since her pro-calcitonin was negative, he has discontinued her IV antibiotic therapy especially since she has had recent C. difficile infection.  Since discontinuation of antibiotic therapy, she has remained afebrile.  One set of blood cultures grew coagulase-negative staph which is likely a contaminant.  She does not have any fever or leukocytosis at this time.  She is currently on nasal cannula oxygen saturating in the mid 90s.  She will be continued on bronchodilators, budesonide, IV Solu-Medrol and mucolytic agents.  I have strongly advised her to discontinue smoking and be compliant with her Trilogy NIV unit.    Patient does have elevated troponin levels which are trending down.  She denies any chest pain and did not have any ST changes on her initial EKG.  I think this is secondary to demand ischemia/type II myocardial infarction and also contribution by her renal failure.  Primary cardiologist on-call recommended continuation of her home medications and  conservative management at this time.  No cardiac interventions are indicated at this time as per primary cardiology.    Patient will be continued on BiPAP at night as well as 4 hours during the day.  She will be continued on physical therapy.  She is on Lovenox for DVT prophylaxis.  I discussed the patient's condition with the case management services at the bedside.  Patient would like to have private sitters arranged before going home and the case management is helping her and her son arrange base.  Hopefully, she should be able to go home tomorrow with home health, home BiPAP therapy as well as home oxygen.  She will need outpatient follow-up with her primary pulmonologist Dr. Garcia.    Kentrell Whittington MD  11/08/18  3:39 PM    Dictated utilizing Dragon dictation.

## 2018-11-08 NOTE — PROGRESS NOTES
Discharge Planning Assessment   [facility]     Patient Name: Vidhi Lopez  MRN: [id number]  Today's Date: 11/8/2018    Admit Date: 11/5/2018          Discharge Needs Assessment    No documentation.             Discharge Plan     Row Name 11/08/18 0924       Plan    Plan Faxed updated notes to [facility].        Destination     Service Request Status Selected Specialties Address Phone Number Fax Number    [facility] Pending - Request Sent N/A [address] , [facility] [id number] [phone]      Durable Medical Equipment     No service coordination in this encounter.      Dialysis/Infusion     No service coordination in this encounter.      Home Medical Care     No service coordination in this encounter.      Social Care     No service coordination in this encounter.        Expected Discharge Date and Time     Expected Discharge Date Expected Discharge Time    Nov 9, 2018               Demographic Summary    No documentation.           Functional Status    No documentation.           Psychosocial    No documentation.           Abuse/Neglect    No documentation.           Legal    No documentation.           Substance Abuse    No documentation.           Patient Forms    No documentation.         Serena Golden

## 2018-11-08 NOTE — PLAN OF CARE
Problem: Fall Risk (Adult)  Goal: Absence of Fall  Outcome: Ongoing (interventions implemented as appropriate)   11/08/18 0414   Fall Risk (Adult)   Absence of Fall making progress toward outcome

## 2018-11-08 NOTE — DISCHARGE PLACEMENT REQUEST
"Referral for Rehab  Serena  [phone]    Vidhi Lopez (68 y.o. Female)     Date of Birth Social Security Number Address Home Phone MRN    1950  [id number] [address] [phone] [id number]    Alevism Marital Status          [employer]        Admission Date Admission Type Admitting Provider Attending Provider Department, Room/Bed    11/5/18 Emergency Anders Ulloa MD Pais, Roshan, MD [facility] SURG  3, 327/1    Discharge Date Discharge Disposition Discharge Destination                       Attending Provider:  Kentrell Whittington MD    Allergies:  Amlodipine, Reglan [Metoclopramide]    Isolation:  Spore   Infection:  C.difficile (10/16/18)   Code Status:  CPR    Ht:  160 cm (62.99\")   Wt:  88 kg (194 lb)    Admission Cmt:  None   Principal Problem:  Acute and chronic respiratory failure with hypercapnia (CMS/HCC) [J96.22]                 Active Insurance as of 11/5/2018     Primary Coverage     Payor Plan Insurance Group Employer/Plan Group    MEDICARE MEDICARE A & B      Payor Plan Address Payor Plan Phone Number Effective From Effective To    [address] [phone] 3/1/2016     [facility] [id number]       Subscriber Name Subscriber Birth Date Member ID       VIDHI LOPEZ 1950 [id number]           Secondary Coverage     Payor Plan Insurance Group Employer/Plan Group    [facility] SUPP [id number]     Payor Plan Address Payor Plan Phone Number Effective From Effective To    [address]  6/1/2016     [facility] [id number]       Subscriber Name Subscriber Birth Date Member ID       VIDHI LOPEZ 1950 [id number]                 Emergency Contacts      (Rel.) Home Phone Work Phone Mobile Phone    Tavo Lopez (Son) -- -- [phone]    Inez Lopez (Daughter) -- -- [phone]    Carmen Golden -- -- [phone]            Insurance Information                MEDICARE/MEDICARE A & B Phone: " [phone]    Subscriber: Vidhi Lopez Subscriber#: [id number]    Group#:  Precert#:         [facility] SUPP Phone:     Subscriber: Vidhi Lopez Subscriber#: [id number]    Group#: [id number] Precert#:              History & Physical      Anders Ulloa MD at 11/6/2018  5:44 AM              [facility]  HISTORY AND PHYSICAL    Primary Care Physician: Michael Mays MD    Subjective     Chief Complaint:    Chief Complaint   Patient presents with   • Shortness of Breath       History of Present Illness:     I have reviewed labs/imaging/records from this hospitalization, including ER staff to establish a comprehensive understanding of this patient's clinical issues, as well as to establish plan of care appropriately.     Patient is a 68 year old female with medical history of COPD, URIEL, OHS, noncompliant on BiPAP and oxygen therapy, type 2 DM, CAD s/p 1 stent, hypertension, hyperlipidemia and anxiety, who was brought in from home by EMS for evaluation of altered mental status. During my initial encounter, patient was on BiPAP, unable to provide any history, however, later on patient was able to have a conversation and tell me that she lives at home alone and is supposed to wear BiPAP at night and oxygen during the day. She did not do this prior to going to bed on Friday. She cannot recall much of what happened over the last 2 days, but denies any complaints of significant short of breath or cough, though, she was visibly coughing heavily when I was interviewing her. She had no complaints of chest pain, pressure, palpitations, abdominal pain, diarrhea or dysuria. She denies any fevers or chills. Of note, patient was discharged from [facility] on on 10/18 after being admitted for very similar episode after which she was discharged home. She was treated for COPD exacerbation and C. Diff at the time, she is still taking oral  vancomycin.     Her initial vitals on arrival were within normal limits. Labs are notable for wbc 12k, H&H 11.7/40, . BUN/Cr 42/1.9 (baseline 0.75). ALT/AST 79/52. Troponin 0.191. BNP 4340. PH 7.2, CO2 97. Urinalysis was rather unremarkable other than 3-5 wbcs. Negative for leuk esterase and nitrite. After about 2 hours of BiPAP her pH improved to 7.247 and CO2 improved to 88. EKG did not reveal any acute ischemic changes. CXR showed a RLL airspace disease. She was given a liter of IVF and started on Vancomycin and Zosyn.     Soon after arriving to the ICU, patient took herself off BiPAP. She was fully alert and oriented. She started to demand to be given food or that she would leave against medical advise. She then stated that she would leave against medical advice no matter what we tell her, even though her oxygen was in the mid 70s. I had multiple conversations with the patient's daughters over the phone to notify them of patient's request. They subsequently spoke to the patient over the phone multiple times and she finally decided to stay, but still refusing to wear BiPAP. I have asked nursing staff to place her on high-flow for the time being. I advised the patient that her heart enzymes were elevated and that she may need evaluation by cardiology, she then stated that if that was the case that she wants us to call her cardiologist at [facility], Dr. Twin Flores and that she would want to be transferred there if she needed any cardiac interventions. She did not want to see anyone here.     Review of Systems   1. Constitutional: Negative for fever. Negative for chills, diaphoresis, fatigue and unexpected weight change.   2. HENT: Negative for congestion and hearing loss.   3. Eyes: Negative for redness and visual disturbance.   4. Respiratory: negative for shortness of breath. Negative for chest pain . Negative for cough and chest tightness.   5. Cardiovascular: Negative for chest pain and palpitations.    6. Gastrointestinal: Negative for abdominal distention, abdominal pain and blood in stool.   7. Endocrine: Negative for cold intolerance and heat intolerance.   8. Genitourinary: Negative for difficulty urinating, dysuria and frequency.   9. Musculoskeletal: Negative for arthralgias, back pain and myalgias.   10. Skin: Negative for color change, rash and wound.   11. Neurological: Negative for syncope, weakness and headaches.   12. Hematological: Negative for adenopathy. Does not bruise/bleed easily.   13. Psychiatric/Behavioral: Negative for confusion. The patient is not nervous/anxious.     Past Medical History:   Past Medical History:   Diagnosis Date   • Allergic rhinitis    • Asthma with COPD (CMS/[facility])     Asthma/COPD   • Bilateral ovarian cysts    • Coronary artery disease    • Depression with anxiety     Depression/Anxiety   • Diabetes (CMS/[facility])     pre   • Endometriosis     Dr. Jin; estradiol    • ESR raised 3/20/2018   • Fatigue    • Fibromyalgia    • Headache     Headaches   • High cholesterol    • History of gallstones    • History of myocardial infarction    • Hypertension    • Influenza B     DX'D 2/6/2018, TREATED WITH TAMIFLU. LAST DOSE 2/11/2018 AM.  PATIENT STATES DR FLORES IS AWARE. DENIES FEVERS OR CHILLS.   • Interstitial cystitis    • On home oxygen therapy     2 L NC   • URIEL on CPAP    • PONV (postoperative nausea and vomiting)    • Seizure disorder, nonconvulsive, with status epilepticus (CMS/[facility]) 3/20/2018   • Septic joint of right knee joint (CMS/[facility]) 3/21/2018   • Shortness of breath on exertion    • Stroke (CMS/[facility])     X1 8 YEARS AGO, GENERALIZED UPPER BODY WEAKNESS RESIDUAL PER PT    • Thyroid disorder    • Urinary leakage    • UTI (urinary tract infection)    • Wears glasses    • Yeast dermatitis        Past Surgical History:  Past Surgical History:   Procedure Laterality Date   • ARTERIOGRAM N/A 2/12/2018    Procedure: Renal Arteriogram;  Surgeon: Lito Flores MD;  Location:   [facility] CATH [facility]VASIVE LOC[facility];  Service:    • BREAST BIOPSY Right 1991   • CARDIAC CATHETERIZATION N/A 2/12/2018    Procedure: Right Heart Cath;  Surgeon: Lito Flores MD;  Location:  [facility] CATH INVASIVE LOCATION;  Service:    • CAROTID STENT      Transcath    • CAROTID STENT Left    • CHOLECYSTECTOMY     • CYSTOSTOMY W/ BLADDER BIOPSY     • DILATATION AND CURETTAGE     • KNEE ARTHROSCOPY Right 3/23/2018    Procedure: ARTHROSCOPY, RIGHT KNEE  INCISION AND DRAINAGE LOWER EXTREMITY WITH SYNOVIAL BIOPSY;  Surgeon: Dilip Giron MD;  Location:  [facility] OR;  Service: Orthopedics   • LAPAROSCOPIC CHOLECYSTECTOMY  1994    Lap rolando   • OOPHORECTOMY     • PAP SMEAR  05/10/2016   • PARTIAL HYSTERECTOMY         Family History: family history includes Breast cancer in her mother; Cancer in her other; Colon cancer in her father; Diabetes in her other; Heart attack in her other; Hyperlipidemia in her other; Hypertension in her other; Osteoporosis in her mother and other; Stroke in her other.    Social History:  reports that she has been smoking Cigarettes.  She has a 10.00 pack-year smoking history. She has never used smokeless tobacco. She reports that she drinks about 0.6 oz of alcohol per week . She reports that she does not use drugs.    Medications:  Prescriptions Prior to Admission   Medication Sig Dispense Refill Last Dose   • ALPRAZolam (XANAX) 0.25 MG tablet Take 0.25 mg by mouth 2 (Two) Times a Day As Needed for Anxiety.   10/13/2018 at Unknown time   • aspirin 81 MG EC tablet Take 1 tablet by mouth Daily. 100 tablet 4 10/13/2018 at Unknown time   • atorvastatin (LIPITOR) 80 MG tablet Take 80 mg by mouth Daily.   10/13/2018 at Unknown time   • bumetanide (BUMEX) 1 MG tablet Take 1 tablet by mouth Daily With Breakfast. 30 tablet 0    • celecoxib (CELEBREX) 100 MG capsule Every 12 (Twelve) Hours.   10/13/2018 at Unknown time   • clopidogrel (PLAVIX) 75 MG tablet Take 1 tablet by mouth Daily. 90 tablet 3 10/13/2018 at  Unknown time   • colchicine 0.6 MG tablet take 1 tablet by mouth once daily if needed  0 10/13/2018 at Unknown time   • famotidine (PEPCID) 20 MG tablet Take 1 tablet by mouth 2 (Two) Times a Day. 60 tablet 0    • febuxostat (ULORIC) 40 MG tablet Take 80 mg by mouth Daily.      • gabapentin (NEURONTIN) 600 MG tablet Take 1 tablet by mouth 3 (Three) Times a Day. 21 tablet 0    • HUMALOG KWIKPEN 100 UNIT/ML solution pen-injector Inject 14 Units under the skin into the appropriate area as directed 3 (Three) Times a Day With Meals. Max 56 units daily  0 10/13/2018 at Unknown time   • levETIRAcetam (KEPPRA) 1000 MG tablet Take 1 tablet by mouth Every 12 (Twelve) Hours. 180 tablet 3 10/13/2018 at Unknown time   • levocetirizine (XYZAL) 5 MG tablet Take 5 mg by mouth Every Evening.      • metFORMIN ER (GLUCOPHAGE-XR) 500 MG 24 hr tablet Take 500 mg by mouth 2 (Two) Times a Day.  0 10/13/2018 at Unknown time   • metoprolol tartrate (LOPRESSOR) 50 MG tablet Take 1 tablet by mouth Every 12 (Twelve) Hours. 60 tablet 0    • mirtazapine (REMERON) 15 MG tablet Take 1 tablet by mouth Every Night. 30 tablet 0    • nitroglycerin (NITROSTAT) 0.4 MG SL tablet Place 0.4 mg under the tongue Every 5 (Five) Minutes As Needed.  0 Unknown at Unknown time   • ondansetron (ZOFRAN) 8 MG tablet Take 8 mg by mouth 2 (Two) Times a Day As Needed for Nausea or Vomiting.   10/13/2018 at Unknown time   • potassium chloride (K-DUR,KLOR-CON) 20 MEQ CR tablet Take 1 tablet by mouth Daily. 30 tablet 0 10/13/2018 at Unknown time   • Probiotic Product (PROBIOTIC DAILY PO) Take 1 tablet by mouth Daily.      • promethazine (PHENERGAN) 25 MG tablet Take 25 mg by mouth Every 8 (Eight) Hours As Needed for Nausea or Vomiting.      • rOPINIRole (REQUIP) 0.25 MG tablet Take 0.25 mg by mouth Every Night. Take 1-3 hours before bedtime.   10/13/2018 at Unknown time   • SYMBICORT 160-4.5 MCG/ACT inhaler Inhale 2 puffs 2 (Two) Times a Day.  0 10/13/2018 at Unknown time  "  • TOUJEO SOLOSTAR 300 UNIT/ML solution pen-injector Inject 20 Units under the skin into the appropriate area as directed Daily.  0 10/13/2018 at Unknown time   • vancomycin 50 MG/ML reconstituted solution oral solution reconstituted 125mg QID x 2 weeks, then 125mg BID x 2 weeks 160 mL 0        Allergies:  Allergies   Allergen Reactions   • Amlodipine Swelling   • Reglan [Metoclopramide] Other (See Comments)     DYSTONIC REACTION         Objective     Physical Exam:  Vital Signs: /75   Pulse 71   Temp 99 °F (37.2 °C) (Oral)   Resp 26   Ht 160 cm (63\")   Wt 92.4 kg (203 lb 11.2 oz)   LMP  (LMP Unknown) Comment: LAST MAMMOGRAM 2017  SpO2 94%   BMI 36.08 kg/m²       Physical Exam:     General Appearance:   Alert, cooperative, in no acute distress.     Head:   Normocephalic, without obvious abnormality, atraumatic.     Eyes:       Normal, conjunctivae and sclerae, no icterus, no pallor, corneas clear, PERRLA        Throat:   Oral mucosa dry      Neck:  No adenopathy, supple, trachea midline, no thyromegaly, no carotid bruit, no JVD      Back:   No CVA tenderness on Percussion.     Lungs:    Clear to auscultation and fair air movement noted.      Heart:   Regular rhythm and normal rate, normal S1 and S2.       Abdomen:   Obese. Normal bowel sounds, no masses, no organomegaly, soft non-tender, non-distended, no guarding, no rebound tenderness        Extremities:  Moves all extremities, no edema, no cyanosis, no redness.     Pulses:  Pulses palpable and equal bilaterally but weak.     Skin:  No bleeding, bruising or rash        Neurologic:  Cranial nerves grossly intact, move all extremities         Results Review:  Lab Results (last 7 days)     Procedure Component Value Units Date/Time    Troponin [[id number]]  (Abnormal) Collected:  11/06/18 0224    Specimen:  Blood Updated:  11/06/18 0317     Troponin I 0.216 (C) ng/mL     Narrative:       Normal Patient Upper Reference Limit (URL) (99th Percentile)=0.03 " ng/mL   Non-AMI Illness Reference Limit=0.03-0.11 ng/mL   AMI Confirmation=0.12 ng/mL and above    aPTT [[id number]]  (Abnormal) Collected:  11/06/18 0220    Specimen:  Blood Updated:  11/06/18 0258     PTT 24.0 (L) seconds     Protime-INR [[id number]]  (Abnormal) Collected:  11/06/18 0220    Specimen:  Blood Updated:  11/06/18 0258     Protime 13.9 (H) Seconds      INR 1.25 (H)    CBC & Differential [[id number]] Collected:  11/06/18 0220    Specimen:  Blood Updated:  11/06/18 0255    Narrative:       The following orders were created for panel order CBC & Differential.  Procedure                               Abnormality         Status                     ---------                               -----------         ------                     Scan Slide[[id number]]                                       Final result               CBC Auto Differential[[id number]]        Abnormal            Final result                 Please view results for these tests on the individual orders.    CBC Auto Differential [[id number]]  (Abnormal) Collected:  11/06/18 0220    Specimen:  Blood Updated:  11/06/18 0255     WBC 11.41 (H) 10*3/mm3      RBC 3.77 (L) 10*6/mm3      Hemoglobin 11.3 (L) g/dL      Hematocrit 38.0 %      .8 (H) fL      MCH 30.0 pg      MCHC 29.7 (L) g/dL      RDW 15.5 (H) %      RDW-SD 57.1 (H) fl      MPV 9.7 fL      Platelets 202 10*3/mm3      Neutrophil % 86.4 (H) %      Lymphocyte % 6.2 (L) %      Monocyte % 6.0 %      Eosinophil % 0.1 %      Basophil % 0.3 %      Immature Grans % 1.0 (H) %      Neutrophils, Absolute 9.87 (H) 10*3/mm3      Lymphocytes, Absolute 0.71 10*3/mm3      Monocytes, Absolute 0.68 10*3/mm3      Eosinophils, Absolute 0.01 10*3/mm3      Basophils, Absolute 0.03 10*3/mm3      Immature Grans, Absolute 0.11 (H) 10*3/mm3      nRBC 0.4 (H) /100 WBC     Scan Slide [[id number]] Collected:  11/06/18 0220    Specimen:  Blood Updated:  11/06/18 0255     Anisocytosis Slight/1+     Hypochromia  Slight/1+     Macrocytes Slight/1+     WBC Morphology Normal     Platelet Estimate Adequate    Basic Metabolic Panel [[id number]]  (Abnormal) Collected:  11/06/18 0224    Specimen:  Blood Updated:  11/06/18 0252     Glucose 149 (H) mg/dL      BUN 40 (H) mg/dL      Creatinine 1.40 (H) mg/dL      Sodium 135 (L) mmol/L      Potassium 5.2 (H) mmol/L      Chloride 96 (L) mmol/L      CO2 35.0 (H) mmol/L      Calcium 8.5 mg/dL      eGFR Non African Amer 38 (L) mL/min/1.73      BUN/Creatinine Ratio 28.6 (H)     Anion Gap 9.2 (L) mmol/L     Narrative:       GFR Normal >60  Chronic Kidney Disease <60  Kidney Failure <15    POC Glucose Once [[id number]]  (Abnormal) Collected:  11/06/18 0203    Specimen:  Blood Updated:  11/06/18 0210     Glucose 160 (H) mg/dL      Comment: Serial Number: [id number]:  [id number]       Blood Gas, Arterial With Co-Ox [[id number]]  (Abnormal) Collected:  11/06/18 0023    Specimen:  Arterial Blood Updated:  11/06/18 0024     Site Right Radial     John's Test Positive     pH, Arterial 7.247 (C) pH units      pCO2, Arterial 88.4 (C) mm Hg      pO2, Arterial 65.3 (L) mm Hg      HCO3, Arterial 38.5 (H) mmol/L      Base Excess, Arterial 8.3 (H) mmol/L      O2 Saturation, Arterial 90.5 (L) %      Hemoglobin, Blood Gas 11.7 (L) g/dL      Hematocrit, Blood Gas 35.9 %      Oxyhemoglobin 87.3 (L) %      Methemoglobin 1.00 %      Carboxyhemoglobin 2.5 (H) %      Barometric Pressure for Blood Gas 726 mmHg      Modality N/A     FIO2 40 %      Ventilator Mode NA     Set Mech Resp Rate 18.0     IPAP 18     EPAP 5     Note --     pH, Temp Corrected -- pH Units      pCO2, Temperature Corrected -- mm Hg      pO2, Temperature Corrected -- mm Hg     Blood Culture - Blood, [[id number]] Collected:  11/05/18 2248    Specimen:  Blood from Blood, Venous Line Updated:  11/05/18 2322    Blood Culture - Blood, [[id number]] Collected:  11/05/18 2238    Specimen:  Blood from Arm, Left Updated:  11/05/18 2321    [place] Draw  [[id number]] Collected:  11/05/18 2121    Specimen:  Blood Updated:  11/05/18 2231    Narrative:       The following orders were created for panel order [place] Draw.  Procedure                               Abnormality         Status                     ---------                               -----------         ------                     Light Blue Top[[id number]]                                   Final result               Lavender Top[[id number]]                                     Final result               Gold Top - SST[[id number]]                                   Final result               Green Top (No Gel)[[id number]]                               Final result                 Please view results for these tests on the individual orders.    Light Blue Top [[id number]] Collected:  11/05/18 2121    Specimen:  Blood Updated:  11/05/18 2231     Extra Tube hold for add-on     Comment: Auto resulted       Lavender Top [[id number]] Collected:  11/05/18 2121    Specimen:  Blood Updated:  11/05/18 2231     Extra Tube hold for add-on     Comment: Auto resulted       Gold Top - SST [[id number]] Collected:  11/05/18 2121    Specimen:  Blood Updated:  11/05/18 2231     Extra Tube Hold for add-ons.     Comment: Auto resulted.       Green Top (No Gel) [[id number]] Collected:  11/05/18 2121    Specimen:  Blood Updated:  11/05/18 2231     Extra Tube Hold for add-ons.     Comment: Auto resulted.       Troponin [[id number]]  (Abnormal) Collected:  11/05/18 2121    Specimen:  Blood Updated:  11/05/18 2225     Troponin I 0.191 (C) ng/mL      Comment: Modified report. Previous result was 0.187 ng/mL on 11/5/2018 at 2209 EST.       Narrative:       Normal Patient Upper Reference Limit (URL) (99th Percentile)=0.03 ng/mL   Non-AMI Illness Reference Limit=0.03-0.11 ng/mL   AMI Confirmation=0.12 ng/mL and above    Urinalysis, Microscopic Only - Urine, Clean Catch [[id number]]  (Abnormal) Collected:  11/05/18 2154    Specimen:  Urine from  Urine, Catheter Updated:  11/05/18 2214     RBC, UA 6-12 (A) /HPF      WBC, UA 3-5 (A) /HPF      Bacteria, UA 1+ (A) /HPF      Squamous Epithelial Cells, UA 0-2 /HPF      Hyaline Casts, UA 3-6 /LPF      Mucus, UA Small/1+ (A) /HPF      WBC Clumps, UA Small/1+ /HPF      Methodology Manual Light Microscopy    CBC & Differential [[id number]] Collected:  11/05/18 2121    Specimen:  Blood Updated:  11/05/18 2210    Narrative:       The following orders were created for panel order CBC & Differential.  Procedure                               Abnormality         Status                     ---------                               -----------         ------                     Scan Slide[[id number]]                                       Final result               CBC Auto Differential[[id number]]        Abnormal            Final result                 Please view results for these tests on the individual orders.    Scan Slide [[id number]] Collected:  11/05/18 2121    Specimen:  Blood Updated:  11/05/18 2210     Anisocytosis Slight/1+     Basophilic Stippling Slight/1+     Macrocytes Slight/1+     WBC Morphology Normal     Platelet Estimate Adequate    Comprehensive Metabolic Panel [[id number]]  (Abnormal) Collected:  11/05/18 2121    Specimen:  Blood Updated:  11/05/18 2208     Glucose 189 (H) mg/dL      Comment: Glucose >180, Hemoglobin A1C recommended.        BUN 42 (H) mg/dL      Creatinine 1.90 (H) mg/dL      Sodium 137 mmol/L      Potassium 5.3 (H) mmol/L      Chloride 90 (L) mmol/L      CO2 37.0 (H) mmol/L      Calcium 9.1 mg/dL      Total Protein 7.5 g/dL      Albumin 3.90 g/dL      ALT (SGPT) 79 (H) U/L      AST (SGOT) 52 (H) U/L      Alkaline Phosphatase 132 (H) U/L      Total Bilirubin 0.6 mg/dL      eGFR Non African Amer 26 (L) mL/min/1.73      Globulin 3.6 gm/dL      A/G Ratio 1.1 g/dL      BUN/Creatinine Ratio 22.1     Anion Gap 15.3 mmol/L     Narrative:       GFR Normal >60  Chronic Kidney Disease <60  Kidney  Failure <15    BNP [[id number]]  (Abnormal) Collected:  11/05/18 2121    Specimen:  Blood Updated:  11/05/18 2208     proBNP 4,340.0 (C) pg/mL     Urinalysis With Culture If Indicated - Urine, Catheter [[id number]]  (Abnormal) Collected:  11/05/18 2154    Specimen:  Urine from Urine, Catheter Updated:  11/05/18 2200     Color, UA Yellow     Appearance, UA Clear     pH, UA <=5.0     Specific Gravity, UA >=1.030     Glucose, UA Negative     Ketones, UA Negative     Bilirubin, UA Negative     Blood, UA Moderate (2+) (A)     Protein, UA Trace (A)     Leuk Esterase, UA Negative     Nitrite, UA Negative     Urobilinogen, UA 0.2 E.U./dL    Lactic Acid, Plasma [[id number]]  (Normal) Collected:  11/05/18 2123    Specimen:  Blood Updated:  11/05/18 2152     Lactate 1.2 mmol/L     Blood Gas, Arterial With Co-Ox [[id number]]  (Abnormal) Collected:  11/05/18 2139    Specimen:  Arterial Blood Updated:  11/05/18 2141     Site Left Radial     John's Test Positive     pH, Arterial 7.207 (C) pH units      pCO2, Arterial 97.1 (C) mm Hg      pO2, Arterial 68.9 (L) mm Hg      HCO3, Arterial 38.6 (H) mmol/L      Base Excess, Arterial 7.5 (H) mmol/L      O2 Saturation, Arterial 91.2 (L) %      Hemoglobin, Blood Gas 12.0 g/dL      Hematocrit, Blood Gas 36.9 %      Oxyhemoglobin 87.9 (L) %      Methemoglobin 1.00 %      Carboxyhemoglobin 2.6 (H) %      Barometric Pressure for Blood Gas 729 mmHg      Modality N/A     FIO2 40 %      Ventilator Mode BiPAP     IPAP 18     EPAP 5     Note --     pH, Temp Corrected -- pH Units      pCO2, Temperature Corrected -- mm Hg      pO2, Temperature Corrected -- mm Hg     CBC Auto Differential [[id number]]  (Abnormal) Collected:  11/05/18 2121    Specimen:  Blood Updated:  11/05/18 2139     WBC 12.34 (H) 10*3/mm3      RBC 3.96 (L) 10*6/mm3      Hemoglobin 11.7 (L) g/dL      Hematocrit 40.0 %      .0 (H) fL      MCH 29.5 pg      MCHC 29.3 (L) g/dL      RDW 15.5 (H) %      RDW-SD 58.2 (H) fl      MPV 9.8  fL      Platelets 232 10*3/mm3      Neutrophil % 85.2 (H) %      Lymphocyte % 6.7 (L) %      Monocyte % 6.7 %      Eosinophil % 0.1 %      Basophil % 0.2 %      Immature Grans % 1.1 (H) %      Neutrophils, Absolute 10.50 (H) 10*3/mm3      Lymphocytes, Absolute 0.83 10*3/mm3      Monocytes, Absolute 0.83 10*3/mm3      Eosinophils, Absolute 0.01 10*3/mm3      Basophils, Absolute 0.03 10*3/mm3      Immature Grans, Absolute 0.14 (H) 10*3/mm3      nRBC 0.6 (H) /100 WBC         Imaging Results (last 72 hours)     Procedure Component Value Units Date/Time    XR Chest 1 View [[id number]] Collected:  11/05/18 2156     Updated:  11/05/18 2155    Narrative:       FINAL REPORT    CLINICAL HISTORY:  ams    FINDINGS:  There are low lung volumes with elevation right hemidiaphragm.  There is right lower lobe airspace disease.  The heart is  enlarged.  Aortic knob is calcified.  Impression: Right lower  lobe airspace disease      Impression:       Authenticated by Brandon Golden MD on 11/05/2018 09:56:44 PM        I have personally reviewed and interpreted available lab data, radiology studies and ECG obtained at time of admission.     Assessment / Plan     Assessment/Problem List:     Dyslipidemia    Hypertension    Anxiety (Chronic benzos)    GERD (gastroesophageal reflux disease)    Diabetes mellitus (CMS/[facility])    History of acute myocardial infarction    CAD (coronary artery disease)    Obstructive sleep apnea    Obesity hypoventilation syndrome (CMS/HCC)    Clostridium difficile colitis diagnosed September 2018. Persistent diarrhea despite oral vancomycin    Acute and chronic respiratory failure with hypercapnia (CMS/HCC)    1. Acute metabolic encephalopathy, due to # 2  2. Acute COPD exacerbation, due to # 3  3. RLL pneumonia, possible HCAP due to recent hospitalization  4. Acute renal failure, POA  5. Elevated troponin level, likely type 2 MI in the setting of severe hypoxia and hypercarbia  6. URIEL and OHS, non-compliant on  BiPAP and oxygen therapy  7. Coronary artery disease s/p 1 stent  8. Recent C. Diff infection, currently on oral vancomycin to finish a total of 4 week course  9. Type 2 DM, insulin dependent  10. Hypertension  11. Hyperlipidemia  12. Anxiety  13. Debility  14. Medical non-compliance    Plan:  Patient admitted to the ICU. She is refusing to wear BiPAP at this time. She is maintaining adequate saturation on high-flow. She is mentating normally and answering all questions appropriately. AM ABG ordered, however, doubt that she'll allow for us to obtain this. Continue with IV broad spectrum antibiotics - Vancomycin and Zosyn, IV steroids, bronchodilators and nebulized steroids. F/u blood cultures and sputum culture.     Patient denies any chest pain, pressure or SOA. However, her cardiac enzymes are elevated and trending up. I did load her with aspirin and initiate her on a heparin drip. I was going to consult cardiology with Dr. Davies, however, this morning, patient stated that if she needed any intervention then she wants to be transferred to [facility] under the care of her cardiologist Dr. Flores. She wants us to speak to him before doing anything further.     Of note, patient's current presentation was very similar to her recent hospitalization at [facility]. It appears that the patient lives alone and does not take good care of herself. She did not want us to speak to her daughters regarding her medical care anymore and only requested for her son to be contacted if needed.     During my multiple encounters with her, she stated that she does not want to be intubated or placed on a mechanical ventilator at any point. I did make her daughters aware of patient's request prior to the patient's request to not speak to them any further regarding her medical care.     Further recommendations will depend on clinical course of the patient during the current hospitalization.    I also discussed the details with the nursing staff.  Rest as  ordered.    Anticipated stay is greater than 2 midnights.    Anders Ulloa MD 11/06/18 6:21 AM    Dictated using Dragon.    Electronically signed by Anders Ulloa MD at 11/6/2018  6:57 AM          Emergency Department Notes      Barbara De Luna, RN at 11/6/2018 12:57 AM        Report given to Eliana in ICU. She asked to hold the pt for a few more minutes because they are getting two admissions. Will return call when ready for transport.     Barbara De Luna RN  11/06/18 0059      Electronically signed by Barbara De Luna RN at 11/6/2018 12:59 AM     Edward Johnson MD at 11/6/2018  1:09 AM          Subjective   68-year-old female that presents via EMS after someone called for a welfare check.  Police did find the patient altered in her home.  Follow no at this time is according to EMS she is supposed to be wearing BiPAP continuously and she is noncompliant.  Reviewing medical records patient was recently admitted for respiratory failure to [facility].  Patient can answer no questions.  Past medical history, social history, allergies and review of systems cannot be properly completed due to the patient's altered mental status            Review of Systems   All other systems reviewed and are negative.      Past Medical History:   Diagnosis Date   • Allergic rhinitis    • Asthma with COPD (CMS/[facility])     Asthma/COPD   • Bilateral ovarian cysts    • Coronary artery disease    • Depression with anxiety     Depression/Anxiety   • Diabetes (CMS/[facility])     pre   • Endometriosis     Dr. Jin; estradiol    • ESR raised 3/20/2018   • Fatigue    • Fibromyalgia    • Headache     Headaches   • High cholesterol    • History of gallstones    • History of myocardial infarction    • Hypertension    • Influenza B     DX'D 2/6/2018, TREATED WITH TAMIFLU. LAST DOSE 2/11/2018 AM.  PATIENT STATES DR RANGEL IS AWARE. DENIES FEVERS OR CHILLS.   • Interstitial cystitis    • On home oxygen therapy     2 L NC   • URIEL on CPAP    •  PONV (postoperative nausea and vomiting)    • Seizure disorder, nonconvulsive, with status epilepticus (CMS/[facility]) 3/20/2018   • Septic joint of right knee joint (CMS/[facility]) 3/21/2018   • Shortness of breath on exertion    • Stroke (CMS/[facility])     X1 8 YEARS AGO, GENERALIZED UPPER BODY WEAKNESS RESIDUAL PER PT    • Thyroid disorder    • Urinary leakage    • UTI (urinary tract infection)    • Wears glasses    • Yeast dermatitis        Allergies   Allergen Reactions   • Amlodipine Swelling   • Reglan [Metoclopramide] Other (See Comments)     DYSTONIC REACTION       Past Surgical History:   Procedure Laterality Date   • ARTERIOGRAM N/A 2/12/2018    Procedure: Renal Arteriogram;  Surgeon: Lito Flores MD;  Location:  [facility] CATH INVASIVE LOCATION;  Service:    • BREAST BIOPSY Right 1991   • CARDIAC CATHETERIZATION N/A 2/12/2018    Procedure: Right Heart Cath;  Surgeon: Lito Flores MD;  Location:  [facility] CATH INVASIVE LOCATION;  Service:    • CAROTID STENT      Transcath    • CAROTID STENT Left    • CHOLECYSTECTOMY     • CYSTOSTOMY W/ BLADDER BIOPSY     • DILATATION AND CURETTAGE     • KNEE ARTHROSCOPY Right 3/23/2018    Procedure: ARTHROSCOPY, RIGHT KNEE  INCISION AND DRAINAGE LOWER EXTREMITY WITH SYNOVIAL BIOPSY;  Surgeon: Dilip Giron MD;  Location:  [facility] OR;  Service: Orthopedics   • LAPAROSCOPIC CHOLECYSTECTOMY  1994    Lap rolando   • OOPHORECTOMY     • PAP SMEAR  05/10/2016   • PARTIAL HYSTERECTOMY         Family History   Problem Relation Age of Onset   • Cancer Other    • Diabetes Other    • Heart attack Other    • Hyperlipidemia Other    • Hypertension Other    • Osteoporosis Other    • Stroke Other    • Breast cancer Mother    • Osteoporosis Mother    • Colon cancer Father        Social History     Social History   • Marital status:      Social History Main Topics   • Smoking status: Current Every Day Smoker     Packs/day: 0.25     Years: 40.00     Types: Cigarettes   • Smokeless tobacco: Never Used       Comment: in process of quiting--AVERAGE 1 PPD, DOWN TO 6 CIG PER DAY X2 WEEKS    • Alcohol use 0.6 oz/week     1 Glasses of wine per week      Comment: occasional   • Drug use: No   • Sexual activity: Defer     Other Topics Concern   • Not on file     Social History Narrative    Lives alone.            Objective   Physical Exam   Nursing note and vitals reviewed.    GEN: An obese, lethargic  Head: Normocephalic, atraumatic  Eyes: Pupils equal round reactive to light  ENT: Posterior pharynx normal in appearance, oral mucosa is dry  Chest: Nontender to palpation  Cardiovascular: Regular rate  Lungs: Clear to auscultation bilaterally  Abdomen: Soft, nontender, nondistended, no peritoneal signs  Neuro: Patient will open eyes to loud voice, she does attempt to communicate verbally and simple answers but does seem very confused, patient will follow commands when forcefully prompted      Procedures          ED Course  ED Course as of Nov 06 0109 Mon Nov 05, 2018   2312 EKG shows sinus rhythm with a rate of 65.  Low qrs voltage in the chest leads.  Non-specific T waves throughout the majority of this EKG.  Abnormal EKG.  Interpreted by me.  [DT]      ED Course User Index  [DT] Edward Johnson MD                  MDM  Number of Diagnoses or Management Options  Acute and chronic respiratory failure with hypercapnia (CMS/HCC):   Chronic obstructive pulmonary disease, unspecified COPD type (CMS/HCC):   Pneumonia of right lower lobe due to infectious organism (CMS/HCC):   Diagnosis management comments: Patient was started on BiPAP for hypercapnic respiratory failure.  Patient has been hemodynamically stable entire time.  Chest x-ray suggested a right lower lobe pneumonia.  Blood cultures drawn and antibiotics initiated.  Patient will be admitted to the ICU.    Greater than 35 minutes ago care time was spent by the attending physician excluding separately billable procedures.       Amount and/or Complexity of Data  Reviewed  Clinical lab tests: ordered and reviewed  Discussion of test results with the performing providers: yes  Decide to obtain previous medical records or to obtain history from someone other than the patient: yes  Obtain history from someone other than the patient: yes  Review and summarize past medical records: yes  Discuss the patient with other providers: yes  Independent visualization of images, tracings, or specimens: yes          Final diagnoses:   Acute and chronic respiratory failure with hypercapnia (CMS/HCC)   Pneumonia of right lower lobe due to infectious organism (CMS/HCC)   Chronic obstructive pulmonary disease, unspecified COPD type (CMS/HCC)            Edward Johnson MD  18 0116      Electronically signed by Edward Johnson MD at 2018  1:16 AM          Physician Progress Notes (last 72 hours) (Notes from 2018 10:24 AM through 2018 10:24 AM)      Kentrell Whittington MD at 2018  2:39 PM                [facility]    [facility]ESS [facility]    Name:  Vidhi Lopez   Age:  68 y.o.  Sex:  female  :  1950  MRN:  [id number]   Visit Number:  [id number]  Admission Date:  2018  Date Of Service:  18  Primary Care Physician:  Michael Mays MD     LOS: 1 day :  Patient Care Team:  Michael Mays MD as PCP - General  Michael Mays MD as PCP - Family Medicine  Michael Mays MD as PCP - Lito Huntley MD as Consulting Physician (Cardiology):    Chief Complaint:      Generalized weakness and confusion.    Subjective / Interval History:     Ms. Lopez is currently sitting up on the bed and is comfortable at rest.  According to the nurse, she was very confused and agitated at night almost requiring restraints.  This morning she is very pleasant and is answering questions appropriately.  She is oriented to place time and person.  She states that she got upset because she was forced to have the BiPAP even during  the day.  She is more alert today compared to yesterday and has less asterixis.  She denies any chest pain in her troponins are trending down.      She was admitted from the emergency room on 11/5/2018 after she was found by her neighbor at home unresponsive.  She was noted to have hypercapnic respiratory failure and was placed on BiPAP therapy.  Unfortunately, she continues to smoke cigarettes and is noncompliant with her Trilogy NIV unit at home.  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  She states that she does live alone.  She states that her son lives in [place] and apparently she is in the process of making a plan to move into assisted living facility in [facility].  She denies any alcohol use at home.      She was noted to have elevated troponin levels on admission but denies any chest pain.  She does have history of coronary artery disease status post stenting in May 2018.  She states that she sees Dr. Flores in [place] who is her cardiologist.  Her treatment plan was discussed with Dr. Salmon was on call for Dr. Flores yesterday and he recommended conservative management at this time with continuation of her cardiac medications including dual antiplatelet agents.      Review of Systems:     General ROS: Patient denies any fevers, chills or loss of consciousness.  Generalized weakness.  Respiratory ROS: Cough and chest congestion.  Cardiovascular ROS: Denies chest pain or palpitations. No history of exertional chest pain.  Gastrointestinal ROS: Denies nausea and vomiting. Denies any abdominal pain. No diarrhea.  Neurological ROS: Denies any focal weakness. No loss of consciousness. Denies any numbness.  History of confusion.  Dermatological ROS: Denies any redness or pruritis.    Vital Signs:    Temp:  [97.9 °F (36.6 °C)-98.4 °F (36.9 °C)] 98.2 °F (36.8 °C)  Heart Rate:  [46-68] 54  Resp:  [15-32] 18  BP: (104-225)/() 176/77  FiO2 (%):  [35 %] 35 %    Intake  and output:    I/O last 3 completed shifts:  In: 4464 [P.O.:2520; I.V.:1944]  Out: 1550 [Urine:1550]  I/O this shift:  In: 560 [P.O.:560]  Out: -     Physical Examination:    General Appearance:  Alert and cooperative, not in any acute distress.   Head:  Atraumatic and normocephalic, without obvious abnormality.   Eyes:          PERRLA, conjunctivae and sclerae normal, no Icterus. No pallor. Extra-occular movements are within normal limits.   Neck: Supple, trachea midline, no thyromegaly, no carotid bruit.   Lungs:   Chest shape is normal. Breath sounds heard bilaterally equally.  No wheezing.  Occasional basal crackles heard. No Pleural rub or bronchial breathing.   Heart:  Normal S1 and S2, no murmur, no gallop, no rub. No JVD   Abdomen:   Normal bowel sounds, no masses, no organomegaly. Soft, non-tender, non-distended, no guarding, no rebound tenderness.   Extremities: Moves all extremities well, no edema, no cyanosis, no            clubbing.   Skin: No bleeding, bruising or rash.   Neurologic: Awake, alert and oriented times 3. Moves all 4 extremities equally.  Asterixis noted on the outstretched hands.   Laboratory results:      Results from last 7 days  Lab Units 11/07/18 0415 11/06/18 0224 11/05/18 2121   SODIUM mmol/L 138 135* 137   POTASSIUM mmol/L 4.7 5.2* 5.3*   CHLORIDE mmol/L 96* 96* 90*   CO2 mmol/L 35.0* 35.0* 37.0*   BUN mg/dL 37* 40* 42*   CREATININE mg/dL 0.80 1.40* 1.90*   CALCIUM mg/dL 9.1 8.5 9.1   BILIRUBIN mg/dL  --   --  0.6   ALK PHOS U/L  --   --  132*   ALT (SGPT) U/L  --   --  79*   AST (SGOT) U/L  --   --  52*   GLUCOSE mg/dL 269* 149* 189*       Results from last 7 days  Lab Units 11/07/18 0415 11/06/18 0220 11/05/18 2121   WBC 10*3/mm3 9.46 11.41* 12.34*   HEMOGLOBIN g/dL 10.8* 11.3* 11.7*   HEMATOCRIT % 35.3* 38.0 40.0   PLATELETS 10*3/mm3 162 202 232       Results from last 7 days  Lab Units 11/06/18  0220   INR  1.25*       Results from last 7 days  Lab Units 11/07/18  041  11/06/18  1315 11/06/18  0813   TROPONIN I ng/mL 0.117* 0.173* 0.182*       Results from last 7 days  Lab Units 11/05/18  2248 11/05/18  2238   BLOODCX  Abnormal Stain* No growth at 24 hours     I have reviewed the patient's laboratory results.    Radiology results:    Imaging Results (last 24 hours)     Procedure Component Value Units Date/Time    XR Chest 1 View [[id number]] Collected:  11/07/18 0852     Updated:  11/07/18 0855    Narrative:       PROCEDURE: XR CHEST 1 VW-     HISTORY: RLL pneumonia.; J96.22-Acute and chronic respiratory failure  with hypercapnia; J18.1-Lobar pneumonia, unspecified organism;  J44.9-Chronic obstructive pulmonary disease, unspecified     COMPARISON: November 5, 2018.     FINDINGS: The heart is normal in size. The mediastinum is unremarkable.  There is diffuse bilateral bronchial wall thickening. There is improved  aeration of the right lung base compared to the prior exam. There is no  pneumothorax.  There are no acute osseous abnormalities.           Impression:       Improved aeration of the right lung base compared to the  prior exam.     Continued followup is recommended.     This report was finalized on 11/7/2018 8:53 AM by Carissa Markham M.D..        I have reviewed the patient's radiology reports.    Medication Review:     I have reviewed the patients active and prn medications.       Acute and chronic respiratory failure with hypercapnia (CMS/HCC)    Pneumonia of right lower lobe due to infectious organism (CMS/HCC)    Obstructive sleep apnea    CAD (coronary artery disease)    Essential hypertension    Dyslipidemia    Anxiety (Chronic benzos)    GERD (gastroesophageal reflux disease)    Diabetes mellitus (CMS/HCC)    Acute exacerbation of chronic obstructive pulmonary disease (COPD) (CMS/HCC)    History of acute myocardial infarction    Acute kidney injury (CMS/HCC)    Clostridium difficile colitis diagnosed September 2018. Persistent diarrhea despite oral vancomycin     Demand ischemia (CMS/[facility])    Assessment:    1.  Acute metabolic encephalopathy secondary to #2, present on admission.  2.  Acute on chronic hypercapnic and hypoxic respiratory failure, present on admission.  3.  Right lower lobe bacterial pneumonia, present on admission.  4.  Acute COPD exacerbation, present on admission.  5.  Acute renal failure, present on admission, resolved.  6.  Demand ischemia with elevated troponin level secondary to #2.  7.  Obstructive sleep apnea, noncompliant with BiPAP therapy at home.  8.  Coronary artery disease status post stenting.  9.  Recent history of C. difficile colitis status post a course of oral vancomycin.  10.  Diabetes mellitus type 2.  11.  Essential hypertension.  12.  Generalized anxiety disorder.  13.  Medical noncompliance.  14.  Chronic tobacco dependence.    Plan:    Ms. Lopez is currently doing better and her hypercapnia is improving.  She is being followed by Dr. Mancilla from pulmonology and since her pro-calcitonin was negative, he has discontinued her IV antibiotic therapy especially since she has had recent C. difficile infection.  Unfortunately one of her blood culture is positive for gram-positive cocci which may be a contaminant as she is afebrile and does not seem to be in any sepsis.  She is currently on nasal cannula oxygen saturating in the mid 90s.  She will be continued on bronchodilators, budesonide, IV Solu-Medrol and mucolytic agents.  I have strongly advised her to discontinue smoking and be compliant with her Trilogy NIV unit.    Patient unfortunately develops increased confusion, hallucinations and agitated behavior especially in the evening.  She is currently alert and oriented ×3.  It seems that the patient may have underlying dementia/mild cognitive deficit.  This may be compounded by hospitalization, hypercapnia as well as medication use including benzodiazepines and Neurontin.  We will place her on low-dose Seroquel at night improve her  "agitation.    Patient does have elevated troponin levels which are trending down.  She denies any chest pain and did not have any ST changes on her initial EKG.  I think this is secondary to demand ischemia/type II myocardial infarction and also contribution by her renal failure.  Primary cardiologist on-call recommended continuation of her home medications and conservative management at this time.  No cardiac interventions are indicated at this time as per primary cardiology.    Patient will be continued on BiPAP at night as well as 4 hours during the day.  She will be started on physical therapy.  She is on Lovenox for DVT prophylaxis.  I have discussed the patient's condition with case management services.  I have recommended if possible the patient may be transitioned from the hospital to the assisted living facility at discharge.    Kentrell Whittington MD  18  2:39 PM    Dictated utilizing Dragon dictation.      Electronically signed by Kentrell Whittington MD at 2018  4:48 PM     Phoebe Mancilla MD at 2018 11:34 AM            CC: Acute respiratory failure.    S: On BiPAP. Awake and Alert.     ROS: Positive for diarrhea, shortness of breath, mild anxiety, & headache. Denies chest pain, or fever.    O:Vital signs reviewed. O2Sat: 98 % on BiPAP   /72   Pulse 56   Temp 98.2 °F (36.8 °C) (Oral)   Resp 22   Ht 160 cm (63\")   Wt 92.5 kg (204 lb)   LMP  (LMP Unknown) Comment: LAST MAMMOGRAM   SpO2 97%   BMI 36.14 kg/m²      Temp (24hrs), Av.3 °F (36.8 °C), Min:97.9 °F (36.6 °C), Max:99 °F (37.2 °C)        I & Os reviewed.   Intake/Output       18 0700 - 18 0659 18 0700 - 18 0659    Intake (ml) 3126 320    Output (ml) 750 --    Net (ml) 2376 320    Last Weight  92.5 kg (204 lb)  --          General: On BiPAP. No acute distress noted.  Eyes: PERRL. EOM Sluggish   Neck: Supple with out JVD. No obvious masses noted.   Cardiovascular: S1 + S2. Regular.   Respiratory: On " BiPAP. No respiratory distress noted. Scattered wheezing heard. No crackles noted  GI: Soft. Bowel sounds somewhat hyperactive   Extremities: No edema noted.  Neurologic: AAOx3. Was able to follow commands.   Skin: Appeared somewhat dry and without any overt rashes    Labs: Reviewed.       Results from last 7 days  Lab Units 11/07/18 0415 11/06/18 0224 11/05/18 2121   SODIUM mmol/L 138 135* 137   POTASSIUM mmol/L 4.7 5.2* 5.3*   CHLORIDE mmol/L 96* 96* 90*   CO2 mmol/L 35.0* 35.0* 37.0*   BUN mg/dL 37* 40* 42*   CREATININE mg/dL 0.80 1.40* 1.90*   CALCIUM mg/dL 9.1 8.5 9.1   BILIRUBIN mg/dL  --   --  0.6   ALK PHOS U/L  --   --  132*   ALT (SGPT) U/L  --   --  79*   AST (SGOT) U/L  --   --  52*   GLUCOSE mg/dL 269* 149* 189*         Results from last 7 days  Lab Units 11/07/18 0415   MAGNESIUM mg/dL 2.9*           Results from last 7 days  Lab Units 11/07/18 0415 11/06/18 0220 11/05/18  2121   WBC 10*3/mm3 9.46 11.41* 12.34*   HEMOGLOBIN g/dL 10.8* 11.3* 11.7*   PLATELETS 10*3/mm3 162 202 232         Results from last 7 days  Lab Units 11/06/18 0220   INR  1.25*           Pharmacy to dose vancomycin    Pharmacy to Dose Zosyn    [START ON 11/8/2018] Pharmacy Consult        ABG: Reviewed  Lab Results   Component Value Date    PHART 7.447 11/07/2018    JMB6FVM 54.2 (H) 11/07/2018    PO2ART 70.4 (L) 11/07/2018    HGBBG 10.1 (L) 11/07/2018    R5BQDPDO 95.3 11/07/2018    CARBOXYHGB 1.8 11/07/2018         CXRay: Reviewed.  Imaging Results (last 24 hours)     Procedure Component Value Units Date/Time    XR Chest 1 View [[id number]] Collected:  11/07/18 0852     Updated:  11/07/18 0855    Narrative:       PROCEDURE: XR CHEST 1 VW-     HISTORY: RLL pneumonia.; J96.22-Acute and chronic respiratory failure  with hypercapnia; J18.1-Lobar pneumonia, unspecified organism;  J44.9-Chronic obstructive pulmonary disease, unspecified     COMPARISON: November 5, 2018.     FINDINGS: The heart is normal in size. The mediastinum is  unremarkable.  There is diffuse bilateral bronchial wall thickening. There is improved  aeration of the right lung base compared to the prior exam. There is no  pneumothorax.  There are no acute osseous abnormalities.           Impression:       Improved aeration of the right lung base compared to the  prior exam.     Continued followup is recommended.     This report was finalized on 11/7/2018 8:53 AM by Carissa Markham M.D..            Assessment & Recommendations/Plan:   1.  Acute Respiratory Failure.  BiPAP can be used PRN during the daytime and all night please.   ABG has improved.     2.  Acute exacerbation of COPD  Will consider switching her to by mouth prednisone.    3.?  Right lower lobe pneumonia  Normal Pro Calcitonin.  CXR has improved.  Due to recent CDI, will stop all antibiotics for now.    4.  C. difficile infection  On by mouth vancomycin    5.  Smoking  Advised to quit smoking    6.  Obstructive sleep apnea hypoventilation syndrome.    Currently on noninvasive ventilation device at home.    7.  Obesity    8.  Grade 1 diastolic dysfunction.    Appears compensated at this time    9.  Acute renal failure  Creatinine normal  Will D/C IVF.    We have reviewed patient's current orders and changes, if any, have been suggested to primary care team. Plan was also discussed with nursing staff, as necessary.     Phoebe Mancilla MD  11/07/18  11:34 AM    Dictated utilizing Dragon dictation.      Electronically signed by Phoebe Mancilla MD at 11/7/2018  5:50 PM     Anders Ulloa MD at 11/7/2018 12:58 AM        Patient has been extremely agitated, combative and disoriented throughout the night. She appeared to have been hallucinating - talking to people in her room when there was no one.   She refused to keep BiPAP on. Pulled out her IV line.   She had to be given haldol and valium. Security was called.   BiPAP placed back on.   Unable to obtain ABG at this time due to patient's lack of  cooperation. Will reattempt.     Electronically signed by Anders Ulloa MD at 2018  1:23 AM     Kentrell Whittington MD at 2018  4:15 PM                [facility]    [facility]    Name:  Vidhi Lopez   Age:  68 y.o.  Sex:  female  :  1950  MRN:  [id number]   Visit Number:  [id number]  Admission Date:  2018  Date Of Service:  18  Primary Care Physician:  Michael Mays MD     LOS: 0 days :  Patient Care Team:  Michael Mays MD as PCP - General  Michael Mays MD as PCP - Family Medicine  Michael Mays MD as PCP - Lito Huntley MD as Consulting Physician (Cardiology):    Chief Complaint:      Generalized weakness and confusion.    Subjective / Interval History:     Ms. Lopez is currently sitting up on the bed and is comfortable at rest.  She is slightly drowsy but has significantly improved with regards to her mental status.  She was admitted from the emergency room on 2018 after she was found by her neighbor at home unresponsive.  She was noted to have hypercapnic respiratory failure and was placed on BiPAP therapy overnight.  Her CO2 levels have improved in this morning CABG.  Unfortunately, she continues to smoke cigarettes and is noncompliant with her Trilogy NIV unit at home.  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  She states that she does live alone.  She was noted to have elevated troponin levels were denies any chest pain.  She does have history of coronary artery disease status post stenting in May 2018.  She states that she sees Dr. Flores in [place] who is her cardiologist.  Previous physician documentation, laboratory and imaging data have been reviewed.    Review of Systems:     General ROS: Patient denies any fevers, chills or loss of consciousness.  Generalized weakness.  Respiratory ROS: Cough and chest congestion.  Cardiovascular ROS: Denies chest pain or  palpitations. No history of exertional chest pain.  Gastrointestinal ROS: Denies nausea and vomiting. Denies any abdominal pain. No diarrhea.  Neurological ROS: Denies any focal weakness. No loss of consciousness. Denies any numbness.  History of confusion.  Dermatological ROS: Denies any redness or pruritis.    Vital Signs:    Temp:  [97.6 °F (36.4 °C)-99 °F (37.2 °C)] 99 °F (37.2 °C)  Heart Rate:  [53-76] 53  Resp:  [16-26] 21  BP: ()/() 157/70  FiO2 (%):  [35 %-60 %] 35 %    Intake and output:    I/O last 3 completed shifts:  In: 1338 [P.O.:600; I.V.:738]  Out: 800 [Urine:800]  I/O this shift:  In: 800 [P.O.:800]  Out: -     Physical Examination:    General Appearance:  Alert and cooperative, not in any acute distress.   Head:  Atraumatic and normocephalic, without obvious abnormality.   Eyes:          PERRLA, conjunctivae and sclerae normal, no Icterus. No pallor. Extra-occular movements are within normal limits.   Neck: Supple, trachea midline, no thyromegaly, no carotid bruit.   Lungs:   Chest shape is normal. Breath sounds heard bilaterally equally.  No wheezing.  Occasional basal crackles heard. No Pleural rub or bronchial breathing.   Heart:  Normal S1 and S2, no murmur, no gallop, no rub. No JVD   Abdomen:   Normal bowel sounds, no masses, no organomegaly. Soft, non-tender, non-distended, no guarding, no rebound tenderness.  Melissa catheter is in place.   Extremities: Moves all extremities well, no edema, no cyanosis, no            clubbing.   Skin: No bleeding, bruising or rash.   Neurologic: Awake, alert and oriented times 3. Moves all 4 extremities equally.  Asterixis noted on the outstretched hands.   Laboratory results:      Results from last 7 days  Lab Units 11/06/18 0224 11/05/18  2121   SODIUM mmol/L 135* 137   POTASSIUM mmol/L 5.2* 5.3*   CHLORIDE mmol/L 96* 90*   CO2 mmol/L 35.0* 37.0*   BUN mg/dL 40* 42*   CREATININE mg/dL 1.40* 1.90*   CALCIUM mg/dL 8.5 9.1   BILIRUBIN mg/dL  --   0.6   ALK PHOS U/L  --  132*   ALT (SGPT) U/L  --  79*   AST (SGOT) U/L  --  52*   GLUCOSE mg/dL 149* 189*       Results from last 7 days  Lab Units 11/06/18  0220 11/05/18  2121   WBC 10*3/mm3 11.41* 12.34*   HEMOGLOBIN g/dL 11.3* 11.7*   HEMATOCRIT % 38.0 40.0   PLATELETS 10*3/mm3 202 232       Results from last 7 days  Lab Units 11/06/18  0220   INR  1.25*       Results from last 7 days  Lab Units 11/06/18  1315 11/06/18  0813 11/06/18  0224   TROPONIN I ng/mL 0.173* 0.182* 0.216*       Results from last 7 days  Lab Units 11/05/18  2248 11/05/18  2238   BLOODCX  No growth at less than 24 hours No growth at less than 24 hours     I have reviewed the patient's laboratory results.    Radiology results:    Imaging Results (last 24 hours)     Procedure Component Value Units Date/Time    XR Chest 1 View [[id number]] Collected:  11/05/18 2156     Updated:  11/05/18 2155    Narrative:       FINAL REPORT    CLINICAL HISTORY:  ams    FINDINGS:  There are low lung volumes with elevation right hemidiaphragm.  There is right lower lobe airspace disease.  The heart is  enlarged.  Aortic knob is calcified.  Impression: Right lower  lobe airspace disease      Impression:       Authenticated by Brandon Golden MD on 11/05/2018 09:56:44 PM        I have reviewed the patient's radiology reports.    Medication Review:     I have reviewed the patients active and prn medications.       Obstructive sleep apnea    CAD (coronary artery disease)    Dyslipidemia    Hypertension    Anxiety (Chronic benzos)    GERD (gastroesophageal reflux disease)    Diabetes mellitus (CMS/HCC)    History of acute myocardial infarction    Obesity hypoventilation syndrome (CMS/HCC)    Clostridium difficile colitis diagnosed September 2018. Persistent diarrhea despite oral vancomycin    Acute and chronic respiratory failure with hypercapnia (CMS/HCC)    Assessment:    1.  Acute metabolic encephalopathy secondary to #2, present on admission.  2.  Acute on  chronic hypercapnic and hypoxic respiratory failure, present on admission.  3.  Right lower lobe bacterial pneumonia, present on admission.  4.  Acute COPD exacerbation, present on admission.  5.  Acute renal failure, present on admission.  6.  Demand ischemia with elevated troponin level secondary to #2.  7.  Obstructive sleep apnea, noncompliant with BiPAP therapy at home.  8.  Coronary artery disease status post stenting.  9.  Recent history of C. difficile colitis status post a course of oral vancomycin.  10.  Diabetes mellitus type 2.  11.  Essential hypertension.  12.  Generalized anxiety disorder.  13.  Medical noncompliance.  14.  Chronic tobacco dependence.    Plan:    Ms. Lopez is currently doing better after she used the BiPAP through the night.  Hypercapnia has improved.  She is currently on nasal cannula oxygen saturating in the mid 90s.  She does have right lower lobe pneumonia and will be continued on IV antibiotic therapy with Zosyn and vancomycin since she had a recent hospital stay.  Blood cultures have been sent and are currently pending.  She will be continued on bronchodilators, budesonide, IV Solu-Medrol and mucolytic agents.  I have strongly advised her to discontinue smoking and be compliant with her Trilogy NIV unit.    Patient does have elevated troponin levels which are trending down.  She denies any chest pain and did not have any ST changes on her initial EKG.  I think this is secondary to demand ischemia/type II myocardial infarction and also contribution by her renal failure.  Patient does not want to see any cardiologist here.  In view of this, I discussed the patient's condition with Dr. Salmon was on call for her primary cardiologist Dr. Lito Flores. Dr. Salmon felt that the patient's troponin levels at this time did not require any interventions and he recommended to continue her home medications including dual antiplatelet agents.  He recommended outpatient follow-up with   Mark.    Patient will be continued on BiPAP at night as well as 4 hours during the day.  She will be transferred to the medical floor with telemetry.  She was initially on heparin drip, and I will discontinue that and place her on Lovenox for DVT prophylaxis.    Kentrell Whittington MD  11/06/18  4:15 PM    Dictated utilizing Dragon dictation.      Electronically signed by Kentrell Whittington MD at 11/6/2018  5:27 PM          Consult Notes (last 72 hours) (Notes from 11/5/2018 10:24 AM through 11/8/2018 10:24 AM)      Phoebe Mancilla MD at 11/6/2018 11:56 AM      Consult Orders:    1. Inpatient Pulmonology Consult [[id number]] ordered by Anders Ulloa MD at 11/06/18 0215                Date of consultation:   November 6, 2018    Requested by:   Hospitalist Service.     PCP: Michael Mays MD    Reason:  Acute Respiratory Failure.     History of Present Illness:  68 y.o. female   who apparently is on noninvasive ventilation device at home for?  Obstructive sleep apnea and apparently had been using her device on compliant basis, as per the patient who was brought into the ER after the neighbors informed to please as they had not seen her in a few days.  The patient was brought to the emergency room and was found to have elevated carbon dioxide.  She was placed on BiPAP and was transferred to ICU.      Her history is limited since she is on BiPAP at this time but she says that she is compliant with her noninvasive ventilation unit.  Upon questioning she could not tell me when the sleep study was performed    She currently smokes 1/2 PPD and has been smoking for 45-50 years.  Denies any Family history of URIEL or COPD.     She also insists that she does not have COPD although upon review of her home medications she is on Symbicort as well as nebulized treatments.    Pulmonary consultation was requested for further recommendations.     Review of System: Could not be obtained, as the patient is on BiPAP.     Past Medical  History:  Past Medical History:   Diagnosis Date   • Allergic rhinitis    • Asthma with COPD (CMS/[facility])     Asthma/COPD   • Bilateral ovarian cysts    • Coronary artery disease    • Depression with anxiety     Depression/Anxiety   • Diabetes (CMS/[facility])     pre   • Endometriosis     Dr. Jin; estradiol    • ESR raised 3/20/2018   • Fatigue    • Fibromyalgia    • Headache     Headaches   • High cholesterol    • History of gallstones    • History of myocardial infarction    • Hypertension    • Influenza B     DX'D 2/6/2018, TREATED WITH TAMIFLU. LAST DOSE 2/11/2018 AM.  PATIENT STATES DR FLORES IS AWARE. DENIES FEVERS OR CHILLS.   • Interstitial cystitis    • On home oxygen therapy     2 L NC   • URIEL on CPAP    • PONV (postoperative nausea and vomiting)    • Seizure disorder, nonconvulsive, with status epilepticus (CMS/[facility]) 3/20/2018   • Septic joint of right knee joint (CMS/[facility]) 3/21/2018   • Shortness of breath on exertion    • Stroke (CMS/HCC)     X1 8 YEARS AGO, GENERALIZED UPPER BODY WEAKNESS RESIDUAL PER PT    • Thyroid disorder    • Urinary leakage    • UTI (urinary tract infection)    • Wears glasses    • Yeast dermatitis          Past Surgical History:  Past Surgical History:   Procedure Laterality Date   • ARTERIOGRAM N/A 2/12/2018    Procedure: Renal Arteriogram;  Surgeon: Lito Flores MD;  Location:  [facility] CATH INVASIVE LOCATION;  Service:    • BREAST BIOPSY Right 1991   • CARDIAC CATHETERIZATION N/A 2/12/2018    Procedure: Right Heart Cath;  Surgeon: Lito Flores MD;  Location:  [facility] CATH INVASIVE LOCATION;  Service:    • CAROTID STENT      Transcath    • CAROTID STENT Left    • CHOLECYSTECTOMY     • CYSTOSTOMY W/ BLADDER BIOPSY     • DILATATION AND CURETTAGE     • KNEE ARTHROSCOPY Right 3/23/2018    Procedure: ARTHROSCOPY, RIGHT KNEE  INCISION AND DRAINAGE LOWER EXTREMITY WITH SYNOVIAL BIOPSY;  Surgeon: Dilip Giron MD;  Location:  [facility] OR;  Service: Orthopedics   • LAPAROSCOPIC CHOLECYSTECTOMY   "1994    Lap rolando   • OOPHORECTOMY     • PAP SMEAR  05/10/2016   • PARTIAL HYSTERECTOMY           Family History:  Family History   Problem Relation Age of Onset   • Cancer Other    • Diabetes Other    • Heart attack Other    • Hyperlipidemia Other    • Hypertension Other    • Osteoporosis Other    • Stroke Other    • Breast cancer Mother    • Osteoporosis Mother    • Colon cancer Father          Social History:  Social History     Social History   • Marital status:      Social History Main Topics   • Smoking status: Current Every Day Smoker     Packs/day: 0.25     Years: 40.00     Types: Cigarettes   • Smokeless tobacco: Never Used      Comment: in process of quiting--AVERAGE 1 PPD, DOWN TO 6 CIG PER DAY X2 WEEKS    • Alcohol use 0.6 oz/week     1 Glasses of wine per week      Comment: occasional   • Drug use: No   • Sexual activity: Defer     Other Topics Concern   • Not on file     Social History Narrative    Lives alone.          Physical Exam:  /64   Pulse 56   Temp 98.1 °F (36.7 °C) (Oral)   Resp 20   Ht 160 cm (63\")   Wt 92.4 kg (203 lb 11.2 oz)   LMP  (LMP Unknown) Comment: LAST MAMMOGRAM 2017  SpO2 98%   BMI 36.08 kg/m²      Constitutional:            Vital signs reviewed            Patient is currently on BiPAP    Head/Face/Eyes:            Pupils appeared equal and reactive to light    ENT:             Patient was on the BiPAP      Neck:             Supple. No JVD noted.     Cardiovascular:              S1 + S2. Regular.     Respiratory:            Transmitted breath sounds bilaterally with good air entry             Percussion could not be performed at this time.    Abdomen:            Soft.  Bowel sounds sluggishly positive. No obvious organomegaly noted.    Musculoskeletal/Extremities:             Gait could not be assessed at this time.              No clubbing in the upper extremities             No cyanosis noted in the upper extremities.             No edema noted in the lower " extremities bilaterally.    Neurologic/Psychiatric:             Was able to follow simple commands              Exam was limited since the patient was on BiPAP.    Skin:             No obvious rash noted.             Warm and dry.        Labs:   Reviewed. Pertinent labs were noted.     Lab Results   Component Value Date    WBC 11.41 (H) 11/06/2018    HGB 11.3 (L) 11/06/2018    HCT 38.0 11/06/2018    .8 (H) 11/06/2018     11/06/2018       Lab Results   Component Value Date    GLUCOSE 149 (H) 11/06/2018    CALCIUM 8.5 11/06/2018     (L) 11/06/2018    K 5.2 (H) 11/06/2018    CO2 35.0 (H) 11/06/2018    CL 96 (L) 11/06/2018    BUN 40 (H) 11/06/2018    CREATININE 1.40 (H) 11/06/2018    EGFRIFNONA 38 (L) 11/06/2018    BCR 28.6 (H) 11/06/2018    ANIONGAP 9.2 (L) 11/06/2018         ABG:  Lab Results   Component Value Date    PHART 7.377 11/06/2018    OMK4EBW 64.2 (C) 11/06/2018    PO2ART 78.9 11/06/2018    SO2 19.1 (L) 02/05/2015    HGBBG 10.2 (L) 11/06/2018    K2IFKKJX 96.7 11/06/2018    CARBOXYHGB 2.1 (H) 11/06/2018           Imaging Study: Images reviewed personally     Imaging Results (last 72 hours)     Procedure Component Value Units Date/Time    XR Chest 1 View [[id number]] Collected:  11/05/18 2156     Updated:  11/05/18 2155    Narrative:       FINAL REPORT    CLINICAL HISTORY:  ams    FINDINGS:  There are low lung volumes with elevation right hemidiaphragm.  There is right lower lobe airspace disease.  The heart is  enlarged.  Aortic knob is calcified.  Impression: Right lower  lobe airspace disease      Impression:       Authenticated by Brandon Golden MD on 11/05/2018 09:56:44 PM            (10/15/2018)ECHO:  · Left ventricular systolic function is normal. Estimated EF = 55%.  · Left ventricular diastolic dysfunction (grade I) consistent with impaired relaxation.  · Moderately reduced right ventricular systolic function noted.      Assessment:  1.  Acute Respiratory Failure.  2.  Acute  exacerbation of COPD  3.?  Right lower lobe pneumonia  4.  C. difficile infection  5.  Smoking  6.  Obstructive sleep apnea hypoventilation syndrome.  Currently on noninvasive ventilation device at home.  7.  Obesity  8.  Grade 1 diastolic dysfunction.  Appears compensated at this time  9.  Acute renal failure    Discussion/Recommendations:   I have adjusted the BiPAP settings. ABG and Chest X Ray will be ordered as appropriate.     Will continue IV steroids but since her blood glucose level has remained significantly elevated throughout her stay, I will ask the nursing staff to administer 10 units of regular insulin subcutaneously with each dose of Solu-Medrol.    In reviewing the patient's chest x-ray does suggest the possibility of right-sided pneumonia although her symptoms were not consistent with it.  In order to further aid in the diagnosis or otherwise, I will order pro-calcitonin level.    It is imperative that we decrease the use of antibiotics if appropriate, given her recent history of C. difficile colitis and the fact that she had to be started on vancomycin by mouth.    I will definitely de-escalate antibiotics in a rapid fashion, if clinically appropriate.    I will discontinue IV fluids once her creatinine is less than 1.2 or so.    Some parts of history, ROS & physical exam were obtained by and with the APRN.     All the relevant labs, radiology images, echocardiograms etc were reviewed with the APRN. Plan was also discussed in detail with APRN.     The plan was discussed with the patient .  I have also discussed the case with the nursing staff.    Recommendations were also discussed with the referring provider.     I would like to thank you for the opportunity to participate in the care of this patient.  We will communicate changes and recommendations, if and when necessary.      Phoebe Mancilla MD  11/06/18  11:56 AM    Dictated utilizing Dragon dictation.        Electronically signed by Laurent,  Phoebe CHURCHILL MD at 11/6/2018  6:27 PM       Physical Therapy Notes (last 24 hours) (Notes from 11/7/2018 10:24 AM through 11/8/2018 10:24 AM)     No notes of this type exist for this encounter.        Occupational Therapy Notes (last 24 hours) (Notes from 11/7/2018 10:24 AM through 11/8/2018 10:24 AM)     No notes of this type exist for this encounter.           Respiratory Therapy Notes (last 24 hours) (Notes from 11/7/2018 10:24 AM through 11/8/2018 10:24 AM)      Franklin Topete, DANIEL at 11/8/2018  3:45 AM        Problem: NPPV/CPAP (Adult)  Goal: Signs and Symptoms of Listed Potential Problems Will be Absent, Minimized or Managed (NPPV/CPAP)  Outcome: Ongoing (interventions implemented as appropriate)          Electronically signed by Franklin Topete CRT at 11/8/2018  3:45 AM     Marcy Shafer CRT at 11/7/2018 12:46 PM          Problem: Patient Care Overview  Goal: Plan of Care Review  Outcome: Ongoing (interventions implemented as appropriate)   11/07/18 1245   Coping/Psychosocial   Plan of Care Reviewed With patient   Plan of Care Review   Progress improving       Problem: NPPV/CPAP (Adult)  Goal: Signs and Symptoms of Listed Potential Problems Will be Absent, Minimized or Managed (NPPV/CPAP)   11/07/18 1245   Goal/Outcome Evaluation   Problems Assessed (NPPV/CPAP) all   Problems Present (NPPV/CPAP) none           Electronically signed by Marcy Shafer CRT at 11/7/2018 12:46 PM

## 2018-11-08 NOTE — PLAN OF CARE
Problem: Patient Care Overview  Goal: Plan of Care Review  Outcome: Ongoing (interventions implemented as appropriate)   11/08/18 9128   Coping/Psychosocial   Plan of Care Reviewed With patient   Plan of Care Review   Progress no change   OTHER   Outcome Summary OT eval completed. Patient presents deficits in strength, endurance, balance, mobility and ADL performance. Patient is expected to benefit from OT services to improve overall functional performance and mobility prior to DC.

## 2018-11-08 NOTE — THERAPY EVALUATION
Acute Care - Physical Therapy Initial Evaluation   [facility]     Patient Name: Vidhi Loepz  : 1950  MRN: [id number]  Today's Date: 2018   Onset of Illness/Injury or Date of Surgery: 18  Date of Referral to PT: 18  Referring Physician: Nelsy      Admit Date: 2018    Visit Dx:     ICD-10-CM ICD-9-CM   1. Acute and chronic respiratory failure with hypercapnia (CMS/[facility]) J96.22 518.84   2. Pneumonia of right lower lobe due to infectious organism (CMS/[facility]) J18.1 486   3. Chronic obstructive pulmonary disease, unspecified COPD type (CMS/[facility]) J44.9 496   4. Impaired functional mobility, balance, gait, and endurance Z74.09 V49.89     Patient Active Problem List   Diagnosis   • Dyslipidemia   • Hypertension   • Anxiety (Chronic benzos)   • Insomnia   • GERD (gastroesophageal reflux disease)   • Diabetes mellitus (CMS/[facility])   • Fibromyalgia   • RLS (restless legs syndrome)   • Migraines   • Acute exacerbation of chronic obstructive pulmonary disease (COPD) (CMS/[facility])   • Interstitial cystitis   • History of acute myocardial infarction   • History of cardiac murmur   • History of stroke   • CAD (coronary artery disease)   • Essential hypertension   • CKD (chronic kidney disease) stage 2, GFR 60-89 ml/min   • Bilateral carotid artery stenosis   • Pneumonia of right lower lobe due to infectious organism (CMS/[facility])   • Obstructive sleep apnea   • Obesity (BMI 30-39.9)   • Diabetes mellitus type 2 in obese (CMS/[facility])   • Obesity hypoventilation syndrome (CMS/[facility])   • Tobacco use   • Acute kidney injury (CMS/[facility])   • Chronic pain (Chronic narcotics)   • R/O CAP (community acquired pneumonia)   • Respiratory failure, acute and chronic (CMS/[facility])   • Recurrent UTI/interstitial cystitis on chronic methenamine   • Clostridium difficile colitis diagnosed 2018. Persistent diarrhea despite oral vancomycin   • Acute and chronic respiratory failure with hypercapnia (CMS/[facility])   • Demand ischemia (CMS/[facility])      Past Medical History:   Diagnosis Date   • Allergic rhinitis    • Asthma with COPD (CMS/[facility])     Asthma/COPD   • Bilateral ovarian cysts    • Coronary artery disease    • Depression with anxiety     Depression/Anxiety   • Diabetes (CMS/[facility])     pre   • Endometriosis     Dr. Jin; estradiol    • ESR raised 3/20/2018   • Fatigue    • Fibromyalgia    • Headache     Headaches   • High cholesterol    • History of gallstones    • History of myocardial infarction    • Hypertension    • Influenza B     DX'D 2/6/2018, TREATED WITH TAMIFLU. LAST DOSE 2/11/2018 AM.  PATIENT STATES DR FLORES IS AWARE. DENIES FEVERS OR CHILLS.   • Interstitial cystitis    • On home oxygen therapy     2 L NC   • URIEL on CPAP    • PONV (postoperative nausea and vomiting)    • Seizure disorder, nonconvulsive, with status epilepticus (CMS/[facility]) 3/20/2018   • Septic joint of right knee joint (CMS/[facility]) 3/21/2018   • Shortness of breath on exertion    • Stroke (CMS/HCC)     X1 8 YEARS AGO, GENERALIZED UPPER BODY WEAKNESS RESIDUAL PER PT    • Thyroid disorder    • Urinary leakage    • UTI (urinary tract infection)    • Wears glasses    • Yeast dermatitis      Past Surgical History:   Procedure Laterality Date   • ARTERIOGRAM N/A 2/12/2018    Procedure: Renal Arteriogram;  Surgeon: Lito Flores MD;  Location:  [facility] CATH INVASIVE LOCATION;  Service:    • BREAST BIOPSY Right 1991   • CARDIAC CATHETERIZATION N/A 2/12/2018    Procedure: Right Heart Cath;  Surgeon: Lito Flores MD;  Location:  [facility] CATH INVASIVE LOCATION;  Service:    • CAROTID STENT      Transcath    • CAROTID STENT Left    • CHOLECYSTECTOMY     • CYSTOSTOMY W/ BLADDER BIOPSY     • DILATATION AND CURETTAGE     • KNEE ARTHROSCOPY Right 3/23/2018    Procedure: ARTHROSCOPY, RIGHT KNEE  INCISION AND DRAINAGE LOWER EXTREMITY WITH SYNOVIAL BIOPSY;  Surgeon: Dilip Giron MD;  Location:  [facility] OR;  Service: Orthopedics   • LAPAROSCOPIC CHOLECYSTECTOMY  1994    Lap rolando   • OOPHORECTOMY      • PAP SMEAR  05/10/2016   • PARTIAL HYSTERECTOMY          PT ASSESSMENT (last 12 hours)      Physical Therapy Evaluation     Row Name 11/08/18 0939          PT Evaluation Time/Intention    Subjective Information complains of;weakness  -LM     Document Type evaluation  -LM     Mode of Treatment physical therapy  -LM     Patient Effort good  -LM     Symptoms Noted During/After Treatment fatigue  -LM     Row Name 11/08/18 0939          General Information    Patient Profile Reviewed? yes  -LM     Onset of Illness/Injury or Date of Surgery 11/05/18  -LM     Referring Physician Nelsy  -LM     Patient Observations alert;cooperative;agree to therapy  -LM     Patient/Family Observations Pt in room with RN giving meds.  -LM     General Observations of Patient Pt received supine in bed with IV intact and O2 @4 LPM per n/c.  -LM     Prior Level of Function independent:;community mobility  -LM     Equipment Currently Used at Home cane, straight;oxygen;commode, bedside;shower chair;walker, rolling  -LM     Pertinent History of Current Functional Problem Acute on chronic respiratory failure with hypercapnia;COPD,URIEL,DM,CAD,HTN,HLD,anxiety,CVA,fibromyalgia,seizures  -LM     Existing Precautions/Restrictions fall;oxygen therapy device and L/min  -LM     Risks Reviewed patient:;increased discomfort  -LM     Benefits Reviewed patient:;improve function;increase independence  -LM     Row Name 11/08/18 0939          Relationship/Environment    Lives With alone  -LM     Row Name 11/08/18 0939          Resource/Environmental Concerns    Current Living Arrangements home/apartment/condo  -LM     Row Name 11/08/18 0939          Home Main Entrance    Number of Stairs, Main Entrance two  -LM     Stair Railings, Main Entrance railing on right side (ascending)  -LM     Row Name 11/08/18 0939          Stairs Within Home, Primary    Stairs, Within Home, Primary Stairs to second floor.  -LM     Number of Stairs, Within Home, Primary other (see  comments)   16  -LM     Stair Railings, Within Home, Primary railing on right side (ascending)  -LM     Row Name 11/08/18 0939          Cognitive Assessment/Intervention- PT/OT    Orientation Status (Cognition) oriented x 4  -LM     Follows Commands (Cognition) WFL;verbal cues/prompting required  -LM     Safety Deficit (Cognitive) safety precautions awareness;safety precautions follow-through/compliance  -LM     Row Name 11/08/18 0939          Safety Issues, Functional Mobility    Safety Issues Affecting Function (Mobility) safety precaution awareness;safety precautions follow-through/compliance  -LM     Impairments Affecting Function (Mobility) endurance/activity tolerance;shortness of breath;strength  -LM     Row Name 11/08/18 0939          Bed Mobility Assessment/Treatment    Bed Mobility Assessment/Treatment supine-sit  -LM     Supine-Sit [place] (Bed Mobility) contact guard  -LM     Assistive Device (Bed Mobility) bed rails;head of bed elevated  -LM     Row Name 11/08/18 0939          Transfer Assessment/Treatment    Transfer Assessment/Treatment sit-stand transfer;stand-sit transfer;bed-chair transfer  -LM     Bed-Chair [place] (Transfers) contact guard  -LM     Assistive Device (Bed-Chair Transfers) walker, front-wheeled  -LM     Sit-Stand [place] (Transfers) contact guard  -LM     Stand-Sit [place] (Transfers) contact guard  -LM     Row Name 11/08/18 0939          Sit-Stand Transfer    Assistive Device (Sit-Stand Transfers) walker, front-wheeled  -LM     Row Name 11/08/18 0939          Stand-Sit Transfer    Assistive Device (Stand-Sit Transfers) walker, front-wheeled  -LM     Row Name 11/08/18 0939          Gait/Stairs Assessment/Training    Gait/Stairs Assessment/Training gait/ambulation assistive device  -LM     [place] Level (Gait) contact guard  -LM     Assistive Device (Gait) walker, front-wheeled  -LM     Distance in Feet (Gait) 104  -LM     Pattern (Gait) swing-through  -LM      Deviations/Abnormal Patterns (Gait) gait speed decreased;stride length decreased  -LM     Bilateral Gait Deviations heel strike decreased;weight shift ability decreased  -LM     Row Name 11/08/18 0939          General ROM    GENERAL ROM COMMENTS WFL  -LM     Row Name 11/08/18 0939          MMT (Manual Muscle Testing)    General MMT Comments Grossly 4-/5.  -LM     Row Name 11/08/18 0939          Pain Assessment    Additional Documentation Pain Scale: Numbers Pre/Post-Treatment (Group)  -LM     Row Name 11/08/18 0939          Pain Scale: Numbers Pre/Post-Treatment    Pain Scale: Numbers, Pretreatment 0/10 - no pain  -LM     Pain Scale: Numbers, Post-Treatment 0/10 - no pain  -LM     Row Name 11/08/18 0939          Coping    Observed Emotional State calm;cooperative  -LM     Verbalized Emotional State acceptance  -LM     Row Name 11/08/18 0939          Plan of Care Review    Plan of Care Reviewed With patient  -LM     Row Name 11/08/18 0939          Physical Therapy Clinical Impression    Date of Referral to PT 11/07/18  -LM     PT Diagnosis (PT Clinical Impression) Generalized weakness  -LM     Patient/Family Goals Statement (PT Clinical Impression) Return home.  -LM     Criteria for Skilled Interventions Met (PT Clinical Impression) yes;treatment indicated  -LM     Rehab Potential (PT Clinical Summary) good, to achieve stated therapy goals  -LM     Care Plan Review (PT) evaluation/treatment results reviewed;care plan/treatment goals reviewed;risks/benefits reviewed;current/potential barriers reviewed;patient/other agree to care plan  -LM     Row Name 11/08/18 0939          Vital Signs    Pre SpO2 (%) 95  -LM     O2 Delivery Pre Treatment supplemental O2   4 LPM  -LM     Intra SpO2 (%) 93  -LM     O2 Delivery Intra Treatment supplemental O2   4 LPM  -LM     Post SpO2 (%) 94  -LM     O2 Delivery Post Treatment supplemental O2   4 LPM  -LM     Pre Patient Position Supine  -LM     Intra Patient Position Sitting  -LM      Post Patient Position Sitting  -LM     Row Name 11/08/18 0939          Physical Therapy Goals    Bed Mobility Goal Selection (PT) bed mobility, PT goal 1  -LM     Transfer Goal Selection (PT) transfer, PT goal 1  -LM     Gait Training Goal Selection (PT) gait training, PT goal 1  -LM     Row Name 11/08/18 0939          Bed Mobility Goal 1 (PT)    Activity/Assistive Device (Bed Mobility Goal 1, PT) sit to supine/supine to sit;bed rails  -LM     [place] Level/Cues Needed (Bed Mobility Goal 1, PT) conditional independence  -LM     Time Frame (Bed Mobility Goal 1, PT) 2 weeks  -LM     Progress/Outcomes (Bed Mobility Goal 1, PT) goal ongoing  -LM     Row Name 11/08/18 0939          Transfer Goal 1 (PT)    Activity/Assistive Device (Transfer Goal 1, PT) sit-to-stand/stand-to-sit;bed-to-chair/chair-to-bed;walker, rolling  -LM     [place] Level/Cues Needed (Transfer Goal 1, PT) conditional independence  -LM     Time Frame (Transfer Goal 1, PT) 2 weeks  -LM     Progress/Outcome (Transfer Goal 1, PT) goal ongoing  -     Row Name 11/08/18 0939          Gait Training Goal 1 (PT)    Activity/Assistive Device (Gait Training Goal 1, PT) gait (walking locomotion);assistive device use;walker, rolling  -LM     [place] Level (Gait Training Goal 1, PT) standby assist  -LM     Distance (Gait Goal 1, PT) 300  -LM     Time Frame (Gait Training Goal 1, PT) 2 weeks  -LM     Progress/Outcome (Gait Training Goal 1, PT) goal ongoing  -     Row Name 11/08/18 0939          Patient Education Goal (PT)    Activity (Patient Education Goal, PT) I with HEP.  -LM     [place]/Cues/Accuracy (Memory Goal 2, PT) demonstrates adequately;independent;verbalizes understanding  -LM     Time Frame (Patient Education Goal, PT) 2 weeks  -LM     Progress/Outcome (Patient Education Goal, PT) goal ongoing  -     Row Name 11/08/18 0939          Positioning and Restraints    Pre-Treatment Position in bed  -LM     Post Treatment Position  chair  -LM     In Chair reclined;call light within reach;encouraged to call for assist  -LM     Row Name 11/08/18 0939          Living Environment    Home Accessibility stairs within home;stairs to enter home  -LM       User Key  (r) = Recorded By, (t) = Taken By, (c) = Cosigned By    Initials Name Provider Type    Aarti Song, PT Physical Therapist          Physical Therapy Education     Title: PT OT SLP Therapies (Active)     Topic: Physical Therapy (Active)     Point: Mobility training (Done)    Learning Progress Summary     Learner Status Readiness Method Response Comment Documented by    Patient Done Acceptance E,TB VU Purpose of PT/POC.  11/08/18 1224          Point: Home exercise program (Done)    Learning Progress Summary     Learner Status Readiness Method Response Comment Documented by    Patient Done Acceptance E,TB VU Purpose of PT/POC.  11/08/18 1224          Point: Body mechanics (Done)    Learning Progress Summary     Learner Status Readiness Method Response Comment Documented by    Patient Done Acceptance E,TB VU Purpose of PT/POC.  11/08/18 1224                      User Key     Initials Effective Dates Name Provider Type Discipline     04/03/18 -  Aarti Byers, PT Physical Therapist PT                PT Recommendation and Plan  Anticipated Discharge Disposition (PT): assisted living facility ([facility])  Planned Therapy Interventions (PT Eval): balance training, bed mobility training, gait training, home exercise program, patient/family education, strengthening, transfer training  Therapy Frequency (PT Clinical Impression): daily  Outcome Summary/Treatment Plan (PT)  Anticipated Discharge Disposition (PT): assisted living facility ([facility])  Plan of Care Reviewed With: patient  Outcome Summary: PT eval completed. Patient presents with decreased balance, strength, endurance and independence. She is expected to benefit from continued skilled PT intervention to improve her mobility status prior to  D/C.          Outcome Measures     Row Name 11/08/18 0939             How much help from another person do you currently need...    Turning from your back to your side while in flat bed without using bedrails? 3  -LM      Moving from lying on back to sitting on the side of a flat bed without bedrails? 3  -LM      Moving to and from a bed to a chair (including a wheelchair)? 3  -LM      Standing up from a chair using your arms (e.g., wheelchair, bedside chair)? 3  -LM      Climbing 3-5 steps with a railing? 3  -LM      To walk in hospital room? 3  -LM      AM-PAC 6 Clicks Score 18  -LM         Functional Assessment    Outcome Measure Options AM-PAC 6 Clicks Basic Mobility (PT)  -LM        User Key  (r) = Recorded By, (t) = Taken By, (c) = Cosigned By    Initials Name Provider Type    Aarti Song, PT Physical Therapist           Time Calculation:         PT Charges     Row Name 11/08/18 1226             Time Calculation    Start Time 0939  -LM      PT Received On 11/08/18  -      PT Goal Re-Cert Due Date 11/18/18  -        User Key  (r) = Recorded By, (t) = Taken By, (c) = Cosigned By    Initials Name Provider Type    Aarti Song, PT Physical Therapist        Therapy Suggested Charges     Code   Minutes Charges    None           Therapy Charges for Today     Code Description Service Date Service Provider Modifiers Qty    [id number]  PT EVAL MOD COMPLEXITY 4 11/8/2018 Aarti Byers, PT GP 1          PT G-Codes  Outcome Measure Options: AM-PAC 6 Clicks Basic Mobility (PT)  AM-PAC 6 Clicks Score: 18      Aarti Byers PT  11/8/2018

## 2018-11-08 NOTE — PLAN OF CARE
Problem: Patient Care Overview  Goal: Plan of Care Review  Outcome: Ongoing (interventions implemented as appropriate)   11/08/18 5725   Coping/Psychosocial   Plan of Care Reviewed With patient   OTHER   Outcome Summary PT eval completed. Patient presents with decreased balance, strength, endurance and independence. She is expected to benefit from continued skilled PT intervention to improve her mobility status prior to D/C.

## 2018-11-08 NOTE — PROGRESS NOTES
"  CC: Acute respiratory failure.    S: Out of bed to chair. Off BiPAP. Awake and Alert.     ROS: Positive for occasional diarrhea, & headache. Denies chest pain, or fever.    O:Vital signs reviewed. O2Sat: 92%    /60   Pulse 72   Temp 97.6 °F (36.4 °C) (Oral)   Resp 16   Ht 160 cm (62.99\")   Wt 88 kg (194 lb)   LMP  (LMP Unknown) Comment: LAST MAMMOGRAM   SpO2 97%   BMI 34.37 kg/m²     Temp (24hrs), Av.2 °F (36.8 °C), Min:97.6 °F (36.4 °C), Max:98.5 °F (36.9 °C)      I & Os reviewed.   Intake/Output       18 0700 - 18 0659    Intake (ml) 560    Output (ml) --    Net (ml) 560    Last Weight  88 kg (194 lb)          General: No acute distress noted.  Eyes: PERRL. EOM Sluggish   Cardiovascular: S1 + S2. Regular.   Respiratory: No respiratory distress noted. Scattered wheezing heard. No crackles noted  GI: Soft. Bowel sounds somewhat hyperactive   Extremities: No edema noted.  Neurologic: AAOx3. Was able to follow commands.     Labs: Reviewed.       Results from last 7 days  Lab Units 18  0550 185 184 181   SODIUM mmol/L 139 138 135* 137   POTASSIUM mmol/L 3.7 4.7 5.2* 5.3*   CHLORIDE mmol/L 97* 96* 96* 90*   CO2 mmol/L 37.0* 35.0* 35.0* 37.0*   BUN mg/dL 20 37* 40* 42*   CREATININE mg/dL 0.70 0.80 1.40* 1.90*   CALCIUM mg/dL 9.9 9.1 8.5 9.1   BILIRUBIN mg/dL  --   --   --  0.6   ALK PHOS U/L  --   --   --  132*   ALT (SGPT) U/L  --   --   --  79*   AST (SGOT) U/L  --   --   --  52*   GLUCOSE mg/dL 112* 269* 149* 189*         Results from last 7 days  Lab Units 18  0415   MAGNESIUM mg/dL 2.9*           Results from last 7 days  Lab Units 18  0550 185 18   WBC 10*3/mm3 12.33* 9.46 11.41* 12.34*   HEMOGLOBIN g/dL 12.1 10.8* 11.3* 11.7*   PLATELETS 10*3/mm3 204 162 202 232         Results from last 7 days  Lab Units 18  022   INR  1.25*              ABG: Reviewed  Lab Results   Component Value " Date    PHART 7.490 11/08/2018    PKM6FPQ 45.9 (H) 11/08/2018    PO2ART 81.1 11/08/2018    HGBBG 12.3 11/08/2018    Q8HFSHMW 97.3 11/08/2018    CARBOXYHGB 1.8 11/08/2018         Assessment & Recommendations/Plan:   1.  Acute Respiratory Failure.  ABG has improved.   Continue BiPAP.    2.  Acute exacerbation of COPD  She is on prednisone by mouth.    3.?  Right lower lobe pneumonia  Normal Pro Calcitonin.  CXR has improved.  Antibiotics were stopped yesterday.  No clinical evidence of pneumonia and no worsening symptoms noted.    4.  C. difficile infection  On by mouth vancomycin    5.  Smoking  Advised to quit smoking    6.  Obstructive sleep apnea hypoventilation syndrome.    Currently on noninvasive ventilation device at home.    7.  Obesity    8.  Grade 1 diastolic dysfunction.    Appears compensated at this time    9.  Acute renal failure  Resolved    Patient wants to establish Dr. Garcia as her pulmonologist.  This will need to be arranged upon discharge.    From a pulmonology standpoint, she can be discharged today or tomorrow.     We have updated the admitting attending and nursing staff, as appropriate, on the patient's current status and plan. I will be going off shift tonight and will be unavailable.     Phoebe Mancilla MD  11/08/18  12:16 PM    Dictated utilizing Dragon dictation.

## 2018-11-08 NOTE — THERAPY EVALUATION
Acute Care - Occupational Therapy Initial Evaluation  [facility]     Patient Name: Vidhi Lopze  : 1950  MRN: [id number]  Today's Date: 2018  Onset of Illness/Injury or Date of Surgery: 18  Date of Referral to OT: 18  Referring Physician: Dr. Whittington    Admit Date: 2018       ICD-10-CM ICD-9-CM   1. Acute and chronic respiratory failure with hypercapnia (CMS/[facility]) J96.22 518.84   2. Pneumonia of right lower lobe due to infectious organism (CMS/[facility]) J18.1 486   3. Chronic obstructive pulmonary disease, unspecified COPD type (CMS/[facility]) J44.9 496   4. Impaired functional mobility, balance, gait, and endurance Z74.09 V49.89   5. Impaired mobility and ADLs Z74.09 799.89     Patient Active Problem List   Diagnosis   • Dyslipidemia   • Hypertension   • Anxiety (Chronic benzos)   • Insomnia   • GERD (gastroesophageal reflux disease)   • Diabetes mellitus (CMS/[facility])   • Fibromyalgia   • RLS (restless legs syndrome)   • Migraines   • Acute exacerbation of chronic obstructive pulmonary disease (COPD) (CMS/[facility])   • Interstitial cystitis   • History of acute myocardial infarction   • History of cardiac murmur   • History of stroke   • CAD (coronary artery disease)   • Essential hypertension   • CKD (chronic kidney disease) stage 2, GFR 60-89 ml/min   • Bilateral carotid artery stenosis   • Pneumonia of right lower lobe due to infectious organism (CMS/[facility])   • Obstructive sleep apnea   • Obesity (BMI 30-39.9)   • Diabetes mellitus type 2 in obese (CMS/[facility])   • Obesity hypoventilation syndrome (CMS/[facility])   • Tobacco use   • Acute kidney injury (CMS/[facility])   • Chronic pain (Chronic narcotics)   • R/O CAP (community acquired pneumonia)   • Respiratory failure, acute and chronic (CMS/[facility])   • Recurrent UTI/interstitial cystitis on chronic methenamine   • Clostridium difficile colitis diagnosed 2018. Persistent diarrhea despite oral vancomycin   • Acute and chronic respiratory failure with hypercapnia  (CMS/[facility])   • Demand ischemia (CMS/[facility])     Past Medical History:   Diagnosis Date   • Allergic rhinitis    • Asthma with COPD (CMS/[facility])     Asthma/COPD   • Bilateral ovarian cysts    • Coronary artery disease    • Depression with anxiety     Depression/Anxiety   • Diabetes (CMS/[facility])     pre   • Endometriosis     Dr. Jin; estradiol    • ESR raised 3/20/2018   • Fatigue    • Fibromyalgia    • Headache     Headaches   • High cholesterol    • History of gallstones    • History of myocardial infarction    • Hypertension    • Influenza B     DX'D 2/6/2018, TREATED WITH TAMIFLU. LAST DOSE 2/11/2018 AM.  PATIENT STATES DR FLORES IS AWARE. DENIES FEVERS OR CHILLS.   • Interstitial cystitis    • On home oxygen therapy     2 L NC   • URIEL on CPAP    • PONV (postoperative nausea and vomiting)    • Seizure disorder, nonconvulsive, with status epilepticus (CMS/[facility]) 3/20/2018   • Septic joint of right knee joint (CMS/[facility]) 3/21/2018   • Shortness of breath on exertion    • Stroke (CMS/[facility])     X1 8 YEARS AGO, GENERALIZED UPPER BODY WEAKNESS RESIDUAL PER PT    • Thyroid disorder    • Urinary leakage    • UTI (urinary tract infection)    • Wears glasses    • Yeast dermatitis      Past Surgical History:   Procedure Laterality Date   • ARTERIOGRAM N/A 2/12/2018    Procedure: Renal Arteriogram;  Surgeon: Lito Flores MD;  Location:  [facility] CATH INVASIVE LOCATION;  Service:    • BREAST BIOPSY Right 1991   • CARDIAC CATHETERIZATION N/A 2/12/2018    Procedure: Right Heart Cath;  Surgeon: Lito Flores MD;  Location:  [facility] CATH INVASIVE LOCATION;  Service:    • CAROTID STENT      Transcath    • CAROTID STENT Left    • CHOLECYSTECTOMY     • CYSTOSTOMY W/ BLADDER BIOPSY     • DILATATION AND CURETTAGE     • KNEE ARTHROSCOPY Right 3/23/2018    Procedure: ARTHROSCOPY, RIGHT KNEE  INCISION AND DRAINAGE LOWER EXTREMITY WITH SYNOVIAL BIOPSY;  Surgeon: Dilip Giron MD;  Location:  [facility] OR;  Service: Orthopedics   • LAPAROSCOPIC  CHOLECYSTECTOMY  1994    Lap rolando   • OOPHORECTOMY     • PAP SMEAR  05/10/2016   • PARTIAL HYSTERECTOMY            OT ASSESSMENT FLOWSHEET (last 72 hours)      Occupational Therapy Evaluation     Row Name 11/08/18 1013                   OT Evaluation Time/Intention    Subjective Information complains of;weakness  -SD        Document Type evaluation  -SD        Mode of Treatment occupational therapy  -SD        Patient Effort good  -SD        Symptoms Noted During/After Treatment fatigue  -SD           General Information    Patient Profile Reviewed? yes  -SD        Onset of Illness/Injury or Date of Surgery 11/05/18  -SD        Referring Physician Dr. Whittington  -SD        Patient Observations alert;cooperative;agree to therapy  -SD        Patient/Family Observations RN present  -SD        General Observations of Patient Supine, IV intact, on 4L o2  -SD        Prior Level of Function independent:;community mobility  -SD        Equipment Currently Used at Home cane, straight;oxygen;commode, bedside;shower chair;walker, rolling  -SD        Pertinent History of Current Functional Problem Acute on chronic respiratory failure with hypercapnia; COPD, URIEL, DM, CAD, HTN. HLD, anxiety, CVA, fibromyalgia  -SD        Existing Precautions/Restrictions fall;oxygen therapy device and L/min  -SD        Risks Reviewed patient:;increased discomfort  -SD        Benefits Reviewed patient:;improve function;increase independence;increase strength;increase balance  -SD           Relationship/Environment    Lives With alone  -SD        Family Caregiver if Needed child(sekou), adult  -SD           Resource/Environmental Concerns    Current Living Arrangements home/apartment/condo  -SD        Transportation Concerns car, none  -SD           Home Main Entrance    Number of Stairs, Main Entrance two  -SD        Stair Railings, Main Entrance railing on right side (ascending)  -SD           Stairs Within Home, Primary    Stairs, Within Home, Primary  stairs to 2nd floor  -SD        Number of Stairs, Within Home, Primary --   16  -SD        Stair Railings, Within Home, Primary railing on right side (ascending)  -SD           Cognitive Assessment/Intervention- PT/OT    Orientation Status (Cognition) oriented x 4  -SD        Follows Commands (Cognition) verbal cues/prompting required  -SD        Safety Deficit (Cognitive) safety precautions follow-through/compliance;safety precautions awareness  -SD           Safety Issues, Functional Mobility    Safety Issues Affecting Function (Mobility) safety precaution awareness;safety precautions follow-through/compliance  -SD        Impairments Affecting Function (Mobility) endurance/activity tolerance;shortness of breath;strength  -SD           Bed Mobility Assessment/Treatment    Bed Mobility Assessment/Treatment supine-sit  -SD        Supine-Sit [place] (Bed Mobility) contact guard  -SD        Assistive Device (Bed Mobility) bed rails;head of bed elevated  -SD           Functional Mobility    Functional Mobility- Ind. Level contact guard assist  -SD        Functional Mobility- Device --   HHA  -SD        Functional Mobility-Distance (Feet) 104  -SD        Functional Mobility- Safety Issues balance decreased during turns;sequencing ability decreased;step length decreased;weight-shifting ability decreased;supplemental O2  -SD           Transfer Assessment/Treatment    Transfer Assessment/Treatment sit-stand transfer;stand-sit transfer  -SD           Bed-Chair Transfer    Bed-Chair [place] (Transfers) contact guard  -SD           Sit-Stand Transfer    Sit-Stand [place] (Transfers) contact guard  -SD           Stand-Sit Transfer    Stand-Sit [place] (Transfers) contact guard  -SD           ADL Assessment/Intervention    BADL Assessment/Intervention bathing;upper body dressing;lower body dressing;grooming;feeding;toileting  -SD           Bathing Assessment/Intervention    Bathing [place] Level minimum  assist (75% patient effort)  -SD           Upper Body Dressing Assessment/Training    Upper Body Dressing [place] Level set up  -SD           Lower Body Dressing Assessment/Training    Lower Body Dressing [place] Level minimum assist (75% patient effort)  -SD           Grooming Assessment/Training    [place] Level (Grooming) set up  -SD           Self-Feeding Assessment/Training    [place] Level (Feeding) set up  -SD           Toileting Assessment/Training    [place] Level (Toileting) contact guard assist  -SD           BADL Safety/Performance    Impairments, BADL Safety/Performance balance;endurance/activity tolerance;strength  -SD           General ROM    GENERAL ROM COMMENTS WFL  -SD           MMT (Manual Muscle Testing)    General MMT Comments 4-/5  -SD           Positioning and Restraints    Pre-Treatment Position in bed  -SD        Post Treatment Position chair  -SD        In Chair reclined;call light within reach;encouraged to call for assist  -SD           Pain Scale: Numbers Pre/Post-Treatment    Pain Scale: Numbers, Pretreatment 0/10 - no pain  -SD        Pain Scale: Numbers, Post-Treatment 0/10 - no pain  -SD           Coping    Observed Emotional State calm;cooperative  -SD        Verbalized Emotional State acceptance  -SD           Plan of Care Review    Plan of Care Reviewed With patient  -SD           Clinical Impression (OT)    Date of Referral to OT 11/07/18  -SD        OT Diagnosis ADL decline  -SD        Patient/Family Goals Statement (OT Eval) Increase strength/mobilityq  -SD        Criteria for Skilled Therapeutic Interventions Met (OT Eval) yes  -SD        Rehab Potential (OT Eval) good, to achieve stated therapy goals  -SD        Therapy Frequency (OT Eval) 3 times/wk   5 times if indicated  -SD        Care Plan Review (OT) evaluation/treatment results reviewed  -SD        Anticipated Discharge Disposition (OT) home with home health  -SD           Vital Signs    Pre  SpO2 (%) 95  -SD        O2 Delivery Pre Treatment supplemental O2  -SD        Intra SpO2 (%) 93  -SD        O2 Delivery Intra Treatment supplemental O2  -SD        Post SpO2 (%) 94  -SD        O2 Delivery Post Treatment supplemental O2  -SD           Planned OT Interventions    Planned Therapy Interventions (OT Eval) activity tolerance training;adaptive equipment training;BADL retraining;patient/caregiver education/training;strengthening exercise;transfer/mobility retraining  -SD           OT Goals    Transfer Goal Selection (OT) transfer, OT goal 1  -SD        Dressing Goal Selection (OT) dressing, OT goal 1  -SD        Toileting Goal Selection (OT) toileting, OT goal 1  -SD        Strength Goal Selection (OT) strength, OT goal 1  -SD        Functional Mobility Goal Selection (OT) functional mobility, OT goal 1  -SD        Additional Documentation Strength Goal Selection (OT) (Row);Functional Mobility Selection (OT) (Row)  -SD           Transfer Goal 1 (OT)    Activity/Assistive Device (Transfer Goal 1, OT) sit-to-stand/stand-to-sit;walker, rolling  -SD        [place] Level/Cues Needed (Transfer Goal 1, OT) standby assist  -SD        Time Frame (Transfer Goal 1, OT) 2 weeks  -SD        Progress/Outcome (Transfer Goal 1, OT) goal ongoing  -SD           Dressing Goal 1 (OT)    Activity/Assistive Device (Dressing Goal 1, OT) lower body dressing  -SD        [place]/Cues Needed (Dressing Goal 1, OT) contact guard assist  -SD        Time Frame (Dressing Goal 1, OT) 2 weeks  -SD        Progress/Outcome (Dressing Goal 1, OT) goal ongoing  -SD           Toileting Goal 1 (OT)    Activity/Device (Toileting Goal 1, OT) toileting skills, all;commode  -SD        [place] Level/Cues Needed (Toileting Goal 1, OT) standby assist  -SD        Time Frame (Toileting Goal 1, OT) 2 weeks  -SD        Progress/Outcome (Toileting Goal 1, OT) goal ongoing  -SD           Strength Goal 1 (OT)    Strength Goal 1 (OT) Patient to  perform UB ther ex using theraband in order to increase strength and endurance  -SD        Time Frame (Strength Goal 1, OT) long term goal (LTG)  -SD        Progress/Outcome (Strength Goal 1, OT) goal ongoing  -SD           Functional Mobility Goal 1 (OT)    Activity/Assistive Device (Functional Mobility Goal 1, OT) walker, rolling  -SD        [place] Level/Cues Needed (Functional Mobility Goal 1, OT) contact guard assist;standby assist  -SD        Distance Goal 1 (Functional Mobility, OT) 200  -SD        Time Frame (Functional Mobility Goal 1, OT) long term goal (LTG)  -SD        Progress/Outcome (Functional Mobility Goal 1, OT) goal ongoing  -SD           Living Environment    Home Accessibility stairs within home;stairs to enter home  -SD          User Key  (r) = Recorded By, (t) = Taken By, (c) = Cosigned By    Initials Name Effective Dates    Julia Ornelas OT 03/07/18 -            Occupational Therapy Education     Title: PT OT SLP Therapies (Active)     Topic: Occupational Therapy (Active)     Point: ADL training (Done)     Description: Instruct learner(s) on proper safety adaptation and remediation techniques during self care or transfers.   Instruct in proper use of assistive devices.   Learning Progress Summary     Learner Status Readiness Method Response Comment Documented by    Patient Done Acceptance E,TB VU Benefit of OT; OT POC SD 11/08/18 1330                      User Key     Initials Effective Dates Name Provider Type Discipline    SD 03/07/18 -  Julia Mittal OT Occupational Therapist OT                  OT Recommendation and Plan  Outcome Summary/Treatment Plan (OT)  Anticipated Discharge Disposition (OT): home with home health  Planned Therapy Interventions (OT Eval): activity tolerance training, adaptive equipment training, BADL retraining, patient/caregiver education/training, strengthening exercise, transfer/mobility retraining  Therapy Frequency (OT Eval): 3 times/wk (5 times  if indicated)  Plan of Care Review  Plan of Care Reviewed With: patient  Plan of Care Reviewed With: patient  Outcome Summary: OT eval completed. Patient presents deficits in strength, endurance, balance, mobility and ADL performance. Patient is expected to benefit from OT services to improve overall functional performance and mobility prior to DC.           Outcome Measures     Row Name 11/08/18 1013 11/08/18 0939          How much help from another person do you currently need...    Turning from your back to your side while in flat bed without using bedrails?  -- 3  -LM     Moving from lying on back to sitting on the side of a flat bed without bedrails?  -- 3  -LM     Moving to and from a bed to a chair (including a wheelchair)?  -- 3  -LM     Standing up from a chair using your arms (e.g., wheelchair, bedside chair)?  -- 3  -LM     Climbing 3-5 steps with a railing?  -- 3  -LM     To walk in hospital room?  -- 3  -LM     AM-PAC 6 Clicks Score  -- 18  -LM        How much help from another is currently needed...    Putting on and taking off regular lower body clothing? 3  -SD  --     Bathing (including washing, rinsing, and drying) 3  -SD  --     Toileting (which includes using toilet bed pan or urinal) 3  -SD  --     Putting on and taking off regular upper body clothing 4  -SD  --     Taking care of personal grooming (such as brushing teeth) 4  -SD  --     Eating meals 4  -SD  --     Score 21  -SD  --        Functional Assessment    Outcome Measure Options AM-PAC 6 Clicks Daily Activity (OT)  -SD AM-PAC 6 Clicks Basic Mobility (PT)  -LM       User Key  (r) = Recorded By, (t) = Taken By, (c) = Cosigned By    Initials Name Provider Type    Aarti Song, PT Physical Therapist    Julia Ornelas OT Occupational Therapist          Time Calculation:         Time Calculation- OT     Row Name 11/08/18 1331             Time Calculation- OT    OT Start Time 1013  -SD      OT Received On 11/08/18  -SD      OT Goal  Re-Cert Due Date 11/18/18  -SD        User Key  (r) = Recorded By, (t) = Taken By, (c) = Cosigned By    Initials Name Provider Type    Julia Ornelas OT Occupational Therapist        Therapy Suggested Charges     Code   Minutes Charges    None           Therapy Charges for Today     Code Description Service Date Service Provider Modifiers Qty    [id number]  OT EVAL LOW COMPLEXITY 4 11/8/2018 Julia Mittal OT GO 1               Julia Mittal OT  11/8/2018

## 2018-11-08 NOTE — DISCHARGE PLACEMENT REQUEST
"Updated noted  Serena, [phone]    Vidhi Lopez (68 y.o. Female)     Date of Birth Social Security Number Address Home Phone MRN    1950  [address] [address] [phone] [id number]    Adventism Marital Status          [employer]        Admission Date Admission Type Admitting Provider Attending Provider Department, Room/Bed    11/5/18 Emergency Anders Ulloa MD Pais, Roshan, MD [facility] MED SURG  3, 327/1    Discharge Date Discharge Disposition Discharge Destination                       Attending Provider:  Kentrell Whittington MD    Allergies:  Amlodipine, Reglan [Metoclopramide]    Isolation:  Spore   Infection:  C.difficile (10/16/18)   Code Status:  CPR    Ht:  160 cm (62.99\")   Wt:  88 kg (194 lb)    Admission Cmt:  None   Principal Problem:  Acute and chronic respiratory failure with hypercapnia (CMS/HCC) [J96.22]                 Active Insurance as of 11/5/2018     Primary Coverage     Payor Plan Insurance Group Employer/Plan Group    MEDICARE MEDICARE A & B      Payor Plan Address Payor Plan Phone Number Effective From Effective To    [address] [phone] 3/1/2016     [facility] [id number]       Subscriber Name Subscriber Birth Date Member ID       VIDHI LOPEZ 1950 [id number]           Secondary Coverage     Payor Plan Insurance Group Employer/Plan Group    [facility] SUPP [id number]     Payor Plan Address Payor Plan Phone Number Effective From Effective To    [address]  6/1/2016     [facility] [id number]       Subscriber Name Subscriber Birth Date Member ID       VIDHI LOPEZ 1950 [id number]                 Emergency Contacts      (Rel.) Home Phone Work Phone Mobile Phone    Tavo Lopez (Son) -- -- [phone]    Inez Lopez (Daughter) -- -- [phone]    Carmen Golden -- -- [phone]            Insurance Information                MEDICARE/MEDICARE A & B Phone: [phone]    " Subscriber: Vidhi Lopez Subscriber#: [id number]    Group#:  Precert#:         ANTHEM BLUE CROSS/ANTHEM [facility] SUPP Phone:     Subscriber: Vidhi Lopez Subscriber#: [id number]    Group#: [id number] Precert#:              Physician Progress Notes (last 24 hours) (Notes from 2018  9:23 AM through 2018  9:23 AM)      Kentrell Whittington MD at 2018  2:39 PM                [facility]    [facility]ESS [facility]    Name:  Vidhi Lopez   Age:  68 y.o.  Sex:  female  :  1950  MRN:  [id number]   Visit Number:  [id number]  Admission Date:  2018  Date Of Service:  18  Primary Care Physician:  Michael Mays MD     LOS: 1 day :  Patient Care Team:  Michael Mays MD as PCP - General  Michael Mays MD as PCP - Family Medicine  Michael Mays MD as PCP - [facility]  Lito Flores MD as Consulting Physician (Cardiology):    Chief Complaint:      Generalized weakness and confusion.    Subjective / Interval History:     Ms. Lopez is currently sitting up on the bed and is comfortable at rest.  According to the nurse, she was very confused and agitated at night almost requiring restraints.  This morning she is very pleasant and is answering questions appropriately.  She is oriented to place time and person.  She states that she got upset because she was forced to have the BiPAP even during the day.  She is more alert today compared to yesterday and has less asterixis.  She denies any chest pain in her troponins are trending down.      She was admitted from the emergency room on 2018 after she was found by her neighbor at home unresponsive.  She was noted to have hypercapnic respiratory failure and was placed on BiPAP therapy.  Unfortunately, she continues to smoke cigarettes and is noncompliant with her Trilogy NIV unit at home.  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  She states that she does live alone.  She  states that her son lives in [place] and apparently she is in the process of making a plan to move into assisted living facility in [facility].  She denies any alcohol use at home.      She was noted to have elevated troponin levels on admission but denies any chest pain.  She does have history of coronary artery disease status post stenting in May 2018.  She states that she sees Dr. Flores in [place] who is her cardiologist.  Her treatment plan was discussed with Dr. Salmon was on call for Dr. Flores yesterday and he recommended conservative management at this time with continuation of her cardiac medications including dual antiplatelet agents.      Review of Systems:     General ROS: Patient denies any fevers, chills or loss of consciousness.  Generalized weakness.  Respiratory ROS: Cough and chest congestion.  Cardiovascular ROS: Denies chest pain or palpitations. No history of exertional chest pain.  Gastrointestinal ROS: Denies nausea and vomiting. Denies any abdominal pain. No diarrhea.  Neurological ROS: Denies any focal weakness. No loss of consciousness. Denies any numbness.  History of confusion.  Dermatological ROS: Denies any redness or pruritis.    Vital Signs:    Temp:  [97.9 °F (36.6 °C)-98.4 °F (36.9 °C)] 98.2 °F (36.8 °C)  Heart Rate:  [46-68] 54  Resp:  [15-32] 18  BP: (104-225)/() 176/77  FiO2 (%):  [35 %] 35 %    Intake and output:    I/O last 3 completed shifts:  In: 4464 [P.O.:2520; I.V.:1944]  Out: 1550 [Urine:1550]  I/O this shift:  In: 560 [P.O.:560]  Out: -     Physical Examination:    General Appearance:  Alert and cooperative, not in any acute distress.   Head:  Atraumatic and normocephalic, without obvious abnormality.   Eyes:          PERRLA, conjunctivae and sclerae normal, no Icterus. No pallor. Extra-occular movements are within normal limits.   Neck: Supple, trachea midline, no thyromegaly, no carotid bruit.   Lungs:   Chest shape is normal. Breath sounds heard  bilaterally equally.  No wheezing.  Occasional basal crackles heard. No Pleural rub or bronchial breathing.   Heart:  Normal S1 and S2, no murmur, no gallop, no rub. No JVD   Abdomen:   Normal bowel sounds, no masses, no organomegaly. Soft, non-tender, non-distended, no guarding, no rebound tenderness.   Extremities: Moves all extremities well, no edema, no cyanosis, no            clubbing.   Skin: No bleeding, bruising or rash.   Neurologic: Awake, alert and oriented times 3. Moves all 4 extremities equally.  Asterixis noted on the outstretched hands.   Laboratory results:      Results from last 7 days  Lab Units 11/07/18 0415 11/06/18 0224 11/05/18 2121   SODIUM mmol/L 138 135* 137   POTASSIUM mmol/L 4.7 5.2* 5.3*   CHLORIDE mmol/L 96* 96* 90*   CO2 mmol/L 35.0* 35.0* 37.0*   BUN mg/dL 37* 40* 42*   CREATININE mg/dL 0.80 1.40* 1.90*   CALCIUM mg/dL 9.1 8.5 9.1   BILIRUBIN mg/dL  --   --  0.6   ALK PHOS U/L  --   --  132*   ALT (SGPT) U/L  --   --  79*   AST (SGOT) U/L  --   --  52*   GLUCOSE mg/dL 269* 149* 189*       Results from last 7 days  Lab Units 11/07/18 0415 11/06/18 0220 11/05/18  2121   WBC 10*3/mm3 9.46 11.41* 12.34*   HEMOGLOBIN g/dL 10.8* 11.3* 11.7*   HEMATOCRIT % 35.3* 38.0 40.0   PLATELETS 10*3/mm3 162 202 232       Results from last 7 days  Lab Units 11/06/18  0220   INR  1.25*       Results from last 7 days  Lab Units 11/07/18  0415 11/06/18  1315 11/06/18  0813   TROPONIN I ng/mL 0.117* 0.173* 0.182*       Results from last 7 days  Lab Units 11/05/18  2248 11/05/18  2238   BLOODCX  Abnormal Stain* No growth at 24 hours     I have reviewed the patient's laboratory results.    Radiology results:    Imaging Results (last 24 hours)     Procedure Component Value Units Date/Time    XR Chest 1 View [[id number]] Collected:  11/07/18 0852     Updated:  11/07/18 0855    Narrative:       PROCEDURE: XR CHEST 1 VW-     HISTORY: RLL pneumonia.; J96.22-Acute and chronic respiratory failure  with  hypercapnia; J18.1-Lobar pneumonia, unspecified organism;  J44.9-Chronic obstructive pulmonary disease, unspecified     COMPARISON: November 5, 2018.     FINDINGS: The heart is normal in size. The mediastinum is unremarkable.  There is diffuse bilateral bronchial wall thickening. There is improved  aeration of the right lung base compared to the prior exam. There is no  pneumothorax.  There are no acute osseous abnormalities.           Impression:       Improved aeration of the right lung base compared to the  prior exam.     Continued followup is recommended.     This report was finalized on 11/7/2018 8:53 AM by Carissa Markham M.D..        I have reviewed the patient's radiology reports.    Medication Review:     I have reviewed the patients active and prn medications.       Acute and chronic respiratory failure with hypercapnia (CMS/HCC)    Pneumonia of right lower lobe due to infectious organism (CMS/HCC)    Obstructive sleep apnea    CAD (coronary artery disease)    Essential hypertension    Dyslipidemia    Anxiety (Chronic benzos)    GERD (gastroesophageal reflux disease)    Diabetes mellitus (CMS/HCC)    Acute exacerbation of chronic obstructive pulmonary disease (COPD) (CMS/HCC)    History of acute myocardial infarction    Acute kidney injury (CMS/HCC)    Clostridium difficile colitis diagnosed September 2018. Persistent diarrhea despite oral vancomycin    Demand ischemia (CMS/HCC)    Assessment:    1.  Acute metabolic encephalopathy secondary to #2, present on admission.  2.  Acute on chronic hypercapnic and hypoxic respiratory failure, present on admission.  3.  Right lower lobe bacterial pneumonia, present on admission.  4.  Acute COPD exacerbation, present on admission.  5.  Acute renal failure, present on admission, resolved.  6.  Demand ischemia with elevated troponin level secondary to #2.  7.  Obstructive sleep apnea, noncompliant with BiPAP therapy at home.  8.  Coronary artery disease status  post stenting.  9.  Recent history of C. difficile colitis status post a course of oral vancomycin.  10.  Diabetes mellitus type 2.  11.  Essential hypertension.  12.  Generalized anxiety disorder.  13.  Medical noncompliance.  14.  Chronic tobacco dependence.    Plan:    Ms. Lopez is currently doing better and her hypercapnia is improving.  She is being followed by Dr. Mancilla from pulmonology and since her pro-calcitonin was negative, he has discontinued her IV antibiotic therapy especially since she has had recent C. difficile infection.  Unfortunately one of her blood culture is positive for gram-positive cocci which may be a contaminant as she is afebrile and does not seem to be in any sepsis.  She is currently on nasal cannula oxygen saturating in the mid 90s.  She will be continued on bronchodilators, budesonide, IV Solu-Medrol and mucolytic agents.  I have strongly advised her to discontinue smoking and be compliant with her Trilogy NIV unit.    Patient unfortunately develops increased confusion, hallucinations and agitated behavior especially in the evening.  She is currently alert and oriented ×3.  It seems that the patient may have underlying dementia/mild cognitive deficit.  This may be compounded by hospitalization, hypercapnia as well as medication use including benzodiazepines and Neurontin.  We will place her on low-dose Seroquel at night improve her agitation.    Patient does have elevated troponin levels which are trending down.  She denies any chest pain and did not have any ST changes on her initial EKG.  I think this is secondary to demand ischemia/type II myocardial infarction and also contribution by her renal failure.  Primary cardiologist on-call recommended continuation of her home medications and conservative management at this time.  No cardiac interventions are indicated at this time as per primary cardiology.    Patient will be continued on BiPAP at night as well as 4 hours during the  "day.  She will be started on physical therapy.  She is on Lovenox for DVT prophylaxis.  I have discussed the patient's condition with case management services.  I have recommended if possible the patient may be transitioned from the hospital to the assisted living facility at discharge.    Kentrell Whittington MD  18  2:39 PM    Dictated utilizing Dragon dictation.      Electronically signed by Kentrell Whittington MD at 2018  4:48 PM     Phoebe Mancilla MD at 2018 11:34 AM            CC: Acute respiratory failure.    S: On BiPAP. Awake and Alert.     ROS: Positive for diarrhea, shortness of breath, mild anxiety, & headache. Denies chest pain, or fever.    O:Vital signs reviewed. O2Sat: 98 % on BiPAP   /72   Pulse 56   Temp 98.2 °F (36.8 °C) (Oral)   Resp 22   Ht 160 cm (63\")   Wt 92.5 kg (204 lb)   LMP  (LMP Unknown) Comment: LAST MAMMOGRAM   SpO2 97%   BMI 36.14 kg/m²      Temp (24hrs), Av.3 °F (36.8 °C), Min:97.9 °F (36.6 °C), Max:99 °F (37.2 °C)        I & Os reviewed.   Intake/Output       18 0700 - 18 0659 18 0700 - 18 0659    Intake (ml) 3126 320    Output (ml) 750 --    Net (ml) 2376 320    Last Weight  92.5 kg (204 lb)  --          General: On BiPAP. No acute distress noted.  Eyes: PERRL. EOM Sluggish   Neck: Supple with out JVD. No obvious masses noted.   Cardiovascular: S1 + S2. Regular.   Respiratory: On BiPAP. No respiratory distress noted. Scattered wheezing heard. No crackles noted  GI: Soft. Bowel sounds somewhat hyperactive   Extremities: No edema noted.  Neurologic: AAOx3. Was able to follow commands.   Skin: Appeared somewhat dry and without any overt rashes    Labs: Reviewed.       Results from last 7 days  Lab Units 18  0415 18  0224 18  2121   SODIUM mmol/L 138 135* 137   POTASSIUM mmol/L 4.7 5.2* 5.3*   CHLORIDE mmol/L 96* 96* 90*   CO2 mmol/L 35.0* 35.0* 37.0*   BUN mg/dL 37* 40* 42*   CREATININE mg/dL 0.80 1.40* 1.90* "   CALCIUM mg/dL 9.1 8.5 9.1   BILIRUBIN mg/dL  --   --  0.6   ALK PHOS U/L  --   --  132*   ALT (SGPT) U/L  --   --  79*   AST (SGOT) U/L  --   --  52*   GLUCOSE mg/dL 269* 149* 189*         Results from last 7 days  Lab Units 11/07/18  0415   MAGNESIUM mg/dL 2.9*           Results from last 7 days  Lab Units 11/07/18  0415 11/06/18  0220 11/05/18  2121   WBC 10*3/mm3 9.46 11.41* 12.34*   HEMOGLOBIN g/dL 10.8* 11.3* 11.7*   PLATELETS 10*3/mm3 162 202 232         Results from last 7 days  Lab Units 11/06/18  0220   INR  1.25*           Pharmacy to dose vancomycin    Pharmacy to Dose Zosyn    [START ON 11/8/2018] Pharmacy Consult        ABG: Reviewed  Lab Results   Component Value Date    PHART 7.447 11/07/2018    DOA1HYN 54.2 (H) 11/07/2018    PO2ART 70.4 (L) 11/07/2018    HGBBG 10.1 (L) 11/07/2018    E7RRQPCY 95.3 11/07/2018    CARBOXYHGB 1.8 11/07/2018         CXRay: Reviewed.  Imaging Results (last 24 hours)     Procedure Component Value Units Date/Time    XR Chest 1 View [[id number]] Collected:  11/07/18 0852     Updated:  11/07/18 0855    Narrative:       PROCEDURE: XR CHEST 1 VW-     HISTORY: RLL pneumonia.; J96.22-Acute and chronic respiratory failure  with hypercapnia; J18.1-Lobar pneumonia, unspecified organism;  J44.9-Chronic obstructive pulmonary disease, unspecified     COMPARISON: November 5, 2018.     FINDINGS: The heart is normal in size. The mediastinum is unremarkable.  There is diffuse bilateral bronchial wall thickening. There is improved  aeration of the right lung base compared to the prior exam. There is no  pneumothorax.  There are no acute osseous abnormalities.           Impression:       Improved aeration of the right lung base compared to the  prior exam.     Continued followup is recommended.     This report was finalized on 11/7/2018 8:53 AM by Carissa Markham M.D..            Assessment & Recommendations/Plan:   1.  Acute Respiratory Failure.  BiPAP can be used PRN during the daytime  and all night please.   ABG has improved.     2.  Acute exacerbation of COPD  Will consider switching her to by mouth prednisone.    3.?  Right lower lobe pneumonia  Normal Pro Calcitonin.  CXR has improved.  Due to recent CDI, will stop all antibiotics for now.    4.  C. difficile infection  On by mouth vancomycin    5.  Smoking  Advised to quit smoking    6.  Obstructive sleep apnea hypoventilation syndrome.    Currently on noninvasive ventilation device at home.    7.  Obesity    8.  Grade 1 diastolic dysfunction.    Appears compensated at this time    9.  Acute renal failure  Creatinine normal  Will D/C IVF.    We have reviewed patient's current orders and changes, if any, have been suggested to primary care team. Plan was also discussed with nursing staff, as necessary.     Phoebe Mancilla MD  11/07/18  11:34 AM    Dictated utilizing Dragon dictation.      Electronically signed by Phoebe Mancilla MD at 11/7/2018  5:50 PM       Consult Notes (last 24 hours) (Notes from 11/7/2018  9:23 AM through 11/8/2018  9:23 AM)     No notes of this type exist for this encounter.        Physical Therapy Notes (last 24 hours) (Notes from 11/7/2018  9:23 AM through 11/8/2018  9:23 AM)     No notes of this type exist for this encounter.        Occupational Therapy Notes (last 24 hours) (Notes from 11/7/2018  9:23 AM through 11/8/2018  9:23 AM)     No notes of this type exist for this encounter.           Respiratory Therapy Notes (last 24 hours) (Notes from 11/7/2018  9:23 AM through 11/8/2018  9:23 AM)      Franklin Topete CRT at 11/8/2018  3:45 AM        Problem: NPPV/CPAP (Adult)  Goal: Signs and Symptoms of Listed Potential Problems Will be Absent, Minimized or Managed (NPPV/CPAP)  Outcome: Ongoing (interventions implemented as appropriate)          Electronically signed by Franklin Topete CRT at 11/8/2018  3:45 AM     Marcy Shafer CRT at 11/7/2018 12:46 PM          Problem: Patient Care Overview  Goal: Plan of  Care Review  Outcome: Ongoing (interventions implemented as appropriate)   11/07/18 1245   Coping/Psychosocial   Plan of Care Reviewed With patient   Plan of Care Review   Progress improving       Problem: NPPV/CPAP (Adult)  Goal: Signs and Symptoms of Listed Potential Problems Will be Absent, Minimized or Managed (NPPV/CPAP)   11/07/18 1245   Goal/Outcome Evaluation   Problems Assessed (NPPV/CPAP) all   Problems Present (NPPV/CPAP) none           Electronically signed by Marcy Shafer CRT at 11/7/2018 12:46 PM

## 2018-11-08 NOTE — PROGRESS NOTES
Discharge Planning Assessment  [facility]     Patient Name: Vidhi Lopez  MRN: [id number]  Today's Date: 11/8/2018    Admit Date: 11/5/2018          Discharge Needs Assessment    No documentation.             Discharge Plan     Row Name 11/08/18 5995       Plan    Plan vd call from [facility] with [facility] and they are unable to accept patient due to non compliance and recent behaviors.  [facility] also advised that they do not accept Trilogy units.  Spoke to son David and he advises the goal is to get his mom closer to him in [place] in an assited living facility but knows that she is requiring more help at this time.  He asked to send referrals to other [place] rehabs including [facility], [facility] and [facility] even though he knows due to her non compliance and behaviors, rehab placement will be difficult.  This CM did speak to the patient as well as Dr Whittington who was assessing patient.  Patient is now alert and oriented and does not remember her agitation or agressiveness.  She understands that rehab placement will be difficult due to this.  She advises that she has the financial means to pay someone to come into the home and help her and that she would ideally like someone there from 3 pm to 7 am to ensure she will wear her Trilogy and take her neb treatments.  Dr Whittington is in agreement that this woud work along with having home health come in.  [facility]. The patient asked me to call her son David and inform him that she agrees to in home help and for him to find someone.  Called and spoke to David again, he is in agreement and asked that a sitter list be emailed to him at [email or web address].  Explained to him that the patient is medically ready for DC and that arrangements need to be made as soon as possible for DC.  He verbalized understanding.     Patient/Family in Agreement with Plan yes    Row Name 11/08/18 2937       Plan    Plan Faxed updated notes to  [facility].        Destination     Service Request Status Selected Specialties Address Phone Number Fax Number    [facility] P[facility] - Request Sent N/A [address], [facility] [address] [phone]    [facility] [place] Pending - Request Sent N/A [address] [facility] [address] [phone] --    [facility] [place] Pending - Request Sent N/A [address] [address] [phone]    [facility] [address] [address] [phone]    [facility] P[facility] - Request Sent N/A [address] [facility] [address] [phone]      Durable Medical Equipment     No service coordination in this encounter.      Dialysis/Infusion     No service coordination in this encounter.      Home Medical Care     No service coordination in this encounter.      Social Care     No service coordination in this encounter.        Expected Discharge Date and Time     Expected Discharge Date Expected Discharge Time    Nov 9, 2018               Demographic Summary    No documentation.           Functional Status    No documentation.           Psychosocial    No documentation.           Abuse/Neglect    No documentation.           Legal    No documentation.           Substance Abuse    No documentation.           Patient Forms    No documentation.         Serena Golden

## 2018-11-09 VITALS
BODY MASS INDEX: 32.78 KG/M2 | SYSTOLIC BLOOD PRESSURE: 138 MMHG | WEIGHT: 185 LBS | HEART RATE: 78 BPM | RESPIRATION RATE: 18 BRPM | HEIGHT: 63 IN | TEMPERATURE: 97.6 F | OXYGEN SATURATION: 94 % | DIASTOLIC BLOOD PRESSURE: 69 MMHG

## 2018-11-09 LAB
BACTERIA SPEC AEROBE CULT: ABNORMAL
GLUCOSE BLDC GLUCOMTR-MCNC: 163 MG/DL (ref 70–130)
GRAM STN SPEC: ABNORMAL
ISOLATED FROM: ABNORMAL

## 2018-11-09 PROCEDURE — 97530 THERAPEUTIC ACTIVITIES: CPT

## 2018-11-09 PROCEDURE — 94799 UNLISTED PULMONARY SVC/PX: CPT

## 2018-11-09 PROCEDURE — 82962 GLUCOSE BLOOD TEST: CPT

## 2018-11-09 PROCEDURE — 99238 HOSP IP/OBS DSCHRG MGMT 30/<: CPT | Performed by: INTERNAL MEDICINE

## 2018-11-09 PROCEDURE — 63710000001 INSULIN ASPART PER 5 UNITS: Performed by: INTERNAL MEDICINE

## 2018-11-09 PROCEDURE — 63710000001 PREDNISONE PER 1 MG: Performed by: INTERNAL MEDICINE

## 2018-11-09 PROCEDURE — 25010000002 HYDRALAZINE PER 20 MG: Performed by: INTERNAL MEDICINE

## 2018-11-09 PROCEDURE — 97535 SELF CARE MNGMENT TRAINING: CPT

## 2018-11-09 PROCEDURE — 25010000002 LORAZEPAM PER 2 MG: Performed by: INTERNAL MEDICINE

## 2018-11-09 PROCEDURE — 94660 CPAP INITIATION&MGMT: CPT

## 2018-11-09 RX ADMIN — VANCOMYCIN HYDROCHLORIDE 125 MG: 125 CAPSULE ORAL at 00:53

## 2018-11-09 RX ADMIN — LEVETIRACETAM 1000 MG: 500 TABLET, FILM COATED ORAL at 09:14

## 2018-11-09 RX ADMIN — PREDNISONE 20 MG: 20 TABLET ORAL at 09:14

## 2018-11-09 RX ADMIN — BUDESONIDE 0.5 MG: 0.5 INHALANT RESPIRATORY (INHALATION) at 07:01

## 2018-11-09 RX ADMIN — ASPIRIN 81 MG: 81 TABLET, COATED ORAL at 09:14

## 2018-11-09 RX ADMIN — CLOPIDOGREL BISULFATE 75 MG: 75 TABLET ORAL at 09:16

## 2018-11-09 RX ADMIN — HYDRALAZINE HYDROCHLORIDE 10 MG: 20 INJECTION INTRAMUSCULAR; INTRAVENOUS at 07:14

## 2018-11-09 RX ADMIN — INSULIN ASPART 2 UNITS: 100 INJECTION, SOLUTION INTRAVENOUS; SUBCUTANEOUS at 12:30

## 2018-11-09 RX ADMIN — VANCOMYCIN HYDROCHLORIDE 125 MG: 125 CAPSULE ORAL at 12:55

## 2018-11-09 RX ADMIN — CARVEDILOL 6.25 MG: 6.25 TABLET, FILM COATED ORAL at 09:14

## 2018-11-09 RX ADMIN — VANCOMYCIN HYDROCHLORIDE 125 MG: 125 CAPSULE ORAL at 06:46

## 2018-11-09 RX ADMIN — FEBUXOSTAT 80 MG: 40 TABLET ORAL at 09:15

## 2018-11-09 RX ADMIN — LORAZEPAM 1 MG: 2 INJECTION INTRAMUSCULAR; INTRAVENOUS at 00:53

## 2018-11-09 RX ADMIN — Medication 1 CAPSULE: at 09:14

## 2018-11-09 RX ADMIN — FAMOTIDINE 20 MG: 20 TABLET ORAL at 09:16

## 2018-11-09 RX ADMIN — Medication 3 ML: at 09:16

## 2018-11-09 RX ADMIN — CETIRIZINE HYDROCHLORIDE 5 MG: 10 TABLET, FILM COATED ORAL at 09:17

## 2018-11-09 RX ADMIN — IPRATROPIUM BROMIDE AND ALBUTEROL SULFATE 3 ML: .5; 3 SOLUTION RESPIRATORY (INHALATION) at 03:11

## 2018-11-09 RX ADMIN — IPRATROPIUM BROMIDE AND ALBUTEROL SULFATE 3 ML: .5; 3 SOLUTION RESPIRATORY (INHALATION) at 07:01

## 2018-11-09 RX ADMIN — ATORVASTATIN CALCIUM 80 MG: 80 TABLET, FILM COATED ORAL at 09:16

## 2018-11-09 RX ADMIN — VALSARTAN 80 MG: 80 TABLET, FILM COATED ORAL at 09:14

## 2018-11-09 NOTE — PROGRESS NOTES
Patient sister Jovita Cardona called [phone]  Having concerns thinks her sister is not physically able to go to Assited living due to  [facility] states no.  Sister was asking about rehab  CM spoke with patient this morning  And pt states she did not want to go to rehab informed sister of this.  Sister states she will call her and see if she will be agreeable to go to rehab.  JYOTI discussed with Dr Whittington.

## 2018-11-09 NOTE — THERAPY TREATMENT NOTE
Acute Care - Occupational Therapy Treatment Note   [place]     Patient Name: Vidhi Lopez  : 1950  MRN: [id number]  Today's Date: 2018  Onset of Illness/Injury or Date of Surgery: 18  Date of Referral to OT: 18  Referring Physician: Dr. Whittington    Admit Date: 2018       ICD-10-CM ICD-9-CM   1. Acute and chronic respiratory failure with hypercapnia (CMS/[facility]) J96.22 518.84   2. Pneumonia of right lower lobe due to infectious organism (CMS/[facility]) J18.1 486   3. Chronic obstructive pulmonary disease, unspecified COPD type (CMS/[facility]) J44.9 496   4. Impaired functional mobility, balance, gait, and endurance Z74.09 V49.89   5. Impaired mobility and ADLs Z74.09 799.89   6. Essential hypertension I10 401.9   7. Demand ischemia (CMS/HCC) I24.8 411.89   8. Diabetes mellitus type 2 in obese (CMS/HCC) E11.69 250.00    E66.9 278.00     Patient Active Problem List   Diagnosis   • Dyslipidemia   • Hypertension   • Anxiety (Chronic benzos)   • Insomnia   • GERD (gastroesophageal reflux disease)   • Diabetes mellitus (CMS/[facility])   • Fibromyalgia   • RLS (restless legs syndrome)   • Migraines   • Acute exacerbation of chronic obstructive pulmonary disease (COPD) (CMS/[facility])   • Interstitial cystitis   • History of acute myocardial infarction   • History of cardiac murmur   • History of stroke   • CAD (coronary artery disease)   • Essential hypertension   • CKD (chronic kidney disease) stage 2, GFR 60-89 ml/min   • Bilateral carotid artery stenosis   • Pneumonia of right lower lobe due to infectious organism (CMS/[facility])   • Obstructive sleep apnea   • Obesity (BMI 30-39.9)   • Diabetes mellitus type 2 in obese (CMS/HCC)   • Obesity hypoventilation syndrome (CMS/[facility])   • Tobacco use   • Acute kidney injury (CMS/[facility])   • Chronic pain (Chronic narcotics)   • R/O CAP (community acquired pneumonia)   • Respiratory failure, acute and chronic (CMS/[facility])   • Recurrent UTI/interstitial cystitis on chronic methenamine   •  Clostridium difficile colitis diagnosed September 2018. Persistent diarrhea despite oral vancomycin   • Acute and chronic respiratory failure with hypercapnia (CMS/[facility])   • Demand ischemia (CMS/[facility])     Past Medical History:   Diagnosis Date   • Allergic rhinitis    • Asthma with COPD (CMS/[facility])     Asthma/COPD   • Bilateral ovarian cysts    • Coronary artery disease    • Depression with anxiety     Depression/Anxiety   • Diabetes (CMS/[facility])     pre   • Endometriosis     Dr. Jin; estradiol    • ESR raised 3/20/2018   • Fatigue    • Fibromyalgia    • Headache     Headaches   • High cholesterol    • History of gallstones    • History of myocardial infarction    • Hypertension    • Influenza B     DX'D 2/6/2018, TREATED WITH TAMIFLU. LAST DOSE 2/11/2018 AM.  PATIENT STATES DR FLORES IS AWARE. DENIES FEVERS OR CHILLS.   • Interstitial cystitis    • On home oxygen therapy     2 L NC   • URIEL on CPAP    • PONV (postoperative nausea and vomiting)    • Seizure disorder, nonconvulsive, with status epilepticus (CMS/[facility]) 3/20/2018   • Septic joint of right knee joint (CMS/[facility]) 3/21/2018   • Shortness of breath on exertion    • Stroke (CMS/[facility])     X1 8 YEARS AGO, GENERALIZED UPPER BODY WEAKNESS RESIDUAL PER PT    • Thyroid disorder    • Urinary leakage    • UTI (urinary tract infection)    • Wears glasses    • Yeast dermatitis      Past Surgical History:   Procedure Laterality Date   • ARTERIOGRAM N/A 2/12/2018    Procedure: Renal Arteriogram;  Surgeon: Lito Flores MD;  Location:  [facility] CATH INVASIVE LOCATION;  Service:    • BREAST BIOPSY Right 1991   • CARDIAC CATHETERIZATION N/A 2/12/2018    Procedure: Right Heart Cath;  Surgeon: Lito Flores MD;  Location:  [facility] CATH INVASIVE LOCATION;  Service:    • CAROTID STENT      Transcath    • CAROTID STENT Left    • CHOLECYSTECTOMY     • CYSTOSTOMY W/ BLADDER BIOPSY     • DILATATION AND CURETTAGE     • KNEE ARTHROSCOPY Right 3/23/2018    Procedure: ARTHROSCOPY, RIGHT KNEE   INCISION AND DRAINAGE LOWER EXTREMITY WITH SYNOVIAL BIOPSY;  Surgeon: Dilip Giron MD;  Location: [facility];  Service: Orthopedics   • LAPAROSCOPIC CHOLECYSTECTOMY  1994    Lap [place]   • OOPHORECTOMY     • PAP SMEAR  05/10/2016   • PARTIAL HYSTERECTOMY         Therapy Treatment          Rehabilitation Treatment Summary     Row Name 11/09/18 1029             Treatment Time/Intention    Discipline occupational therapist  -SD      Document Type therapy note (daily note)  -SD      Subjective Information no complaints  -SD      Mode of Treatment occupational therapy  -SD      Patient Effort good  -SD      Existing Precautions/Restrictions fall;oxygen therapy device and L/min  -SD      Recorded by [SD] Julia Mittal OT 11/09/18 1413      Row Name 11/09/18 1029             Vital Signs    O2 Delivery Pre Treatment supplemental O2  -SD      O2 Delivery Intra Treatment supplemental O2  -SD      O2 Delivery Post Treatment supplemental O2  -SD      Recorded by [SD] Julia Mittal OT 11/09/18 1413      Row Name 11/09/18 1029             Bed Mobility Assessment/Treatment    Bed Mobility Assessment/Treatment supine-sit;sit-supine  -SD      Supine-Sit [place] (Bed Mobility) conditional independence  -SD      Sit-Supine [place] (Bed Mobility) conditional independence  -SD      Recorded by [SD] Julia Mittal OT 11/09/18 1413      Row Name 11/09/18 1029             Functional Mobility    Functional Mobility- Ind. Level contact guard assist  -SD      Functional Mobility-Distance (Feet) 318  -SD      Recorded by [SD] Julia Mittal OT 11/09/18 1413      Row Name 11/09/18 1029             Transfer Assessment/Treatment    Transfer Assessment/Treatment sit-stand transfer;stand-sit transfer;toilet transfer  -SD      Recorded by [SD] Julia Mittal OT 11/09/18 1413      Row Name 11/09/18 1029             Sit-Stand Transfer    Sit-Stand [place] (Transfers) stand by assist  -SD      Assistive Device (Sit-Stand  Transfers) walker, front-wheeled  -SD      Recorded by [SD] Julia Mittal OT 11/09/18 1413      Row Name 11/09/18 1029             Stand-Sit Transfer    Stand-Sit [place] (Transfers) stand by assist  -SD      Assistive Device (Stand-Sit Transfers) walker, front-wheeled  -SD      Recorded by [SD] Julia Mittal OT 11/09/18 1413      Row Name 11/09/18 1029             Toilet Transfer    Type (Toilet Transfer) sit-stand;stand-sit  -SD      [place] Level (Toilet Transfer) stand by assist  -SD      Assistive Device (Toilet Transfer) commode  -SD      Recorded by [SD] Julia Mittal OT 11/09/18 1413      Row Name 11/09/18 1029             Lower Body Dressing Assessment/Training    Lower Body Dressing [place] Level don;doff;socks;set up  -SD      Recorded by [SD] Julia Mittal OT 11/09/18 1413      Row Name 11/09/18 1029             Toileting Assessment/Training    [place] Level (Toileting) supervision  -SD      Recorded by [SD] Julia Mittal OT 11/09/18 1413      Row Name 11/09/18 1029             Positioning and Restraints    Pre-Treatment Position in bed  -SD      Post Treatment Position bed  -SD      In Bed supine;call light within reach;encouraged to call for assist  -SD      Recorded by [SD] Julia Mittal OT 11/09/18 1413      Row Name 11/09/18 1029             Pain Scale: Numbers Pre/Post-Treatment    Pain Scale: Numbers, Pretreatment 0/10 - no pain  -SD      Pain Scale: Numbers, Post-Treatment 0/10 - no pain  -SD      Recorded by [SD] Julia Mittal OT 11/09/18 1413      Row Name 11/09/18 1029             Coping    Observed Emotional State calm;cooperative  -SD      Verbalized Emotional State acceptance  -SD      Recorded by [SD] Julia Mittal OT 11/09/18 1413      Row Name 11/09/18 1029             Plan of Care Review    Plan of Care Reviewed With patient  -SD      Recorded by [SD] Julia Mittal OT 11/09/18 1413      Row Name 11/09/18 1029             Outcome  Summary/Treatment Plan (OT)    Daily Summary of Progress (OT) progress toward functional goals as expected  -SD      Anticipated Discharge Disposition (OT) home with home health  -SD      Recorded by [SD] Julia Mittal OT 11/09/18 1413        User Key  (r) = Recorded By, (t) = Taken By, (c) = Cosigned By    Initials Name Effective Dates Discipline    SD Julia Mittal OT 03/07/18 -  OT                   OT Rehab Goals     Row Name 11/09/18 1029             Transfer Goal 1 (OT)    Progress/Outcome (Transfer Goal 1, OT) goal met  -SD         Dressing Goal 1 (OT)    Progress/Outcome (Dressing Goal 1, OT) goal met  -SD         Toileting Goal 1 (OT)    Progress/Outcome (Toileting Goal 1, OT) goal met  -SD         Strength Goal 1 (OT)    Progress/Outcome (Strength Goal 1, OT) goal ongoing  -SD        User Key  (r) = Recorded By, (t) = Taken By, (c) = Cosigned By    Initials Name Provider Type Discipline    SD Julia Mittal OT Occupational Therapist OT        Occupational Therapy Education     Title: PT OT SLP Therapies (Active)     Topic: Occupational Therapy (Active)     Point: ADL training (Done)     Description: Instruct learner(s) on proper safety adaptation and remediation techniques during self care or transfers.   Instruct in proper use of assistive devices.   Learning Progress Summary     Learner Status Readiness Method Response Comment Documented by    Patient Done Acceptance E,TB VU Safety and sequencing during functiona transfers/mobility SD 11/09/18 1414     Done Acceptance E,TB VU Benefit of OT; OT POC SD 11/08/18 1330                      User Key     Initials Effective Dates Name Provider Type Discipline    SD 03/07/18 -  Julia Mittal OT Occupational Therapist OT                OT Recommendation and Plan  Outcome Summary/Treatment Plan (OT)  Daily Summary of Progress (OT): progress toward functional goals as expected  Anticipated Discharge Disposition (OT): home with home health  Planned  Therapy Interventions (OT Eval): activity tolerance training, adaptive equipment training, BADL retraining, patient/caregiver education/training, strengthening exercise, transfer/mobility retraining  Therapy Frequency (OT Eval): 3 times/wk (5 times if indicated)  Daily Summary of Progress (OT): progress toward functional goals as expected  Plan of Care Review  Plan of Care Reviewed With: patient  Plan of Care Reviewed With: patient  Outcome Summary: OT tx completed. patient presented improved performance with bed mobility, transfers, toileting tasks and functional mobility, walking 315' with no AD. Continue OT POC        Outcome Measures     Row Name 11/09/18 1029 11/08/18 1013 11/08/18 0939       How much help from another person do you currently need...    Turning from your back to your side while in flat bed without using bedrails?  --  -- 3  -LM    Moving from lying on back to sitting on the side of a flat bed without bedrails?  --  -- 3  -LM    Moving to and from a bed to a chair (including a wheelchair)?  --  -- 3  -LM    Standing up from a chair using your arms (e.g., wheelchair, bedside chair)?  --  -- 3  -LM    Climbing 3-5 steps with a railing?  --  -- 3  -LM    To walk in hospital room?  --  -- 3  -LM    AM-PAC 6 Clicks Score  --  -- 18  -LM       How much help from another is currently needed...    Putting on and taking off regular lower body clothing? 4  -SD 3  -SD  --    Bathing (including washing, rinsing, and drying) 3  -SD 3  -SD  --    Toileting (which includes using toilet bed pan or urinal) 4  -SD 3  -SD  --    Putting on and taking off regular upper body clothing 4  -SD 4  -SD  --    Taking care of personal grooming (such as brushing teeth) 4  -SD 4  -SD  --    Eating meals 4  -SD 4  -SD  --    Score 23  -SD 21  -SD  --       Functional Assessment    Outcome Measure Options AM-PAC 6 Clicks Daily Activity (OT)  -SD AM-PAC 6 Clicks Daily Activity (OT)  -SD AM-PAC 6 Clicks Basic Mobility (PT)  -LM       User Key  (r) = Recorded By, (t) = Taken By, (c) = Cosigned By    Initials Name Provider Type    Aarti Song, PT Physical Therapist    Julia Ornelas OT Occupational Therapist           Time Calculation:         Time Calculation- OT     Row Name 11/09/18 1415             Time Calculation- OT    OT Start Time 1029  -SD      Total Timed Code Minutes- OT 40 minute(s)  -SD      OT Received On 11/09/18  -SD      OT Goal Re-Cert Due Date 11/18/18  -SD         Timed Charges    70614 - OT Therapeutic Activity Minutes 20  -SD      86456 - OT Self Care/Mgmt Minutes 20  -SD        User Key  (r) = Recorded By, (t) = Taken By, (c) = Cosigned By    Initials Name Provider Type    Julia Ornelas OT Occupational Therapist           Therapy Suggested Charges     Code   Minutes Charges    25384 (CPT®) Hc Ot Neuromusc Re Education Ea 15 Min      86582 (CPT®) Hc Ot Ther Proc Ea 15 Min      57621 (CPT®) Hc Ot Therapeutic Act Ea 15 Min 20 2    69913 (CPT®) Hc Ot Manual Therapy Ea 15 Min      11266 (CPT®) Hc Ot Iontophoresis Ea 15 Min      91326 (CPT®) Hc Ot Elec Stim Ea-Per 15 Min      90217 (CPT®) Hc Ot Ultrasound Ea 15 Min      85885 (CPT®) Hc Ot Self Care/Mgmt/Train Ea 15 Min 20 1    Total  40 3        Therapy Charges for Today     Code Description Service Date Service Provider Modifiers Qty    17327031437 HC OT EVAL LOW COMPLEXITY 4 11/8/2018 Julia Mittal OT GO 1    20989802207 HC OT THERAPEUTIC ACT EA 15 MIN 11/9/2018 Julia Mittal OT GO 2    37324961299 HC OT SELF CARE/MGMT/TRAIN EA 15 MIN 11/9/2018 Julia Mittal OT GO 1               Julia Mittal OT  11/9/2018

## 2018-11-09 NOTE — PROGRESS NOTES
[facility] intake here to evaluate patient.  States she may not qualify due  To needing more assistants and has been treated for C diff

## 2018-11-09 NOTE — PROGRESS NOTES
Case Management Discharge Note    Final Note: Pt is going to assisted living near son in [facility].      Destination     No service has been selected for the patient.      Durable Medical Equipment     No service has been selected for the patient.      Dialysis/Infusion     No service has been selected for the patient.      Home Medical Care     No service has been selected for the patient.      Social Care     No service has been selected for the patient.        Other: Other (Private vehicle)    Final Discharge Disposition Code: 01 - home or self-care

## 2018-11-09 NOTE — DISCHARGE SUMMARY
[facility]   DISCHARGE SUMMARY      Name:  Vidhi Lopez   Age:  68 y.o.  Sex:  female  :  1950  MRN:  [id number]   Visit Number:  [id number]    Admission Date:  2018  Date of Discharge:  2018  Primary Care Physician:  Michael Mays MD    Discharge Diagnoses:     1.  Acute metabolic encephalopathy secondary to #2, present on admission, improved.  2.  Acute on chronic hypercapnic and hypoxic respiratory failure, present on admission, improved.  3.  Right lower lobe bacterial pneumonia, present on admission, improved.  4.  Acute COPD exacerbation, present on admission, improved.  5.  Acute renal failure, present on admission, resolved.  6.  Demand ischemia with elevated troponin level secondary to #2.  7.  Obstructive sleep apnea, noncompliant with BiPAP therapy at home.  8.  Coronary artery disease status post stenting.  9.  Recent history of C. difficile colitis status post a course of oral vancomycin.  10.  Diabetes mellitus type 2.  11.  Essential hypertension.  12.  Generalized anxiety disorder.  13.  Medical noncompliance.  14.  Chronic tobacco dependence.      Acute and chronic respiratory failure with hypercapnia (CMS/HCC)    Pneumonia of right lower lobe due to infectious organism (CMS/HCC)    Obstructive sleep apnea    CAD (coronary artery disease)    Essential hypertension    Dyslipidemia    Anxiety (Chronic benzos)    GERD (gastroesophageal reflux disease)    Diabetes mellitus (CMS/HCC)    Acute exacerbation of chronic obstructive pulmonary disease (COPD) (CMS/HCC)    History of acute myocardial infarction    Acute kidney injury (CMS/HCC)    Clostridium difficile colitis diagnosed 2018. Persistent diarrhea despite oral vancomycin    Demand ischemia (CMS/HCC)    Presenting Problem:    Acute and chronic respiratory failure with hypercapnia (CMS/HCC) [J96.22]     Consults:     Consults     Date and Time Order Name Status Description    2018  0215 Inpatient Pulmonology Consult Completed     10/18/2018 1109 Inpatient Infectious Diseases Consult Completed     10/18/2018 1033 Inpatient Infectious Diseases Consult Completed         Consulting Physician(s)     Provider Relationship Specialty    Phoebe Mancilla MD Consulting Physician Pulmonary Disease        Procedures Performed:    None.    History of presenting illness:    Patient is a 68 year old female with medical history of COPD, URIEL, OHS, noncompliant on BiPAP and oxygen therapy, type 2 DM, CAD s/p 1 stent, hypertension, hyperlipidemia and anxiety, who was brought in from home by EMS for evaluation of altered mental status. During my initial encounter, patient was on BiPAP, unable to provide any history, however, later on patient was able to have a conversation and tell me that she lives at home alone and is supposed to wear BiPAP at night and oxygen during the day. She did not do this prior to going to bed on Friday. She cannot recall much of what happened over the last 2 days, but denies any complaints of significant short of breath or cough, though, she was visibly coughing heavily when I was interviewing her. She had no complaints of chest pain, pressure, palpitations, abdominal pain, diarrhea or dysuria. She denies any fevers or chills. Of note, patient was discharged from [facility] on on 10/18 after being admitted for very similar episode after which she was discharged home. She was treated for COPD exacerbation and C. Diff at the time, she is still taking oral vancomycin.      Her initial vitals on arrival were within normal limits. Labs are notable for wbc 12k, H&H 11.7/40, . BUN/Cr 42/1.9 (baseline 0.75). ALT/AST 79/52. Troponin 0.191. BNP 4340. PH 7.2, CO2 97. Urinalysis was rather unremarkable other than 3-5 wbcs. Negative for leuk esterase and nitrite. After about 2 hours of BiPAP her pH improved to 7.247 and CO2 improved to 88. EKG did not reveal any acute ischemic  changes. CXR showed a RLL airspace disease. She was given a liter of IVF and started on Vancomycin and Zosyn.      Soon after arriving to the ICU, patient took herself off BiPAP. She was fully alert and oriented. She started to demand to be given food or that she would leave against medical advise. She then stated that she would leave against medical advice no matter what we tell her, even though her oxygen was in the mid 70s. I had multiple conversations with the patient's daughters over the phone to notify them of patient's request. They subsequently spoke to the patient over the phone multiple times and she finally decided to stay, but still refusing to wear BiPAP.    Hospital Course:    Patient was admitted to the medical ICU and subsequently required Haldol and Ativan to calm her down.  She was continued on BiPAP therapy and improved with regards to her CO2 retention.  She did have elevated troponin levels but did not have any chest pain or EKG changes.  Her troponins were trending down.  She was initially placed on Zosyn and vancomycin and was continued on oral vancomycin for her history of C. difficile colitis.  She was evaluated by Dr. Mancilla from pulmonology.    Patient did have episodes of agitation and aggressive behavior requiring restraints at one point.  It seems that the patient may have underlying dementia and may be having sundowning phenomena.  During the morning hours she is very pleasant and answering questions appropriately.  Patient improved significantly with regards to her respiratory distress and was subsequently transferred to the medical floor with telemetry.  Her pro-calcitonin level was negative and Dr. Mancilla recommended discontinuation of antibiotic therapy especially in view of her recent C. difficile colitis.  One of her blood cultures did come back positive but they are growing coagulase negative Staphylococcus which is likely a contaminant.  The other blood culture bottle has been  negative so far.  She did not have any evidence of sepsis, fever or significant leukocytosis.    Patient was noted to have elevated blood pressures and was continued on metoprolol.  Diovan 1 was subsequently added to her regimen.  She did have elevated troponin levels which was thought to be secondary to demand ischemia.  I discussed this with the on-call cardiologist for Dr. Lito Flores and he recommended conservative management.  He did not think that the patient needed any acute intervention.  He recommended discharge on dual antiplatelet agents and current cardiac medications with subsequent outpatient follow-up with Dr. Flores.  Patient states that she already has an appointment in a couple weeks and will follow-up with Dr. Flores.  She is advised to complete the course of her C. difficile treatment.    Patient was evaluated by physical therapy and was able to walk in the hallway with walker.  She is currently eager to go home but does live alone.  I had a long discussion with case management in the room and the patient states that she is planning to arrange private sitters to be with her.  She states that her son lives in [place], [place] and she is eventually planning to go to an assisted living facility over there.  For now, she will be discharged home with home health.  She already has home oxygen and home Trilogy NIV unit.  She has been strongly advised to be compliant with her Trilogy unit especially when she feels drowsy or shaky which is a sign of CO2 buildup.  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  She denies any home equipment needs.  She states that she already has a walker as well as a bedside commode.    Patient will be discharged home today with home health and she is advised to follow-up with her primary care physician in one week and Dr. Flores in 2-3 weeks.  I have strongly advised the patient to discontinue smoking which she is planning to do.    Vital Signs:    Temp:  [98 °F (36.7  °C)-98.2 °F (36.8 °C)] 98 °F (36.7 °C)  Heart Rate:  [65-90] 90  Resp:  [16-18] 18  BP: (122-189)/(64-95) 142/65  FiO2 (%):  [35 %] 35 %    Physical Exam:    General Appearance:  Alert and cooperative, not in any acute distress.   Head:  Atraumatic and normocephalic, without obvious abnormality.   Eyes:          PERRLA, conjunctivae and sclerae normal, no Icterus. No pallor. Extra-occular movements are within normal limits.   Ears:  Ears appear intact with no abnormalities noted.   Throat: No oral lesions, no thrush, oral mucosa moist.   Neck: Supple, trachea midline, no thyromegaly, no carotid bruit.   Back:   No kyphoscoliosis present. No tenderness to palpation,   range of motion normal.   Lungs:   Chest shape is normal. Breath sounds heard bilaterally equally.  No wheezing.  Occasional basal crackles heard. No Pleural rub or bronchial breathing.   Heart:  Normal S1 and S2, no murmur, no gallop, no rub. No JVD.   Abdomen:   Normal bowel sounds, no masses, no organomegaly. Soft, non-tender, obese, no guarding, no rebound tenderness.   Extremities: Moves all extremities well, no edema, no cyanosis, no clubbing.   Pulses: Pulses palpable and equal bilaterally.   Skin: No bleeding, bruising or rash.   Neurologic: Alert and oriented x 3. Moves all four limbs equally. No flapping tremors. No facial asymetry.     Pertinent Lab Results:       Results from last 7 days  Lab Units 11/08/18  0550 11/07/18  0415 11/06/18  0224 11/05/18  2121   SODIUM mmol/L 139 138 135* 137   POTASSIUM mmol/L 3.7 4.7 5.2* 5.3*   CHLORIDE mmol/L 97* 96* 96* 90*   CO2 mmol/L 37.0* 35.0* 35.0* 37.0*   BUN mg/dL 20 37* 40* 42*   CREATININE mg/dL 0.70 0.80 1.40* 1.90*   CALCIUM mg/dL 9.9 9.1 8.5 9.1   BILIRUBIN mg/dL  --   --   --  0.6   ALK PHOS U/L  --   --   --  132*   ALT (SGPT) U/L  --   --   --  79*   AST (SGOT) U/L  --   --   --  52*   GLUCOSE mg/dL 112* 269* 149* 189*       Results from last 7 days  Lab Units 11/08/18  0550 11/07/18  0415  11/06/18  0220   WBC 10*3/mm3 12.33* 9.46 11.41*   HEMOGLOBIN g/dL 12.1 10.8* 11.3*   HEMATOCRIT % 38.4 35.3* 38.0   PLATELETS 10*3/mm3 204 162 202       Results from last 7 days  Lab Units 11/06/18  0220   INR  1.25*       Results from last 7 days  Lab Units 11/08/18  0550 11/07/18  0415 11/06/18  1315   TROPONIN I ng/mL 0.112* 0.117* 0.173*       Results from last 7 days  Lab Units 11/05/18  2121   PROBNP pg/mL 4,340.0*               Results from last 7 days  Lab Units 11/08/18  0520   PH, ARTERIAL pH units 7.490   PO2 ART mm Hg 81.1   PCO2, ARTERIAL mm Hg 45.9*   HCO3 ART mmol/L 35.0*       Results from last 7 days  Lab Units 11/05/18  2154   COLOR UA  Yellow   GLUCOSE UA  Negative   KETONES UA  Negative   LEUKOCYTES UA  Negative   PH, URINE  <=5.0   BILIRUBIN UA  Negative   UROBILINOGEN UA  0.2 E.U./dL       Results from last 7 days  Lab Units 11/05/18  2248 11/05/18  2238   BLOODCX  Staphylococcus epidermidis* No growth at 3 days     Pertinent Radiology Results:    Imaging Results (all)     Procedure Component Value Units Date/Time    XR Chest 1 View [[id number]] Collected:  11/07/18 0852     Updated:  11/07/18 0855    Narrative:       PROCEDURE: XR CHEST 1 VW-     HISTORY: RLL pneumonia.; J96.22-Acute and chronic respiratory failure  with hypercapnia; J18.1-Lobar pneumonia, unspecified organism;  J44.9-Chronic obstructive pulmonary disease, unspecified     COMPARISON: November 5, 2018.     FINDINGS: The heart is normal in size. The mediastinum is unremarkable.  There is diffuse bilateral bronchial wall thickening. There is improved  aeration of the right lung base compared to the prior exam. There is no  pneumothorax.  There are no acute osseous abnormalities.           Impression:       Improved aeration of the right lung base compared to the  prior exam.     Continued followup is recommended.     This report was finalized on 11/7/2018 8:53 AM by Carissa Markham M.D..    XR Chest 1 View [[id number]]  Collected:  11/05/18 2156     Updated:  11/05/18 2155    Narrative:       FINAL REPORT    CLINICAL HISTORY:  ams    FINDINGS:  There are low lung volumes with elevation right hemidiaphragm.  There is right lower lobe airspace disease.  The heart is  enlarged.  Aortic knob is calcified.  Impression: Right lower  lobe airspace disease      Impression:       Authenticated by Brandon Golden MD on 11/05/2018 09:56:44 PM        Condition on Discharge:      Stable.    Code status during the hospital stay:    Code Status and Medical Interventions:   Ordered at: 11/06/18 0255     Limited Support to NOT Include:    Intubation     Code Status:    CPR     Medical Interventions (Level of Support Prior to Arrest):    Limited     Discharge Disposition:    Home-Health Care [facility]    Discharge Medications:       Discharge Medications      New Medications      Instructions Start Date   valsartan 80 MG tablet  Commonly known as:  DIOVAN   80 mg, Oral, Every 24 Hours Scheduled         Changes to Medications      Instructions Start Date   gabapentin 600 MG tablet  Commonly known as:  NEURONTIN  What changed:  how much to take   600 mg, Oral, 3 Times Daily         Continue These Medications      Instructions Start Date   ALPRAZolam 0.25 MG tablet  Commonly known as:  XANAX   0.25 mg, Oral, 2 Times Daily PRN      ASPIRIN LOW DOSE 81 MG EC tablet  Generic drug:  aspirin   81 mg, Oral, Daily      atorvastatin 80 MG tablet  Commonly known as:  LIPITOR   80 mg, Oral, Daily      bumetanide 1 MG tablet  Commonly known as:  BUMEX   1 mg, Oral, Daily With Breakfast      CELEBREX 100 MG capsule  Generic drug:  celecoxib   Every 12 Hours Scheduled      clopidogrel 75 MG tablet  Commonly known as:  PLAVIX   75 mg, Oral, Daily      colchicine 0.6 MG tablet   take 1 tablet by mouth once daily if needed      famotidine 20 MG tablet  Commonly known as:  PEPCID   20 mg, Oral, 2 Times Daily      HUMALOG KWIKPEN 100 UNIT/ML solution pen-injector  Generic  drug:  Insulin Lispro   30 Units, Subcutaneous, 3 Times Daily With Meals, Max 56 units daily      levETIRAcetam 1000 MG tablet  Commonly known as:  KEPPRA   1,000 mg, Oral, Every 12 Hours Scheduled      levocetirizine 5 MG tablet  Commonly known as:  XYZAL   5 mg, Oral, Every Evening      metoprolol tartrate 50 MG tablet  Commonly known as:  LOPRESSOR   50 mg, Oral, Every 12 Hours Scheduled      mirtazapine 15 MG tablet  Commonly known as:  REMERON   15 mg, Oral, Nightly      nitroglycerin 0.4 MG SL tablet  Commonly known as:  NITROSTAT   0.4 mg, Sublingual, Every 5 Minutes PRN      ondansetron 8 MG tablet  Commonly known as:  ZOFRAN   8 mg, Oral, 2 Times Daily PRN      PROBIOTIC DAILY PO   1 tablet, Oral, Daily      promethazine 25 MG tablet  Commonly known as:  PHENERGAN   25 mg, Oral, Every 8 Hours PRN      rOPINIRole 0.25 MG tablet  Commonly known as:  REQUIP   0.25 mg, Oral, Nightly, Take 1-3 hours before bedtime.      SYMBICORT 160-4.5 MCG/ACT inhaler  Generic drug:  budesonide-formoterol   2 puffs, Inhalation, 2 Times Daily - RT      TOUJEO SOLOSTAR 300 UNIT/ML solution pen-injector  Generic drug:  Insulin Glargine   20 Units, Subcutaneous, Daily      vancomycin 50 MG/ML reconstituted solution oral solution reconstituted   125mg QID x 2 weeks, then 125mg BID x 2 weeks         Stop These Medications    potassium chloride 20 MEQ CR tablet  Commonly known as:  K-DUR,KLOR-CON          Discharge Diet:     Diet Instructions     Diet: Consistent Carbohydrate, Cardiac; Thin       Discharge Diet:   Consistent Carbohydrate  Cardiac       Fluid Consistency:  Thin        Activity at Discharge:     Activity Instructions     Activity as Tolerated           Follow-up Appointments:    Additional Instructions for the Follow-ups that You Need to Schedule     Ambulatory Referral to Home Health    As directed      Face to Face Visit Date:  11/8/2018    Follow-up Provider for Plan of Care?:  I treated the patient in an acute care  facility and will not continue treatment after discharge.    Follow-up Provider:  ARABELLA ABRAHAM [[id number]]    Reason/Clinical Findings:  COPD, Respiratory failure, CAD, DM, HTN    Describe mobility limitations that make leaving home difficult:  Generalized weakness, Fall risk    Nursing/Therapeutic Services Requested:  Skilled Nursing Physical Therapy Occupational Therapy    Skilled nursing orders:  Medication education COPD management O2 instruction Cardiopulmonary assessments    PT orders:  Gait Training Transfer training Strengthening Therapeutic exercise    Weight Bearing Status:  As Tolerated    Occupational orders:  Activities of daily living Strengthening    Frequency:  1 Week 1            Contact information for follow-up providers     Arabella Abraham MD Follow up in 1 week(s).    Specialty:  General Practice  Contact information:  [address]  [facility] [address] [phone]                   Contact information for after-discharge care     Home Medical Care     [facility] .    Specialty:  Home Health Services  Contact information:  [address]  [facility] [address] [phone]                           Future Appointments  Date Time Provider Department Center   11/29/2018 1:15 PM Lito Flores MD [facility] None   11/30/2018 12:45 PM RAD TECH [facility] None   11/30/2018 1:00 PM [facility], PFT LAB 1 [facility] None   11/30/2018 1:30 PM Oliver Garcia MD [facility] None   4/9/2019 1:00 PM Eusebia Yanez PA-C [facility] N [facility] None     Test Results Pending at Discharge:     Order Current Status    Blood Culture - Blood, Preliminary result             Kentrell Whittington MD  11/09/18  10:29 AM    Time spent: 25 min.    Dictated utilizing Dragon dictation.

## 2018-11-09 NOTE — PROGRESS NOTES
Alexandra from [facility] went to see patient.  Patient still refuses rehab  At nursing home setting

## 2018-11-09 NOTE — PROGRESS NOTES
Discharge Planning Assessment  [facility]     Patient Name: Vidhi Lopez  MRN: [id number]  Today's Date: 11/9/2018    Admit Date: 11/5/2018          Discharge Needs Assessment    No documentation.             Discharge Plan     Row Name 11/09/18 1156       Plan    Plan Comments CM contacted Patient son David  states he was coming to get her today and taking her to [place] [facility],  patient is agreeable , Omkar RN updated    Row Name 11/09/18 0903       Plan    Plan Home with     Patient/Family in Agreement with Plan yes    Plan Comments Received call back from Aguila with [facility] .  Referral received and they accept.  They will plan to see pt on the weekend.        Destination     Service Request Status Selected Specialties Address Phone Number Fax Number    [facility] - Request Sent N/A [address] [facility] [address] [phone] --    [facility] Pending - Request Sent N/A [address] [address] [phone]    [facility] Pending - Request Sent N/A [address] [facility] [address] [phone]    [facility] Pending - Request Sent N/A [address] [address] [phone]    [facility] Declined  patients noncompliance and do not accept Tr[facility] N/A [address] [facility] [address] [phone]      Durable Medical Equipment     No service coordination in this encounter.      Dialysis/Infusion     No service coordination in this encounter.      Home Medical Care - Selection Complete     Service Request Status Selected Specialties Address Phone Number Fax Number    [facility] CARE Selected [facility] Health [facility] [address] [address] [phone]      Social Care     No service coordination in this  encounter.        Expected Discharge Date and Time     Expected Discharge Date Expected Discharge Time    Nov 9, 2018               Demographic Summary    No documentation.           Functional Status    No documentation.           Psychosocial    No documentation.           Abuse/Neglect    No documentation.           Legal    No documentation.           Substance Abuse    No documentation.           Patient Forms    No documentation.         Gricelda Robledo RN

## 2018-11-09 NOTE — PLAN OF CARE
Problem: Patient Care Overview  Goal: Plan of Care Review  Outcome: Ongoing (interventions implemented as appropriate)   11/09/18 1413   Coping/Psychosocial   Plan of Care Reviewed With patient   Plan of Care Review   Progress improving   OTHER   Outcome Summary OT tx completed. patient presented improved performance with bed mobility, transfers, toileting tasks and functional mobility, walking 315' with no AD. Continue OT POC

## 2018-11-09 NOTE — PROGRESS NOTES
Case Management Discharge Note         Destination     No service has been selected for the patient.      Durable Medical Equipment     No service has been selected for the patient.      Dialysis/Infusion     No service has been selected for the patient.      Home Medical Care - Selection Complete     Service Request Status Selected Specialties Address Phone Number Fax Number    [facility] HOME CARE Selected Home Health Services [address] [facility] [address] [phone]      Social Care     No service has been selected for the patient.             Final Discharge Disposition Code: 01 - home or self-care

## 2018-11-10 ENCOUNTER — READMISSION MANAGEMENT (OUTPATIENT)
Dept: CALL CENTER | Facility: HOSPITAL | Age: 68
End: 2018-11-10

## 2018-11-10 LAB — BACTERIA SPEC AEROBE CULT: NORMAL

## 2018-11-10 NOTE — OUTREACH NOTE
Prep Survey      Responses   Facility patient discharged from?  [place]   Is patient eligible?  Yes   Discharge diagnosis  Exacerbation of COPD, and metabolic encephalopathy   Does the patient have one of the following disease processes/diagnoses(primary or secondary)?  COPD/Pneumonia   Does the patient have Home health ordered?  No   Is there a DME ordered?  No   General alerts for this patient  Assisted living    Prep survey completed?  Yes          Enzo Leblanc RN

## 2018-11-10 NOTE — PROGRESS NOTES
Case Management Discharge Note    Final Note: home with son in [place]  [facility]  [facility]    Destination      No service has been selected for the patient.      Durable Medical Equipment      No service has been selected for the patient.      Dialysis/Infusion      No service has been selected for the patient.      Home Medical Care      No service has been selected for the patient.      Community Resources      No service has been selected for the patient.        Other: Other(Private vehicle)    Final Discharge Disposition Code: 01 - home or self-care

## 2018-11-12 ENCOUNTER — READMISSION MANAGEMENT (OUTPATIENT)
Dept: CALL CENTER | Facility: HOSPITAL | Age: 68
End: 2018-11-12

## 2018-11-12 NOTE — OUTREACH NOTE
COPD/PN Week 1 Survey      Responses   Facility patient discharged from?  [facility]   Does the patient have one of the following disease processes/diagnoses(primary or secondary)?  COPD/Pneumonia   Is there a successful TCM telephone encounter documented?  No   Was the primary reason for admission:  Pneumonia   Week 1 attempt successful?  No   Unsuccessful attempts  Attempt 1          Zuleika Jacques RN

## 2018-11-13 ENCOUNTER — READMISSION MANAGEMENT (OUTPATIENT)
Dept: CALL CENTER | Facility: HOSPITAL | Age: 68
End: 2018-11-13

## 2018-11-13 NOTE — OUTREACH NOTE
COPD/PN Week 1 Survey      Responses   Facility patient discharged from?  [facility]   Does the patient have one of the following disease processes/diagnoses(primary or secondary)?  COPD/Pneumonia   Is there a successful TCM telephone encounter documented?  No   Was the primary reason for admission:  Pneumonia   Week 1 attempt successful?  No   Unsuccessful attempts  Attempt 2          Marina Price RN

## 2018-11-17 ENCOUNTER — READMISSION MANAGEMENT (OUTPATIENT)
Dept: CALL CENTER | Facility: HOSPITAL | Age: 68
End: 2018-11-17

## 2018-11-17 NOTE — OUTREACH NOTE
COPD/PN Week 2 Survey      Responses   Facility patient discharged from?  [facility]   Does the patient have one of the following disease processes/diagnoses(primary or secondary)?  COPD/Pneumonia   Was the primary reason for admission:  Pneumonia   Week 2 attempt successful?  No   Unsuccessful attempts  Attempt 1          Amber Grady RN

## 2018-11-19 ENCOUNTER — READMISSION MANAGEMENT (OUTPATIENT)
Dept: CALL CENTER | Facility: HOSPITAL | Age: 68
End: 2018-11-19

## 2018-11-19 NOTE — OUTREACH NOTE
COPD/PN Week 2 Survey      Responses   Facility patient discharged from?  [facility]   Does the patient have one of the following disease processes/diagnoses(primary or secondary)?  COPD/Pneumonia   Was the primary reason for admission:  Pneumonia   Week 2 attempt successful?  No   Unsuccessful attempts  Attempt 2          Kailey Alvarez RN

## 2018-11-20 ENCOUNTER — TELEPHONE (OUTPATIENT)
Dept: CARDIAC REHAB | Facility: HOSPITAL | Age: 68
End: 2018-11-20

## 2018-11-28 ENCOUNTER — TRANSCRIBE ORDERS (OUTPATIENT)
Dept: CARDIAC REHAB | Facility: HOSPITAL | Age: 68
End: 2018-11-28

## 2018-11-28 DIAGNOSIS — Z95.5 STATUS POST INSERTION OF DRUG ELUTING CORONARY ARTERY STENT: Primary | ICD-10-CM

## 2018-12-12 ENCOUNTER — OFFICE VISIT (OUTPATIENT)
Dept: CARDIAC REHAB | Facility: HOSPITAL | Age: 68
End: 2018-12-12

## 2018-12-12 VITALS — HEIGHT: 62 IN | BODY MASS INDEX: 37.39 KG/M2 | WEIGHT: 203.2 LBS

## 2018-12-12 DIAGNOSIS — Z95.5 STATUS POST INSERTION OF DRUG ELUTING CORONARY ARTERY STENT: Primary | ICD-10-CM

## 2018-12-12 PROCEDURE — 93797 PHYS/QHP OP CAR RHAB WO ECG: CPT

## 2018-12-12 NOTE — PROGRESS NOTES
Cardiac Rehab Initial Assessment      Name: Vidhi Lopez  :1950 Allergies:Amlodipine and Reglan [metoclopramide]   MRN: [id number] 68 y.o. Physician: Michael Mays MD   Primary Diagnosis:    Diagnosis Plan   1. Status post insertion of drug eluting coronary artery stent  Cardiac Rehab Phase II    Event Date: 18 Specialist: Lito Flores   Secondary Diagnosis:  Risk Stratification:Moderate Risk Note Author: Pina Jacome RN     Cardiovascular History: Previous MI      EXERCISE AT HOME  Did physical therapy during the summer for deconditioning  2 times per week.  Has not exercised since -October    EF: 55%      Source: Echo 10/15/18          Ambulatory Status:Independent  Ambulatory Fall Risk Assessed on Initial Visit: yes 6 Minute Walk Pre- Cardiac Rehab:  Distance:523ft      RPE:3  Max. HR: 70       SPO2:87    MET: 1.8  MPH: 1.0             RPD: 4  Resting BP: 124/64 LA, 116/64 RA    Peak BP: 138/64  Recovery BP: 120/64  Comments: Oxygen saturation dropped to 87 % at 4 minutes and walk was stopped.  O2 came back up quickly after the patient sat down to rest.  No other difficulties.       NUTRITION  Lipids:yes If yes, labs as follows;  Total: No components found for: CHOLESTEROL  HDL:   HDL Cholesterol   Date Value Ref Range Status   2018 26 (L) 40 - 60 mg/dL Final    Lipids continued:  LDL:  LDL Cholesterol    Date Value Ref Range Status   2018 63 0 - 99 mg/dL Final     Triglyceride: No components found for: TRIGLYCERIDE   Weight Management:                 Weight: 203.2 Patient states that by this measurement she has gained about 4 pounds.  She has been given instructions by her Cardiologist on how to manage her diuretics when she has a weight gain.   Height: 62                               BMI: Body mass index is 37.17 kg/m².  Waist Circumference: 47 inches   Alcohol Use: defer Diabetes:Yes,  Monitors BS at home- yes, Frequency: 2 times daily, Random BS: 132    Last HGBA1C  with date if applicable:No components found for: A1C         SOCIAL HISTORY  Social History     Socioeconomic History   • Marital status:      Spouse name: Not on file   • Number of children: Not on file   • Years of education: Not on file   • Highest education level: Not on file   Tobacco Use   • Smoking status: Current Every Day Smoker     Packs/day: 0.25     Years: 40.00     Pack years: 10.00     Types: Cigarettes   • Smokeless tobacco: Never Used   • Tobacco comment: in process of quiting--AVERAGE 1 PPD, DOWN TO 6 CIG PER DAY X2 WEEKS    Substance and Sexual Activity   • Alcohol use: Yes     Alcohol/week: 0.6 oz     Types: 1 Glasses of wine per week     Comment: occasional   • Drug use: No   • Sexual activity: Defer   Social History Narrative    Lives alone.        Educational Level (choose one that applies) college graduate Learning Barriers:Ready to Learn    Family Support:yes    Living Arrangement: lives in an adult home  Independent living     Risk Factors: Stress  Yes, Clinical Depression  No, Heredity  Yes If Yes mother and father , Hyperlipidemia  No, Diabetes  Yes If Yes: Do you check blood glucose daily  Yes Today's glucose level 132, Exercise prior to event  Yes If Yes: Activity was attendning [employer] treadmill and recumbent bike and weights, Minutes per day30, Days per week 2, Obesity  Yes and 0     Tobacco Adjunct: No  Still smoking 0.25 ppd for 5 weeks  Was smoking 1 ppd.    Has smoked for 40 years.                 Comorbidities: Cerebrovascular disease, Pulmonary disease, Diabetes Mellitus and Previous MI     2 strokes carotid disease and had stent in left carotid artery  Asthma and COPD and URIEL with bi-pap      March 17, 2018 had sepsis in right knee following gout.  Was semi-conscious sent to the hospital  by ambulance.  Was unresponsive for about 4 days. Was having seizures due to the sepsis.  Renal insufficiency.    Was transferred to [facility] until end of April.    Pneumonia in  Sept. hospitalized for 10 days.    Had C-diff for 3 months.  Has had several UTI's over this last few months.   Staying on Vancomycin    Acute metabolic encephalopathy with hypercapnic and hypoxic respiratory failure  11/5/2018 most recent admission.    Generalized anxiety disorder noted per discharge summary.     PSYCHOSOCIAL  Clinical Depression: no    Stress: yes     Assess presence or absence of depression using a valid screening tool: yes      PHYSICAL ASSESSMENT  Influenza vaccine: yes  Pneumococcal vaccine: yes          Angina: yes    Describe angina scale of 0 - 4: 1 = light    Today are you having incisional pain? N/A. If, Yes, Scale: na        Today are you having any other pain? No. If, Yes, Scale: 0     Diagnosed with Hypertension:yes    Heart Sounds: faint murmur    Lung Sounds: decreased breath sounds in right lower lobe         Assessment:  Orthopedic Problems: none    Are you being hurt, hit, or frightened by anyone at home or in your life? no    Are you being neglected by a caregiver? N/A Shoulder flexibility/Range of motion: Below average   Some limitation of left shoulder   Recommended arm activity: Any    Chair sit and reach within: 4 inches   Leg flexibility: Average    Leg Strength/Balance/Five times sit to stand: 12 seconds.   Left foot hammer-toe uses orthostatics    Chose one: Average    Recommended stretching: Standing    Assessment: good balance demonstrated.     Family attends IA: no Time of arrival: 1400  Time of departure: 1515     Patient Goals: To be able to do house hold chores grocery shopping and cleaning.    Will do exercise after [facility] in her complex has all equipment.           12/12/2018  3:41 PM  Pina Jacome RN

## 2018-12-17 ENCOUNTER — TREATMENT (OUTPATIENT)
Dept: CARDIAC REHAB | Facility: HOSPITAL | Age: 68
End: 2018-12-17

## 2018-12-17 DIAGNOSIS — Z95.5 STATUS POST INSERTION OF DRUG ELUTING CORONARY ARTERY STENT: Primary | ICD-10-CM

## 2018-12-17 PROCEDURE — 93798 PHYS/QHP OP CAR RHAB W/ECG: CPT

## 2019-01-09 ENCOUNTER — TELEPHONE (OUTPATIENT)
Dept: CARDIOLOGY | Facility: CLINIC | Age: 69
End: 2019-01-09

## 2019-01-09 NOTE — TELEPHONE ENCOUNTER
Patient left message she would like to reschedule appt due to continued GI issues. She has no cardiovascular symptoms and I left her a message stating we would reschedule her for April 2019 and mail her appt card with [facility] and she could call should sooner appt if needed.

## 2019-01-10 ENCOUNTER — LAB (OUTPATIENT)
Dept: LAB | Facility: HOSPITAL | Age: 69
End: 2019-01-10

## 2019-01-10 ENCOUNTER — TRANSCRIBE ORDERS (OUTPATIENT)
Dept: LAB | Facility: HOSPITAL | Age: 69
End: 2019-01-10

## 2019-01-10 ENCOUNTER — TRANSCRIBE ORDERS (OUTPATIENT)
Dept: ADMINISTRATIVE | Facility: HOSPITAL | Age: 69
End: 2019-01-10

## 2019-01-10 ENCOUNTER — HOSPITAL ENCOUNTER (OUTPATIENT)
Dept: GENERAL RADIOLOGY | Facility: HOSPITAL | Age: 69
Discharge: HOME | End: 2019-01-10
Attending: INTERNAL MEDICINE | Admitting: INTERNAL MEDICINE

## 2019-01-10 DIAGNOSIS — A04.72 INTESTINAL INFECTION DUE TO CLOSTRIDIUM DIFFICILE: ICD-10-CM

## 2019-01-10 DIAGNOSIS — J44.1 OBSTRUCTIVE CHRONIC BRONCHITIS WITH EXACERBATION (HCC): Primary | ICD-10-CM

## 2019-01-10 DIAGNOSIS — A04.72 INTESTINAL INFECTION DUE TO CLOSTRIDIUM DIFFICILE: Primary | ICD-10-CM

## 2019-01-10 LAB
BASOPHILS # BLD AUTO: 0.04 10*3/MM3 (ref 0–0.2)
BASOPHILS NFR BLD AUTO: 0.3 % (ref 0–1)
DEPRECATED RDW RBC AUTO: 54.9 FL (ref 37–54)
EOSINOPHIL # BLD AUTO: 0.11 10*3/MM3 (ref 0–0.3)
EOSINOPHIL NFR BLD AUTO: 0.9 % (ref 0–3)
ERYTHROCYTE [DISTWIDTH] IN BLOOD BY AUTOMATED COUNT: 15.1 % (ref 11.3–14.5)
HCT VFR BLD AUTO: 39.3 % (ref 34.5–44)
HGB BLD-MCNC: 11.9 G/DL (ref 11.5–15.5)
IMM GRANULOCYTES # BLD AUTO: 0.03 10*3/MM3 (ref 0–0.03)
IMM GRANULOCYTES NFR BLD AUTO: 0.2 % (ref 0–0.6)
LYMPHOCYTES # BLD AUTO: 1.84 10*3/MM3 (ref 0.6–4.8)
LYMPHOCYTES NFR BLD AUTO: 14.4 % (ref 24–44)
MCH RBC QN AUTO: 30.1 PG (ref 27–31)
MCHC RBC AUTO-ENTMCNC: 30.3 G/DL (ref 32–36)
MCV RBC AUTO: 99.5 FL (ref 80–99)
MONOCYTES # BLD AUTO: 0.76 10*3/MM3 (ref 0–1)
MONOCYTES NFR BLD AUTO: 5.9 % (ref 0–12)
NEUTROPHILS # BLD AUTO: 10.05 10*3/MM3 (ref 1.5–8.3)
NEUTROPHILS NFR BLD AUTO: 78.5 % (ref 41–71)
PLATELET # BLD AUTO: 257 10*3/MM3 (ref 150–450)
PMV BLD AUTO: 9.7 FL (ref 6–12)
RBC # BLD AUTO: 3.95 10*6/MM3 (ref 3.89–5.14)
WBC NRBC COR # BLD: 12.8 10*3/MM3 (ref 3.5–10.8)

## 2019-01-10 PROCEDURE — 87798 DETECT AGENT NOS DNA AMP: CPT

## 2019-01-10 PROCEDURE — 36415 COLL VENOUS BLD VENIPUNCTURE: CPT

## 2019-01-10 PROCEDURE — 87581 M.PNEUMON DNA AMP PROBE: CPT

## 2019-01-10 PROCEDURE — 85025 COMPLETE CBC W/AUTO DIFF WBC: CPT

## 2019-01-10 PROCEDURE — 87633 RESP VIRUS 12-25 TARGETS: CPT

## 2019-01-10 PROCEDURE — 87486 CHLMYD PNEUM DNA AMP PROBE: CPT

## 2019-01-10 PROCEDURE — 71046 X-RAY EXAM CHEST 2 VIEWS: CPT

## 2019-01-11 LAB — REF LAB TEST RESULTS: NORMAL

## 2019-01-16 ENCOUNTER — APPOINTMENT (OUTPATIENT)
Dept: CARDIAC REHAB | Facility: HOSPITAL | Age: 69
End: 2019-01-16

## 2019-01-18 ENCOUNTER — TREATMENT (OUTPATIENT)
Dept: CARDIAC REHAB | Facility: HOSPITAL | Age: 69
End: 2019-01-18

## 2019-01-18 DIAGNOSIS — Z95.5 STATUS POST INSERTION OF DRUG ELUTING CORONARY ARTERY STENT: Primary | ICD-10-CM

## 2019-01-18 PROCEDURE — 93798 PHYS/QHP OP CAR RHAB W/ECG: CPT

## 2019-01-23 ENCOUNTER — TREATMENT (OUTPATIENT)
Dept: CARDIAC REHAB | Facility: HOSPITAL | Age: 69
End: 2019-01-23

## 2019-01-23 DIAGNOSIS — Z95.5 STATUS POST INSERTION OF DRUG ELUTING CORONARY ARTERY STENT: Primary | ICD-10-CM

## 2019-01-23 PROCEDURE — 93798 PHYS/QHP OP CAR RHAB W/ECG: CPT

## 2019-01-25 ENCOUNTER — OFFICE VISIT (OUTPATIENT)
Dept: PULMONOLOGY | Facility: CLINIC | Age: 69
End: 2019-01-25

## 2019-01-25 ENCOUNTER — TREATMENT (OUTPATIENT)
Dept: CARDIAC REHAB | Facility: HOSPITAL | Age: 69
End: 2019-01-25

## 2019-01-25 VITALS
HEART RATE: 56 BPM | WEIGHT: 205.38 LBS | DIASTOLIC BLOOD PRESSURE: 76 MMHG | OXYGEN SATURATION: 95 % | RESPIRATION RATE: 16 BRPM | TEMPERATURE: 98.1 F | BODY MASS INDEX: 37.79 KG/M2 | HEIGHT: 62 IN | SYSTOLIC BLOOD PRESSURE: 142 MMHG

## 2019-01-25 DIAGNOSIS — E66.9 OBESITY (BMI 30-39.9): ICD-10-CM

## 2019-01-25 DIAGNOSIS — Z12.2 ENCOUNTER FOR SCREENING FOR LUNG CANCER: ICD-10-CM

## 2019-01-25 DIAGNOSIS — R09.02 HYPOXEMIA REQUIRING SUPPLEMENTAL OXYGEN: ICD-10-CM

## 2019-01-25 DIAGNOSIS — Z95.5 STATUS POST INSERTION OF DRUG ELUTING CORONARY ARTERY STENT: Primary | ICD-10-CM

## 2019-01-25 DIAGNOSIS — E66.2 OBESITY HYPOVENTILATION SYNDROME (HCC): Primary | ICD-10-CM

## 2019-01-25 DIAGNOSIS — Z99.81 HYPOXEMIA REQUIRING SUPPLEMENTAL OXYGEN: ICD-10-CM

## 2019-01-25 DIAGNOSIS — G47.33 OBSTRUCTIVE SLEEP APNEA: ICD-10-CM

## 2019-01-25 DIAGNOSIS — Z72.0 TOBACCO USE: ICD-10-CM

## 2019-01-25 DIAGNOSIS — J96.12 CHRONIC RESPIRATORY FAILURE WITH HYPOXIA AND HYPERCAPNIA (HCC): ICD-10-CM

## 2019-01-25 DIAGNOSIS — J44.9 CHRONIC ASTHMATIC BRONCHITIS (HCC): ICD-10-CM

## 2019-01-25 DIAGNOSIS — J96.11 CHRONIC RESPIRATORY FAILURE WITH HYPOXIA AND HYPERCAPNIA (HCC): ICD-10-CM

## 2019-01-25 DIAGNOSIS — J44.9 CHRONIC OBSTRUCTIVE PULMONARY DISEASE, UNSPECIFIED COPD TYPE (HCC): Primary | ICD-10-CM

## 2019-01-25 DIAGNOSIS — Z87.891 PERSONAL HISTORY OF NICOTINE DEPENDENCE: ICD-10-CM

## 2019-01-25 PROBLEM — J96.20 RESPIRATORY FAILURE, ACUTE AND CHRONIC (HCC): Status: RESOLVED | Noted: 2018-10-15 | Resolved: 2019-01-25

## 2019-01-25 PROBLEM — J96.22 ACUTE AND CHRONIC RESPIRATORY FAILURE WITH HYPERCAPNIA (HCC): Status: RESOLVED | Noted: 2018-11-06 | Resolved: 2019-01-25

## 2019-01-25 PROBLEM — N17.9 ACUTE KIDNEY INJURY (HCC): Status: RESOLVED | Noted: 2018-03-17 | Resolved: 2019-01-25

## 2019-01-25 PROBLEM — J18.9 CAP (COMMUNITY ACQUIRED PNEUMONIA): Status: RESOLVED | Noted: 2018-10-15 | Resolved: 2019-01-25

## 2019-01-25 PROBLEM — J18.9 PNEUMONIA OF RIGHT LOWER LOBE DUE TO INFECTIOUS ORGANISM: Status: RESOLVED | Noted: 2017-09-28 | Resolved: 2019-01-25

## 2019-01-25 PROCEDURE — 99214 OFFICE O/P EST MOD 30 MIN: CPT | Performed by: INTERNAL MEDICINE

## 2019-01-25 PROCEDURE — 93798 PHYS/QHP OP CAR RHAB W/ECG: CPT

## 2019-01-25 PROCEDURE — 94375 RESPIRATORY FLOW VOLUME LOOP: CPT | Performed by: INTERNAL MEDICINE

## 2019-01-25 RX ORDER — CLOTRIMAZOLE AND BETAMETHASONE DIPROPIONATE 10; .64 MG/G; MG/G
CREAM TOPICAL AS NEEDED
Refills: 1 | COMMUNITY
Start: 2018-10-26 | End: 2020-06-04 | Stop reason: SDUPTHER

## 2019-01-25 RX ORDER — GABAPENTIN 800 MG/1
800 TABLET ORAL 3 TIMES DAILY
Refills: 0 | COMMUNITY
Start: 2018-11-21 | End: 2019-12-09 | Stop reason: HOSPADM

## 2019-01-25 RX ORDER — VALSARTAN 80 MG/1
TABLET ORAL
COMMUNITY
Start: 2018-11-09 | End: 2019-05-09

## 2019-01-25 NOTE — PROGRESS NOTES
Subjective:     Chief Complaint:   Chief Complaint   Patient presents with   • COPD     f/u       HPI:    Vidhi Lopez is a 68 y.o. female here for follow-up of COPD and chronic respiratory failure    She has a mild cough with mild sputum production.  This has been worse over the last week.  She does have a history of Clostridium difficile colonization and has endeavored to avoid any antibiotics.    She remains on a trilogy ventilator at night and has to use this to significant benefit and remains compliant with it.    She thinks her exercise tolerance is better than it was 6-12 months ago.    She remains on Symbicort without any significant side effects.    Further details:    General symptoms:  - no fever  - weight stable  - lower extremity edema    Chest symptoms:  - no chest pain  - moderate cough  - cough is productive    Sputum:  - expectorates clear/white sputum  - expectorates yellow sputum  - denies hemoptysis    Upper airway:  - congestion    Reflux symptoms:  - mild reflux symptoms    Exercise tolerance:  - exercise limitation at 2 city block(s)    Recent imaging:  - CXR:  Chronic changes 1/2019    Current medications are:   Current Outpatient Medications:   •  ALPRAZolam (XANAX) 0.25 MG tablet, Take 0.25 mg by mouth 2 (Two) Times a Day As Needed for Anxiety., Disp: , Rfl:   •  aspirin 81 MG EC tablet, Take 1 tablet by mouth Daily., Disp: 100 tablet, Rfl: 4  •  atorvastatin (LIPITOR) 80 MG tablet, Take 80 mg by mouth Daily., Disp: , Rfl:   •  bumetanide (BUMEX) 1 MG tablet, Take 1 tablet by mouth Daily With Breakfast., Disp: 30 tablet, Rfl: 0  •  celecoxib (CELEBREX) 100 MG capsule, Every 12 (Twelve) Hours., Disp: , Rfl:   •  clopidogrel (PLAVIX) 75 MG tablet, Take 1 tablet by mouth Daily., Disp: 90 tablet, Rfl: 3  •  clotrimazole-betamethasone (LOTRISONE) 1-0.05 % cream, Apply  topically to the appropriate area as directed As Needed., Disp: , Rfl: 1  •  colchicine 0.6 MG tablet, take 1 tablet by mouth  once daily if needed, Disp: , Rfl: 0  •  famotidine (PEPCID) 20 MG tablet, Take 1 tablet by mouth 2 (Two) Times a Day., Disp: 60 tablet, Rfl: 0  •  gabapentin (NEURONTIN) 800 MG tablet, Take 800 mg by mouth 3 (Three) Times a Day., Disp: , Rfl: 0  •  HUMALOG KWIKPEN 100 UNIT/ML solution pen-injector, Inject 30 Units under the skin into the appropriate area as directed 3 (Three) Times a Day With Meals. Max 56 units daily, Disp: , Rfl: 0  •  levETIRAcetam (KEPPRA) 1000 MG tablet, Take 1 tablet by mouth Every 12 (Twelve) Hours., Disp: 180 tablet, Rfl: 3  •  levocetirizine (XYZAL) 5 MG tablet, Take 5 mg by mouth Every Evening., Disp: , Rfl:   •  metoprolol tartrate (LOPRESSOR) 50 MG tablet, Take 1 tablet by mouth Every 12 (Twelve) Hours., Disp: 60 tablet, Rfl: 0  •  mirtazapine (REMERON) 15 MG tablet, Take 1 tablet by mouth Every Night., Disp: 30 tablet, Rfl: 0  •  nitroglycerin (NITROSTAT) 0.4 MG SL tablet, Place 0.4 mg under the tongue Every 5 (Five) Minutes As Needed., Disp: , Rfl: 0  •  ondansetron (ZOFRAN) 8 MG tablet, Take 8 mg by mouth 2 (Two) Times a Day As Needed for Nausea or Vomiting., Disp: , Rfl:   •  Probiotic Product (PROBIOTIC DAILY PO), Take 1 tablet by mouth Daily., Disp: , Rfl:   •  PROBIOTIC PRODUCT PO, Take  by mouth., Disp: , Rfl:   •  promethazine (PHENERGAN) 25 MG tablet, Take 25 mg by mouth Every 8 (Eight) Hours As Needed for Nausea or Vomiting., Disp: , Rfl:   •  rOPINIRole (REQUIP) 0.25 MG tablet, Take 0.25 mg by mouth Every Night. Take 1-3 hours before bedtime., Disp: , Rfl:   •  SYMBICORT 160-4.5 MCG/ACT inhaler, Inhale 2 puffs 2 (Two) Times a Day., Disp: , Rfl: 0  •  TOUJEO SOLOSTAR 300 UNIT/ML solution pen-injector, Inject 20 Units under the skin into the appropriate area as directed Daily., Disp: , Rfl: 0  •  valsartan (DIOVAN) 80 MG tablet, Take 1 tablet by mouth Daily., Disp: 30 tablet, Rfl: 0  •  valsartan (DIOVAN) 80 MG tablet, Take  by mouth., Disp: , Rfl: .      The patient's  relevant past medical, surgical, family and social history were reviewed and updated in Epic as appropriate.     ROS:    Review of Systems  ROS as documented in patient questionnaire unless as noted otherwise    Objective:    Physical Exam   Constitutional: She is oriented to person, place, and time. She appears well-developed and well-nourished.   HENT:   Head: Normocephalic and atraumatic.   Mouth/Throat: Oropharynx is clear and moist.   Neck: Neck supple. No thyromegaly present.   Cardiovascular: Normal rate and regular rhythm. Exam reveals no gallop and no friction rub.   No murmur heard.  Pulmonary/Chest: Effort normal. No respiratory distress. She has no wheezes. She has no rales.   Decreased BS   Musculoskeletal: She exhibits edema.   Neurological: She is alert and oriented to person, place, and time.   Skin: Skin is warm and dry.   Psychiatric: She has a normal mood and affect. Her behavior is normal.   Vitals reviewed.      Diagnostics:     PFT:  - no obstruction and moderate restriction    CXR:  - no additional    Assessment:    Problem List Items Addressed This Visit        Pulmonary Problems    Chronic asthmatic bronchitis (CMS/HCC)    Chronic respiratory failure with hypoxia and hypercapnia (CMS/HCC)    Overview     Trilogy nocturnal ventilator with full face mask         Obstructive sleep apnea    Obesity hypoventilation syndrome (CMS/HCC) - Primary    Hypoxemia requiring supplemental oxygen    Overview     QHS with trilogy and prn during the day            Other    Obesity (BMI 30-39.9)    Tobacco use      Other Visit Diagnoses     Personal history of nicotine dependence         Relevant Orders    CT chest low dose wo    Encounter for screening for lung cancer        Relevant Orders    CT chest low dose wo        Acceptable treatment of her COPD and obstructive sleep apnea.  She does qualify for a low dose screening CAT scan    Plan:     1. No change in nocturnal trilogy and oxygen use  2. Continue  current inhaled therapy  3. No need for antibiotics currently  4. Long-term weight loss  5. Low-dose screening lung CT  Clinic follow-up  6 month    Discussed in detail with the patient.  She will call prior to her follow up visit for any new problems.    Signed by  Oliver Garcia MD

## 2019-01-28 ENCOUNTER — TREATMENT (OUTPATIENT)
Dept: CARDIAC REHAB | Facility: HOSPITAL | Age: 69
End: 2019-01-28

## 2019-01-28 DIAGNOSIS — Z95.5 STATUS POST INSERTION OF DRUG ELUTING CORONARY ARTERY STENT: Primary | ICD-10-CM

## 2019-01-28 PROCEDURE — 93798 PHYS/QHP OP CAR RHAB W/ECG: CPT

## 2019-02-04 ENCOUNTER — TREATMENT (OUTPATIENT)
Dept: CARDIAC REHAB | Facility: HOSPITAL | Age: 69
End: 2019-02-04

## 2019-02-04 DIAGNOSIS — Z95.5 STATUS POST INSERTION OF DRUG ELUTING CORONARY ARTERY STENT: Primary | ICD-10-CM

## 2019-02-04 PROCEDURE — 93798 PHYS/QHP OP CAR RHAB W/ECG: CPT

## 2019-03-01 ENCOUNTER — TREATMENT (OUTPATIENT)
Dept: CARDIAC REHAB | Facility: HOSPITAL | Age: 69
End: 2019-03-01

## 2019-03-01 DIAGNOSIS — Z95.5 STATUS POST INSERTION OF DRUG ELUTING CORONARY ARTERY STENT: Primary | ICD-10-CM

## 2019-03-01 PROCEDURE — 93798 PHYS/QHP OP CAR RHAB W/ECG: CPT

## 2019-03-06 ENCOUNTER — APPOINTMENT (OUTPATIENT)
Dept: CARDIAC REHAB | Facility: HOSPITAL | Age: 69
End: 2019-03-06

## 2019-04-01 ENCOUNTER — TREATMENT (OUTPATIENT)
Dept: CARDIAC REHAB | Facility: HOSPITAL | Age: 69
End: 2019-04-01

## 2019-04-01 DIAGNOSIS — Z95.5 STATUS POST INSERTION OF DRUG ELUTING CORONARY ARTERY STENT: Primary | ICD-10-CM

## 2019-04-01 PROCEDURE — 93798 PHYS/QHP OP CAR RHAB W/ECG: CPT

## 2019-04-03 ENCOUNTER — TELEPHONE (OUTPATIENT)
Dept: URGENT CARE | Facility: CLINIC | Age: 69
End: 2019-04-03

## 2019-04-03 NOTE — TELEPHONE ENCOUNTER
Dr. marshall prescribed hydroxyzine and it is on back order. Changed to atarax 50mg three times a day for up to 5 days per dr. Marshall. vitor

## 2019-04-15 ENCOUNTER — TREATMENT (OUTPATIENT)
Dept: CARDIAC REHAB | Facility: HOSPITAL | Age: 69
End: 2019-04-15

## 2019-04-15 DIAGNOSIS — Z95.5 STATUS POST INSERTION OF DRUG ELUTING CORONARY ARTERY STENT: Primary | ICD-10-CM

## 2019-04-15 PROCEDURE — 93798 PHYS/QHP OP CAR RHAB W/ECG: CPT

## 2019-04-24 ENCOUNTER — TREATMENT (OUTPATIENT)
Dept: CARDIAC REHAB | Facility: HOSPITAL | Age: 69
End: 2019-04-24

## 2019-04-24 DIAGNOSIS — Z95.5 STATUS POST INSERTION OF DRUG ELUTING CORONARY ARTERY STENT: Primary | ICD-10-CM

## 2019-04-24 PROCEDURE — 93798 PHYS/QHP OP CAR RHAB W/ECG: CPT

## 2019-05-02 RX ORDER — ATORVASTATIN CALCIUM 80 MG/1
80 TABLET, FILM COATED ORAL DAILY
Qty: 90 TABLET | Refills: 0 | Status: SHIPPED | OUTPATIENT
Start: 2019-05-02 | End: 2019-06-28 | Stop reason: SDUPTHER

## 2019-05-02 RX ORDER — CLOPIDOGREL BISULFATE 75 MG/1
75 TABLET ORAL DAILY
Qty: 90 TABLET | Refills: 0 | Status: SHIPPED | OUTPATIENT
Start: 2019-05-02 | End: 2019-06-28 | Stop reason: SDUPTHER

## 2019-05-02 RX ORDER — METOPROLOL TARTRATE 50 MG/1
TABLET, FILM COATED ORAL
Qty: 60 TABLET | Refills: 0 | OUTPATIENT
Start: 2019-05-02

## 2019-05-09 ENCOUNTER — OFFICE VISIT (OUTPATIENT)
Dept: CARDIOLOGY | Facility: CLINIC | Age: 69
End: 2019-05-09

## 2019-05-09 VITALS
WEIGHT: 197 LBS | HEART RATE: 92 BPM | HEIGHT: 63 IN | SYSTOLIC BLOOD PRESSURE: 131 MMHG | DIASTOLIC BLOOD PRESSURE: 78 MMHG | BODY MASS INDEX: 34.91 KG/M2

## 2019-05-09 DIAGNOSIS — I10 ESSENTIAL HYPERTENSION: ICD-10-CM

## 2019-05-09 DIAGNOSIS — I25.10 CORONARY ARTERY DISEASE INVOLVING NATIVE CORONARY ARTERY OF NATIVE HEART WITHOUT ANGINA PECTORIS: Primary | ICD-10-CM

## 2019-05-09 DIAGNOSIS — I65.23 BILATERAL CAROTID ARTERY STENOSIS: ICD-10-CM

## 2019-05-09 PROCEDURE — 99214 OFFICE O/P EST MOD 30 MIN: CPT | Performed by: INTERNAL MEDICINE

## 2019-05-09 RX ORDER — ASPIRIN 81 MG/1
81 TABLET ORAL DAILY
COMMUNITY

## 2019-05-09 RX ORDER — NYSTATIN 100000 U/G
OINTMENT TOPICAL 2 TIMES DAILY
Qty: 30 G | Refills: 0 | Status: SHIPPED | OUTPATIENT
Start: 2019-05-09 | End: 2021-04-19 | Stop reason: ALTCHOICE

## 2019-05-09 RX ORDER — METOPROLOL TARTRATE 50 MG/1
50 TABLET, FILM COATED ORAL EVERY 12 HOURS SCHEDULED
Qty: 60 TABLET | Refills: 11 | Status: SHIPPED | OUTPATIENT
Start: 2019-05-09 | End: 2019-05-15 | Stop reason: SDUPTHER

## 2019-05-09 NOTE — PROGRESS NOTES
"  OFFICE FOLLOW UP     Date of Encounter:2019     Name: Vidhi Lopez  : 1950  Address: [address] [place] [id number]    PCP: Ev Cardona, APRN  [address] [place] [address]    Vidhi Lopez is a 69 y.o. female.    Chief Complaint: Follow up of CAD, carotid stenois    Problem List:   1. Carotid artery disease  a. Remote CVA, IDB.  b. Left hemispheric TIA, 2011.  c. Emergent LICA stent, 2011.  d. \"Last\" DUS negative,  (SJE).  e. MRA head/neck, 2017: \"severe\" LICA proximal stenosis, moderate AURE stenosis  f. Duplex, 17: 50-69% AURE stenosis, <50% LICA stenosis with stent.  2. Coronary artery disease  a. History of myocardial infarction, remote IDB  b. Echo, normal LV, no evidence of pulm HTN, 17.  c. LHC 2018: distal nondominant circumflex artery, treated with EES  3. Hypertension.                       A. Relative hypotension, 2018.  4. Lung disease.  a. URIEL, history of CO2 retention - CPAP use  b. Moderate asthma  c. Bilateral pneumonia, 2017.  d. RHC, 18: mild pulm HTN, right lower lobe subselective angiography is normal  e. Acute/chronic hypoxic respiratory failure, 3/2018.  5. Diabetes mellitus type 2.  6. Obesity   7. Dyslipidemia.  8. Fibromyalgia.  9. Hepatic steatosis.  10. Non convulsive status epilepticus by EEG  a. Keppra initiated, 3/20/2018  11. Gastroesophageal reflux disease.  12. Status post operations, remote.  13. Anxiety disorder.  14. Chronic kidney disease, Stage 2  a. Chronic cystitis  b. ?Secondary to NSAIDS  c. Diuretic induced per RAMYA Mcginnis, 17.  d. Bilateral renal angiography NORMAL 18  15. \"New onset\" lower extremity edema, May 2015:  a. Treated with Lasix 20 mg daily with resolution.  b. Recurrent LE edema summer 2017  16. Right knee septic arthritis 3/21/2018    Allergies:  Allergies   Allergen Reactions   • Amlodipine Swelling   • Reglan [Metoclopramide] Other (See " Comments)     DYSTONIC REACTION     Current Medications:  · ASA 81mg daily   •  atorvastatin (LIPITOR) 80 MG tablet, Take 1 tablet by mouth Daily  •  bumetanide (BUMEX) 1 MG tablet, Take 1 tablet by mouth Daily With Breakfast.  •  celecoxib (CELEBREX) 100 MG capsule, Every 12 (Twelve) Hours.  •  clopidogrel (PLAVIX) 75 MG tablet, Take 1 tablet by mouth Daily  •  clotrimazole-betamethasone (LOTRISONE) 1-0.05 % cream, Apply  topically to the appropriate area as directed As Needed  •  gabapentin (NEURONTIN) 800 MG tablet, Take 800 mg by mouth 3 (Three) Times a Day.  •  HUMALOG KWIKPEN 100 UNIT/ML solution pen-injector, Inject 16 Units under the skin into the appropriate area as directed 3 (Three) Times a Day With Meals. Max 56 units daily  •  levETIRAcetam (KEPPRA) 1000 MG tablet, Take 1 tablet by mouth Every 12 (Twelve) Hours.  •  levocetirizine (XYZAL) 5 MG tablet, Take 5 mg by mouth Every Evening.  •  nitroglycerin (NITROSTAT) 0.4 MG SL tablet, Place 0.4 mg under the tongue Every 5 (Five) Minutes As Needed  •  promethazine (PHENERGAN) 25 MG tablet, Take 25 mg by mouth Every 8 (Eight) Hours As Needed for Nausea or Vomiting.  •  rOPINIRole (REQUIP) 0.25 MG tablet, Take 0.25 mg by mouth Every Night. Take 1-3 hours before bedtime  •  SYMBICORT 160-4.5 MCG/ACT inhaler, Inhale 2 puffs 2 (Two) Times a Day.  •  TOUJEO SOLOSTAR 300 UNIT/ML solution pen-injector, Inject 20 Units under the skin into the appropriate area as directed Daily    History of Present Illness:           Mrs. Lopez returns today for scheduled follow up. She reports 5 hospitalizations since last seen, for noncardiac reasons. Her last hospitalization was in March for the flu, fall and rhabdomyolysis. She now lives in an independent living facility in [place] and is seen by local primary care there. She wants to move back to [facility] soon. She reports she was started on Metoprolol 50mg BID following her hospitalization and has run out of  "it for the past week. She has had occasional chest heaviness which occurs mostly at rest, and is infrequent. She denies any other complaints.     The following portions of the patient's history were reviewed and updated as appropriate: allergies, current medications and problem list.    ROS: Pertinent positives as listed in the HPI.  All other systems reviewed and negative.    Objective:  Vitals:    05/09/19 1504 05/09/19 1507   BP: 123/86 131/78   BP Location: Right arm Right arm   Patient Position: Sitting Standing   Pulse: 87 92   Weight: 89.4 kg (197 lb)    Height: 158.8 cm (62.5\")      Physical Exam:  GENERAL: Alert, cooperative, in no acute distress.   HEENT: Normocephalic, no adenopathy, no jugular venous distention  HEART: No discrete PMI is noted. Regular rhythm, normal rate, 2/6 systolic murmur, no gallops, or rubs.   LUNGS: Diminished breath sounds. No wheezing, rales or ronchi.  ABDOMEN: Soft, bowel sounds present, non-tender   NEUROLOGIC: No focal abnormalities involving strength or sensation are noted.   EXTREMITIES: No clubbing, cyanosis, or edema noted.     Diagnostic Data:      Procedures    Assessment and Plan:     1. CAD: asymptomatic without angina. Continue DAPT and statin.  2. HTN: controlled. She may restart Metoprolol tartrate 50mg BID, and we will send Rx as she has been out of it for about 1 week.  3. HLD: continue Lipitor 80mg to target LDL <70.  4. Follow up with us in 1 year or sooner as needed.       Scribed for Lito Flores MD by Felicity Mcgee PA-C. 05/09/2019 3:15 PM.  I, Lito Flores MD, [facility], [facility], personally performed the services described in this documentation as scribed by the above named individual in my presence, and it is both accurate and complete. At 5:28 PM on 05/09/2019        EMR Dragon/Transcription Disclaimer:  Much of this encounter note is an electronic transcription/translation of spoken language to printed text.  The electronic translation of " spoken language may permit erroneous, or at times, nonsensical words or phrases to be inadvertently transcribed.  Although I have reviewed the note for such errors, some may still exist.

## 2019-05-15 RX ORDER — METOPROLOL TARTRATE 50 MG/1
50 TABLET, FILM COATED ORAL EVERY 12 HOURS SCHEDULED
Qty: 60 TABLET | Refills: 11 | Status: SHIPPED | OUTPATIENT
Start: 2019-05-15 | End: 2020-06-16 | Stop reason: SDUPTHER

## 2019-06-03 ENCOUNTER — TELEPHONE (OUTPATIENT)
Dept: NEUROLOGY | Facility: CLINIC | Age: 69
End: 2019-06-03

## 2019-06-03 RX ORDER — LEVETIRACETAM 1000 MG/1
1000 TABLET ORAL EVERY 12 HOURS SCHEDULED
Qty: 180 TABLET | Refills: 3 | Status: SHIPPED | OUTPATIENT
Start: 2019-06-03 | End: 2019-06-28 | Stop reason: SDUPTHER

## 2019-06-03 NOTE — TELEPHONE ENCOUNTER
----- Message from Agapito Stanley sent at 6/3/2019  9:29 AM EDT -----  Contact: DARIUS MEHTA:    PT NEEDS A REFILL ON : levETIRAcetam (KEPPRA) 1000 MG tablet  PHARMACY :[facility] - [place], [place]

## 2019-06-28 RX ORDER — LEVETIRACETAM 1000 MG/1
TABLET ORAL
Qty: 180 TABLET | Refills: 3 | Status: SHIPPED | OUTPATIENT
Start: 2019-06-28 | End: 2019-07-17 | Stop reason: SDUPTHER

## 2019-07-01 RX ORDER — CLOPIDOGREL BISULFATE 75 MG/1
TABLET ORAL
Qty: 90 TABLET | Refills: 3 | Status: SHIPPED | OUTPATIENT
Start: 2019-07-01 | End: 2020-07-13

## 2019-07-01 RX ORDER — ATORVASTATIN CALCIUM 80 MG/1
TABLET, FILM COATED ORAL
Qty: 90 TABLET | Refills: 3 | Status: SHIPPED | OUTPATIENT
Start: 2019-07-01 | End: 2020-11-11 | Stop reason: SDUPTHER

## 2019-07-17 ENCOUNTER — APPOINTMENT (OUTPATIENT)
Dept: CT IMAGING | Facility: HOSPITAL | Age: 69
End: 2019-07-17

## 2019-07-17 NOTE — TELEPHONE ENCOUNTER
----- Message from Christineia Edwin sent at 7/17/2019 10:54 AM EDT -----  Contact: [phone]  Beau,    Pt called to request a refill on levETIRAcetam (KEPPRA) 1000 MG tablet. Pt states she is also out of her promethazine (PHENERGAN) 25 MG tablet but was not sure if [facility] could refill. Please advise.    Send to [facility].

## 2019-07-18 RX ORDER — LEVETIRACETAM 1000 MG/1
1000 TABLET ORAL EVERY 12 HOURS
Qty: 180 TABLET | Refills: 3 | Status: SHIPPED | OUTPATIENT
Start: 2019-07-18 | End: 2019-10-23

## 2019-07-18 RX ORDER — PROMETHAZINE HYDROCHLORIDE 25 MG/1
25 TABLET ORAL EVERY 8 HOURS PRN
Qty: 30 TABLET | Refills: 0 | Status: CANCELLED | OUTPATIENT
Start: 2019-07-18

## 2019-07-18 NOTE — TELEPHONE ENCOUNTER
We can certainly refill the Keppra, but I would recommend asking her PCP about the phenergan as this is not typically something we have prescribed for her. Thanks!

## 2019-07-19 ENCOUNTER — APPOINTMENT (OUTPATIENT)
Dept: CT IMAGING | Facility: HOSPITAL | Age: 69
End: 2019-07-19

## 2019-07-23 ENCOUNTER — APPOINTMENT (OUTPATIENT)
Dept: CT IMAGING | Facility: HOSPITAL | Age: 69
End: 2019-07-23

## 2019-07-26 ENCOUNTER — APPOINTMENT (OUTPATIENT)
Dept: CT IMAGING | Facility: HOSPITAL | Age: 69
End: 2019-07-26

## 2019-07-30 ENCOUNTER — APPOINTMENT (OUTPATIENT)
Dept: CT IMAGING | Facility: HOSPITAL | Age: 69
End: 2019-07-30

## 2019-08-02 ENCOUNTER — APPOINTMENT (OUTPATIENT)
Dept: CT IMAGING | Facility: HOSPITAL | Age: 69
End: 2019-08-02

## 2019-08-02 ENCOUNTER — APPOINTMENT (OUTPATIENT)
Dept: GENERAL RADIOLOGY | Facility: HOSPITAL | Age: 69
End: 2019-08-02

## 2019-08-02 ENCOUNTER — HOSPITAL ENCOUNTER (EMERGENCY)
Facility: HOSPITAL | Age: 69
Discharge: HOME | End: 2019-08-03
Attending: EMERGENCY MEDICINE | Admitting: EMERGENCY MEDICINE
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
## 2019-08-02 DIAGNOSIS — S80.02XA CONTUSION OF LEFT KNEE, INITIAL ENCOUNTER: ICD-10-CM

## 2019-08-02 DIAGNOSIS — R10.9 ABDOMINAL PAIN IN FEMALE: ICD-10-CM

## 2019-08-02 DIAGNOSIS — S20.211A CHEST WALL CONTUSION, RIGHT, INITIAL ENCOUNTER: ICD-10-CM

## 2019-08-02 DIAGNOSIS — S80.11XA CONTUSION OF MULTIPLE SITES OF RIGHT LEG, INITIAL ENCOUNTER: ICD-10-CM

## 2019-08-02 LAB
ALBUMIN SERPL-MCNC: 3.8 G/DL (ref 3.5–5.2)
ALBUMIN/GLOB SERPL: 1.4 G/DL
ALP SERPL-CCNC: 104 U/L (ref 39–117)
ALT SERPL W P-5'-P-CCNC: 17 U/L (ref 1–33)
ANION GAP SERPL CALCULATED.3IONS-SCNC: 12.9 MMOL/L (ref 5–15)
AST SERPL-CCNC: 18 U/L (ref 1–32)
BASOPHILS # BLD AUTO: 0.04 10*3/MM3 (ref 0–0.2)
BASOPHILS NFR BLD AUTO: 0.4 % (ref 0–1.5)
BILIRUB SERPL-MCNC: 0.5 MG/DL (ref 0.2–1.2)
BUN BLD-MCNC: 15 MG/DL (ref 8–23)
BUN/CREAT SERPL: 13.4 (ref 7–25)
CALCIUM SPEC-SCNC: 9.2 MG/DL (ref 8.6–10.5)
CHLORIDE SERPL-SCNC: 98 MMOL/L (ref 98–107)
CO2 SERPL-SCNC: 31.1 MMOL/L (ref 22–29)
CREAT BLD-MCNC: 1.12 MG/DL (ref 0.57–1)
DEPRECATED RDW RBC AUTO: 49.1 FL (ref 37–54)
EOSINOPHIL # BLD AUTO: 0.19 10*3/MM3 (ref 0–0.4)
EOSINOPHIL NFR BLD AUTO: 1.8 % (ref 0.3–6.2)
ERYTHROCYTE [DISTWIDTH] IN BLOOD BY AUTOMATED COUNT: 14.2 % (ref 12.3–15.4)
GFR SERPL CREATININE-BSD FRML MDRD: 48 ML/MIN/1.73
GLOBULIN UR ELPH-MCNC: 2.8 GM/DL
GLUCOSE BLD-MCNC: 117 MG/DL (ref 65–99)
HCT VFR BLD AUTO: 38.3 % (ref 34–46.6)
HGB BLD-MCNC: 11.8 G/DL (ref 12–15.9)
HOLD SPECIMEN: NORMAL
HOLD SPECIMEN: NORMAL
IMM GRANULOCYTES # BLD AUTO: 0.04 10*3/MM3 (ref 0–0.05)
IMM GRANULOCYTES NFR BLD AUTO: 0.4 % (ref 0–0.5)
LIPASE SERPL-CCNC: 17 U/L (ref 13–60)
LYMPHOCYTES # BLD AUTO: 1.81 10*3/MM3 (ref 0.7–3.1)
LYMPHOCYTES NFR BLD AUTO: 17.1 % (ref 19.6–45.3)
MCH RBC QN AUTO: 29.6 PG (ref 26.6–33)
MCHC RBC AUTO-ENTMCNC: 30.8 G/DL (ref 31.5–35.7)
MCV RBC AUTO: 96 FL (ref 79–97)
MONOCYTES # BLD AUTO: 0.58 10*3/MM3 (ref 0.1–0.9)
MONOCYTES NFR BLD AUTO: 5.5 % (ref 5–12)
NEUTROPHILS # BLD AUTO: 7.94 10*3/MM3 (ref 1.7–7)
NEUTROPHILS NFR BLD AUTO: 74.8 % (ref 42.7–76)
NRBC BLD AUTO-RTO: 0 /100 WBC (ref 0–0.2)
PLATELET # BLD AUTO: 174 10*3/MM3 (ref 140–450)
PMV BLD AUTO: 9.4 FL (ref 6–12)
POTASSIUM BLD-SCNC: 4.2 MMOL/L (ref 3.5–5.2)
PROT SERPL-MCNC: 6.6 G/DL (ref 6–8.5)
RBC # BLD AUTO: 3.99 10*6/MM3 (ref 3.77–5.28)
SODIUM BLD-SCNC: 142 MMOL/L (ref 136–145)
WBC NRBC COR # BLD: 10.6 10*3/MM3 (ref 3.4–10.8)
WHOLE BLOOD HOLD SPECIMEN: NORMAL
WHOLE BLOOD HOLD SPECIMEN: NORMAL

## 2019-08-02 PROCEDURE — 73562 X-RAY EXAM OF KNEE 3: CPT

## 2019-08-02 PROCEDURE — 96374 THER/PROPH/DIAG INJ IV PUSH: CPT

## 2019-08-02 PROCEDURE — 74177 CT ABD & PELVIS W/CONTRAST: CPT

## 2019-08-02 PROCEDURE — 80053 COMPREHEN METABOLIC PANEL: CPT | Performed by: NURSE PRACTITIONER

## 2019-08-02 PROCEDURE — 73590 X-RAY EXAM OF LOWER LEG: CPT

## 2019-08-02 PROCEDURE — 99283 EMERGENCY DEPT VISIT LOW MDM: CPT

## 2019-08-02 PROCEDURE — 25010000002 ONDANSETRON PER 1 MG: Performed by: NURSE PRACTITIONER

## 2019-08-02 PROCEDURE — 25010000002 MORPHINE PER 10 MG: Performed by: NURSE PRACTITIONER

## 2019-08-02 PROCEDURE — 25010000002 IOPAMIDOL 61 % SOLUTION: Performed by: EMERGENCY MEDICINE

## 2019-08-02 PROCEDURE — 96375 TX/PRO/DX INJ NEW DRUG ADDON: CPT

## 2019-08-02 PROCEDURE — 85025 COMPLETE CBC W/AUTO DIFF WBC: CPT | Performed by: NURSE PRACTITIONER

## 2019-08-02 PROCEDURE — 83690 ASSAY OF LIPASE: CPT | Performed by: NURSE PRACTITIONER

## 2019-08-02 PROCEDURE — 71250 CT THORAX DX C-: CPT

## 2019-08-02 RX ORDER — MORPHINE SULFATE 2 MG/ML
4 INJECTION, SOLUTION INTRAMUSCULAR; INTRAVENOUS ONCE
Status: COMPLETED | OUTPATIENT
Start: 2019-08-02 | End: 2019-08-02

## 2019-08-02 RX ORDER — ONDANSETRON 2 MG/ML
4 INJECTION INTRAMUSCULAR; INTRAVENOUS ONCE
Status: COMPLETED | OUTPATIENT
Start: 2019-08-02 | End: 2019-08-02

## 2019-08-02 RX ADMIN — IOPAMIDOL 85 ML: 612 INJECTION, SOLUTION INTRAVENOUS at 23:14

## 2019-08-02 RX ADMIN — MORPHINE SULFATE 4 MG: 2 INJECTION, SOLUTION INTRAMUSCULAR; INTRAVENOUS at 22:43

## 2019-08-02 RX ADMIN — ONDANSETRON HYDROCHLORIDE 4 MG: 2 SOLUTION INTRAMUSCULAR; INTRAVENOUS at 22:43

## 2019-08-03 VITALS
SYSTOLIC BLOOD PRESSURE: 123 MMHG | WEIGHT: 202.9 LBS | TEMPERATURE: 98.6 F | HEIGHT: 62 IN | OXYGEN SATURATION: 92 % | DIASTOLIC BLOOD PRESSURE: 70 MMHG | HEART RATE: 81 BPM | RESPIRATION RATE: 18 BRPM | BODY MASS INDEX: 37.34 KG/M2
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
## 2019-08-03 NOTE — DISCHARGE INSTRUCTIONS
Home to rest  Moist heat to sore areas  Tylenol for pain.  Return if worse or new concerns   Continue care with your primary care physician and have your blood pressure regularly checked and managed. Normal blood pressure is 120/80.

## 2019-08-05 ENCOUNTER — PATIENT OUTREACH (OUTPATIENT)
Dept: CASE MANAGEMENT | Facility: OTHER | Age: 69
End: 2019-08-05
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
## 2019-08-08 ENCOUNTER — EPISODE CHANGES (OUTPATIENT)
Dept: CASE MANAGEMENT | Facility: OTHER | Age: 69
End: 2019-08-08

## 2019-08-09 ENCOUNTER — PATIENT OUTREACH (OUTPATIENT)
Dept: CASE MANAGEMENT | Facility: OTHER | Age: 69
End: 2019-08-09

## 2019-08-09 NOTE — OUTREACH NOTE
Patient Outreach Note  Relayed information to patient regarding nebulizer supplies from [facility]; and pulmonology appointment in [place]. Relayed phone number  information of new PCP Dr. Maddison Jarrett [phone]. Patient verbalized understanding and will contact providers as needed.     mAa Glynn RN    8/9/2019, 1:40 PM
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
## 2019-08-09 NOTE — OUTREACH NOTE
Care Coordination Note  Per patient requested; talked with Ale Hughes regarding nebulizer(DuoNeb) supplies. She verbalized understanding and will contact patient.     Ama Glynn RN    8/9/2019, 1:35 PM

## 2019-08-09 NOTE — OUTREACH NOTE
Care Coordination Note  Talked with Nhi/ Pulmonologist office to confirm patient appointment. . Appointment scheduled at 8/16/19 at 11:00 for PFT and 11:30 with Ama GUNTER.     Ama Glynn RN    8/9/2019, 1:37 PM

## 2019-08-13 ENCOUNTER — TELEPHONE (OUTPATIENT)
Dept: PULMONOLOGY | Facility: CLINIC | Age: 69
End: 2019-08-13

## 2019-08-13 RX ORDER — IPRATROPIUM BROMIDE AND ALBUTEROL SULFATE 2.5; .5 MG/3ML; MG/3ML
3 SOLUTION RESPIRATORY (INHALATION) 4 TIMES DAILY
Qty: 360 ML | Refills: 1 | Status: SHIPPED | OUTPATIENT
Start: 2019-08-13 | End: 2019-08-14 | Stop reason: SDUPTHER

## 2019-08-14 ENCOUNTER — TELEPHONE (OUTPATIENT)
Dept: PULMONOLOGY | Facility: CLINIC | Age: 69
End: 2019-08-14

## 2019-08-14 DIAGNOSIS — J44.9 CHRONIC ASTHMATIC BRONCHITIS (HCC): Primary | ICD-10-CM

## 2019-08-14 RX ORDER — IPRATROPIUM BROMIDE AND ALBUTEROL SULFATE 2.5; .5 MG/3ML; MG/3ML
3 SOLUTION RESPIRATORY (INHALATION) 4 TIMES DAILY
Qty: 360 ML | Refills: 1 | Status: SHIPPED | OUTPATIENT
Start: 2019-08-14 | End: 2019-10-08 | Stop reason: SDUPTHER

## 2019-08-15 ENCOUNTER — TELEPHONE (OUTPATIENT)
Dept: PULMONOLOGY | Facility: CLINIC | Age: 69
End: 2019-08-15

## 2019-08-15 ENCOUNTER — EPISODE CHANGES (OUTPATIENT)
Dept: CASE MANAGEMENT | Facility: OTHER | Age: 69
End: 2019-08-15

## 2019-08-15 NOTE — TELEPHONE ENCOUNTER
Patient has not had her low dose ct scan completed, she had several appt.'s in July that were cancelled and one no show. I talked to JANI Bar about follow up w/ Ama Jaramillo on 8/16/19 without her ct scan completed. Kim stated for patient to be seen and that the provider will address the need for a low dose ct scan.

## 2019-08-16 RX ORDER — IPRATROPIUM BROMIDE AND ALBUTEROL SULFATE 2.5; .5 MG/3ML; MG/3ML
SOLUTION RESPIRATORY (INHALATION)
Qty: 1080 ML | Refills: 1 | Status: SHIPPED | OUTPATIENT
Start: 2019-08-16 | End: 2020-03-19 | Stop reason: SDUPTHER

## 2019-08-21 ENCOUNTER — EPISODE CHANGES (OUTPATIENT)
Dept: CASE MANAGEMENT | Facility: OTHER | Age: 69
End: 2019-08-21

## 2019-08-30 ENCOUNTER — EPISODE CHANGES (OUTPATIENT)
Dept: CASE MANAGEMENT | Facility: OTHER | Age: 69
End: 2019-08-30

## 2019-09-30 ENCOUNTER — PATIENT OUTREACH (OUTPATIENT)
Dept: CASE MANAGEMENT | Facility: OTHER | Age: 69
End: 2019-09-30
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
## 2019-09-30 NOTE — OUTREACH NOTE
Care Coordination Note  Talked with Marilyn/ Dr. Linda Calderon office. 3 way phone call made with patient to PCP office to see if earlier appointment available. No earlier appointment available. Staff recommended ED visit if necessary to be seen  prior to appointment date. Patient verbalized understanding and states may visit ED.Reviewed with patient importance of evaluation by physician. She verbalized understanding and states she will do so. Will continue to follow.     Ama Glynn RN  Community Care Coordinator    9/30/2019, 4:31 PM

## 2019-10-08 DIAGNOSIS — J44.9 CHRONIC ASTHMATIC BRONCHITIS (HCC): ICD-10-CM

## 2019-10-08 RX ORDER — IPRATROPIUM BROMIDE AND ALBUTEROL SULFATE 2.5; .5 MG/3ML; MG/3ML
SOLUTION RESPIRATORY (INHALATION)
Qty: 360 ML | Refills: 0 | OUTPATIENT
Start: 2019-10-08

## 2019-10-08 RX ORDER — IPRATROPIUM BROMIDE AND ALBUTEROL SULFATE 2.5; .5 MG/3ML; MG/3ML
3 SOLUTION RESPIRATORY (INHALATION) 4 TIMES DAILY
Qty: 360 ML | Refills: 1 | Status: SHIPPED | OUTPATIENT
Start: 2019-10-08 | End: 2019-10-28

## 2019-10-14 ENCOUNTER — HOSPITAL ENCOUNTER (EMERGENCY)
Facility: HOSPITAL | Age: 69
Discharge: HOME | End: 2019-10-15
Attending: EMERGENCY MEDICINE | Admitting: EMERGENCY MEDICINE

## 2019-10-14 ENCOUNTER — APPOINTMENT (OUTPATIENT)
Dept: GENERAL RADIOLOGY | Facility: HOSPITAL | Age: 69
End: 2019-10-14

## 2019-10-14 DIAGNOSIS — N39.0 UTI (URINARY TRACT INFECTION), BACTERIAL: Primary | ICD-10-CM

## 2019-10-14 DIAGNOSIS — L53.9 LEG ERYTHEMA: ICD-10-CM

## 2019-10-14 DIAGNOSIS — A49.9 UTI (URINARY TRACT INFECTION), BACTERIAL: Primary | ICD-10-CM

## 2019-10-14 DIAGNOSIS — L29.9 PRURITUS: ICD-10-CM

## 2019-10-14 LAB
ALBUMIN SERPL-MCNC: 4.3 G/DL (ref 3.5–5.2)
ALBUMIN/GLOB SERPL: 1.4 G/DL
ALP SERPL-CCNC: 109 U/L (ref 39–117)
ALT SERPL W P-5'-P-CCNC: 17 U/L (ref 1–33)
ANION GAP SERPL CALCULATED.3IONS-SCNC: 9.3 MMOL/L (ref 5–15)
AST SERPL-CCNC: 19 U/L (ref 1–32)
BACTERIA UR QL AUTO: ABNORMAL /HPF
BASOPHILS # BLD AUTO: 0.06 10*3/MM3 (ref 0–0.2)
BASOPHILS NFR BLD AUTO: 0.6 % (ref 0–1.5)
BILIRUB SERPL-MCNC: 0.4 MG/DL (ref 0.2–1.2)
BILIRUB UR QL STRIP: NEGATIVE
BUN BLD-MCNC: 15 MG/DL (ref 8–23)
BUN/CREAT SERPL: 17.4 (ref 7–25)
CALCIUM SPEC-SCNC: 9.7 MG/DL (ref 8.6–10.5)
CHLORIDE SERPL-SCNC: 90 MMOL/L (ref 98–107)
CLARITY UR: CLEAR
CO2 SERPL-SCNC: 34.7 MMOL/L (ref 22–29)
COLOR UR: YELLOW
CREAT BLD-MCNC: 0.86 MG/DL (ref 0.57–1)
D-LACTATE SERPL-SCNC: 1.5 MMOL/L (ref 0.5–2)
DEPRECATED RDW RBC AUTO: 50.4 FL (ref 37–54)
EOSINOPHIL # BLD AUTO: 0.29 10*3/MM3 (ref 0–0.4)
EOSINOPHIL NFR BLD AUTO: 2.9 % (ref 0.3–6.2)
ERYTHROCYTE [DISTWIDTH] IN BLOOD BY AUTOMATED COUNT: 14.7 % (ref 12.3–15.4)
GFR SERPL CREATININE-BSD FRML MDRD: 66 ML/MIN/1.73
GLOBULIN UR ELPH-MCNC: 3 GM/DL
GLUCOSE BLD-MCNC: 128 MG/DL (ref 65–99)
GLUCOSE UR STRIP-MCNC: NEGATIVE MG/DL
HCT VFR BLD AUTO: 36.5 % (ref 34–46.6)
HGB BLD-MCNC: 12.3 G/DL (ref 12–15.9)
HGB UR QL STRIP.AUTO: ABNORMAL
HYALINE CASTS UR QL AUTO: ABNORMAL /LPF
IMM GRANULOCYTES # BLD AUTO: 0.04 10*3/MM3 (ref 0–0.05)
IMM GRANULOCYTES NFR BLD AUTO: 0.4 % (ref 0–0.5)
KETONES UR QL STRIP: NEGATIVE
LEUKOCYTE ESTERASE UR QL STRIP.AUTO: ABNORMAL
LYMPHOCYTES # BLD AUTO: 1.58 10*3/MM3 (ref 0.7–3.1)
LYMPHOCYTES NFR BLD AUTO: 16 % (ref 19.6–45.3)
MCH RBC QN AUTO: 31.4 PG (ref 26.6–33)
MCHC RBC AUTO-ENTMCNC: 33.7 G/DL (ref 31.5–35.7)
MCV RBC AUTO: 93.1 FL (ref 79–97)
MONOCYTES # BLD AUTO: 0.55 10*3/MM3 (ref 0.1–0.9)
MONOCYTES NFR BLD AUTO: 5.6 % (ref 5–12)
NEUTROPHILS # BLD AUTO: 7.38 10*3/MM3 (ref 1.7–7)
NEUTROPHILS NFR BLD AUTO: 74.5 % (ref 42.7–76)
NITRITE UR QL STRIP: NEGATIVE
NRBC BLD AUTO-RTO: 0 /100 WBC (ref 0–0.2)
NT-PROBNP SERPL-MCNC: 187.5 PG/ML (ref 5–900)
PH UR STRIP.AUTO: 5.5 [PH] (ref 5–8)
PLATELET # BLD AUTO: 214 10*3/MM3 (ref 140–450)
PMV BLD AUTO: 9.3 FL (ref 6–12)
POTASSIUM BLD-SCNC: 3.8 MMOL/L (ref 3.5–5.2)
PROT SERPL-MCNC: 7.3 G/DL (ref 6–8.5)
PROT UR QL STRIP: NEGATIVE
RBC # BLD AUTO: 3.92 10*6/MM3 (ref 3.77–5.28)
RBC # UR: ABNORMAL /HPF
REF LAB TEST METHOD: ABNORMAL
SODIUM BLD-SCNC: 134 MMOL/L (ref 136–145)
SP GR UR STRIP: 1.01 (ref 1–1.03)
SQUAMOUS #/AREA URNS HPF: ABNORMAL /HPF
UROBILINOGEN UR QL STRIP: ABNORMAL
WBC NRBC COR # BLD: 9.9 10*3/MM3 (ref 3.4–10.8)
WBC UR QL AUTO: ABNORMAL /HPF

## 2019-10-14 PROCEDURE — 83605 ASSAY OF LACTIC ACID: CPT | Performed by: EMERGENCY MEDICINE

## 2019-10-14 PROCEDURE — 85025 COMPLETE CBC W/AUTO DIFF WBC: CPT | Performed by: EMERGENCY MEDICINE

## 2019-10-14 PROCEDURE — 81001 URINALYSIS AUTO W/SCOPE: CPT | Performed by: EMERGENCY MEDICINE

## 2019-10-14 PROCEDURE — 99284 EMERGENCY DEPT VISIT MOD MDM: CPT

## 2019-10-14 PROCEDURE — 73130 X-RAY EXAM OF HAND: CPT

## 2019-10-14 PROCEDURE — 80053 COMPREHEN METABOLIC PANEL: CPT | Performed by: EMERGENCY MEDICINE

## 2019-10-14 PROCEDURE — 83880 ASSAY OF NATRIURETIC PEPTIDE: CPT | Performed by: EMERGENCY MEDICINE

## 2019-10-14 RX ORDER — SODIUM CHLORIDE 0.9 % (FLUSH) 0.9 %
10 SYRINGE (ML) INJECTION AS NEEDED
Status: DISCONTINUED | OUTPATIENT
Start: 2019-10-14 | End: 2019-10-15 | Stop reason: HOSPADM

## 2019-10-14 NOTE — ED TRIAGE NOTES
Pt reports cellulitis on both legs 8 weeks ago. Pt was given a shot of rocephin at urgent care and antibiotics but reports this did not help. Pt also reports urinary frequency also.

## 2019-10-14 NOTE — ED PROVIDER NOTES
EMERGENCY DEPARTMENT ENCOUNTER    CHIEF COMPLAINT  Chief Complaint: Cellulitis  History given by: Patient  History limited by: None  Room Number:   PMD: System, Provider Not In      HPI:  Pt is a 69 y.o. female who presents complaining of redness, warmth and itching to her BLE that began 7 weeks ago. Patient thinks she has cellulitis. She reports she was seen at an urgent care center in 2019 where she was given a shot of Rocephin and discharged with a 7 day course of abx. Patient reports this did not resolve her symptoms.     She reports associated urinary frequency, and she thinks she has a kidney infection.     She also complains of swelling and pain to her right fifth finger that occurred last night when she got her finger slammed in a door.    Duration/Onset/Timin weeks/gradual/constant  Location: BLE  Radiation: none  Quality: redness, warmth, and itching  Intensity/Severity: moderate  Associated Symptoms: urinary frequency, finger pain  Aggravating or Alleviating Factors: none  Previous Episodes: no      PAST MEDICAL HISTORY  Active Ambulatory Problems     Diagnosis Date Noted   • Dyslipidemia 2016   • Hypertension 2016   • Anxiety (Chronic benzos) 2016   • Insomnia 2016   • GERD (gastroesophageal reflux disease) 2016   • Diabetes mellitus (CMS/[facility]) 2016   • Fibromyalgia 2016   • RLS (restless legs syndrome) 2016   • Migraines 2016   • Chronic asthmatic bronchitis (CMS/[facility]) 2016   • Interstitial cystitis 2016   • History of acute myocardial infarction 2016   • History of cardiac murmur 2016   • History of stroke 2016   • CAD (coronary artery disease) 2016   • Essential hypertension 2017   • CKD (chronic kidney disease) stage 2, GFR 60-89 ml/min 2017   • Bilateral carotid artery stenosis 2017   • Chronic respiratory failure with hypoxia and hypercapnia (CMS/[facility]) 2017   • Obstructive  sleep apnea 09/30/2017   • Obesity (BMI 30-39.9) 09/30/2017   • Diabetes mellitus type 2 in obese (CMS/HCC) 09/30/2017   • Obesity hypoventilation syndrome (CMS/HCC) 03/17/2018   • Tobacco use 03/17/2018   • Chronic pain (Chronic narcotics) 10/15/2018   • Recurrent UTI/interstitial cystitis on chronic methenamine 10/15/2018   • Clostridium difficile colitis diagnosed September 2018. Persistent diarrhea despite oral vancomycin 10/16/2018   • Demand ischemia (CMS/HCC) 11/06/2018   • Hypoxemia requiring supplemental oxygen 01/25/2019     Resolved Ambulatory Problems     Diagnosis Date Noted   • Generalized edema 09/20/2017   • Pneumonia of right lower lobe due to infectious organism (CMS/HCC) 09/28/2017   • Acute metabolic encephalopathy due to hypercarbia, hypoxemia, benzodiazepines 03/17/2018   • Acute kidney injury (CMS/HCC) 03/17/2018   • SIRS (systemic inflammatory response syndrome) (CMS/HCC) 03/17/2018   • Elevated LFTs 03/17/2018   • Right leg pain 03/19/2018   • Elevated d-dimer 03/19/2018   • ESR raised 03/20/2018   • Seizure disorder, nonconvulsive, with status epilepticus (CMS/HCC) 03/20/2018   • Septic joint of right knee joint (CMS/HCC) 03/21/2018   • R/O CAP (community acquired pneumonia) 10/15/2018   • Respiratory failure, acute and chronic (CMS/HCC) 10/15/2018   • Acute and chronic respiratory failure with hypercapnia (CMS/HCC) 11/06/2018     Past Medical History:   Diagnosis Date   • Allergic rhinitis    • Asthma with COPD (CMS/HCC)    • Bilateral ovarian cysts    • Coronary artery disease    • Depression with anxiety    • Diabetes (CMS/HCC)    • Endometriosis    • ESR raised 3/20/2018   • Fatigue    • Fibromyalgia    • Headache    • High cholesterol    • History of gallstones    • History of myocardial infarction    • Hypertension    • Influenza B    • Interstitial cystitis    • On home oxygen therapy    • URIEL on CPAP    • PONV (postoperative nausea and vomiting)    • Seizure disorder, nonconvulsive,  with status epilepticus (CMS/HCC) 3/20/2018   • Septic joint of right knee joint (CMS/[facility]) 3/21/2018   • Shortness of breath on exertion    • Stroke (CMS/[facility])    • Thyroid disorder    • Urinary leakage    • UTI (urinary tract infection)    • Wears glasses    • Yeast dermatitis        PAST SURGICAL HISTORY  Past Surgical History:   Procedure Laterality Date   • ARTERIOGRAM N/A 2/12/2018    Procedure: Renal Arteriogram;  Surgeon: Lito Flores MD;  Location:  [facility] CATH INVASIVE LOCATION;  Service:    • BREAST BIOPSY Right 1991   • CARDIAC CATHETERIZATION N/A 2/12/2018    Procedure: Right Heart Cath;  Surgeon: Lito Flores MD;  Location:  [facility] CATH INVASIVE LOCATION;  Service:    • CAROTID STENT      Transcath    • CAROTID STENT Left    • CHOLECYSTECTOMY     • CYSTOSTOMY W/ BLADDER BIOPSY     • DILATATION AND CURETTAGE     • KNEE ARTHROSCOPY Right 3/23/2018    Procedure: ARTHROSCOPY, RIGHT KNEE  INCISION AND DRAINAGE LOWER EXTREMITY WITH SYNOVIAL BIOPSY;  Surgeon: Dilip Giron MD;  Location:  [facility] OR;  Service: Orthopedics   • LAPAROSCOPIC CHOLECYSTECTOMY  1994    Lap rolando   • OOPHORECTOMY     • PAP SMEAR  05/10/2016   • PARTIAL HYSTERECTOMY         FAMILY HISTORY  Family History   Problem Relation Age of Onset   • Cancer Other    • Diabetes Other    • Heart attack Other    • Hyperlipidemia Other    • Hypertension Other    • Osteoporosis Other    • Stroke Other    • Breast cancer Mother    • Osteoporosis Mother    • Colon cancer Father        SOCIAL HISTORY  Social History     Socioeconomic History   • Marital status:      Spouse name: Not on file   • Number of children: Not on file   • Years of education: Not on file   • Highest education level: Not on file   Tobacco Use   • Smoking status: Current Every Day Smoker     Packs/day: 0.25     Years: 40.00     Pack years: 10.00     Types: Cigarettes   • Smokeless tobacco: Never Used   • Tobacco comment: in process of quiting--AVERAGE 1 PPD, DOWN TO 6  CIG PER DAY X2 WEEKS    Substance and Sexual Activity   • Alcohol use: Yes     Alcohol/week: 0.6 oz     Types: 1 Glasses of wine per week     Comment: occasional   • Drug use: No   • Sexual activity: Defer   Social History Narrative    Lives alone.        ALLERGIES  Amlodipine and Reglan [metoclopramide]    REVIEW OF SYSTEMS  Review of Systems   Constitutional: Negative for fever.   HENT: Negative for sore throat.    Eyes: Negative.    Respiratory: Negative for cough and shortness of breath.    Cardiovascular: Negative for chest pain.   Gastrointestinal: Negative for abdominal pain, diarrhea and vomiting.   Genitourinary: Positive for frequency. Negative for dysuria.   Musculoskeletal: Negative for neck pain.        + swelling and pain to the R fifth digit   Skin: Positive for color change (redness to BLE). Negative for rash.   Allergic/Immunologic: Negative.    Neurological: Negative for weakness, numbness and headaches.   Hematological: Negative.    Psychiatric/Behavioral: Negative.    All other systems reviewed and are negative.      PHYSICAL EXAM  ED Triage Vitals   Temp Heart Rate Resp BP SpO2   10/14/19 1700 10/14/19 1700 10/14/19 1700 10/14/19 1814 10/14/19 1700   99.3 °F (37.4 °C) 65 18 127/61 93 %      Temp src Heart Rate Source Patient Position BP Location FiO2 (%)   10/14/19 1700 -- -- -- --   Tympanic           Physical Exam   Constitutional: She is oriented to person, place, and time. No distress.   HENT:   Head: Normocephalic and atraumatic.   Eyes: EOM are normal. Pupils are equal, round, and reactive to light.   Neck: Normal range of motion. Neck supple.   Cardiovascular: Normal rate, regular rhythm and normal heart sounds.   Pulmonary/Chest: Effort normal and breath sounds normal. No respiratory distress.   Abdominal: Soft. There is no tenderness. There is no rebound and no guarding.   Musculoskeletal: Normal range of motion. She exhibits edema (BLE).   Right 5th digit bruising and swelling. BLE  there is 2+ pitting edema with mild redness. No warmth to the touch.    Neurological: She is alert and oriented to person, place, and time. She has normal sensation and normal strength.   Skin: Skin is warm and dry. No rash noted.   Psychiatric: Mood and affect normal.   Nursing note and vitals reviewed.      LAB RESULTS  Lab Results (last 24 hours)     Procedure Component Value Units Date/Time    CBC & Differential [[id number]] Collected:  10/14/19 2132    Specimen:  Blood Updated:  10/14/19 2150    Narrative:       The following orders were created for panel order CBC & Differential.  Procedure                               Abnormality         Status                     ---------                               -----------         ------                     CBC Auto Differential[[id number]]        Abnormal            Final result                 Please view results for these tests on the individual orders.    Comprehensive Metabolic Panel [[id number]]  (Abnormal) Collected:  10/14/19 2132    Specimen:  Blood from Arm, Right Updated:  10/14/19 2242     Glucose 128 mg/dL      BUN 15 mg/dL      Creatinine 0.86 mg/dL      Sodium 134 mmol/L      Potassium 3.8 mmol/L      Chloride 90 mmol/L      CO2 34.7 mmol/L      Calcium 9.7 mg/dL      Total Protein 7.3 g/dL      Albumin 4.30 g/dL      ALT (SGPT) 17 U/L      AST (SGOT) 19 U/L      Alkaline Phosphatase 109 U/L      Total Bilirubin 0.4 mg/dL      eGFR Non African Amer 66 mL/min/1.73      Globulin 3.0 gm/dL      A/G Ratio 1.4 g/dL      BUN/Creatinine Ratio 17.4     Anion Gap 9.3 mmol/L     Narrative:       GFR Normal >60  Chronic Kidney Disease <60  Kidney Failure <15    Lactic Acid, Plasma [[id number]]  (Normal) Collected:  10/14/19 2132    Specimen:  Blood from Arm, Right Updated:  10/14/19 2157     Lactate 1.5 mmol/L     CBC Auto Differential [[id number]]  (Abnormal) Collected:  10/14/19 2132    Specimen:  Blood from Arm, Right Updated:  10/14/19 2150     WBC 9.90 10*3/mm3       RBC 3.92 10*6/mm3      Hemoglobin 12.3 g/dL      Hematocrit 36.5 %      MCV 93.1 fL      MCH 31.4 pg      MCHC 33.7 g/dL      RDW 14.7 %      RDW-SD 50.4 fl      MPV 9.3 fL      Platelets 214 10*3/mm3      Neutrophil % 74.5 %      Lymphocyte % 16.0 %      Monocyte % 5.6 %      Eosinophil % 2.9 %      Basophil % 0.6 %      Immature Grans % 0.4 %      Neutrophils, Absolute 7.38 10*3/mm3      Lymphocytes, Absolute 1.58 10*3/mm3      Monocytes, Absolute 0.55 10*3/mm3      Eosinophils, Absolute 0.29 10*3/mm3      Basophils, Absolute 0.06 10*3/mm3      Immature Grans, Absolute 0.04 10*3/mm3      nRBC 0.0 /100 WBC     BNP [[id number]]  (Normal) Collected:  10/14/19 2132    Specimen:  Blood from Arm, Right Updated:  10/14/19 2239     proBNP 187.5 pg/mL     Narrative:       Among patients with dyspnea, NT-proBNP is highly sensitive for the detection of acute congestive heart failure. In addition NT-proBNP of <300 pg/ml effectively rules out acute congestive heart failure with 99% negative predictive value.    Urinalysis With Microscopic If Indicated (No Culture) - Urine, Clean Catch [[id number]]  (Abnormal) Collected:  10/14/19 2149    Specimen:  Urine, Clean Catch Updated:  10/14/19 2208     Color, UA Yellow     Appearance, UA Clear     pH, UA 5.5     Specific Gravity, UA 1.007     Glucose, UA Negative     Ketones, UA Negative     Bilirubin, UA Negative     Blood, UA Small (1+)     Protein, UA Negative     Leuk Esterase, UA Moderate (2+)     Nitrite, UA Negative     Urobilinogen, UA 0.2 E.U./dL    Urinalysis, Microscopic Only - Urine, Clean Catch [[id number]]  (Abnormal) Collected:  10/14/19 2149    Specimen:  Urine, Clean Catch Updated:  10/14/19 2208     RBC, UA 0-2 /HPF      WBC, UA Too Numerous to Count /HPF      Bacteria, UA None Seen /HPF      Squamous Epithelial Cells, UA None Seen /HPF      Hyaline Casts, UA None Seen /LPF      Methodology Automated Microscopy          I ordered the above labs and reviewed the  results    RADIOLOGY  XR Hand 3+ View Right   Preliminary Result   1. Degenerative change of the 1st carpometacarpal joint.   2. Tiny osseous density adjacent to the PIP joint of the middle finger   may represent an old chip fracture fragment. It does not appear acute.   3. No acute process identified.                   I ordered the above noted radiological studies. Interpreted by radiologist. Reviewed by me in PACS.       PROCEDURES  Procedures      PROGRESS AND CONSULTS     1953 Labs ordered for evaluation.    1956 XR hand ordered for evaluation    2205 UA ordered for evaluation.    2325 Rechecked the patient who is resting comfortably and in NAD. The patient is stable.  BP- 171/68 HR- 67 Temp- 99.3 °F (37.4 °C) (Tympanic) O2 sat- 92%. Informed the patient of all labs and imaging, including UTI detected with elevated WBC in UA. Advised the patient this is likely not cellulitis, and she does not have a fracture to her finger. Discussed the plan for discharge with instructions to f/u with PCP for further evaluation and management. Pt understands and agrees with the plan, all questions answered.    MEDICAL DECISION MAKING  Results were reviewed/discussed with the patient and they were also made aware of online access. Pt also made aware that some labs, such as cultures, will not be resulted during ER visit and follow up with PMD is necessary.     MDM  Number of Diagnoses or Management Options     Amount and/or Complexity of Data Reviewed  Clinical lab tests: ordered and reviewed (WBC, UA: too numerous)  Tests in the radiology section of CPT®: reviewed and ordered (XR Right hand: Degenerative change of the 1st carpometacarpal joint. Tiny osseous density adjacent to the PIP joint of the middle finger may represent an old chip fracture fragment. It does not appear acute. No acute process identified.)  Decide to obtain previous medical records or to obtain history from someone other than the patient: yes (Epic)  Review  and summarize past medical records: yes (Patient was seen in August 2019 at an urgent care center for cellulitis.)  Independent visualization of images, tracings, or specimens: yes (Dr. Connie MD)    Patient Progress  Patient progress: stable         DIAGNOSIS  Final diagnoses:   UTI (urinary tract infection), bacterial   Pruritus   Leg erythema       DISPOSITION  DISCHARGE    Patient discharged in stable condition.    Reviewed implications of results, diagnosis, meds, responsibility to follow up, warning signs and symptoms of possible worsening, potential complications and reasons to return to ER, including any new or worsening symptoms.    Patient/Family voiced understanding of above instructions.    Discussed plan for discharge, as there is no emergent indication for admission. Patient referred to primary care provider for BP management due to today's BP. Pt/family is agreeable and understands need for follow up and repeat testing.  Pt is aware that discharge does not mean that nothing is wrong but it indicates no emergency is present that requires admission and they must continue care with follow-up as given below or physician of their choice.     FOLLOW-UP  [facility] REFERRAL SERVICE  [place] [id number] [phone]  Schedule an appointment as soon as possible for a visit            Medication List      New Prescriptions    hydrOXYzine 25 MG tablet  Commonly known as:  ATARAX  Take 1 tablet by mouth 3 (Three) Times a Day As Needed for Itching.        Changed    * cephalexin 500 MG capsule  Commonly known as:  KEFLEX  Take 1 capsule by mouth 3 (Three) Times a Day.  What changed:  Another medication with the same name was added. Make sure   you understand how and when to take each.     * cephalexin 250 MG capsule  Commonly known as:  KEFLEX  Take 1 capsule by mouth 3 (Three) Times a Day.  What changed:  You were already taking a medication with the same name,   and this  prescription was added. Make sure you understand how and when to   take each.         * This list has 2 medication(s) that are the same as other medications   prescribed for you. Read the directions carefully, and ask your doctor or   other care provider to review them with you.                  Latest Documented Vital Signs:  As of 2:31 AM  BP- 174/74 HR- 81 Temp- 99.3 °F (37.4 °C) (Tympanic) O2 sat- 91%    --  Documentation assistance provided by slime Marrero for Dr. Migule MD.  Information recorded by the scribe was done at my direction and has been verified and validated by me.           Raya Marrero  10/15/19 0010       Amol Rivera MD  10/15/19 023

## 2019-10-15 VITALS
HEIGHT: 62 IN | WEIGHT: 200 LBS | DIASTOLIC BLOOD PRESSURE: 74 MMHG | HEART RATE: 81 BPM | SYSTOLIC BLOOD PRESSURE: 174 MMHG | OXYGEN SATURATION: 91 % | TEMPERATURE: 99.3 F | BODY MASS INDEX: 36.8 KG/M2 | RESPIRATION RATE: 18 BRPM

## 2019-10-15 RX ORDER — HYDROXYZINE HYDROCHLORIDE 25 MG/1
25 TABLET, FILM COATED ORAL 3 TIMES DAILY PRN
Qty: 12 TABLET | Refills: 0 | Status: SHIPPED | OUTPATIENT
Start: 2019-10-15 | End: 2019-10-23 | Stop reason: SDUPTHER

## 2019-10-15 RX ORDER — CEPHALEXIN 250 MG/1
250 CAPSULE ORAL 3 TIMES DAILY
Qty: 30 CAPSULE | Refills: 0 | Status: SHIPPED | OUTPATIENT
Start: 2019-10-15 | End: 2019-10-28

## 2019-10-23 ENCOUNTER — OFFICE VISIT (OUTPATIENT)
Dept: NEUROLOGY | Facility: CLINIC | Age: 69
End: 2019-10-23

## 2019-10-23 ENCOUNTER — TELEPHONE (OUTPATIENT)
Dept: NEUROLOGY | Facility: CLINIC | Age: 69
End: 2019-10-23

## 2019-10-23 VITALS — WEIGHT: 200 LBS | HEIGHT: 62 IN | BODY MASS INDEX: 36.8 KG/M2

## 2019-10-23 DIAGNOSIS — G40.901 SEIZURE DISORDER, NONCONVULSIVE, WITH STATUS EPILEPTICUS (HCC): Primary | ICD-10-CM

## 2019-10-23 DIAGNOSIS — L29.9 SEVERE ITCHING: ICD-10-CM

## 2019-10-23 PROCEDURE — 99214 OFFICE O/P EST MOD 30 MIN: CPT | Performed by: PHYSICIAN ASSISTANT

## 2019-10-23 RX ORDER — LEVETIRACETAM 500 MG/1
1500 TABLET ORAL 2 TIMES DAILY
Qty: 180 TABLET | Refills: 11 | Status: SHIPPED | OUTPATIENT
Start: 2019-10-23 | End: 2020-01-20 | Stop reason: SDUPTHER

## 2019-10-23 RX ORDER — HYDROXYZINE HYDROCHLORIDE 25 MG/1
25 TABLET, FILM COATED ORAL 3 TIMES DAILY PRN
Qty: 12 TABLET | Refills: 0 | Status: SHIPPED | OUTPATIENT
Start: 2019-10-23 | End: 2019-10-25 | Stop reason: SDUPTHER

## 2019-10-23 NOTE — PROGRESS NOTES
"Subjective     Chief Complaint: seizures      History of Present Illness   Vidhi Lopez is a 69 y.o. female who with a history of diabetes who returns to clinic today following a hospitalization at [facility] in 3/18 for multiple issues, including seizure. She was noted to have significant confusion prior to her admission, though the history otherwise is a bit unclear. She was diagnosed with acute mixed respiratory failure and acute renal failure. Workup including an MRI of the brain, which was unremarkable for any acute abnormalities. An EEG was notable for status epilepticus.  She was subsequently treated with Keppra 1000mg BID. Additionally, after undergoing a right knee arthroscopy with incision and drainage and was diagnosed with an acute gout flare. Since her hospitalization, she feels that she has returned to her prior cognitive baseline.     For several months prior to her hospitalization, she noted occasional episodes in the evening during which she would \"zone out\" for several seconds. She denies any unresponsiveness or loss of consciousness as well as any urinary incontinence, tongue biting or confusion during these episodes. Since she was started on Keppra, she notes that these spells have resolved.      Additionally, she previously noted episodes of incoordination. She has had three episodes in 2017 during which she notes incoordination in her hands bilaterally. During these episodes, she is more prone to dropping items. She denies any clear associated weakness. She may note some unsteadiness, though denies any ataxia. 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 A MRA of the head and neck was notable for significant stenosis in the LICA and moderate stenosis in the AURE. She is currently taking ASA 81mg, Plavix, and Lipitor 80mg daily.     Today: Since her last visit in 10/18, she has noted several episodes of incoordination, typically 1-2 a month. She is currently " "taking Keppra 1000mg BID and is tolerating this well.       I have reviewed and confirmed the past family, social and medical history as accurate on 10/23/19.     Review of Systems   Constitutional: Negative.    HENT: Negative.    Eyes: Negative.    Respiratory: Negative.    Cardiovascular: Negative.    Gastrointestinal: Negative.    Endocrine: Negative.    Genitourinary: Negative.    Musculoskeletal: Negative.    Skin: Negative.    Allergic/Immunologic: Negative.    Neurological: Positive for seizures.   Hematological: Negative.    Psychiatric/Behavioral: Negative.        Objective     Ht 157.5 cm (62\")   Wt 90.7 kg (200 lb)   LMP  (LMP Unknown) Comment: LAST MAMMOGRAM 2017  BMI 36.58 kg/m²     General appearance today is normal.       Physical Exam   Neurological: She has a normal Finger-Nose-Finger Test.   Psychiatric: Her speech is normal.        Neurologic Exam     Mental Status   Speech: speech is normal   Level of consciousness: alert  Normal comprehension.     Cranial Nerves   Cranial nerves II through XII intact.     Motor Exam   Muscle bulk: normal    Sensory Exam   Light touch normal.     Gait, Coordination, and Reflexes     Coordination   Finger to nose coordination: normal          Assessment/Plan   Vidhi was seen today for seizures.    Diagnoses and all orders for this visit:    Seizure disorder, nonconvulsive, with status epilepticus (CMS/[facility])          Discussion/Summary   Vidhi Lopez returns to clinic today with a history of seizure. I again reviewed her current status and treatment options. After discussing potential treatment options, it was elected to increase her Keppra to 1500mg BID. She has moved to [facility] so I have made a referral to neurology there. She will then follow up in our clinic on an as needed basis.   I spent 25 minutes face to face with the patient with 20 minutes spent on discussing diagnosis, evaluation, current status, treatment options and management as discussed " above.       As part of this visit I discussed the history with the patient .      Eusebia Yanez PA-C

## 2019-10-23 NOTE — TELEPHONE ENCOUNTER
Relayed this to Vidhi on her voicemail system. Office # given if any questions, but she has been instructed that she may go to any  lab and have this level drawn as well.

## 2019-10-23 NOTE — TELEPHONE ENCOUNTER
Vidhi called stating she is kind of out of it sometimes and wonders shouldn't we get a Keppra level periodically as she has never had one before from what she can recall?

## 2019-10-24 NOTE — TELEPHONE ENCOUNTER
Pt left a vm asking for a call back. Pt states she knows the MA left her a vm yesterday, but she has a new phone and having trouble accessing her messages. Please advise.

## 2019-10-25 RX ORDER — HYDROXYZINE HYDROCHLORIDE 25 MG/1
25 TABLET, FILM COATED ORAL 3 TIMES DAILY PRN
Qty: 12 TABLET | Refills: 0 | Status: SHIPPED | OUTPATIENT
Start: 2019-10-25 | End: 2019-10-25 | Stop reason: SDUPTHER

## 2019-10-25 RX ORDER — HYDROXYZINE HYDROCHLORIDE 25 MG/1
25 TABLET, FILM COATED ORAL 3 TIMES DAILY PRN
Qty: 12 TABLET | Refills: 0 | Status: SHIPPED | OUTPATIENT
Start: 2019-10-25 | End: 2019-10-28 | Stop reason: SDUPTHER

## 2019-10-25 NOTE — TELEPHONE ENCOUNTER
Called her back. Left detailed vm relaying that Eusebiaprema Yanez has put in an order to have  Her Keppra level checked and she may have this drawn at any  laboratory. Thanks!

## 2019-10-25 NOTE — TELEPHONE ENCOUNTER
Vidhi called inquiring about a medication Eusebia was going to send in for itching. Hydroxyzine 25 mg accidentally got sent orally instead of the normal electronic way. Reordered and sent to [facility] in [address] As requested by patient.

## 2019-10-28 ENCOUNTER — OFFICE VISIT (OUTPATIENT)
Dept: FAMILY MEDICINE CLINIC | Facility: CLINIC | Age: 69
End: 2019-10-28

## 2019-10-28 VITALS
TEMPERATURE: 98.3 F | WEIGHT: 208.8 LBS | SYSTOLIC BLOOD PRESSURE: 140 MMHG | RESPIRATION RATE: 19 BRPM | HEIGHT: 63 IN | DIASTOLIC BLOOD PRESSURE: 65 MMHG | OXYGEN SATURATION: 93 % | BODY MASS INDEX: 37 KG/M2 | HEART RATE: 66 BPM

## 2019-10-28 DIAGNOSIS — M1A.9XX1 CHRONIC GOUT WITH TOPHUS, UNSPECIFIED CAUSE, UNSPECIFIED SITE: ICD-10-CM

## 2019-10-28 DIAGNOSIS — R60.9 EDEMA, UNSPECIFIED TYPE: ICD-10-CM

## 2019-10-28 DIAGNOSIS — F32.A DEPRESSION, UNSPECIFIED DEPRESSION TYPE: ICD-10-CM

## 2019-10-28 DIAGNOSIS — Z12.31 ENCOUNTER FOR SCREENING MAMMOGRAM FOR MALIGNANT NEOPLASM OF BREAST: ICD-10-CM

## 2019-10-28 DIAGNOSIS — J45.909 ASTHMA, UNSPECIFIED ASTHMA SEVERITY, UNSPECIFIED WHETHER COMPLICATED, UNSPECIFIED WHETHER PERSISTENT: ICD-10-CM

## 2019-10-28 DIAGNOSIS — Z00.00 WELLNESS EXAMINATION: ICD-10-CM

## 2019-10-28 DIAGNOSIS — I65.22 OCCLUSION AND STENOSIS OF LEFT CAROTID ARTERY: ICD-10-CM

## 2019-10-28 DIAGNOSIS — G25.81 RESTLESS LEG SYNDROME: ICD-10-CM

## 2019-10-28 DIAGNOSIS — Z00.00 MEDICARE ANNUAL WELLNESS VISIT, SUBSEQUENT: Primary | ICD-10-CM

## 2019-10-28 DIAGNOSIS — J30.2 SEASONAL ALLERGIES: ICD-10-CM

## 2019-10-28 DIAGNOSIS — I65.29 STENOSIS OF CAROTID ARTERY, UNSPECIFIED LATERALITY: ICD-10-CM

## 2019-10-28 DIAGNOSIS — E11.69 TYPE 2 DIABETES MELLITUS WITH OTHER SPECIFIED COMPLICATION, WITH LONG-TERM CURRENT USE OF INSULIN (HCC): ICD-10-CM

## 2019-10-28 DIAGNOSIS — Z79.4 TYPE 2 DIABETES MELLITUS WITH OTHER SPECIFIED COMPLICATION, WITH LONG-TERM CURRENT USE OF INSULIN (HCC): ICD-10-CM

## 2019-10-28 LAB
BILIRUB BLD-MCNC: NEGATIVE MG/DL
CLARITY, POC: CLEAR
COLOR UR: YELLOW
GLUCOSE UR STRIP-MCNC: NEGATIVE MG/DL
KETONES UR QL: NEGATIVE
LEUKOCYTE EST, POC: NEGATIVE
NITRITE UR-MCNC: NEGATIVE MG/ML
PH UR: 6 [PH] (ref 5–8)
PROT UR STRIP-MCNC: ABNORMAL MG/DL
RBC # UR STRIP: NEGATIVE /UL
SP GR UR: 1.02 (ref 1–1.03)
UROBILINOGEN UR QL: NORMAL

## 2019-10-28 PROCEDURE — G0439 PPPS, SUBSEQ VISIT: HCPCS | Performed by: FAMILY MEDICINE

## 2019-10-28 PROCEDURE — 81003 URINALYSIS AUTO W/O SCOPE: CPT | Performed by: FAMILY MEDICINE

## 2019-10-28 PROCEDURE — 99204 OFFICE O/P NEW MOD 45 MIN: CPT | Performed by: FAMILY MEDICINE

## 2019-10-28 RX ORDER — HYDROXYZINE 50 MG/1
50 TABLET, FILM COATED ORAL EVERY 8 HOURS PRN
Qty: 30 TABLET | Refills: 3 | Status: SHIPPED | OUTPATIENT
Start: 2019-10-28 | End: 2019-11-07 | Stop reason: SDUPTHER

## 2019-10-28 RX ORDER — HYDROXYZINE 50 MG/1
50 TABLET, FILM COATED ORAL EVERY 8 HOURS PRN
Qty: 30 TABLET | Refills: 3 | Status: SHIPPED | OUTPATIENT
Start: 2019-10-28 | End: 2019-10-28

## 2019-10-28 RX ORDER — BUDESONIDE AND FORMOTEROL FUMARATE DIHYDRATE 160; 4.5 UG/1; UG/1
2 AEROSOL RESPIRATORY (INHALATION)
Qty: 10.2 G | Refills: 6 | Status: SHIPPED | OUTPATIENT
Start: 2019-10-28 | End: 2020-07-16

## 2019-10-28 RX ORDER — FLUOXETINE 10 MG/1
10 CAPSULE ORAL DAILY
Qty: 90 CAPSULE | Refills: 3 | Status: SHIPPED | OUTPATIENT
Start: 2019-10-28 | End: 2020-02-28 | Stop reason: ALTCHOICE

## 2019-10-28 RX ORDER — ROPINIROLE 0.25 MG/1
0.25 TABLET, FILM COATED ORAL NIGHTLY
Qty: 90 TABLET | Refills: 3 | Status: SHIPPED | OUTPATIENT
Start: 2019-10-28 | End: 2019-12-09 | Stop reason: HOSPADM

## 2019-10-28 RX ORDER — BUMETANIDE 1 MG/1
1 TABLET ORAL
Qty: 90 TABLET | Refills: 3 | Status: SHIPPED | OUTPATIENT
Start: 2019-10-28 | End: 2020-12-10 | Stop reason: ALTCHOICE

## 2019-10-28 NOTE — PROGRESS NOTES
The ABCs of the Annual Wellness Visit  Subsequent Medicare Wellness Visit  Depression x 3 years and anxiety  Chief Complaint   Patient presents with   • Annual Exam     pt is fasting    • Depression     needs to talk about some depression issues       Subjective   History of Present Illness:  Vidhi Lopez is a 69 y.o. female who presents for a Subsequent Medicare Wellness Visit.    HEALTH RISK ASSESSMENT    Recent Hospitalizations:  No hospitalization(s) within the last year.    Current Medical Providers:  Patient Care Team:  System, Provider Not In as PCP - General  Arnoldo Newman MD as PCP - Claims Attributed  Lito Flores MD as Consulting Physician (Cardiology)  Jacky Hernandez MD as Consulting Physician (Infectious Diseases)  Ama Glynn, JANE as Ambulatory  (Population Health)    Smoking Status:  Social History     Tobacco Use   Smoking Status Current Every Day Smoker   • Packs/day: 0.25   • Years: 40.00   • Pack years: 10.00   • Types: Cigarettes   Smokeless Tobacco Never Used   Tobacco Comment    in process of quiting--AVERAGE 1 PPD, DOWN TO 6 CIG PER DAY X2 WEEKS        Alcohol Consumption:  Social History     Substance and Sexual Activity   Alcohol Use Yes   • Alcohol/week: 0.6 oz   • Types: 1 Glasses of wine per week    Comment: occasional       Depression Screen:   PHQ-2/PHQ-9 Depression Screening 10/28/2019   Little interest or pleasure in doing things 3   Feeling down, depressed, or hopeless 3   Trouble falling or staying asleep, or sleeping too much 3   Feeling tired or having little energy 3   Poor appetite or overeating 3   Feeling bad about yourself - or that you are a failure or have let yourself or your family down 3   Trouble concentrating on things, such as reading the newspaper or watching television 0   Moving or speaking so slowly that other people could have noticed. Or the opposite - being so fidgety or restless that you have been moving around a lot more than  usual 1   Thoughts that you would be better off dead, or of hurting yourself in some way 0   Total Score 19   If you checked off any problems, how difficult have these problems made it for you to do your work, take care of things at home, or get along with other people? Somewhat difficult       Fall Risk Screen:  CHRIS Fall Risk Assessment was completed, and patient is at HIGH risk for falls. Assessment completed on:10/28/2019    Health Habits and Functional and Cognitive Screening:  Functional & Cognitive Status 10/28/2019   Do you have difficulty preparing food and eating? No   Do you have difficulty bathing yourself, getting dressed or grooming yourself? No   Do you have difficulty using the toilet? No   Do you have difficulty moving around from place to place? No   Do you have trouble with steps or getting out of a bed or a chair? No   Current Diet Low Fat Diet   Dental Exam Up to date   Eye Exam Up to date   Exercise (times per week) 3 times per week   Current Exercise Activities Include Walking   Do you need help using the phone?  No   Are you deaf or do you have serious difficulty hearing?  No   Do you need help with transportation? No   Do you need help shopping? No   Do you need help preparing meals?  No   Do you need help with housework?  No   Do you need help with laundry? No   Do you need help taking your medications? No   Do you need help managing money? No   Do you ever drive or ride in a car without wearing a seat belt? No   Have you felt unusual stress, anger or loneliness in the last month? Yes   Who do you live with? Alone   If you need help, do you have trouble finding someone available to you? No   Have you been bothered in the last four weeks by sexual problems? No   Do you have difficulty concentrating, remembering or making decisions? No         Does the patient have evidence of cognitive impairment? Yes    Asprin use counseling:Start ASA 81 mg daily     Age-appropriate Screening  Schedule:  Refer to the list below for future screening recommendations based on patient's age, sex and/or medical conditions. Orders for these recommended tests are listed in the plan section. The patient has been provided with a written plan.    Health Maintenance   Topic Date Due   • HEMOGLOBIN A1C  04/28/2020   • DIABETIC FOOT EXAM  10/28/2020   • LIPID PANEL  10/28/2020   • DIABETIC EYE EXAM  10/28/2020   • URINE MICROALBUMIN  10/28/2020   • MAMMOGRAM  10/28/2021   • TDAP/TD VACCINES (3 - Td) 09/18/2027   • COLONOSCOPY  06/11/2029   • INFLUENZA VACCINE  Completed   • PNEUMOCOCCAL VACCINES (65+ LOW/MEDIUM RISK)  Completed   • ZOSTER VACCINE  Discontinued          The following portions of the patient's history were reviewed and updated as appropriate: allergies, current medications, past family history, past medical history, past social history, past surgical history and problem list.    Outpatient Medications Prior to Visit   Medication Sig Dispense Refill   • aspirin 81 MG EC tablet Take 81 mg by mouth Daily.     • atorvastatin (LIPITOR) 80 MG tablet take 1 tablet by mouth once daily 90 tablet 3   • celecoxib (CELEBREX) 100 MG capsule Every 12 (Twelve) Hours.     • clopidogrel (PLAVIX) 75 MG tablet take 1 tablet by mouth once daily 90 tablet 3   • clotrimazole-betamethasone (LOTRISONE) 1-0.05 % cream Apply  topically to the appropriate area as directed As Needed.  1   • gabapentin (NEURONTIN) 800 MG tablet Take 800 mg by mouth 3 (Three) Times a Day.  0   • Insulin aspart (FIASP FLEXTOUCH) 100 UNIT/ML solution pen-injector injection pen   0   • ipratropium-albuterol (DUO-NEB) 0.5-2.5 mg/3 ml nebulizer USE ONE AMPULE VIA NEBULIZER FOUR TIMES DAILY 1080 mL 1   • levETIRAcetam (KEPPRA) 500 MG tablet Take 3 tablets by mouth 2 (Two) Times a Day. 180 tablet 11   • metoprolol tartrate (LOPRESSOR) 50 MG tablet Take 1 tablet by mouth Every 12 (Twelve) Hours. 60 tablet 11   • nitroglycerin (NITROSTAT) 0.4 MG SL tablet  Place 0.4 mg under the tongue Every 5 (Five) Minutes As Needed.  0   • nystatin (MYCOSTATIN) 011524 UNIT/GM ointment Apply  topically to the appropriate area as directed 2 (Two) Times a Day. 30 g 0   • nystatin (MYCOSTATIN) 022968 UNIT/ML suspension Take 5 mL by mouth 4 (Four) Times a Day. 473 mL 0   • Probiotic Product (PROBIOTIC DAILY PO) Take 1 tablet by mouth Daily.     • promethazine (PHENERGAN) 25 MG tablet Take 25 mg by mouth Every 8 (Eight) Hours As Needed for Nausea or Vomiting.     • tiZANidine (ZANAFLEX) 4 MG tablet Every 12 (Twelve) Hours.     • TOUJEO SOLOSTAR 300 UNIT/ML solution pen-injector Inject 20 Units under the skin into the appropriate area as directed Daily.  0   • bumetanide (BUMEX) 1 MG tablet Take 1 tablet by mouth Daily With Breakfast. 30 tablet 0   • HUMALOG KWIKPEN 100 UNIT/ML solution pen-injector Inject 16 Units under the skin into the appropriate area as directed 3 (Three) Times a Day With Meals. Max 56 units daily  0   • hydrOXYzine (ATARAX) 25 MG tablet Take 1 tablet by mouth 3 (Three) Times a Day As Needed for Itching. 12 tablet 0   • rOPINIRole (REQUIP) 0.25 MG tablet Take 0.25 mg by mouth Every Night. Take 1-3 hours before bedtime.     • SYMBICORT 160-4.5 MCG/ACT inhaler Inhale 2 puffs 2 (Two) Times a Day.  0   • azithromycin (ZITHROMAX) 250 MG tablet Take 2 tablets the first day, then 1 tablet daily for 4 days. 6 tablet 0   • cephalexin (KEFLEX) 250 MG capsule Take 1 capsule by mouth 3 (Three) Times a Day. 30 capsule 0   • cephalexin (KEFLEX) 500 MG capsule Take 1 capsule by mouth 3 (Three) Times a Day. 30 capsule 0   • Fluticasone-Umeclidin-Vilant (TRELEGY ELLIPTA) 100-62.5-25 MCG/INH aerosol powder  Inhale 1 puff Daily.     • ipratropium-albuterol (DUO-NEB) 0.5-2.5 mg/3 ml nebulizer Take 3 mL by nebulization 4 (Four) Times a Day. 360 mL 1     No facility-administered medications prior to visit.        Patient Active Problem List   Diagnosis   • Dyslipidemia   • Hypertension    • Anxiety (Chronic benzos)   • Insomnia   • GERD (gastroesophageal reflux disease)   • Diabetes mellitus (CMS/HCC)   • Fibromyalgia   • RLS (restless legs syndrome)   • Migraines   • Chronic asthmatic bronchitis (CMS/[facility])   • Interstitial cystitis   • History of acute myocardial infarction   • History of cardiac murmur   • History of stroke   • CAD (coronary artery disease)   • Essential hypertension   • CKD (chronic kidney disease) stage 2, GFR 60-89 ml/min   • Bilateral carotid artery stenosis   • Chronic respiratory failure with hypoxia and hypercapnia (CMS/[facility])   • Obstructive sleep apnea   • Obesity (BMI 30-39.9)   • Diabetes mellitus type 2 in obese (CMS/[facility])   • Obesity hypoventilation syndrome (CMS/[facility])   • Tobacco use   • Chronic pain (Chronic narcotics)   • Recurrent UTI/interstitial cystitis on chronic methenamine   • Clostridium difficile colitis diagnosed September 2018. Persistent diarrhea despite oral vancomycin   • Demand ischemia (CMS/[facility])   • Hypoxemia requiring supplemental oxygen   • Stenosis of carotid artery   • Occlusion and stenosis of left carotid artery    • Chronic gout with tophus   • Depression   • Edema   • Restless leg syndrome   • Asthma   • Wellness examination   • Encounter for screening mammogram for malignant neoplasm of breast    • Seasonal allergies       Advanced Care Planning:  Patient does not have an advance directive - additional information requested. Referral to advance care planning placed    Review of Systems   Constitutional: Negative.    HENT: Negative.    Eyes: Negative.    Respiratory: Negative.    Cardiovascular: Negative.    Gastrointestinal: Negative.    Endocrine: Negative.    Genitourinary: Negative.    Musculoskeletal: Negative.    Skin: Negative.    Allergic/Immunologic: Negative.    Neurological: Negative.    Hematological: Negative.    Psychiatric/Behavioral: Negative.    All other systems reviewed and are negative.      Compared to one year ago, the  "patient feels her physical health is the same.  Compared to one year ago, the patient feels her mental health is the same.    Reviewed chart for potential of high risk medication in the elderly: yes  Reviewed chart for potential of harmful drug interactions in the elderly:yes    Objective         Vitals:    10/28/19 1328   BP: 140/65   BP Location: Right arm   Patient Position: Sitting   Cuff Size: Adult   Pulse: 66   Resp: 19   Temp: 98.3 °F (36.8 °C)   TempSrc: Oral   SpO2: 93%   Weight: 94.7 kg (208 lb 12.8 oz)   Height: 160 cm (62.99\")       Body mass index is 37 kg/m².  Discussed the patient's BMI with her. The BMI is above average; BMI management plan is completed.    Physical Exam   Constitutional: She appears well-developed and well-nourished.   HENT:   Head: Normocephalic and atraumatic.   Right Ear: External ear normal.   Left Ear: External ear normal.   Mouth/Throat: Oropharynx is clear and moist.   Neck: Normal range of motion.   Cardiovascular: Normal rate, regular rhythm and normal heart sounds. Exam reveals no gallop and no friction rub.   No murmur heard.  Pulmonary/Chest: Effort normal and breath sounds normal. No stridor. No respiratory distress. She has no wheezes. She has no rales.   Abdominal: Soft. Bowel sounds are normal. She exhibits no distension. There is no tenderness. There is no guarding.   Musculoskeletal: Normal range of motion.   Neurological: She is alert. She displays normal reflexes. No cranial nerve deficit.   Skin: Skin is warm.   Psychiatric: She has a normal mood and affect. Her behavior is normal. Judgment and thought content normal.   Nursing note and vitals reviewed.            Assessment/Plan   Medicare Risks and Personalized Health Plan  CMS Preventative Services Quick Reference  Advance Directive Discussion    The above risks/problems have been discussed with the patient.  Pertinent information has been shared with the patient in the After Visit Summary.  Follow up plans " and orders are seen below in the Assessment/Plan Section.    Diagnoses and all orders for this visit:    1. Medicare annual wellness visit, subsequent (Primary)    2. Type 2 diabetes mellitus with other specified complication, with long-term current use of insulin (CMS/formerly [facility])  -     CBC & Differential  -     Comprehensive Metabolic Panel  -     Hemoglobin A1c  -     Lipid Panel  -     MicroAlbumin, Urine, Random - Urine, Clean Catch  -     POC Urinalysis Dipstick, Automated; Future  -     TSH  -     pneumococcal polysaccharide 23-valent (PNEUMOVAX-23) vaccine 0.5 mL  -     influenza vac split quad (FLUZONE,FLUARIX,AFLURIA) injection 0.5 mL    3. Occlusion and stenosis of left carotid artery   -     US Carotid Bilateral; Future    4. Stenosis of carotid artery, unspecified laterality  -     US Carotid Bilateral; Future    5. Chronic gout with tophus, unspecified cause, unspecified site  -     Sedimentation Rate  -     Uric acid    6. Depression, unspecified depression type  -     FLUoxetine (PROZAC) 10 MG capsule; Take 1 capsule by mouth Daily.  Dispense: 90 capsule; Refill: 3  -     Ambulatory Referral to Psychiatry    7. Edema, unspecified type  -     bumetanide (BUMEX) 1 MG tablet; Take 1 tablet by mouth Daily With Breakfast.  Dispense: 90 tablet; Refill: 3    8. Restless leg syndrome  -     rOPINIRole (REQUIP) 0.25 MG tablet; Take 1 tablet by mouth Every Night. Take 1-3 hours before bedtime.  Dispense: 90 tablet; Refill: 3    9. Asthma, unspecified asthma severity, unspecified whether complicated, unspecified whether persistent  -     SYMBICORT 160-4.5 MCG/ACT inhaler; Inhale 2 puffs 2 (Two) Times a Day.  Dispense: 10.2 g; Refill: 6    10. Wellness examination  -     Mammo Screening Bilateral With CAD; Future    11. Encounter for screening mammogram for malignant neoplasm of breast   -     Mammo Screening Bilateral With CAD; Future    12. Seasonal allergies  -     Ambulatory Referral to Allergy    Other orders  -      hydrOXYzine (ATARAX) 50 MG tablet; Take 1 tablet by mouth Every 8 (Eight) Hours As Needed for Itching.  Dispense: 30 tablet; Refill: 3      Follow Up:  No Follow-up on file.     An After Visit Summary and PPPS were given to the patient.

## 2019-10-28 NOTE — PROGRESS NOTES
"Subjective   Vidhi Lopez is a 69 y.o. female.     Chief Complaint   Patient presents with   • Annual Exam     pt is fasting        BS as home \" good\" 110, Hx of L carotid stent years ago, had a stroke before and gout, also CAD, Gout, Interstitial cystitis and cellulitis, BP at home better, seen by Amelie, nephrologist and Neuro,x seizures, and RLS, and Hx of C diff and asthma, and smoker, no CP/SOA, colonoscopy a year ago normal      Diabetes   She presents for her follow-up diabetic visit. She has type 2 diabetes mellitus. The initial diagnosis of diabetes was made 4 years ago. Her disease course has been stable. There are no hypoglycemic associated symptoms. There are no diabetic associated symptoms. There are no hypoglycemic complications. Symptoms are stable. There are no diabetic complications. Risk factors for coronary artery disease include diabetes mellitus and hypertension. When asked about current treatments, none were reported. She is following a diabetic diet.      Discussed with patient all advanced directives, including but not limited to no smoking, no alcohol, no drugs, no sex, also discussed with patient healthy diet including fruits and vegetables, also dental care, eye care, and helmet while riding a bicycle, seat belt, etc.    The following portions of the patient's history were reviewed and updated as appropriate: allergies, current medications, past family history, past medical history, past social history, past surgical history and problem list.    Past Medical History:   Diagnosis Date   • Allergic rhinitis    • Asthma with COPD (CMS/[facility])     Asthma/COPD   • Bilateral ovarian cysts    • Coronary artery disease    • Depression with anxiety     Depression/Anxiety   • Diabetes (CMS/[facility])     pre   • Endometriosis     Dr. Jin; estradiol    • ESR raised 3/20/2018   • Fatigue    • Fibromyalgia    • Headache     Headaches   • High cholesterol    • History of gallstones    • History of myocardial " infarction    • Hypertension    • Influenza B     DX'D 2/6/2018, TREATED WITH TAMIFLU. LAST DOSE 2/11/2018 AM.  PATIENT STATES DR FLORES IS AWARE. DENIES FEVERS OR CHILLS.   • Interstitial cystitis    • On home oxygen therapy     2 L NC   • URIEL on CPAP    • PONV (postoperative nausea and vomiting)    • Seizure disorder, nonconvulsive, with status epilepticus (CMS/[facility]) 3/20/2018   • Septic joint of right knee joint (CMS/[facility]) 3/21/2018   • Shortness of breath on exertion    • Stroke (CMS/[facility])     X1 8 YEARS AGO, GENERALIZED UPPER BODY WEAKNESS RESIDUAL PER PT    • Thyroid disorder    • Urinary leakage    • UTI (urinary tract infection)    • Wears glasses    • Yeast dermatitis        Past Surgical History:   Procedure Laterality Date   • ARTERIOGRAM N/A 2/12/2018    Procedure: Renal Arteriogram;  Surgeon: Lito Flores MD;  Location:  [facility] CATH INVASIVE LOCATION;  Service:    • BREAST BIOPSY Right 1991   • CARDIAC CATHETERIZATION N/A 2/12/2018    Procedure: Right Heart Cath;  Surgeon: Lito Flores MD;  Location:  [facility] CATH INVASIVE LOCATION;  Service:    • CAROTID STENT      Transcath    • CAROTID STENT Left    • CHOLECYSTECTOMY     • CYSTOSTOMY W/ BLADDER BIOPSY     • DILATATION AND CURETTAGE     • KNEE ARTHROSCOPY Right 3/23/2018    Procedure: ARTHROSCOPY, RIGHT KNEE  INCISION AND DRAINAGE LOWER EXTREMITY WITH SYNOVIAL BIOPSY;  Surgeon: Dilip Giron MD;  Location:  [facility] OR;  Service: Orthopedics   • LAPAROSCOPIC CHOLECYSTECTOMY  1994    Lap rolando   • OOPHORECTOMY     • PAP SMEAR  05/10/2016   • PARTIAL HYSTERECTOMY         Family History   Problem Relation Age of Onset   • Cancer Other    • Diabetes Other    • Heart attack Other    • Hyperlipidemia Other    • Hypertension Other    • Osteoporosis Other    • Stroke Other    • Breast cancer Mother    • Osteoporosis Mother    • Colon cancer Father        Social History     Socioeconomic History   • Marital status:      Spouse name: Not on file   •  Number of children: Not on file   • Years of education: Not on file   • Highest education level: Not on file   Tobacco Use   • Smoking status: Current Every Day Smoker     Packs/day: 0.25     Years: 40.00     Pack years: 10.00     Types: Cigarettes   • Smokeless tobacco: Never Used   • Tobacco comment: in process of quiting--AVERAGE 1 PPD, DOWN TO 6 CIG PER DAY X2 WEEKS    Substance and Sexual Activity   • Alcohol use: Yes     Alcohol/week: 0.6 oz     Types: 1 Glasses of wine per week     Comment: occasional   • Drug use: No   • Sexual activity: Defer   Social History Narrative    Lives alone.        Review of Systems    Objective   Vitals:    10/28/19 1328   BP: 140/65   Pulse: 66   Resp: 19   Temp: 98.3 °F (36.8 °C)   SpO2: 93%     Body mass index is 37 kg/m².  Physical Exam      Assessment/Plan   There are no diagnoses linked to this encounter.

## 2019-10-29 LAB
ALBUMIN SERPL-MCNC: 3.9 G/DL (ref 3.6–4.8)
ALBUMIN/GLOB SERPL: 1.6 {RATIO} (ref 1.2–2.2)
ALP SERPL-CCNC: 120 IU/L (ref 39–117)
ALT SERPL-CCNC: 16 IU/L (ref 0–32)
AST SERPL-CCNC: 12 IU/L (ref 0–40)
BASOPHILS # BLD AUTO: 0 X10E3/UL (ref 0–0.2)
BASOPHILS NFR BLD AUTO: 0 %
BILIRUB SERPL-MCNC: 0.2 MG/DL (ref 0–1.2)
BUN SERPL-MCNC: 19 MG/DL (ref 8–27)
BUN/CREAT SERPL: 20 (ref 12–28)
CALCIUM SERPL-MCNC: 10 MG/DL (ref 8.7–10.3)
CHLORIDE SERPL-SCNC: 97 MMOL/L (ref 96–106)
CHOLEST SERPL-MCNC: 191 MG/DL (ref 100–199)
CO2 SERPL-SCNC: 30 MMOL/L (ref 20–29)
CREAT SERPL-MCNC: 0.95 MG/DL (ref 0.57–1)
EOSINOPHIL # BLD AUTO: 0.4 X10E3/UL (ref 0–0.4)
EOSINOPHIL NFR BLD AUTO: 4 %
ERYTHROCYTE [DISTWIDTH] IN BLOOD BY AUTOMATED COUNT: 15 % (ref 12.3–15.4)
ERYTHROCYTE [SEDIMENTATION RATE] IN BLOOD BY WESTERGREN METHOD: 16 MM/HR (ref 0–40)
GLOBULIN SER CALC-MCNC: 2.5 G/DL (ref 1.5–4.5)
GLUCOSE SERPL-MCNC: 174 MG/DL (ref 65–99)
HBA1C MFR BLD: 8 % (ref 4.8–5.6)
HCT VFR BLD AUTO: 35.1 % (ref 34–46.6)
HDLC SERPL-MCNC: 38 MG/DL
HGB BLD-MCNC: 11.3 G/DL (ref 11.1–15.9)
IMM GRANULOCYTES # BLD AUTO: 0 X10E3/UL (ref 0–0.1)
IMM GRANULOCYTES NFR BLD AUTO: 0 %
LDLC SERPL CALC-MCNC: 93 MG/DL (ref 0–99)
LYMPHOCYTES # BLD AUTO: 1.5 X10E3/UL (ref 0.7–3.1)
LYMPHOCYTES NFR BLD AUTO: 16 %
MCH RBC QN AUTO: 30.8 PG (ref 26.6–33)
MCHC RBC AUTO-ENTMCNC: 32.2 G/DL (ref 31.5–35.7)
MCV RBC AUTO: 96 FL (ref 79–97)
MICROALBUMIN UR-MCNC: 73.3 UG/ML
MONOCYTES # BLD AUTO: 0.5 X10E3/UL (ref 0.1–0.9)
MONOCYTES NFR BLD AUTO: 5 %
NEUTROPHILS # BLD AUTO: 6.9 X10E3/UL (ref 1.4–7)
NEUTROPHILS NFR BLD AUTO: 75 %
PLATELET # BLD AUTO: 202 X10E3/UL (ref 150–450)
POTASSIUM SERPL-SCNC: 4.8 MMOL/L (ref 3.5–5.2)
PROT SERPL-MCNC: 6.4 G/DL (ref 6–8.5)
RBC # BLD AUTO: 3.67 X10E6/UL (ref 3.77–5.28)
SODIUM SERPL-SCNC: 142 MMOL/L (ref 134–144)
TRIGL SERPL-MCNC: 298 MG/DL (ref 0–149)
TSH SERPL DL<=0.005 MIU/L-ACNC: 1.87 UIU/ML (ref 0.45–4.5)
URATE SERPL-MCNC: 9.4 MG/DL (ref 2.5–7.1)
VLDLC SERPL CALC-MCNC: 60 MG/DL (ref 5–40)
WBC # BLD AUTO: 9.3 X10E3/UL (ref 3.4–10.8)

## 2019-10-30 DIAGNOSIS — I65.22 OCCLUSION AND STENOSIS OF LEFT CAROTID ARTERY: Primary | ICD-10-CM

## 2019-10-30 DIAGNOSIS — M10.9 GOUT, UNSPECIFIED CAUSE, UNSPECIFIED CHRONICITY, UNSPECIFIED SITE: Primary | ICD-10-CM

## 2019-10-30 RX ORDER — INSULIN GLARGINE 300 U/ML
INJECTION, SOLUTION SUBCUTANEOUS
Qty: 1.5 ML | Refills: 6 | Status: ON HOLD | OUTPATIENT
Start: 2019-10-30 | End: 2022-06-22

## 2019-10-30 RX ORDER — ALLOPURINOL 100 MG/1
100 TABLET ORAL DAILY
Qty: 90 TABLET | Refills: 1 | Status: SHIPPED | OUTPATIENT
Start: 2019-10-30 | End: 2020-04-27

## 2019-11-01 ENCOUNTER — TELEPHONE (OUTPATIENT)
Dept: FAMILY MEDICINE CLINIC | Facility: CLINIC | Age: 69
End: 2019-11-01

## 2019-11-01 NOTE — TELEPHONE ENCOUNTER
Pt ask that you return her call.   1. She would like to know her xray results.  2. She has questions regarding the medications she was prescribed.  3. She would like her medications resent to [facility] on [address].

## 2019-11-04 ENCOUNTER — TELEPHONE (OUTPATIENT)
Dept: FAMILY MEDICINE CLINIC | Facility: CLINIC | Age: 69
End: 2019-11-04

## 2019-11-05 NOTE — TELEPHONE ENCOUNTER
Patient called you back. Also, she wanted to let you know that she has been taking the hydroxyzine 3 times a day because her itching is so bad.  There was only 30 pills in the bottle for 30 days.  She says she will run out of medication before the month is up and wants to know if the prescription could be fixed.  Her phone number is [phone].

## 2019-11-07 DIAGNOSIS — L29.9 ITCHING: Primary | ICD-10-CM

## 2019-11-07 RX ORDER — HYDROXYZINE 50 MG/1
50 TABLET, FILM COATED ORAL EVERY 8 HOURS PRN
Qty: 90 TABLET | Refills: 2 | Status: SHIPPED | OUTPATIENT
Start: 2019-11-07 | End: 2020-02-26 | Stop reason: SDUPTHER

## 2019-11-08 ENCOUNTER — TELEPHONE (OUTPATIENT)
Dept: FAMILY MEDICINE CLINIC | Facility: CLINIC | Age: 69
End: 2019-11-08

## 2019-11-08 DIAGNOSIS — R11.0 NAUSEA: Primary | ICD-10-CM

## 2019-11-08 RX ORDER — ONDANSETRON HYDROCHLORIDE 8 MG/1
8 TABLET, FILM COATED ORAL EVERY 8 HOURS PRN
Qty: 30 TABLET | Refills: 1 | Status: SHIPPED | OUTPATIENT
Start: 2019-11-08 | End: 2020-02-28

## 2019-11-08 NOTE — TELEPHONE ENCOUNTER
Zofran 8 mg to take it every 8 hours as needed sent, please ask her if she wants to try Uloric instead of allopurinol then

## 2019-11-08 NOTE — TELEPHONE ENCOUNTER
Pt is requesting the following medication, Zofran or Phenergan. Pt stated the Allopurinol makes her nauseous.

## 2019-11-18 NOTE — PROGRESS NOTES
"  OFFICE FOLLOW UP     Date of Encounter:2017     Name: Vidhi Lopez  : 1950  Address: [facility] [address] [place] [address]  Phone: [phone]    PCP: Michael Mays MD  [address]  [facility] [address]    Vidhi Lopez is a 67 y.o. female.    Chief Complaint: f/u HTN/ LE edema     Problem List:   1. Carotid artery disease:  a. Remote CVA, IDB.  b. Left hemispheric TIA, 2011.  c. Emergent LICA stent, 2011.  d. \"Last\" DUS negative,  (SJE).  2. History of myocardial infarction, remote IDB:  a. Normal nuclear myocardial perfusion study, 2011.  b. Echocardiogram,  revealing normal EF.  3. Hypertension.  4. Tobacco use.  5. Diabetes mellitus type 2.  6. Obesity.  7. Dyslipidemia.  8. Sinus arrhythmia.  9. Fibromyalgia.  10. Renal insufficiency, felt secondary to NSAIDS.  11. Interstitial cystitis.  12. Gastroesophageal reflux disease.  13. Status post operations, remote.  14. Anxiety disorder.  15. \"New onset\" lower extremity edema, May 2015:      a. Treated with Lasix 20 mg daily with resolution.      b. Recurrent LE edema summer 2017       Allergies:  Allergies   Allergen Reactions   • Amlodipine Swelling   • Reglan [Metoclopramide]        Current Medications:  · ASA 81mg daily  •  ALPRAZolam  0.5 MG tablet,  2 Times a Day As Needed  •  atorvastatin  80 MG tablet, daily   •  clotrimazole-betamethasone 1-0.05 % cream, Apply  topically 2 Times a Day.  •  furosemide 20 MG tablet, Take 1 tablet by mouth Every Other Day. (Patient taking differently: Take 20 mg by mouth Daily.)  •  gabapentin 800 MG tablet, 2  times a day.  •  linagliptin 5 MG tablet tablet, daily  •  metFORMIN 500 MG tablet,  2 Times a Day With Meals  •  methenamine 1 G tablet,  2 Times a Day With Meals.  •  metoprolol succinate XL 50 MG 24 hr tablet,  2 Times a Day  •  PREMARIN 0.625 MG/GM vaginal cream, apply vaginally two times a week as directed  •  Promethazine 25 MG tablet,  Every 6 " "Hours As Needed for Nausea or Vomiting.  •  QUEtiapine 25 MG tablet,  every night  •  terconazole 0.8 % vaginal cream, Every Night.  •  tiZANidine 4 MG tablet, Take 2 mg by mouth 2 Times a Day As Needed  •  triamterene-hydrochlorothiazide 37.5-25 MG per tablet,  Daily    History of Present Illness:           Mrs. Lopez presents today for 6 month follow up. Since her last visit, she reports she has had some peripheral edema in her bilateral lower extremities x6 weeks. She has [facility], her endocrinologist as well as Neurology, and she continues to have the same problem. Her labs have been within normal limits. She saw neurology for tremors, and incoordination, manifested by dropping of objects. An MRI brain did not show any acute abnormalities, and an MRA Head and Neck as well as echocardiogram was ordered. She has been taking Lasix 20mg daily and notes this has not helped with her symptoms. She also has had some shortness of breath, which she states is at baseline for her, and she usually uses 2 different inhalers, however today forgot one of her inhalers. She denies any chest discomfort, palpitations or syncope. She notes her BP has been running low at home, and Nephrology discontinue her Lisinopril. She has not had any focal neurological changes.  She continues to smoke about half a pack per day.     The following portions of the patient's history were reviewed and updated as appropriate: allergies, current medications and problem list.    HPI: Pertinent positives as listed in the HPI.  All other systems reviewed and negative.    Objective:    Vitals:    07/25/17 1320   BP: 90/62   BP Location: Right arm   Patient Position: Sitting   Pulse: 73   SpO2: (!) 88%   Weight: 221 lb 12.8 oz (101 kg)   Height: 64\" (162.6 cm)       Physical Exam:  GENERAL: Alert, cooperative, in no acute distress.   HEENT: Fundoscopic deferred, otherwise unremarkable.  NECK: No Jugular venous distention, adenopathy, or " thyromegaly noted.   HEART: Regular rhythm, normal rate, and no murmurs, gallops, or rubs.   LUNGS: Diminished breath sounds. No wheezing, rales or ronchi.  NEUROLOGIC: No focal abnormalities involving strength or sensation are noted.   EXTREMITIES: No clubbing, cyanosis. Bilateral lower extremity mild-moderate edema, mostly localized to ankles and feet, with mild erythema and no warmth.     Diagnostic Data:    Lab Results   Component Value Date    WBC 11.14 (H) 03/15/2017    HGB 14.3 03/15/2017    HCT 45.4 (H) 03/15/2017    MCV 98.3 03/15/2017     03/15/2017     Lab Results   Component Value Date    GLUCOSE 146 (H) 04/28/2017    CALCIUM 10.3 04/28/2017     04/28/2017    K 4.3 04/28/2017    CO2 32.0 (H) 04/28/2017    CL 98 (L) 04/28/2017    BUN 25 (H) 04/28/2017    CREATININE 1.00 04/28/2017    EGFRIFNONA 55 (L) 04/28/2017    BCR 25.0 04/28/2017    ANIONGAP 8.0 04/28/2017     Lab Results   Component Value Date    HGBA1C 7.60 (H) 03/15/2017     TSH 7/20/17: 1.250, Free T4: 0.97    Lipid Panel 3/15/17: , HDL 44, , Trig 241    Procedures: N/A      Assessment and Plan:    1. Carotid artery disease: stable with no recent focal neurological findings. MRA Head and neck as well as carotid duplex ordered by Neurology, have not been performed yet. Continue ASA and will change Lipitor to Crestor 40mg due to . She was encouraged to stop smoking.   2. Lower extremity edema: has been seen and evaluated by Nephrology. I doubt this is related to heart failure, however, an echocardiogram has already been ordered by Neurology and this will be reviewed once results available. Obtain BMP and BNP today. She attributes her low O2 sat today to not using one of her inhalers this am.   3. HTN: controlled.  4. Continue current medical regimen and follow up in 6 months or sooner as needed.     Felicity Quarles PA-C  3:20 PM  07/25/17                X Size Of Lesion In Cm (Optional): 0.6

## 2019-12-04 ENCOUNTER — HOSPITAL ENCOUNTER (INPATIENT)
Facility: HOSPITAL | Age: 69
LOS: 3 days | Discharge: NURSING, REHAB OR LONG-TERM CARE | End: 2019-12-09
Attending: HOSPITALIST | Admitting: HOSPITALIST

## 2019-12-04 DIAGNOSIS — S32.010A CLOSED WEDGE COMPRESSION FRACTURE OF FIRST LUMBAR VERTEBRA, INITIAL ENCOUNTER: Primary | ICD-10-CM

## 2019-12-04 DIAGNOSIS — Z79.899 POLYPHARMACY: ICD-10-CM

## 2019-12-04 DIAGNOSIS — S22.39XA CLOSED FRACTURE OF ONE RIB, UNSPECIFIED LATERALITY, INITIAL ENCOUNTER: ICD-10-CM

## 2019-12-05 ENCOUNTER — APPOINTMENT (OUTPATIENT)
Dept: CT IMAGING | Facility: HOSPITAL | Age: 69
End: 2019-12-05

## 2019-12-05 ENCOUNTER — HOSPITAL ENCOUNTER (OUTPATIENT)
Dept: MAMMOGRAPHY | Facility: HOSPITAL | Age: 69
End: 2019-12-05

## 2019-12-05 ENCOUNTER — APPOINTMENT (OUTPATIENT)
Dept: MRI IMAGING | Facility: HOSPITAL | Age: 69
End: 2019-12-05

## 2019-12-05 PROBLEM — E78.5 HYPERLIPIDEMIA: Status: ACTIVE | Noted: 2019-12-05

## 2019-12-05 PROBLEM — G40.909 SEIZURE DISORDER (HCC): Status: ACTIVE | Noted: 2019-12-05

## 2019-12-05 PROBLEM — S32.019A L1 VERTEBRAL FRACTURE (HCC): Status: ACTIVE | Noted: 2019-12-05

## 2019-12-05 PROBLEM — S22.39XA RIB FRACTURE: Status: ACTIVE | Noted: 2019-12-05

## 2019-12-05 LAB
ANION GAP SERPL CALCULATED.3IONS-SCNC: 13.6 MMOL/L (ref 5–15)
BUN BLD-MCNC: 14 MG/DL (ref 8–23)
BUN/CREAT SERPL: 16.7 (ref 7–25)
C DIFF TOX GENS STL QL NAA+PROBE: NEGATIVE
CALCIUM SPEC-SCNC: 9.1 MG/DL (ref 8.6–10.5)
CHLORIDE SERPL-SCNC: 98 MMOL/L (ref 98–107)
CO2 SERPL-SCNC: 30.4 MMOL/L (ref 22–29)
CREAT BLD-MCNC: 0.84 MG/DL (ref 0.57–1)
DEPRECATED RDW RBC AUTO: 39.4 FL (ref 37–54)
ERYTHROCYTE [DISTWIDTH] IN BLOOD BY AUTOMATED COUNT: 12.5 % (ref 12.3–15.4)
GFR SERPL CREATININE-BSD FRML MDRD: 67 ML/MIN/1.73
GLUCOSE BLD-MCNC: 119 MG/DL (ref 65–99)
GLUCOSE BLDC GLUCOMTR-MCNC: 116 MG/DL (ref 70–130)
GLUCOSE BLDC GLUCOMTR-MCNC: 129 MG/DL (ref 70–130)
GLUCOSE BLDC GLUCOMTR-MCNC: 149 MG/DL (ref 70–130)
GLUCOSE BLDC GLUCOMTR-MCNC: 155 MG/DL (ref 70–130)
HCT VFR BLD AUTO: 34.5 % (ref 34–46.6)
HGB BLD-MCNC: 11.5 G/DL (ref 12–15.9)
MCH RBC QN AUTO: 28.8 PG (ref 26.6–33)
MCHC RBC AUTO-ENTMCNC: 33.3 G/DL (ref 31.5–35.7)
MCV RBC AUTO: 86.3 FL (ref 79–97)
PLATELET # BLD AUTO: 213 10*3/MM3 (ref 140–450)
PMV BLD AUTO: 9.3 FL (ref 6–12)
POTASSIUM BLD-SCNC: 3.8 MMOL/L (ref 3.5–5.2)
RBC # BLD AUTO: 4 10*6/MM3 (ref 3.77–5.28)
SODIUM BLD-SCNC: 142 MMOL/L (ref 136–145)
WBC NRBC COR # BLD: 7.45 10*3/MM3 (ref 3.4–10.8)

## 2019-12-05 PROCEDURE — 0 GADOBENATE DIMEGLUMINE 529 MG/ML SOLUTION: Performed by: HOSPITALIST

## 2019-12-05 PROCEDURE — 85027 COMPLETE CBC AUTOMATED: CPT | Performed by: NURSE PRACTITIONER

## 2019-12-05 PROCEDURE — 99214 OFFICE O/P EST MOD 30 MIN: CPT | Performed by: NURSE PRACTITIONER

## 2019-12-05 PROCEDURE — A9577 INJ MULTIHANCE: HCPCS | Performed by: HOSPITALIST

## 2019-12-05 PROCEDURE — 70553 MRI BRAIN STEM W/O & W/DYE: CPT

## 2019-12-05 PROCEDURE — 25010000002 LORAZEPAM PER 2 MG: Performed by: PSYCHIATRY & NEUROLOGY

## 2019-12-05 PROCEDURE — 99204 OFFICE O/P NEW MOD 45 MIN: CPT | Performed by: PSYCHIATRY & NEUROLOGY

## 2019-12-05 PROCEDURE — 87493 C DIFF AMPLIFIED PROBE: CPT | Performed by: HOSPITALIST

## 2019-12-05 PROCEDURE — 25010000002 ONDANSETRON PER 1 MG: Performed by: NURSE PRACTITIONER

## 2019-12-05 PROCEDURE — G0378 HOSPITAL OBSERVATION PER HR: HCPCS

## 2019-12-05 PROCEDURE — 80048 BASIC METABOLIC PNL TOTAL CA: CPT | Performed by: NURSE PRACTITIONER

## 2019-12-05 PROCEDURE — 82962 GLUCOSE BLOOD TEST: CPT

## 2019-12-05 PROCEDURE — 70450 CT HEAD/BRAIN W/O DYE: CPT

## 2019-12-05 PROCEDURE — 63710000001 INSULIN LISPRO (HUMAN) PER 5 UNITS: Performed by: NURSE PRACTITIONER

## 2019-12-05 PROCEDURE — 94799 UNLISTED PULMONARY SVC/PX: CPT

## 2019-12-05 RX ORDER — METOPROLOL TARTRATE 50 MG/1
50 TABLET, FILM COATED ORAL EVERY 12 HOURS SCHEDULED
Status: DISCONTINUED | OUTPATIENT
Start: 2019-12-05 | End: 2019-12-09 | Stop reason: HOSPADM

## 2019-12-05 RX ORDER — DEXTROSE MONOHYDRATE 25 G/50ML
25 INJECTION, SOLUTION INTRAVENOUS
Status: DISCONTINUED | OUTPATIENT
Start: 2019-12-05 | End: 2019-12-09 | Stop reason: HOSPADM

## 2019-12-05 RX ORDER — SODIUM CHLORIDE 0.9 % (FLUSH) 0.9 %
10 SYRINGE (ML) INJECTION EVERY 12 HOURS SCHEDULED
Status: DISCONTINUED | OUTPATIENT
Start: 2019-12-05 | End: 2019-12-09 | Stop reason: HOSPADM

## 2019-12-05 RX ORDER — SODIUM CHLORIDE 0.9 % (FLUSH) 0.9 %
10 SYRINGE (ML) INJECTION AS NEEDED
Status: DISCONTINUED | OUTPATIENT
Start: 2019-12-05 | End: 2019-12-09 | Stop reason: HOSPADM

## 2019-12-05 RX ORDER — ACETAMINOPHEN 325 MG/1
650 TABLET ORAL EVERY 4 HOURS PRN
Status: DISCONTINUED | OUTPATIENT
Start: 2019-12-05 | End: 2019-12-09 | Stop reason: HOSPADM

## 2019-12-05 RX ORDER — CLOPIDOGREL BISULFATE 75 MG/1
75 TABLET ORAL DAILY
Status: DISCONTINUED | OUTPATIENT
Start: 2019-12-05 | End: 2019-12-05

## 2019-12-05 RX ORDER — LIDOCAINE 50 MG/G
1 PATCH TOPICAL
Status: DISCONTINUED | OUTPATIENT
Start: 2019-12-05 | End: 2019-12-09 | Stop reason: HOSPADM

## 2019-12-05 RX ORDER — CALCIUM CARBONATE 200(500)MG
2 TABLET,CHEWABLE ORAL 2 TIMES DAILY PRN
Status: DISCONTINUED | OUTPATIENT
Start: 2019-12-05 | End: 2019-12-09 | Stop reason: HOSPADM

## 2019-12-05 RX ORDER — LORAZEPAM 2 MG/ML
1 INJECTION INTRAMUSCULAR ONCE
Status: DISCONTINUED | OUTPATIENT
Start: 2019-12-05 | End: 2019-12-08

## 2019-12-05 RX ORDER — ALLOPURINOL 100 MG/1
100 TABLET ORAL DAILY
Status: DISCONTINUED | OUTPATIENT
Start: 2019-12-05 | End: 2019-12-09 | Stop reason: HOSPADM

## 2019-12-05 RX ORDER — LEVETIRACETAM 500 MG/1
1500 TABLET ORAL 2 TIMES DAILY
Status: DISCONTINUED | OUTPATIENT
Start: 2019-12-05 | End: 2019-12-09 | Stop reason: HOSPADM

## 2019-12-05 RX ORDER — ONDANSETRON 2 MG/ML
4 INJECTION INTRAMUSCULAR; INTRAVENOUS EVERY 6 HOURS PRN
Status: DISCONTINUED | OUTPATIENT
Start: 2019-12-05 | End: 2019-12-09 | Stop reason: HOSPADM

## 2019-12-05 RX ORDER — LORAZEPAM 2 MG/ML
1 INJECTION INTRAMUSCULAR ONCE
Status: COMPLETED | OUTPATIENT
Start: 2019-12-05 | End: 2019-12-05

## 2019-12-05 RX ORDER — ATORVASTATIN CALCIUM 80 MG/1
80 TABLET, FILM COATED ORAL DAILY
Status: DISCONTINUED | OUTPATIENT
Start: 2019-12-05 | End: 2019-12-09 | Stop reason: HOSPADM

## 2019-12-05 RX ORDER — SODIUM CHLORIDE 9 MG/ML
100 INJECTION, SOLUTION INTRAVENOUS CONTINUOUS
Status: DISCONTINUED | OUTPATIENT
Start: 2019-12-05 | End: 2019-12-08

## 2019-12-05 RX ORDER — HYDROXYZINE 50 MG/1
50 TABLET, FILM COATED ORAL EVERY 8 HOURS PRN
Status: DISCONTINUED | OUTPATIENT
Start: 2019-12-05 | End: 2019-12-09 | Stop reason: HOSPADM

## 2019-12-05 RX ORDER — ACETAMINOPHEN 650 MG/1
650 SUPPOSITORY RECTAL EVERY 4 HOURS PRN
Status: DISCONTINUED | OUTPATIENT
Start: 2019-12-05 | End: 2019-12-09 | Stop reason: HOSPADM

## 2019-12-05 RX ORDER — NICOTINE POLACRILEX 4 MG
15 LOZENGE BUCCAL
Status: DISCONTINUED | OUTPATIENT
Start: 2019-12-05 | End: 2019-12-09 | Stop reason: HOSPADM

## 2019-12-05 RX ORDER — BUDESONIDE AND FORMOTEROL FUMARATE DIHYDRATE 160; 4.5 UG/1; UG/1
2 AEROSOL RESPIRATORY (INHALATION)
Status: DISCONTINUED | OUTPATIENT
Start: 2019-12-05 | End: 2019-12-09 | Stop reason: HOSPADM

## 2019-12-05 RX ORDER — ACETAMINOPHEN 160 MG/5ML
650 SOLUTION ORAL EVERY 4 HOURS PRN
Status: DISCONTINUED | OUTPATIENT
Start: 2019-12-05 | End: 2019-12-09 | Stop reason: HOSPADM

## 2019-12-05 RX ORDER — ONDANSETRON 4 MG/1
4 TABLET, FILM COATED ORAL EVERY 6 HOURS PRN
Status: DISCONTINUED | OUTPATIENT
Start: 2019-12-05 | End: 2019-12-09 | Stop reason: HOSPADM

## 2019-12-05 RX ORDER — FLUOXETINE 10 MG/1
10 CAPSULE ORAL DAILY
Status: DISCONTINUED | OUTPATIENT
Start: 2019-12-05 | End: 2019-12-09 | Stop reason: HOSPADM

## 2019-12-05 RX ORDER — BISACODYL 5 MG/1
5 TABLET, DELAYED RELEASE ORAL DAILY PRN
Status: DISCONTINUED | OUTPATIENT
Start: 2019-12-05 | End: 2019-12-09 | Stop reason: HOSPADM

## 2019-12-05 RX ORDER — IPRATROPIUM BROMIDE AND ALBUTEROL SULFATE 2.5; .5 MG/3ML; MG/3ML
3 SOLUTION RESPIRATORY (INHALATION) EVERY 6 HOURS PRN
Status: DISCONTINUED | OUTPATIENT
Start: 2019-12-05 | End: 2019-12-08

## 2019-12-05 RX ORDER — ROPINIROLE 0.25 MG/1
0.25 TABLET, FILM COATED ORAL NIGHTLY
Status: DISCONTINUED | OUTPATIENT
Start: 2019-12-05 | End: 2019-12-06

## 2019-12-05 RX ORDER — L.ACID,PARA/B.BIFIDUM/S.THERM 8B CELL
1 CAPSULE ORAL DAILY
Status: DISCONTINUED | OUTPATIENT
Start: 2019-12-05 | End: 2019-12-09 | Stop reason: HOSPADM

## 2019-12-05 RX ORDER — ASPIRIN 81 MG/1
81 TABLET ORAL DAILY
Status: DISCONTINUED | OUTPATIENT
Start: 2019-12-05 | End: 2019-12-05

## 2019-12-05 RX ADMIN — Medication 1 CAPSULE: at 10:05

## 2019-12-05 RX ADMIN — LORAZEPAM 1 MG: 2 INJECTION INTRAMUSCULAR; INTRAVENOUS at 15:54

## 2019-12-05 RX ADMIN — FLUOXETINE HYDROCHLORIDE 10 MG: 10 CAPSULE ORAL at 10:04

## 2019-12-05 RX ADMIN — ACETAMINOPHEN 650 MG: 325 TABLET, FILM COATED ORAL at 10:04

## 2019-12-05 RX ADMIN — ONDANSETRON HYDROCHLORIDE 4 MG: 4 TABLET, FILM COATED ORAL at 10:05

## 2019-12-05 RX ADMIN — SODIUM CHLORIDE, PRESERVATIVE FREE 10 ML: 5 INJECTION INTRAVENOUS at 10:06

## 2019-12-05 RX ADMIN — GADOBENATE DIMEGLUMINE 20 ML: 529 INJECTION, SOLUTION INTRAVENOUS at 17:10

## 2019-12-05 RX ADMIN — ACETAMINOPHEN 650 MG: 325 TABLET, FILM COATED ORAL at 02:09

## 2019-12-05 RX ADMIN — LEVETIRACETAM 1500 MG: 500 TABLET, FILM COATED ORAL at 21:52

## 2019-12-05 RX ADMIN — LIDOCAINE 1 PATCH: 50 PATCH CUTANEOUS at 10:04

## 2019-12-05 RX ADMIN — ALLOPURINOL 100 MG: 100 TABLET ORAL at 10:04

## 2019-12-05 RX ADMIN — METOPROLOL TARTRATE 50 MG: 50 TABLET, FILM COATED ORAL at 21:53

## 2019-12-05 RX ADMIN — BUDESONIDE AND FORMOTEROL FUMARATE DIHYDRATE 2 PUFF: 160; 4.5 AEROSOL RESPIRATORY (INHALATION) at 10:09

## 2019-12-05 RX ADMIN — ROPINIROLE HYDROCHLORIDE 0.25 MG: 0.25 TABLET, FILM COATED ORAL at 21:52

## 2019-12-05 RX ADMIN — SODIUM CHLORIDE, PRESERVATIVE FREE 10 ML: 5 INJECTION INTRAVENOUS at 02:09

## 2019-12-05 RX ADMIN — LEVETIRACETAM 1500 MG: 500 TABLET, FILM COATED ORAL at 10:05

## 2019-12-05 RX ADMIN — SODIUM CHLORIDE 100 ML/HR: 9 INJECTION, SOLUTION INTRAVENOUS at 05:21

## 2019-12-05 RX ADMIN — SODIUM CHLORIDE 100 ML/HR: 9 INJECTION, SOLUTION INTRAVENOUS at 14:55

## 2019-12-05 RX ADMIN — BUDESONIDE AND FORMOTEROL FUMARATE DIHYDRATE 2 PUFF: 160; 4.5 AEROSOL RESPIRATORY (INHALATION) at 21:55

## 2019-12-05 RX ADMIN — INSULIN LISPRO 2 UNITS: 100 INJECTION, SOLUTION INTRAVENOUS; SUBCUTANEOUS at 12:38

## 2019-12-05 RX ADMIN — ONDANSETRON 4 MG: 2 INJECTION INTRAMUSCULAR; INTRAVENOUS at 02:09

## 2019-12-05 RX ADMIN — METOPROLOL TARTRATE 50 MG: 50 TABLET, FILM COATED ORAL at 10:05

## 2019-12-05 RX ADMIN — ATORVASTATIN CALCIUM 80 MG: 80 TABLET, FILM COATED ORAL at 10:05

## 2019-12-05 NOTE — PLAN OF CARE
Problem: Patient Care Overview  Goal: Plan of Care Review  Outcome: Ongoing (interventions implemented as appropriate)   12/05/19 7480   Coping/Psychosocial   Plan of Care Reviewed With patient   Plan of Care Review   Progress no change   OTHER   Outcome Summary Patient with several large stools today; stool collected and sent for cdiff; wound rn to see per patient request for finger abrassions; MRI and CT of head completed today per Neurology order; no surgery at this time as bhanu does not want a kyphoplasty; isolation precautions maintained throughout shift; bhanu doesnt want care discussed with daughter; unable to get ua today as bhanu has been incontinent of stool. large facial brusing noted; no hallcinations noted during this shift; pretreated with 1mg ativan prior to mri; rn to continue to monitor.        Problem: Fall Risk (Adult)  Goal: Identify Related Risk Factors and Signs and Symptoms  Outcome: Ongoing (interventions implemented as appropriate)    Goal: Absence of Fall  Outcome: Ongoing (interventions implemented as appropriate)      Problem: Pain, Chronic (Adult)  Goal: Identify Related Risk Factors and Signs and Symptoms  Outcome: Ongoing (interventions implemented as appropriate)    Goal: Acceptable Pain/Comfort Level and Functional Ability  Outcome: Ongoing (interventions implemented as appropriate)

## 2019-12-05 NOTE — SIGNIFICANT NOTE
Neuro has requested additional MRI to clear acute cerebellar stroke, also waiting for TLSO brace to perform mobility due to L1 fx. Will follow up 12/6 if able when bedrest orders are discharged and brace can be donned.

## 2019-12-05 NOTE — CONSULTS
Patient Identification:  NAME:  Vidhi Lopez  Age:  69 y.o.   Sex:  female   :  1950   MRN:  [id number]       Chief complaint: I fell, reason for consult falling  History of present illness: This patient is a 69-year-old right-handed white female with a history of diabetes coronary artery disease anxiety who has had several falls one at least occurring recently and causing an L1 compression fracture she is fallen this time and landed face forward with bilateral raccoon eyes luckily she does not have intracranial hemorrhage she denies focal neurologic deficit and states she is not passing out she just staggers and falls context a diabetic who has numbness in the feet quality falls without syncope associated symptoms she had head trauma quality dull headache and bilateral raccoon eyes modifying factors so far none      Past medical history:  Past Medical History:   Diagnosis Date   • Allergic rhinitis    • Asthma with COPD (CMS/[facility])     Asthma/COPD   • Bilateral ovarian cysts    • Coronary artery disease    • Depression with anxiety     Depression/Anxiety   • Diabetes (CMS/[facility])     pre   • Endometriosis     Dr. Jin; estradiol    • ESR raised 3/20/2018   • Fatigue    • Fibromyalgia    • Headache     Headaches   • High cholesterol    • History of gallstones    • History of myocardial infarction    • Hypertension    • Influenza B     DX'D 2018, TREATED WITH TAMIFLU. LAST DOSE 2018 AM.  PATIENT STATES DR RANGEL IS AWARE. DENIES FEVERS OR CHILLS.   • Interstitial cystitis    • On home oxygen therapy     2 L NC   • URIEL on CPAP    • PONV (postoperative nausea and vomiting)    • Seizure disorder, nonconvulsive, with status epilepticus (CMS/[facility]) 3/20/2018   • Septic joint of right knee joint (CMS/[facility]) 3/21/2018   • Shortness of breath on exertion    • Stroke (CMS/[facility])     X1 8 YEARS AGO, GENERALIZED UPPER BODY WEAKNESS RESIDUAL PER PT    • Thyroid disorder    • Urinary leakage    • UTI (urinary tract  infection)    • Wears glasses    • Yeast dermatitis        Past surgical history:  Past Surgical History:   Procedure Laterality Date   • ARTERIOGRAM N/A 2/12/2018    Procedure: Renal Arteriogram;  Surgeon: Lito Flores MD;  Location:  [facility] CATH INVASIVE LOCATION;  Service:    • BREAST BIOPSY Right 1991   • CARDIAC CATHETERIZATION N/A 2/12/2018    Procedure: Right Heart Cath;  Surgeon: Lito Flores MD;  Location:  [facility] CATH INVASIVE LOCATION;  Service:    • CAROTID STENT      Transcath    • CAROTID STENT Left    • CHOLECYSTECTOMY     • CYSTOSTOMY W/ BLADDER BIOPSY     • DILATATION AND CURETTAGE     • KNEE ARTHROSCOPY Right 3/23/2018    Procedure: ARTHROSCOPY, RIGHT KNEE  INCISION AND DRAINAGE LOWER EXTREMITY WITH SYNOVIAL BIOPSY;  Surgeon: Dilip Giron MD;  Location:  [facility] OR;  Service: Orthopedics   • LAPAROSCOPIC CHOLECYSTECTOMY  1994    Lap rolando   • OOPHORECTOMY     • PAP SMEAR  05/10/2016   • PARTIAL HYSTERECTOMY         Allergies:  Amlodipine; Hydrocodone; and Reglan [metoclopramide]    Home medications:  Medications Prior to Admission   Medication Sig Dispense Refill Last Dose   • allopurinol (ZYLOPRIM) 100 MG tablet Take 1 tablet by mouth Daily for 180 days. 90 tablet 1 Past Week at Unknown time   • aspirin 81 MG EC tablet Take 81 mg by mouth Daily.   Past Week at Unknown time   • atorvastatin (LIPITOR) 80 MG tablet take 1 tablet by mouth once daily 90 tablet 3 12/4/2019 at Unknown time   • bumetanide (BUMEX) 1 MG tablet Take 1 tablet by mouth Daily With Breakfast. 90 tablet 3 12/4/2019 at Unknown time   • celecoxib (CELEBREX) 100 MG capsule Every 12 (Twelve) Hours.   12/4/2019 at Unknown time   • clopidogrel (PLAVIX) 75 MG tablet take 1 tablet by mouth once daily 90 tablet 3 12/4/2019 at Unknown time   • clotrimazole-betamethasone (LOTRISONE) 1-0.05 % cream Apply  topically to the appropriate area as directed As Needed.  1 Past Week at Unknown time   • FLUoxetine (PROZAC) 10 MG capsule Take 1  capsule by mouth Daily. 90 capsule 3 12/4/2019 at Unknown time   • gabapentin (NEURONTIN) 800 MG tablet Take 800 mg by mouth 3 (Three) Times a Day.  0 Taking   • hydrOXYzine (ATARAX) 50 MG tablet Take 1 tablet by mouth Every 8 (Eight) Hours As Needed for Itching. 90 tablet 2 12/4/2019 at Unknown time   • Insulin aspart (FIASP FLEXTOUCH) 100 UNIT/ML solution pen-injector injection pen   0 12/4/2019 at Unknown time   • ipratropium-albuterol (DUO-NEB) 0.5-2.5 mg/3 ml nebulizer USE ONE AMPULE VIA NEBULIZER FOUR TIMES DAILY 1080 mL 1 Past Week at Unknown time   • levETIRAcetam (KEPPRA) 500 MG tablet Take 3 tablets by mouth 2 (Two) Times a Day. 180 tablet 11 12/4/2019 at Unknown time   • metoprolol tartrate (LOPRESSOR) 50 MG tablet Take 1 tablet by mouth Every 12 (Twelve) Hours. 60 tablet 11 12/4/2019 at Unknown time   • ondansetron (ZOFRAN) 8 MG tablet Take 1 tablet by mouth Every 8 (Eight) Hours As Needed for Nausea or Vomiting. 30 tablet 1 12/4/2019 at Unknown time   • Probiotic Product (PROBIOTIC DAILY PO) Take 1 tablet by mouth Daily.   12/4/2019 at Unknown time   • promethazine (PHENERGAN) 25 MG tablet Take 25 mg by mouth Every 8 (Eight) Hours As Needed for Nausea or Vomiting.   12/4/2019 at Unknown time   • rOPINIRole (REQUIP) 0.25 MG tablet Take 1 tablet by mouth Every Night. Take 1-3 hours before bedtime. 90 tablet 3 12/4/2019 at Unknown time   • SYMBICORT 160-4.5 MCG/ACT inhaler Inhale 2 puffs 2 (Two) Times a Day. 10.2 g 6 12/4/2019 at Unknown time   • tiZANidine (ZANAFLEX) 4 MG tablet Every 12 (Twelve) Hours.   12/4/2019 at Unknown time   • TOUJEO SOLOSTAR 300 UNIT/ML solution pen-injector injection Use 25 Units at night 1.5 mL 6 12/4/2019 at Unknown time   • nitroglycerin (NITROSTAT) 0.4 MG SL tablet Place 0.4 mg under the tongue Every 5 (Five) Minutes As Needed.  0 Unknown at Unknown time   • nystatin (MYCOSTATIN) 437586 UNIT/GM ointment Apply  topically to the appropriate area as directed 2 (Two) Times a  Day. 30 g 0 Unknown at Unknown time   • nystatin (MYCOSTATIN) 970490 UNIT/ML suspension Take 5 mL by mouth 4 (Four) Times a Day. 473 mL 0 Unknown at Unknown time        Hospital medications:    allopurinol 100 mg Oral Daily   atorvastatin 80 mg Oral Daily   budesonide-formoterol 2 puff Inhalation BID - RT   FLUoxetine 10 mg Oral Daily   insulin lispro 0-9 Units Subcutaneous 4x Daily With Meals & Nightly   lactobacillus acidophilus 1 capsule Oral Daily   levETIRAcetam 1,500 mg Oral BID   lidocaine 1 patch Transdermal Q24H   metoprolol tartrate 50 mg Oral Q12H   rOPINIRole 0.25 mg Oral Nightly   sodium chloride 10 mL Intravenous Q12H       sodium chloride 100 mL/hr Last Rate: 100 mL/hr (12/05/19 5669)     •  acetaminophen **OR** acetaminophen **OR** acetaminophen  •  bisacodyl  •  calcium carbonate  •  dextrose  •  dextrose  •  glucagon (human recombinant)  •  hydrOXYzine  •  ipratropium-albuterol  •  ondansetron **OR** ondansetron  •  sodium chloride    Family history:  Family History   Problem Relation Age of Onset   • Cancer Other    • Diabetes Other    • Heart attack Other    • Hyperlipidemia Other    • Hypertension Other    • Osteoporosis Other    • Stroke Other    • Breast cancer Mother    • Osteoporosis Mother    • Colon cancer Father        Social history:  Social History     Tobacco Use   • Smoking status: Current Every Day Smoker     Packs/day: 0.25     Years: 40.00     Pack years: 10.00     Types: Cigarettes   • Smokeless tobacco: Never Used   • Tobacco comment: in process of quiting--AVERAGE 1 PPD, DOWN TO 6 CIG PER DAY X2 WEEKS    Substance Use Topics   • Alcohol use: Yes     Alcohol/week: 0.6 oz     Types: 1 Glasses of wine per week     Comment: occasional   • Drug use: No       Review of systems:    Some headache has had some dizziness but not vertigo when she sits up on the side of the bed she states she falls but is not passing out with any of this she staggers a lot she has had an L1 compression  fracture at one point with 1 of her recent falls she denies hair eyes ears nose throat skin joint  lymphatic hematologic oncologic complaints there is been no chest pain abdominal pain she does have some back pain.  No fever chills or rash no focal paresthesias in the upper extremities or face but both feet feel numb    Objective:  Vitals Ranges:   Temp:  [97.8 °F (36.6 °C)-99 °F (37.2 °C)] 98.4 °F (36.9 °C)  Heart Rate:  [69-79] 72  Resp:  [16-18] 18  BP: (175-185)/(69-94) 185/77      Physical Exam:  Awake alert and oriented x3 she has raccoon eyes she is very pleasant fund of knowledge good attention span concentration good recent remote memory good language function normal well-developed well-nourished in no distress with bilateral raccoon eyes pupils one half constricting to 1 I was unable to visualize discs and retinas visual fields are full and she can count fingers at 3 feet extraocular movements full without nystagmus nasolabial folds palate tongue symmetrical normal hearing facial sensation head turning shoulder shrug motor 5 out of 5 x 4 extremities no atrophy fasciculations rigidity bradykinesia resting tremor reflexes absent throughout toes downgoing bilaterally normal light touch in the face and arms but loss of light touch in the feet in a stocking glove distribution coordination normal in the upper extremity Station and gait was impossible I thought she would pass out or fall and hurt herself again.  Heart regular without murmur neck supple without bruits extremities no clubbing cyanosis edema visual acuity normal at 3 feet    Results review:   I reviewed the patient's new clinical results.    Data review:  Lab Results (last 24 hours)     Procedure Component Value Units Date/Time    POC Glucose Once [[id number]]  (Abnormal) Collected:  12/05/19 1142    Specimen:  Blood Updated:  12/05/19 1157     Glucose 155 mg/dL     POC Glucose Once [[id number]]  (Normal) Collected:  12/05/19 5588    Specimen:   Blood Updated:  12/05/19 0802     Glucose 129 mg/dL     Basic Metabolic Panel [[id number]]  (Abnormal) Collected:  12/05/19 0520    Specimen:  Blood Updated:  12/05/19 0734     Glucose 119 mg/dL      BUN 14 mg/dL      Creatinine 0.84 mg/dL      Sodium 142 mmol/L      Potassium 3.8 mmol/L      Chloride 98 mmol/L      CO2 30.4 mmol/L      Calcium 9.1 mg/dL      eGFR Non African Amer 67 mL/min/1.73      BUN/Creatinine Ratio 16.7     Anion Gap 13.6 mmol/L     Narrative:       GFR Normal >60  Chronic Kidney Disease <60  Kidney Failure <15    CBC (No Diff) [[id number]]  (Abnormal) Collected:  12/05/19 0520    Specimen:  Blood Updated:  12/05/19 0620     WBC 7.45 10*3/mm3      RBC 4.00 10*6/mm3      Hemoglobin 11.5 g/dL      Hematocrit 34.5 %      MCV 86.3 fL      MCH 28.8 pg      MCHC 33.3 g/dL      RDW 12.5 %      RDW-SD 39.4 fl      MPV 9.3 fL      Platelets 213 10*3/mm3            Imaging:  Imaging Results (Last 24 Hours)     ** No results found for the last 24 hours. **             Assessment and Plan:       L1 vertebral fracture (CMS/HCC)    Hypertension    Diabetes mellitus, type 2 (CMS/HCC)    RLS (restless legs syndrome)    COPD (chronic obstructive pulmonary disease) (CMS/HCC)    History of stroke    CAD (coronary artery disease)    CKD (chronic kidney disease) stage 2, GFR 60-89 ml/min    Bilateral carotid artery stenosis    Chronic gout with tophus    Hyperlipidemia    Seizure disorder (CMS/HCC)    Rib fracture    This patient has suffered a fall with a concussion.  There was no loss of consciousness and no seizure activity she has evidence of severe diabetic peripheral neuropathy which results in decreased proprioception and resultant falls.  This diabetic neuropathy is also responsible for dysautonomia and her recurrent dizziness (not vertigo) when she first gets up out of bed with some probable drop in orthostatic blood pressure.  Given the concussion I will proceed with an MRI of the brain and will also rule  out the possibility of an acute cerebellar stroke.  Thanks      Zac Howe MD  12/05/19  2:08 PM

## 2019-12-05 NOTE — PLAN OF CARE
"Problem: Patient Care Overview  Goal: Plan of Care Review  Outcome: Ongoing (interventions implemented as appropriate)   12/05/19 0554   Coping/Psychosocial   Plan of Care Reviewed With patient   Plan of Care Review   Progress no change   OTHER   Outcome Summary Pt direct admit from [facility] around midnight. Pt A+O x4, although forgetful sometimes (thinks she's in [place], but then remembers [place]). Pt has had a high BP that I have called A to address and get orders for. No new meds or PRN meds to be ordered. Pt is to be seen this morning and home meds to be restarted. Pt hallucinates, often talking to \"a cartoon character on my shoulder\" and repeats things I have mentioned to that \"said character.\" Pt gets anxious easily, \"not a fan of hospitals.\" Pt currently keeps asking if she is going to die because of the fear she has about her condition. She needs constant reassurance. She complained of a headache and Tyelnol was prescribed and pt got Zofran for feeling nauseuos once she arrived. Periorbital bruising seen from a fall she had and bruising is also seen on the chin, abdomen, and left buttock. SCDs on, continuous fluids running, and purewick in place. Will continue to monitor and progress towards goals as tolerated.        Problem: Fall Risk (Adult)  Goal: Absence of Fall  Outcome: Ongoing (interventions implemented as appropriate)      Problem: Pain, Chronic (Adult)  Goal: Acceptable Pain/Comfort Level and Functional Ability  Outcome: Ongoing (interventions implemented as appropriate)        "

## 2019-12-05 NOTE — SIGNIFICANT NOTE
Pt on strict bedrest order and awaiting neuro consult. RN stated not appropriate for therapy today. Will attempt to eval 12/6 if able.

## 2019-12-05 NOTE — PROGRESS NOTES
Discharge Planning Assessment  [facility]     Patient Name: Vidhi Lopez  MRN: [id number]  Today's Date: 12/5/2019    Admit Date: 12/4/2019    Discharge Needs Assessment     Row Name 12/05/19 144       Living Environment    Lives With  alone    Name(s) of Who Lives With Patient  independent living at [facility]     Current Living Arrangements  independent/assisted living facility    Primary Care Provided by  self    Family Caregiver if Needed  child(sekou), adult    Family Caregiver Names  Tavo Lopez son [phone]        Discharge Plan     Row Name 12/05/19 0062       Plan    Plan  return to [facility] with new referral to [facility] for physical therapy     Plan Comments  Spoke with patient at bedside face sheet verified. Patient lives at the [facility]. Patient stated she uses a cane and walker at home and is independent with ADL's does her own cooking and cleaning. Patient plans to return to the [facility] at discharge and would like to use [facility] at discharge for physical therapy.  Patient gave permission to call her son Tavo Lopez [phone] do not call her daughter Carmen Golden.   New referral to [facility]. Kina Lieberman, RN        Destination      No service coordination in this encounter.      Durable Medical Equipment      No service coordination in this encounter.      Dialysis/Infusion      No service coordination in this encounter.      Home Medical Care      Service Provider Request Status Selected Services Address Phone Number Fax Number    [facility] Pending - Request Sent N/A [address] [address] [phone]      Therapy      No service coordination in this encounter.      Community Resources      No service coordination in this encounter.          Demographic Summary     Row Name 12/05/19 8026       General Information    Admission Type   observation    Arrived From  emergency department    Row Name 12/05/19 1442       General Information    Arrived From  home    Referral Source  admission list    Reason for Consult  discharge planning    Preferred Language  English        Functional Status     Row Name 12/05/19 1442       Functional Status    Usual Activity Tolerance  moderate    Current Activity Tolerance  fair       Functional Status, IADL    Medications  independent    Meal Preparation  independent    Housekeeping  independent    Laundry  independent    Shopping  independent        Psychosocial    No documentation.       Abuse/Neglect    No documentation.       Legal    No documentation.       Substance Abuse    No documentation.       Patient Forms    No documentation.           Kina Lieberman RN

## 2019-12-05 NOTE — CONSULTS
"NEUROSURGERY CONSULT      Vidhi Lopez  1950  [id number]    Referring Provider: Sridhar Dumont MD  [address] [place] [address]  Reason for Consultation: L1 compression fracture; s/p fall    Patient Care Team:  Abiodun Vila MD as PCP - General (Family Medicine)  Arnoldo Newman MD as PCP - Claims Attributed  Lito Flores MD as Consulting Physician (Cardiology)  Jacky Hernandez MD as Consulting Physician (Infectious Diseases)  Ama Glynn RN as Ambulatory  ([facility])    Chief Complaint: Lumbar pain    Subjective .     History of Present Illness: This is a very pleasant 69-year-old female.  She is a retired surgical nurse.  She has a history of type 2 diabetes mellitus with associated peripheral neuropathy and chronic pain which is followed by [facility] pain management.  She also has a history of seizure disorder, gout, chronic kidney disease, recurrent UTIs, carotid artery stenosis, hypertension, coronary artery disease with stent placement February 2018, COPD, and C. difficile colitis.  The patient reports a history of falls.  She is uncertain as to the cause of the falls.  She stated that she had been \"falling a lot\".  She is having a little bit of difficulty with the history. The history obtained at [facility] states that the patient was there with her daughter who reported 5-6 falls with patient complaints of feeling weak and dizzy for the last couple of weeks.  The daughter reported that the patient was hallucinating and combative toward the staff at the assisted living facility.  These are congruent with her behavior when she has a urinary tract infection.  On the evaluation at [facility] the patient was confused but oriented to all questions.  The patient stated that she is unsure of the cause of her falls.  She does not believe that she had a seizure or syncopal episode.  She denies dysuria or odorous urine.  She " does report intermittent low-grade temperature of 100degrees.  She states that she has a problem with chronic cellulitis in both of her lower extremities and takes antibiotics for it.   X-rays there had revealed evidence of compression fracture. Due to her worsening pain, the patient was transferred to [facility] for further management of the L1 compression fracture.  The patient has bilateral raccoon eyes.  She denies any evidence of otorrhea or rhinorrhea.  She has a mild headache but nothing severe.  She denies any visual difficulties.  She has had some ongoing nausea but no vomiting.  She denies any significant pain in either of her 4 extremities.  She denies any significant neck pain however her pain in the lower back is quite significant.  Despite that, the patient states that she is not interested in any surgery or even kyphoplasty for the symptoms.  Neurosurgery has been asked to evaluate the patient given the L1 compression fracture.    Review of Systems  Review of Systems   Constitutional: Positive for activity change.   Eyes: Positive for pain (The swelling surrounding both eyes has improved).   Gastrointestinal: Positive for constipation and nausea. Negative for vomiting.   Musculoskeletal: Positive for back pain.   Neurological: Positive for seizures.   All other systems reviewed and are negative.      History  Past Medical History:   Diagnosis Date   • Allergic rhinitis    • Asthma with COPD (CMS/[facility])     Asthma/COPD   • Bilateral ovarian cysts    • Coronary artery disease    • Depression with anxiety     Depression/Anxiety   • Diabetes (CMS/[facility])     pre   • Endometriosis     Dr. Jin; estradiol    • ESR raised 3/20/2018   • Fatigue    • Fibromyalgia    • Headache     Headaches   • High cholesterol    • History of gallstones    • History of myocardial infarction    • Hypertension    • Influenza B     DX'D 2/6/2018, TREATED WITH TAMIFLU. LAST DOSE 2/11/2018 AM.  PATIENT STATES DR RANGEL IS  AWARE. DENIES FEVERS OR CHILLS.   • Interstitial cystitis    • On home oxygen therapy     2 L NC   • URIEL on CPAP    • PONV (postoperative nausea and vomiting)    • Seizure disorder, nonconvulsive, with status epilepticus (CMS/[facility]) 3/20/2018   • Septic joint of right knee joint (CMS/[facility]) 3/21/2018   • Shortness of breath on exertion    • Stroke (CMS/[facility])     X1 8 YEARS AGO, GENERALIZED UPPER BODY WEAKNESS RESIDUAL PER PT    • Thyroid disorder    • Urinary leakage    • UTI (urinary tract infection)    • Wears glasses    • Yeast dermatitis    ,   Past Surgical History:   Procedure Laterality Date   • ARTERIOGRAM N/A 2/12/2018    Procedure: Renal Arteriogram;  Surgeon: Lito Flores MD;  Location:  [facility] CATH INVASIVE LOCATION;  Service:    • BREAST BIOPSY Right 1991   • CARDIAC CATHETERIZATION N/A 2/12/2018    Procedure: Right Heart Cath;  Surgeon: Lito Flores MD;  Location:  [facility] CATH INVASIVE LOCATION;  Service:    • CAROTID STENT      Transcath    • CAROTID STENT Left    • CHOLECYSTECTOMY     • CYSTOSTOMY W/ BLADDER BIOPSY     • DILATATION AND CURETTAGE     • KNEE ARTHROSCOPY Right 3/23/2018    Procedure: ARTHROSCOPY, RIGHT KNEE  INCISION AND DRAINAGE LOWER EXTREMITY WITH SYNOVIAL BIOPSY;  Surgeon: Dilip Giron MD;  Location:  [facility] OR;  Service: Orthopedics   • LAPAROSCOPIC CHOLECYSTECTOMY  1994    Lap rolando   • OOPHORECTOMY     • PAP SMEAR  05/10/2016   • PARTIAL HYSTERECTOMY     ,   Family History   Problem Relation Age of Onset   • Cancer Other    • Diabetes Other    • Heart attack Other    • Hyperlipidemia Other    • Hypertension Other    • Osteoporosis Other    • Stroke Other    • Breast cancer Mother    • Osteoporosis Mother    • Colon cancer Father    ,   Social History     Tobacco Use   • Smoking status: Current Every Day Smoker     Packs/day: 0.25     Years: 40.00     Pack years: 10.00     Types: Cigarettes   • Smokeless tobacco: Never Used   • Tobacco comment: in process of quiting--AVERAGE 1  PPD, DOWN TO 6 CIG PER DAY X2 WEEKS    Substance Use Topics   • Alcohol use: Yes     Alcohol/week: 0.6 oz     Types: 1 Glasses of wine per week     Comment: occasional   • Drug use: No   ,   Medications Prior to Admission   Medication Sig Dispense Refill Last Dose   • allopurinol (ZYLOPRIM) 100 MG tablet Take 1 tablet by mouth Daily for 180 days. 90 tablet 1 Past Week at Unknown time   • aspirin 81 MG EC tablet Take 81 mg by mouth Daily.   Past Week at Unknown time   • atorvastatin (LIPITOR) 80 MG tablet take 1 tablet by mouth once daily 90 tablet 3 12/4/2019 at Unknown time   • bumetanide (BUMEX) 1 MG tablet Take 1 tablet by mouth Daily With Breakfast. 90 tablet 3 12/4/2019 at Unknown time   • celecoxib (CELEBREX) 100 MG capsule Every 12 (Twelve) Hours.   12/4/2019 at Unknown time   • clopidogrel (PLAVIX) 75 MG tablet take 1 tablet by mouth once daily 90 tablet 3 12/4/2019 at Unknown time   • clotrimazole-betamethasone (LOTRISONE) 1-0.05 % cream Apply  topically to the appropriate area as directed As Needed.  1 Past Week at Unknown time   • FLUoxetine (PROZAC) 10 MG capsule Take 1 capsule by mouth Daily. 90 capsule 3 12/4/2019 at Unknown time   • gabapentin (NEURONTIN) 800 MG tablet Take 800 mg by mouth 3 (Three) Times a Day.  0 Taking   • hydrOXYzine (ATARAX) 50 MG tablet Take 1 tablet by mouth Every 8 (Eight) Hours As Needed for Itching. 90 tablet 2 12/4/2019 at Unknown time   • Insulin aspart (FIASP FLEXTOUCH) 100 UNIT/ML solution pen-injector injection pen   0 12/4/2019 at Unknown time   • ipratropium-albuterol (DUO-NEB) 0.5-2.5 mg/3 ml nebulizer USE ONE AMPULE VIA NEBULIZER FOUR TIMES DAILY 1080 mL 1 Past Week at Unknown time   • levETIRAcetam (KEPPRA) 500 MG tablet Take 3 tablets by mouth 2 (Two) Times a Day. 180 tablet 11 12/4/2019 at Unknown time   • metoprolol tartrate (LOPRESSOR) 50 MG tablet Take 1 tablet by mouth Every 12 (Twelve) Hours. 60 tablet 11 12/4/2019 at Unknown time   • ondansetron (ZOFRAN) 8  MG tablet Take 1 tablet by mouth Every 8 (Eight) Hours As Needed for Nausea or Vomiting. 30 tablet 1 12/4/2019 at Unknown time   • Probiotic Product (PROBIOTIC DAILY PO) Take 1 tablet by mouth Daily.   12/4/2019 at Unknown time   • promethazine (PHENERGAN) 25 MG tablet Take 25 mg by mouth Every 8 (Eight) Hours As Needed for Nausea or Vomiting.   12/4/2019 at Unknown time   • rOPINIRole (REQUIP) 0.25 MG tablet Take 1 tablet by mouth Every Night. Take 1-3 hours before bedtime. 90 tablet 3 12/4/2019 at Unknown time   • SYMBICORT 160-4.5 MCG/ACT inhaler Inhale 2 puffs 2 (Two) Times a Day. 10.2 g 6 12/4/2019 at Unknown time   • tiZANidine (ZANAFLEX) 4 MG tablet Every 12 (Twelve) Hours.   12/4/2019 at Unknown time   • TOUJEO SOLOSTAR 300 UNIT/ML solution pen-injector injection Use 25 Units at night 1.5 mL 6 12/4/2019 at Unknown time   • nitroglycerin (NITROSTAT) 0.4 MG SL tablet Place 0.4 mg under the tongue Every 5 (Five) Minutes As Needed.  0 Unknown at Unknown time   • nystatin (MYCOSTATIN) 922121 UNIT/GM ointment Apply  topically to the appropriate area as directed 2 (Two) Times a Day. 30 g 0 Unknown at Unknown time   • nystatin (MYCOSTATIN) 778816 UNIT/ML suspension Take 5 mL by mouth 4 (Four) Times a Day. 473 mL 0 Unknown at Unknown time   , Scheduled Meds:    allopurinol 100 mg Oral Daily   atorvastatin 80 mg Oral Daily   budesonide-formoterol 2 puff Inhalation BID - RT   FLUoxetine 10 mg Oral Daily   insulin lispro 0-9 Units Subcutaneous 4x Daily With Meals & Nightly   lactobacillus acidophilus 1 capsule Oral Daily   levETIRAcetam 1,500 mg Oral BID   lidocaine 1 patch Transdermal Q24H   metoprolol tartrate 50 mg Oral Q12H   rOPINIRole 0.25 mg Oral Nightly   sodium chloride 10 mL Intravenous Q12H   , Continuous Infusions:    sodium chloride 100 mL/hr Last Rate: 100 mL/hr (12/05/19 1009)   , PRN Meds:  •  acetaminophen **OR** acetaminophen **OR** acetaminophen  •  bisacodyl  •  calcium carbonate  •  dextrose  •   dextrose  •  glucagon (human recombinant)  •  hydrOXYzine  •  ipratropium-albuterol  •  ondansetron **OR** ondansetron  •  sodium chloride and Allergies:  Amlodipine; Hydrocodone; and Reglan [metoclopramide]    Objective     Vital Signs   Temp:  [97.8 °F (36.6 °C)-99 °F (37.2 °C)] 97.8 °F (36.6 °C)  Heart Rate:  [69-79] 75  Resp:  [16-18] 18  BP: (175-184)/(69-94) 182/81  Body mass index is 237.59 kg/m².    Physical and Neurological Exam       CONSTITUTIONAL: This is a 69 year old right handed  female appears well developed, well-nourished and in no acute distress.    HEAD & FACE: the head and face are symmetric, normocephalic, bilateral raccoon eyes.    EYES: Inspection of the conjunctivae is normal. Patient is able to keep eyes open.  Significant bilateral periorbital ecchymosis and mild lid swelling.  No eye drainage or discharge.  Pupils are round, equal and reactive to light.     EARS, NOSE, MOUTH & THROAT: On inspection, the ears, nose and oral cavity are within normal limits.  No evidence of otorrhea or rhinorrhea.  No evidence of alonzo sign.      NECK: the neck is supple and symmetric. The trachea is midline with no masses.  Mild area of ecchymosis just below the right jawline.    PULMONARY: Respiratory effort is normal with no increased work of breathing or signs of respiratory distress.    CARDIOVASCULAR: Pedal pulses are +2/4 bilaterally. Examination of the extremities shows no edema or varicosities.    LYMPHATIC: There is no palpable lymphadenopathy of the neck.    MUSCULOSKELETAL: Gait and station not tested. Normal spinal alignment.  Tender to palpation in the low thoracic upper lumbar region.  Demonstrates full range of motion in arms and legs.    SKIN: The skin is warm, dry and intact    NEUROLOGIC:Alert and oriented x 3, gait normal., reflexes normal and symmetric, strength and  sensation grossly normal, negative, negative findings: R handed, GCS: 15, speech is normal and  appropriate.    Cranial Nerves  CN I (Olfactory): no obvious smelling deficit  CN II (Optic): Visual fields are full to confrontation. Pupils are 4 mm and briskly reactive to light.   CN III, IV, VI (Oculomotor, Trochlear, Abducens): Extraocular movements are intact.   CN V (Trigeminal): Facial sensation is intact to light touch in all 3 divisions bilaterally.   CN VII (Facial): Face is symmetric with normal eye closure and smile.  CN VII (Acoustic): Hearing is normal to finger rustle bilaterally  CN IX, X (Glossopharyngeal, Vagus): Palate elevates symmetrically. Phonation is normal.  CN XI (Spinal Accessory): Sternocleidomastoid and trapezius muscle strengths are intact bilaterally.    CN XII (Hypoglossal): Tongue is midline with normal movements and no atrophy.     Normal motor strength noted. Muscle bulk and tone are normal.  Sensory exam is normal to all modalities.  Reflexes on the right side demonstrates 2/4 Triceps Reflex, 2/4 Biceps Reflex, 2/4 Brachioradialis Reflex, 2/4 Knee Jerk Reflex, 2/4 Ankle Jerk Reflex and no ankle clonus on the right.   Reflexes on the left side demonstrates 2/4 Triceps Reflex, 2/4 Biceps Reflex, 2/4 Brachioradialis Reflex, 2/4 Knee Jerk Reflex, 2/4 Ankle Jerk Reflex and no ankle clonus on the left.  Negative Noam's bilaterally.    PSYCHIATRIC: oriented to person, place and time. Patient's mood and affect are normal.      Results Review:   I reviewed the patient's new clinical results.  Discussed with Dr. Worthington      Lab Results (last 24 hours)     Procedure Component Value Units Date/Time    Basic Metabolic Panel [[id number]]  (Abnormal) Collected:  12/05/19 0520    Specimen:  Blood Updated:  12/05/19 0734     Glucose 119 mg/dL      BUN 14 mg/dL      Creatinine 0.84 mg/dL      Sodium 142 mmol/L      Potassium 3.8 mmol/L      Chloride 98 mmol/L      CO2 30.4 mmol/L      Calcium 9.1 mg/dL      eGFR Non African Amer 67 mL/min/1.73      BUN/Creatinine Ratio 16.7     Anion Gap  13.6 mmol/L     Narrative:       GFR Normal >60  Chronic Kidney Disease <60  Kidney Failure <15    CBC (No Diff) [[id number]]  (Abnormal) Collected:  12/05/19 0520    Specimen:  Blood Updated:  12/05/19 0620     WBC 7.45 10*3/mm3      RBC 4.00 10*6/mm3      Hemoglobin 11.5 g/dL      Hematocrit 34.5 %      MCV 86.3 fL      MCH 28.8 pg      MCHC 33.3 g/dL      RDW 12.5 %      RDW-SD 39.4 fl      MPV 9.3 fL      Platelets 213 10*3/mm3     POC Glucose Once [[id number]]  (Normal) Collected:  12/05/19 0759    Specimen:  Blood Updated:  12/05/19 0802     Glucose 129 mg/dL         I reviewed radiographic imaging and reports of a CT of the thoracic, and lumbar spine.  I also reviewed the radiographic report from a CT of the head, internal auditory canal/temporal bones and cervical spine without contrast as well.    CT head was negative for acute intracranial hemorrhage.  No evidence of mass-effect or midline shift.  There was an area of right cerebellar encephalomalacia.  No evidence of hydrocephalus.  No evidence of midline shift. The skull base and calvarium appeared intact.  The temporal bone and internal auditory canal CT revealed patency of the external auditory canals bilaterally.  The middle ear cavity and mastoid air cells were clear.    CT cervical spine revealed multilevel degenerative disc disease with disc osteophyte complexes at multiple levels.  Moderate neuroforaminal narrowing appreciated at C5-6 left greater than right.  Mild right and moderate left neuroforaminal narrowing noted at C6-7 with associated mild central canal narrowing.  There is no evidence of acute fracture in the cervical spine.    CT scan of the thoracic spine revealed exaggerated kyphosis.  There is a chronic T7 vertebral body fracture.  No evidence of new compression fracture or subluxation.  No significant canal or foraminal narrowing.    CT scan of the lumbar spine revealed compression fracture at L1 with 20 to 30% body height loss.  No  other fractures appreciated.  Multilevel degenerative changes secondary to osteophyte complexes noted at L2-3.  There is associated facet arthropathy at L2-3.  There is multilevel neuroforaminal narrowing most significant at L5-S1.      Assessment/Plan       L1 vertebral fracture (CMS/HCC)    Hypertension    Diabetes mellitus, type 2 (CMS/HCC)    RLS (restless legs syndrome)    COPD (chronic obstructive pulmonary disease) (CMS/HCC)    History of stroke    CAD (coronary artery disease)    CKD (chronic kidney disease) stage 2, GFR 60-89 ml/min    Bilateral carotid artery stenosis    Chronic gout with tophus    Hyperlipidemia    Seizure disorder (CMS/HCC)    Rib fracture    History of multiple falls    Lumbar pain          The patient expresses no desire for kyphoplasty therefore I will not order an MRI to confirm fracture acuteness.  She is moving her legs well.  She is interested in trying a brace just to help with comfort.    Given the signs of recent head trauma, I have recommended a repeat CT scan of the brain for comparison.  I will have the images from [facility] downloaded into the [employer] imaging system.    The cause of the recurrent falls is not completely established.  She does have a history of seizure disorder therefore I think a neurology consult is in order.  We will make further recommendations tomorrow after the patient has had some time to work with physical therapy and mobilize.    I discussed the patients findings and my recommendations with patient    JANI Tierney  12/05/19  10:54 AM

## 2019-12-05 NOTE — SIGNIFICANT NOTE
12/05/19 0832   Rehab Time/Intention   Evaluation Not Performed (Noted orders for strict bedrest and noted neurosurgery consult regarding spinal imaging.  Will not see pt for OT while on bedrest.  Please clarify any activity restrictions.  )   Rehab Treatment   Discipline occupational therapist   Recommendation   OT - Next Appointment 12/06/19

## 2019-12-05 NOTE — H&P
Patient Name:  Vidhi Lopez  YOB: 1950  MRN:  [id number]  Admit Date:  12/4/2019  Patient Care Team:  Abiodun Vila MD as PCP - General (Family Medicine)  Arnoldo Newman MD as PCP - Claims Attributed  Lito Flores MD as Consulting Physician (Cardiology)  Jacky Hernandez MD as Consulting Physician (Infectious Diseases)  Ama Glynn RN as Ambulatory  (Population Health)      Subjective   History Present Illness     Chief Complaint: Recurrent Falls        Back pain    Ms. Lopez is a 69 y.o. smoker with a history of hyperlipidemia, hypertension, diabetes mellitus type 2, coronary artery disease, and chronic kidney disease that presents to [facility] due to a fall.  She initially presented to [facility] and has been transferred to [facility] for further evaluation.  She reports seven falls in the last two weeks.  She states she has had progressive weakness for the past month or so, and states her legs often give out on her.  She c/o intermittent light-headedness, but denies loss of consiousness.  She states she has hit her head three times during the falls, and last night fell forward and hit her face on the wall. She c/o headache, but denies speech difficulty and facial asymmetry.  She c/o suprapubic and flank pain, dysuria, and urinary frequency.  She reports a history of interstitial cystitis.  She c/o low grade fevers, but denies chills.  She also c/o intermittent nausea.  She c/o back pain that is described as constant, moderate, and achy in nature.  She states movement exacerbates her pain and she denies alleviating factors.    Lab work at [facility] was unremarkable. CT of the thoracic spine showed degenerative changes, but was negative for acute fracture. CT of the lumbar spine found superior endplate concavity and mild deformity superior endplate L1 with monitor wedging concerning for acute fracture.  CT of the abdomen/pelvis showed  "hepatomegaly, hepatic steatosis, and hiatal hernia.  CT of the chest revealed an acute facture of the left 10th rib, age indeterminate.  CT of the head showed encephalomalacia of the right cerebellum, trace fluid inferior right mastoid air cells, and multilevel degenerative disc disease of the cervical spine. X-ray of the left hip was negative for an acute fracture.  UA showed trace bacteria and WBCs.          History of Present Illness  Review of Systems   Constitutional: Positive for fever (\"low grade\").   Eyes: Negative.    Respiratory: Negative.    Cardiovascular: Negative.    Gastrointestinal: Positive for nausea.   Genitourinary: Positive for dysuria, flank pain and frequency.   Musculoskeletal: Positive for back pain and gait problem.   Skin: Negative.    Neurological: Positive for weakness, light-headedness and headaches.   Psychiatric/Behavioral: Negative.         Personal History     Past Medical History:   Diagnosis Date   • Allergic rhinitis    • Asthma with COPD (CMS/[facility])     Asthma/COPD   • Bilateral ovarian cysts    • Coronary artery disease    • Depression with anxiety     Depression/Anxiety   • Diabetes (CMS/[facility])     pre   • Endometriosis     Dr. Jin; estradiol    • ESR raised 3/20/2018   • Fatigue    • Fibromyalgia    • Headache     Headaches   • High cholesterol    • History of gallstones    • History of myocardial infarction    • Hypertension    • Influenza B     DX'D 2/6/2018, TREATED WITH TAMIFLU. LAST DOSE 2/11/2018 AM.  PATIENT STATES DR RANGEL IS AWARE. DENIES FEVERS OR CHILLS.   • Interstitial cystitis    • On home oxygen therapy     2 L NC   • URIEL on CPAP    • PONV (postoperative nausea and vomiting)    • Seizure disorder, nonconvulsive, with status epilepticus (CMS/[facility]) 3/20/2018   • Septic joint of right knee joint (CMS/HCC) 3/21/2018   • Shortness of breath on exertion    • Stroke (CMS/[facility])     X1 8 YEARS AGO, GENERALIZED UPPER BODY WEAKNESS RESIDUAL PER PT    • Thyroid disorder    • " Urinary leakage    • UTI (urinary tract infection)    • Wears glasses    • Yeast dermatitis      Past Surgical History:   Procedure Laterality Date   • ARTERIOGRAM N/A 2/12/2018    Procedure: Renal Arteriogram;  Surgeon: Lito Flores MD;  Location:  [facility] CATH INVASIVE LOCATION;  Service:    • BREAST BIOPSY Right 1991   • CARDIAC CATHETERIZATION N/A 2/12/2018    Procedure: Right Heart Cath;  Surgeon: Lito Flores MD;  Location:  [facility] CATH INVASIVE LOCATION;  Service:    • CAROTID STENT      Transcath    • CAROTID STENT Left    • CHOLECYSTECTOMY     • CYSTOSTOMY W/ BLADDER BIOPSY     • DILATATION AND CURETTAGE     • KNEE ARTHROSCOPY Right 3/23/2018    Procedure: ARTHROSCOPY, RIGHT KNEE  INCISION AND DRAINAGE LOWER EXTREMITY WITH SYNOVIAL BIOPSY;  Surgeon: Dilip Giron MD;  Location:  [facility] OR;  Service: Orthopedics   • LAPAROSCOPIC CHOLECYSTECTOMY  1994    Lap rolando   • OOPHORECTOMY     • PAP SMEAR  05/10/2016   • PARTIAL HYSTERECTOMY       Family History   Problem Relation Age of Onset   • Cancer Other    • Diabetes Other    • Heart attack Other    • Hyperlipidemia Other    • Hypertension Other    • Osteoporosis Other    • Stroke Other    • Breast cancer Mother    • Osteoporosis Mother    • Colon cancer Father      Social History     Tobacco Use   • Smoking status: Current Every Day Smoker     Packs/day: 0.25     Years: 40.00     Pack years: 10.00     Types: Cigarettes   • Smokeless tobacco: Never Used   • Tobacco comment: in process of quiting--AVERAGE 1 PPD, DOWN TO 6 CIG PER DAY X2 WEEKS    Substance Use Topics   • Alcohol use: Yes     Alcohol/week: 0.6 oz     Types: 1 Glasses of wine per week     Comment: occasional   • Drug use: No     Medications Prior to Admission   Medication Sig Dispense Refill Last Dose   • allopurinol (ZYLOPRIM) 100 MG tablet Take 1 tablet by mouth Daily for 180 days. 90 tablet 1 Past Week at Unknown time   • aspirin 81 MG EC tablet Take 81 mg by mouth Daily.   Past Week at  Unknown time   • atorvastatin (LIPITOR) 80 MG tablet take 1 tablet by mouth once daily 90 tablet 3 12/4/2019 at Unknown time   • bumetanide (BUMEX) 1 MG tablet Take 1 tablet by mouth Daily With Breakfast. 90 tablet 3 12/4/2019 at Unknown time   • celecoxib (CELEBREX) 100 MG capsule Every 12 (Twelve) Hours.   12/4/2019 at Unknown time   • clopidogrel (PLAVIX) 75 MG tablet take 1 tablet by mouth once daily 90 tablet 3 12/4/2019 at Unknown time   • clotrimazole-betamethasone (LOTRISONE) 1-0.05 % cream Apply  topically to the appropriate area as directed As Needed.  1 Past Week at Unknown time   • FLUoxetine (PROZAC) 10 MG capsule Take 1 capsule by mouth Daily. 90 capsule 3 12/4/2019 at Unknown time   • gabapentin (NEURONTIN) 800 MG tablet Take 800 mg by mouth 3 (Three) Times a Day.  0 Taking   • hydrOXYzine (ATARAX) 50 MG tablet Take 1 tablet by mouth Every 8 (Eight) Hours As Needed for Itching. 90 tablet 2 12/4/2019 at Unknown time   • Insulin aspart (FIASP FLEXTOUCH) 100 UNIT/ML solution pen-injector injection pen   0 12/4/2019 at Unknown time   • ipratropium-albuterol (DUO-NEB) 0.5-2.5 mg/3 ml nebulizer USE ONE AMPULE VIA NEBULIZER FOUR TIMES DAILY 1080 mL 1 Past Week at Unknown time   • levETIRAcetam (KEPPRA) 500 MG tablet Take 3 tablets by mouth 2 (Two) Times a Day. 180 tablet 11 12/4/2019 at Unknown time   • metoprolol tartrate (LOPRESSOR) 50 MG tablet Take 1 tablet by mouth Every 12 (Twelve) Hours. 60 tablet 11 12/4/2019 at Unknown time   • ondansetron (ZOFRAN) 8 MG tablet Take 1 tablet by mouth Every 8 (Eight) Hours As Needed for Nausea or Vomiting. 30 tablet 1 12/4/2019 at Unknown time   • Probiotic Product (PROBIOTIC DAILY PO) Take 1 tablet by mouth Daily.   12/4/2019 at Unknown time   • promethazine (PHENERGAN) 25 MG tablet Take 25 mg by mouth Every 8 (Eight) Hours As Needed for Nausea or Vomiting.   12/4/2019 at Unknown time   • rOPINIRole (REQUIP) 0.25 MG tablet Take 1 tablet by mouth Every Night. Take 1-3  hours before bedtime. 90 tablet 3 12/4/2019 at Unknown time   • SYMBICORT 160-4.5 MCG/ACT inhaler Inhale 2 puffs 2 (Two) Times a Day. 10.2 g 6 12/4/2019 at Unknown time   • tiZANidine (ZANAFLEX) 4 MG tablet Every 12 (Twelve) Hours.   12/4/2019 at Unknown time   • TOUJEO SOLOSTAR 300 UNIT/ML solution pen-injector injection Use 25 Units at night 1.5 mL 6 12/4/2019 at Unknown time   • nitroglycerin (NITROSTAT) 0.4 MG SL tablet Place 0.4 mg under the tongue Every 5 (Five) Minutes As Needed.  0 Unknown at Unknown time   • nystatin (MYCOSTATIN) 600405 UNIT/GM ointment Apply  topically to the appropriate area as directed 2 (Two) Times a Day. 30 g 0 Unknown at Unknown time   • nystatin (MYCOSTATIN) 293486 UNIT/ML suspension Take 5 mL by mouth 4 (Four) Times a Day. 473 mL 0 Unknown at Unknown time     Allergies:    Allergies   Allergen Reactions   • Amlodipine Swelling   • Hydrocodone Hallucinations   • Reglan [Metoclopramide] Other (See Comments)     DYSTONIC REACTION       Objective    Objective     Vital Signs  Temp:  [98.4 °F (36.9 °C)-99 °F (37.2 °C)] 98.4 °F (36.9 °C)  Heart Rate:  [69-79] 79  Resp:  [16-18] 16  BP: (175-184)/(69-94) 184/94  SpO2:  [93 %] 93 %  on  Flow (L/min):  [2] 2;   Device (Oxygen Therapy): nasal cannula  Body mass index is 237.59 kg/m².    Physical Exam   Constitutional: She is oriented to person, place, and time. She appears distressed.   HENT:   Head: Normocephalic. Head is with raccoon's eyes.   Eyes: Conjunctivae and EOM are normal.   Neck: Normal range of motion. Neck supple.   Cardiovascular: Normal rate, regular rhythm, normal heart sounds and intact distal pulses.   Pulmonary/Chest: Effort normal and breath sounds normal.   Abdominal: Soft. Bowel sounds are normal.   Musculoskeletal: Normal range of motion. She exhibits tenderness (lumbar spine). She exhibits no edema.   Neurological: She is alert and oriented to person, place, and time.   Skin: Skin is warm and dry.   Psychiatric: She  has a normal mood and affect. Her behavior is normal.   Nursing note and vitals reviewed.      Results Review:  I reviewed the patient's new clinical results.  I reviewed the patient's new imaging results and agree with the interpretation.  I reviewed the patient's other test results and agree with the interpretation  I personally viewed and interpreted the patient's EKG/Telemetry data  Discussed with ED provider.    Lab Results (last 24 hours)     Procedure Component Value Units Date/Time    Basic Metabolic Panel [[id number]] Collected:  12/05/19 0520    Specimen:  Blood Updated:  12/05/19 0559    CBC (No Diff) [[id number]]  (Abnormal) Collected:  12/05/19 0520    Specimen:  Blood Updated:  12/05/19 0620     WBC 7.45 10*3/mm3      RBC 4.00 10*6/mm3      Hemoglobin 11.5 g/dL      Hematocrit 34.5 %      MCV 86.3 fL      MCH 28.8 pg      MCHC 33.3 g/dL      RDW 12.5 %      RDW-SD 39.4 fl      MPV 9.3 fL      Platelets 213 10*3/mm3           Imaging Results (Last 24 Hours)     ** No results found for the last 24 hours. **          Results for orders placed during the hospital encounter of 10/14/18   Adult Transthoracic Echo Complete W/ Cont if Necessary Per Protocol    Narrative · Left ventricular systolic function is normal. Estimated EF = 55%.  · Left ventricular diastolic dysfunction (grade I) consistent with   impaired relaxation.  · Moderately reduced right ventricular systolic function noted.          No orders to display        Assessment/Plan     Active Hospital Problems    Diagnosis POA   • **L1 vertebral fracture (CMS/HCC) [S32.019A] Unknown   • Hyperlipidemia [E78.5] Unknown   • Seizure disorder (CMS/HCC) [G40.909] Unknown   • Rib fracture [S22.39XA] Unknown   • Chronic gout with tophus [M1A.9XX1] Yes   • CKD (chronic kidney disease) stage 2, GFR 60-89 ml/min [N18.2] Yes   • Bilateral carotid artery stenosis [I65.23] Yes   • Hypertension [I10] Yes   • RLS (restless legs syndrome) [G25.81] Yes   • History of  stroke [Z86.73] Not Applicable   • CAD (coronary artery disease) [I25.10] Yes     S/P CARDIAC STENT February 2018     • Diabetes mellitus, type 2 (CMS/[facility]) [E11.9] Unknown   • COPD (chronic obstructive pulmonary disease) (CMS/[facility]) [J44.9] Unknown     L1 Vertebral Fracture/Left 10th Rib Fracture  -Neurosurgery consult  -PT/OT consult for recurrent falls/weakness  -Pain control  -Neuro checks due to head injury, CT of the head negative  -Review of records from outside facility indicate she was confused on arrival to the ED. RN reports patient was hallucinating through the night. A&Ox3 and conversational on exam  -Will repeat UA as patient c/o persistent urinary symptoms      Hypertension  -Blood pressures slightly elevated throughout the night  -Continue Metoprolol and Bumex and monitor pressures closely  -Creat 0.88 at outside hospital    Seizure Disorder  -Continue Keppra  -Seizure precautions    Coronary Artery Disease  -Will hold Aspirin and Plavix for possible surgical intervention  -Continue statin    Diabetes Mellitus Type 2  -Initiate moderate dose correctional factor insulin  -Monitor blood sugars ACHS  -Hgb A1C 8.0 on 10/28/2019    COPD  -Continue Symbicort  -Respiratory status stable       -I discussed the patients findings and my recommendations with patient.    VTE Prophylaxis - SCDs.  Code Status - Full code.       JANI Lea  [facility]  12/05/19  6:48 AM

## 2019-12-05 NOTE — DISCHARGE PLACEMENT REQUEST
"Vidhi Lopez (69 y.o. Female)     Date of Birth Social Security Number Address Home Phone MRN    1950  [address] [id number] [phone] [id number]    Confucianist Marital Status          Confucianist of [place]        Admission Date Admission Type Admitting Provider Attending Provider Department, Room/Bed    12/4/19 Urgent Lenny Grajeda MD Edling, Stephen A, MD [address], P595/1    Discharge Date Discharge Disposition Discharge Destination                       Attending Provider:  Lenny Grajeda MD    Allergies:  Amlodipine, Hydrocodone, Reglan [Metoclopramide]    Isolation:  None   Infection:  None   Code Status:  CPR    Ht:  63 cm (24.8\")   Wt:  94.3 kg (207 lb 14.3 oz)    Admission Cmt:  None   Principal Problem:  L1 vertebral fracture (CMS/[facility]) [S32.019A]                 Active Insurance as of 12/4/2019     Primary Coverage     Payor Plan Insurance Group Employer/Plan Group    MEDICARE MEDICARE A & B      Payor Plan Address Payor Plan Phone Number Payor Plan Fax Number Effective Dates    [address] [phone]  3/1/2016 - None Entered    [facility] [address]       Subscriber Name Subscriber Birth Date Member ID       VIDHI LOPEZ 1950 [id number]           Secondary Coverage     Payor Plan Insurance Group Employer/Plan Group    [facility] SUPP [id number]     Payor Plan Address Payor Plan Phone Number Payor Plan Fax Number Effective Dates    [address]   6/1/2016 - None Entered    [facility] [id number]       Subscriber Name Subscriber Birth Date Member ID       VIDHI LOPEZ 1950 [id number]                 Emergency Contacts      (Rel.) Home Phone Work Phone Mobile Phone    Tavo Lopez (Son) [phone] -- [phone]    Carmen Golden (Daughter) [phone] -- [phone]    SHARON KONG (Sister) [phone] -- [phone]              "

## 2019-12-05 NOTE — SIGNIFICANT NOTE
12/05/19 1325   Rehab Time/Intention   Evaluation Not Performed (discuss pt with floor nurse and MAGALIS KYAW.  ARNP states will d/c bedrest order, has also ordered back brace.  Agree OT will follow up next service date for OT eval needs.)   Rehab Treatment   Discipline occupational therapist

## 2019-12-06 PROBLEM — S32.000A LUMBAR COMPRESSION FRACTURE (HCC): Status: ACTIVE | Noted: 2019-12-06

## 2019-12-06 LAB
BACTERIA UR QL AUTO: ABNORMAL /HPF
BILIRUB UR QL STRIP: NEGATIVE
CLARITY UR: CLEAR
COLOR UR: YELLOW
GLUCOSE BLDC GLUCOMTR-MCNC: 126 MG/DL (ref 70–130)
GLUCOSE BLDC GLUCOMTR-MCNC: 134 MG/DL (ref 70–130)
GLUCOSE BLDC GLUCOMTR-MCNC: 151 MG/DL (ref 70–130)
GLUCOSE BLDC GLUCOMTR-MCNC: 158 MG/DL (ref 70–130)
GLUCOSE UR STRIP-MCNC: NEGATIVE MG/DL
HBA1C MFR BLD: 8.9 % (ref 4.8–5.6)
HGB UR QL STRIP.AUTO: ABNORMAL
HYALINE CASTS UR QL AUTO: ABNORMAL /LPF
KETONES UR QL STRIP: NEGATIVE
LEUKOCYTE ESTERASE UR QL STRIP.AUTO: NEGATIVE
NITRITE UR QL STRIP: NEGATIVE
PH UR STRIP.AUTO: 7 [PH] (ref 5–8)
PROT UR QL STRIP: ABNORMAL
RBC # UR: ABNORMAL /HPF
REF LAB TEST METHOD: ABNORMAL
SP GR UR STRIP: 1.02 (ref 1–1.03)
SQUAMOUS #/AREA URNS HPF: ABNORMAL /HPF
UROBILINOGEN UR QL STRIP: ABNORMAL
WBC UR QL AUTO: ABNORMAL /HPF

## 2019-12-06 PROCEDURE — 94799 UNLISTED PULMONARY SVC/PX: CPT

## 2019-12-06 PROCEDURE — 63710000001 INSULIN LISPRO (HUMAN) PER 5 UNITS: Performed by: NURSE PRACTITIONER

## 2019-12-06 PROCEDURE — 97535 SELF CARE MNGMENT TRAINING: CPT

## 2019-12-06 PROCEDURE — 97162 PT EVAL MOD COMPLEX 30 MIN: CPT

## 2019-12-06 PROCEDURE — 97165 OT EVAL LOW COMPLEX 30 MIN: CPT

## 2019-12-06 PROCEDURE — 99231 SBSQ HOSP IP/OBS SF/LOW 25: CPT | Performed by: PSYCHIATRY & NEUROLOGY

## 2019-12-06 PROCEDURE — 25010000002 ONDANSETRON PER 1 MG: Performed by: NURSE PRACTITIONER

## 2019-12-06 PROCEDURE — 99231 SBSQ HOSP IP/OBS SF/LOW 25: CPT | Performed by: NURSE PRACTITIONER

## 2019-12-06 PROCEDURE — 97530 THERAPEUTIC ACTIVITIES: CPT

## 2019-12-06 PROCEDURE — 83036 HEMOGLOBIN GLYCOSYLATED A1C: CPT | Performed by: NURSE PRACTITIONER

## 2019-12-06 PROCEDURE — 81001 URINALYSIS AUTO W/SCOPE: CPT | Performed by: NURSE PRACTITIONER

## 2019-12-06 PROCEDURE — 82962 GLUCOSE BLOOD TEST: CPT

## 2019-12-06 RX ORDER — LANOLIN ALCOHOL/MO/W.PET/CERES
CREAM (GRAM) TOPICAL 2 TIMES DAILY
Status: DISCONTINUED | OUTPATIENT
Start: 2019-12-06 | End: 2019-12-09 | Stop reason: HOSPADM

## 2019-12-06 RX ORDER — ASPIRIN 81 MG/1
81 TABLET, CHEWABLE ORAL DAILY
Status: DISCONTINUED | OUTPATIENT
Start: 2019-12-06 | End: 2019-12-09 | Stop reason: HOSPADM

## 2019-12-06 RX ORDER — GABAPENTIN 300 MG/1
300 CAPSULE ORAL 3 TIMES DAILY
Status: DISCONTINUED | OUTPATIENT
Start: 2019-12-06 | End: 2019-12-06 | Stop reason: DRUGHIGH

## 2019-12-06 RX ORDER — GABAPENTIN 300 MG/1
300 CAPSULE ORAL 3 TIMES DAILY
Status: DISCONTINUED | OUTPATIENT
Start: 2019-12-06 | End: 2019-12-09 | Stop reason: HOSPADM

## 2019-12-06 RX ORDER — OXYCODONE HYDROCHLORIDE AND ACETAMINOPHEN 5; 325 MG/1; MG/1
1 TABLET ORAL EVERY 4 HOURS PRN
Status: DISCONTINUED | OUTPATIENT
Start: 2019-12-06 | End: 2019-12-08

## 2019-12-06 RX ORDER — HYDRALAZINE HYDROCHLORIDE 50 MG/1
50 TABLET, FILM COATED ORAL EVERY 8 HOURS SCHEDULED
Status: DISCONTINUED | OUTPATIENT
Start: 2019-12-06 | End: 2019-12-09 | Stop reason: HOSPADM

## 2019-12-06 RX ORDER — CLOPIDOGREL BISULFATE 75 MG/1
75 TABLET ORAL DAILY
Status: DISCONTINUED | OUTPATIENT
Start: 2019-12-06 | End: 2019-12-09 | Stop reason: HOSPADM

## 2019-12-06 RX ADMIN — Medication 1 CAPSULE: at 10:34

## 2019-12-06 RX ADMIN — OXYCODONE AND ACETAMINOPHEN 1 TABLET: 5; 325 TABLET ORAL at 20:12

## 2019-12-06 RX ADMIN — ALLOPURINOL 100 MG: 100 TABLET ORAL at 10:34

## 2019-12-06 RX ADMIN — Medication: at 20:13

## 2019-12-06 RX ADMIN — METOPROLOL TARTRATE 50 MG: 50 TABLET, FILM COATED ORAL at 20:12

## 2019-12-06 RX ADMIN — ONDANSETRON 4 MG: 2 INJECTION INTRAMUSCULAR; INTRAVENOUS at 01:43

## 2019-12-06 RX ADMIN — INSULIN LISPRO 2 UNITS: 100 INJECTION, SOLUTION INTRAVENOUS; SUBCUTANEOUS at 12:53

## 2019-12-06 RX ADMIN — BUDESONIDE AND FORMOTEROL FUMARATE DIHYDRATE 2 PUFF: 160; 4.5 AEROSOL RESPIRATORY (INHALATION) at 08:18

## 2019-12-06 RX ADMIN — BUDESONIDE AND FORMOTEROL FUMARATE DIHYDRATE 2 PUFF: 160; 4.5 AEROSOL RESPIRATORY (INHALATION) at 22:07

## 2019-12-06 RX ADMIN — ONDANSETRON HYDROCHLORIDE 4 MG: 4 TABLET, FILM COATED ORAL at 10:47

## 2019-12-06 RX ADMIN — HYDRALAZINE HYDROCHLORIDE 50 MG: 50 TABLET, FILM COATED ORAL at 23:56

## 2019-12-06 RX ADMIN — SODIUM CHLORIDE, PRESERVATIVE FREE 10 ML: 5 INJECTION INTRAVENOUS at 20:12

## 2019-12-06 RX ADMIN — LIDOCAINE 1 PATCH: 50 PATCH CUTANEOUS at 10:34

## 2019-12-06 RX ADMIN — METOPROLOL TARTRATE 50 MG: 50 TABLET, FILM COATED ORAL at 10:34

## 2019-12-06 RX ADMIN — LEVETIRACETAM 1500 MG: 500 TABLET, FILM COATED ORAL at 10:32

## 2019-12-06 RX ADMIN — SODIUM CHLORIDE, PRESERVATIVE FREE 10 ML: 5 INJECTION INTRAVENOUS at 10:35

## 2019-12-06 RX ADMIN — LEVETIRACETAM 1500 MG: 500 TABLET, FILM COATED ORAL at 20:12

## 2019-12-06 RX ADMIN — Medication: at 12:52

## 2019-12-06 RX ADMIN — ASPIRIN 81 MG: 81 TABLET, CHEWABLE ORAL at 17:33

## 2019-12-06 RX ADMIN — INSULIN LISPRO 2 UNITS: 100 INJECTION, SOLUTION INTRAVENOUS; SUBCUTANEOUS at 21:39

## 2019-12-06 RX ADMIN — ATORVASTATIN CALCIUM 80 MG: 80 TABLET, FILM COATED ORAL at 10:34

## 2019-12-06 RX ADMIN — ONDANSETRON 4 MG: 2 INJECTION INTRAMUSCULAR; INTRAVENOUS at 17:50

## 2019-12-06 RX ADMIN — GABAPENTIN 300 MG: 300 CAPSULE ORAL at 20:12

## 2019-12-06 RX ADMIN — FLUOXETINE HYDROCHLORIDE 10 MG: 10 CAPSULE ORAL at 10:34

## 2019-12-06 RX ADMIN — CLOPIDOGREL 75 MG: 75 TABLET, FILM COATED ORAL at 17:33

## 2019-12-06 NOTE — NURSING NOTE
"Carmen Golden and Inezollie Lopez, the pt's daughters, called me just now wanting to know the status of pt. Carmen was very frustrated that no one called her or other siblings all day about updates. She threatened \"to complain to higher authority\" about me and the dayshift RN, Michelle Mckeon.  I told her I could not speak with her per pt request (see nursing notes).     I spoke with pt and told her about this situation and pt gave me one time permission to call back and speak with Carmen and Inez of the following below. She mentioned \"I don't want to be talking to them, but you can give them updates about my care.\"    I called Inez and Carmen back and mentioned pt did not want surgical intervention and awaiting brace to work with PT. I mentioned a neurologist came to visit her and further testing is needed to figure out cause for fall. Inez and Carmen started mentioning their own beliefs and I mentioned that the physician would be the person to talk to about.     Vicky would like for the physicians caring for pt to contact them as well. I mentioned that the pt will need to give permission again beforehand.      "

## 2019-12-06 NOTE — PLAN OF CARE
Problem: Patient Care Overview  Goal: Plan of Care Review   12/06/19 0931   Coping/Psychosocial   Plan of Care Reviewed With patient   Plan of Care Review   Progress no change   OTHER   Outcome Summary Pt A+Ox4. Minimal hallucinations this shift. No other unusual activity in trying leave her room to visit other units. BP high; medication given. Using bedside commode with assist x1. Zofran given once for nausea that has resolved. No major complaints of pain. Still waiting for TLSO brace so pt can work with PT. Will continue to monitor and progress towards goals as tolerated.        Problem: Fall Risk (Adult)  Goal: Absence of Fall  Outcome: Ongoing (interventions implemented as appropriate)      Problem: Pain, Chronic (Adult)  Goal: Acceptable Pain/Comfort Level and Functional Ability  Outcome: Ongoing (interventions implemented as appropriate)

## 2019-12-06 NOTE — NURSING NOTE
Patient became combative at shift change and confused wanting to see the rest of the hospital; RN was able to de-escalate the situation and redirect patient back to her room as she has come out of her room despite the CNA's attempt to keep her in her room. Patient had not had any behavioral issues throughout the entire shift. Patient escorted back to her room and set up to eat dinner; event occurred at roughly 1930. RN to continue to monitor.

## 2019-12-06 NOTE — THERAPY EVALUATION
Patient Name: Vidhi Lopez  : 1950    MRN: [id number]                              Today's Date: 2019       Admit Date: 2019    Visit Dx: No diagnosis found.  Patient Active Problem List   Diagnosis   • Dyslipidemia   • Hypertension   • Anxiety (Chronic benzos)   • Insomnia   • GERD (gastroesophageal reflux disease)   • Diabetes mellitus, type 2 (CMS/HCC)   • Fibromyalgia   • RLS (restless legs syndrome)   • Migraines   • COPD (chronic obstructive pulmonary disease) (CMS/[facility])   • Interstitial cystitis   • History of acute myocardial infarction   • History of cardiac murmur   • History of stroke   • CAD (coronary artery disease)   • Essential hypertension   • CKD (chronic kidney disease) stage 2, GFR 60-89 ml/min   • Bilateral carotid artery stenosis   • Chronic respiratory failure with hypoxia and hypercapnia (CMS/[facility])   • Obstructive sleep apnea   • Obesity (BMI 30-39.9)   • Diabetes mellitus type 2 in obese (CMS/HCC)   • Obesity hypoventilation syndrome (CMS/HCC)   • Tobacco use   • Chronic pain (Chronic narcotics)   • Recurrent UTI/interstitial cystitis on chronic methenamine   • Clostridium difficile colitis diagnosed 2018. Persistent diarrhea despite oral vancomycin   • Demand ischemia (CMS/HCC)   • Hypoxemia requiring supplemental oxygen   • Stenosis of carotid artery   • Occlusion and stenosis of left carotid artery    • Chronic gout with tophus   • Depression   • Edema   • Restless leg syndrome   • Asthma   • Wellness examination   • Encounter for screening mammogram for malignant neoplasm of breast    • Seasonal allergies   • Hyperlipidemia   • Seizure disorder (CMS/HCC)   • L1 vertebral fracture (CMS/HCC)   • Rib fracture     Past Medical History:   Diagnosis Date   • Allergic rhinitis    • Asthma with COPD (CMS/[facility])     Asthma/COPD   • Bilateral ovarian cysts    • Coronary artery disease    • Depression with anxiety     Depression/Anxiety   • Diabetes (CMS/HCC)     pre   •  Endometriosis     Dr. Jin; estradiol    • ESR raised 3/20/2018   • Fatigue    • Fibromyalgia    • Headache     Headaches   • High cholesterol    • History of gallstones    • History of myocardial infarction    • Hypertension    • Influenza B     DX'D 2/6/2018, TREATED WITH TAMIFLU. LAST DOSE 2/11/2018 AM.  PATIENT STATES DR FLORES IS AWARE. DENIES FEVERS OR CHILLS.   • Interstitial cystitis    • On home oxygen therapy     2 L NC   • URIEL on CPAP    • PONV (postoperative nausea and vomiting)    • Seizure disorder, nonconvulsive, with status epilepticus (CMS/HCC) 3/20/2018   • Septic joint of right knee joint (CMS/HCC) 3/21/2018   • Shortness of breath on exertion    • Stroke (CMS/[facility])     X1 8 YEARS AGO, GENERALIZED UPPER BODY WEAKNESS RESIDUAL PER PT    • Thyroid disorder    • Urinary leakage    • UTI (urinary tract infection)    • Wears glasses    • Yeast dermatitis      Past Surgical History:   Procedure Laterality Date   • ARTERIOGRAM N/A 2/12/2018    Procedure: Renal Arteriogram;  Surgeon: Lito Flores MD;  Location:  [facility] CATH INVASIVE LOCATION;  Service:    • BREAST BIOPSY Right 1991   • CARDIAC CATHETERIZATION N/A 2/12/2018    Procedure: Right Heart Cath;  Surgeon: Lito Flores MD;  Location:  [facility] CATH INVASIVE LOCATION;  Service:    • CAROTID STENT      Transcath    • CAROTID STENT Left    • CHOLECYSTECTOMY     • CYSTOSTOMY W/ BLADDER BIOPSY     • DILATATION AND CURETTAGE     • KNEE ARTHROSCOPY Right 3/23/2018    Procedure: ARTHROSCOPY, RIGHT KNEE  INCISION AND DRAINAGE LOWER EXTREMITY WITH SYNOVIAL BIOPSY;  Surgeon: Dilip Giron MD;  Location:  [facility] OR;  Service: Orthopedics   • LAPAROSCOPIC CHOLECYSTECTOMY  1994    Lap rolando   • OOPHORECTOMY     • PAP SMEAR  05/10/2016   • PARTIAL HYSTERECTOMY       General Information     Row Name 12/06/19 1010          PT Evaluation Time/Intention    Document Type  evaluation  -AE     Mode of Treatment  physical therapy  -AE     Row Name 12/06/19 1010           General Information    Patient Profile Reviewed?  yes  -AE     Prior Level of Function  independent:  -AE     Existing Precautions/Restrictions  fall  -AE     Row Name 12/06/19 1010          Relationship/Environment    Lives With  alone  -AE     Row Name 12/06/19 1010          Resource/Environmental Concerns    Current Living Arrangements  independent/assisted living facility lives [facility]  -AE     Row Name 12/06/19 1010          Home Main Entrance    Number of Stairs, Main Entrance  none elevator access  -AE     Row Name 12/06/19 1010          Stairs Within Home, Primary    Number of Stairs, Within Home, Primary  none  -AE     Row Name 12/06/19 1010          Cognitive Assessment/Intervention- PT/OT    Orientation Status (Cognition)  oriented x 3  -AE     Row Name 12/06/19 1010          Safety Issues, Functional Mobility    Safety Issues Affecting Function (Mobility)  insight into deficits/self awareness  -AE     Impairments Affecting Function (Mobility)  balance;pain;shortness of breath;strength  -AE       User Key  (r) = Recorded By, (t) = Taken By, (c) = Cosigned By    Initials Name Provider Type    AE Barbara Merrill, PT Physical Therapist        Mobility     Row Name 12/06/19 1011          Bed Mobility Assessment/Treatment    Comment (Bed Mobility)  pt up in chair  -AE     Row Name 12/06/19 1011          Transfer Assessment/Treatment    Comment (Transfers)  CGA with FWW for all transfers  -AE     Row Name 12/06/19 1011          Bed-Chair Transfer    Bed-Chair [place] (Transfers)  contact guard  -AE     Assistive Device (Bed-Chair Transfers)  walker, front-wheeled  -AE     Row Name 12/06/19 1011          Sit-Stand Transfer    Sit-Stand [place] (Transfers)  contact guard  -AE     Assistive Device (Sit-Stand Transfers)  walker, front-wheeled  -AE     Row Name 12/06/19 1011          Gait/Stairs Assessment/Training    Gait/Stairs Assessment/Training  gait/ambulation  independence;gait/ambulation assistive device  -AE     [place] Level (Gait)  contact guard  -AE     Assistive Device (Gait)  walker, front-wheeled  -AE     Distance in Feet (Gait)  20 ft  -AE     Pattern (Gait)  step-through  -AE     Deviations/Abnormal Patterns (Gait)  base of support, wide;antalgic  -AE     Bilateral Gait Deviations  forward flexed posture  -AE       User Key  (r) = Recorded By, (t) = Taken By, (c) = Cosigned By    Initials Name Provider Type    AE Barbara Merrill PT Physical Therapist        Obj/Interventions     Row Name 12/06/19 1012          General ROM    GENERAL ROM COMMENTS  BLE WFL  -AE     Row Name 12/06/19 1012          MMT (Manual Muscle Testing)    General MMT Comments  4/5 gross MMTs  -AE     Row Name 12/06/19 1012          Static Sitting Balance    Level of [place] (Unsupported Sitting, Static Balance)  conditional independence  -AE     Row Name 12/06/19 1012          Static Standing Balance    Level of [place] (Supported Standing, Static Balance)  contact guard assist  -AE     Time Able to Maintain Position (Supported Standing, Static Balance)  2 to 3 minutes  -AE       User Key  (r) = Recorded By, (t) = Taken By, (c) = Cosigned By    Initials Name Provider Type    AE Barbara Merrill PT Physical Therapist        Goals/Plan     Row Name 12/06/19 1014          Bed Mobility Goal 1 (PT)    Activity/Assistive Device (Bed Mobility Goal 1, PT)  bed mobility activities, all  -AE     [place] Level/Cues Needed (Bed Mobility Goal 1, PT)  supervision required  -AE     Time Frame (Bed Mobility Goal 1, PT)  1 week  -AE     Progress/Outcomes (Bed Mobility Goal 1, PT)  continuing progress toward goal  -AE     Row Name 12/06/19 1014          Transfer Goal 1 (PT)    Activity/Assistive Device (Transfer Goal 1, PT)  transfers, all  -AE     [place] Level/Cues Needed (Transfer Goal 1, PT)  supervision required  -AE     Time Frame (Transfer Goal 1, PT)  1 week  -AE      Progress/Outcome (Transfer Goal 1, PT)  continuing progress toward goal  -AE     Row Name 12/06/19 1014          Gait Training Goal 1 (PT)    Activity/Assistive Device (Gait Training Goal 1, PT)  gait (walking locomotion);assistive device use  -AE     [place] Level (Gait Training Goal 1, PT)  supervision required  -AE     Distance (Gait Goal 1, PT)  50 ft FWW  -AE     Time Frame (Gait Training Goal 1, PT)  1 week  -AE     Progress/Outcome (Gait Training Goal 1, PT)  continuing progress toward goal  -AE       User Key  (r) = Recorded By, (t) = Taken By, (c) = Cosigned By    Initials Name Provider Type    AE Barbara Merrill, PT Physical Therapist        Clinical Impression     Row Name 12/06/19 1012          Pain Assessment    Additional Documentation  Pain Scale: Numbers Pre/Post-Treatment (Group)  -AE     Row Name 12/06/19 1012          Pain Scale: Numbers Pre/Post-Treatment    Pain Scale: Numbers, Pretreatment  7/10  -AE     Pain Scale: Numbers, Post-Treatment  7/10  -AE     Pain Location - Orientation  lower  -AE     Pain Location  back  -AE     Pain Intervention(s)  Medication (See MAR);Repositioned;Ambulation/increased activity;Rest  -AE     Row Name 12/06/19 1012          Plan of Care Review    Plan of Care Reviewed With  patient  -AE     Row Name 12/06/19 1012          Physical Therapy Clinical Impression    Patient/Family Goals Statement (PT Clinical Impression)  Pt wants OPPT  -AE     Criteria for Skilled Interventions Met (PT Clinical Impression)  yes;treatment indicated  -AE     Rehab Potential (PT Clinical Summary)  good, to achieve stated therapy goals  -AE     Row Name 12/06/19 1012          Positioning and Restraints    Pre-Treatment Position  sitting in chair/recliner  -AE     Post Treatment Position  chair  -AE     In Chair  reclined;with OT;call light within reach;encouraged to call for assist;exit alarm on  -AE       User Key  (r) = Recorded By, (t) = Taken By, (c) = Cosigned By    Initials  Name Provider Type    AE Barbara Merrill PT Physical Therapist        Outcome Measures     Row Name 12/06/19 1015          How much help from another person do you currently need...    Turning from your back to your side while in flat bed without using bedrails?  2  -AE     Moving from lying on back to sitting on the side of a flat bed without bedrails?  2  -AE     Moving to and from a bed to a chair (including a wheelchair)?  3  -AE     Standing up from a chair using your arms (e.g., wheelchair, bedside chair)?  3  -AE     Climbing 3-5 steps with a railing?  2  -AE     To walk in hospital room?  3  -AE     AM-PAC 6 Clicks Score (PT)  15  -AE     Row Name 12/06/19 1015          Functional Assessment    Outcome Measure Options  AM-PAC 6 Clicks Basic Mobility (PT)  -AE       User Key  (r) = Recorded By, (t) = Taken By, (c) = Cosigned By    Initials Name Provider Type    AE Barbara Merrill PT Physical Therapist        Physical Therapy Education     Title: PT OT SLP Therapies (Done)     Topic: Physical Therapy (Done)     Point: Mobility training (Done)     Learning Progress Summary           Patient Acceptance, E,TB, VU,DU by AE at 12/6/2019 10:15 AM                   Point: Home exercise program (Done)     Learning Progress Summary           Patient Acceptance, E,TB, VU,DU by AE at 12/6/2019 10:15 AM                   Point: Body mechanics (Done)     Learning Progress Summary           Patient Acceptance, E,TB, VU,DU by AE at 12/6/2019 10:15 AM                   Point: Precautions (Done)     Learning Progress Summary           Patient Acceptance, E,TB, VU,DU by AE at 12/6/2019 10:15 AM                               User Key     Initials Effective Dates Name Provider Type Discipline    AE 09/04/19 -  Barbara Merrill PT Physical Therapist PT              PT Recommendation and Plan  Planned Therapy Interventions (PT Eval): balance training, bed mobility training, gait training, home exercise program,  patient/family education, ROM (range of motion), strengthening, postural re-education, transfer training  Outcome Summary/Treatment Plan (PT)  Anticipated Equipment Needs at Discharge (PT): front wheeled walker(pt needs FWW due to hers being in [facility], however insurance has bought FWW in last 5 years)  Anticipated Discharge Disposition (PT): home, home with assist, home with home health, home with OP services  Plan of Care Reviewed With: patient  Outcome Summary: Pt is a 70 yo F who is undergoing conservative management of L1 vertebral fracture. Pt reports PLOF as independent for all mobility with no use of AD. Pt states her DME is in [facility] and does not have access to it. Pt lives in independent living with elevator access, walk-in tub with bars, shower seat, and elevated toilet. Pt reports 5 falls in last 3 day due to B knees buckling. Pt presents with 7/10 back pain,      Time Calculation:   PT Charges     Row Name 12/06/19 1021             Time Calculation    Start Time  0840  -AE      Stop Time  0910  -AE      Time Calculation (min)  30 min  -AE      PT Received On  12/06/19  -AE      PT - Next Appointment  12/07/19  -AE      PT Goal Re-Cert Due Date  12/13/19  -AE         Time Calculation- PT    Total Timed Code Minutes- PT  25 minute(s)  -AE        User Key  (r) = Recorded By, (t) = Taken By, (c) = Cosigned By    Initials Name Provider Type    AE Barbara Merrill, DARIUS Physical Therapist        Therapy Charges for Today     Code Description Service Date Service Provider Modifiers Qty    97672459708 HC PT EVAL MOD COMPLEXITY 2 12/6/2019 Barbara Merrill, PT GP 1    43272562674 HC PT THERAPEUTIC ACT EA 15 MIN 12/6/2019 Barbara Merrill, PT GP 2          PT G-Codes  Outcome Measure Options: AM-PAC 6 Clicks Basic Mobility (PT)  AM-PAC 6 Clicks Score (PT): 15    Barbara Merrill PT  12/6/2019

## 2019-12-06 NOTE — PLAN OF CARE
Problem: Patient Care Overview  Goal: Plan of Care Review  Outcome: Ongoing (interventions implemented as appropriate)   12/06/19 1016   OTHER   Outcome Summary Pt is a 70 yo F who is undergoing conservative management of L1 vertebral fracture. Pt reports PLOF as independent for all mobility with no use of AD. Pt [facility] not [facility]. Pt lives in independent living with elevator access, walk-in tub with bars, shower seat, and elevated toilet. Pt reports 5 falls in last 3 day due to B knees buckling. Pt presents with 7/10 back pain, generalized weakness and balance deficits. Pt was CGA for transfers, standing balance with FWW, and gait up to 20 ft with no evidence of knees buckling or LOB. Pt would like follow up with OPPT but is concerned about adequate insurance coverage. Skilled PT needed to address above impairments. Current DC recs include home with HHPT vs OPPT.

## 2019-12-06 NOTE — PLAN OF CARE
Problem: Patient Care Overview  Goal: Plan of Care Review  Outcome: Ongoing (interventions implemented as appropriate)   12/06/19 5673   Coping/Psychosocial   Plan of Care Reviewed With patient   OTHER   Outcome Summary Pt c/o of lower back and generalized pain, rating pain 10/10. MD notified, lower dose gabapentin restarted. Pt requesting pain medication for uncontrolled pain. Pt is A&Ox4, no hallucinations reported today. Has had episodes of dizziness when active. UA completed today, negative for UTI. Ambulates w. asstx1 and gait belt.TSLO brace when up. VSS. Will continue to monitor.

## 2019-12-06 NOTE — NURSING NOTE
Pt requested that physicians caring for her should ONLY talk to her son, Tavo, in regards to care.     Pt refuses any physician talk to her daughters, Inez and Carmen, in regards to her care when they call.     Per her request, I updated contact information to include her son, Tavo, and sister, Jovita, as active companions.

## 2019-12-06 NOTE — NURSING NOTE
"CWOCN consult for hands. Patient states \"When I fell, it was like I burned my hands on the wall.\" Assessed both hands- there are small scratches on both hands. It looks like patient rubbed her hands down a rough wall but she says it was smooth. There is no redness, just many small scratches. Could see if any lotion helps to soothe the skin. No open wounds, no erythema, no drainage or signs of infection noted.  "

## 2019-12-06 NOTE — PROGRESS NOTES
Patient Identification:  NAME:  Vidhi Lopez  Age:  69 y.o.   Sex:  female   :  1950   MRN:  [id number]       Chief complaint: Concussion postconcussive syndrome ataxia    History of present illness: The patient has had an MRI scan that shows an old right cerebellar stroke but no acute stroke or evidence of brain contusion she has definitely had a concussion.  She states that prior to the fall she was having some confusion and hallucinations this will almost certainly be related to the Requip which is famous for causing that type of hallucination.  We have also discontinued Zanaflex.  She is not having any hallucinations or confusion now.      Past medical history:  Past Medical History:   Diagnosis Date   • Allergic rhinitis    • Asthma with COPD (CMS/[facility])     Asthma/COPD   • Bilateral ovarian cysts    • Coronary artery disease    • Depression with anxiety     Depression/Anxiety   • Diabetes (CMS/[facility])     pre   • Endometriosis     Dr. Jin; estradiol    • ESR raised 3/20/2018   • Fatigue    • Fibromyalgia    • Headache     Headaches   • High cholesterol    • History of gallstones    • History of myocardial infarction    • Hypertension    • Influenza B     DX'D 2018, TREATED WITH TAMIFLU. LAST DOSE 2018 AM.  PATIENT STATES DR RANGEL IS AWARE. DENIES FEVERS OR CHILLS.   • Interstitial cystitis    • On home oxygen therapy     2 L NC   • URIEL on CPAP    • PONV (postoperative nausea and vomiting)    • Seizure disorder, nonconvulsive, with status epilepticus (CMS/[facility]) 3/20/2018   • Septic joint of right knee joint (CMS/[facility]) 3/21/2018   • Shortness of breath on exertion    • Stroke (CMS/[facility])     X1 8 YEARS AGO, GENERALIZED UPPER BODY WEAKNESS RESIDUAL PER PT    • Thyroid disorder    • Urinary leakage    • UTI (urinary tract infection)    • Wears glasses    • Yeast dermatitis        Allergies:  Amlodipine; Hydrocodone; and Reglan [metoclopramide]    Home medications:  Medications Prior to Admission    Medication Sig Dispense Refill Last Dose   • allopurinol (ZYLOPRIM) 100 MG tablet Take 1 tablet by mouth Daily for 180 days. 90 tablet 1 Past Week at Unknown time   • aspirin 81 MG EC tablet Take 81 mg by mouth Daily.   Past Week at Unknown time   • atorvastatin (LIPITOR) 80 MG tablet take 1 tablet by mouth once daily 90 tablet 3 12/4/2019 at Unknown time   • bumetanide (BUMEX) 1 MG tablet Take 1 tablet by mouth Daily With Breakfast. 90 tablet 3 12/4/2019 at Unknown time   • celecoxib (CELEBREX) 100 MG capsule Every 12 (Twelve) Hours.   12/4/2019 at Unknown time   • clopidogrel (PLAVIX) 75 MG tablet take 1 tablet by mouth once daily 90 tablet 3 12/4/2019 at Unknown time   • clotrimazole-betamethasone (LOTRISONE) 1-0.05 % cream Apply  topically to the appropriate area as directed As Needed.  1 Past Week at Unknown time   • FLUoxetine (PROZAC) 10 MG capsule Take 1 capsule by mouth Daily. 90 capsule 3 12/4/2019 at Unknown time   • gabapentin (NEURONTIN) 800 MG tablet Take 800 mg by mouth 3 (Three) Times a Day.  0 Taking   • hydrOXYzine (ATARAX) 50 MG tablet Take 1 tablet by mouth Every 8 (Eight) Hours As Needed for Itching. 90 tablet 2 12/4/2019 at Unknown time   • Insulin aspart (FIASP FLEXTOUCH) 100 UNIT/ML solution pen-injector injection pen   0 12/4/2019 at Unknown time   • ipratropium-albuterol (DUO-NEB) 0.5-2.5 mg/3 ml nebulizer USE ONE AMPULE VIA NEBULIZER FOUR TIMES DAILY 1080 mL 1 Past Week at Unknown time   • levETIRAcetam (KEPPRA) 500 MG tablet Take 3 tablets by mouth 2 (Two) Times a Day. 180 tablet 11 12/4/2019 at Unknown time   • metoprolol tartrate (LOPRESSOR) 50 MG tablet Take 1 tablet by mouth Every 12 (Twelve) Hours. 60 tablet 11 12/4/2019 at Unknown time   • ondansetron (ZOFRAN) 8 MG tablet Take 1 tablet by mouth Every 8 (Eight) Hours As Needed for Nausea or Vomiting. 30 tablet 1 12/4/2019 at Unknown time   • Probiotic Product (PROBIOTIC DAILY PO) Take 1 tablet by mouth Daily.   12/4/2019 at Unknown  time   • promethazine (PHENERGAN) 25 MG tablet Take 25 mg by mouth Every 8 (Eight) Hours As Needed for Nausea or Vomiting.   12/4/2019 at Unknown time   • rOPINIRole (REQUIP) 0.25 MG tablet Take 1 tablet by mouth Every Night. Take 1-3 hours before bedtime. 90 tablet 3 12/4/2019 at Unknown time   • SYMBICORT 160-4.5 MCG/ACT inhaler Inhale 2 puffs 2 (Two) Times a Day. 10.2 g 6 12/4/2019 at Unknown time   • tiZANidine (ZANAFLEX) 4 MG tablet Every 12 (Twelve) Hours.   12/4/2019 at Unknown time   • TOUJEO SOLOSTAR 300 UNIT/ML solution pen-injector injection Use 25 Units at night 1.5 mL 6 12/4/2019 at Unknown time   • nitroglycerin (NITROSTAT) 0.4 MG SL tablet Place 0.4 mg under the tongue Every 5 (Five) Minutes As Needed.  0 Unknown at Unknown time   • nystatin (MYCOSTATIN) 038015 UNIT/GM ointment Apply  topically to the appropriate area as directed 2 (Two) Times a Day. 30 g 0 Unknown at Unknown time   • nystatin (MYCOSTATIN) 889807 UNIT/ML suspension Take 5 mL by mouth 4 (Four) Times a Day. 473 mL 0 Unknown at Unknown time        Hospital medications:    allopurinol 100 mg Oral Daily   atorvastatin 80 mg Oral Daily   budesonide-formoterol 2 puff Inhalation BID - RT   FLUoxetine 10 mg Oral Daily   hydrocerin  Topical BID   insulin lispro 0-9 Units Subcutaneous 4x Daily With Meals & Nightly   lactobacillus acidophilus 1 capsule Oral Daily   levETIRAcetam 1,500 mg Oral BID   lidocaine 1 patch Transdermal Q24H   LORazepam 1 mg Intravenous Once   metoprolol tartrate 50 mg Oral Q12H   rOPINIRole 0.25 mg Oral Nightly   sodium chloride 10 mL Intravenous Q12H       sodium chloride 100 mL/hr Last Rate: 100 mL/hr (12/05/19 1228)     •  acetaminophen **OR** acetaminophen **OR** acetaminophen  •  bisacodyl  •  calcium carbonate  •  dextrose  •  dextrose  •  glucagon (human recombinant)  •  hydrOXYzine  •  ipratropium-albuterol  •  ondansetron **OR** ondansetron  •  sodium chloride      Objective:  Vitals Ranges:   Temp:  [98.2 °F  (36.8 °C)-98.8 °F (37.1 °C)] 98.6 °F (37 °C)  Heart Rate:  [60-74] 70  Resp:  [18-20] 18  BP: (141-206)/(91-97) 206/93      Physical Exam:  Awake alert and oriented x3 bilateral raccoon eyes normal cranial nerves II through VII other than the raccoon eyes tongue is midline good  strength and toe wiggle reflexes absent throughout toes downgoing bilaterally    Results review:   I reviewed the patient's new clinical results.    Data review:  Lab Results (last 24 hours)     Procedure Component Value Units Date/Time    POC Glucose Once [[id number]]  (Abnormal) Collected:  12/06/19 1104    Specimen:  Blood Updated:  12/06/19 1105     Glucose 158 mg/dL     POC Glucose Once [[id number]]  (Abnormal) Collected:  12/06/19 0738    Specimen:  Blood Updated:  12/06/19 0739     Glucose 134 mg/dL     Hemoglobin A1c [[id number]]  (Abnormal) Collected:  12/06/19 0601    Specimen:  Blood Updated:  12/06/19 0716     Hemoglobin A1C 8.90 %     Narrative:       Hemoglobin A1C Ranges:    Increased Risk for Diabetes  5.7% to 6.4%  Diabetes                     >= 6.5%  Diabetic Goal                < 7.0%    POC Glucose Once [[id number]]  (Abnormal) Collected:  12/05/19 2016    Specimen:  Blood Updated:  12/05/19 2018     Glucose 149 mg/dL     Clostridium Difficile Toxin - Stool, Per Rectum [[id number]] Collected:  12/05/19 1545    Specimen:  Stool from Per Rectum Updated:  12/05/19 1830    Narrative:       The following orders were created for panel order Clostridium Difficile Toxin - Stool, Per Rectum.  Procedure                               Abnormality         Status                     ---------                               -----------         ------                     Clostridium Difficile To...[[id number]]  Normal              Final result                 Please view results for these tests on the individual orders.    Clostridium Difficile Toxin, PCR - Stool, Per Rectum [[id number]]  (Normal) Collected:  12/05/19 1545    Specimen:   Stool from Per Rectum Updated:  12/05/19 1830     C. Difficile Toxins by PCR Negative    POC Glucose Once [[id number]]  (Normal) Collected:  12/05/19 1755    Specimen:  Blood Updated:  12/05/19 1756     Glucose 116 mg/dL            Imaging:  Imaging Results (Last 24 Hours)     Procedure Component Value Units Date/Time    MRI Brain With & Without Contrast [[id number]] Collected:  12/05/19 2014     Updated:  12/05/19 2025    Narrative:       MRI BRAIN W WO CONTRAST-     CLINICAL HISTORY: Patient fell. Head trauma.     TECHNIQUE: Multiple axial T1, T2, gradient echo and diffusion images  were acquired. Axial and coronal T1-weighted images were also acquired  after intravenous injection of MultiHance contrast.      COMPARISON: CT scan of the head performed earlier today.     FINDINGS: The images are somewhat degraded due to patient motion. As  also shown on the CT scan, there is a small well-circumscribed area of  encephalomalacia in the posterior and inferior aspect of the right  cerebellar hemisphere that measures 1.9 x 1.2 cm in diameter. This is  unchanged and is consistent with a chronic infarct. There is no evidence  of acute infarct or hemorrhage. No foci of restricted diffusion are  identified in the brain or brainstem. There is no mass effect. No  abnormal enhancement is evident within the brain or brainstem. There are  no masses. Normal flow voids are observed in the major vascular  structures. The paranasal sinuses are well aerated.       Impression:       Suboptimal study due to patient motion. Small chronic  infarct in the right cerebellar hemisphere as described. Otherwise  unremarkable MRI of the brain with and without contrast.     This report was finalized on 12/5/2019 8:22 PM by Dr. Edward Wu M.D.       CT Head Without Contrast [[id number]] Collected:  12/05/19 2009     Updated:  12/05/19 2017    Narrative:       CT HEAD WO CONTRAST-     CLINICAL HISTORY: Multiple falls. Head trauma.     TECHNIQUE:  Transverse 3 mm thick images were acquired from the base of  skull to vertex without IV contrast.     Radiation dose reduction techniques were utilized, including automated  exposure control and exposure modulation based on body size.     COMPARISON: CT scan of the head from another facility dated 12/04/2019.     FINDINGS: There is a small chronic infarct in the posterior aspect of  the right cerebellar hemisphere that is unchanged there is no evidence  of acute infarct or hemorrhage. There is no mass effect. Bone window  images demonstrate no evidence of skull fracture. The paranasal sinuses  are well aerated.       Impression:       Small stable chronic infarct in the right cerebellar  hemisphere. Otherwise unremarkable CT scan of the head     This report was finalized on 12/5/2019 8:14 PM by Dr. Edward Wu M.D.                Assessment and Plan:       L1 vertebral fracture (CMS/HCC)    Hypertension    Diabetes mellitus, type 2 (CMS/HCC)    RLS (restless legs syndrome)    COPD (chronic obstructive pulmonary disease) (CMS/HCC)    History of stroke    CAD (coronary artery disease)    CKD (chronic kidney disease) stage 2, GFR 60-89 ml/min    Bilateral carotid artery stenosis    Chronic gout with tophus    Hyperlipidemia    Seizure disorder (CMS/[facility])    Rib fracture    This patient has falls because she has a history of a right cerebellar stroke as noted by my review of the MRI scan she has had during this admission, but no acute stroke she also has diabetic peripheral neuropathy affecting proprioceptive input.  She is now also postconcussive.  He has been warned to use either a walker or always had a cane as that will help with her proprioceptive input.  She is awake alert and not having any type of hallucinations now but apparently prior to the fall she was having some sort of confusion and hallucinations.  I suspect that this will be secondary to her medications and I will discontinue the Requip which would  be the primary culprit.  We have already discontinued the Zanaflex.  I will sign off and follow-up PRN reconsult thank you    Zac Howe MD  12/06/19  12:52 PM

## 2019-12-06 NOTE — PROGRESS NOTES
Continued Stay Note  [facility]     Patient Name: Vidhi Lopez  MRN: [id number]  Today's Date: 12/6/2019    Admit Date: 12/4/2019    Discharge Plan     Row Name 12/06/19 1520       Plan    Plan  Return to [facility] with [facility] (referral made)    Plan Comments  Spoke with patient regarding DC plans. Plan remain to DC home (Lives at [facility]) with [facility] (Referral made)        Discharge Codes    No documentation.             Zari Alves RN

## 2019-12-06 NOTE — NURSING NOTE
Unable to perform in/out straight cath to obtain UA d/t patient vaginal pain. NA will try to bathe patient and cleanse the annie area well so that a clean catch sample can be sent to lab.

## 2019-12-06 NOTE — PLAN OF CARE
Problem: Patient Care Overview  Goal: Plan of Care Review  Outcome: Ongoing (interventions implemented as appropriate)   12/06/19 1511   Coping/Psychosocial   Plan of Care Reviewed With patient   OTHER   Outcome Summary Pt presents to OT with decreased independence and safety for adls and functional transfers. Pt will continue to benefit from OT

## 2019-12-06 NOTE — PROGRESS NOTES
[facility]     LOS: 0 days     Subjective:    CC:No chief complaint on file.    DIET:  Diet Order   Procedures   • Diet Regular; Consistent Carbohydrate     Barely able to get to the bathroom  Patient lives by herself  Severe back pain  Patient more alert today  No cp, no soa    Objective:    Vital Signs:  Temp:  [98.2 °F (36.8 °C)-98.8 °F (37.1 °C)] 98.6 °F (37 °C)  Heart Rate:  [70-74] 70  Resp:  [18-20] 18  BP: (141-206)/(91-97) 206/93    SpO2:  [92 %-96 %] 96 %  on  Flow (L/min):  [2.5] 2.5;   Device (Oxygen Therapy): nasal cannula  Body mass index is 237.59 kg/m².    Physical Exam   Constitutional: She appears well-developed and well-nourished.   HENT:   Head: Normocephalic and atraumatic.   Cardiovascular: Exam reveals no friction rub.   No murmur heard.  Pulmonary/Chest: Effort normal and breath sounds normal.   Abdominal: Soft. Bowel sounds are normal. She exhibits no distension. There is no tenderness.   Neurological: She is alert.   Skin: Skin is warm and dry.       Results Review:    Glucose   Date Value Ref Range Status   12/05/2019 119 (H) 65 - 99 mg/dL Final     Results from last 7 days   Lab Units 12/05/19  0520   WBC 10*3/mm3 7.45   HEMOGLOBIN g/dL 11.5*   HEMATOCRIT % 34.5   PLATELETS 10*3/mm3 213     Results from last 7 days   Lab Units 12/05/19  0520   SODIUM mmol/L 142   POTASSIUM mmol/L 3.8   CHLORIDE mmol/L 98   CO2 mmol/L 30.4*   BUN mg/dL 14   CREATININE mg/dL 0.84   CALCIUM mg/dL 9.1   GLUCOSE mg/dL 119*                 Cultures:       I have reviewed daily medications and changes in CPOE    Scheduled meds    allopurinol 100 mg Oral Daily   atorvastatin 80 mg Oral Daily   budesonide-formoterol 2 puff Inhalation BID - RT   FLUoxetine 10 mg Oral Daily   gabapentin 300 mg Oral TID   gabapentin 300 mg Oral TID   hydrocerin  Topical BID   insulin lispro 0-9 Units Subcutaneous 4x Daily With Meals & Nightly   lactobacillus acidophilus 1 capsule Oral Daily    levETIRAcetam 1,500 mg Oral BID   lidocaine 1 patch Transdermal Q24H   LORazepam 1 mg Intravenous Once   metoprolol tartrate 50 mg Oral Q12H   sodium chloride 10 mL Intravenous Q12H         sodium chloride 100 mL/hr Last Rate: 100 mL/hr (12/05/19 2111)     PRN meds  •  acetaminophen **OR** acetaminophen **OR** acetaminophen  •  bisacodyl  •  calcium carbonate  •  dextrose  •  dextrose  •  glucagon (human recombinant)  •  hydrOXYzine  •  ipratropium-albuterol  •  ondansetron **OR** ondansetron  •  sodium chloride        L1 vertebral fracture (CMS/HCC)    Hypertension    Diabetes mellitus, type 2 (CMS/HCC)    RLS (restless legs syndrome)    COPD (chronic obstructive pulmonary disease) (CMS/HCC)    History of stroke    CAD (coronary artery disease)    CKD (chronic kidney disease) stage 2, GFR 60-89 ml/min    Bilateral carotid artery stenosis    Chronic gout with tophus    Hyperlipidemia    Seizure disorder (CMS/HCC)    Rib fracture        Assessment/Plan:    L1 vertebral fracture (CMS/HCC)  Pain in the low back, start neurontin 300mg tid    Encephalopathy and falls-now on lower dose of neurontin and stopped muscle relaxers, mental status is better today  -PT eval  -ambulate      Rib fracture- stable      Hypertension      Diabetes mellitus, type 2 (CMS/HCC)      RLS (restless legs syndrome)      COPD (chronic obstructive pulmonary disease) (CMS/HCC)      History of stroke      CAD (coronary artery disease) with stents, restart plavix and aspirin  -Hypertension, likely related to pain, see how it does with the neurontin being added, continue metoprolol 50 bid      CKD (chronic kidney disease) stage 2, GFR 60-89 ml/min      Bilateral carotid artery stenosis       Chronic gout with tophus      Hyperlipidemia      Seizure disorder (CMS/HCC)  -keppra 1500 bid      Rib fracture      Interstitial cystitis      DVT PPX: scd      Lenny Grajeda MD  12/06/19  4:30 PM

## 2019-12-06 NOTE — PROGRESS NOTES
NEUROSURGERY PROGRESS NOTE    PATIENT IDENTIFICATION:   Name:  Vidhi Lopez      MRN:  [id number]     69 y.o.  female               CC: s/p fall with L1 comp fracture      Subjective     Interval History: has complaint of only minimal back pain.    Objective     Vital signs in last 24 hours:  Temp:  [98.2 °F (36.8 °C)-98.8 °F (37.1 °C)] 98.2 °F (36.8 °C)  Heart Rate:  [60-74] 74  Resp:  [18-20] 20  BP: (141-185)/(77-97) 141/97  ICP ranges-    Intake/Output last 3 shifts:  I/O last 3 completed shifts:  In: 975 [I.V.:975]  Out: -     Intake/Output this shift:  No intake/output data recorded.      Physical Exam:  General:   Awake, alert, and oriented x 3. NAD  Obese  HOFFMAN well  Raccoon eyes  Bruising on neck and chest      LABS:    Lab Results (last 24 hours)     Procedure Component Value Units Date/Time    POC Glucose Once [[id number]]  (Abnormal) Collected:  12/05/19 1142    Specimen:  Blood Updated:  12/05/19 1157     Glucose 155 mg/dL     Clostridium Difficile Toxin - Stool, Per Rectum [[id number]] Collected:  12/05/19 1545    Specimen:  Stool from Per Rectum Updated:  12/05/19 1830    Narrative:       The following orders were created for panel order Clostridium Difficile Toxin - Stool, Per Rectum.  Procedure                               Abnormality         Status                     ---------                               -----------         ------                     Clostridium Difficile To...[[id number]]  Normal              Final result                 Please view results for these tests on the individual orders.    Clostridium Difficile Toxin, PCR - Stool, Per Rectum [[id number]]  (Normal) Collected:  12/05/19 1545    Specimen:  Stool from Per Rectum Updated:  12/05/19 1830     C. Difficile Toxins by PCR Negative    POC Glucose Once [[id number]]  (Normal) Collected:  12/05/19 1755    Specimen:  Blood Updated:  12/05/19 1756     Glucose 116 mg/dL     POC Glucose Once [[id number]]  (Abnormal) Collected:  12/05/19  2016    Specimen:  Blood Updated:  12/05/19 2018     Glucose 149 mg/dL     Hemoglobin A1c [[id number]]  (Abnormal) Collected:  12/06/19 0601    Specimen:  Blood Updated:  12/06/19 0716     Hemoglobin A1C 8.90 %     Narrative:       Hemoglobin A1C Ranges:    Increased Risk for Diabetes  5.7% to 6.4%  Diabetes                     >= 6.5%  Diabetic Goal                < 7.0%    POC Glucose Once [[id number]]  (Abnormal) Collected:  12/06/19 0738    Specimen:  Blood Updated:  12/06/19 0739     Glucose 134 mg/dL     POC Glucose Once [[id number]]  (Abnormal) Collected:  12/06/19 1104    Specimen:  Blood Updated:  12/06/19 1105     Glucose 158 mg/dL           IMAGING STUDIES:    CT head dated December 5, 2019 reveals small stable chronic infarct in the right cerebellar hemisphere.      Meds reviewed/changed: Yes    Current Facility-Administered Medications   Medication Dose Route Frequency Provider Last Rate Last Dose   • acetaminophen (TYLENOL) tablet 650 mg  650 mg Oral Q4H PRN Stephanie Marte APRN   650 mg at 12/05/19 1004    Or   • acetaminophen (TYLENOL) 160 MG/5ML solution 650 mg  650 mg Oral Q4H PRN Stephanie Marte APRN        Or   • acetaminophen (TYLENOL) suppository 650 mg  650 mg Rectal Q4H PRN Stephanie Marte APRN       • allopurinol (ZYLOPRIM) tablet 100 mg  100 mg Oral Daily Stephanie Marte APRN   100 mg at 12/06/19 1034   • atorvastatin (LIPITOR) tablet 80 mg  80 mg Oral Daily Stephanie Marte APRN   80 mg at 12/06/19 1034   • bisacodyl (DULCOLAX) EC tablet 5 mg  5 mg Oral Daily PRN Stephanie Marte APRN       • budesonide-formoterol (SYMBICORT) 160-4.5 MCG/ACT inhaler 2 puff  2 puff Inhalation BID - RT Stephanie Marte APRN   2 puff at 12/06/19 0818   • calcium carbonate (TUMS) chewable tablet 500 mg (200 mg elemental)  2 tablet Oral BID PRN Stephanie Marte APRN       • dextrose (D50W) 25 g/ 50mL Intravenous Solution 25 g  25 g Intravenous Q15 Min PRN Estuardo,  JANI Delgado       • dextrose (GLUTOSE) oral gel 15 g  15 g Oral Q15 Min PRN Stephanie Marte APRN       • FLUoxetine (PROzac) capsule 10 mg  10 mg Oral Daily Stephanie Marte APRN   10 mg at 12/06/19 1034   • glucagon (human recombinant) (GLUCAGEN DIAGNOSTIC) injection 1 mg  1 mg Subcutaneous PRN Stephanie Marte APRN       • hydrocerin (EUCERIN) cream   Topical BID Lenny Grajeda MD       • hydrOXYzine (ATARAX) tablet 50 mg  50 mg Oral Q8H PRN Stephanie Marte APRN       • insulin lispro (humaLOG) injection 0-9 Units  0-9 Units Subcutaneous 4x Daily With Meals & Nightly Stephanie Marte APRN   2 Units at 12/05/19 1238   • ipratropium-albuterol (DUO-NEB) nebulizer solution 3 mL  3 mL Nebulization Q6H PRN Stephanie Marte APRN       • lactobacillus acidophilus (RISAQUAD) capsule 1 capsule  1 capsule Oral Daily Stephanie Marte APRN   1 capsule at 12/06/19 1034   • levETIRAcetam (KEPPRA) tablet 1,500 mg  1,500 mg Oral BID Stephanie Marte APRN   1,500 mg at 12/06/19 1032   • lidocaine (LIDODERM) 5 % 1 patch  1 patch Transdermal Q24H Stephanie Marte APRN   1 patch at 12/06/19 1034   • LORazepam (ATIVAN) injection 1 mg  1 mg Intravenous Once Zac Howe MD       • metoprolol tartrate (LOPRESSOR) tablet 50 mg  50 mg Oral Q12H Stephanie Marte APRN   50 mg at 12/06/19 1034   • ondansetron (ZOFRAN) tablet 4 mg  4 mg Oral Q6H PRN Stephanie Marte APRN   4 mg at 12/06/19 1047    Or   • ondansetron (ZOFRAN) injection 4 mg  4 mg Intravenous Q6H PRN Stephanie Marte APRN   4 mg at 12/06/19 0143   • rOPINIRole (REQUIP) tablet 0.25 mg  0.25 mg Oral Nightly Stephanie Marte APRN   0.25 mg at 12/05/19 2152   • sodium chloride 0.9 % flush 10 mL  10 mL Intravenous Q12H Stephanie Marte APRN   10 mL at 12/06/19 1035   • sodium chloride 0.9 % flush 10 mL  10 mL Intravenous PRN Stephanie Marte APRN       • sodium chloride 0.9 % infusion  100  "mL/hr Intravenous Continuous Stephanie Marte, APRSHABBIR 100 mL/hr at 12/05/19 1758 100 mL/hr at 12/05/19 1758       Assessment/Plan     ASSESSMENT:      L1 vertebral fracture (CMS/[facility])    Hypertension    Diabetes mellitus, type 2 (CMS/[facility])    RLS (restless legs syndrome)    COPD (chronic obstructive pulmonary disease) (CMS/[facility])    History of stroke    CAD (coronary artery disease)    CKD (chronic kidney disease) stage 2, GFR 60-89 ml/min    Bilateral carotid artery stenosis    Chronic gout with tophus    Hyperlipidemia    Seizure disorder (CMS/[facility])    Rib fracture      PLAN: She continues to express no desire for surgical intervention.  The TLSO brace has not been delivered.  Will reorder today, which will be ordered for comfort only. We will have her follow up PRN. We will sign off, please call if needed.      I discussed the patients findings and my recommendations with patient, nursing staff and Dr Worthington       LOS: 0 days       JANI Brown  12/6/2019  11:30 AM      \"Dictated utilizing Dragon dictation\".      "

## 2019-12-07 PROBLEM — Z79.899 POLYPHARMACY: Status: ACTIVE | Noted: 2019-12-07

## 2019-12-07 LAB
GLUCOSE BLDC GLUCOMTR-MCNC: 145 MG/DL (ref 70–130)
GLUCOSE BLDC GLUCOMTR-MCNC: 155 MG/DL (ref 70–130)
GLUCOSE BLDC GLUCOMTR-MCNC: 169 MG/DL (ref 70–130)
GLUCOSE BLDC GLUCOMTR-MCNC: 200 MG/DL (ref 70–130)

## 2019-12-07 PROCEDURE — 82962 GLUCOSE BLOOD TEST: CPT

## 2019-12-07 PROCEDURE — 94799 UNLISTED PULMONARY SVC/PX: CPT

## 2019-12-07 PROCEDURE — 63710000001 INSULIN LISPRO (HUMAN) PER 5 UNITS: Performed by: NURSE PRACTITIONER

## 2019-12-07 PROCEDURE — 94640 AIRWAY INHALATION TREATMENT: CPT

## 2019-12-07 RX ORDER — LIDOCAINE 50 MG/G
1 PATCH TOPICAL
Qty: 10 PATCH | Refills: 0 | Status: SHIPPED | OUTPATIENT
Start: 2019-12-08 | End: 2020-02-28

## 2019-12-07 RX ORDER — HYDRALAZINE HYDROCHLORIDE 50 MG/1
50 TABLET, FILM COATED ORAL EVERY 8 HOURS SCHEDULED
Qty: 90 TABLET | Refills: 0 | Status: SHIPPED | OUTPATIENT
Start: 2019-12-07 | End: 2020-01-06

## 2019-12-07 RX ORDER — GABAPENTIN 300 MG/1
300 CAPSULE ORAL 3 TIMES DAILY
Qty: 30 CAPSULE | Refills: 0 | Status: SHIPPED | OUTPATIENT
Start: 2019-12-07 | End: 2019-12-09

## 2019-12-07 RX ADMIN — INSULIN LISPRO 4 UNITS: 100 INJECTION, SOLUTION INTRAVENOUS; SUBCUTANEOUS at 17:59

## 2019-12-07 RX ADMIN — CLOPIDOGREL 75 MG: 75 TABLET, FILM COATED ORAL at 09:00

## 2019-12-07 RX ADMIN — BUDESONIDE AND FORMOTEROL FUMARATE DIHYDRATE 2 PUFF: 160; 4.5 AEROSOL RESPIRATORY (INHALATION) at 08:03

## 2019-12-07 RX ADMIN — LEVETIRACETAM 1500 MG: 500 TABLET, FILM COATED ORAL at 20:51

## 2019-12-07 RX ADMIN — IPRATROPIUM BROMIDE AND ALBUTEROL SULFATE 3 ML: 2.5; .5 SOLUTION RESPIRATORY (INHALATION) at 21:28

## 2019-12-07 RX ADMIN — GABAPENTIN 300 MG: 300 CAPSULE ORAL at 10:36

## 2019-12-07 RX ADMIN — Medication: at 20:52

## 2019-12-07 RX ADMIN — OXYCODONE AND ACETAMINOPHEN 1 TABLET: 5; 325 TABLET ORAL at 16:17

## 2019-12-07 RX ADMIN — GABAPENTIN 300 MG: 300 CAPSULE ORAL at 20:51

## 2019-12-07 RX ADMIN — SODIUM CHLORIDE, PRESERVATIVE FREE 10 ML: 5 INJECTION INTRAVENOUS at 21:46

## 2019-12-07 RX ADMIN — HYDRALAZINE HYDROCHLORIDE 50 MG: 50 TABLET, FILM COATED ORAL at 05:23

## 2019-12-07 RX ADMIN — ASPIRIN 81 MG: 81 TABLET, CHEWABLE ORAL at 09:00

## 2019-12-07 RX ADMIN — OXYCODONE AND ACETAMINOPHEN 1 TABLET: 5; 325 TABLET ORAL at 04:16

## 2019-12-07 RX ADMIN — ATORVASTATIN CALCIUM 80 MG: 80 TABLET, FILM COATED ORAL at 09:00

## 2019-12-07 RX ADMIN — METOPROLOL TARTRATE 50 MG: 50 TABLET, FILM COATED ORAL at 20:52

## 2019-12-07 RX ADMIN — INSULIN LISPRO 2 UNITS: 100 INJECTION, SOLUTION INTRAVENOUS; SUBCUTANEOUS at 20:58

## 2019-12-07 RX ADMIN — ACETAMINOPHEN 650 MG: 325 TABLET, FILM COATED ORAL at 05:27

## 2019-12-07 RX ADMIN — SODIUM CHLORIDE, PRESERVATIVE FREE 10 ML: 5 INJECTION INTRAVENOUS at 09:01

## 2019-12-07 RX ADMIN — HYDRALAZINE HYDROCHLORIDE 50 MG: 50 TABLET, FILM COATED ORAL at 20:53

## 2019-12-07 RX ADMIN — GABAPENTIN 300 MG: 300 CAPSULE ORAL at 17:59

## 2019-12-07 RX ADMIN — HYDRALAZINE HYDROCHLORIDE 50 MG: 50 TABLET, FILM COATED ORAL at 14:48

## 2019-12-07 RX ADMIN — LEVETIRACETAM 1500 MG: 500 TABLET, FILM COATED ORAL at 09:00

## 2019-12-07 NOTE — PLAN OF CARE
Pt was to be discharged today but is appealing her discharge as she does not think it safe to go home due to her gait. Pt was upset that MD did not want to restart her sedating medications at discharge.  Appeal will take about 48 hours to process.  Percocet x 1.  See Dr. Peterson's note.

## 2019-12-07 NOTE — PROGRESS NOTES
Continued Stay Note  [facility]     Patient Name: Vidhi Lopez  MRN: [id number]  Today's Date: 12/7/2019    Admit Date: 12/4/2019    Discharge Plan     Row Name 12/07/19 1438       Plan    Plan Comments  Spoke with patient at bedside. Discharge plans unchanged. Plans dc to [facility] independent living with [facility] for PT.     Row Name 12/07/19 1431       Plan    Plan Comments  RN called stating patient wants to appeal discharge. CCP met with patient at bedside. IMM explained to patient and signed at bedside. Verbalized understanding. Original scanned to HIM and copy given to patient. Detail Notice of Discharge was given to patient and original scanned into HIM. Explained she would have to call and once [facility] contacts case management documents will be sent for review.         Discharge Codes    No documentation.       Expected Discharge Date and Time     Expected Discharge Date Expected Discharge Time    Dec 7, 2019             Isabella Sharma RN

## 2019-12-07 NOTE — PLAN OF CARE
Problem: Patient Care Overview  Goal: Plan of Care Review   12/06/19 2348   Coping/Psychosocial   Plan of Care Reviewed With patient   Plan of Care Review   Progress improving   OTHER   Outcome Summary Pt. A&Ox4. BP has been elevated. Hydralazine ordered for management. No hallucinations reported. Asst.X1. Pain medication effective for c/o back pain. Will continue to monitor.        Problem: Fall Risk (Adult)  Intervention: Monitor/Assist with Self Care   12/06/19 2348   Activity   Activity Assistance Provided assistance, 1 person   Daily Care Interventions   Self-Care Promotion independence encouraged;safe use of adaptive equipment encouraged       Goal: Identify Related Risk Factors and Signs and Symptoms   12/06/19 2348   Fall Risk (Adult)   Related Risk Factors (Fall Risk) sensory deficits;environment unfamiliar;gait/mobility problems;history of falls;culprit medication(s);homeostatic imbalance   Signs and Symptoms (Fall Risk) presence of risk factors     Goal: Absence of Fall   12/06/19 2348   Fall Risk (Adult)   Absence of Fall making progress toward outcome       Problem: Pain, Chronic (Adult)  Goal: Identify Related Risk Factors and Signs and Symptoms   12/06/19 2348   Pain, Chronic (Adult)   Related Risk Factors (Chronic Pain) physical disability   Signs and Symptoms (Chronic Pain) BADLs/IADLs, reluctance/inability to perform;verbalization of pain descriptors     Goal: Acceptable Pain/Comfort Level and Functional Ability   12/06/19 2348   Pain, Chronic (Adult)   Acceptable Pain/Comfort Level and Functional Ability making progress toward outcome

## 2019-12-07 NOTE — DISCHARGE SUMMARY
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       DETAILS OF HOSPITAL STAY     Pertinent Test Results and Procedures Performed    Head CT:  Small stable chronic infarct in the right cerebellar  hemisphere. Otherwise unremarkable CT scan of the head    Brain MRI:  Suboptimal study due to patient motion. Small chronic  infarct in the right cerebellar hemisphere as described. Otherwise  unremarkable MRI of the brain with and without contrast.    Urinalysis on 10/6/2019 showed 2+ blood, 1+ protein, 13-20 red blood cells but no white blood cells, no bacteria, no leukocytes, and no nitrite nor any other evidence to suggest urinary tract infection    C. difficile toxin PCR negative    HPI per JANI Guido and Dr. Grajeda  Ms. Lopez is a 69 y.o. smoker with a history of hyperlipidemia, hypertension, diabetes mellitus type 2, coronary artery disease, and chronic kidney disease that presents to [facility] due to a fall.  She initially presented to [facility]  and has been transferred to [facility] for further evaluation.  She reports seven falls in the last two weeks.  She states she has had progressive weakness for the past month or so, and states her legs often give out on her.  She c/o intermittent light-headedness, but denies loss of consiousness.  She states she has hit her head three times during the falls, and last night fell forward and hit her face on the wall. She c/o headache, but denies speech difficulty and facial asymmetry.  She c/o suprapubic and flank pain, dysuria, and urinary frequency.  She reports a history of interstitial cystitis.  She c/o low grade fevers, but denies chills.  She also c/o intermittent nausea.  She c/o back pain that is described as constant, moderate, and achy in nature.  She states movement exacerbates her pain and she denies alleviating factors.               Lab work at [facility] was unremarkable. CT of the thoracic spine showed degenerative changes, but was negative for acute fracture. CT of the lumbar spine found superior endplate concavity and mild deformity superior endplate L1 with monitor wedging concerning for acute fracture.  CT of the abdomen/pelvis showed hepatomegaly, hepatic steatosis, and hiatal hernia.  CT of the chest revealed an acute facture of the left 10th rib, age indeterminate.  CT of the head showed encephalomalacia of the right cerebellum, trace fluid inferior right mastoid air cells, and multilevel degenerative disc disease of the cervical spine. X-ray of the left hip was negative for an acute fracture.    Hospital Course  The patient was admitted from [facility] emergency room on 12/5/2019.  On that day she was evaluated here by neurosurgery for the findings of L1 vertebral compression fracture.  Options for further work-up and management were discussed with her and the patient expressed to the neurosurgery service that she had no desire for kyphoplasty and therefore they did not feel that an MRI was warranted to  confirm the acuteness of her fracture given that they would not be performing any operative intervention.  She has had no red flag symptoms as related to the fracture.  Neurosurgery recommended conservative management, to stay in line with her wishes, and provided her a TLSO brace which they indicate she can use for comfort and pain relief.  They have signed off on her case and states she can follow-up with them in the office as needed.  The patient was also evaluated by neurology given her recurrent falls.  Head CT was negative along with brain MRI.  Neurology did not feel there is any evidence of seizure activity and thus kept her antiepileptic doses the same from her outpatient regimen.  They did comment that she has evidence of severe diabetic peripheral neuropathy which has resulted in decreased proprioception, resulting in her falls.  Additionally they feel that her diabetes is responsible for some dysautonomia that could also be contributing.  We have recommended that she have home health follow her upon discharge and to use either a walker or a cane but she has declined these recommendations.  Her medication list was reviewed upon admission and sequentially on additional hospital days thereafter.  It was felt by neurology as well as the other physicians that have come before me (I am seeing the patient today for the first time today) that she was experiencing side effects from polypharmacy and her sedating medications have been discontinued/reduced as outlined below.  Neurology stated on 12/6/2019 that she was more awake and alert and showed improvement from a neurologic/cognitive standpoint.  All consulting services have signed off at this time.  I discussed this with the patient today.  She is adamant that she has a urinary tract infection.  I informed her that this has already been looked into with a urinalysis yesterday that was completely unremarkable for any signs of bacteria, white blood cells,  nitrite, or leukocytes.  Additionally, she is not having any fevers or leukocytosis and therefore has no clinical evidence of urinary tract infection.  Therefore, there is no indication to prescribe antibiotics at this time.  At this point the patient appears medically stable and will be released home.    Physical Exam at Discharge:  General: No acute distress, AAOx3  HEENT: EOMI, PERRL, periorbital ecchymosis  Cardiovascular: +s1 and s2, RRR  Lungs: No rhonchi or wheezing  Abdomen: soft, nontender    Consults:   Consults     Date and Time Order Name Status Description    12/5/2019 1237 Inpatient Neurology Consult General Completed     12/5/2019 0113 Inpatient Neurosurgery Consult Completed             Condition on Discharge: Stable    Discharge Disposition  Home or Self Care    Discharge Medications     Discharge Medications      New Medications      Instructions Start Date   gabapentin 300 MG capsule  Commonly known as:  NEURONTIN  Replaces:  gabapentin 800 MG tablet   300 mg, Oral, 3 Times Daily      hydrALAZINE 50 MG tablet  Commonly known as:  APRESOLINE   50 mg, Oral, Every 8 Hours Scheduled      lidocaine 5 %  Commonly known as:  LIDODERM   1 patch, Transdermal, Every 24 Hours Scheduled, Remove & Discard patch within 12 hours or as directed by MD   Start Date:  December 8, 2019        Continue These Medications      Instructions Start Date   allopurinol 100 MG tablet  Commonly known as:  ZYLOPRIM   100 mg, Oral, Daily      aspirin 81 MG EC tablet   81 mg, Oral, Daily      atorvastatin 80 MG tablet  Commonly known as:  LIPITOR   take 1 tablet by mouth once daily      bumetanide 1 MG tablet  Commonly known as:  BUMEX   1 mg, Oral, Daily With Breakfast      CELEBREX 100 MG capsule  Generic drug:  celecoxib   Every 12 Hours Scheduled      clopidogrel 75 MG tablet  Commonly known as:  PLAVIX   take 1 tablet by mouth once daily      clotrimazole-betamethasone 1-0.05 % cream  Commonly known as:  LOTRISONE   Topical,  As Needed      FLUoxetine 10 MG capsule  Commonly known as:  PROZAC   10 mg, Oral, Daily      hydrOXYzine 50 MG tablet  Commonly known as:  ATARAX   50 mg, Oral, Every 8 Hours PRN      Insulin aspart 100 UNIT/ML solution pen-injector injection pen  Commonly known as:  FIASP FLEXTOUCH   No dose, route, or frequency recorded.      ipratropium-albuterol 0.5-2.5 mg/3 ml nebulizer  Commonly known as:  DUO-NEB   USE ONE AMPULE VIA NEBULIZER FOUR TIMES DAILY      levETIRAcetam 500 MG tablet  Commonly known as:  KEPPRA   1,500 mg, Oral, 2 Times Daily      metoprolol tartrate 50 MG tablet  Commonly known as:  LOPRESSOR   50 mg, Oral, Every 12 Hours Scheduled      nitroglycerin 0.4 MG SL tablet  Commonly known as:  NITROSTAT   0.4 mg, Sublingual, Every 5 Minutes PRN      nystatin 207888 UNIT/GM ointment  Commonly known as:  MYCOSTATIN   Topical, 2 Times Daily      nystatin 404256 UNIT/ML suspension  Commonly known as:  MYCOSTATIN   500,000 Units, Oral, 4 Times Daily      ondansetron 8 MG tablet  Commonly known as:  ZOFRAN   8 mg, Oral, Every 8 Hours PRN      PROBIOTIC DAILY PO   1 tablet, Oral, Daily      SYMBICORT 160-4.5 MCG/ACT inhaler  Generic drug:  budesonide-formoterol   2 puffs, Inhalation, 2 Times Daily - RT      TOUJEO SOLOSTAR 300 UNIT/ML solution pen-injector injection  Generic drug:  Insulin Glargine (1 Unit Dial)   Use 25 Units at night         Stop These Medications    gabapentin 800 MG tablet  Commonly known as:  NEURONTIN  Replaced by:  gabapentin 300 MG capsule     promethazine 25 MG tablet  Commonly known as:  PHENERGAN     rOPINIRole 0.25 MG tablet  Commonly known as:  REQUIP     tiZANidine 4 MG tablet  Commonly known as:  ZANAFLEX            Discharge Diet:   Diet Instructions     Diet: Regular, Consistent Carbohydrate      Discharge Diet:   Regular  Consistent Carbohydrate             Activity at Discharge:   Activity Instructions     Activity as Tolerated            Follow-up Appointments  Future  Appointments   Date Time Provider Department Center   12/17/2019  8:30 AM Clrae Ledesma MD [facility] None     Additional Instructions for the Follow-ups that You Need to Schedule     Ambulatory Referral to Home Health   As directed      Face to Face Visit Date:  12/7/2019    Follow-up provider for Plan of Care?:  I treated the patient in an acute care facility and will not continue treatment after discharge.    Follow-up provider:  ABIODUN BROOKE [[id number]]    Reason/Clinical Findings:  L1 compression fracture    Describe mobility limitations that make leaving home difficult:  weakness, deconditioning, pain die to L1 VCF    Nursing/Therapeutic Services Requested:  Skilled Nursing Physical Therapy    Skilled nursing orders:  Medication education    Frequency:  1 Week 1         Discharge Follow-up with PCP   As directed       Currently Documented PCP:    Abiodun Brooke MD    PCP Phone Number:    [phone]     Follow Up Details:  1 week             I have examined and discussed discharge planning with the patient today.     Raymond Peterson MD  12/07/19  9:53 AM    Time: Discharge graeter than 30 min    Addendum: The patient is now agreeable to home health and I have ordered this for physical therapy as well as nursing to provide strengthening exercises and to review her medications upon her return home.  The patient is requesting this afternoon that I restart some of her sedating medications that she was taking the outpatient setting.  I declined to do so at this time and instructed her that she will need to follow-up with her outpatient doctors for further management of these medications but she certainly is more awake and alert her review of her chart once these medications were removed.    Addendum #2: I was called by the patient's nurse this afternoon stating that the patient is now appealing her discharge.  The reason for this is that the patient now feels that she is  too unsteady on her feet to be discharged.  She started the day by refusing home health but then became agreeable to this later in the day.  Now, despite her most recent complaints, she refuses efforts to place her in assisted living or subacute rehab and thus I really do not know what else we can do for her given the constraints she has placed on us and herself through her unwillingness/refusal to follow our recommendations as outlined above.

## 2019-12-07 NOTE — PROGRESS NOTES
Continued Stay Note  [facility]     Patient Name: Vidhi Lopez  MRN: [id number]  Today's Date: 12/7/2019    Admit Date: 12/4/2019    Discharge Plan     Row Name 12/07/19 1610       Plan    Plan Comments  Received [facility] Expediated Appeal Documentation Request. CCP faxed necessary documents as noted on facesheet. Await determination. Continue to follow.    Row Name 12/07/19 1438       Plan    Plan  [facility] with [facility] for PT.     Patient/Family in Agreement with Plan  yes    Plan Comments  Spoke with patient at bedside. Discharge plans unchanged. Plans dc to [facility] with [facility] for PT.     Row Name 12/07/19 1431       Plan    Plan Comments  RN called stating patient wants to appeal discharge. CCP met with patient at bedside. IMM explained to patient and signed at bedside. Verbalized understanding. Original scanned to HIM and copy given to patient. Detail Notice of Discharge was given to patient and original scanned into HIM. Explained she would have to call and once [facility] contacts case management documents will be sent for review.         Discharge Codes    No documentation.       Expected Discharge Date and Time     Expected Discharge Date Expected Discharge Time    Dec 7, 2019             Isabella Sharma RN

## 2019-12-08 LAB
GLUCOSE BLDC GLUCOMTR-MCNC: 156 MG/DL (ref 70–130)
GLUCOSE BLDC GLUCOMTR-MCNC: 197 MG/DL (ref 70–130)
GLUCOSE BLDC GLUCOMTR-MCNC: 210 MG/DL (ref 70–130)
GLUCOSE BLDC GLUCOMTR-MCNC: 248 MG/DL (ref 70–130)

## 2019-12-08 PROCEDURE — 94799 UNLISTED PULMONARY SVC/PX: CPT

## 2019-12-08 PROCEDURE — 63710000001 INSULIN LISPRO (HUMAN) PER 5 UNITS: Performed by: NURSE PRACTITIONER

## 2019-12-08 PROCEDURE — 82962 GLUCOSE BLOOD TEST: CPT

## 2019-12-08 RX ORDER — OXYCODONE HYDROCHLORIDE AND ACETAMINOPHEN 5; 325 MG/1; MG/1
1 TABLET ORAL EVERY 6 HOURS PRN
Status: DISCONTINUED | OUTPATIENT
Start: 2019-12-08 | End: 2019-12-09 | Stop reason: HOSPADM

## 2019-12-08 RX ORDER — IPRATROPIUM BROMIDE AND ALBUTEROL SULFATE 2.5; .5 MG/3ML; MG/3ML
3 SOLUTION RESPIRATORY (INHALATION)
Status: DISCONTINUED | OUTPATIENT
Start: 2019-12-08 | End: 2019-12-09 | Stop reason: HOSPADM

## 2019-12-08 RX ADMIN — ALLOPURINOL 100 MG: 100 TABLET ORAL at 08:58

## 2019-12-08 RX ADMIN — LEVETIRACETAM 1500 MG: 500 TABLET, FILM COATED ORAL at 20:37

## 2019-12-08 RX ADMIN — IPRATROPIUM BROMIDE AND ALBUTEROL SULFATE 3 ML: 2.5; .5 SOLUTION RESPIRATORY (INHALATION) at 21:17

## 2019-12-08 RX ADMIN — INSULIN LISPRO 4 UNITS: 100 INJECTION, SOLUTION INTRAVENOUS; SUBCUTANEOUS at 17:10

## 2019-12-08 RX ADMIN — ATORVASTATIN CALCIUM 80 MG: 80 TABLET, FILM COATED ORAL at 08:54

## 2019-12-08 RX ADMIN — INSULIN LISPRO 2 UNITS: 100 INJECTION, SOLUTION INTRAVENOUS; SUBCUTANEOUS at 08:54

## 2019-12-08 RX ADMIN — INSULIN LISPRO 4 UNITS: 100 INJECTION, SOLUTION INTRAVENOUS; SUBCUTANEOUS at 11:49

## 2019-12-08 RX ADMIN — Medication 1 APPLICATION: at 08:59

## 2019-12-08 RX ADMIN — INSULIN LISPRO 2 UNITS: 100 INJECTION, SOLUTION INTRAVENOUS; SUBCUTANEOUS at 20:37

## 2019-12-08 RX ADMIN — SODIUM CHLORIDE, PRESERVATIVE FREE 10 ML: 5 INJECTION INTRAVENOUS at 08:56

## 2019-12-08 RX ADMIN — SODIUM CHLORIDE, PRESERVATIVE FREE 10 ML: 5 INJECTION INTRAVENOUS at 20:37

## 2019-12-08 RX ADMIN — METOPROLOL TARTRATE 50 MG: 50 TABLET, FILM COATED ORAL at 20:36

## 2019-12-08 RX ADMIN — GABAPENTIN 300 MG: 300 CAPSULE ORAL at 17:10

## 2019-12-08 RX ADMIN — HYDRALAZINE HYDROCHLORIDE 50 MG: 50 TABLET, FILM COATED ORAL at 06:31

## 2019-12-08 RX ADMIN — ONDANSETRON HYDROCHLORIDE 4 MG: 4 TABLET, FILM COATED ORAL at 08:58

## 2019-12-08 RX ADMIN — GABAPENTIN 300 MG: 300 CAPSULE ORAL at 08:54

## 2019-12-08 RX ADMIN — LIDOCAINE 1 PATCH: 50 PATCH CUTANEOUS at 08:55

## 2019-12-08 RX ADMIN — BUDESONIDE AND FORMOTEROL FUMARATE DIHYDRATE 2 PUFF: 160; 4.5 AEROSOL RESPIRATORY (INHALATION) at 08:35

## 2019-12-08 RX ADMIN — OXYCODONE AND ACETAMINOPHEN 1 TABLET: 5; 325 TABLET ORAL at 20:46

## 2019-12-08 RX ADMIN — IPRATROPIUM BROMIDE AND ALBUTEROL SULFATE 3 ML: 2.5; .5 SOLUTION RESPIRATORY (INHALATION) at 11:22

## 2019-12-08 RX ADMIN — BUDESONIDE AND FORMOTEROL FUMARATE DIHYDRATE 2 PUFF: 160; 4.5 AEROSOL RESPIRATORY (INHALATION) at 21:17

## 2019-12-08 RX ADMIN — LEVETIRACETAM 1500 MG: 500 TABLET, FILM COATED ORAL at 08:52

## 2019-12-08 RX ADMIN — GABAPENTIN 300 MG: 300 CAPSULE ORAL at 20:37

## 2019-12-08 RX ADMIN — METOPROLOL TARTRATE 50 MG: 50 TABLET, FILM COATED ORAL at 08:52

## 2019-12-08 RX ADMIN — CLOPIDOGREL 75 MG: 75 TABLET, FILM COATED ORAL at 08:54

## 2019-12-08 RX ADMIN — HYDRALAZINE HYDROCHLORIDE 50 MG: 50 TABLET, FILM COATED ORAL at 13:24

## 2019-12-08 RX ADMIN — FLUOXETINE HYDROCHLORIDE 10 MG: 10 CAPSULE ORAL at 08:52

## 2019-12-08 RX ADMIN — OXYCODONE AND ACETAMINOPHEN 1 TABLET: 5; 325 TABLET ORAL at 11:11

## 2019-12-08 RX ADMIN — Medication 1 CAPSULE: at 08:52

## 2019-12-08 RX ADMIN — ASPIRIN 81 MG: 81 TABLET, CHEWABLE ORAL at 08:54

## 2019-12-08 NOTE — PROGRESS NOTES
" LOS: 2 days     Name: Vidhi Lopez  Age: 69 y.o.  Sex: female  :  1950  MRN: [id number]         Primary Care Physician: Abiodun Vila MD    Subjective   Subjective  Says that she feels \"a little better than yesterday\" but cannot be more specific or relay onto me what in particular feels better today.  No new complaints today.  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    Objective   Vital Signs  Temp:  [97.9 °F (36.6 °C)-98.9 °F (37.2 °C)] 98.5 °F (36.9 °C)  Heart Rate:  [57-70] 60  Resp:  [16-20] 18  BP: (104-155)/(60-75) 128/63  Body mass index is 237.59 kg/m².    Objective:  General Appearance:  Comfortable and in no acute distress.    Vital signs: (most recent): Blood pressure 128/63, pulse 60, temperature 98.5 °F (36.9 °C), temperature source Oral, resp. rate 18, height 63 cm (24.8\"), weight 94.3 kg (207 lb 14.3 oz), SpO2 94 %, not currently breastfeeding.    HEENT: (Bilateral periorbital ecchymosis)    Lungs:  Normal effort and normal respiratory rate.    Heart: Normal rate.  Regular rhythm.    Abdomen: Abdomen is soft.  (Morbidly obese).  Bowel sounds are normal.   There is no abdominal tenderness.     Extremities: There is no dependent edema or local swelling.    Neurological: Patient is alert and oriented to person, place and time.    Skin:  Warm and dry.              Results Review:       I reviewed the patient's new clinical results.    Results from last 7 days   Lab Units 19  0520   WBC 10*3/mm3 7.45   HEMOGLOBIN g/dL 11.5*   PLATELETS 10*3/mm3 213     Results from last 7 days   Lab Units 19  0520   SODIUM mmol/L 142   POTASSIUM mmol/L 3.8   CHLORIDE mmol/L 98   CO2 mmol/L 30.4*   BUN mg/dL 14   CREATININE mg/dL 0.84   CALCIUM mg/dL 9.1   GLUCOSE mg/dL 119*       "     Scheduled Meds:     allopurinol 100 mg Oral Daily   aspirin 81 mg Oral Daily   atorvastatin 80 mg Oral Daily   budesonide-formoterol 2 puff Inhalation BID - RT   clopidogrel 75 mg Oral Daily   FLUoxetine 10 mg Oral Daily   gabapentin 300 mg Oral TID   hydrALAZINE 50 mg Oral Q8H   hydrocerin  Topical BID   insulin lispro 0-9 Units Subcutaneous 4x Daily With Meals & Nightly   ipratropium-albuterol 3 mL Nebulization Q6H While Awake - RT   lactobacillus acidophilus 1 capsule Oral Daily   levETIRAcetam 1,500 mg Oral BID   lidocaine 1 patch Transdermal Q24H   LORazepam 1 mg Intravenous Once   metoprolol tartrate 50 mg Oral Q12H   sodium chloride 10 mL Intravenous Q12H     PRN Meds:   •  acetaminophen **OR** acetaminophen **OR** acetaminophen  •  bisacodyl  •  calcium carbonate  •  dextrose  •  dextrose  •  glucagon (human recombinant)  •  hydrOXYzine  •  ondansetron **OR** ondansetron  •  oxyCODONE-acetaminophen  •  sodium chloride  Continuous Infusions:       Assessment/Plan   Active Hospital Problems    Diagnosis  POA   • **L1 vertebral fracture (CMS/HCC) [S32.019A]  Unknown   • Polypharmacy [Z79.899]  Not Applicable   • Lumbar compression fracture (CMS/HCC) [S32.000A]  Yes   • Hyperlipidemia [E78.5]  Unknown   • Seizure disorder (CMS/[facility]) [G40.909]  Unknown   • Rib fracture [S22.39XA]  Unknown   • 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       Assessment & Plan    -Overall the patient's mental status has improved with no  "additional somnolence.  She remains unhappy about the fact that we are holding her sedating medications but I am not prepared to relinquish this position as I do feel that her medications directly contributed to her falls at home resulting in facial trauma and a lumbar compression fracture.  Neurology and neurosurgery have signed off.  The patient declined kyphoplasty as offered by neurosurgery and will use a TLSO brace.  -The patient appealed her discharge yesterday stating that she was too weak to go home.  However, at the same time she was also unwilling to engage in discussion or consider the possibility of subacute rehab.  -Today she states to me \"you are not discharging me today, I have 48 hours on my appeal\".  I told her today that I am fine with this but did tell the patient that she needs to help herself and us in arranging better disposition if she feels that she cannot return home.  To this she responds \"I want to go to inpatient rehab\".  I told her that inpatient rehab would not be an option but subacute rehab is a possibility.  CCP is involved and I will need more input from them regarding insurance coverage and disposition planning.  -The patient is otherwise medically stable and can be discharged once disposition is arranged to her satisfaction.    Raymond Peterson MD  [facility]  12/08/19  9:13 AM    "

## 2019-12-09 ENCOUNTER — TELEPHONE (OUTPATIENT)
Dept: FAMILY MEDICINE CLINIC | Facility: CLINIC | Age: 69
End: 2019-12-09

## 2019-12-09 VITALS
HEIGHT: 63 IN | RESPIRATION RATE: 18 BRPM | DIASTOLIC BLOOD PRESSURE: 56 MMHG | BODY MASS INDEX: 36.84 KG/M2 | SYSTOLIC BLOOD PRESSURE: 127 MMHG | WEIGHT: 207.89 LBS | TEMPERATURE: 98.9 F | HEART RATE: 54 BPM | OXYGEN SATURATION: 96 %

## 2019-12-09 LAB
GLUCOSE BLDC GLUCOMTR-MCNC: 192 MG/DL (ref 70–130)
GLUCOSE BLDC GLUCOMTR-MCNC: 242 MG/DL (ref 70–130)

## 2019-12-09 PROCEDURE — 82962 GLUCOSE BLOOD TEST: CPT

## 2019-12-09 PROCEDURE — 25010000002 ONDANSETRON PER 1 MG: Performed by: NURSE PRACTITIONER

## 2019-12-09 PROCEDURE — 97116 GAIT TRAINING THERAPY: CPT

## 2019-12-09 PROCEDURE — 63710000001 INSULIN LISPRO (HUMAN) PER 5 UNITS: Performed by: NURSE PRACTITIONER

## 2019-12-09 PROCEDURE — 94799 UNLISTED PULMONARY SVC/PX: CPT

## 2019-12-09 RX ORDER — GABAPENTIN 300 MG/1
300 CAPSULE ORAL 3 TIMES DAILY
Qty: 9 CAPSULE | Refills: 0 | Status: SHIPPED | OUTPATIENT
Start: 2019-12-09 | End: 2019-12-19

## 2019-12-09 RX ORDER — INSULIN GLARGINE 100 [IU]/ML
12 INJECTION, SOLUTION SUBCUTANEOUS NIGHTLY
Status: DISCONTINUED | OUTPATIENT
Start: 2019-12-09 | End: 2019-12-09 | Stop reason: HOSPADM

## 2019-12-09 RX ORDER — ACETAMINOPHEN 325 MG/1
650 TABLET ORAL EVERY 4 HOURS PRN
Start: 2019-12-09 | End: 2021-04-19 | Stop reason: ALTCHOICE

## 2019-12-09 RX ADMIN — ONDANSETRON 4 MG: 2 INJECTION INTRAMUSCULAR; INTRAVENOUS at 08:47

## 2019-12-09 RX ADMIN — Medication: at 13:57

## 2019-12-09 RX ADMIN — BUDESONIDE AND FORMOTEROL FUMARATE DIHYDRATE 2 PUFF: 160; 4.5 AEROSOL RESPIRATORY (INHALATION) at 08:44

## 2019-12-09 RX ADMIN — OXYCODONE AND ACETAMINOPHEN 1 TABLET: 5; 325 TABLET ORAL at 08:36

## 2019-12-09 RX ADMIN — INSULIN LISPRO 4 UNITS: 100 INJECTION, SOLUTION INTRAVENOUS; SUBCUTANEOUS at 12:33

## 2019-12-09 RX ADMIN — INSULIN LISPRO 2 UNITS: 100 INJECTION, SOLUTION INTRAVENOUS; SUBCUTANEOUS at 08:35

## 2019-12-09 RX ADMIN — METOPROLOL TARTRATE 50 MG: 50 TABLET, FILM COATED ORAL at 08:50

## 2019-12-09 RX ADMIN — OXYCODONE AND ACETAMINOPHEN 1 TABLET: 5; 325 TABLET ORAL at 14:36

## 2019-12-09 RX ADMIN — IPRATROPIUM BROMIDE AND ALBUTEROL SULFATE 3 ML: 2.5; .5 SOLUTION RESPIRATORY (INHALATION) at 14:47

## 2019-12-09 RX ADMIN — ASPIRIN 81 MG: 81 TABLET, CHEWABLE ORAL at 08:36

## 2019-12-09 RX ADMIN — HYDRALAZINE HYDROCHLORIDE 50 MG: 50 TABLET, FILM COATED ORAL at 06:23

## 2019-12-09 RX ADMIN — FLUOXETINE HYDROCHLORIDE 10 MG: 10 CAPSULE ORAL at 08:35

## 2019-12-09 RX ADMIN — GABAPENTIN 300 MG: 300 CAPSULE ORAL at 16:58

## 2019-12-09 RX ADMIN — GABAPENTIN 300 MG: 300 CAPSULE ORAL at 08:36

## 2019-12-09 RX ADMIN — CLOPIDOGREL 75 MG: 75 TABLET, FILM COATED ORAL at 08:36

## 2019-12-09 RX ADMIN — ATORVASTATIN CALCIUM 80 MG: 80 TABLET, FILM COATED ORAL at 08:36

## 2019-12-09 RX ADMIN — Medication 1 CAPSULE: at 08:36

## 2019-12-09 RX ADMIN — LIDOCAINE 1 PATCH: 50 PATCH CUTANEOUS at 08:36

## 2019-12-09 RX ADMIN — ALLOPURINOL 100 MG: 100 TABLET ORAL at 08:36

## 2019-12-09 RX ADMIN — SODIUM CHLORIDE, PRESERVATIVE FREE 10 ML: 5 INJECTION INTRAVENOUS at 08:37

## 2019-12-09 RX ADMIN — LEVETIRACETAM 1500 MG: 500 TABLET, FILM COATED ORAL at 08:35

## 2019-12-09 NOTE — SIGNIFICANT NOTE
12/09/19 1111   Rehab Treatment   Discipline physical therapy assistant   Reason Treatment Not Performed patient/family declined treatment, not feeling well  (pt feeling very tired at the moment, requested for PT to check back after lunch; will f/u later today)   Recommendation   PT - Next Appointment 12/09/19

## 2019-12-09 NOTE — PROGRESS NOTES
Continued Stay Note  [facility]     Patient Name: Vidhi Lopez  MRN: [id number]  Today's Date: 12/9/2019    Admit Date: 12/4/2019    Discharge Plan     Row Name 12/09/19 1600       Plan    Plan  [place]    Plan Comments  VM left for Larisa Lim to inform of DC  time for 6pm.    Row Name 12/09/19 1547       Plan    Plan  [place]    Plan Comments  Discharge order noted. Spoke with patient regarding transportation. Family/friends unable. Would have transportation if could stay another night. Discussed that she was medically stable and had an accepting facility. [facility] scheduled. Discharge summary faxed. acket given to RN. P    Row Name 12/09/19 1357       Plan    Plan  [place]    Plan Comments  Per Larisa Lim, patient accepted and bed available for [place]. Notified Nanette/[facility] and RN        Discharge Codes    No documentation.       Expected Discharge Date and Time     Expected Discharge Date Expected Discharge Time    Dec 7, 2019             Zari Alves, RN

## 2019-12-09 NOTE — THERAPY TREATMENT NOTE
Patient Name: Vidhi Lopez  : 1950    MRN: [id number]                              Today's Date: 2019       Admit Date: 2019    Visit Dx:     ICD-10-CM ICD-9-CM   1. Closed wedge compression fracture of first lumbar vertebra, initial encounter (CMS/[facility]) S32.010A 805.4   2. Closed fracture of one rib, unspecified laterality, initial encounter S22.39XA 807.01   3. Polypharmacy Z79.899 V58.69     Patient Active Problem List   Diagnosis   • Dyslipidemia   • Hypertension   • Anxiety (Chronic benzos)   • Insomnia   • GERD (gastroesophageal reflux disease)   • Diabetes mellitus, type 2 (CMS/[facility])   • Fibromyalgia   • RLS (restless legs syndrome)   • Migraines   • COPD (chronic obstructive pulmonary disease) (CMS/[facility])   • Interstitial cystitis   • History of acute myocardial infarction   • History of cardiac murmur   • History of stroke   • CAD (coronary artery disease)   • Essential hypertension   • CKD (chronic kidney disease) stage 2, GFR 60-89 ml/min   • Bilateral carotid artery stenosis   • Chronic respiratory failure with hypoxia and hypercapnia (CMS/[facility])   • Obstructive sleep apnea   • Obesity (BMI 30-39.9)   • Diabetes mellitus type 2 in obese (CMS/[facility])   • Obesity hypoventilation syndrome (CMS/[facility])   • Tobacco use   • Chronic pain (Chronic narcotics)   • Recurrent UTI/interstitial cystitis on chronic methenamine   • Clostridium difficile colitis diagnosed 2018. Persistent diarrhea despite oral vancomycin   • Demand ischemia (CMS/[facility])   • Hypoxemia requiring supplemental oxygen   • Stenosis of carotid artery   • Occlusion and stenosis of left carotid artery    • Chronic gout with tophus   • Depression   • Edema   • Restless leg syndrome   • Asthma   • Wellness examination   • Encounter for screening mammogram for malignant neoplasm of breast    • Seasonal allergies   • Hyperlipidemia   • Seizure disorder (CMS/[facility])   • L1 vertebral fracture (CMS/[facility])   • Rib fracture   • Lumbar  compression fracture (CMS/[facility])   • Polypharmacy     Past Medical History:   Diagnosis Date   • Allergic rhinitis    • Asthma with COPD (CMS/[facility])     Asthma/COPD   • Bilateral ovarian cysts    • Coronary artery disease    • Depression with anxiety     Depression/Anxiety   • Diabetes (CMS/[facility])     pre   • Endometriosis     Dr. Jin; estradiol    • ESR raised 3/20/2018   • Fatigue    • Fibromyalgia    • Headache     Headaches   • High cholesterol    • History of gallstones    • History of myocardial infarction    • Hypertension    • Influenza B     DX'D 2/6/2018, TREATED WITH TAMIFLU. LAST DOSE 2/11/2018 AM.  PATIENT STATES DR FLORES IS AWARE. DENIES FEVERS OR CHILLS.   • Interstitial cystitis    • On home oxygen therapy     2 L NC   • URIEL on CPAP    • PONV (postoperative nausea and vomiting)    • Seizure disorder, nonconvulsive, with status epilepticus (CMS/[facility]) 3/20/2018   • Septic joint of right knee joint (CMS/[facility]) 3/21/2018   • Shortness of breath on exertion    • Stroke (CMS/HCC)     X1 8 YEARS AGO, GENERALIZED UPPER BODY WEAKNESS RESIDUAL PER PT    • Thyroid disorder    • Urinary leakage    • UTI (urinary tract infection)    • Wears glasses    • Yeast dermatitis      Past Surgical History:   Procedure Laterality Date   • ARTERIOGRAM N/A 2/12/2018    Procedure: Renal Arteriogram;  Surgeon: Lito Flores MD;  Location:  [facility] CATH INVASIVE LOCATION;  Service:    • BREAST BIOPSY Right 1991   • CARDIAC CATHETERIZATION N/A 2/12/2018    Procedure: Right Heart Cath;  Surgeon: Lito Flores MD;  Location:  [facility] CATH INVASIVE LOCATION;  Service:    • CAROTID STENT      Transcath    • CAROTID STENT Left    • CHOLECYSTECTOMY     • CYSTOSTOMY W/ BLADDER BIOPSY     • DILATATION AND CURETTAGE     • KNEE ARTHROSCOPY Right 3/23/2018    Procedure: ARTHROSCOPY, RIGHT KNEE  INCISION AND DRAINAGE LOWER EXTREMITY WITH SYNOVIAL BIOPSY;  Surgeon: Dilip Giron MD;  Location:  [facility] OR;  Service: Orthopedics   • LAPAROSCOPIC  CHOLECYSTECTOMY  1994    Lap rolando   • OOPHORECTOMY     • PAP SMEAR  05/10/2016   • PARTIAL HYSTERECTOMY       General Information     [facility] Name 12/09/19 1337          PT Evaluation Time/Intention    Document Type  therapy note (daily note)  -     Mode of Treatment  physical therapy  -[facility] Name 12/09/19 1337          General Information    Existing Precautions/Restrictions  fall;brace worn when out of bed  -[facility] Name 12/09/19 1337          Cognitive Assessment/Intervention- PT/OT    Orientation Status (Cognition)  oriented x 4  -       User Key  (r) = Recorded By, (t) = Taken By, (c) = Cosigned By    Initials Name Provider Type     Paty Rivera PTA Physical Therapy Assistant        Mobility     [facility] Name 12/09/19 1338          Bed Mobility Assessment/Treatment    Comment (Bed Mobility)  up in chair  -[facility] Name 12/09/19 1338          Sit-Stand Transfer    Sit-Stand Jack (Transfers)  contact guard  -     Assistive Device (Sit-Stand Transfers)  walker, front-wheeled  -[facility] Name 12/09/19 1338          Gait/Stairs Assessment/Training    Jack Level (Gait)  contact guard;stand by assist  -     Assistive Device (Gait)  walker, front-wheeled  -     Distance in Feet (Gait)  100  -SM     Pattern (Gait)  step-through  -     Deviations/Abnormal Patterns (Gait)  jannie decreased;stride length decreased  -     Bilateral Gait Deviations  forward flexed posture  -       User Key  (r) = Recorded By, (t) = Taken By, (c) = Cosigned By    Initials Name Provider Type     Paty Rivera PTA Physical Therapy Assistant        Obj/Interventions    No documentation.       Goals/Plan    No documentation.       Clinical Impression     [facility] Name 12/09/19 1340          Pain Assessment    Additional Documentation  Pain Scale: Numbers Pre/Post-Treatment (Group)  -[facility] Name 12/09/19 1340          Pain Scale: Numbers Pre/Post-Treatment    Pain Scale: Numbers, Pretreatment   3/10  -     Pain Scale: Numbers, Post-Treatment  3/10  -SM     Pain Location  back  -SM     Pain Intervention(s)  Repositioned;Ambulation/increased activity;Rest  -     Row Name 12/09/19 1340          Positioning and Restraints    Pre-Treatment Position  sitting in chair/recliner  -SM     Post Treatment Position  chair  -SM     In Chair  sitting;call light within reach;encouraged to call for assist;exit alarm on  -       User Key  (r) = Recorded By, (t) = Taken By, (c) = Cosigned By    Initials Name Provider Type    Paty Poole PTA Physical Therapy Assistant        Outcome Measures     Row Name 12/09/19 1341          How much help from another person do you currently need...    Turning from your back to your side while in flat bed without using bedrails?  3  -SM     Moving from lying on back to sitting on the side of a flat bed without bedrails?  3  -SM     Moving to and from a bed to a chair (including a wheelchair)?  3  -SM     Standing up from a chair using your arms (e.g., wheelchair, bedside chair)?  3  -SM     Climbing 3-5 steps with a railing?  2  -SM     To walk in hospital room?  3  -SM     AM-PAC 6 Clicks Score (PT)  17  -     Row Name 12/09/19 1341          Functional Assessment    Outcome Measure Options  AM-PAC 6 Clicks Basic Mobility (PT)  -       User Key  (r) = Recorded By, (t) = Taken By, (c) = Cosigned By    Initials Name Provider Type    Paty Poole PTA Physical Therapy Assistant          PT Recommendation and Plan     Outcome Summary/Treatment Plan (PT)  Anticipated Discharge Disposition (PT): skilled nursing facility  Progress: improving  Outcome Summary: Pt tolerated treatment well this date. Increased gait distance to 100ft w/ Rw and CGA-SBA. Slight right lean throughout, though no overt LOBs noted. Encouraged pt to ambulate w/ nsg throughout the day. PT will continue to address functional mobility deficits as tolerated.     Time Calculation:   PT Charges      Row Name 12/09/19 1344 12/09/19 1111          Time Calculation    Start Time  1313  -  --     Stop Time  1330  -  --     Time Calculation (min)  17 min  -  --     PT Received On  12/09/19  -  --     PT - Next Appointment  12/10/19  -  12/09/19  -       User Key  (r) = Recorded By, (t) = Taken By, (c) = Cosigned By    Initials Name Provider Type     Paty Rivera PTA Physical Therapy Assistant        Therapy Charges for Today     Code Description Service Date Service Provider Modifiers Qty    [id number] HC GAIT TRAINING EA 15 MIN 12/9/2019 Paty Rivera PTA GP 1          PT G-Codes  Outcome Measure Options: AM-PAC 6 Clicks Basic Mobility (PT)  AM-PAC 6 Clicks Score (PT): 17  AM-PAC 6 Clicks Score (OT): 17    Paty Rivera PTA  12/9/2019

## 2019-12-09 NOTE — DISCHARGE PLACEMENT REQUEST
"Vidhi Lopez (69 y.o. Female)     Date of Birth Social Security Number Address Home Phone MRN    1950  [address] [place] [id number] [phone] [id number]    [employer]astrian Marital Status          [employer]        Admission Date Admission Type Admitting Provider Attending Provider Department, Room/Bed    12/4/19 Urgent Lenny Grajeda MD [place], Robert Russell, MD [address], P595/1    Discharge Date Discharge Disposition Discharge Destination         Home or Self Care              Attending Provider:  Raymond Peterson MD    Allergies:  Amlodipine, Hydrocodone, Reglan [Metoclopramide]    Isolation:  None   Infection:  None   Code Status:  CPR    Ht:  160 cm (63\")   Wt:  94.3 kg (207 lb 14.3 oz)    Admission Cmt:  None   Principal Problem:  L1 vertebral fracture (CMS/[facility]) [S32.019A]                 Active Insurance as of 12/4/2019     Primary Coverage     Payor Plan Insurance Group Employer/Plan Group    MEDICARE MEDICARE A & B      Payor Plan Address Payor Plan Phone Number Payor Plan Fax Number Effective Dates    [address] [phone]  3/1/2016 - None Entered    [facility] [id number]       Subscriber Name Subscriber Birth Date Member ID       VIDHI LOPEZ 1950 [id number]           Secondary Coverage     Payor Plan Insurance Group Employer/Plan Group    [facility] SUPP [id number]     Payor Plan Address Payor Plan Phone Number Payor Plan Fax Number Effective Dates    [address]   6/1/2016 - None Entered    [facility] [id number]       Subscriber Name Subscriber Birth Date Member ID       VIDHI LOPEZ 1950 [id number]                 Emergency Contacts      (Rel.) Home Phone Work Phone Mobile Phone    Tavo Lopez (Son) [phone] -- [phone]    DILAN SHARON (Sister) [phone] -- [phone]              "

## 2019-12-09 NOTE — NURSING NOTE
"Patient is refusing to wear her TLSO brace while sitting.  She states, \"your doctor doesn't want to do what I want (testing of uric acid) so I am not going to do what he wants\".   Education reinforced on wearing brace to allow healing of L1 fracture.  Patient also refusing dinner accucheck.    "

## 2019-12-09 NOTE — PROGRESS NOTES
Discharge Planning Assessment  [facility]     Patient Name: Vidhi oLpez  MRN: [id number]  Today's Date: 12/9/2019    Admit Date: 12/4/2019    Discharge Needs Assessment    No documentation.       Discharge Plan     Row Name 12/09/19 1547       Plan    Plan  [place]    Plan Comments  Discharge order noted. Spoke with patient regarding transportation. Family/friends unable. Would have transportation if could stay another night. Discussed that she was medically stable and had an accepting facility. [facility] scheduled. Discharge summary faxed. acket given to RN. P    Row Name 12/09/19 3781       Plan    Plan  [place]    Plan Comments  Per Larisa with [facility], patient accepted and bed available for [place]. Notified Nanette/[facility] and RN        Destination - Selection Complete      Service Provider Request Status Selected Services Address Phone Number Fax Number    [facility] [address] [facility] [address] [phone]    [facility] Accepted N/A [address] [facility] [id number] [phone]    [facility] Declined  No Bed Available N/A [address] [facility] [address] [phone]      Durable Medical Equipment      Coordination has not been started for this encounter.      Dialysis/Infusion      Coordination has not been started for this encounter.      Home Medical Care - Selection Complete      Service Provider Request Status Selected Services Address Phone Number Fax Number    [facility] [place] Selected Home Health Services [address] [address] [phone]      Therapy      Coordination has not been started for this encounter.      Community Resources      Coordination has not been started for this encounter.        Expected Discharge Date and Time     Expected Discharge Date Expected Discharge Time    Dec 7, 2019          Demographic Summary    No documentation.       Functional Status    No documentation.       Psychosocial    No documentation.       Abuse/Neglect    No documentation.       Legal    No documentation.       Substance Abuse    No documentation.       Patient Forms    No documentation.           Zari Alves RN

## 2019-12-09 NOTE — DISCHARGE SUMMARY
Discharge Summary    Patient Name: Vidhi Lopez  Age/Sex: 69 y.o. female  : 1950  MRN: [id number]  Patient Care Team:  Abiodun Vila MD as PCP - General (Family Medicine)  Arnoldo Newman MD as PCP - [facility]  Lito Flores MD as Consulting Physician (Cardiology)  Jacky Hernandez MD as Consulting Physician (Infectious Diseases)  Ama Glynn RN as Ambulatory  ([facility])    Hospital Course     Date of Admit: 2019  Date of Discharge:  19   Discharge Condition: Stable    Discharge Diagnoses:    L1 vertebral fracture (CMS/HCC)    Hypertension    Diabetes mellitus, type 2 (CMS/HCC)    RLS (restless legs syndrome)    COPD (chronic obstructive pulmonary disease) (CMS/HCC)    History of stroke    CAD (coronary artery disease)    CKD (chronic kidney disease) stage 2, GFR 60-89 ml/min    Bilateral carotid artery stenosis    Chronic gout with tophus    Hyperlipidemia    Seizure disorder (CMS/HCC)    Rib fracture    Lumbar compression fracture (CMS/HCC)    Polypharmacy    Present on Admission:  • Hypertension  • RLS (restless legs syndrome)  • CAD (coronary artery disease)  • CKD (chronic kidney disease) stage 2, GFR 60-89 ml/min  • Bilateral carotid artery stenosis  • Chronic gout with tophus  • Lumbar compression fracture (CMS/HCC)      Hospital Course:   Vidhi Lopez presented to [facility] with complaints of frequent falls. Please see the admitting history and physical for further details. She was found to have L1 compression fracture and was admitted to the hospital for further evaluation and treatment. Options for further work-up and management were discussed with her and the patient expressed to the neurosurgery service that she had no desire for kyphoplasty and therefore they did not feel that an MRI was warranted to confirm the acuteness of her fracture given that they would not be performing any operative  intervention.  She has had no red flag symptoms as related to the fracture.  Neurosurgery recommended conservative management, to stay in line with her wishes, and provided her a TLSO brace which they indicate she can use for comfort and pain relief.  They have signed off on her case and states she can follow-up with them in the office as needed.      The patient was also evaluated by neurology given her recurrent falls.  Head CT was negative along with brain MRI.  Neurology did not feel there is any evidence of seizure activity and thus kept her antiepileptic doses the same from her outpatient regimen.  They did comment that she has evidence of severe diabetic peripheral neuropathy which has resulted in decreased proprioception, resulting in her falls.  Additionally they feel that her diabetes is responsible for some dysautonomia that could also be contributing. A1c was up to 8.9 and blood sugars were starting to increase on day of discharge, but was not up to home dose of insulin and would resume home insulin at discharge. Hydralazine was also added for blood pressure control.    It was felt by neurology as well as the other physicians that have come before me (I am seeing the patient today for the first time today) that she was experiencing side effects from polypharmacy and her sedating medications have been discontinued/reduced as outlined below.  Neurology stated on 12/6/2019 that she was more awake and alert and showed improvement from a neurologic/cognitive standpoint.  All consulting services have signed off at this time. She was sleeping and seemed a little lethargic during my visit this morning. Will plan to resume home celebrex and PRN tylenol for pain and stop further narcotics.     Patient was adamant that she has a urinary tract infection. However, UA was unremarkable for any signs of bacteria, white blood cells, nitrite, or leukocytes.  Additionally, she is not having any fevers or leukocytosis and  therefore has no clinical evidence of urinary tract infection.  Therefore, there is no indication to prescribe antibiotics at this time.     Initially we recommended that she have home health follow her upon discharge and to use either a walker or a cane, but she declined these recommendations. She was initially to be discharged on 12/7/19, but appealed the discharge and eventually agreed to subacute rehab. At this point the patient appears medically stable and will be discharged to rehab for further care.         Consults:   IP CONSULT TO NEUROSURGERY  IP CONSULT TO CASE MANAGEMENT   IP CONSULT TO NEUROLOGY    Significant Discharge Diagnostics   Procedures Performed:         Pertinent Lab Results:  Results from last 7 days   Lab Units 12/05/19  0520   SODIUM mmol/L 142   POTASSIUM mmol/L 3.8   CHLORIDE mmol/L 98   CO2 mmol/L 30.4*   BUN mg/dL 14   CREATININE mg/dL 0.84   GLUCOSE mg/dL 119*   CALCIUM mg/dL 9.1         Results from last 7 days   Lab Units 12/05/19  0520   WBC 10*3/mm3 7.45   HEMOGLOBIN g/dL 11.5*   HEMATOCRIT % 34.5   PLATELETS 10*3/mm3 213   MCV fL 86.3   MCH pg 28.8   MCHC g/dL 33.3   RDW % 12.5   RDW-SD fl 39.4   MPV fL 9.3                   Invalid input(s): LDLCALC                              Results from last 7 days   Lab Units 12/06/19  1404   NITRITE UA  Negative   WBC UA /HPF 0-2   BACTERIA UA /HPF None Seen   SQUAM EPITHEL UA /HPF 0-2               Imaging Results:  Imaging Results (Most Recent)     Procedure Component Value Units Date/Time    MRI Brain With & Without Contrast [[id number]] Collected:  12/05/19 2014     Updated:  12/05/19 2025    Narrative:       MRI BRAIN W WO CONTRAST-     CLINICAL HISTORY: Patient fell. Head trauma.     TECHNIQUE: Multiple axial T1, T2, gradient echo and diffusion images  were acquired. Axial and coronal T1-weighted images were also acquired  after intravenous injection of MultiHance contrast.      COMPARISON: CT scan of the head performed  earlier today.     FINDINGS: The images are somewhat degraded due to patient motion. As  also shown on the CT scan, there is a small well-circumscribed area of  encephalomalacia in the posterior and inferior aspect of the right  cerebellar hemisphere that measures 1.9 x 1.2 cm in diameter. This is  unchanged and is consistent with a chronic infarct. There is no evidence  of acute infarct or hemorrhage. No foci of restricted diffusion are  identified in the brain or brainstem. There is no mass effect. No  abnormal enhancement is evident within the brain or brainstem. There are  no masses. Normal flow voids are observed in the major vascular  structures. The paranasal sinuses are well aerated.       Impression:       Suboptimal study due to patient motion. Small chronic  infarct in the right cerebellar hemisphere as described. Otherwise  unremarkable MRI of the brain with and without contrast.     This report was finalized on 12/5/2019 8:22 PM by Dr. Edward Wu M.D.       CT Head Without Contrast [[id number]] Collected:  12/05/19 2009     Updated:  12/05/19 2017    Narrative:       CT HEAD WO CONTRAST-     CLINICAL HISTORY: Multiple falls. Head trauma.     TECHNIQUE: Transverse 3 mm thick images were acquired from the base of  skull to vertex without IV contrast.     Radiation dose reduction techniques were utilized, including automated  exposure control and exposure modulation based on body size.     COMPARISON: CT scan of the head from another facility dated 12/04/2019.     FINDINGS: There is a small chronic infarct in the posterior aspect of  the right cerebellar hemisphere that is unchanged there is no evidence  of acute infarct or hemorrhage. There is no mass effect. Bone window  images demonstrate no evidence of skull fracture. The paranasal sinuses  are well aerated.       Impression:       Small stable chronic infarct in the right cerebellar  hemisphere. Otherwise unremarkable CT scan of the head     This  report was finalized on 12/5/2019 8:14 PM by Dr. Edward Wu M.D.               Objective:   Temp:  [97.8 °F (36.6 °C)-98.9 °F (37.2 °C)] 98.9 °F (37.2 °C)  Heart Rate:  [55-66] 57  Resp:  [16-20] 20  BP: (121-179)/(56-87) 127/56   SpO2:  [92 %-95 %] 95 %  on  Flow (L/min):  [2] 2 Device (Oxygen Therapy): room air  No intake or output data in the 24 hours ending 12/09/19 1419  Body mass index is 36.83 kg/m².      12/05/19  0007 12/05/19  0103 12/09/19  0745   Weight: 83.9 kg (185 lb) 94.3 kg (207 lb 14.3 oz) 94.3 kg (207 lb 14.3 oz)       Physical Exam   Constitutional: No distress.   HENT:   Nose: Nose normal.   Periorbital ecchymosis   Eyes: Conjunctivae are normal.   Cardiovascular: Normal rate and regular rhythm.   No murmur heard.  Pulmonary/Chest: Effort normal. She has no wheezes. She has no rales.   Diminished bases   Abdominal: Soft. Bowel sounds are normal. There is no tenderness.   Musculoskeletal: She exhibits no edema.   Brace in place   Neurological: She is alert.   Skin: Skin is warm and dry. She is not diaphoretic.       Discharge Medications and Instructions:     Discharge Medications     Discharge Medications      New Medications      Instructions Start Date   acetaminophen 325 MG tablet  Commonly known as:  TYLENOL   650 mg, Oral, Every 4 Hours PRN      gabapentin 300 MG capsule  Commonly known as:  NEURONTIN  Replaces:  gabapentin 800 MG tablet   300 mg, Oral, 3 Times Daily      hydrALAZINE 50 MG tablet  Commonly known as:  APRESOLINE   50 mg, Oral, Every 8 Hours Scheduled      lidocaine 5 %  Commonly known as:  LIDODERM   1 patch, Transdermal, Every 24 Hours Scheduled, Remove & Discard patch within 12 hours or as directed by MD         Continue These Medications      Instructions Start Date   allopurinol 100 MG tablet  Commonly known as:  ZYLOPRIM   100 mg, Oral, Daily      aspirin 81 MG EC tablet   81 mg, Oral, Daily      atorvastatin 80 MG tablet  Commonly known as:  LIPITOR   take 1  tablet by mouth once daily      bumetanide 1 MG tablet  Commonly known as:  BUMEX   1 mg, Oral, Daily With Breakfast      CELEBREX 100 MG capsule  Generic drug:  celecoxib   Every 12 Hours Scheduled      clopidogrel 75 MG tablet  Commonly known as:  PLAVIX   take 1 tablet by mouth once daily      clotrimazole-betamethasone 1-0.05 % cream  Commonly known as:  LOTRISONE   Topical, As Needed      FLUoxetine 10 MG capsule  Commonly known as:  PROZAC   10 mg, Oral, Daily      hydrOXYzine 50 MG tablet  Commonly known as:  ATARAX   50 mg, Oral, Every 8 Hours PRN      Insulin aspart 100 UNIT/ML solution pen-injector injection pen  Commonly known as:  FIASP FLEXTOUCH   No dose, route, or frequency recorded.      ipratropium-albuterol 0.5-2.5 mg/3 ml nebulizer  Commonly known as:  DUO-NEB   USE ONE AMPULE VIA NEBULIZER FOUR TIMES DAILY      levETIRAcetam 500 MG tablet  Commonly known as:  KEPPRA   1,500 mg, Oral, 2 Times Daily      metoprolol tartrate 50 MG tablet  Commonly known as:  LOPRESSOR   50 mg, Oral, Every 12 Hours Scheduled      nitroglycerin 0.4 MG SL tablet  Commonly known as:  NITROSTAT   0.4 mg, Sublingual, Every 5 Minutes PRN      nystatin 905503 UNIT/GM ointment  Commonly known as:  MYCOSTATIN   Topical, 2 Times Daily      nystatin 934169 UNIT/ML suspension  Commonly known as:  MYCOSTATIN   500,000 Units, Oral, 4 Times Daily      ondansetron 8 MG tablet  Commonly known as:  ZOFRAN   8 mg, Oral, Every 8 Hours PRN      PROBIOTIC DAILY PO   1 tablet, Oral, Daily      SYMBICORT 160-4.5 MCG/ACT inhaler  Generic drug:  budesonide-formoterol   2 puffs, Inhalation, 2 Times Daily - RT      TOUJEO SOLOSTAR 300 UNIT/ML solution pen-injector injection  Generic drug:  Insulin Glargine (1 Unit Dial)   Use 25 Units at night         Stop These Medications    gabapentin 800 MG tablet  Commonly known as:  NEURONTIN  Replaced by:  gabapentin 300 MG capsule     promethazine 25 MG tablet  Commonly known as:  PHENERGAN      rOPINIRole 0.25 MG tablet  Commonly known as:  REQUIP     tiZANidine 4 MG tablet  Commonly known as:  ZANAFLEX            Discharge Diet:    Dietary Orders (From admission, onward)     Start     Ordered    12/05/19 0103  Diet Regular; Consistent Carbohydrate  Diet Effective Now     Question Answer Comment   Diet Texture / Consistency Regular    Common Modifiers Consistent Carbohydrate        12/05/19 0113                Activity at Discharge:    Activity Instructions     Activity as Tolerated             Discharge disposition: Rehab    Discharge Instructions and Follow ups:  Additional Instructions for the Follow-ups that You Need to Schedule     Ambulatory Referral to Home Health   As directed      Face to Face Visit Date:  12/7/2019    Follow-up provider for Plan of Care?:  I treated the patient in an acute care facility and will not continue treatment after discharge.    Follow-up provider:  ETHAN BROOKE [[id number]]    Reason/Clinical Findings:  L1 compression fracture    Describe mobility limitations that make leaving home difficult:  weakness, deconditioning, pain die to L1 VCF    Nursing/Therapeutic Services Requested:  Skilled Nursing Physical Therapy    Skilled nursing orders:  Medication education    Frequency:  1 Week 1         Discharge Follow-up with PCP   As directed       Currently Documented PCP:    Ethan Brooke MD    PCP Phone Number:    [phone]     Follow Up Details:  1 week            Contact information for follow-up providers     Ethan Brooke MD .    Specialty:  Family Medicine  Why:  1 week  Contact information:  [address]  [facility] [id number] [phone]             Orion Cummins MD Follow up.    Specialty:  Neurosurgery  Why:  follow up as needed  Contact information:  [address]  [facility] [address]  [facility] [id number] [phone]                   Contact information for after-discharge care     Destination     [facility]  .    Service:  Skilled Nursing  Contact information:  [address]  [place] [address] [phone]                 Home Medical Care     [facility] .    Service:  Home Health Services  Contact information:  [address]  [facility] [address] [phone]                           Future Appointments   Date Time Provider Department Center   12/17/2019  8:30 AM Clare Ledesma MD [facility] None        Medication Reconciliation: Please see electronically completed Med Rec.      JANI Prajapati  12/09/19  2:10 PM

## 2019-12-09 NOTE — PROGRESS NOTES
Continued Stay Note  [facility]     Patient Name: iVdhi Lopez  MRN: [id number]  Today's Date: 12/9/2019    Admit Date: 12/4/2019    Discharge Plan     Row Name 12/09/19 0949       Plan    Plan  [facility] (pending)    Plan Comments  Met with patient regargding DC plan. Requested [facility] or [facility]. Referrals made. No beds available at [facility] per Zari Carroll. VM left for [facility] with [facility]. Patient updated.        Discharge Codes    No documentation.       Expected Discharge Date and Time     Expected Discharge Date Expected Discharge Time    Dec 7, 2019             Zair Alves RN

## 2019-12-09 NOTE — PLAN OF CARE
Pt A+O x4, VSS. No hallucinations this shift. Pain controlled with with PO meds. Pt wearing TLSO brace appropriately, sitting in chair and ambulating to bathroom, assist x1. Family was at bedside beginning of shift and asked that the physicians check her uric acid and test her for Parkinson's disease, I have placed a sticky note. Awaiting for new results from apeal for discharge. Will continue to monitor and progress towards goals as tolerated.

## 2019-12-09 NOTE — TELEPHONE ENCOUNTER
I called the patient today she feels better, she feels that she has a UTI also but she is in the hospital, she going to start some antibiotic, and she been sent to a rehab facility to be there for the next 6 weeks

## 2019-12-09 NOTE — PLAN OF CARE
A+0x4.  Waiting for [employer] to take her to [facility].  Report called.  Refusing TLSO (has worn most of the day).

## 2019-12-09 NOTE — PROGRESS NOTES
Evaluated patient at bedside.  She has been a able to ambulate well with walker and brace.  Although she feels that the brace is uncomfortable at times especially when it starts to push up towards her chin.  Denies any chest pain, shortness of breath, constipation or dizziness.  Pain is well controlled with medication.     She is agreeable to go to subacute rehab and was discussed with CCP regarding placement.  Patient would like to consider going back to [facility] as she has been there in the past.  We will plan discharge later today pending bed availability.    Galina Worrell, APRN  12/09/19

## 2019-12-10 ENCOUNTER — READMISSION MANAGEMENT (OUTPATIENT)
Dept: CALL CENTER | Facility: HOSPITAL | Age: 69
End: 2019-12-10

## 2019-12-10 ENCOUNTER — EPISODE CHANGES (OUTPATIENT)
Dept: CASE MANAGEMENT | Facility: OTHER | Age: 69
End: 2019-12-10

## 2019-12-10 NOTE — PROGRESS NOTES
Case Management Discharge Note      Final Note: Discharged to [facility]          Destination - Selection Complete      Service Provider Request Status Selected Services Address Phone Number Fax Number    [facility] Selected Skilled Nursing [address], [facility] [address] [phone]      Durable Medical Equipment      No service has been selected for the patient.      Dialysis/Infusion      No service has been selected for the patient.      Home Medical Care - Selection Complete      Service Provider Request Status Selected Services Address Phone Number Fax Number    [facility] Selected Home Health Services [address] [address] [phone]      Therapy      No service has been selected for the patient.      Community Resources      No service has been selected for the patient.        Transportation Services  W/C Van: [employer]    Final Discharge Disposition Code: 03 - skilled nursing facility (SNF)

## 2019-12-10 NOTE — OUTREACH NOTE
Prep Survey      Responses   Facility patient discharged from?  [facility]   Is patient eligible?  No   What are the reasons patient is not eligible?  [facility]   Does the patient have one of the following disease processes/diagnoses(primary or secondary)?  Other   Prep survey completed?  Yes          Eliana Shell RN

## 2019-12-20 ENCOUNTER — PATIENT OUTREACH (OUTPATIENT)
Dept: CASE MANAGEMENT | Facility: OTHER | Age: 69
End: 2019-12-20

## 2019-12-20 NOTE — OUTREACH NOTE
Care Coordination Note  Talked with [facility] [phone]. She states patient remains at facility  receiving at rehab services. No discharge date at this time.     Ama Glynn RN  Ambulatory     12/20/2019, 10:06 AM

## 2019-12-27 ENCOUNTER — TELEPHONE (OUTPATIENT)
Dept: FAMILY MEDICINE CLINIC | Facility: CLINIC | Age: 69
End: 2019-12-27

## 2019-12-27 NOTE — TELEPHONE ENCOUNTER
Karma BONILLA from [facility] called to get verbal orders for Nursing, PT, and OT evaluation orders for patient.  Orders given.

## 2019-12-31 ENCOUNTER — EPISODE CHANGES (OUTPATIENT)
Dept: CASE MANAGEMENT | Facility: OTHER | Age: 69
End: 2019-12-31

## 2020-01-01 ENCOUNTER — APPOINTMENT (OUTPATIENT)
Dept: GENERAL RADIOLOGY | Facility: HOSPITAL | Age: 70
End: 2020-01-01

## 2020-01-01 ENCOUNTER — APPOINTMENT (OUTPATIENT)
Dept: CT IMAGING | Facility: HOSPITAL | Age: 70
End: 2020-01-01

## 2020-01-01 ENCOUNTER — HOSPITAL ENCOUNTER (EMERGENCY)
Facility: HOSPITAL | Age: 70
Discharge: HOME | End: 2020-01-02
Attending: EMERGENCY MEDICINE | Admitting: EMERGENCY MEDICINE

## 2020-01-01 DIAGNOSIS — M54.50 ACUTE LOW BACK PAIN WITHOUT SCIATICA, UNSPECIFIED BACK PAIN LATERALITY: Primary | ICD-10-CM

## 2020-01-01 DIAGNOSIS — R25.1 TREMOR: ICD-10-CM

## 2020-01-01 DIAGNOSIS — R44.1 VISUAL HALLUCINATION: ICD-10-CM

## 2020-01-01 LAB
ALBUMIN SERPL-MCNC: 3.9 G/DL (ref 3.5–5.2)
ALBUMIN/GLOB SERPL: 1.2 G/DL
ALP SERPL-CCNC: 114 U/L (ref 39–117)
ALT SERPL W P-5'-P-CCNC: 18 U/L (ref 1–33)
ANION GAP SERPL CALCULATED.3IONS-SCNC: 15.6 MMOL/L (ref 5–15)
AST SERPL-CCNC: 18 U/L (ref 1–32)
BASOPHILS # BLD AUTO: 0.07 10*3/MM3 (ref 0–0.2)
BASOPHILS NFR BLD AUTO: 0.7 % (ref 0–1.5)
BILIRUB SERPL-MCNC: 0.4 MG/DL (ref 0.2–1.2)
BUN BLD-MCNC: 22 MG/DL (ref 8–23)
BUN/CREAT SERPL: 20.4 (ref 7–25)
CALCIUM SPEC-SCNC: 9.7 MG/DL (ref 8.6–10.5)
CHLORIDE SERPL-SCNC: 99 MMOL/L (ref 98–107)
CO2 SERPL-SCNC: 28.4 MMOL/L (ref 22–29)
CREAT BLD-MCNC: 1.08 MG/DL (ref 0.57–1)
DEPRECATED RDW RBC AUTO: 42.8 FL (ref 37–54)
EOSINOPHIL # BLD AUTO: 0.22 10*3/MM3 (ref 0–0.4)
EOSINOPHIL NFR BLD AUTO: 2.3 % (ref 0.3–6.2)
ERYTHROCYTE [DISTWIDTH] IN BLOOD BY AUTOMATED COUNT: 14.4 % (ref 12.3–15.4)
ETHANOL BLD-MCNC: <10 MG/DL (ref 0–10)
ETHANOL UR QL: <0.01 %
GFR SERPL CREATININE-BSD FRML MDRD: 50 ML/MIN/1.73
GLOBULIN UR ELPH-MCNC: 3.2 GM/DL
GLUCOSE BLD-MCNC: 189 MG/DL (ref 65–99)
HCT VFR BLD AUTO: 32.5 % (ref 34–46.6)
HGB BLD-MCNC: 10.6 G/DL (ref 12–15.9)
HOLD SPECIMEN: NORMAL
HOLD SPECIMEN: NORMAL
IMM GRANULOCYTES # BLD AUTO: 0.04 10*3/MM3 (ref 0–0.05)
IMM GRANULOCYTES NFR BLD AUTO: 0.4 % (ref 0–0.5)
LYMPHOCYTES # BLD AUTO: 1.66 10*3/MM3 (ref 0.7–3.1)
LYMPHOCYTES NFR BLD AUTO: 17.5 % (ref 19.6–45.3)
MCH RBC QN AUTO: 27.7 PG (ref 26.6–33)
MCHC RBC AUTO-ENTMCNC: 32.6 G/DL (ref 31.5–35.7)
MCV RBC AUTO: 85.1 FL (ref 79–97)
MONOCYTES # BLD AUTO: 0.6 10*3/MM3 (ref 0.1–0.9)
MONOCYTES NFR BLD AUTO: 6.3 % (ref 5–12)
NEUTROPHILS # BLD AUTO: 6.9 10*3/MM3 (ref 1.7–7)
NEUTROPHILS NFR BLD AUTO: 72.8 % (ref 42.7–76)
NRBC BLD AUTO-RTO: 0 /100 WBC (ref 0–0.2)
PLATELET # BLD AUTO: 210 10*3/MM3 (ref 140–450)
PMV BLD AUTO: 9.1 FL (ref 6–12)
POTASSIUM BLD-SCNC: 4.1 MMOL/L (ref 3.5–5.2)
PROT SERPL-MCNC: 7.1 G/DL (ref 6–8.5)
RBC # BLD AUTO: 3.82 10*6/MM3 (ref 3.77–5.28)
SODIUM BLD-SCNC: 143 MMOL/L (ref 136–145)
TROPONIN T SERPL-MCNC: <0.01 NG/ML (ref 0–0.03)
WBC NRBC COR # BLD: 9.49 10*3/MM3 (ref 3.4–10.8)
WHOLE BLOOD HOLD SPECIMEN: NORMAL
WHOLE BLOOD HOLD SPECIMEN: NORMAL

## 2020-01-01 PROCEDURE — 85025 COMPLETE CBC W/AUTO DIFF WBC: CPT | Performed by: PHYSICIAN ASSISTANT

## 2020-01-01 PROCEDURE — 84484 ASSAY OF TROPONIN QUANT: CPT | Performed by: PHYSICIAN ASSISTANT

## 2020-01-01 PROCEDURE — 93010 ELECTROCARDIOGRAM REPORT: CPT | Performed by: INTERNAL MEDICINE

## 2020-01-01 PROCEDURE — 80307 DRUG TEST PRSMV CHEM ANLYZR: CPT | Performed by: PHYSICIAN ASSISTANT

## 2020-01-01 PROCEDURE — 72110 X-RAY EXAM L-2 SPINE 4/>VWS: CPT

## 2020-01-01 PROCEDURE — 99284 EMERGENCY DEPT VISIT MOD MDM: CPT

## 2020-01-01 PROCEDURE — 71045 X-RAY EXAM CHEST 1 VIEW: CPT

## 2020-01-01 PROCEDURE — 93005 ELECTROCARDIOGRAM TRACING: CPT | Performed by: PHYSICIAN ASSISTANT

## 2020-01-01 PROCEDURE — 80053 COMPREHEN METABOLIC PANEL: CPT | Performed by: PHYSICIAN ASSISTANT

## 2020-01-01 PROCEDURE — 70450 CT HEAD/BRAIN W/O DYE: CPT

## 2020-01-01 RX ORDER — SODIUM CHLORIDE 0.9 % (FLUSH) 0.9 %
10 SYRINGE (ML) INJECTION AS NEEDED
Status: DISCONTINUED | OUTPATIENT
Start: 2020-01-01 | End: 2020-01-02 | Stop reason: HOSPADM

## 2020-01-02 VITALS
TEMPERATURE: 99.7 F | OXYGEN SATURATION: 93 % | HEIGHT: 62 IN | HEART RATE: 71 BPM | SYSTOLIC BLOOD PRESSURE: 180 MMHG | WEIGHT: 220 LBS | RESPIRATION RATE: 18 BRPM | DIASTOLIC BLOOD PRESSURE: 75 MMHG | BODY MASS INDEX: 40.48 KG/M2

## 2020-01-02 LAB
AMPHET+METHAMPHET UR QL: NEGATIVE
BACTERIA UR QL AUTO: NORMAL /HPF
BARBITURATES UR QL SCN: NEGATIVE
BENZODIAZ UR QL SCN: NEGATIVE
BILIRUB UR QL STRIP: NEGATIVE
CANNABINOIDS SERPL QL: NEGATIVE
CLARITY UR: CLEAR
COCAINE UR QL: NEGATIVE
COLOR UR: YELLOW
GLUCOSE UR STRIP-MCNC: NEGATIVE MG/DL
HGB UR QL STRIP.AUTO: NEGATIVE
HYALINE CASTS UR QL AUTO: NORMAL /LPF
KETONES UR QL STRIP: NEGATIVE
LEUKOCYTE ESTERASE UR QL STRIP.AUTO: ABNORMAL
METHADONE UR QL SCN: NEGATIVE
NITRITE UR QL STRIP: NEGATIVE
OPIATES UR QL: NEGATIVE
OXYCODONE UR QL SCN: NEGATIVE
PH UR STRIP.AUTO: 7.5 [PH] (ref 5–8)
PROT UR QL STRIP: NEGATIVE
RBC # UR: NORMAL /HPF
REF LAB TEST METHOD: NORMAL
SP GR UR STRIP: 1.01 (ref 1–1.03)
SQUAMOUS #/AREA URNS HPF: NORMAL /HPF
UROBILINOGEN UR QL STRIP: ABNORMAL
WBC UR QL AUTO: NORMAL /HPF

## 2020-01-02 PROCEDURE — 80307 DRUG TEST PRSMV CHEM ANLYZR: CPT | Performed by: PHYSICIAN ASSISTANT

## 2020-01-02 PROCEDURE — 81001 URINALYSIS AUTO W/SCOPE: CPT | Performed by: PHYSICIAN ASSISTANT

## 2020-01-02 PROCEDURE — 90791 PSYCH DIAGNOSTIC EVALUATION: CPT

## 2020-01-02 NOTE — ED NOTES
"Asked patient if she was able to leave a urine sample, and pt stated \"not right now\". Pt was instructed to push the call button when she feels she can Ev Marie RN  01/02/20 0004    "

## 2020-01-02 NOTE — ED NOTES
"Pt refusing to call her son for ride home \"I won't disturb him right now\". Charge RN stated to discharge patient and send her to lobby to wait for ride home. Pt made aware.      Ev Yañez RN  01/02/20 2293    "

## 2020-01-02 NOTE — ED PROVIDER NOTES
EMERGENCY DEPARTMENT ENCOUNTER    Room Number:  17/17  Date seen:  1/2/2020  Time seen: 10:03 PM  PCP: Abiodun Vila MD  Historian: Patient      HPI:  Chief complaint: Visual hallucinations  Context: Vidhi Lopez is a 69 y.o. female who presents to the ED c/o progressively worsening visual hallucinations that began a few weeks ago and worsened today when she saw her son and his fiance at her apartment. Patient reports her son later told her that he was not at the apartment. Patient denies SI or HI. She reports associated intermittent tremors that began a few years ago, and the patient reports she came into the ED today because she is worried about her safety. Patient reports chest tightness, dysuria, dark urine, and lumbar pain, but she denies SOA, nausea/vomiting. Patient reports a hx of seizures and she is being treated with Keppra. Patient reports chronic leg redness and right calf swelling that began in July 2019, and she reports she has had cellulitis for 5 months. Patient reports a hx of frequent falls, but she has not had a fall since she was discharged from the hospital in December 2019.      MEDICAL RECORD REVIEW     Patient was seen at an [facility] earlier today for hallucinations, and she was referred to the ED for further evaluation. Patient was admitted from 12/4/19 - 12/9/19 for weakness, frequent falls, and a L1 compression fracture. Patient was discharged from rehab on December 27, 2019.      ALLERGIES  Amlodipine; Hydrocodone; and Reglan [metoclopramide]    PAST MEDICAL HISTORY  Active Ambulatory Problems     Diagnosis Date Noted   • Dyslipidemia 07/14/2016   • Hypertension 07/14/2016   • Anxiety (Chronic benzos) 07/14/2016   • Insomnia 07/14/2016   • GERD (gastroesophageal reflux disease) 07/14/2016   • Diabetes mellitus, type 2 (CMS/[facility]) 07/14/2016   • Fibromyalgia 07/14/2016   • RLS (restless legs syndrome) 07/14/2016   • Migraines 07/14/2016   • COPD (chronic obstructive pulmonary  disease) (CMS/HCC) 07/14/2016   • Interstitial cystitis 07/14/2016   • History of acute myocardial infarction 07/14/2016   • History of cardiac murmur 07/14/2016   • History of stroke 07/14/2016   • CAD (coronary artery disease) 07/14/2016   • Essential hypertension 01/12/2017   • CKD (chronic kidney disease) stage 2, GFR 60-89 ml/min 09/20/2017   • Bilateral carotid artery stenosis 09/20/2017   • Chronic respiratory failure with hypoxia and hypercapnia (CMS/HCC) 09/30/2017   • Obstructive sleep apnea 09/30/2017   • Obesity (BMI 30-39.9) 09/30/2017   • Diabetes mellitus type 2 in obese (CMS/HCC) 09/30/2017   • Obesity hypoventilation syndrome (CMS/HCC) 03/17/2018   • Tobacco use 03/17/2018   • Chronic pain (Chronic narcotics) 10/15/2018   • Recurrent UTI/interstitial cystitis on chronic methenamine 10/15/2018   • Clostridium difficile colitis diagnosed September 2018. Persistent diarrhea despite oral vancomycin 10/16/2018   • Demand ischemia (CMS/HCC) 11/06/2018   • Hypoxemia requiring supplemental oxygen 01/25/2019   • Stenosis of carotid artery 10/28/2019   • Occlusion and stenosis of left carotid artery  10/28/2019   • Chronic gout with tophus 10/28/2019   • Depression 10/28/2019   • Edema 10/28/2019   • Restless leg syndrome 10/28/2019   • Asthma 10/28/2019   • Wellness examination 10/28/2019   • Encounter for screening mammogram for malignant neoplasm of breast  10/28/2019   • Seasonal allergies 10/28/2019   • Hyperlipidemia 12/05/2019   • Seizure disorder (CMS/HCC) 12/05/2019   • L1 vertebral fracture (CMS/HCC) 12/05/2019   • Rib fracture 12/05/2019   • Lumbar compression fracture (CMS/HCC) 12/06/2019   • Polypharmacy 12/07/2019     Resolved Ambulatory Problems     Diagnosis Date Noted   • Generalized edema 09/20/2017   • Pneumonia of right lower lobe due to infectious organism (CMS/[facility]) 09/28/2017   • Acute metabolic encephalopathy due to hypercarbia, hypoxemia, benzodiazepines 03/17/2018   • Acute kidney  injury (CMS/HCC) 03/17/2018   • SIRS (systemic inflammatory response syndrome) (CMS/HCC) 03/17/2018   • Elevated LFTs 03/17/2018   • Right leg pain 03/19/2018   • Elevated d-dimer 03/19/2018   • ESR raised 03/20/2018   • Seizure disorder, nonconvulsive, with status epilepticus (CMS/HCC) 03/20/2018   • Septic joint of right knee joint (CMS/HCC) 03/21/2018   • R/O CAP (community acquired pneumonia) 10/15/2018   • Respiratory failure, acute and chronic (CMS/HCC) 10/15/2018   • Acute and chronic respiratory failure with hypercapnia (CMS/HCC) 11/06/2018     Past Medical History:   Diagnosis Date   • Allergic rhinitis    • Asthma with COPD (CMS/HCC)    • Bilateral ovarian cysts    • Coronary artery disease    • Depression with anxiety    • Diabetes (CMS/HCC)    • Endometriosis    • Fatigue    • Headache    • High cholesterol    • History of gallstones    • History of myocardial infarction    • Influenza B    • On home oxygen therapy    • URIEL on CPAP    • PONV (postoperative nausea and vomiting)    • Shortness of breath on exertion    • Stroke (CMS/HCC)    • Thyroid disorder    • Urinary leakage    • UTI (urinary tract infection)    • Wears glasses    • Yeast dermatitis        PAST SURGICAL HISTORY  Past Surgical History:   Procedure Laterality Date   • ARTERIOGRAM N/A 2/12/2018    Procedure: Renal Arteriogram;  Surgeon: Lito Flores MD;  Location:  [facility];  Service:    • BREAST BIOPSY Right 1991   • CARDIAC CATHETERIZATION N/A 2/12/2018    Procedure: Right Heart Cath;  Surgeon: Lito Flores MD;  Location:  [facility] LOCATION;  Service:    • CAROTID STENT      Transcath    • CAROTID STENT Left    • CHOLECYSTECTOMY     • CYSTOSTOMY W/ BLADDER BIOPSY     • DILATATION AND CURETTAGE     • KNEE ARTHROSCOPY Right 3/23/2018    Procedure: ARTHROSCOPY, RIGHT KNEE  INCISION AND DRAINAGE LOWER EXTREMITY WITH SYNOVIAL BIOPSY;  Surgeon: Dilip Giron MD;  Location:  [facility] OR;  Service:  Orthopedics   • LAPAROSCOPIC CHOLECYSTECTOMY  1994    Lap rolando   • OOPHORECTOMY     • PAP SMEAR  05/10/2016   • PARTIAL HYSTERECTOMY         FAMILY HISTORY  Family History   Problem Relation Age of Onset   • Cancer Other    • Diabetes Other    • Heart attack Other    • Hyperlipidemia Other    • Hypertension Other    • Osteoporosis Other    • Stroke Other    • Breast cancer Mother    • Osteoporosis Mother    • Colon cancer Father        SOCIAL HISTORY  Social History     Socioeconomic History   • Marital status:      Spouse name: Not on file   • Number of children: Not on file   • Years of education: Not on file   • Highest education level: Not on file   Tobacco Use   • Smoking status: Current Every Day Smoker     Packs/day: 0.25     Years: 40.00     Pack years: 10.00     Types: Cigarettes   • Smokeless tobacco: Never Used   • Tobacco comment: in process of quiting--AVERAGE 1 PPD, DOWN TO 6 CIG PER DAY X2 WEEKS    Substance and Sexual Activity   • Alcohol use: Yes     Alcohol/week: 1.0 standard drinks     Types: 1 Glasses of wine per week     Comment: occasional   • Drug use: No   • Sexual activity: Defer   Social History Narrative    Lives alone.            REVIEW OF SYSTEMS  Review of Systems   Constitutional: Negative for fever.   HENT: Negative for sore throat.    Eyes: Negative.    Respiratory: Positive for chest tightness. Negative for cough and shortness of breath.    Cardiovascular: Negative for chest pain.   Gastrointestinal: Negative for abdominal pain, diarrhea and vomiting.   Genitourinary: Positive for dysuria.        + dark urine   Musculoskeletal: Positive for back pain. Negative for neck pain.   Skin: Negative for rash.   Allergic/Immunologic: Negative for immunocompromised state.   Neurological: Positive for tremors. Negative for weakness, numbness and headaches.   Psychiatric/Behavioral: Positive for hallucinations. Negative for suicidal ideas.   All other systems reviewed and are  negative.          PHYSICAL EXAM  ED Triage Vitals   Temp Heart Rate Resp BP SpO2   01/01/20 2041 01/01/20 2041 01/01/20 2041 01/01/20 2157 01/01/20 2041   99.7 °F (37.6 °C) 79 18 134/92 93 %      Temp src Heart Rate Source Patient Position BP Location FiO2 (%)   -- -- -- -- --            Physical Exam   Constitutional: She is oriented to person, place, and time. No distress.   HENT:   Head: Normocephalic and atraumatic.   Eyes: Pupils are equal, round, and reactive to light. EOM are normal.   Neck: Normal range of motion. Neck supple.   Cardiovascular: Normal rate and regular rhythm. Exam reveals no gallop and no friction rub.   Murmur heard.   Systolic murmur is present with a grade of 3/6.  Pulmonary/Chest: Effort normal and breath sounds normal. No respiratory distress. She has no wheezes. She has no rhonchi. She has no rales.   Abdominal: Soft. There is no tenderness. There is no rebound and no guarding.   Musculoskeletal: Normal range of motion. She exhibits edema.   There is mild edema and mild erythema to the right lower leg. This is a chronic issue.   Neurological: She is alert and oriented to person, place, and time. She has normal sensation and normal strength.   Skin: Skin is warm and dry. No rash noted. There is erythema.   Psychiatric: Mood and affect normal.   Nursing note and vitals reviewed.        LAB RESULTS  Recent Results (from the past 24 hour(s))   POC Urinalysis Dipstick, Multipro (Automated dipstick)    Collection Time: 01/01/20  6:57 PM   Result Value Ref Range    Color Yellow Yellow, Straw, Dark Yellow, Karin    Clarity, UA Slightly Cloudy (A) Clear    Glucose, UA Negative Negative, 1000 mg/dL (3+) mg/dL    Bilirubin Negative Negative    Ketones, UA Negative Negative    Specific Gravity  1.015 1.005 - 1.030    Blood, UA Negative Negative    pH, Urine 7.0 5.0 - 8.0    Protein, POC Negative Negative mg/dL    Urobilinogen, UA Normal Normal    Nitrite, UA Negative Negative    Leukocytes  Negative Negative   Light Blue Top    Collection Time: 01/01/20 10:19 PM   Result Value Ref Range    Extra Tube hold for add-on    Green Top (Gel)    Collection Time: 01/01/20 10:19 PM   Result Value Ref Range    Extra Tube Hold for add-ons.    Lavender Top    Collection Time: 01/01/20 10:19 PM   Result Value Ref Range    Extra Tube hold for add-on    Gold Top - SST    Collection Time: 01/01/20 10:19 PM   Result Value Ref Range    Extra Tube Hold for add-ons.    Comprehensive Metabolic Panel    Collection Time: 01/01/20 10:19 PM   Result Value Ref Range    Glucose 189 (H) 65 - 99 mg/dL    BUN 22 8 - 23 mg/dL    Creatinine 1.08 (H) 0.57 - 1.00 mg/dL    Sodium 143 136 - 145 mmol/L    Potassium 4.1 3.5 - 5.2 mmol/L    Chloride 99 98 - 107 mmol/L    CO2 28.4 22.0 - 29.0 mmol/L    Calcium 9.7 8.6 - 10.5 mg/dL    Total Protein 7.1 6.0 - 8.5 g/dL    Albumin 3.90 3.50 - 5.20 g/dL    ALT (SGPT) 18 1 - 33 U/L    AST (SGOT) 18 1 - 32 U/L    Alkaline Phosphatase 114 39 - 117 U/L    Total Bilirubin 0.4 0.2 - 1.2 mg/dL    eGFR Non African Amer 50 (L) >60 mL/min/1.73    Globulin 3.2 gm/dL    A/G Ratio 1.2 g/dL    BUN/Creatinine Ratio 20.4 7.0 - 25.0    Anion Gap 15.6 (H) 5.0 - 15.0 mmol/L   Troponin    Collection Time: 01/01/20 10:19 PM   Result Value Ref Range    Troponin T <0.010 0.000 - 0.030 ng/mL   Ethanol    Collection Time: 01/01/20 10:19 PM   Result Value Ref Range    Ethanol <10 0 - 10 mg/dL    Ethanol % <0.010 %   CBC Auto Differential    Collection Time: 01/01/20 10:19 PM   Result Value Ref Range    WBC 9.49 3.40 - 10.80 10*3/mm3    RBC 3.82 3.77 - 5.28 10*6/mm3    Hemoglobin 10.6 (L) 12.0 - 15.9 g/dL    Hematocrit 32.5 (L) 34.0 - 46.6 %    MCV 85.1 79.0 - 97.0 fL    MCH 27.7 26.6 - 33.0 pg    MCHC 32.6 31.5 - 35.7 g/dL    RDW 14.4 12.3 - 15.4 %    RDW-SD 42.8 37.0 - 54.0 fl    MPV 9.1 6.0 - 12.0 fL    Platelets 210 140 - 450 10*3/mm3    Neutrophil % 72.8 42.7 - 76.0 %    Lymphocyte % 17.5 (L) 19.6 - 45.3 %    Monocyte  % 6.3 5.0 - 12.0 %    Eosinophil % 2.3 0.3 - 6.2 %    Basophil % 0.7 0.0 - 1.5 %    Immature Grans % 0.4 0.0 - 0.5 %    Neutrophils, Absolute 6.90 1.70 - 7.00 10*3/mm3    Lymphocytes, Absolute 1.66 0.70 - 3.10 10*3/mm3    Monocytes, Absolute 0.60 0.10 - 0.90 10*3/mm3    Eosinophils, Absolute 0.22 0.00 - 0.40 10*3/mm3    Basophils, Absolute 0.07 0.00 - 0.20 10*3/mm3    Immature Grans, Absolute 0.04 0.00 - 0.05 10*3/mm3    nRBC 0.0 0.0 - 0.2 /100 WBC   Urinalysis With Microscopic If Indicated (No Culture) - Urine, Clean Catch    Collection Time: 01/02/20 12:48 AM   Result Value Ref Range    Color, UA Yellow Yellow, Straw    Appearance, UA Clear Clear    pH, UA 7.5 5.0 - 8.0    Specific Gravity, UA 1.014 1.005 - 1.030    Glucose, UA Negative Negative    Ketones, UA Negative Negative    Bilirubin, UA Negative Negative    Blood, UA Negative Negative    Protein, UA Negative Negative    Leuk Esterase, UA Trace (A) Negative    Nitrite, UA Negative Negative    Urobilinogen, UA 0.2 E.U./dL 0.2 - 1.0 E.U./dL   Urine Drug Screen - Urine, Clean Catch    Collection Time: 01/02/20 12:48 AM   Result Value Ref Range    Amphet/Methamphet, Screen Negative Negative    Barbiturates Screen, Urine Negative Negative    Benzodiazepine Screen, Urine Negative Negative    Cocaine Screen, Urine Negative Negative    Opiate Screen Negative Negative    THC, Screen, Urine Negative Negative    Methadone Screen, Urine Negative Negative    Oxycodone Screen, Urine Negative Negative   Urinalysis, Microscopic Only - Urine, Clean Catch    Collection Time: 01/02/20 12:48 AM   Result Value Ref Range    RBC, UA 0-2 None Seen, 0-2 /HPF    WBC, UA 0-2 None Seen, 0-2 /HPF    Bacteria, UA None Seen None Seen /HPF    Squamous Epithelial Cells, UA 0-2 None Seen, 0-2 /HPF    Hyaline Casts, UA None Seen None Seen /LPF    Methodology Automated Microscopy        I ordered the above labs and reviewed the results        RADIOLOGY  CT Head Without Contrast   Final Result    No acute findings.       Radiation dose reduction techniques were utilized, including automated   exposure control and exposure modulation based on body size.       This report was finalized on 1/2/2020 12:32 AM by Dr. Melani Rosales M.D.          XR Chest 1 View   Final Result   Cardiomegaly with perhaps mild vascular congestion.       This report was finalized on 1/2/2020 12:11 AM by Dr. Melani Rosales M.D.          XR Spine Lumbar Complete 4+VW   Final Result       1. Chronic compression deformity noted at L1. There is also some mild   deformity of the superior endplate of L3. This was actually present on   prior CT from 12/04/2019, and appears unchanged. No new fractures are   seen.       This report was finalized on 1/2/2020 12:06 AM by Dr. Melani Rosales M.D.              I ordered the above noted radiological studies and reviewed the images on the PACS system.        MEDICATIONS GIVEN IN ER  Medications   sodium chloride 0.9 % flush 10 mL (has no administration in time range)           EKG     EKG time: 2248  Rhythm/Rate: NSR, 72  P waves and KS: normal; first degree heart block  QRS, axis: normal   ST and T waves: non-specific T wave changes     Interpreted Contemporaneously by me, independently viewed  Similar compared to prior 4/2/18    PROCEDURES  Procedures        COURSE & MEDICAL DECISION MAKING  Pertinent Labs and Imaging studies that were ordered and reviewed are noted above.  Results were reviewed/discussed with the patient and they were also made aware of online access.  Pt also made aware that some labs, such as cultures, will not be resulted during ER visit and follow up with PMD is necessary.         PROGRESS AND CONSULTS    Progress Notes:    ED Course as of Jan 02 0300 Wed Jan 01, 2020   2149 Patient with a very complex HPI.  Patient has multiple complaints.  Most of her symptoms have been chronic in nature.  She states she has had previous visual hallucinations.  She has had  "previous episodes of tremor.  She has not had any falls since she left the hospital.  Patient is a very vague historian.  Patient does have stable vitals and a benign physical exam.  We will have access evaluate her as well.    [EE]      ED Course User Index  [EE] Dominguez Whitley PA       2237 Upon initial encounter, discussed with the patient that [facility] with evaluate her in the ED. Ordered labs, XR lumbar spine, CXR, and CT head for evaluation.    0021 Reviewed pt's history and workup with Dr. Malik (ER physician).  After a bedside evaluation, Dr. Malik agrees with the plan of care.    0142 Edward, RN with [facility], has evaluated the patient. He states the patient self-aware of her hallucinations, and he reports she is cleared for discharge. He will provide out-patient referrals for the patient.    0242 Rechecked the patient who is resting comfortably and in NAD. The patient is stable.  BP- (!) 196/107 HR- 71 Temp- 99.7 °F (37.6 °C) O2 sat- 96%. Informed the patient of all unremarkable labs and imaging. Discussed the plan for discharge with instructions to f/u with PCP for further evaluation and management. Pt understands and agrees with the plan, all questions answered.      Disposition vitals:  BP (!) 196/107   Pulse 71   Temp 99.7 °F (37.6 °C)   Resp 18   Ht 157.5 cm (62\")   Wt 99.8 kg (220 lb)   LMP  (LMP Unknown) Comment: LAST MAMMOGRAM 2017  SpO2 96%   BMI 40.24 kg/m²         DIAGNOSIS  Final diagnoses:   Acute low back pain without sciatica, unspecified back pain laterality   Visual hallucination   Tremor         DISPOSITION  DISCHARGE    Patient discharged in stable condition.    Reviewed implications of results, diagnosis, meds, responsibility to follow up, warning signs and symptoms of possible worsening, potential complications and reasons to return to ER, including any new or worsening symptoms.    Patient/Family voiced understanding of above instructions.    Discussed plan for discharge, as there " is no emergent indication for admission. Patient referred to primary care provider for BP management due to today's BP. Pt/family is agreeable and understands need for follow up and repeat testing.  Pt is aware that discharge does not mean that nothing is wrong but it indicates no emergency is present that requires admission and they must continue care with follow-up as given below or physician of their choice.       FOLLOW UP   Abiodun Vila MD  [id number] [facility] [address]  [place] [id number]  [phone]    Schedule an appointment as soon as possible for a visit         Documentation assistance provided by slime Marrero for Dominguez Whitley PA-C.  Information recorded by the scribe was done at my direction and has been verified and validated by me.                        Raya Marrero  01/02/20 0427       Dominguez Whitley PA  01/02/20 [id number]

## 2020-01-03 ENCOUNTER — EPISODE CHANGES (OUTPATIENT)
Dept: CASE MANAGEMENT | Facility: OTHER | Age: 70
End: 2020-01-03

## 2020-01-03 ENCOUNTER — PATIENT OUTREACH (OUTPATIENT)
Dept: CASE MANAGEMENT | Facility: OTHER | Age: 70
End: 2020-01-03

## 2020-01-03 NOTE — OUTREACH NOTE
Care Coordination Note  Talked with Renée/ PCP office and confirmed patient appointment for 1/6/2020 for 4:15 PM.     Ama Glynn RN  Ambulatory     1/3/2020, 3:22 PM

## 2020-01-03 NOTE — OUTREACH NOTE
Care Coordination Note  Talked with April/ Dr. Garcia (neurologist) office [phone] regarding scheduling of patient appointment for 1/16/2020 at 11 AM.     Ama Glynn RN  Ambulatory     1/3/2020, 3:24 PM

## 2020-01-03 NOTE — OUTREACH NOTE
Care Plan Note      Responses   Lifestyle Goals  Routine follow-up with doctor(s), Self monitor blood pressure, Self monitor blood sugar   Barriers  Disease education, Other (See Comment) [Skin integriety to lower extremities]   Self Management  Medication Adherence, Home BP Monitoring, Home Glucose Monitoring   Annual Wellness Visit:   Patient Has Completed   Care Gaps Addressed  Colon Cancer Screening, Mammogram, Flu Shot   Colon Cancer Screening Type  Colonoscopy   Colonoscopy Status  Up to Date (< 10 yrs)   Colon Cancer Screening Completion at [facility] or Other  Other   Flu Shot Status  Up to Date   Mammogram Status  Up to Date   Mammogram Completion at [facility] or Other  [facility]   Other Patient Education/Resources   24/7 [facility] Nurse Call Line, Advanced Care Planning, [facility]   24/7 Nurse Call Line Education Method  Verbal   ACP Education Method  Verbal [Declines information]   [facility] Education Method  Verbal   Does patient have depression diagnosis?  No   Advanced Directives:  Not Interested At This Time   Ed Visits past 12 months:  2 or 3   Hospitalizations past 12 months  1        The main concerns and/or symptoms the patient would like to address are: Talked with patient. Discussed 1/1/2020 ED visit regarding back  pain; tremors and hallucinations . Patient states to be compliant with ED recommendations and states symptoms have improved. She reports to have episodes of chest pain; and no difficulty with SOB; appetite or sleeping. She does states to have increase in sleeping. Patient is a retired nurse.  Patient lives alone; independent with ADL's; light meal preparation; receiving assistance with transportation from friends and IDL..She lives in [facility] and receives assistance from her son as needed.  Patient ambulates without assistive device and states to have numbness to soles of feet. She states to be compliant with medications; medical appointments and monitoring of blood  pressure and blood sugars. Per patient request, ACM to assist with confirming and scheduling of physician appointments.     Education/instruction provided by Care Coordinator: Reviewed with patient ED recommendations; assisted with scheduling of appointments; home health services; 24/7 Nurse Line Telephone number; ACM contact information; Advance Directives;  My Chart; gaps in care; MWV and Care Advising program. Patient verbalized understanding.Patient verbalized understanding of physician appointments.  No further questions or concerns voiced at this time.     Follow Up Outreach Due: Follow up as needed.     Ama Glynn RN  Ambulatory     1/3/2020, 3:13 PM
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
## 2020-01-03 NOTE — OUTREACH NOTE
Care Coordination Note  Talked with Darrius/ Clinical Manager at [facility] [phone]. He will be contacting PCP regarding home health orders for services and will contact patient with information.     Ama Glynn RN  Ambulatory     1/3/2020, 3:00 PM

## 2020-01-08 ENCOUNTER — TELEPHONE (OUTPATIENT)
Dept: FAMILY MEDICINE CLINIC | Facility: CLINIC | Age: 70
End: 2020-01-08

## 2020-01-17 ENCOUNTER — OFFICE VISIT (OUTPATIENT)
Dept: FAMILY MEDICINE CLINIC | Facility: CLINIC | Age: 70
End: 2020-01-17

## 2020-01-17 VITALS
SYSTOLIC BLOOD PRESSURE: 112 MMHG | HEART RATE: 65 BPM | OXYGEN SATURATION: 95 % | TEMPERATURE: 98.4 F | WEIGHT: 204 LBS | DIASTOLIC BLOOD PRESSURE: 70 MMHG | BODY MASS INDEX: 37.54 KG/M2 | HEIGHT: 62 IN

## 2020-01-17 DIAGNOSIS — N39.0 RECURRENT UTI: Primary | ICD-10-CM

## 2020-01-17 DIAGNOSIS — S32.010A CLOSED WEDGE COMPRESSION FRACTURE OF FIRST LUMBAR VERTEBRA, INITIAL ENCOUNTER: ICD-10-CM

## 2020-01-17 DIAGNOSIS — Z79.899 POLYPHARMACY: ICD-10-CM

## 2020-01-17 DIAGNOSIS — S22.39XA CLOSED FRACTURE OF ONE RIB, UNSPECIFIED LATERALITY, INITIAL ENCOUNTER: ICD-10-CM

## 2020-01-17 DIAGNOSIS — G40.909 SEIZURE DISORDER (HCC): ICD-10-CM

## 2020-01-17 PROCEDURE — 99214 OFFICE O/P EST MOD 30 MIN: CPT | Performed by: FAMILY MEDICINE

## 2020-01-17 RX ORDER — GABAPENTIN 300 MG/1
300 CAPSULE ORAL 3 TIMES DAILY
COMMUNITY
End: 2020-02-28 | Stop reason: DRUGHIGH

## 2020-01-17 RX ORDER — CLONAZEPAM 0.5 MG/1
0.5 TABLET ORAL 3 TIMES DAILY PRN
COMMUNITY
End: 2020-01-17

## 2020-01-17 NOTE — PROGRESS NOTES
Subjective   Vidhi Lopez is a 69 y.o. female.     Chief Complaint   Patient presents with   • Follow up from [facility] for falls     Rib fracture and L1, L2, L3 fracture        History of Present Illness   Patient went to the ER for safety.  Her ER visit from a fall was back in December.  Records reviewed.  Had been having visual hallucinations at most recent ER visit.  Also has had intermittent tremors for years.  Has a history of seizures.  She was seen by the access center and given outpatient referrals for these issues.    For the hospital admission date 12/4/2019 discharge date 12/9/2019 the history is as follows.  She was having frequent falls and found to have an L1 compression fracture.  He had no desire for kyphoplasty.  Recommended conservative management.  Her a brace.  It was decided she was not having seizures by the neurology team that are causing her falls.  They think her diabetic peripheral neuropathy is the reason she is having falls.  They also thought it could be due to polypharmacy.  And changed her medication.  Was advised to use a walker but she declined this.  She was also advised to have home health but she declined this as well.  He did eventually agree to go to subacute rehab. She feels her strength is much better. No more falls. Pt is insisting she has a UTI even though BE ruled her out. Wanting a culture today.     The following portions of the patient's history were reviewed and updated as appropriate: allergies, current medications, past family history, past medical history, past social history, past surgical history and problem list.    Past Medical History:   Diagnosis Date   • Allergic rhinitis    • Asthma with COPD (CMS/[facility])     Asthma/COPD   • Bilateral ovarian cysts    • Coronary artery disease    • Depression with anxiety     Depression/Anxiety   • Diabetes (CMS/[facility])     pre   • Endometriosis     Dr. Jin; estradiol    • ESR raised 3/20/2018   • Fatigue    • Fibromyalgia     • Headache     Headaches   • High cholesterol    • History of gallstones    • History of myocardial infarction    • Hypertension    • Influenza B     DX'D 2/6/2018, TREATED WITH TAMIFLU. LAST DOSE 2/11/2018 AM.  PATIENT STATES DR FLORES IS AWARE. DENIES FEVERS OR CHILLS.   • Interstitial cystitis    • On home oxygen therapy     2 L NC   • URIEL on CPAP    • PONV (postoperative nausea and vomiting)    • Seizure disorder, nonconvulsive, with status epilepticus (CMS/[facility]) 3/20/2018   • Septic joint of right knee joint (CMS/[facility]) 3/21/2018   • Shortness of breath on exertion    • Stroke (CMS/[facility])     X1 8 YEARS AGO, GENERALIZED UPPER BODY WEAKNESS RESIDUAL PER PT    • Thyroid disorder    • Urinary leakage    • UTI (urinary tract infection)    • Wears glasses    • Yeast dermatitis        Past Surgical History:   Procedure Laterality Date   • ARTERIOGRAM N/A 2/12/2018    Procedure: Renal Arteriogram;  Surgeon: Lito Flores MD;  Location:  [facility] CATH [facility]VASIVE [facility];  Service:    • BREAST BIOPSY Right 1991   • CARDIAC CATHETERIZATION N/A 2/12/2018    Procedure: Right Heart Cath;  Surgeon: Lito Flores MD;  Location:  [facility] CATH INVASIVE LOCATION;  Service:    • CAROTID STENT      Transcath    • CAROTID STENT Left    • CHOLECYSTECTOMY     • CYSTOSTOMY W/ BLADDER BIOPSY     • DILATATION AND CURETTAGE     • KNEE ARTHROSCOPY Right 3/23/2018    Procedure: ARTHROSCOPY, RIGHT KNEE  INCISION AND DRAINAGE LOWER EXTREMITY WITH SYNOVIAL BIOPSY;  Surgeon: Dilip Giron MD;  Location:  [facility] OR;  Service: Orthopedics   • LAPAROSCOPIC CHOLECYSTECTOMY  1994    Lap rolando   • OOPHORECTOMY     • PAP SMEAR  05/10/2016   • PARTIAL HYSTERECTOMY         Family History   Problem Relation Age of Onset   • Cancer Other    • Diabetes Other    • Heart attack Other    • Hyperlipidemia Other    • Hypertension Other    • Osteoporosis Other    • Stroke Other    • Breast cancer Mother    • Osteoporosis Mother    • Colon cancer Father   "      Social History     Socioeconomic History   • Marital status:      Spouse name: Not on file   • Number of children: Not on file   • Years of education: Not on file   • Highest education level: Not on file   Tobacco Use   • Smoking status: Current Every Day Smoker     Packs/day: 0.25     Years: 40.00     Pack years: 10.00     Types: Cigarettes   • Smokeless tobacco: Never Used   • Tobacco comment: in process of quiting--AVERAGE 1 PPD, DOWN TO 6 CIG PER DAY X2 WEEKS    Substance and Sexual Activity   • Alcohol use: Yes     Alcohol/week: 1.0 standard drinks     Types: 1 Glasses of wine per week     Comment: occasional   • Drug use: No   • Sexual activity: Defer   Social History Narrative    Lives alone.        Review of Systems   Constitutional: Negative for fever.   Cardiovascular: Negative for chest pain.       Objective   Visit Vitals  /70   Pulse 65   Temp 98.4 °F (36.9 °C) (Temporal)   Ht 157.5 cm (62\")   Wt 92.5 kg (204 lb)   LMP  (LMP Unknown) Comment: LAST MAMMOGRAM 2017   SpO2 95%   BMI 37.31 kg/m²     Body mass index is 37.31 kg/m².  Physical Exam   Constitutional: She is oriented to person, place, and time. She appears well-developed and well-nourished.   Cardiovascular: Normal rate, regular rhythm, normal heart sounds and intact distal pulses.   Pulmonary/Chest: Effort normal and breath sounds normal.   Musculoskeletal: Normal range of motion. She exhibits no edema.   Neurological: She is alert and oriented to person, place, and time.   Skin: Skin is warm and dry.   Psychiatric: She has a normal mood and affect. Her behavior is normal.         Assessment/Plan   Vidhi was seen today for follow up from [facility] for falls.    Diagnoses and all orders for this visit:    Recurrent UTI/interstitial cystitis on chronic methenamine  -     Urine Culture - Urine, Urine, Clean Catch    Closed wedge compression fracture of first lumbar vertebra, initial encounter (CMS/[facility])    Closed fracture of " one rib, unspecified laterality, initial encounter    Polypharmacy  -     Levetiracetam Level (Keppra)    Seizure disorder (CMS/HCC)  -     Levetiracetam Level (Keppra)      Cont meds, f/u  In 2 months. F/u with neurosurgeon later this month.

## 2020-01-20 ENCOUNTER — TELEPHONE (OUTPATIENT)
Dept: FAMILY MEDICINE CLINIC | Facility: CLINIC | Age: 70
End: 2020-01-20

## 2020-01-20 DIAGNOSIS — G40.909 SEIZURE DISORDER (HCC): Primary | ICD-10-CM

## 2020-01-20 LAB
BACTERIA UR CULT: NORMAL
BACTERIA UR CULT: NORMAL
LEVETIRACETAM SERPL-MCNC: 61.5 UG/ML (ref 10–40)

## 2020-01-20 RX ORDER — LEVETIRACETAM 750 MG/1
750 TABLET ORAL 2 TIMES DAILY
Qty: 180 TABLET | Refills: 0 | Status: SHIPPED | OUTPATIENT
Start: 2020-01-20 | End: 2020-02-28 | Stop reason: SDUPTHER

## 2020-01-20 NOTE — TELEPHONE ENCOUNTER
Patient is still having right groin pain and wants to make sure it is not a clot. Can you order a doppler for her. Please advise

## 2020-01-29 ENCOUNTER — APPOINTMENT (OUTPATIENT)
Dept: LAB | Facility: HOSPITAL | Age: 70
End: 2020-01-29

## 2020-01-29 ENCOUNTER — OFFICE VISIT (OUTPATIENT)
Dept: NEUROLOGY | Facility: CLINIC | Age: 70
End: 2020-01-29

## 2020-01-29 VITALS — HEIGHT: 62 IN | BODY MASS INDEX: 37.54 KG/M2 | WEIGHT: 204 LBS

## 2020-01-29 DIAGNOSIS — G40.901 SEIZURE DISORDER, NONCONVULSIVE, WITH STATUS EPILEPTICUS (HCC): Primary | ICD-10-CM

## 2020-01-29 PROCEDURE — 80177 DRUG SCRN QUAN LEVETIRACETAM: CPT | Performed by: PHYSICIAN ASSISTANT

## 2020-01-29 PROCEDURE — 99214 OFFICE O/P EST MOD 30 MIN: CPT | Performed by: PHYSICIAN ASSISTANT

## 2020-01-29 PROCEDURE — 36415 COLL VENOUS BLD VENIPUNCTURE: CPT | Performed by: PHYSICIAN ASSISTANT

## 2020-01-29 RX ORDER — PROMETHAZINE HYDROCHLORIDE 25 MG/1
TABLET ORAL
COMMUNITY
Start: 2020-01-22 | End: 2020-02-28

## 2020-01-29 RX ORDER — TIZANIDINE 4 MG/1
TABLET ORAL
COMMUNITY
Start: 2020-01-18 | End: 2020-05-21 | Stop reason: HOSPADM

## 2020-01-29 RX ORDER — GABAPENTIN 800 MG/1
800 TABLET ORAL 3 TIMES DAILY
COMMUNITY
Start: 2020-01-18 | End: 2020-05-21 | Stop reason: HOSPADM

## 2020-01-29 RX ORDER — ROPINIROLE 0.25 MG/1
TABLET, FILM COATED ORAL
COMMUNITY
Start: 2020-01-22 | End: 2020-09-08

## 2020-01-29 NOTE — PROGRESS NOTES
"Subjective     Chief Complaint: seizures      History of Present Illness   Vidhi Lopez is a 69 y.o. female with a history of diabetes who returns to clinic today following a hospitalization at [facility] in 3/18 for multiple issues, including seizure. She was noted to have significant confusion prior to her admission, though the history otherwise is a bit unclear. She was diagnosed with acute mixed respiratory failure and acute renal failure. Workup including an MRI of the brain, which was unremarkable for any acute abnormalities. An EEG was notable for status epilepticus.  She was subsequently treated with Keppra 1000mg BID. Additionally, after undergoing a right knee arthroscopy with incision and drainage and was diagnosed with an acute gout flare. Since her hospitalization, she feels that she has returned to her prior cognitive baseline.     For several months prior to her hospitalization, she noted occasional episodes in the evening during which she would \"zone out\" for several seconds. She denies any unresponsiveness or loss of consciousness as well as any urinary incontinence, tongue biting or confusion during these episodes. Since she was started on Keppra, she notes that these spells have resolved.      Additionally, she previously noted episodes of incoordination. She has had three episodes in 2017 during which she notes incoordination in her hands bilaterally. During these episodes, she is more prone to dropping items. She denies any clear associated weakness. She may note some unsteadiness, though denies any ataxia. 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 A MRA of the head and neck was notable for significant stenosis in the LICA and moderate stenosis in the AURE. She is currently taking ASA 81mg, Plavix, and Lipitor 80mg daily.      Today: Since her last visit in 10/19, she was hospitalized at [facility] for frequent falls. A head CT and screening blood " "work were unremarkable. Polypharmacy was suspected. On 1/17/2020, blood work showed a Keppra level of 61.0, therefore her Keppra was lowered to 500mg BID. She denies any recurrent falls or episodes of incoordination since her hospitalization.       I have reviewed and confirmed the past family, social and medical history as accurate on 1/29/2020.     Review of Systems   Constitutional: Negative.    HENT: Negative.    Eyes: Negative.    Respiratory: Negative.    Cardiovascular: Negative.    Gastrointestinal: Negative.    Endocrine: Negative.    Genitourinary: Negative.    Musculoskeletal: Negative.    Skin: Negative.    Allergic/Immunologic: Negative.    Hematological: Negative.    Psychiatric/Behavioral: Negative.        Objective     Ht 157.5 cm (62\")   Wt 92.5 kg (204 lb)   LMP  (LMP Unknown) Comment: LAST MAMMOGRAM 2017  BMI 37.31 kg/m²     General appearance today is normal.       Physical Exam   Neurological: She has normal strength. She has a normal Finger-Nose-Finger Test. Gait normal.   Psychiatric: Her speech is normal.        Neurologic Exam     Mental Status   Speech: speech is normal   Level of consciousness: alert  Normal comprehension.     Cranial Nerves   Cranial nerves II through XII intact.     Motor Exam   Muscle bulk: normal  Overall muscle tone: normal    Strength   Strength 5/5 throughout.     Gait, Coordination, and Reflexes     Gait  Gait: normal    Coordination   Finger to nose coordination: normal    Tremor   Resting tremor: absent            Assessment/Plan   Vidhi was seen today for seizures.    Diagnoses and all orders for this visit:    Seizure disorder, nonconvulsive, with status epilepticus (CMS/HCC)  -     MRI Brain Without Contrast  -     EEG Awake or Asleep Routine  -     Levetiracetam Level (Keppra)          Discussion/Summary   Vidhi Lopez returns to clinic today with a history of seizures. I again reviewed her current status and treatment options. It was elected to obtain a " repeat Keppra level. It was also elected to obtain an MRI of the brain and EEG per the patient's request. After discussing potential treatment options, it was elected to continue on Keppra 500mg BID. She will then follow up in 3 months, or sooner if needed.   I spent 25 minutes face to face with the patient with 15 minutes spent on discussing diagnosis, prognosis, diagnostic testing, evaluation, current status, treatment options and management as discussed above.       As part of this visit I reviewed prior lab results, reviewed radiology results, reviewed radiology images and reviewed records from prior hospitalizations which is incorporated in the HPI.      Eusebia Yanez PA-C

## 2020-01-30 ENCOUNTER — TELEPHONE (OUTPATIENT)
Dept: NEUROLOGY | Facility: CLINIC | Age: 70
End: 2020-01-30

## 2020-02-01 LAB — LEVETIRACETAM SERPL-MCNC: 17.8 UG/ML (ref 10–40)

## 2020-02-04 ENCOUNTER — TELEPHONE (OUTPATIENT)
Dept: NEUROLOGY | Facility: CLINIC | Age: 70
End: 2020-02-04

## 2020-02-05 NOTE — TELEPHONE ENCOUNTER
Called and LVM informing that we have called numerous times regarding scheduling for MRI and asked to please give office a call back. Mailed out letter. Thanks.

## 2020-02-10 ENCOUNTER — EPISODE CHANGES (OUTPATIENT)
Dept: CASE MANAGEMENT | Facility: OTHER | Age: 70
End: 2020-02-10

## 2020-02-12 ENCOUNTER — EPISODE CHANGES (OUTPATIENT)
Dept: CASE MANAGEMENT | Facility: OTHER | Age: 70
End: 2020-02-12

## 2020-02-14 ENCOUNTER — PATIENT OUTREACH (OUTPATIENT)
Dept: CASE MANAGEMENT | Facility: OTHER | Age: 70
End: 2020-02-14

## 2020-02-14 NOTE — OUTREACH NOTE
Patient Outreach Note  Talked with patient.Patient discusses rehab stay at [facility]. Patient states to be feeling well. She is a retired nurse.  Patient lives alone; lives at [facility]; independent with ADL's; light meal preparation; transportation and ambulates without assistive device.   She reports no difficulty with chest pain; SOB; appetite or sleeping. She states to be compliant with medications; medical appointments; monitoring of blood pressure and blood sugars.She states blood pressure and blood sugars are WNL's. Reviewed with patient 24/7 Nurse Line Telephone number; M  contact information; Advance Directives(declines); My Chart(declines);  gaps in care(addressed);  MWV(addressed); and Care Advising program. Patient verbalized understanding. Patient exhibits good  sense of health self-management and adequate support system. No further questions or concerns voiced at this time.     Ama Glynn RN  Ambulatory     2/14/2020, 4:11 PM

## 2020-02-17 ENCOUNTER — TELEPHONE (OUTPATIENT)
Dept: NEUROLOGY | Facility: CLINIC | Age: 70
End: 2020-02-17

## 2020-02-17 NOTE — TELEPHONE ENCOUNTER
Called and spoke to pt informing of scheduled MRI for 2/21. Pt stated verbal understanding and transferred called to Central scheduling for details pertaining to MRI. Thanks.

## 2020-02-21 ENCOUNTER — APPOINTMENT (OUTPATIENT)
Dept: NEUROLOGY | Facility: HOSPITAL | Age: 70
End: 2020-02-21

## 2020-02-21 ENCOUNTER — TELEPHONE (OUTPATIENT)
Dept: PULMONOLOGY | Facility: CLINIC | Age: 70
End: 2020-02-21

## 2020-02-21 ENCOUNTER — APPOINTMENT (OUTPATIENT)
Dept: MRI IMAGING | Facility: HOSPITAL | Age: 70
End: 2020-02-21

## 2020-02-21 DIAGNOSIS — J44.9 CHRONIC OBSTRUCTIVE PULMONARY DISEASE, UNSPECIFIED COPD TYPE (HCC): Primary | ICD-10-CM

## 2020-02-21 RX ORDER — ALBUTEROL SULFATE 90 UG/1
2 AEROSOL, METERED RESPIRATORY (INHALATION) EVERY 4 HOURS PRN
Qty: 18 G | Refills: 5 | Status: SHIPPED | OUTPATIENT
Start: 2020-02-21 | End: 2021-04-09

## 2020-02-21 RX ORDER — BUDESONIDE AND FORMOTEROL FUMARATE DIHYDRATE 160; 4.5 UG/1; UG/1
2 AEROSOL RESPIRATORY (INHALATION) 2 TIMES DAILY
Qty: 1 INHALER | Refills: 3 | Status: SHIPPED | OUTPATIENT
Start: 2020-02-21 | End: 2020-07-16 | Stop reason: ALTCHOICE

## 2020-02-26 ENCOUNTER — TELEPHONE (OUTPATIENT)
Dept: FAMILY MEDICINE CLINIC | Facility: CLINIC | Age: 70
End: 2020-02-26

## 2020-02-26 DIAGNOSIS — L29.9 ITCHING: ICD-10-CM

## 2020-02-27 RX ORDER — HYDROXYZINE 50 MG/1
50 TABLET, FILM COATED ORAL EVERY 8 HOURS PRN
Qty: 90 TABLET | Refills: 2 | Status: SHIPPED | OUTPATIENT
Start: 2020-02-27 | End: 2020-07-01 | Stop reason: SDUPTHER

## 2020-02-28 ENCOUNTER — OFFICE VISIT (OUTPATIENT)
Dept: FAMILY MEDICINE CLINIC | Facility: CLINIC | Age: 70
End: 2020-02-28

## 2020-02-28 VITALS
RESPIRATION RATE: 18 BRPM | BODY MASS INDEX: 37.94 KG/M2 | TEMPERATURE: 97.4 F | DIASTOLIC BLOOD PRESSURE: 76 MMHG | HEART RATE: 62 BPM | SYSTOLIC BLOOD PRESSURE: 150 MMHG | OXYGEN SATURATION: 94 % | HEIGHT: 62 IN | WEIGHT: 206.2 LBS

## 2020-02-28 DIAGNOSIS — G40.909 SEIZURE DISORDER (HCC): ICD-10-CM

## 2020-02-28 DIAGNOSIS — R11.0 NAUSEA: ICD-10-CM

## 2020-02-28 DIAGNOSIS — M10.9 GOUT, UNSPECIFIED CAUSE, UNSPECIFIED CHRONICITY, UNSPECIFIED SITE: ICD-10-CM

## 2020-02-28 DIAGNOSIS — I10 HYPERTENSION, UNSPECIFIED TYPE: Primary | ICD-10-CM

## 2020-02-28 DIAGNOSIS — E11.69 TYPE 2 DIABETES MELLITUS WITH OTHER SPECIFIED COMPLICATION, WITH LONG-TERM CURRENT USE OF INSULIN (HCC): ICD-10-CM

## 2020-02-28 DIAGNOSIS — F33.9 EPISODE OF RECURRENT MAJOR DEPRESSIVE DISORDER, UNSPECIFIED DEPRESSION EPISODE SEVERITY (HCC): Primary | ICD-10-CM

## 2020-02-28 DIAGNOSIS — J02.9 SORE THROAT: Primary | ICD-10-CM

## 2020-02-28 DIAGNOSIS — F32.A DEPRESSION, UNSPECIFIED DEPRESSION TYPE: ICD-10-CM

## 2020-02-28 DIAGNOSIS — I10 HYPERTENSION, UNSPECIFIED TYPE: ICD-10-CM

## 2020-02-28 DIAGNOSIS — Z79.4 TYPE 2 DIABETES MELLITUS WITH OTHER SPECIFIED COMPLICATION, WITH LONG-TERM CURRENT USE OF INSULIN (HCC): ICD-10-CM

## 2020-02-28 DIAGNOSIS — J06.9 UPPER RESPIRATORY TRACT INFECTION, UNSPECIFIED TYPE: ICD-10-CM

## 2020-02-28 DIAGNOSIS — K11.5 SALIVARY DUCT STONE: ICD-10-CM

## 2020-02-28 DIAGNOSIS — R56.9 SEIZURES (HCC): ICD-10-CM

## 2020-02-28 LAB
EXPIRATION DATE: NORMAL
EXPIRATION DATE: NORMAL
FLUAV AG NPH QL: NEGATIVE
FLUBV AG NPH QL: NEGATIVE
INTERNAL CONTROL: NORMAL
INTERNAL CONTROL: NORMAL
Lab: NORMAL
Lab: NORMAL
S PYO AG THROAT QL: NEGATIVE

## 2020-02-28 PROCEDURE — 99214 OFFICE O/P EST MOD 30 MIN: CPT | Performed by: FAMILY MEDICINE

## 2020-02-28 PROCEDURE — 87880 STREP A ASSAY W/OPTIC: CPT | Performed by: FAMILY MEDICINE

## 2020-02-28 PROCEDURE — 87804 INFLUENZA ASSAY W/OPTIC: CPT | Performed by: FAMILY MEDICINE

## 2020-02-28 RX ORDER — LEVETIRACETAM 500 MG/1
500 TABLET ORAL 2 TIMES DAILY
Qty: 180 TABLET | Refills: 3 | Status: SHIPPED | OUTPATIENT
Start: 2020-02-28 | End: 2020-04-29

## 2020-02-28 RX ORDER — PAROXETINE 10 MG/1
10 TABLET, FILM COATED ORAL EVERY MORNING
Qty: 90 TABLET | Refills: 1 | Status: SHIPPED | OUTPATIENT
Start: 2020-02-28 | End: 2020-02-28 | Stop reason: ALTCHOICE

## 2020-02-28 RX ORDER — CEFDINIR 300 MG/1
300 CAPSULE ORAL 2 TIMES DAILY
Qty: 20 CAPSULE | Refills: 0 | Status: SHIPPED | OUTPATIENT
Start: 2020-02-28 | End: 2020-03-09

## 2020-02-28 RX ORDER — PAROXETINE 10 MG/1
10 TABLET, FILM COATED ORAL EVERY MORNING
Qty: 30 TABLET | Refills: 1 | Status: SHIPPED | OUTPATIENT
Start: 2020-02-28 | End: 2020-06-25 | Stop reason: SDUPTHER

## 2020-02-28 RX ORDER — ONDANSETRON HYDROCHLORIDE 8 MG/1
8 TABLET, FILM COATED ORAL EVERY 8 HOURS PRN
Qty: 30 TABLET | Refills: 1 | Status: SHIPPED | OUTPATIENT
Start: 2020-02-28 | End: 2020-07-29

## 2020-02-28 RX ORDER — HYDRALAZINE HYDROCHLORIDE 10 MG/1
10 TABLET, FILM COATED ORAL 3 TIMES DAILY
Qty: 90 TABLET | Refills: 3 | Status: SHIPPED | OUTPATIENT
Start: 2020-02-28 | End: 2020-02-28 | Stop reason: SDUPTHER

## 2020-02-28 RX ORDER — HYDRALAZINE HYDROCHLORIDE 10 MG/1
10 TABLET, FILM COATED ORAL 3 TIMES DAILY
COMMUNITY
End: 2020-02-28 | Stop reason: SDUPTHER

## 2020-02-28 RX ORDER — HYDRALAZINE HYDROCHLORIDE 10 MG/1
10 TABLET, FILM COATED ORAL 3 TIMES DAILY
Qty: 90 TABLET | Refills: 3 | Status: ON HOLD | OUTPATIENT
Start: 2020-02-28 | End: 2020-05-21 | Stop reason: SDUPTHER

## 2020-02-28 RX ORDER — ONDANSETRON HYDROCHLORIDE 8 MG/1
8 TABLET, FILM COATED ORAL EVERY 8 HOURS PRN
Qty: 30 TABLET | Refills: 1 | Status: SHIPPED | OUTPATIENT
Start: 2020-02-28 | End: 2020-02-28

## 2020-02-28 NOTE — PROGRESS NOTES
Subjective   Vidhi Lopez is a 69 y.o. female.     Chief Complaint   Patient presents with   • Sore Throat   • Pain   • Fever       Sore throat x a week, has a Salivary gland stone, needs, fever 100.5F, cough, clear sputum, no V/D, achy, + flu shot, patient did receive the flu shot this year, denies any vomiting or diarrhea, no chest pain or shortness of breath, no wheezing, blood sugar has been stable, she is due for labs next month, she told me this is the second time she has a salivary gland stone, she had one a few years back, no surgery done, it was on the left side, this time is also on the left side, needs refills          The following portions of the patient's history were reviewed and updated as appropriate: allergies, current medications, past family history, past medical history, past social history, past surgical history and problem list.    Past Medical History:   Diagnosis Date   • Allergic rhinitis    • Asthma with COPD (CMS/[facility])     Asthma/COPD   • Bilateral ovarian cysts    • Coronary artery disease    • Depression with anxiety     Depression/Anxiety   • Diabetes (CMS/[facility])     pre   • Endometriosis     Dr. Jin; estradiol    • ESR raised 3/20/2018   • Fatigue    • Fibromyalgia    • Headache     Headaches   • High cholesterol    • History of gallstones    • History of myocardial infarction    • Hypertension    • Influenza B     DX'D 2/6/2018, TREATED WITH TAMIFLU. LAST DOSE 2/11/2018 AM.  PATIENT STATES DR RANGEL IS AWARE. DENIES FEVERS OR CHILLS.   • Interstitial cystitis    • On home oxygen therapy     2 L NC   • URIEL on CPAP    • PONV (postoperative nausea and vomiting)    • Seizure disorder, nonconvulsive, with status epilepticus (CMS/[facility]) 3/20/2018   • Septic joint of right knee joint (CMS/[facility]) 3/21/2018   • Shortness of breath on exertion    • Stroke (CMS/[facility])     X1 8 YEARS AGO, GENERALIZED UPPER BODY WEAKNESS RESIDUAL PER PT    • Thyroid disorder    • Urinary leakage    • UTI (urinary tract  infection)    • Wears glasses    • Yeast dermatitis        Past Surgical History:   Procedure Laterality Date   • ARTERIOGRAM N/A 2/12/2018    Procedure: Renal Arteriogram;  Surgeon: Lito Flores MD;  Location:  [facility] CATH [facility];  Service:    • BREAST BIOPSY Right 1991   • CARDIAC CATHETERIZATION N/A 2/12/2018    Procedure: Right Heart Cath;  Surgeon: Lito Flores MD;  Location:  [facility] CATH [facility]VASIVE LOCATION;  Service:    • CAROTID STENT      Transcath    • CAROTID STENT Left    • CHOLECYSTECTOMY     • CYSTOSTOMY W/ BLADDER BIOPSY     • DILATATION AND CURETTAGE     • KNEE ARTHROSCOPY Right 3/23/2018    Procedure: ARTHROSCOPY, RIGHT KNEE  INCISION AND DRAINAGE LOWER EXTREMITY WITH SYNOVIAL BIOPSY;  Surgeon: Dilip Giron MD;  Location:  [facility] OR;  Service: Orthopedics   • LAPAROSCOPIC CHOLECYSTECTOMY  1994    Lap rolando   • OOPHORECTOMY     • PAP SMEAR  05/10/2016   • SUBTOTAL HYSTERECTOMY         Family History   Problem Relation Age of Onset   • Cancer Other    • Diabetes Other    • Heart attack Other    • Hyperlipidemia Other    • Hypertension Other    • Osteoporosis Other    • Stroke Other    • Breast cancer Mother    • Osteoporosis Mother    • Colon cancer Father        Social History     Socioeconomic History   • Marital status:      Spouse name: Not on file   • Number of children: Not on file   • Years of education: Not on file   • Highest education level: Not on file   Tobacco Use   • Smoking status: Current Every Day Smoker     Packs/day: 0.25     Years: 40.00     Pack years: 10.00     Types: Cigarettes   • Smokeless tobacco: Never Used   • Tobacco comment: in process of quiting--AVERAGE 1 PPD, DOWN TO 6 CIG PER DAY X2 WEEKS    Substance and Sexual Activity   • Alcohol use: Yes     Alcohol/week: 1.0 standard drinks     Types: 1 Glasses of wine per week     Comment: occasional   • Drug use: No   • Sexual activity: Defer   Social History Narrative    Lives alone.        Review of  Systems   Constitutional: Positive for fever.   HENT: Positive for sore throat and swollen glands.    Respiratory: Positive for cough.    Gastrointestinal: Negative.    Genitourinary: Negative.    Musculoskeletal: Negative.    Skin: Negative.    Hematological: Negative.    Psychiatric/Behavioral: Negative.        Objective   Vitals:    02/28/20 1435   BP: 150/76   Pulse: 62   Resp: 18   Temp: 97.4 °F (36.3 °C)   SpO2: 94%     Body mass index is 37.7 kg/m².  Physical Exam   Constitutional: She is oriented to person, place, and time. She appears well-developed and well-nourished.   HENT:   Head: Normocephalic and atraumatic.   Right Ear: External ear normal.   Left Ear: External ear normal.   Nose: Nose normal.   Mouth/Throat: Oropharyngeal exudate present.   Eyes: Pupils are equal, round, and reactive to light. Conjunctivae and EOM are normal.   Neck: Normal range of motion. Neck supple. No tracheal deviation present. No thyromegaly present.   Left maxillary lymphadenopathy   Cardiovascular: Normal rate, regular rhythm and normal heart sounds. Exam reveals no gallop and no friction rub.   No murmur heard.  Pulmonary/Chest: Effort normal and breath sounds normal. No respiratory distress. She exhibits no tenderness.   Abdominal: Soft. Bowel sounds are normal. She exhibits no distension. There is no tenderness.   Musculoskeletal: Normal range of motion. She exhibits no edema or tenderness.   Lymphadenopathy:     She has cervical adenopathy.   Neurological: She is alert and oriented to person, place, and time. She displays normal reflexes. No cranial nerve deficit or sensory deficit. Coordination normal.   Skin: Skin is warm and dry.   Psychiatric: She has a normal mood and affect. Her behavior is normal. Judgment and thought content normal.   Nursing note and vitals reviewed.        Assessment/Plan   Vidhi was seen today for sore throat, pain and fever.    Diagnoses and all orders for this visit:    Sore throat  -      POC Influenza A / B  -     POC Rapid Strep A    Salivary duct stone  -     Ambulatory Referral to ENT (Otolaryngology)    Type 2 diabetes mellitus with other specified complication, with long-term current use of insulin (CMS/[facility])  -     CBC & Differential; Future  -     Comprehensive Metabolic Panel; Future  -     Hemoglobin A1c; Future  -     Lipid Panel; Future  -     TSH; Future  -     POC Urinalysis Dipstick, Automated; Future  -     MicroAlbumin, Urine, Random - Urine, Clean Catch; Future    Gout, unspecified cause, unspecified chronicity, unspecified site  -     Uric acid; Future    Upper respiratory tract infection, unspecified type  -     cefdinir (OMNICEF) 300 MG capsule; Take 1 capsule by mouth 2 (Two) Times a Day for 10 days.    Depression, unspecified depression type  -     PARoxetine (PAXIL) 10 MG tablet; Take 1 tablet by mouth Every Morning.    Hypertension, unspecified type  -     hydrALAZINE (APRESOLINE) 10 MG tablet; Take 1 tablet by mouth 3 (Three) Times a Day.    Seizure disorder (CMS/[facility])  -     levETIRAcetam (KEPPRA) 500 MG tablet; Take 1 tablet by mouth 2 (Two) Times a Day.    Seizures (CMS/[facility])  -     levETIRAcetam (KEPPRA) 500 MG tablet; Take 1 tablet by mouth 2 (Two) Times a Day.    Nausea  -     ondansetron (ZOFRAN) 8 MG tablet; Take 1 tablet by mouth Every 8 (Eight) Hours As Needed for Nausea or Vomiting.      D/w prozac due to risks with plavix and monitor x bleeding d/w pt risks  Did not want Doxy or Augmentin

## 2020-03-03 ENCOUNTER — TELEPHONE (OUTPATIENT)
Dept: NEUROLOGY | Facility: CLINIC | Age: 70
End: 2020-03-03

## 2020-03-03 NOTE — TELEPHONE ENCOUNTER
Would you care to get more history to see if she has had any recent medication changes? Also, I see that a referral has recently been placed to psychiatry, does she have an upcoming appt soon? Thanks

## 2020-03-03 NOTE — TELEPHONE ENCOUNTER
Provider: JANINE WOODWARD    Caller: VISHNU    Relationship to Patient: SELF    Pharmacy: [facility]    Phone Number: [phone]    Reason for Call: HALLUCINATIONS    When was the patient last seen: 1/29/20    When did it start: TODAY    Where is it located:     Characteristics of symptom/severity: WOKE UP AND THOUGHT SHE WAS IN A VETS OFFICE, HEARD TALKING WHILE SHE THOUGHT SHE WAS IN THE VETS OFFICE. HALLUCINATING ALL DAY    Timing- Is it constant or intermittent: CONSTANT    What makes it worse: NOT SURE    What makes it better: NOTHING    What therapies/medications have you tried  NOTHING     PATIENT NUMBER:[phone]

## 2020-03-03 NOTE — TELEPHONE ENCOUNTER
Eusebia,    It looks like pt Keppa was lower to 500 mg BID recently to due Keppra level of 61.0 and has a scheduled fu appt 4/29.  Please advise. Thanks.

## 2020-03-04 NOTE — TELEPHONE ENCOUNTER
I am aware of that medicine change because I made it. Did she say any other providers have made other medicine changes? Thank you.

## 2020-03-05 ENCOUNTER — TELEPHONE (OUTPATIENT)
Dept: FAMILY MEDICINE CLINIC | Facility: CLINIC | Age: 70
End: 2020-03-05

## 2020-03-06 ENCOUNTER — RESULTS ENCOUNTER (OUTPATIENT)
Dept: FAMILY MEDICINE CLINIC | Facility: CLINIC | Age: 70
End: 2020-03-06

## 2020-03-06 DIAGNOSIS — E11.69 TYPE 2 DIABETES MELLITUS WITH OTHER SPECIFIED COMPLICATION, WITH LONG-TERM CURRENT USE OF INSULIN (HCC): ICD-10-CM

## 2020-03-06 DIAGNOSIS — M10.9 GOUT, UNSPECIFIED CAUSE, UNSPECIFIED CHRONICITY, UNSPECIFIED SITE: ICD-10-CM

## 2020-03-06 DIAGNOSIS — Z79.4 TYPE 2 DIABETES MELLITUS WITH OTHER SPECIFIED COMPLICATION, WITH LONG-TERM CURRENT USE OF INSULIN (HCC): ICD-10-CM

## 2020-03-06 NOTE — TELEPHONE ENCOUNTER
I spoke to the pt, she was not happy. She ended the phone call before getting how often the medication is taken. Dr. Mckeon was notified

## 2020-03-09 ENCOUNTER — TELEPHONE (OUTPATIENT)
Dept: FAMILY MEDICINE CLINIC | Facility: CLINIC | Age: 70
End: 2020-03-09

## 2020-03-09 NOTE — TELEPHONE ENCOUNTER
I called pt many times to clarify strength and frequency of her Hydralazine, no answer, last time my MA talked to her pt hanged up on her

## 2020-03-11 ENCOUNTER — TELEPHONE (OUTPATIENT)
Dept: PULMONOLOGY | Facility: CLINIC | Age: 70
End: 2020-03-11

## 2020-03-11 NOTE — TELEPHONE ENCOUNTER
Spoke with pt and she c/o sore throat/cough/low grade fever/congestion for 2 days and advised pt to cancel her apt for today and call the office once she feels better and reschedule. Pt verbalized understanding.

## 2020-03-19 RX ORDER — IPRATROPIUM BROMIDE AND ALBUTEROL SULFATE 2.5; .5 MG/3ML; MG/3ML
3 SOLUTION RESPIRATORY (INHALATION)
Qty: 1080 ML | Refills: 1 | Status: SHIPPED | OUTPATIENT
Start: 2020-03-19 | End: 2020-04-01

## 2020-03-19 NOTE — TELEPHONE ENCOUNTER
Fax refill request for Rx Cuauhtemoco-Mervat Sol approved and faxed per chart to [facility].

## 2020-03-20 ENCOUNTER — DOCUMENTATION (OUTPATIENT)
Dept: PULMONOLOGY | Facility: CLINIC | Age: 70
End: 2020-03-20

## 2020-03-23 DIAGNOSIS — L29.9 ITCHING: ICD-10-CM

## 2020-03-23 DIAGNOSIS — J06.9 UPPER RESPIRATORY TRACT INFECTION, UNSPECIFIED TYPE: Primary | ICD-10-CM

## 2020-03-23 RX ORDER — DOXYCYCLINE HYCLATE 100 MG
100 TABLET ORAL 2 TIMES DAILY
Qty: 20 TABLET | Refills: 0 | Status: SHIPPED | OUTPATIENT
Start: 2020-03-23 | End: 2020-04-02

## 2020-03-23 RX ORDER — HYDROXYZINE 50 MG/1
50 TABLET, FILM COATED ORAL EVERY 8 HOURS PRN
Qty: 90 TABLET | Refills: 2 | Status: CANCELLED | OUTPATIENT
Start: 2020-03-23

## 2020-03-31 NOTE — TELEPHONE ENCOUNTER
Called and LVM (3rd attempt) asking pt to please call office regarding verifying medication. Thanks.

## 2020-04-01 RX ORDER — IPRATROPIUM BROMIDE AND ALBUTEROL SULFATE 2.5; .5 MG/3ML; MG/3ML
SOLUTION RESPIRATORY (INHALATION)
Qty: 3 ML | Refills: 0 | Status: SHIPPED | OUTPATIENT
Start: 2020-04-01 | End: 2020-05-28 | Stop reason: SDUPTHER

## 2020-04-09 ENCOUNTER — TELEPHONE (OUTPATIENT)
Dept: FAMILY MEDICINE CLINIC | Facility: CLINIC | Age: 70
End: 2020-04-09

## 2020-04-09 NOTE — TELEPHONE ENCOUNTER
Pt is requesting a refill on the following medication,   Insulin aspart (FIASP FLEXTOUCH) 100 UNIT/ML solution pen-injector injection pen    Please send to [facility]ul .  Pt states the following medication will need a PA.

## 2020-04-09 NOTE — TELEPHONE ENCOUNTER
Patient called checking on the PA request and told me that we should be receiving a fax from her pharmacy.  I informed the patient that we have already submitted that request to the insurance for the PA.  I looked through the faxes and the approval is there to be printed.  I informed patient that the FIAPS was approved through 4/9/2021.

## 2020-04-09 NOTE — TELEPHONE ENCOUNTER
I called the pharmacy and she does need a PA. But I don't see a script on file for this medication

## 2020-04-09 NOTE — TELEPHONE ENCOUNTER
No problem, can  get 3-months supply with 3 refills, but in order to do a PA we need to know what kind short acting insulin she tried  in the past, please contact the patient just to ask those questions, in order to proceed with the PA

## 2020-04-29 ENCOUNTER — OFFICE VISIT (OUTPATIENT)
Dept: NEUROLOGY | Facility: CLINIC | Age: 70
End: 2020-04-29

## 2020-04-29 DIAGNOSIS — G40.901 SEIZURE DISORDER, NONCONVULSIVE, WITH STATUS EPILEPTICUS (HCC): Primary | ICD-10-CM

## 2020-04-29 PROCEDURE — 99443 PR PHYS/QHP TELEPHONE EVALUATION 21-30 MIN: CPT | Performed by: PHYSICIAN ASSISTANT

## 2020-04-29 RX ORDER — LEVETIRACETAM 750 MG/1
750 TABLET ORAL 2 TIMES DAILY
Qty: 60 TABLET | Refills: 11 | Status: ON HOLD | OUTPATIENT
Start: 2020-04-29 | End: 2020-05-21 | Stop reason: SDUPTHER

## 2020-04-29 NOTE — PROGRESS NOTES
"Subjective     Chief Complaint: seizures      History of Present Illness   Vidhi Lopez is a 70 y.o. female with a history of diabetes who returns to clinic today following a hospitalization at [facility] in 3/18 for multiple issues, including seizure. She was noted to have significant confusion prior to her admission, though the history otherwise is a bit unclear. She was diagnosed with acute mixed respiratory failure and acute renal failure. Workup including an MRI of the brain, which was unremarkable for any acute abnormalities. An EEG was notable for status epilepticus.  She was subsequently treated with Keppra 1000mg BID. Additionally, after undergoing a right knee arthroscopy with incision and drainage and was diagnosed with an acute gout flare. Since her hospitalization, she feels that she has returned to her prior cognitive baseline.     For several months prior to her hospitalization, she noted occasional episodes in the evening during which she would \"zone out\" for several seconds. She denies any unresponsiveness or loss of consciousness as well as any urinary incontinence, tongue biting or confusion during these episodes. Since she was started on Keppra, she notes that these spells have resolved. She was unable to tolerate this at a higher dose of 1000mg BID, due to balance impairment.      Additionally, she previously noted episodes of incoordination. She has had three episodes in 2017 during which she notes incoordination in her hands bilaterally. During these episodes, she is more prone to dropping items. She denies any clear associated weakness. She may note some unsteadiness, though denies any ataxia. 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 A MRA of the head and neck was notable for significant stenosis in the LICA and moderate stenosis in the AURE. She is currently taking ASA 81mg, Plavix, and Lipitor 80mg daily.     Today: Since her last visit in 1/20, she " has noted increased episodes of incoordination since decreasing Keppra to 500mg BID.      I have reviewed and confirmed the past family, social and medical history as accurate on 4/29/2020.     Review of Systems   Constitutional: Negative.    HENT: Negative.    Eyes: Negative.    Respiratory: Negative.    Cardiovascular: Negative.    Gastrointestinal: Negative.    Endocrine: Negative.    Genitourinary: Negative.    Musculoskeletal: Negative.    Skin: Negative.    Allergic/Immunologic: Negative.    Neurological: Positive for seizures.   Hematological: Negative.    Psychiatric/Behavioral: Negative.        Objective       Physical Exam   Psychiatric: Her speech is normal.        Neurologic Exam     Mental Status   Speech: speech is normal   Level of consciousness: alert  Normal comprehension.           Assessment/Plan   Diagnoses and all orders for this visit:    Seizure disorder, nonconvulsive, with status epilepticus (CMS/HCC)  -     Levetiracetam Level (Keppra); Future          Discussion/Summary   Vidhi Lopez returns to clinic today with a history of seizures. I again reviewed her current status and treatment options. After discussing potential treatment options, it was elected to increase her Keppra to 750mg BID. We will then recheck a level 2 weeks after the dose increase. She will then follow up in 3 months , or sooner if needed.   I spent 25 minutes face to face with the patient with 15 minutes spent on discussing diagnosis, prognosis, diagnostic testing, evaluation, current status, treatment options and management as discussed above.     This visit was performed over the telephone with patient's consent.     As part of this visit I discussed the history with the patient .      Eusebia Yanez PA-C

## 2020-05-01 ENCOUNTER — TELEPHONE (OUTPATIENT)
Dept: NEUROLOGY | Facility: CLINIC | Age: 70
End: 2020-05-01

## 2020-05-01 NOTE — TELEPHONE ENCOUNTER
PT CALLED IN STATING THEY MISSED A CALL FROM THE OFFICE BUT SHE COULDN'T REMEMBER WHO CALLED. BUT THEN  SHE STATED SHE IS HAVING MEDICAL ISSUES THAT SHE WOULD LIKE TO SPEAK WITH SOMEONE ABOUT AND SEE IF WE COULD CALL IN SOME MEDICATIONS FOR HER .  SHE REQUEST A CALL BACK -[phone]

## 2020-05-01 NOTE — TELEPHONE ENCOUNTER
S/W pt who states she has Nausea, low grade fever and headache. I advised pt to contact her PCP about it. I called her PCP and spoke with a person there regarding the patient. She states she will call her to speak with her regarding her symptoms.

## 2020-05-14 ENCOUNTER — TELEPHONE (OUTPATIENT)
Dept: NEUROLOGY | Facility: CLINIC | Age: 70
End: 2020-05-14

## 2020-05-14 NOTE — TELEPHONE ENCOUNTER
PT CALLED BACK SAYING SHE IS STILL EXPERIENCING BAD HEADACHES, NAUSEA AND A FEVER ON AND OFF. PREVIOUS NOTE IN CHART SAYS PT NEEDS TO CONTACT PCP AND THE MEDICAL ASSISTANT HAD SPOKEN WITH THE PT REGARDING THIS. PT SAID SHE DIDN'T RECEIVE A CALL FROM THE PCP'S OFFICE AND SHE IS UPSET THAT THIS ISN'T SOMETHING THE NEUROLOGIST CAN DISCUSS WITH HER      BEST CALL BACK- [phone]

## 2020-05-15 ENCOUNTER — HOSPITAL ENCOUNTER (INPATIENT)
Facility: HOSPITAL | Age: 70
LOS: 6 days | Discharge: HOME | End: 2020-05-21
Attending: EMERGENCY MEDICINE | Admitting: HOSPITALIST

## 2020-05-15 ENCOUNTER — APPOINTMENT (OUTPATIENT)
Dept: CT IMAGING | Facility: HOSPITAL | Age: 70
End: 2020-05-15

## 2020-05-15 DIAGNOSIS — J44.9 OSA AND COPD OVERLAP SYNDROME (HCC): ICD-10-CM

## 2020-05-15 DIAGNOSIS — R47.1 DYSARTHRIA: ICD-10-CM

## 2020-05-15 DIAGNOSIS — N17.0 ACUTE KIDNEY INJURY (AKI) WITH ACUTE TUBULAR NECROSIS (ATN) (HCC): ICD-10-CM

## 2020-05-15 DIAGNOSIS — M79.7 FIBROMYALGIA: ICD-10-CM

## 2020-05-15 DIAGNOSIS — R27.0 ATAXIA: Primary | ICD-10-CM

## 2020-05-15 DIAGNOSIS — I10 HYPERTENSION, UNSPECIFIED TYPE: ICD-10-CM

## 2020-05-15 DIAGNOSIS — G47.33 OSA AND COPD OVERLAP SYNDROME (HCC): ICD-10-CM

## 2020-05-15 PROBLEM — R53.83 LETHARGY: Status: ACTIVE | Noted: 2020-05-15

## 2020-05-15 LAB
ALBUMIN SERPL-MCNC: 4 G/DL (ref 3.5–5.2)
ALBUMIN/GLOB SERPL: 1.4 G/DL
ALP SERPL-CCNC: 107 U/L (ref 39–117)
ALT SERPL W P-5'-P-CCNC: 13 U/L (ref 1–33)
ANION GAP SERPL CALCULATED.3IONS-SCNC: 13.4 MMOL/L (ref 5–15)
AST SERPL-CCNC: 12 U/L (ref 1–32)
BASOPHILS # BLD AUTO: 0.04 10*3/MM3 (ref 0–0.2)
BASOPHILS NFR BLD AUTO: 0.6 % (ref 0–1.5)
BILIRUB SERPL-MCNC: 0.2 MG/DL (ref 0.2–1.2)
BILIRUB UR QL STRIP: NEGATIVE
BUN BLD-MCNC: 25 MG/DL (ref 8–23)
BUN/CREAT SERPL: 12.3 (ref 7–25)
CALCIUM SPEC-SCNC: 9.2 MG/DL (ref 8.6–10.5)
CHLORIDE SERPL-SCNC: 101 MMOL/L (ref 98–107)
CLARITY UR: CLEAR
CO2 SERPL-SCNC: 28.6 MMOL/L (ref 22–29)
COLOR UR: YELLOW
CREAT BLD-MCNC: 2.03 MG/DL (ref 0.57–1)
DEPRECATED RDW RBC AUTO: 50.2 FL (ref 37–54)
EOSINOPHIL # BLD AUTO: 0.23 10*3/MM3 (ref 0–0.4)
EOSINOPHIL NFR BLD AUTO: 3.4 % (ref 0.3–6.2)
ERYTHROCYTE [DISTWIDTH] IN BLOOD BY AUTOMATED COUNT: 17.8 % (ref 12.3–15.4)
GFR SERPL CREATININE-BSD FRML MDRD: 24 ML/MIN/1.73
GLOBULIN UR ELPH-MCNC: 2.8 GM/DL
GLUCOSE BLD-MCNC: 115 MG/DL (ref 65–99)
GLUCOSE BLDC GLUCOMTR-MCNC: 112 MG/DL (ref 70–130)
GLUCOSE UR STRIP-MCNC: NEGATIVE MG/DL
HCT VFR BLD AUTO: 35.5 % (ref 34–46.6)
HGB BLD-MCNC: 11 G/DL (ref 12–15.9)
HGB UR QL STRIP.AUTO: NEGATIVE
IMM GRANULOCYTES # BLD AUTO: 0.01 10*3/MM3 (ref 0–0.05)
IMM GRANULOCYTES NFR BLD AUTO: 0.1 % (ref 0–0.5)
INR PPP: 1.05 (ref 0.9–1.1)
KETONES UR QL STRIP: NEGATIVE
LEUKOCYTE ESTERASE UR QL STRIP.AUTO: NEGATIVE
LYMPHOCYTES # BLD AUTO: 1.71 10*3/MM3 (ref 0.7–3.1)
LYMPHOCYTES NFR BLD AUTO: 25.2 % (ref 19.6–45.3)
MAGNESIUM SERPL-MCNC: 1.9 MG/DL (ref 1.6–2.4)
MCH RBC QN AUTO: 24.2 PG (ref 26.6–33)
MCHC RBC AUTO-ENTMCNC: 31 G/DL (ref 31.5–35.7)
MCV RBC AUTO: 78 FL (ref 79–97)
MONOCYTES # BLD AUTO: 0.43 10*3/MM3 (ref 0.1–0.9)
MONOCYTES NFR BLD AUTO: 6.3 % (ref 5–12)
NEUTROPHILS # BLD AUTO: 4.37 10*3/MM3 (ref 1.7–7)
NEUTROPHILS NFR BLD AUTO: 64.4 % (ref 42.7–76)
NITRITE UR QL STRIP: NEGATIVE
NRBC BLD AUTO-RTO: 0 /100 WBC (ref 0–0.2)
PH UR STRIP.AUTO: <=5 [PH] (ref 5–8)
PLATELET # BLD AUTO: 191 10*3/MM3 (ref 140–450)
PMV BLD AUTO: 9 FL (ref 6–12)
POTASSIUM BLD-SCNC: 3.5 MMOL/L (ref 3.5–5.2)
PROT SERPL-MCNC: 6.8 G/DL (ref 6–8.5)
PROT UR QL STRIP: NEGATIVE
PROTHROMBIN TIME: 13.4 SECONDS (ref 11.7–14.2)
RBC # BLD AUTO: 4.55 10*6/MM3 (ref 3.77–5.28)
SODIUM BLD-SCNC: 143 MMOL/L (ref 136–145)
SP GR UR STRIP: 1.02 (ref 1–1.03)
TROPONIN T SERPL-MCNC: <0.01 NG/ML (ref 0–0.03)
UROBILINOGEN UR QL STRIP: NORMAL
WBC NRBC COR # BLD: 6.79 10*3/MM3 (ref 3.4–10.8)

## 2020-05-15 PROCEDURE — 94640 AIRWAY INHALATION TREATMENT: CPT

## 2020-05-15 PROCEDURE — 81003 URINALYSIS AUTO W/O SCOPE: CPT | Performed by: EMERGENCY MEDICINE

## 2020-05-15 PROCEDURE — 94799 UNLISTED PULMONARY SVC/PX: CPT

## 2020-05-15 PROCEDURE — 85025 COMPLETE CBC W/AUTO DIFF WBC: CPT | Performed by: EMERGENCY MEDICINE

## 2020-05-15 PROCEDURE — P9612 CATHETERIZE FOR URINE SPEC: HCPCS

## 2020-05-15 PROCEDURE — 84484 ASSAY OF TROPONIN QUANT: CPT | Performed by: EMERGENCY MEDICINE

## 2020-05-15 PROCEDURE — 80053 COMPREHEN METABOLIC PANEL: CPT | Performed by: EMERGENCY MEDICINE

## 2020-05-15 PROCEDURE — 82962 GLUCOSE BLOOD TEST: CPT

## 2020-05-15 PROCEDURE — 93010 ELECTROCARDIOGRAM REPORT: CPT | Performed by: INTERNAL MEDICINE

## 2020-05-15 PROCEDURE — 83735 ASSAY OF MAGNESIUM: CPT | Performed by: EMERGENCY MEDICINE

## 2020-05-15 PROCEDURE — 99285 EMERGENCY DEPT VISIT HI MDM: CPT

## 2020-05-15 PROCEDURE — 93005 ELECTROCARDIOGRAM TRACING: CPT | Performed by: EMERGENCY MEDICINE

## 2020-05-15 PROCEDURE — 85610 PROTHROMBIN TIME: CPT | Performed by: EMERGENCY MEDICINE

## 2020-05-15 PROCEDURE — 70450 CT HEAD/BRAIN W/O DYE: CPT

## 2020-05-15 RX ORDER — CLOPIDOGREL BISULFATE 75 MG/1
75 TABLET ORAL DAILY
Status: DISCONTINUED | OUTPATIENT
Start: 2020-05-16 | End: 2020-05-21 | Stop reason: HOSPADM

## 2020-05-15 RX ORDER — BUDESONIDE AND FORMOTEROL FUMARATE DIHYDRATE 160; 4.5 UG/1; UG/1
2 AEROSOL RESPIRATORY (INHALATION) 2 TIMES DAILY
Status: DISCONTINUED | OUTPATIENT
Start: 2020-05-16 | End: 2020-05-21 | Stop reason: HOSPADM

## 2020-05-15 RX ORDER — ONDANSETRON 2 MG/ML
4 INJECTION INTRAMUSCULAR; INTRAVENOUS EVERY 6 HOURS PRN
Status: DISCONTINUED | OUTPATIENT
Start: 2020-05-15 | End: 2020-05-21 | Stop reason: HOSPADM

## 2020-05-15 RX ORDER — ASPIRIN 325 MG
325 TABLET ORAL DAILY
Status: DISCONTINUED | OUTPATIENT
Start: 2020-05-15 | End: 2020-05-15

## 2020-05-15 RX ORDER — SODIUM CHLORIDE 0.9 % (FLUSH) 0.9 %
10 SYRINGE (ML) INJECTION EVERY 12 HOURS SCHEDULED
Status: DISCONTINUED | OUTPATIENT
Start: 2020-05-15 | End: 2020-05-21 | Stop reason: HOSPADM

## 2020-05-15 RX ORDER — GABAPENTIN 400 MG/1
400 CAPSULE ORAL EVERY 12 HOURS SCHEDULED
Status: DISCONTINUED | OUTPATIENT
Start: 2020-05-16 | End: 2020-05-20

## 2020-05-15 RX ORDER — METOPROLOL TARTRATE 50 MG/1
50 TABLET, FILM COATED ORAL EVERY 12 HOURS SCHEDULED
Status: DISCONTINUED | OUTPATIENT
Start: 2020-05-16 | End: 2020-05-21 | Stop reason: HOSPADM

## 2020-05-15 RX ORDER — ASPIRIN 300 MG/1
300 SUPPOSITORY RECTAL DAILY
Status: DISCONTINUED | OUTPATIENT
Start: 2020-05-15 | End: 2020-05-15

## 2020-05-15 RX ORDER — SODIUM CHLORIDE 0.9 % (FLUSH) 0.9 %
10 SYRINGE (ML) INJECTION AS NEEDED
Status: DISCONTINUED | OUTPATIENT
Start: 2020-05-15 | End: 2020-05-21 | Stop reason: HOSPADM

## 2020-05-15 RX ORDER — SODIUM CHLORIDE 9 MG/ML
75 INJECTION, SOLUTION INTRAVENOUS CONTINUOUS
Status: DISCONTINUED | OUTPATIENT
Start: 2020-05-15 | End: 2020-05-17

## 2020-05-15 RX ORDER — ASPIRIN 81 MG/1
81 TABLET ORAL DAILY
Status: DISCONTINUED | OUTPATIENT
Start: 2020-05-16 | End: 2020-05-21 | Stop reason: HOSPADM

## 2020-05-15 RX ORDER — ACETAMINOPHEN 650 MG/1
650 SUPPOSITORY RECTAL EVERY 4 HOURS PRN
Status: DISCONTINUED | OUTPATIENT
Start: 2020-05-15 | End: 2020-05-21 | Stop reason: HOSPADM

## 2020-05-15 RX ORDER — NITROGLYCERIN 0.4 MG/1
0.4 TABLET SUBLINGUAL
Status: DISCONTINUED | OUTPATIENT
Start: 2020-05-15 | End: 2020-05-21 | Stop reason: HOSPADM

## 2020-05-15 RX ORDER — ACETAMINOPHEN 325 MG/1
650 TABLET ORAL EVERY 4 HOURS PRN
Status: DISCONTINUED | OUTPATIENT
Start: 2020-05-15 | End: 2020-05-21 | Stop reason: HOSPADM

## 2020-05-15 RX ORDER — HYDROXYZINE 50 MG/1
50 TABLET, FILM COATED ORAL 3 TIMES DAILY PRN
Status: DISCONTINUED | OUTPATIENT
Start: 2020-05-15 | End: 2020-05-21 | Stop reason: HOSPADM

## 2020-05-15 RX ORDER — DEXTROSE MONOHYDRATE 25 G/50ML
25 INJECTION, SOLUTION INTRAVENOUS
Status: DISCONTINUED | OUTPATIENT
Start: 2020-05-15 | End: 2020-05-21 | Stop reason: HOSPADM

## 2020-05-15 RX ORDER — DIAPER,BRIEF,INFANT-TODD,DISP
EACH MISCELLANEOUS EVERY 12 HOURS SCHEDULED
Status: DISCONTINUED | OUTPATIENT
Start: 2020-05-15 | End: 2020-05-21 | Stop reason: HOSPADM

## 2020-05-15 RX ORDER — LORAZEPAM 1 MG/1
1 TABLET ORAL ONCE AS NEEDED
Status: COMPLETED | OUTPATIENT
Start: 2020-05-15 | End: 2020-05-16

## 2020-05-15 RX ORDER — IPRATROPIUM BROMIDE AND ALBUTEROL SULFATE 2.5; .5 MG/3ML; MG/3ML
3 SOLUTION RESPIRATORY (INHALATION)
Status: DISCONTINUED | OUTPATIENT
Start: 2020-05-16 | End: 2020-05-21 | Stop reason: HOSPADM

## 2020-05-15 RX ORDER — PAROXETINE 10 MG/1
10 TABLET, FILM COATED ORAL EVERY MORNING
Status: DISCONTINUED | OUTPATIENT
Start: 2020-05-16 | End: 2020-05-21 | Stop reason: HOSPADM

## 2020-05-15 RX ORDER — ACETAMINOPHEN 160 MG/5ML
650 SOLUTION ORAL EVERY 4 HOURS PRN
Status: DISCONTINUED | OUTPATIENT
Start: 2020-05-15 | End: 2020-05-21 | Stop reason: HOSPADM

## 2020-05-15 RX ORDER — INSULIN GLARGINE 100 [IU]/ML
20 INJECTION, SOLUTION SUBCUTANEOUS NIGHTLY
Status: DISCONTINUED | OUTPATIENT
Start: 2020-05-16 | End: 2020-05-21 | Stop reason: HOSPADM

## 2020-05-15 RX ORDER — NICOTINE POLACRILEX 4 MG
15 LOZENGE BUCCAL
Status: DISCONTINUED | OUTPATIENT
Start: 2020-05-15 | End: 2020-05-21 | Stop reason: HOSPADM

## 2020-05-15 RX ORDER — SODIUM CHLORIDE 9 MG/ML
125 INJECTION, SOLUTION INTRAVENOUS CONTINUOUS
Status: DISCONTINUED | OUTPATIENT
Start: 2020-05-15 | End: 2020-05-15

## 2020-05-15 RX ORDER — ROPINIROLE 0.25 MG/1
0.25 TABLET, FILM COATED ORAL NIGHTLY
Status: DISCONTINUED | OUTPATIENT
Start: 2020-05-16 | End: 2020-05-21 | Stop reason: HOSPADM

## 2020-05-15 RX ORDER — ATORVASTATIN CALCIUM 80 MG/1
80 TABLET, FILM COATED ORAL DAILY
Status: DISCONTINUED | OUTPATIENT
Start: 2020-05-16 | End: 2020-05-21 | Stop reason: HOSPADM

## 2020-05-15 RX ORDER — HYDRALAZINE HYDROCHLORIDE 10 MG/1
10 TABLET, FILM COATED ORAL 3 TIMES DAILY
Status: DISCONTINUED | OUTPATIENT
Start: 2020-05-16 | End: 2020-05-18

## 2020-05-15 RX ADMIN — SODIUM CHLORIDE 125 ML/HR: 9 INJECTION, SOLUTION INTRAVENOUS at 19:07

## 2020-05-15 RX ADMIN — SODIUM CHLORIDE, PRESERVATIVE FREE 10 ML: 5 INJECTION INTRAVENOUS at 21:34

## 2020-05-15 RX ADMIN — SODIUM CHLORIDE 125 ML/HR: 9 INJECTION, SOLUTION INTRAVENOUS at 21:33

## 2020-05-15 RX ADMIN — IPRATROPIUM BROMIDE AND ALBUTEROL SULFATE 3 ML: .5; 3 SOLUTION RESPIRATORY (INHALATION) at 23:45

## 2020-05-15 RX ADMIN — HYDROXYZINE HYDROCHLORIDE 50 MG: 50 TABLET ORAL at 23:32

## 2020-05-15 RX ADMIN — METOPROLOL TARTRATE 50 MG: 50 TABLET, FILM COATED ORAL at 23:32

## 2020-05-15 RX ADMIN — SODIUM CHLORIDE 500 ML: 9 INJECTION, SOLUTION INTRAVENOUS at 18:24

## 2020-05-15 RX ADMIN — GABAPENTIN 400 MG: 400 CAPSULE ORAL at 23:32

## 2020-05-15 NOTE — ED PROVIDER NOTES
EMERGENCY DEPARTMENT ENCOUNTER    Room Number:  N541/1  Date of encounter:  5/19/2020  PCP: Abiodun Vila MD  Historian: Patient      HPI:  Chief Complaint: Slurred speech, unsteady gait, lightheadedness  A complete HPI/ROS/PMH/PSH/SH/FH are unobtainable due to: Not applicable  Context: Vidhi Lopez is a 70 y.o. female who presents to the ED c/o the symptoms of slurred speech, lightheadedness, unsteady gait have been occurring approximately 6 to 7 days.  The episodes are fairly constant when she tries to get up and walk.  She describes as if she is feeling that she is going to fall over.  She denies any pain anywhere.  No headache, chest pain, abdominal pain.  She also reports she feels as if she is thick tongue.  She also believes that her oral mucosa is a dry which could be a contributing factor.  She denies any focal weakness to any of her extremities.  Denies any fever, cough, vomiting, or diarrhea.  [place]  She ended up coming here from the encouragement of staff at the [facility].  She has been taking her medicines.  Those medicines consists of Bumex, Plavix, Neurontin, Atarax, diabetic medicine, Keppra, blood pressure medicine, plus several other medicines.  She denies history of a stroke in the past.        Previous Episodes: No  Current Symptoms: Please see above    MEDICAL HISTORY REVIEWED        PAST MEDICAL HISTORY  Active Ambulatory Problems     Diagnosis Date Noted   • Dyslipidemia 07/14/2016   • Hypertension 07/14/2016   • Anxiety (Chronic benzos) 07/14/2016   • Insomnia 07/14/2016   • GERD (gastroesophageal reflux disease) 07/14/2016   • Diabetes mellitus, type 2 (CMS/[facility]) 07/14/2016   • Fibromyalgia 07/14/2016   • RLS (restless legs syndrome) 07/14/2016   • Migraines 07/14/2016   • COPD (chronic obstructive pulmonary disease) (CMS/[facility]) 07/14/2016   • Interstitial cystitis 07/14/2016   • History of acute myocardial infarction 07/14/2016   • History of  cardiac murmur 07/14/2016   • History of stroke 07/14/2016   • CAD (coronary artery disease) 07/14/2016   • Essential hypertension 01/12/2017   • CKD (chronic kidney disease) stage 2, GFR 60-89 ml/min 09/20/2017   • Bilateral carotid artery stenosis 09/20/2017   • Chronic respiratory failure with hypoxia and hypercapnia (CMS/HCC) 09/30/2017   • URIEL (obstructive sleep apnea) 09/30/2017   • Obesity (BMI 30-39.9) 09/30/2017   • Diabetes mellitus type 2 in obese (CMS/HCC) 09/30/2017   • Obesity hypoventilation syndrome (CMS/HCC) 03/17/2018   • Tobacco use 03/17/2018   • GASPER (acute kidney injury) (CMS/HCC) 03/17/2018   • Chronic pain (Chronic narcotics) 10/15/2018   • Recurrent UTI/interstitial cystitis on chronic methenamine 10/15/2018   • Clostridium difficile colitis diagnosed September 2018. Persistent diarrhea despite oral vancomycin 10/16/2018   • Demand ischemia (CMS/HCC) 11/06/2018   • Hypoxemia requiring supplemental oxygen 01/25/2019   • Stenosis of carotid artery 10/28/2019   • Occlusion and stenosis of left carotid artery  10/28/2019   • Chronic gout with tophus 10/28/2019   • Depression 10/28/2019   • Edema 10/28/2019   • Restless leg syndrome 10/28/2019   • Asthma 10/28/2019   • Wellness examination 10/28/2019   • Encounter for screening mammogram for malignant neoplasm of breast  10/28/2019   • Seasonal allergies 10/28/2019   • Hyperlipidemia 12/05/2019   • Seizure disorder (CMS/HCC) 12/05/2019   • L1 vertebral fracture (CMS/HCC) 12/05/2019   • Rib fracture 12/05/2019   • Lumbar compression fracture (CMS/HCC) 12/06/2019   • Polypharmacy 12/07/2019   • Itching 03/23/2020     Resolved Ambulatory Problems     Diagnosis Date Noted   • Generalized edema 09/20/2017   • Pneumonia of right lower lobe due to infectious organism (CMS/HCC) 09/28/2017   • Acute metabolic encephalopathy due to hypercarbia, hypoxemia, benzodiazepines 03/17/2018   • SIRS (systemic inflammatory response syndrome) (CMS/HCC) 03/17/2018   •  Elevated LFTs 03/17/2018   • Right leg pain 03/19/2018   • Elevated d-dimer 03/19/2018   • ESR raised 03/20/2018   • Seizure disorder, nonconvulsive, with status epilepticus (CMS/[facility]) 03/20/2018   • Septic joint of right knee joint (CMS/HCC) 03/21/2018   • R/O CAP (community acquired pneumonia) 10/15/2018   • Respiratory failure, acute and chronic (CMS/[facility]) 10/15/2018   • Acute and chronic respiratory failure with hypercapnia (CMS/HCC) 11/06/2018     Past Medical History:   Diagnosis Date   • Allergic rhinitis    • Asthma with COPD (CMS/[facility])    • Bilateral ovarian cysts    • Coronary artery disease    • Depression with anxiety    • Diabetes (CMS/[facility])    • Endometriosis    • Fatigue    • Headache    • High cholesterol    • History of gallstones    • History of myocardial infarction    • Influenza B    • On home oxygen therapy    • URIEL on CPAP    • PONV (postoperative nausea and vomiting)    • Shortness of breath on exertion    • Stroke (CMS/HCC)    • Thyroid disorder    • Urinary leakage    • UTI (urinary tract infection)    • Wears glasses    • Yeast dermatitis          PAST SURGICAL HISTORY  Past Surgical History:   Procedure Laterality Date   • ARTERIOGRAM N/A 2/12/2018    Procedure: Renal Arteriogram;  Surgeon: Lito Flores MD;  Location:  [facility] CATH INVASIVE LOCATION;  Service:    • BREAST BIOPSY Right 1991   • CARDIAC CATHETERIZATION N/A 2/12/2018    Procedure: Right Heart Cath;  Surgeon: Lito Flores MD;  Location:  [facility] CATH INVASIVE LOCATION;  Service:    • CAROTID STENT      Transcath    • CAROTID STENT Left    • CHOLECYSTECTOMY     • CYSTOSTOMY W/ BLADDER BIOPSY     • DILATATION AND CURETTAGE     • KNEE ARTHROSCOPY Right 3/23/2018    Procedure: ARTHROSCOPY, RIGHT KNEE  INCISION AND DRAINAGE LOWER EXTREMITY WITH SYNOVIAL BIOPSY;  Surgeon: Dilip Giron MD;  Location:  [facility] OR;  Service: Orthopedics   • LAPAROSCOPIC CHOLECYSTECTOMY  1994    Lap rolando   • OOPHORECTOMY     • PAP SMEAR  05/10/2016    • SUBTOTAL HYSTERECTOMY           FAMILY HISTORY  Family History   Problem Relation Age of Onset   • Cancer Other    • Diabetes Other    • Heart attack Other    • Hyperlipidemia Other    • Hypertension Other    • Osteoporosis Other    • Stroke Other    • Breast cancer Mother    • Osteoporosis Mother    • Colon cancer Father          SOCIAL HISTORY  Social History     Socioeconomic History   • Marital status:      Spouse name: Not on file   • Number of children: Not on file   • Years of education: Not on file   • Highest education level: Not on file   Tobacco Use   • Smoking status: Current Every Day Smoker     Packs/day: 0.25     Years: 40.00     Pack years: 10.00     Types: Cigarettes   • Smokeless tobacco: Never Used   • Tobacco comment: in process of quiting--AVERAGE 1 PPD, DOWN TO 6 CIG PER DAY X2 WEEKS    Substance and Sexual Activity   • Alcohol use: Yes     Alcohol/week: 1.0 standard drinks     Types: 1 Glasses of wine per week     Comment: occasional   • Drug use: No   • Sexual activity: Defer   Social History Narrative    Lives alone.          ALLERGIES  Amlodipine; Hydrocodone; and Reglan [metoclopramide]        REVIEW OF SYSTEMS  Review of Systems     All systems reviewed and negative except for those discussed in HPI.       PHYSICAL EXAM    I have reviewed the triage vital signs and nursing notes.    ED Triage Vitals   Temp Heart Rate Resp BP SpO2   05/15/20 1506 05/15/20 1504 05/15/20 1504 05/15/20 1504 05/15/20 1504   97 °F (36.1 °C) 68 18 97/62 97 %      Temp src Heart Rate Source Patient Position BP Location FiO2 (%)   -- -- 05/15/20 1621 -- --     Lying         GENERAL: Elderly female.  She appears chronically ill.  Does not appear in acute distress no acute distress.Vital signs on my initial evaluation unremarkable her repeat blood pressure was 160/60 and repeat were also elevated.  HENT: nares patent  Head/neck/ face are symmetric without gross deformity, signs of trauma, or swelling  patient's oral mucosa is dry  EYES: no scleral icterus, no conjunctival pallor.  NECK: Supple, no meningismus  CV: regular rhythm, regular rate with intact distal pulses.  No murmur or rub  RESPIRATORY: normal effort and no respiratory distress.  Lungs are clear  ABDOMEN: soft and non-tender.  Obese  MUSCULOSKELETAL: no deformity.  Moves all extremity  NEURO: alert and appropriate, moves all extremities, follows commands.  Alert and oriented x3 patient has some mild dysarthria.  Contributing factor could be her dry oral mucosa.  Other than that her cranial nerves II through XII are grossly intact.  She has no pronator drift to her upper or lower extremities.  I do not appreciate any definitive ataxia to finger-to-nose bilaterally.  We try to ambulate the patient she is able to ambulate independently but she still feels very unsteady and off balance.  SKIN: warm, dry    Vital signs and nursing notes reviewed.        LAB RESULTS  Recent Results (from the past 24 hour(s))   POC Glucose Once    Collection Time: 05/18/20 11:06 AM   Result Value Ref Range    Glucose 160 (H) 70 - 130 mg/dL   POC Glucose Once    Collection Time: 05/18/20  4:04 PM   Result Value Ref Range    Glucose 146 (H) 70 - 130 mg/dL   POC Glucose Once    Collection Time: 05/18/20  9:15 PM   Result Value Ref Range    Glucose 231 (H) 70 - 130 mg/dL   Uric Acid    Collection Time: 05/19/20  5:23 AM   Result Value Ref Range    Uric Acid 5.5 2.4 - 5.7 mg/dL   Renal Function Panel    Collection Time: 05/19/20  5:23 AM   Result Value Ref Range    Glucose 158 (H) 65 - 99 mg/dL    BUN 14 8 - 23 mg/dL    Creatinine 0.75 0.57 - 1.00 mg/dL    Sodium 140 136 - 145 mmol/L    Potassium 3.9 3.5 - 5.2 mmol/L    Chloride 98 98 - 107 mmol/L    CO2 30.5 (H) 22.0 - 29.0 mmol/L    Calcium 9.7 8.6 - 10.5 mg/dL    Albumin 4.00 3.50 - 5.20 g/dL    Phosphorus 3.6 2.5 - 4.5 mg/dL    Anion Gap 11.5 5.0 - 15.0 mmol/L    BUN/Creatinine Ratio 18.7 7.0 - 25.0    eGFR Non  Amer  77 >60 mL/min/1.73   Magnesium    Collection Time: 05/19/20  5:23 AM   Result Value Ref Range    Magnesium 2.2 1.6 - 2.4 mg/dL   POC Glucose Once    Collection Time: 05/19/20  5:48 AM   Result Value Ref Range    Glucose 159 (H) 70 - 130 mg/dL       Ordered the above labs and independently reviewed the results.        RADIOLOGY  No Radiology Exams Resulted Within Past 24 Hours    I ordered the above noted radiological studies. Reviewed by me and discussed with radiologist.  See dictation for official radiology interpretation.      PROCEDURES    Procedures      MEDICATIONS GIVEN IN ER    Medications   sodium chloride 0.9 % flush 10 mL (has no administration in time range)   dextrose (GLUTOSE) oral gel 15 g (has no administration in time range)   dextrose (D50W) 25 g/ 50mL Intravenous Solution 25 g (has no administration in time range)   glucagon (human recombinant) (GLUCAGEN DIAGNOSTIC) injection 1 mg (has no administration in time range)   sodium chloride 0.9 % flush 10 mL (10 mL Intravenous Given 5/19/20 0821)   sodium chloride 0.9 % flush 10 mL (has no administration in time range)   nitroglycerin (NITROSTAT) SL tablet 0.4 mg (has no administration in time range)   acetaminophen (TYLENOL) tablet 650 mg (650 mg Oral Given 5/18/20 1555)     Or   acetaminophen (TYLENOL) 160 MG/5ML solution 650 mg ( Oral Not Given:  See Alt 5/18/20 1555)     Or   acetaminophen (TYLENOL) suppository 650 mg ( Rectal Not Given:  See Alt 5/18/20 1555)   ondansetron (ZOFRAN) injection 4 mg (4 mg Intravenous Given 5/17/20 1357)   insulin lispro (humaLOG) injection 0-7 Units (2 Units Subcutaneous Given 5/19/20 0821)   hydrocortisone 1 % cream ( Topical Given 5/19/20 0822)   hydrOXYzine (ATARAX) tablet 50 mg (50 mg Oral Given 5/19/20 5871)   aspirin EC tablet 81 mg (81 mg Oral Given 5/19/20 0821)   atorvastatin (LIPITOR) tablet 80 mg (80 mg Oral Given 5/19/20 0821)   budesonide-formoterol (SYMBICORT) 160-4.5 MCG/ACT inhaler 2 puff (2 puffs  Inhalation Given 5/19/20 0832)   clopidogrel (PLAVIX) tablet 75 mg (75 mg Oral Given 5/19/20 0821)   gabapentin (NEURONTIN) capsule 400 mg (400 mg Oral Given 5/19/20 0821)   ipratropium-albuterol (DUO-NEB) nebulizer solution 3 mL (3 mL Nebulization Not Given 5/19/20 0831)   metoprolol tartrate (LOPRESSOR) tablet 50 mg (50 mg Oral Given 5/19/20 0821)   PARoxetine (PAXIL) tablet 10 mg (10 mg Oral Given 5/19/20 0617)   rOPINIRole (REQUIP) tablet 0.25 mg (0.25 mg Oral Given 5/18/20 2117)   insulin glargine (LANTUS) injection 20 Units (20 Units Subcutaneous Given 5/18/20 2117)   levETIRAcetam (KEPPRA) tablet 500 mg (500 mg Oral Given 5/19/20 0821)   hydrALAZINE (APRESOLINE) tablet 25 mg (25 mg Oral Given 5/19/20 0617)   sodium chloride 0.9 % bolus 500 mL (0 mL Intravenous Stopped 5/15/20 1907)   LORazepam (ATIVAN) tablet 1 mg (1 mg Oral Given 5/16/20 0837)   cloNIDine (CATAPRES) tablet 0.1 mg (0.1 mg Oral Given 5/17/20 0228)   ipratropium-albuterol (DUO-NEB) nebulizer solution 3 mL (3 mL Nebulization Given 5/19/20 0127)         PROGRESS, DATA ANALYSIS, CONSULTS, AND MEDICAL DECISION MAKING    There is the possibility that this patient has had a stroke as her cause of her symptoms are vertebrobasilar insufficiency.  She has a very dry oral mucosa which can be a contributing factor of her dysarthria and slurred speech.  She has multiple risk factors for CVA as well.  I did give her a little bit of IV fluids check a CT scan of her head and do lab work.  I discussed this with the patient.  Is also important to note that the patient is not orthostatic.  We are currently under a pandemic from the COVID19 infection.  The patient presented to the emergency department by ambulance or personal vehicle.  During current hospital restrictions no other visitors were present in the emergency department during my evaluation and treatment. I followed the current protocols required by Infection Control at [facility] in my  evaluation and treatment of the patient. The patient was wearing a face mask during my evaluation and throughout my encounter. During my whole encounter with this patient I used appropriate personal protective equipment.  This equipment consisted of eye protection, facemask, gown, and gloves.  I applied this equipment before entering the room.    All labs have been independently reviewed by me.  All radiology studies have been reviewed by me and discussed with radiologist dictating the report.   EKG's independently viewed and interpreted by me.  Discussion below represents my analysis of pertinent findings related to patient's condition, differential diagnosis, treatment plan and final disposition.      ED Course as of May 19 1034   Fri May 15, 2020   1652 Patient's creatinine was 1 4 months ago.   Creatinine(!): 2.03 [MM]   1652 Chronic anemia   Hemoglobin(!): 11.0 [MM]   1758 CT of the head showed no acute process.  There is an old right cerebellar infarct.    [MM]   1758 The dizziness, slurred speech, and history of old cerebellar infarct entertain the possibility that this patient might have had a stroke approximately a week ago when her symptoms started.  She is also got acute renal failure which can be a contributing factor      [MM]   1758  approximately a week ago when her symptoms started.  She is also got acute renal failure which can be a contributing factor    [MM]   1814 I have seen and reevaluated the patient.  Informed the results of her lab work and CT scan.  Informed her that weakness continue with some IV fluids will need to admit her.  She is not a TPA candidate.  The symptoms have been going on for 6 to 7 days.    [MM]   2000 I spoke with Ev goins [facility].  She agrees to admit the patient to the hospital.  I will admit to Dr. Luu.    [MM]   2019 EKG was done at 1522EKG revealed a sinus rhythm with rate of 65 with prolonged CT interval mildly.Narrow QRSNormal axisHas some nonspecific T  wave changes more prominent inferior lateralQT interval is normalNo significant change from January 1, 2020 EKG.    [MM]      ED Course User Index  [MM] Jacky Eckert MD       AS OF 10:34 VITALS:    BP - (!) 197/95  HR - 62  TEMP - 99.1 °F (37.3 °C) (Oral)  02 SATS - 93%        DIAGNOSIS  Final diagnoses:   Ataxia   Dysarthria   Acute kidney injury (GASPER) with acute tubular necrosis (ATN) (CMS/[facility])         DISPOSITION  Admit monitor admit           Jacky Eckert MD  05/15/20 2001  Was requested by medical records to refresh my chart and to sign as the ED course has been updated.     Jacky Eckert MD  05/19/20 1034       Jacky Eckert MD  05/19/20 1034

## 2020-05-15 NOTE — ED NOTES
"Pt states she has had slurred speech for about a week. 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 She has chronic UTIs and \"take maintenance antibiotics daily because of it\". Pt states she believes the slurred speech is caused by seizures. Pt states she doesn't want to be here and is upset about being here.      Kim Bowers, RN  05/15/20 3421    "

## 2020-05-15 NOTE — TELEPHONE ENCOUNTER
Left Vidhi a detailed vm with Eusebia Yanez's recommendation. Office # given if any further questions. Thanks!

## 2020-05-15 NOTE — ED TRIAGE NOTES
Patient presents to er via ems from [facility] at [facility].  Patient reports dizziness with nausea and slurred speech for one week.  Patient was placed in face mask during first look triage.  Patient was wearing a face mask throughout encounter.  I wore personal protective equipment throughout the encounter.  Hand hygiene was performed before and after patient encounter.

## 2020-05-15 NOTE — TELEPHONE ENCOUNTER
Unfortunately, this is really something she needs to bring up with her PCP to rule out any potential underlying medical condition. I would strongly recommend doing that first. Thanks.

## 2020-05-15 NOTE — ED NOTES
Pt fell asleep and O2 sat dropped to 74% at lowest. Pt placed on 2 l/min of O2. Sat now 95%. Pt states she wears a C-PAP and 2 l/min of O2 at night normally.     Kim Bowers, RN  05/15/20 4984

## 2020-05-16 ENCOUNTER — APPOINTMENT (OUTPATIENT)
Dept: MRI IMAGING | Facility: HOSPITAL | Age: 70
End: 2020-05-16

## 2020-05-16 LAB
ANION GAP SERPL CALCULATED.3IONS-SCNC: 9.1 MMOL/L (ref 5–15)
BASOPHILS # BLD AUTO: 0.06 10*3/MM3 (ref 0–0.2)
BASOPHILS NFR BLD AUTO: 0.8 % (ref 0–1.5)
BUN BLD-MCNC: 22 MG/DL (ref 8–23)
BUN/CREAT SERPL: 16.9 (ref 7–25)
CALCIUM SPEC-SCNC: 8.7 MG/DL (ref 8.6–10.5)
CHLORIDE SERPL-SCNC: 101 MMOL/L (ref 98–107)
CHOLEST SERPL-MCNC: 157 MG/DL (ref 0–200)
CO2 SERPL-SCNC: 30.9 MMOL/L (ref 22–29)
CREAT BLD-MCNC: 1.3 MG/DL (ref 0.57–1)
DEPRECATED RDW RBC AUTO: 49.9 FL (ref 37–54)
EOSINOPHIL # BLD AUTO: 0.24 10*3/MM3 (ref 0–0.4)
EOSINOPHIL NFR BLD AUTO: 3.3 % (ref 0.3–6.2)
ERYTHROCYTE [DISTWIDTH] IN BLOOD BY AUTOMATED COUNT: 17.8 % (ref 12.3–15.4)
GFR SERPL CREATININE-BSD FRML MDRD: 41 ML/MIN/1.73
GLUCOSE BLD-MCNC: 125 MG/DL (ref 65–99)
GLUCOSE BLDC GLUCOMTR-MCNC: 121 MG/DL (ref 70–130)
GLUCOSE BLDC GLUCOMTR-MCNC: 122 MG/DL (ref 70–130)
GLUCOSE BLDC GLUCOMTR-MCNC: 158 MG/DL (ref 70–130)
GLUCOSE BLDC GLUCOMTR-MCNC: 209 MG/DL (ref 70–130)
HBA1C MFR BLD: 7.2 % (ref 4.8–5.6)
HCT VFR BLD AUTO: 32.6 % (ref 34–46.6)
HDLC SERPL-MCNC: 31 MG/DL (ref 40–60)
HGB BLD-MCNC: 10.5 G/DL (ref 12–15.9)
IMM GRANULOCYTES # BLD AUTO: 0.02 10*3/MM3 (ref 0–0.05)
IMM GRANULOCYTES NFR BLD AUTO: 0.3 % (ref 0–0.5)
LDLC SERPL CALC-MCNC: 50 MG/DL (ref 0–100)
LDLC/HDLC SERPL: 1.62 {RATIO}
LYMPHOCYTES # BLD AUTO: 1.76 10*3/MM3 (ref 0.7–3.1)
LYMPHOCYTES NFR BLD AUTO: 24.1 % (ref 19.6–45.3)
MCH RBC QN AUTO: 24.8 PG (ref 26.6–33)
MCHC RBC AUTO-ENTMCNC: 32.2 G/DL (ref 31.5–35.7)
MCV RBC AUTO: 77.1 FL (ref 79–97)
MONOCYTES # BLD AUTO: 0.43 10*3/MM3 (ref 0.1–0.9)
MONOCYTES NFR BLD AUTO: 5.9 % (ref 5–12)
NEUTROPHILS # BLD AUTO: 4.78 10*3/MM3 (ref 1.7–7)
NEUTROPHILS NFR BLD AUTO: 65.6 % (ref 42.7–76)
NRBC BLD AUTO-RTO: 0 /100 WBC (ref 0–0.2)
PLATELET # BLD AUTO: 200 10*3/MM3 (ref 140–450)
PMV BLD AUTO: 9 FL (ref 6–12)
POTASSIUM BLD-SCNC: 3.8 MMOL/L (ref 3.5–5.2)
RBC # BLD AUTO: 4.23 10*6/MM3 (ref 3.77–5.28)
SODIUM BLD-SCNC: 141 MMOL/L (ref 136–145)
TRIGL SERPL-MCNC: 379 MG/DL (ref 0–150)
VLDLC SERPL-MCNC: 75.8 MG/DL (ref 5–40)
WBC NRBC COR # BLD: 7.29 10*3/MM3 (ref 3.4–10.8)

## 2020-05-16 PROCEDURE — 99221 1ST HOSP IP/OBS SF/LOW 40: CPT | Performed by: PSYCHIATRY & NEUROLOGY

## 2020-05-16 PROCEDURE — 85025 COMPLETE CBC W/AUTO DIFF WBC: CPT | Performed by: NURSE PRACTITIONER

## 2020-05-16 PROCEDURE — 97161 PT EVAL LOW COMPLEX 20 MIN: CPT | Performed by: PHYSICAL THERAPIST

## 2020-05-16 PROCEDURE — 70551 MRI BRAIN STEM W/O DYE: CPT

## 2020-05-16 PROCEDURE — 25010000002 ONDANSETRON PER 1 MG: Performed by: NURSE PRACTITIONER

## 2020-05-16 PROCEDURE — 97110 THERAPEUTIC EXERCISES: CPT | Performed by: PHYSICAL THERAPIST

## 2020-05-16 PROCEDURE — 92610 EVALUATE SWALLOWING FUNCTION: CPT

## 2020-05-16 PROCEDURE — 82962 GLUCOSE BLOOD TEST: CPT

## 2020-05-16 PROCEDURE — 94640 AIRWAY INHALATION TREATMENT: CPT

## 2020-05-16 PROCEDURE — 83036 HEMOGLOBIN GLYCOSYLATED A1C: CPT | Performed by: NURSE PRACTITIONER

## 2020-05-16 PROCEDURE — 36415 COLL VENOUS BLD VENIPUNCTURE: CPT | Performed by: NURSE PRACTITIONER

## 2020-05-16 PROCEDURE — 63710000001 INSULIN LISPRO (HUMAN) PER 5 UNITS: Performed by: NURSE PRACTITIONER

## 2020-05-16 PROCEDURE — 94799 UNLISTED PULMONARY SVC/PX: CPT

## 2020-05-16 PROCEDURE — 80061 LIPID PANEL: CPT | Performed by: NURSE PRACTITIONER

## 2020-05-16 PROCEDURE — 80048 BASIC METABOLIC PNL TOTAL CA: CPT | Performed by: NURSE PRACTITIONER

## 2020-05-16 RX ORDER — LEVETIRACETAM 500 MG/1
500 TABLET ORAL 2 TIMES DAILY
Status: DISCONTINUED | OUTPATIENT
Start: 2020-05-16 | End: 2020-05-21 | Stop reason: HOSPADM

## 2020-05-16 RX ADMIN — LEVETIRACETAM 500 MG: 500 TABLET, FILM COATED ORAL at 20:27

## 2020-05-16 RX ADMIN — IPRATROPIUM BROMIDE AND ALBUTEROL SULFATE 3 ML: .5; 3 SOLUTION RESPIRATORY (INHALATION) at 16:28

## 2020-05-16 RX ADMIN — METOPROLOL TARTRATE 50 MG: 50 TABLET, FILM COATED ORAL at 12:12

## 2020-05-16 RX ADMIN — BUDESONIDE AND FORMOTEROL FUMARATE DIHYDRATE 2 PUFF: 160; 4.5 AEROSOL RESPIRATORY (INHALATION) at 19:16

## 2020-05-16 RX ADMIN — GABAPENTIN 400 MG: 400 CAPSULE ORAL at 23:30

## 2020-05-16 RX ADMIN — ROPINIROLE HYDROCHLORIDE 0.25 MG: 0.25 TABLET, FILM COATED ORAL at 23:30

## 2020-05-16 RX ADMIN — HYDROCORTISONE: 1 CREAM TOPICAL at 08:38

## 2020-05-16 RX ADMIN — HYDRALAZINE HYDROCHLORIDE 10 MG: 10 TABLET, FILM COATED ORAL at 19:26

## 2020-05-16 RX ADMIN — BUDESONIDE AND FORMOTEROL FUMARATE DIHYDRATE 2 PUFF: 160; 4.5 AEROSOL RESPIRATORY (INHALATION) at 08:04

## 2020-05-16 RX ADMIN — GABAPENTIN 400 MG: 400 CAPSULE ORAL at 08:57

## 2020-05-16 RX ADMIN — HYDRALAZINE HYDROCHLORIDE 10 MG: 10 TABLET, FILM COATED ORAL at 08:37

## 2020-05-16 RX ADMIN — ROPINIROLE HYDROCHLORIDE 0.25 MG: 0.25 TABLET, FILM COATED ORAL at 00:38

## 2020-05-16 RX ADMIN — SODIUM CHLORIDE 125 ML/HR: 9 INJECTION, SOLUTION INTRAVENOUS at 08:57

## 2020-05-16 RX ADMIN — LEVETIRACETAM 750 MG: 500 TABLET, FILM COATED ORAL at 08:37

## 2020-05-16 RX ADMIN — SODIUM CHLORIDE, PRESERVATIVE FREE 10 ML: 5 INJECTION INTRAVENOUS at 20:27

## 2020-05-16 RX ADMIN — HYDROCORTISONE: 1 CREAM TOPICAL at 00:38

## 2020-05-16 RX ADMIN — SODIUM CHLORIDE, PRESERVATIVE FREE 10 ML: 5 INJECTION INTRAVENOUS at 08:38

## 2020-05-16 RX ADMIN — ONDANSETRON 4 MG: 2 INJECTION INTRAMUSCULAR; INTRAVENOUS at 23:51

## 2020-05-16 RX ADMIN — PAROXETINE HYDROCHLORIDE 10 MG: 10 TABLET, FILM COATED ORAL at 06:08

## 2020-05-16 RX ADMIN — HYDRALAZINE HYDROCHLORIDE 10 MG: 10 TABLET, FILM COATED ORAL at 16:42

## 2020-05-16 RX ADMIN — HYDROCORTISONE: 1 CREAM TOPICAL at 20:27

## 2020-05-16 RX ADMIN — CLOPIDOGREL 75 MG: 75 TABLET, FILM COATED ORAL at 08:38

## 2020-05-16 RX ADMIN — LORAZEPAM 1 MG: 1 TABLET ORAL at 08:37

## 2020-05-16 RX ADMIN — ASPIRIN 81 MG: 81 TABLET, COATED ORAL at 08:37

## 2020-05-16 RX ADMIN — IPRATROPIUM BROMIDE AND ALBUTEROL SULFATE 3 ML: .5; 3 SOLUTION RESPIRATORY (INHALATION) at 11:52

## 2020-05-16 RX ADMIN — HYDROXYZINE HYDROCHLORIDE 50 MG: 50 TABLET ORAL at 08:37

## 2020-05-16 RX ADMIN — ATORVASTATIN CALCIUM 80 MG: 80 TABLET, FILM COATED ORAL at 08:37

## 2020-05-16 RX ADMIN — ACETAMINOPHEN 650 MG: 325 TABLET, FILM COATED ORAL at 23:35

## 2020-05-16 RX ADMIN — INSULIN LISPRO 2 UNITS: 100 INJECTION, SOLUTION INTRAVENOUS; SUBCUTANEOUS at 12:20

## 2020-05-16 RX ADMIN — METOPROLOL TARTRATE 50 MG: 50 TABLET, FILM COATED ORAL at 19:26

## 2020-05-16 NOTE — PROGRESS NOTES
[facility]     LOS: 1 day     Subjective:    CC:Dizziness    DIET:  Diet Order   Procedures   • Diet Regular; Consistent Carbohydrate     No cp  No soa  No n/v/d, eating well        Objective:    Vital Signs:  Temp:  [97 °F (36.1 °C)-98.6 °F (37 °C)] 98.6 °F (37 °C)  Heart Rate:  [62-74] 63  Resp:  [16-19] 18  BP: ()/(51-95) 123/61    SpO2:  [77 %-100 %] 92 %  on  Flow (L/min):  [2] 2;   Device (Oxygen Therapy): nasal cannula  Body mass index is 30.69 kg/m².    Physical Exam   Constitutional: She appears well-developed and well-nourished.   HENT:   Head: Normocephalic and atraumatic.   Eyes: Conjunctivae are normal.   Cardiovascular: Normal rate, regular rhythm and normal heart sounds.   No murmur heard.  Pulmonary/Chest: Effort normal and breath sounds normal.   Abdominal: Soft. She exhibits no distension. There is no tenderness.   Neurological: She is alert.   Skin: Skin is warm and dry.   Psychiatric: She has a normal mood and affect. Her behavior is normal.       Results Review:    Glucose   Date Value Ref Range Status   05/16/2020 125 (H) 65 - 99 mg/dL Final   05/15/2020 115 (H) 65 - 99 mg/dL Final     Results from last 7 days   Lab Units 05/16/20  0454   WBC 10*3/mm3 7.29   HEMOGLOBIN g/dL 10.5*   HEMATOCRIT % 32.6*   PLATELETS 10*3/mm3 200     Results from last 7 days   Lab Units 05/16/20  0454 05/15/20  1606   SODIUM mmol/L 141 143   POTASSIUM mmol/L 3.8 3.5   CHLORIDE mmol/L 101 101   CO2 mmol/L 30.9* 28.6   BUN mg/dL 22 25*   CREATININE mg/dL 1.30* 2.03*   CALCIUM mg/dL 8.7 9.2   BILIRUBIN mg/dL  --  0.2   ALK PHOS U/L  --  107   ALT (SGPT) U/L  --  13   AST (SGOT) U/L  --  12   GLUCOSE mg/dL 125* 115*     Results from last 7 days   Lab Units 05/15/20  1607   INR  1.05     Results from last 7 days   Lab Units 05/15/20  1606   MAGNESIUM mg/dL 1.9     Results from last 7 days   Lab Units 05/15/20  1606   TROPONIN T ng/mL <0.010     Cultures:  No results found for: BLOODCX,  URINECX, WOUNDCX, MRSACX, RESPCX, STOOLCX    I have reviewed daily medications and changes in CPOE    Scheduled meds    aspirin 81 mg Oral Daily   atorvastatin 80 mg Oral Daily   budesonide-formoterol 2 puff Inhalation BID   clopidogrel 75 mg Oral Daily   gabapentin 400 mg Oral Q12H   hydrALAZINE 10 mg Oral TID   hydrocortisone  Topical Q12H   insulin glargine 20 Units Subcutaneous Nightly   insulin lispro 0-7 Units Subcutaneous TID AC   ipratropium-albuterol 3 mL Nebulization 4x Daily - RT   levETIRAcetam 500 mg Oral BID   metoprolol tartrate 50 mg Oral Q12H   PARoxetine 10 mg Oral QAM   rOPINIRole 0.25 mg Oral Nightly   sodium chloride 10 mL Intravenous Q12H         sodium chloride 125 mL/hr Last Rate: 125 mL/hr (05/16/20 0857)     PRN meds  •  acetaminophen **OR** acetaminophen **OR** acetaminophen  •  dextrose  •  dextrose  •  glucagon (human recombinant)  •  hydrOXYzine  •  nitroglycerin  •  ondansetron  •  [COMPLETED] Insert peripheral IV **AND** sodium chloride  •  sodium chloride        Ataxia    Dyslipidemia    Hypertension    Anxiety (Chronic benzos)    GERD (gastroesophageal reflux disease)    Diabetes mellitus, type 2 (CMS/[facility])    Fibromyalgia    Migraines    COPD (chronic obstructive pulmonary disease) (CMS/[facility])    History of acute myocardial infarction    History of stroke    CAD (coronary artery disease)    URIEL (obstructive sleep apnea)    GASPER (acute kidney injury) (CMS/[facility])    Seizure disorder (CMS/[facility])    Polypharmacy    Dysarthria    Lethargy        Assessment/Plan:    Ataxia/dysarthria/lethargy  -neurology has seen  -Mri no acute  -CT of the head was negative  -Patient currently on Plavix, Crestor, aspirin 81 mg  -regular diet  -Neuro telemetry unit  -Polypharmacy, will hold sedating meds for now     Acute kidney injury  -Normal saline at 75 cc an hour overnight  -Recheck BMP in a.m.     Seizure disorder  -reduce Keppra and neurontin     Hypertension/dyslipidemia/CAD  -hydralazine and BB   -up and  down, uses hydralazine prn    Type 2 diabetes  -Accu-Cheks AC with correctional dose insulin  -We will continue long-acting insulin  -A1c 7.2     COPD/obstructive sleep apnea/ chronic resp failure  -Continuous O2 monitoring  -O2 to keep sats above 90  -triology at night       D/c tomorrow if renal functioin better and mental status continues to be improved      Lenny Grajeda MD  05/16/20  14:47

## 2020-05-16 NOTE — PLAN OF CARE
Problem: Patient Care Overview  Goal: Plan of Care Review  Outcome: Ongoing (interventions implemented as appropriate)  Flowsheets (Taken 5/16/2020 2016)  Plan of Care Reviewed With: patient  Outcome Summary: Pt presents WFL's allstages of the swallow. Night nurse stated pt exhibited coughing with larger pills.  SLP recommends a regular diet with thin liquids and medication crushed in applesauce.

## 2020-05-16 NOTE — CONSULTS
Neurology Consult Note    Consult Date: 5/16/2020    Referring MD: Helder Luu*    Reason for Consult I have been asked to see the patient in neurological consultation to render advice and opinion regarding ataxia, weakness    Vidhi Lopez is a 70 y.o. female with past history of cerebellar stroke, fibromyalgia, hypertension, prior seizure disorder.  She reports development over the past week or so of generalized weakness, gait imbalance with associated tremor.  She has intermittent quick jerks of her extremities and this causes her to frequently drop things in her home.  She denies any falls.  She associates this with a generalized fluctuating encephalopathy and associated visual hallucinations, often of animals running around in her home.    Past Medical History:   Diagnosis Date   • Allergic rhinitis    • Asthma with COPD (CMS/[facility])     Asthma/COPD   • Bilateral ovarian cysts    • Coronary artery disease    • Depression with anxiety     Depression/Anxiety   • Diabetes (CMS/[facility])     pre   • Endometriosis     Dr. Jin; estradiol    • ESR raised 3/20/2018   • Fatigue    • Fibromyalgia    • Headache     Headaches   • High cholesterol    • History of gallstones    • History of myocardial infarction    • Hypertension    • Influenza B     DX'D 2/6/2018, TREATED WITH TAMIFLU. LAST DOSE 2/11/2018 AM.  PATIENT STATES DR RANGEL IS AWARE. DENIES FEVERS OR CHILLS.   • Interstitial cystitis    • On home oxygen therapy     2 L NC   • URIEL on CPAP    • PONV (postoperative nausea and vomiting)    • Seizure disorder, nonconvulsive, with status epilepticus (CMS/[facility]) 3/20/2018   • Septic joint of right knee joint (CMS/[facility]) 3/21/2018   • Shortness of breath on exertion    • Stroke (CMS/[facility])     X1 8 YEARS AGO, GENERALIZED UPPER BODY WEAKNESS RESIDUAL PER PT    • Thyroid disorder    • Urinary leakage    • UTI (urinary tract infection)    • Wears glasses    • Yeast dermatitis        ROS:  No fevers, chills  + Weakness,  "tremor, no numbness, + hallucinations  No chest pain, palpitations  + Shortness of air, cough    Exam  BP (!) 196/78 (BP Location: Right arm, Patient Position: Lying)   Pulse 74   Temp 98.3 °F (36.8 °C) (Oral)   Resp 18   Ht 165.1 cm (65\")   Wt 83.6 kg (184 lb 6.4 oz)   LMP  (LMP Unknown) Comment: LAST MAMMOGRAM 2017  SpO2 93%   BMI 30.69 kg/m²   Gen: NAD, vitals reviewed  MS: oriented x3, recent/remote memory intact, normal attention/concentration, language intact, no neglect.  CN: visual acuity grossly normal, PERRL, EOMI, no facial droop, no dysarthria  Motor: 5/5 throughout upper and lower extremities, normal tone  Sensory: Mildly diminished to vibratory sensation distal lower extremities  Gait: Positive Romberg sign    DATA:    Lab Results   Component Value Date    GLUCOSE 125 (H) 05/16/2020    CALCIUM 8.7 05/16/2020     05/16/2020    K 3.8 05/16/2020    CO2 30.9 (H) 05/16/2020     05/16/2020    BUN 22 05/16/2020    CREATININE 1.30 (H) 05/16/2020    EGFRIFAFRI 71 10/28/2019    EGFRIFNONA 41 (L) 05/16/2020    BCR 16.9 05/16/2020    ANIONGAP 9.1 05/16/2020     Lab Results   Component Value Date    WBC 7.29 05/16/2020    HGB 10.5 (L) 05/16/2020    HCT 32.6 (L) 05/16/2020    MCV 77.1 (L) 05/16/2020     05/16/2020       Lab review: GFR 41, hemoglobin 10.5    Imaging review: Personally reviewed her brain MRI performed this morning which shows mild chronic white matter disease, chronic right inferior cerebellar stroke.  Radiology report reviewed.  No acute intracranial normality seen.    Diagnoses:  Gabapentin toxicity  Acute kidney injury  History of seizure disorder  History of stroke    Comment: History is consistent with gabapentin toxicity and possible exacerbation of chronic cerebellar stroke related to acute renal failure on chronic kidney disease.  She had encephalopathy with hallucinations and generalized asterixis which are typical side effects of gabapentin in the setting of renal " failure.  She was previously taking 800 mg 3 times daily.  This has been appropriately dose adjusted by her hospital doctor to 400 mg twice daily and her symptoms have since resolved.  Currently she feels back to her neurologic baseline.  MRI is stable from prior imaging with chronic right inferior cerebellar stroke, chronic white matter disease.    PLAN:  -Agree with reduced dose of gabapentin for renal failure, this has resolved her symptoms  -I would reduce her Keppra to 500 bid as well given her kidney function and continue at the lower dose  -No further neuro work-up needed. Will see prn

## 2020-05-16 NOTE — THERAPY DISCHARGE NOTE
Acute Care - Speech Language Pathology   Swallow Initial Evaluation [facility]     Patient Name: Vidhi Lopez  : 1950  MRN: [id number]  Today's Date: 2020               Admit Date: 5/15/2020    Visit Dx:     ICD-10-CM ICD-9-CM   1. Ataxia R27.0 781.3   2. Dysarthria R47.1 784.51   3. Acute kidney injury (GASPER) with acute tubular necrosis (ATN) (CMS/[facility]) N17.0 584.5     Patient Active Problem List   Diagnosis   • Dyslipidemia   • Hypertension   • Anxiety (Chronic benzos)   • Insomnia   • GERD (gastroesophageal reflux disease)   • Diabetes mellitus, type 2 (CMS/[facility])   • Fibromyalgia   • RLS (restless legs syndrome)   • Migraines   • COPD (chronic obstructive pulmonary disease) (CMS/[facility])   • Interstitial cystitis   • History of acute myocardial infarction   • History of cardiac murmur   • History of stroke   • CAD (coronary artery disease)   • Essential hypertension   • CKD (chronic kidney disease) stage 2, GFR 60-89 ml/min   • Bilateral carotid artery stenosis   • Chronic respiratory failure with hypoxia and hypercapnia (CMS/[facility])   • URIEL (obstructive sleep apnea)   • Obesity (BMI 30-39.9)   • Diabetes mellitus type 2 in obese (CMS/HCC)   • Obesity hypoventilation syndrome (CMS/HCC)   • Tobacco use   • GASPER (acute kidney injury) (CMS/[facility])   • Chronic pain (Chronic narcotics)   • Recurrent UTI/interstitial cystitis on chronic methenamine   • Clostridium difficile colitis diagnosed 2018. Persistent diarrhea despite oral vancomycin   • Demand ischemia (CMS/[facility])   • Hypoxemia requiring supplemental oxygen   • Stenosis of carotid artery   • Occlusion and stenosis of left carotid artery    • Chronic gout with tophus   • Depression   • Edema   • Restless leg syndrome   • Asthma   • Wellness examination   • Encounter for screening mammogram for malignant neoplasm of breast    • Seasonal allergies   • Hyperlipidemia   • Seizure disorder (CMS/[facility])   • L1 vertebral fracture (CMS/HCC)   • Rib fracture   •  Lumbar compression fracture (CMS/[facility])   • Polypharmacy   • Itching   • Ataxia   • Dysarthria   • Lethargy     Past Medical History:   Diagnosis Date   • Allergic rhinitis    • Asthma with COPD (CMS/[facility])     Asthma/COPD   • Bilateral ovarian cysts    • Coronary artery disease    • Depression with anxiety     Depression/Anxiety   • Diabetes (CMS/[facility])     pre   • Endometriosis     Dr. Jin; estradiol    • ESR raised 3/20/2018   • Fatigue    • Fibromyalgia    • Headache     Headaches   • High cholesterol    • History of gallstones    • History of myocardial infarction    • Hypertension    • Influenza B     DX'D 2/6/2018, TREATED WITH TAMIFLU. LAST DOSE 2/11/2018 AM.  PATIENT STATES DR FLORES IS AWARE. DENIES FEVERS OR CHILLS.   • Interstitial cystitis    • On home oxygen therapy     2 L NC   • URIEL on CPAP    • PONV (postoperative nausea and vomiting)    • Seizure disorder, nonconvulsive, with status epilepticus (CMS/[facility]) 3/20/2018   • Septic joint of right knee joint (CMS/[facility]) 3/21/2018   • Shortness of breath on exertion    • Stroke (CMS/HCC)     X1 8 YEARS AGO, GENERALIZED UPPER BODY WEAKNESS RESIDUAL PER PT    • Thyroid disorder    • Urinary leakage    • UTI (urinary tract infection)    • Wears glasses    • Yeast dermatitis      Past Surgical History:   Procedure Laterality Date   • ARTERIOGRAM N/A 2/12/2018    Procedure: Renal Arteriogram;  Surgeon: Lito Flores MD;  Location:  [facility] CATH INVASIVE LOCATION;  Service:    • BREAST BIOPSY Right 1991   • CARDIAC CATHETERIZATION N/A 2/12/2018    Procedure: Right Heart Cath;  Surgeon: Lito Flores MD;  Location:  [facility] CATH INVASIVE LOCATION;  Service:    • CAROTID STENT      Transcath    • CAROTID STENT Left    • CHOLECYSTECTOMY     • CYSTOSTOMY W/ BLADDER BIOPSY     • DILATATION AND CURETTAGE     • KNEE ARTHROSCOPY Right 3/23/2018    Procedure: ARTHROSCOPY, RIGHT KNEE  INCISION AND DRAINAGE LOWER EXTREMITY WITH SYNOVIAL BIOPSY;  Surgeon: Dilip Giron MD;   Location: [facility] OR;  Service: Orthopedics   • LAPAROSCOPIC CHOLECYSTECTOMY  1994    Lap rolando   • OOPHORECTOMY     • PAP SMEAR  05/10/2016   • SUBTOTAL HYSTERECTOMY          SWALLOW EVALUATION (last 72 hours)      SLP Adult Swallow Evaluation     Row Name 05/16/20 0800                   Rehab Evaluation    Document Type  evaluation  -LS        Patient Observations  alert;cooperative;agree to therapy  -LS        Patient Effort  good  -LS           General Information    Patient Profile Reviewed  yes  -LS        Pertinent History Of Current Problem  Pt admitted due to slurred speech and ataxia. Pt complains of a thick tongue., a low grade fever and a headache.. RN made aware.  -LS        Current Method of Nutrition  regular textures;thin liquids  -LS        Precautions/Limitations, Vision  WFL;for purposes of eval  -LS        Precautions/Limitations, Hearing  WFL;for purposes of eval  -LS        Prior Level of Function-Communication  WFL  -LS        Prior Level of Function-Swallowing  no diet consistency restrictions  -LS        Plans/Goals Discussed with  patient;agreed upon  -LS        Barriers to Rehab  none identified  -LS           Oral Motor and Function    Dentition Assessment  edentulous, does not have dentures  -LS        Mucosal Quality  moist, healthy  -LS           Oral Musculature and Cranial Nerve Assessment    Oral Motor General Assessment  WFL  -LS           General Eating/Swallowing Observations    Respiratory Support Currently in Use  room air  -LS        Eating/Swallowing Skills  fed by SLP  -LS        Positioning During Eating  upright in chair  -LS        Utensils Used  spoon;cup  -LS        Consistencies Trialed  regular textures;thin liquids  -LS           Clinical Swallow Eval    Oral Prep Phase  WFL  -LS        Oral Transit  WFL  -LS        Oral Residue  WFL  -LS        Pharyngeal Phase  no overt signs/symptoms of pharyngeal impairment  -LS        Clinical Swallow Evaluation Summary  Pt  presents [facility]'s all stages of the [facility].  -LS           Clinical Impression    SLP Swallowing Diagnosis  functional oral phase;functional pharyngeal phase  -LS        Functional Impact  no impact on function  -LS        Rehab Potential/Prognosis, Swallowing  good, to achieve stated therapy goals  -        Swallow Criteria for Skilled Therapeutic Interventions Met  no problems identified which require skilled intervention  -LS           Recommendations    Therapy Frequency (Swallow)  evaluation only  -LS        Predicted Duration Therapy Intervention (Days)  until discharge  -        SLP Diet Recommendation  regular textures;thin liquids  -        Recommended Precautions and Strategies  upright posture during/after eating;small bites of food and sips of liquid  -        SLP Rec. for Method of Medication Administration  meds crushed;with pudding or applesauce Night nurse stated pt coughed with larger pills.  -        Monitor for Signs of Aspiration  yes;notify SLP if any concerns  -          User Key  (r) = Recorded By, (t) = Taken By, (c) = Cosigned By    Initials Name Effective Dates    FIOR TashiZari mcgovern, MS [facility]-SLP 06/08/18 -           EDUCATION  The patient has been educated in the following areas:   Dysphagia (Swallowing Impairment).    SLP Recommendation and Plan  SLP Swallowing Diagnosis: functional oral phase, functional pharyngeal phase  SLP Diet Recommendation: regular textures, thin liquids  Recommended Precautions and Strategies: upright posture during/after eating, small bites of food and sips of liquid  SLP Rec. for Method of Medication Administration: meds crushed, with pudding or applesauce(Night nurse stated pt coughed with larger pills.)     Monitor for Signs of Aspiration: yes, notify SLP if any concerns     Swallow Criteria for Skilled Therapeutic Interventions Met: no problems identified which require skilled intervention     Rehab Potential/Prognosis, Swallowing: good, to achieve  stated therapy goals  Therapy Frequency (Swallow): evaluation only  Predicted Duration Therapy Intervention (Days): until discharge       Plan of Care Reviewed With: patient  Outcome Summary: Pt presents WFL's allstages of the swallow. Night nurse stated pt exhibited coughing with larger pills.  SLP recommends a regular diet with thin liquids and medication crushed in applesauce.         SLP Outcome Measures (last 72 hours)      SLP Outcome Measures     Row Name 05/16/20 0800             SLP Outcome Measures    Outcome Measure Used?  Adult NOMS  -LS         Adult FCM Scores    FCM Chosen  Swallowing  -      Swallowing FCM Score  7  -        User Key  (r) = Recorded By, (t) = Taken By, (c) = Cosigned By    Initials Name Effective Dates    Zari Anthony MS CCC-SLP 06/08/18 -            Time Calculation:   Time Calculation- SLP     Row Name 05/16/20 0824             Time Calculation- SLP    SLP Received On  05/16/20  -        User Key  (r) = Recorded By, (t) = Taken By, (c) = Cosigned By    Initials Name Provider Type    Zari Anthony MS CCC-SLP Speech and Language Pathologist          Therapy Charges for Today     Code Description Service Date Service Provider Modifiers Qty    [id number]  ST EVAL ORAL PHARYNG SWALLOW 4 5/16/2020 Zari You MS CCC-EVELYN GN 1               MS EMILY August  5/16/2020

## 2020-05-16 NOTE — H&P
Patient Name:  Vidhi Lopez  YOB: 1950  MRN:  [id number]  Admit Date:  5/15/2020  Patient Care Team:  Abiodun Vila MD as PCP - General (Family Medicine)  Arnoldo Newman MD as PCP - Claims Attributed  Lito Flores MD as Consulting Physician (Cardiology)  Jacky Hernandez MD as Consulting Physician (Infectious Diseases)      Subjective   History Present Illness     Chief Complaint   Patient presents with   • Dizziness     History of Present Illness   Mrs. Lopez is a 70-year-old female with history of stroke, MI, hypertension, dyslipidemia, anxiety, GERD, type 2 diabetes, migraines, seizure disorder, COPD, CAD, polypharmacy who presents to the emergency room with dizziness and speech problems.  Patient states she has had some difficulty walking and speech difficulty for the past 4 to 5 days.  The symptoms come and go.  She denies having any falls, she does feel like when she is walking she is unsteady.  She was ambulated in the emergency room by the emergency room doctor and seemed to do okay, but she felt like she was going to fall.  He was also having some bilateral arm weakness, more so left hand  was very weak and she was unable to grasp anything earlier today, that seems to have resolved.  She does have some mild slurred speech at this time, she also has very dry mouth which could contribute to some of the slurred speech, which she says is baseline.  She does states she had some word finding issues earlier in the day.  She also states she is just been very tired over the last 4 to 5 days, difficulty staying awake.  She does have obstructive sleep apnea and uses a CPAP machine at night, but no oxygen use at home.  She denies any fever, shortness of breath, cough at home.  She denies having any pain anywhere.  She states she has been taking her medications as directed, has not missed any doses, has not started on any new medications recently.  In the emergency room  "patient's head CT was negative for acute abnormality, there is a old right cerebellar hemisphere infarct as on prior.  Glucose was 115, potassium 3.5, sodium 143, creatinine 2.03, INR 1.05, hemoglobin 11.0, hematocrit 35.5, platelet 191.  Patient was given IV fluids in the emergency room    Review of Systems   Constitutional: Positive for fatigue. Negative for appetite change and fever.   HENT: Negative for nosebleeds and trouble swallowing.    Eyes: Negative for photophobia, redness and visual disturbance.   Respiratory: Negative for cough, chest tightness, shortness of breath and wheezing.    Cardiovascular: Negative for chest pain, palpitations and leg swelling.   Gastrointestinal: Negative for abdominal distention, abdominal pain, nausea and vomiting.   Endocrine: Negative.    Genitourinary: Negative.    Musculoskeletal: Positive for gait problem (\"feels like I'm falling\" no recent falls). Negative for joint swelling.   Skin: Negative.    Neurological: Positive for dizziness (\"feel off balance\"), speech difficulty (\"thick tongue speech\" on and off), weakness (bilateral hand weakness butmore on left, decrease ) and headaches. Negative for seizures and light-headedness.   Hematological: Negative.    Psychiatric/Behavioral: Negative for behavioral problems and confusion.        Personal History     Past Medical History:   Diagnosis Date   • Allergic rhinitis    • Asthma with COPD (CMS/[facility])     Asthma/COPD   • Bilateral ovarian cysts    • Coronary artery disease    • Depression with anxiety     Depression/Anxiety   • Diabetes (CMS/[facility])     pre   • Endometriosis     Dr. Jin; estradiol    • ESR raised 3/20/2018   • Fatigue    • Fibromyalgia    • Headache     Headaches   • High cholesterol    • History of gallstones    • History of myocardial infarction    • Hypertension    • Influenza B     DX'D 2/6/2018, TREATED WITH TAMIFLU. LAST DOSE 2/11/2018 AM.  PATIENT STATES DR RANGEL IS AWARE. DENIES FEVERS OR CHILLS.   • " Interstitial cystitis    • On home oxygen therapy     2 L NC   • URIEL on CPAP    • PONV (postoperative nausea and vomiting)    • Seizure disorder, nonconvulsive, with status epilepticus (CMS/[facility]) 3/20/2018   • Septic joint of right knee joint (CMS/[facility]) 3/21/2018   • Shortness of breath on exertion    • Stroke (CMS/[facility])     X1 8 YEARS AGO, GENERALIZED UPPER BODY WEAKNESS RESIDUAL PER PT    • Thyroid disorder    • Urinary leakage    • UTI (urinary tract infection)    • Wears glasses    • Yeast dermatitis      Past Surgical History:   Procedure Laterality Date   • ARTERIOGRAM N/A 2/12/2018    Procedure: Renal Arteriogram;  Surgeon: Lito Flores MD;  Location:  [facility] CATH INVASIVE LOCATION;  Service:    • BREAST BIOPSY Right 1991   • CARDIAC CATHETERIZATION N/A 2/12/2018    Procedure: Right Heart Cath;  Surgeon: Lito Flores MD;  Location:  [facility] CATH INVASIVE LOCATION;  Service:    • CAROTID STENT      Transcath    • CAROTID STENT Left    • CHOLECYSTECTOMY     • CYSTOSTOMY W/ BLADDER BIOPSY     • DILATATION AND CURETTAGE     • KNEE ARTHROSCOPY Right 3/23/2018    Procedure: ARTHROSCOPY, RIGHT KNEE  INCISION AND DRAINAGE LOWER EXTREMITY WITH SYNOVIAL BIOPSY;  Surgeon: Dilip Giron MD;  Location:  [facility] OR;  Service: Orthopedics   • LAPAROSCOPIC CHOLECYSTECTOMY  1994    Lap rolando   • OOPHORECTOMY     • PAP SMEAR  05/10/2016   • SUBTOTAL HYSTERECTOMY       Family History   Problem Relation Age of Onset   • Cancer Other    • Diabetes Other    • Heart attack Other    • Hyperlipidemia Other    • Hypertension Other    • Osteoporosis Other    • Stroke Other    • Breast cancer Mother    • Osteoporosis Mother    • Colon cancer Father      Social History     Tobacco Use   • Smoking status: Current Every Day Smoker     Packs/day: 0.25     Years: 40.00     Pack years: 10.00     Types: Cigarettes   • Smokeless tobacco: Never Used   • Tobacco comment: in process of quiting--AVERAGE 1 PPD, DOWN TO 6 CIG PER DAY X2 WEEKS     Substance Use Topics   • Alcohol use: Yes     Alcohol/week: 1.0 standard drinks     Types: 1 Glasses of wine per week     Comment: occasional   • Drug use: No     No current facility-administered medications on file prior to encounter.      Current Outpatient Medications on File Prior to Encounter   Medication Sig Dispense Refill   • acetaminophen (TYLENOL) 325 MG tablet Take 2 tablets by mouth Every 4 (Four) Hours As Needed for Mild Pain .     • albuterol sulfate  (90 Base) MCG/ACT inhaler Inhale 2 puffs Every 4 (Four) Hours As Needed for Wheezing. 18 g 5   • aspirin 81 MG EC tablet Take 81 mg by mouth Daily.     • atorvastatin (LIPITOR) 80 MG tablet take 1 tablet by mouth once daily 90 tablet 3   • budesonide-formoterol (SYMBICORT) 160-4.5 MCG/ACT inhaler Inhale 2 puffs 2 (Two) Times a Day. 1 inhaler 3   • bumetanide (BUMEX) 1 MG tablet Take 1 tablet by mouth Daily With Breakfast. 90 tablet 3   • celecoxib (CELEBREX) 100 MG capsule Every 12 (Twelve) Hours.     • clopidogrel (PLAVIX) 75 MG tablet take 1 tablet by mouth once daily 90 tablet 3   • clotrimazole-betamethasone (LOTRISONE) 1-0.05 % cream Apply  topically to the appropriate area as directed As Needed.  1   • gabapentin (NEURONTIN) 800 MG tablet TK 1 T PO TID UTD     • hydrALAZINE (APRESOLINE) 10 MG tablet Take 1 tablet by mouth 3 (Three) Times a Day. 90 tablet 3   • hydrOXYzine (ATARAX) 50 MG tablet Take 1 tablet by mouth Every 8 (Eight) Hours As Needed for Itching. 90 tablet 2   • Insulin aspart (FIASP FLEXTOUCH) 100 UNIT/ML solution pen-injector injection pen   0   • ipratropium-albuterol (DUO-NEB) 0.5-2.5 mg/3 ml nebulizer USE 3 ML VIA NEBULIZER FOUR TIMES DAILY 3 mL 0   • levETIRAcetam (KEPPRA) 750 MG tablet Take 1 tablet by mouth 2 (Two) Times a Day. 60 tablet 11   • metoprolol tartrate (LOPRESSOR) 50 MG tablet Take 1 tablet by mouth Every 12 (Twelve) Hours. 60 tablet 11   • nitroglycerin (NITROSTAT) 0.4 MG SL tablet Place 0.4 mg under the  tongue Every 5 (Five) Minutes As Needed.  0   • nystatin (MYCOSTATIN) 133776 UNIT/GM ointment Apply  topically to the appropriate area as directed 2 (Two) Times a Day. 30 g 0   • nystatin (MYCOSTATIN) 336353 UNIT/ML suspension Take 5 mL by mouth 4 (Four) Times a Day. 473 mL 0   • ondansetron (ZOFRAN) 8 MG tablet Take 1 tablet by mouth Every 8 (Eight) Hours As Needed for Nausea or Vomiting. 30 tablet 1   • PARoxetine (PAXIL) 10 MG tablet Take 1 tablet by mouth Every Morning. 30 tablet 1   • Probiotic Product (PROBIOTIC DAILY PO) Take 1 tablet by mouth Daily.     • rOPINIRole (REQUIP) 0.25 MG tablet TK 1 T PO Q NIGHT 1 TO 3 H B BED     • SYMBICORT 160-4.5 MCG/ACT inhaler Inhale 2 puffs 2 (Two) Times a Day. 10.2 g 6   • tiZANidine (ZANAFLEX) 4 MG tablet TK 1 T PO BID UTD     • TOUJEO SOLOSTAR 300 UNIT/ML solution pen-injector injection Use 25 Units at night 1.5 mL 6     Allergies   Allergen Reactions   • Amlodipine Swelling   • Hydrocodone Hallucinations   • Reglan [Metoclopramide] Unknown - High Severity     DYSTONIC REACTION       Objective    Objective     Vital Signs  Temp:  [97 °F (36.1 °C)] 97 °F (36.1 °C)  Heart Rate:  [62-69] 69  Resp:  [16-19] 16  BP: ()/(60-86) 117/61  SpO2:  [77 %-99 %] 99 %  on   ;      Body mass index is 31.45 kg/m².    Physical Exam   Constitutional: She is oriented to person, place, and time. She appears lethargic. She is easily aroused. She appears ill. No distress.   HENT:   Head: Normocephalic.   Eyes: EOM are normal.   Neck: Normal range of motion. No JVD present.   Cardiovascular: Normal rate and regular rhythm.   Normal sinus rhythm on the monitor, heart rate 78 during my exam.  No chest pain or acute distress   Pulmonary/Chest: Effort normal. She has decreased breath sounds in the right lower field and the left lower field.   Patient on 2 L nasal cannula during my exam with sats 95%, breath sounds diminished bilaterally in the bases   Abdominal: Soft. Bowel sounds are  "normal. She exhibits no distension. There is no tenderness.   Musculoskeletal: Normal range of motion.   Neurological: She is oriented to person, place, and time and easily aroused. She has normal strength. She appears lethargic.   Patient's very sleepy, but arouses easily and answers all questions and follows all commands.  She states she just feels \"tired\".  Her NIH stroke scale at this time is a 1 due to some mild dysarthria, she does complain of very dry mouth which could explain some of the slurred speech.  Her speech is very understandable, but she does states she has had some word finding issues with the past few days.  All 4 extremities strength 5 out of 5 at this time, patient states it feels back to normal now, she was having some left hand weakness.   Skin: Skin is warm and dry. Capillary refill takes less than 2 seconds.   Psychiatric: She has a normal mood and affect. Her behavior is normal. Judgment normal. Her speech is slurred. Cognition and memory are normal.   Nursing note and vitals reviewed.      Results Review:  I reviewed the patient's new clinical results.  I reviewed the patient's new imaging results and agree with the interpretation.  I reviewed the patient's other test results and agree with the interpretation  I personally viewed and interpreted the patient's EKG/Telemetry data  Discussed with ED provider.    Lab Results (last 24 hours)     Procedure Component Value Units Date/Time    CBC & Differential [[id number]] Collected:  05/15/20 1606    Specimen:  Blood Updated:  05/15/20 1615    Narrative:       The following orders were created for panel order CBC & Differential.  Procedure                               Abnormality         Status                     ---------                               -----------         ------                     CBC Auto Differential[[id number]]        Abnormal            Final result                 Please view results for these tests on the individual " orders.    Comprehensive Metabolic Panel [[id number]]  (Abnormal) Collected:  05/15/20 1606    Specimen:  Blood Updated:  05/15/20 1646     Glucose 115 mg/dL      BUN 25 mg/dL      Creatinine 2.03 mg/dL      Sodium 143 mmol/L      Potassium 3.5 mmol/L      Chloride 101 mmol/L      CO2 28.6 mmol/L      Calcium 9.2 mg/dL      Total Protein 6.8 g/dL      Albumin 4.00 g/dL      ALT (SGPT) 13 U/L      AST (SGOT) 12 U/L      Alkaline Phosphatase 107 U/L      Total Bilirubin 0.2 mg/dL      eGFR Non African Amer 24 mL/min/1.73      Globulin 2.8 gm/dL      A/G Ratio 1.4 g/dL      BUN/Creatinine Ratio 12.3     Anion Gap 13.4 mmol/L     Narrative:       GFR Normal >60  Chronic Kidney Disease <60  Kidney Failure <15      Magnesium [[id number]]  (Normal) Collected:  05/15/20 1606    Specimen:  Blood Updated:  05/15/20 1646     Magnesium 1.9 mg/dL     Troponin [[id number]]  (Normal) Collected:  05/15/20 1606    Specimen:  Blood Updated:  05/15/20 1646     Troponin T <0.010 ng/mL     Narrative:       Troponin T Reference Range:  <= 0.03 ng/mL-   Negative for AMI  >0.03 ng/mL-     Abnormal for myocardial necrosis.  Clinicians would have to utilize clinical acumen, EKG, Troponin and serial changes to determine if it is an Acute Myocardial Infarction or myocardial injury due to an underlying chronic condition.       Results may be falsely decreased if patient taking Biotin.      CBC Auto Differential [[id number]]  (Abnormal) Collected:  05/15/20 1606    Specimen:  Blood Updated:  05/15/20 1615     WBC 6.79 10*3/mm3      RBC 4.55 10*6/mm3      Hemoglobin 11.0 g/dL      Hematocrit 35.5 %      MCV 78.0 fL      MCH 24.2 pg      MCHC 31.0 g/dL      RDW 17.8 %      RDW-SD 50.2 fl      MPV 9.0 fL      Platelets 191 10*3/mm3      Neutrophil % 64.4 %      Lymphocyte % 25.2 %      Monocyte % 6.3 %      Eosinophil % 3.4 %      Basophil % 0.6 %      Immature Grans % 0.1 %      Neutrophils, Absolute 4.37 10*3/mm3      Lymphocytes, Absolute 1.71  10*3/mm3      Monocytes, Absolute 0.43 10*3/mm3      Eosinophils, Absolute 0.23 10*3/mm3      Basophils, Absolute 0.04 10*3/mm3      Immature Grans, Absolute 0.01 10*3/mm3      nRBC 0.0 /100 WBC     Protime-INR [[id number]]  (Normal) Collected:  05/15/20 1607    Specimen:  Blood Updated:  05/15/20 1709     Protime 13.4 Seconds      INR 1.05    Urinalysis With Microscopic If Indicated (No Culture) - Urine, Catheter [[id number]]  (Normal) Collected:  05/15/20 1619    Specimen:  Urine, Catheter Updated:  05/15/20 1631     Color, UA Yellow     Appearance, UA Clear     pH, UA <=5.0     Specific Gravity, UA 1.020     Glucose, UA Negative     Ketones, UA Negative     Bilirubin, UA Negative     Blood, UA Negative     Protein, UA Negative     Leuk Esterase, UA Negative     Nitrite, UA Negative     Urobilinogen, UA 0.2 E.U./dL    Narrative:       Urine microscopic not indicated.          Imaging Results (Last 24 Hours)     Procedure Component Value Units Date/Time    CT Head Without Contrast [[id number]] Collected:  05/15/20 1656     Updated:  05/15/20 1705    Narrative:       CT HEAD     HISTORY:This     TECHNIQUE: CT scan of the head was obtained with 3 mm axial images  without intravenous contrast.  Radiation dose reduction techniques were  utilized, including automated exposure control and exposure modulation  based on body size.     COMPARISON:Head CT 01/01/2020 and brain MRI 12/05/2019     FINDINGS:  There is no finding of acute infarct, hemorrhage, contusion or abnormal  extra-axial collection. No hydrocephalus is present. Old infarct within  the right cerebellar hemisphere, as before.       Impression:       1.  No findings of acute intracranial abnormality. Old right cerebellar  hemisphere infarct, as before.        This report was finalized on 5/15/2020 5:02 PM by Dr. Blayne Cortez M.D.             Results for orders placed during the hospital encounter of 10/14/18   Adult Transthoracic Echo Complete W/ Cont if  Necessary Per Protocol    Narrative · Left ventricular systolic function is normal. Estimated EF = 55%.  · Left ventricular diastolic dysfunction (grade I) consistent with   impaired relaxation.  · Moderately reduced right ventricular systolic function noted.          ECG 12 Lead   Final Result   HEART RATE= 65  bpm   RR Interval= 928  ms   CA Interval= 226  ms   P Horizontal Axis= -10  deg   P Front Axis= 63  deg   QRSD Interval= 104  ms   QT Interval= 467  ms   QRS Axis= 44  deg   T Wave Axis= 22  deg   - ABNORMAL ECG -   Sinus rhythm   Prolonged CA interval   Borderline T wave abnormalities   When compared with ECG of 01-Jan-2020 22:48:19,   No significant change   Electronically Signed By: Josué Hutchinson ([facility]) 15-May-2020 15:37:42   Date and Time of Study: 2020-05-15 15:22:35           Assessment/Plan     Active Hospital Problems    Diagnosis POA   • **Ataxia [R27.0] Yes   • Dysarthria [R47.1] Unknown   • Lethargy [R53.83] Unknown   • Polypharmacy [Z79.899] Not Applicable   • Seizure disorder (CMS/HCC) [G40.909] Yes   • URIEL (obstructive sleep apnea) [G47.33] No   • Dyslipidemia [E78.5] Yes   • Hypertension [I10] Yes   • Anxiety (Chronic benzos) [F41.9] Yes   • GERD (gastroesophageal reflux disease) [K21.9] Yes     Nexium     • Diabetes mellitus, type 2 (CMS/HCC) [E11.9] Yes   • Fibromyalgia [M79.7] Yes     Neurontin 800 mg TID and tizanidine       • COPD (chronic obstructive pulmonary disease) (CMS/[facility]) [J44.9] Yes   • Migraines [G43.909] Yes   • History of acute myocardial infarction [I25.2] Not Applicable   • History of stroke [Z86.73] Not Applicable   • CAD (coronary artery disease) [I25.10] Yes     S/P CARDIAC STENT February 2018       Mrs. Lopez is a 70-year-old female with history of stroke, MI, hypertension, dyslipidemia, anxiety, GERD, type 2 diabetes, migraines, seizure disorder, COPD, CAD, polypharmacy who presents to the emergency room with dizziness and speech  problems.    Ataxia/dysarthria/lethargy  -Consult neurology  -TIA/stroke order set implemented  -CT of the head was negative, will order MRI of the brain  -Patient currently on Plavix, Crestor, aspirin 81 mg  -N.p.o. until passes bedside swallow, speech therapy to see  -PT to eval and treat  -Neuro telemetry unit overnight  -Polypharmacy, will hold sedating meds for now    Acute kidney injury  -Normal saline at 125 cc an hour overnight  -Recheck BMP in a.m.    Seizure disorder  -Continue Keppra    Hypertension/dyslipidemia/CAD  -Continue home meds  -Her blood pressure    Type 2 diabetes  -Accu-Cheks AC with correctional dose insulin  -We will continue long-acting insulin    COPD/obstructive sleep apnea  -Continuous O2 monitoring  -O2 to keep sats above 90          · I discussed the patient's findings and my recommendations with patient and ED provider.    VTE Prophylaxis - SCDs.  Code Status - Full code.       JANI Torres  [facility]  05/15/20  20:59

## 2020-05-16 NOTE — PLAN OF CARE
Problem: Patient Care Overview  Goal: Plan of Care Review  Flowsheets (Taken 5/16/2020 1711)  Outcome Summary: Pt admitted from ind living apt. Pt has been r/o for CVA. Pt reports she ambulates independently at baseline. Pt presents with BLE strength WFL and ambualted with SBA/CGA with no AD. No ataxia and no LOB noted during eval. Pt appears to be functioning at baseline level and id safe to d/c home. PT will sign off.

## 2020-05-16 NOTE — THERAPY DISCHARGE NOTE
Patient Name: Vidhi Lopez  : 1950    MRN: [id number]                              Today's Date: 2020       Admit Date: 5/15/2020    Visit Dx:     ICD-10-CM ICD-9-CM   1. Ataxia R27.0 781.3   2. Dysarthria R47.1 784.51   3. Acute kidney injury (GASPER) with acute tubular necrosis (ATN) (CMS/[facility]) N17.0 584.5     Patient Active Problem List   Diagnosis   • Dyslipidemia   • Hypertension   • Anxiety (Chronic benzos)   • Insomnia   • GERD (gastroesophageal reflux disease)   • Diabetes mellitus, type 2 (CMS/[facility])   • Fibromyalgia   • RLS (restless legs syndrome)   • Migraines   • COPD (chronic obstructive pulmonary disease) (CMS/[facility])   • Interstitial cystitis   • History of acute myocardial infarction   • History of cardiac murmur   • History of stroke   • CAD (coronary artery disease)   • Essential hypertension   • CKD (chronic kidney disease) stage 2, GFR 60-89 ml/min   • Bilateral carotid artery stenosis   • Chronic respiratory failure with hypoxia and hypercapnia (CMS/[facility])   • URIEL (obstructive sleep apnea)   • Obesity (BMI 30-39.9)   • Diabetes mellitus type 2 in obese (CMS/[facility])   • Obesity hypoventilation syndrome (CMS/HCC)   • Tobacco use   • GASPER (acute kidney injury) (CMS/[facility])   • Chronic pain (Chronic narcotics)   • Recurrent UTI/interstitial cystitis on chronic methenamine   • Clostridium difficile colitis diagnosed 2018. Persistent diarrhea despite oral vancomycin   • Demand ischemia (CMS/[facility])   • Hypoxemia requiring supplemental oxygen   • Stenosis of carotid artery   • Occlusion and stenosis of left carotid artery    • Chronic gout with tophus   • Depression   • Edema   • Restless leg syndrome   • Asthma   • Wellness examination   • Encounter for screening mammogram for malignant neoplasm of breast    • Seasonal allergies   • Hyperlipidemia   • Seizure disorder (CMS/HCC)   • L1 vertebral fracture (CMS/HCC)   • Rib fracture   • Lumbar compression fracture (CMS/HCC)   • Polypharmacy   •  Itching   • Ataxia   • Dysarthria   • Lethargy     Past Medical History:   Diagnosis Date   • Allergic rhinitis    • Asthma with COPD (CMS/[facility])     Asthma/COPD   • Bilateral ovarian cysts    • Coronary artery disease    • Depression with anxiety     Depression/Anxiety   • Diabetes (CMS/[facility])     pre   • Endometriosis     Dr. Jin; estradiol    • ESR raised 3/20/2018   • Fatigue    • Fibromyalgia    • Headache     Headaches   • High cholesterol    • History of gallstones    • History of myocardial infarction    • Hypertension    • Influenza B     DX'D 2/6/2018, TREATED WITH TAMIFLU. LAST DOSE 2/11/2018 AM.  PATIENT STATES DR FLORES IS AWARE. DENIES FEVERS OR CHILLS.   • Interstitial cystitis    • On home oxygen therapy     2 L NC   • URIEL on CPAP    • PONV (postoperative nausea and vomiting)    • Seizure disorder, nonconvulsive, with status epilepticus (CMS/[facility]) 3/20/2018   • Septic joint of right knee joint (CMS/[facility]) 3/21/2018   • Shortness of breath on exertion    • Stroke (CMS/[facility])     X1 8 YEARS AGO, GENERALIZED UPPER BODY WEAKNESS RESIDUAL PER PT    • Thyroid disorder    • Urinary leakage    • UTI (urinary tract infection)    • Wears glasses    • Yeast dermatitis      Past Surgical History:   Procedure Laterality Date   • ARTERIOGRAM N/A 2/12/2018    Procedure: Renal Arteriogram;  Surgeon: Lito Flores MD;  Location:  [facility] CATH INVASIVE LOCATION;  Service:    • BREAST BIOPSY Right 1991   • CARDIAC CATHETERIZATION N/A 2/12/2018    Procedure: Right Heart Cath;  Surgeon: Lito Flores MD;  Location:  [facility] CATH INVASIVE LOCATION;  Service:    • CAROTID STENT      Transcath    • CAROTID STENT Left    • CHOLECYSTECTOMY     • CYSTOSTOMY W/ BLADDER BIOPSY     • DILATATION AND CURETTAGE     • KNEE ARTHROSCOPY Right 3/23/2018    Procedure: ARTHROSCOPY, RIGHT KNEE  INCISION AND DRAINAGE LOWER EXTREMITY WITH SYNOVIAL BIOPSY;  Surgeon: Dilip Giron MD;  Location:  [facility] OR;  Service: Orthopedics   • LAPAROSCOPIC  CHOLECYSTECTOMY  1994    Lap rolando   • OOPHORECTOMY     • PAP SMEAR  05/10/2016   • SUBTOTAL HYSTERECTOMY       General Information     Row Name 05/16/20 1708          PT Evaluation Time/Intention    Document Type  evaluation;discharge evaluation/summary  -     Mode of Treatment  individual therapy;physical therapy  -[facility] Name 05/16/20 1708          General Information    Prior Level of Function  independent:  -     Existing Precautions/Restrictions  fall  -     Barriers to Rehab  none identified  -[facility] Name 05/16/20 1708          Resource/Environmental Concerns    Current Living Arrangements  independent/assisted living facility  -[facility] Name 05/16/20 1708          Cognitive Assessment/Intervention- PT/OT    Orientation Status (Cognition)  oriented x 3  -       User Key  (r) = Recorded By, (t) = Taken By, (c) = Cosigned By    Initials Name Provider Type    Love Kraus, PT Physical Therapist        Mobility     [facility] Name 05/16/20 1709          Bed Mobility Assessment/Treatment    Comment (Bed Mobility)  up in chair  -[facility] Name 05/16/20 1709          Sit-Stand Transfer    Sit-Stand [place] (Transfers)  supervision  -[facility] Name 05/16/20 1709          Gait/Stairs Assessment/Training    [place] Level (Gait)  stand by assist;contact guard  -     Distance in Feet (Gait)  180 ft  -     Deviations/Abnormal Patterns (Gait)  jannie decreased  -       User Key  (r) = Recorded By, (t) = Taken By, (c) = Cosigned By    Initials Name Provider Type    Love Kraus, PT Physical Therapist        Obj/Interventions     Row Name 05/16/20 1710          General ROM    GENERAL ROM COMMENTS  BLE WFL  -[facility] Name 05/16/20 1710          MMT (Manual Muscle Testing)    General MMT Comments  BLE WFL  -[facility] Name 05/16/20 1710          Static Sitting Balance    Level of [place] (Unsupported Sitting, Static Balance)  independent  -     Row Name  05/16/20 1710          Dynamic Sitting Balance    Level of [place], Reaches Outside Midline (Sitting, Dynamic Balance)  independent  -[facility] Name 05/16/20 1710          Static Standing Balance    Level of [place] (Supported Standing, Static Balance)  supervision  -       User Key  (r) = Recorded By, (t) = Taken By, (c) = Cosigned By    Initials Name Provider Type    Love Kraus, PT Physical Therapist        Goals/Plan    No documentation.       Clinical Impression     [facility] Name 05/16/20 1711          Pain Assessment    Additional Documentation  Pain Scale: Numbers Pre/Post-Treatment (Group)  -KH     Row Name 05/16/20 1711          Pain Scale: Numbers Pre/Post-Treatment    Pain Scale: Numbers, Pretreatment  0/10 - no pain  -     Pain Scale: Numbers, Post-Treatment  0/10 - no pain  -[facility] Name 05/16/20 1711          Plan of Care Review    Outcome Summary  Pt admitted from [facility]. Pt has been r/o for CVA. Pt reports she ambulates independently at baseline. Pt presents with BLE strength WFL and ambualted with SBA/CGA with no AD. No ataxia and no LOB noted during eval. Pt appears to be functioning at baseline level and id safe to d/c home. PT will sign off.  -[facility] Name 05/16/20 1711          Physical Therapy Clinical Impression    Patient/Family Goals Statement (PT Clinical Impression)  return home to [facility]     Criteria for Skilled Interventions Met (PT Clinical Impression)  no;current level of function same as previous level of function  -[facility] Name 05/16/20 1711          Positioning and Restraints    Pre-Treatment Position  sitting in chair/recliner no alarm  -     Post Treatment Position  chair  -       User Key  (r) = Recorded By, (t) = Taken By, (c) = Cosigned By    Initials Name Provider Type    Love Kraus, PT Physical Therapist        Outcome Measures     Row Name 05/16/20 1713          How much help from another person do you  currently need...    Turning from your back to your side while in flat bed without using bedrails?  4  -KH     Moving from lying on back to sitting on the side of a flat bed without bedrails?  4  -KH     Moving to and from a bed to a chair (including a wheelchair)?  3  -KH     Standing up from a chair using your arms (e.g., wheelchair, bedside chair)?  3  -KH     Climbing 3-5 steps with a railing?  3  -KH     To walk in hospital room?  3  -KH     AM-PAC 6 Clicks Score (PT)  20  -     Row Name 05/16/20 1713          Functional Assessment    Outcome Measure Options  AM-PAC 6 Clicks Basic Mobility (PT)  -       User Key  (r) = Recorded By, (t) = Taken By, (c) = Cosigned By    Initials Name Provider Type    Love Kraus PT Physical Therapist          PT Recommendation and Plan     Outcome Summary: Pt admitted from [facility]. Pt has been r/o for CVA. Pt reports she ambulates independently at baseline. Pt presents with BLE strength WFL and ambualted with SBA/CGA with no AD. No ataxia and no LOB noted during eval. Pt appears to be functioning at baseline level and id safe to d/c home. PT will sign off.     Time Calculation:   PT Charges     Row Name 05/16/20 1714             Time Calculation    Start Time  1521  -      Stop Time  1545  -      Time Calculation (min)  24 min  -         Time Calculation- PT    Total Timed Code Minutes- PT  8 minute(s)  -        User Key  (r) = Recorded By, (t) = Taken By, (c) = Cosigned By    Initials Name Provider Type    Love Kraus PT Physical Therapist        Therapy Charges for Today     Code Description Service Date Service Provider Modifiers Qty    00080577030 HC PT THER PROC EA 15 MIN 5/16/2020 Love Mcgraw, PT GP 1    30645645144 HC PT EVAL LOW COMPLEXITY 1 5/16/2020 Love Mcgraw, PT GP 1          PT G-Codes  Outcome Measure Options: AM-PAC 6 Clicks Basic Mobility (PT)  AM-PAC 6 Clicks Score (PT): 20          Love Mcgraw, PT  5/16/2020

## 2020-05-16 NOTE — PLAN OF CARE
New admit for slurred speech and ataxia, NIH 3, ON 2L O2, at HS, assist x1, on IV fluids, BP's elevated, c/o of itching skin rash, no visible signs of rash, cream ordered, patient very fidgety and up most of shift, prefers the chair to sleep, will CTM

## 2020-05-16 NOTE — NURSING NOTE
Post Residual Void:    After void, patient bladder scanned with 14 ml remaining.  Aarti Odom, RN BSN.

## 2020-05-17 LAB
ANION GAP SERPL CALCULATED.3IONS-SCNC: 12 MMOL/L (ref 5–15)
BUN BLD-MCNC: 17 MG/DL (ref 8–23)
BUN/CREAT SERPL: 20.5 (ref 7–25)
CALCIUM SPEC-SCNC: 9 MG/DL (ref 8.6–10.5)
CHLORIDE SERPL-SCNC: 101 MMOL/L (ref 98–107)
CO2 SERPL-SCNC: 26 MMOL/L (ref 22–29)
CREAT BLD-MCNC: 0.83 MG/DL (ref 0.57–1)
GFR SERPL CREATININE-BSD FRML MDRD: 68 ML/MIN/1.73
GLUCOSE BLD-MCNC: 206 MG/DL (ref 65–99)
GLUCOSE BLDC GLUCOMTR-MCNC: 125 MG/DL (ref 70–130)
GLUCOSE BLDC GLUCOMTR-MCNC: 136 MG/DL (ref 70–130)
GLUCOSE BLDC GLUCOMTR-MCNC: 181 MG/DL (ref 70–130)
GLUCOSE BLDC GLUCOMTR-MCNC: 212 MG/DL (ref 70–130)
POTASSIUM BLD-SCNC: 4.2 MMOL/L (ref 3.5–5.2)
SODIUM BLD-SCNC: 139 MMOL/L (ref 136–145)

## 2020-05-17 PROCEDURE — 94799 UNLISTED PULMONARY SVC/PX: CPT

## 2020-05-17 PROCEDURE — 63710000001 INSULIN LISPRO (HUMAN) PER 5 UNITS: Performed by: NURSE PRACTITIONER

## 2020-05-17 PROCEDURE — 80048 BASIC METABOLIC PNL TOTAL CA: CPT | Performed by: HOSPITALIST

## 2020-05-17 PROCEDURE — 63710000001 INSULIN GLARGINE PER 5 UNITS: Performed by: HOSPITALIST

## 2020-05-17 PROCEDURE — 25010000002 ONDANSETRON PER 1 MG: Performed by: NURSE PRACTITIONER

## 2020-05-17 PROCEDURE — 82962 GLUCOSE BLOOD TEST: CPT

## 2020-05-17 RX ORDER — CLONIDINE HYDROCHLORIDE 0.1 MG/1
0.1 TABLET ORAL ONCE
Status: COMPLETED | OUTPATIENT
Start: 2020-05-17 | End: 2020-05-17

## 2020-05-17 RX ADMIN — INSULIN GLARGINE 20 UNITS: 100 INJECTION, SOLUTION SUBCUTANEOUS at 20:07

## 2020-05-17 RX ADMIN — SODIUM CHLORIDE, PRESERVATIVE FREE 10 ML: 5 INJECTION INTRAVENOUS at 20:10

## 2020-05-17 RX ADMIN — LEVETIRACETAM 500 MG: 500 TABLET, FILM COATED ORAL at 20:10

## 2020-05-17 RX ADMIN — HYDRALAZINE HYDROCHLORIDE 10 MG: 10 TABLET, FILM COATED ORAL at 09:42

## 2020-05-17 RX ADMIN — HYDROXYZINE HYDROCHLORIDE 50 MG: 50 TABLET ORAL at 00:44

## 2020-05-17 RX ADMIN — INSULIN LISPRO 3 UNITS: 100 INJECTION, SOLUTION INTRAVENOUS; SUBCUTANEOUS at 16:49

## 2020-05-17 RX ADMIN — ASPIRIN 81 MG: 81 TABLET, COATED ORAL at 09:42

## 2020-05-17 RX ADMIN — METOPROLOL TARTRATE 50 MG: 50 TABLET, FILM COATED ORAL at 09:56

## 2020-05-17 RX ADMIN — HYDRALAZINE HYDROCHLORIDE 10 MG: 10 TABLET, FILM COATED ORAL at 20:10

## 2020-05-17 RX ADMIN — HYDROXYZINE HYDROCHLORIDE 50 MG: 50 TABLET ORAL at 19:50

## 2020-05-17 RX ADMIN — HYDROCORTISONE 1 APPLICATION: 1 CREAM TOPICAL at 20:20

## 2020-05-17 RX ADMIN — ACETAMINOPHEN 650 MG: 325 TABLET, FILM COATED ORAL at 20:20

## 2020-05-17 RX ADMIN — SODIUM CHLORIDE 75 ML/HR: 9 INJECTION, SOLUTION INTRAVENOUS at 09:41

## 2020-05-17 RX ADMIN — BUDESONIDE AND FORMOTEROL FUMARATE DIHYDRATE 2 PUFF: 160; 4.5 AEROSOL RESPIRATORY (INHALATION) at 19:40

## 2020-05-17 RX ADMIN — ATORVASTATIN CALCIUM 80 MG: 80 TABLET, FILM COATED ORAL at 09:56

## 2020-05-17 RX ADMIN — BUDESONIDE AND FORMOTEROL FUMARATE DIHYDRATE 2 PUFF: 160; 4.5 AEROSOL RESPIRATORY (INHALATION) at 08:11

## 2020-05-17 RX ADMIN — PAROXETINE HYDROCHLORIDE 10 MG: 10 TABLET, FILM COATED ORAL at 06:13

## 2020-05-17 RX ADMIN — ROPINIROLE HYDROCHLORIDE 0.25 MG: 0.25 TABLET, FILM COATED ORAL at 20:10

## 2020-05-17 RX ADMIN — GABAPENTIN 400 MG: 400 CAPSULE ORAL at 20:10

## 2020-05-17 RX ADMIN — HYDRALAZINE HYDROCHLORIDE 10 MG: 10 TABLET, FILM COATED ORAL at 16:20

## 2020-05-17 RX ADMIN — CLONIDINE HYDROCHLORIDE 0.1 MG: 0.1 TABLET ORAL at 02:28

## 2020-05-17 RX ADMIN — LEVETIRACETAM 500 MG: 500 TABLET, FILM COATED ORAL at 09:42

## 2020-05-17 RX ADMIN — CLOPIDOGREL 75 MG: 75 TABLET, FILM COATED ORAL at 09:42

## 2020-05-17 RX ADMIN — ONDANSETRON 4 MG: 2 INJECTION INTRAMUSCULAR; INTRAVENOUS at 13:57

## 2020-05-17 RX ADMIN — METOPROLOL TARTRATE 50 MG: 50 TABLET, FILM COATED ORAL at 20:10

## 2020-05-17 RX ADMIN — IPRATROPIUM BROMIDE AND ALBUTEROL SULFATE 3 ML: .5; 3 SOLUTION RESPIRATORY (INHALATION) at 16:09

## 2020-05-17 RX ADMIN — SODIUM CHLORIDE, PRESERVATIVE FREE 10 ML: 5 INJECTION INTRAVENOUS at 09:43

## 2020-05-17 RX ADMIN — HYDROCORTISONE: 1 CREAM TOPICAL at 09:41

## 2020-05-17 RX ADMIN — IPRATROPIUM BROMIDE AND ALBUTEROL SULFATE 3 ML: .5; 3 SOLUTION RESPIRATORY (INHALATION) at 12:16

## 2020-05-17 RX ADMIN — GABAPENTIN 400 MG: 400 CAPSULE ORAL at 09:56

## 2020-05-17 NOTE — NURSING NOTE
Acute Inpt Rehab Note    Received referral through stroke order set.  Chart reviewed.  Imaging negative.  Speech therapy and physical therapy have signed off.  With two of three therapies already signing off, an acute inpt stay will not be warranted at this time.  We will sign off.  Please re-consult should patient condition change.    Thank you,  Maddison Rivera RN   Rehab Admission RN

## 2020-05-17 NOTE — PLAN OF CARE
Patient's SBP elevated at rest, responded to scheduled medications, a one time dose of clonidine ordered, patient requires 2L O2, up to chair, patients complains of uncontrolled itching in legs and arms, bed bath given, will CTM

## 2020-05-17 NOTE — PROGRESS NOTES
Name: Vidhi Lopez ADMIT: 5/15/2020   : 1950  PCP: Abiodun Vila MD    MRN: [id number] LOS: 2 days   AGE/SEX: 70 y.o. female  ROOM: [facility]     Subjective   Subjective    Still feels weak when she gets up to walk to the bathroom.  No dizziness.  No shortness of breath.  No chest pain at all.  Still has significant itching.  She does not want to go home and is concerned she will be right back if she does.  Nursing has spoken to patient's sister who reports concerns over the amount of medications the patient is taking and that she sometimes hallucinates.       Objective   Objective   Vital Signs  Temp:  [98.1 °F (36.7 °C)-98.6 °F (37 °C)] 98.1 °F (36.7 °C)  Heart Rate:  [50-65] 58  Resp:  [18] 18  BP: (123-221)/() 133/59  SpO2:  [85 %-100 %] 96 %  on  Flow (L/min):  [1-3] 3;   Device (Oxygen Therapy): nasal cannula  Body mass index is 30.69 kg/m².  Physical Exam   Constitutional: She is oriented to person, place, and time. She appears well-developed and well-nourished. No distress.   HENT:   Head: Normocephalic and atraumatic.   Eyes: EOM are normal.   Neck: Neck supple. No JVD present.   Cardiovascular: Normal rate, regular rhythm, normal heart sounds and intact distal pulses.   No murmur heard.  Pulmonary/Chest: Effort normal and breath sounds normal. No respiratory distress.   Wearing oxygen 2 L NC   Abdominal: Soft. Bowel sounds are normal. She exhibits no distension. There is no tenderness.   Genitourinary:   Genitourinary Comments: No difficulty urinating but reports that her urine is dark and concentrated.   Musculoskeletal: She exhibits no edema, tenderness or deformity.   Ambulating to the bathroom without assistance, per nursing.   Neurological: She is alert and oriented to person, place, and time.   Skin: Skin is warm and dry. She is not diaphoretic.   Psychiatric: She has a normal mood and affect.       Results Review:       I reviewed the patient's new clinical  results.  Results from last 7 days   Lab Units 05/16/20  0454 05/15/20  1606   WBC 10*3/mm3 7.29 6.79   HEMOGLOBIN g/dL 10.5* 11.0*   PLATELETS 10*3/mm3 200 191     Results from last 7 days   Lab Units 05/16/20  0454 05/15/20  1606   SODIUM mmol/L 141 143   POTASSIUM mmol/L 3.8 3.5   CHLORIDE mmol/L 101 101   CO2 mmol/L 30.9* 28.6   BUN mg/dL 22 25*   CREATININE mg/dL 1.30* 2.03*   GLUCOSE mg/dL 125* 115*   Estimated Creatinine Clearance: 43.6 mL/min (A) (by C-G formula based on SCr of 1.3 mg/dL (H)).  Results from last 7 days   Lab Units 05/15/20  1606   ALBUMIN g/dL 4.00   BILIRUBIN mg/dL 0.2   ALK PHOS U/L 107   AST (SGOT) U/L 12   ALT (SGPT) U/L 13     Results from last 7 days   Lab Units 05/16/20  0454 05/15/20  1606   CALCIUM mg/dL 8.7 9.2   ALBUMIN g/dL  --  4.00   MAGNESIUM mg/dL  --  1.9       Hemoglobin A1C   Date/Time Value Ref Range Status   05/16/2020 0454 7.20 (H) 4.80 - 5.60 % Final     Glucose   Date/Time Value Ref Range Status   05/17/2020 0556 136 (H) 70 - 130 mg/dL Final   05/16/2020 2100 158 (H) 70 - 130 mg/dL Final   05/16/2020 1608 122 70 - 130 mg/dL Final   05/16/2020 1033 209 (H) 70 - 130 mg/dL Final   05/16/2020 0519 121 70 - 130 mg/dL Final   05/15/2020 2108 112 70 - 130 mg/dL Final         aspirin 81 mg Oral Daily   atorvastatin 80 mg Oral Daily   budesonide-formoterol 2 puff Inhalation BID   clopidogrel 75 mg Oral Daily   gabapentin 400 mg Oral Q12H   hydrALAZINE 10 mg Oral TID   hydrocortisone  Topical Q12H   insulin glargine 20 Units Subcutaneous Nightly   insulin lispro 0-7 Units Subcutaneous TID AC   ipratropium-albuterol 3 mL Nebulization 4x Daily - RT   levETIRAcetam 500 mg Oral BID   metoprolol tartrate 50 mg Oral Q12H   PARoxetine 10 mg Oral QAM   rOPINIRole 0.25 mg Oral Nightly   sodium chloride 10 mL Intravenous Q12H       sodium chloride 75 mL/hr Last Rate: Stopped (05/17/20 0609)   Diet Regular; Consistent Carbohydrate       Assessment/Plan     Active Hospital Problems     Diagnosis  POA   • **Ataxia [R27.0]  Yes   • Dysarthria [R47.1]  Unknown   • Lethargy [R53.83]  Unknown   • Polypharmacy [Z79.899]  Not Applicable   • Seizure disorder (CMS/HCC) [G40.909]  Yes   • GASPER (acute kidney injury) (CMS/[facility]) [N17.9]  Yes   • URIEL (obstructive sleep apnea) [G47.33]  No   • Dyslipidemia [E78.5]  Yes   • Hypertension [I10]  Yes   • Anxiety (Chronic benzos) [F41.9]  Yes   • GERD (gastroesophageal reflux disease) [K21.9]  Yes   • Diabetes mellitus, type 2 (CMS/[facility]) [E11.9]  Yes   • Fibromyalgia [M79.7]  Yes   • COPD (chronic obstructive pulmonary disease) (CMS/[facility]) [J44.9]  Yes   • Migraines [G43.909]  Yes   • History of acute myocardial infarction [I25.2]  Not Applicable   • History of stroke [Z86.73]  Not Applicable   • CAD (coronary artery disease) [I25.10]  Yes      Resolved Hospital Problems   No resolved problems to display.       70 y.o. female admitted with Ataxia.    Assessment/Plan:     Ataxia/dysarthria/lethargy  -neurology has seen  -Mri no acute  -CT of the head was negative  -Polypharmacy, will hold sedating meds for now  Per patient's family she has had hallucinations while taking medications, unclear what medications they are talking about.  Oriented and appropriate at this time.  Speech is clear.  Denies hallucinations.  She has been ambulating to the bathroom with a steady gait, however feels that she is weak.  We will consult physical therapy.     Acute kidney injury  Creatinine improving, 1.3 today down from 2.03.  Her IV fluids were stopped last night due to systolic blood pressure in the 200s and requiring PRN meds.  Blood pressure stable at this time, will restart  fluids.     Seizure disorder  Keppra and Neurontin doses reduced    Hypertension/dyslipidemia/CAD  Required as needed doses of hydralazine last night along with one-time dose of clonidine 0.1 mg.  Her fluids were stopped due to elevated blood pressure.  Nursing will check blood pressure again about 1130.  She does  use hydralazine as needed at home, may need to consider adjusting scheduled medications if she is using this as needed often.    Type 2 diabetes  Glucose is acceptable/stable  -Accu-Cheks AC with correctional dose insulin  -We will continue long-acting insulin  -A1c 7.2     COPD/obstructive sleep apnea/ chronic resp failure  -Continuous O2 monitoring  -O2 to keep sats above 90, wears O2 at home as needed.   -triology at night     We will monitor blood pressure today, check kidney function in the morning and ask PT to see her today.  If blood pressure stable and creatinine continues to improve, she should be discharged tomorrow morning.    · SCDs for DVT prophylaxis.  · Full code.  · Discussed with patient, nursing staff and Dr. Grajeda..  · Anticipate discharge home tomorrow.      JANI Ghotra  [facility]  05/17/20  08:49

## 2020-05-17 NOTE — PLAN OF CARE
Problem: Fall Risk (Adult)  Goal: Absence of Fall  Outcome: Ongoing (interventions implemented as appropriate)  Flowsheets (Taken 5/17/2020 1717)  Absence of Fall: making progress toward outcome     Problem: Patient Care Overview  Goal: Plan of Care Review  Outcome: Ongoing (interventions implemented as appropriate)  Flowsheets (Taken 5/17/2020 1717)  Plan of Care Reviewed With: patient  Note:   Patient alert and oriented.  Up to chair.  Report of itching and hydrocortisone cream applied.  VSS.  Will continue to monitor.     Problem: Pain, Acute (Adult)  Goal: Acceptable Pain Control/Comfort Level  Outcome: Ongoing (interventions implemented as appropriate)  Flowsheets (Taken 5/17/2020 1717)  Acceptable Pain Control/Comfort Level: making progress toward outcome     Problem: Stroke (Ischemic) (Adult)  Goal: Signs and Symptoms of Listed Potential Problems Will be Absent, Minimized or Managed (Stroke)  Outcome: Ongoing (interventions implemented as appropriate)

## 2020-05-18 LAB
ANION GAP SERPL CALCULATED.3IONS-SCNC: 9.9 MMOL/L (ref 5–15)
BUN BLD-MCNC: 15 MG/DL (ref 8–23)
BUN/CREAT SERPL: 19.7 (ref 7–25)
CALCIUM SPEC-SCNC: 9.2 MG/DL (ref 8.6–10.5)
CHLORIDE SERPL-SCNC: 101 MMOL/L (ref 98–107)
CO2 SERPL-SCNC: 30.1 MMOL/L (ref 22–29)
CREAT BLD-MCNC: 0.76 MG/DL (ref 0.57–1)
DEPRECATED RDW RBC AUTO: 49.5 FL (ref 37–54)
ERYTHROCYTE [DISTWIDTH] IN BLOOD BY AUTOMATED COUNT: 17.9 % (ref 12.3–15.4)
GFR SERPL CREATININE-BSD FRML MDRD: 75 ML/MIN/1.73
GLUCOSE BLD-MCNC: 136 MG/DL (ref 65–99)
GLUCOSE BLDC GLUCOMTR-MCNC: 129 MG/DL (ref 70–130)
GLUCOSE BLDC GLUCOMTR-MCNC: 146 MG/DL (ref 70–130)
GLUCOSE BLDC GLUCOMTR-MCNC: 160 MG/DL (ref 70–130)
GLUCOSE BLDC GLUCOMTR-MCNC: 231 MG/DL (ref 70–130)
HCT VFR BLD AUTO: 32.6 % (ref 34–46.6)
HGB BLD-MCNC: 10.5 G/DL (ref 12–15.9)
MCH RBC QN AUTO: 24.8 PG (ref 26.6–33)
MCHC RBC AUTO-ENTMCNC: 32.2 G/DL (ref 31.5–35.7)
MCV RBC AUTO: 76.9 FL (ref 79–97)
PLATELET # BLD AUTO: 162 10*3/MM3 (ref 140–450)
PMV BLD AUTO: 9 FL (ref 6–12)
POTASSIUM BLD-SCNC: 4.1 MMOL/L (ref 3.5–5.2)
RBC # BLD AUTO: 4.24 10*6/MM3 (ref 3.77–5.28)
SODIUM BLD-SCNC: 141 MMOL/L (ref 136–145)
WBC NRBC COR # BLD: 6.57 10*3/MM3 (ref 3.4–10.8)

## 2020-05-18 PROCEDURE — 97165 OT EVAL LOW COMPLEX 30 MIN: CPT

## 2020-05-18 PROCEDURE — 94799 UNLISTED PULMONARY SVC/PX: CPT

## 2020-05-18 PROCEDURE — 85027 COMPLETE CBC AUTOMATED: CPT | Performed by: NURSE PRACTITIONER

## 2020-05-18 PROCEDURE — 63710000001 INSULIN GLARGINE PER 5 UNITS: Performed by: HOSPITALIST

## 2020-05-18 PROCEDURE — 63710000001 INSULIN LISPRO (HUMAN) PER 5 UNITS: Performed by: NURSE PRACTITIONER

## 2020-05-18 PROCEDURE — 82962 GLUCOSE BLOOD TEST: CPT

## 2020-05-18 PROCEDURE — 80048 BASIC METABOLIC PNL TOTAL CA: CPT | Performed by: NURSE PRACTITIONER

## 2020-05-18 RX ORDER — CLONIDINE HYDROCHLORIDE 0.1 MG/1
0.1 TABLET ORAL EVERY 12 HOURS SCHEDULED
Status: DISCONTINUED | OUTPATIENT
Start: 2020-05-18 | End: 2020-05-18

## 2020-05-18 RX ORDER — HYDRALAZINE HYDROCHLORIDE 25 MG/1
25 TABLET, FILM COATED ORAL EVERY 8 HOURS SCHEDULED
Status: DISCONTINUED | OUTPATIENT
Start: 2020-05-18 | End: 2020-05-20

## 2020-05-18 RX ADMIN — ACETAMINOPHEN 650 MG: 325 TABLET, FILM COATED ORAL at 15:55

## 2020-05-18 RX ADMIN — ROPINIROLE HYDROCHLORIDE 0.25 MG: 0.25 TABLET, FILM COATED ORAL at 21:17

## 2020-05-18 RX ADMIN — LEVETIRACETAM 500 MG: 500 TABLET, FILM COATED ORAL at 09:02

## 2020-05-18 RX ADMIN — METOPROLOL TARTRATE 50 MG: 50 TABLET, FILM COATED ORAL at 09:03

## 2020-05-18 RX ADMIN — CLOPIDOGREL 75 MG: 75 TABLET, FILM COATED ORAL at 09:02

## 2020-05-18 RX ADMIN — IPRATROPIUM BROMIDE AND ALBUTEROL SULFATE 3 ML: .5; 3 SOLUTION RESPIRATORY (INHALATION) at 11:40

## 2020-05-18 RX ADMIN — IPRATROPIUM BROMIDE AND ALBUTEROL SULFATE 3 ML: .5; 3 SOLUTION RESPIRATORY (INHALATION) at 20:52

## 2020-05-18 RX ADMIN — SODIUM CHLORIDE, PRESERVATIVE FREE 10 ML: 5 INJECTION INTRAVENOUS at 21:00

## 2020-05-18 RX ADMIN — HYDRALAZINE HYDROCHLORIDE 25 MG: 25 TABLET ORAL at 21:16

## 2020-05-18 RX ADMIN — IPRATROPIUM BROMIDE AND ALBUTEROL SULFATE 3 ML: .5; 3 SOLUTION RESPIRATORY (INHALATION) at 08:14

## 2020-05-18 RX ADMIN — CLONIDINE HYDROCHLORIDE 0.1 MG: 0.1 TABLET ORAL at 00:42

## 2020-05-18 RX ADMIN — SODIUM CHLORIDE, PRESERVATIVE FREE 10 ML: 5 INJECTION INTRAVENOUS at 09:02

## 2020-05-18 RX ADMIN — HYDROCORTISONE: 1 CREAM TOPICAL at 09:02

## 2020-05-18 RX ADMIN — GABAPENTIN 400 MG: 400 CAPSULE ORAL at 09:02

## 2020-05-18 RX ADMIN — ASPIRIN 81 MG: 81 TABLET, COATED ORAL at 09:03

## 2020-05-18 RX ADMIN — HYDRALAZINE HYDROCHLORIDE 10 MG: 10 TABLET, FILM COATED ORAL at 09:02

## 2020-05-18 RX ADMIN — PAROXETINE HYDROCHLORIDE 10 MG: 10 TABLET, FILM COATED ORAL at 06:53

## 2020-05-18 RX ADMIN — IPRATROPIUM BROMIDE AND ALBUTEROL SULFATE 3 ML: .5; 3 SOLUTION RESPIRATORY (INHALATION) at 16:13

## 2020-05-18 RX ADMIN — BUDESONIDE AND FORMOTEROL FUMARATE DIHYDRATE 2 PUFF: 160; 4.5 AEROSOL RESPIRATORY (INHALATION) at 08:15

## 2020-05-18 RX ADMIN — CLONIDINE HYDROCHLORIDE 0.1 MG: 0.1 TABLET ORAL at 09:02

## 2020-05-18 RX ADMIN — INSULIN GLARGINE 20 UNITS: 100 INJECTION, SOLUTION SUBCUTANEOUS at 21:17

## 2020-05-18 RX ADMIN — GABAPENTIN 400 MG: 400 CAPSULE ORAL at 21:16

## 2020-05-18 RX ADMIN — HYDRALAZINE HYDROCHLORIDE 25 MG: 25 TABLET ORAL at 15:55

## 2020-05-18 RX ADMIN — HYDROXYZINE HYDROCHLORIDE 50 MG: 50 TABLET ORAL at 21:24

## 2020-05-18 RX ADMIN — HYDROCORTISONE: 1 CREAM TOPICAL at 21:25

## 2020-05-18 RX ADMIN — LEVETIRACETAM 500 MG: 500 TABLET, FILM COATED ORAL at 21:17

## 2020-05-18 RX ADMIN — INSULIN LISPRO 2 UNITS: 100 INJECTION, SOLUTION INTRAVENOUS; SUBCUTANEOUS at 11:34

## 2020-05-18 RX ADMIN — ATORVASTATIN CALCIUM 80 MG: 80 TABLET, FILM COATED ORAL at 09:03

## 2020-05-18 RX ADMIN — METOPROLOL TARTRATE 50 MG: 50 TABLET, FILM COATED ORAL at 21:17

## 2020-05-18 NOTE — PLAN OF CARE
Problem: Patient Care Overview  Goal: Plan of Care Review  Flowsheets (Taken 5/18/2020 1417)  Plan of Care Reviewed With: patient  Outcome Summary: Pt presents to OT alert and agreeable to OT.  Pt with min decreased strength but using BUE;s for functional tasks.  Pt states feels baseline function. Will not follow for acute OT. Pt agrees. Pt wearing face mask during therapy encounter. Therapist used apporopriate personal protective equipment including mask and gloves. Hand hygiene performed before and after session.

## 2020-05-18 NOTE — CONSULTS
Referring Provider: Lenny Grajeda MD  Reason for Consultation: Evaluation of patient with labile hypertension    Subjective     Chief complaint   Chief Complaint   Patient presents with   • Dizziness       History of present illness:    This is a 70-year-old  female was admitted on 5/15/2020, she had dizziness also she had spike in her blood pressure also has been having for the past few months hallucination.  Apparently blood pressure difficult to control, she has severe obstructive sleep apnea using the trilogy system.  She had a prior episode of acute kidney injury but her renal function is excellent at this time.  Past medical history includes hypertension, dyslipidemia, anxiety, prior CVA, coronary artery disease prior MI, type 2 diabetes mellitus, seizure disorder, migraine, COPD, chronic pain on gabapentin.  Fibromyalgia, dyslipidemia, carotid disease with carotid stents, she has history of interstitial cystitis  At present the patient is feeling better, blood pressures been fluctuating, no lightheadedness, she is not adherent to no added salt diet.  Denies the use of nonsteroidal anti-inflammatory drugs.  Denies any chest pain, no shortness of air, no nausea or vomiting, no abdominal pain, no dysuria or gross hematuria.  She had recurrent lower extremity edema but not at present.  He is currently on hydralazine and metoprolol only and not on any diuretics.  Past Medical History:   Diagnosis Date   • Allergic rhinitis    • Asthma with COPD (CMS/[facility])     Asthma/COPD   • Bilateral ovarian cysts    • Coronary artery disease    • Depression with anxiety     Depression/Anxiety   • Diabetes (CMS/[facility])     pre   • Endometriosis     Dr. Jin; estradiol    • ESR raised 3/20/2018   • Fatigue    • Fibromyalgia    • Headache     Headaches   • High cholesterol    • History of gallstones    • History of myocardial infarction    • Hypertension    • Influenza B     DX'D 2/6/2018, TREATED WITH TAMIFLU. LAST DOSE  2/11/2018 AM.  PATIENT STATES DR FLORES IS AWARE. DENIES FEVERS OR CHILLS.   • Interstitial cystitis    • On home oxygen therapy     2 L NC   • URIEL on CPAP    • PONV (postoperative nausea and vomiting)    • Seizure disorder, nonconvulsive, with status epilepticus (CMS/HCC) 3/20/2018   • Septic joint of right knee joint (CMS/HCC) 3/21/2018   • Shortness of breath on exertion    • Stroke (CMS/[facility])     X1 8 YEARS AGO, GENERALIZED UPPER BODY WEAKNESS RESIDUAL PER PT    • Thyroid disorder    • Urinary leakage    • UTI (urinary tract infection)    • Wears glasses    • Yeast dermatitis      Past Surgical History:   Procedure Laterality Date   • ARTERIOGRAM N/A 2/12/2018    Procedure: Renal Arteriogram;  Surgeon: Lito Flores MD;  Location:  [facility] CATH INVASIVE LOCATION;  Service:    • BREAST BIOPSY Right 1991   • CARDIAC CATHETERIZATION N/A 2/12/2018    Procedure: Right Heart Cath;  Surgeon: Lito Flores MD;  Location:  [facility] CATH INVASIVE LOCATION;  Service:    • CAROTID STENT      Transcath    • CAROTID STENT Left    • CHOLECYSTECTOMY     • CYSTOSTOMY W/ BLADDER BIOPSY     • DILATATION AND CURETTAGE     • KNEE ARTHROSCOPY Right 3/23/2018    Procedure: ARTHROSCOPY, RIGHT KNEE  INCISION AND DRAINAGE LOWER EXTREMITY WITH SYNOVIAL BIOPSY;  Surgeon: Dilip Giron MD;  Location:  [facility] OR;  Service: Orthopedics   • LAPAROSCOPIC CHOLECYSTECTOMY  1994    Lap rolando   • OOPHORECTOMY     • PAP SMEAR  05/10/2016   • SUBTOTAL HYSTERECTOMY       Family History   Problem Relation Age of Onset   • Cancer Other    • Diabetes Other    • Heart attack Other    • Hyperlipidemia Other    • Hypertension Other    • Osteoporosis Other    • Stroke Other    • Breast cancer Mother    • Osteoporosis Mother    • Colon cancer Father      She has very strong family history of hypertension  Social History     Tobacco Use   • Smoking status: Current Every Day Smoker     Packs/day: 0.25     Years: 40.00     Pack years: 10.00     Types: Cigarettes    • Smokeless tobacco: Never Used   • Tobacco comment: in process of quiting--AVERAGE 1 PPD, DOWN TO 6 CIG PER DAY X2 WEEKS    Substance Use Topics   • Alcohol use: Yes     Alcohol/week: 1.0 standard drinks     Types: 1 Glasses of wine per week     Comment: occasional   • Drug use: No     Medications Prior to Admission   Medication Sig Dispense Refill Last Dose   • acetaminophen (TYLENOL) 325 MG tablet Take 2 tablets by mouth Every 4 (Four) Hours As Needed for Mild Pain .   Past Week at Unknown time   • albuterol sulfate  (90 Base) MCG/ACT inhaler Inhale 2 puffs Every 4 (Four) Hours As Needed for Wheezing. 18 g 5 5/14/2020 at Unknown time   • aspirin 81 MG EC tablet Take 81 mg by mouth Daily.   5/14/2020 at Unknown time   • atorvastatin (LIPITOR) 80 MG tablet take 1 tablet by mouth once daily 90 tablet 3 5/14/2020 at Unknown time   • budesonide-formoterol (SYMBICORT) 160-4.5 MCG/ACT inhaler Inhale 2 puffs 2 (Two) Times a Day. 1 inhaler 3 5/14/2020 at Unknown time   • bumetanide (BUMEX) 1 MG tablet Take 1 tablet by mouth Daily With Breakfast. 90 tablet 3 5/14/2020 at Unknown time   • clopidogrel (PLAVIX) 75 MG tablet take 1 tablet by mouth once daily 90 tablet 3 5/14/2020 at Unknown time   • clotrimazole-betamethasone (LOTRISONE) 1-0.05 % cream Apply  topically to the appropriate area as directed As Needed.  1 Past Week at Unknown time   • gabapentin (NEURONTIN) 800 MG tablet 800 mg 3 (Three) Times a Day.   5/14/2020 at Unknown time   • hydrALAZINE (APRESOLINE) 10 MG tablet Take 1 tablet by mouth 3 (Three) Times a Day. (Patient taking differently: Take 10 mg by mouth 3 (Three) Times a Day. Patient taking PRN SBP > 160) 90 tablet 3 Past Week at Unknown time   • hydrOXYzine (ATARAX) 50 MG tablet Take 1 tablet by mouth Every 8 (Eight) Hours As Needed for Itching. 90 tablet 2 Past Week at Unknown time   • Insulin aspart (FIASP FLEXTOUCH) 100 UNIT/ML solution pen-injector injection pen   0 5/14/2020 at Unknown time    • ipratropium-albuterol (DUO-NEB) 0.5-2.5 mg/3 ml nebulizer USE 3 ML VIA NEBULIZER FOUR TIMES DAILY 3 mL 0 5/14/2020 at Unknown time   • levETIRAcetam (KEPPRA) 750 MG tablet Take 1 tablet by mouth 2 (Two) Times a Day. 60 tablet 11 5/14/2020 at Unknown time   • metoprolol tartrate (LOPRESSOR) 50 MG tablet Take 1 tablet by mouth Every 12 (Twelve) Hours. 60 tablet 11 5/14/2020 at Unknown time   • nystatin (MYCOSTATIN) 191059 UNIT/GM ointment Apply  topically to the appropriate area as directed 2 (Two) Times a Day. 30 g 0 5/14/2020 at Unknown time   • nystatin (MYCOSTATIN) 505938 UNIT/ML suspension Take 5 mL by mouth 4 (Four) Times a Day. 473 mL 0 5/14/2020 at Unknown time   • ondansetron (ZOFRAN) 8 MG tablet Take 1 tablet by mouth Every 8 (Eight) Hours As Needed for Nausea or Vomiting. 30 tablet 1 Past Week at Unknown time   • PARoxetine (PAXIL) 10 MG tablet Take 1 tablet by mouth Every Morning. 30 tablet 1 5/14/2020 at Unknown time   • Probiotic Product (PROBIOTIC DAILY PO) Take 1 tablet by mouth Daily.   5/14/2020 at Unknown time   • rOPINIRole (REQUIP) 0.25 MG tablet TK 1 T PO Q NIGHT 1 TO 3 H B BED   5/14/2020 at Unknown time   • SYMBICORT 160-4.5 MCG/ACT inhaler Inhale 2 puffs 2 (Two) Times a Day. 10.2 g 6 5/14/2020 at Unknown time   • tiZANidine (ZANAFLEX) 4 MG tablet LAST DOSE TAKEN Wednesday 5/13/2020, PATIENT FEELS IT IS CAUSING HALLUCINATIONS   Taking   • TOUJEO SOLOSTAR 300 UNIT/ML solution pen-injector injection Use 25 Units at night (Patient taking differently: 20 Units. Use 25 Units at night) 1.5 mL 6 5/14/2020 at Unknown time   • celecoxib (CELEBREX) 100 MG capsule Every 12 (Twelve) Hours.   Taking   • nitroglycerin (NITROSTAT) 0.4 MG SL tablet Place 0.4 mg under the tongue Every 5 (Five) Minutes As Needed.  0 Taking     Allergies:  Amlodipine; Hydrocodone; and Reglan [metoclopramide]    Review of Systems  14 points review of system was performed and it was negative other than what noted above in the  "HPI.    Objective     Vital Signs  Temp:  [97.9 °F (36.6 °C)-98.5 °F (36.9 °C)] 97.9 °F (36.6 °C)  Heart Rate:  [48-72] 48  Resp:  [16-20] 16  BP: (155-198)/() 165/92    Flowsheet Rows      First Filed Value   Admission Height  165.1 cm (65\") Documented at 05/15/2020 1504   Admission Weight  85.7 kg (189 lb) Documented at 05/15/2020 1523           I/O this shift:  In: 240 [P.O.:240]  Out: -   No intake/output data recorded.    Intake/Output Summary (Last 24 hours) at 5/18/2020 1227  Last data filed at 5/18/2020 0841  Gross per 24 hour   Intake 240 ml   Output --   Net 240 ml       Physical Exam:  General Appearance: alert, oriented x 3, no acute distress, morbidly obese  Skin: warm and dry  HEENT: pupils round and reactive to light, oral mucosa normal, nonicteric sclera  Neck: supple, no JVD, trachea midline, carotid bruit  Lungs: CTA, unlabored breathing effort  Heart: RRR, normal S1 and S2, no S3, no rub  Abdomen: soft, non-tender,  present bowel sounds to auscultation, I did not appreciate any epigastric bruit at this time  : no palpable bladder,  Extremities: no edema, cyanosis or clubbing  Joints: No significant deformities noted, no crepitation of the knees or the ankles  Lymphatics: No cervical or supraclavicular lymphadenopathy  Neuro: normal speech and mental status      Results Review:  Results for orders placed or performed during the hospital encounter of 05/15/20   Comprehensive Metabolic Panel   Result Value Ref Range    Glucose 115 (H) 65 - 99 mg/dL    BUN 25 (H) 8 - 23 mg/dL    Creatinine 2.03 (H) 0.57 - 1.00 mg/dL    Sodium 143 136 - 145 mmol/L    Potassium 3.5 3.5 - 5.2 mmol/L    Chloride 101 98 - 107 mmol/L    CO2 28.6 22.0 - 29.0 mmol/L    Calcium 9.2 8.6 - 10.5 mg/dL    Total Protein 6.8 6.0 - 8.5 g/dL    Albumin 4.00 3.50 - 5.20 g/dL    ALT (SGPT) 13 1 - 33 U/L    AST (SGOT) 12 1 - 32 U/L    Alkaline Phosphatase 107 39 - 117 U/L    Total Bilirubin 0.2 0.2 - 1.2 mg/dL    eGFR Non African " Amer 24 (L) >60 mL/min/1.73    Globulin 2.8 gm/dL    A/G Ratio 1.4 g/dL    BUN/Creatinine Ratio 12.3 7.0 - 25.0    Anion Gap 13.4 5.0 - 15.0 mmol/L   Magnesium   Result Value Ref Range    Magnesium 1.9 1.6 - 2.4 mg/dL   Protime-INR   Result Value Ref Range    Protime 13.4 11.7 - 14.2 Seconds    INR 1.05 0.90 - 1.10   Urinalysis With Microscopic If Indicated (No Culture) - Urine, Catheter   Result Value Ref Range    Color, UA Yellow Yellow, Straw    Appearance, UA Clear Clear    pH, UA <=5.0 5.0 - 8.0    Specific Gravity, UA 1.020 1.005 - 1.030    Glucose, UA Negative Negative    Ketones, UA Negative Negative    Bilirubin, UA Negative Negative    Blood, UA Negative Negative    Protein, UA Negative Negative    Leuk Esterase, UA Negative Negative    Nitrite, UA Negative Negative    Urobilinogen, UA 0.2 E.U./dL 0.2 - 1.0 E.U./dL   Troponin   Result Value Ref Range    Troponin T <0.010 0.000 - 0.030 ng/mL   CBC Auto Differential   Result Value Ref Range    WBC 6.79 3.40 - 10.80 10*3/mm3    RBC 4.55 3.77 - 5.28 10*6/mm3    Hemoglobin 11.0 (L) 12.0 - 15.9 g/dL    Hematocrit 35.5 34.0 - 46.6 %    MCV 78.0 (L) 79.0 - 97.0 fL    MCH 24.2 (L) 26.6 - 33.0 pg    MCHC 31.0 (L) 31.5 - 35.7 g/dL    RDW 17.8 (H) 12.3 - 15.4 %    RDW-SD 50.2 37.0 - 54.0 fl    MPV 9.0 6.0 - 12.0 fL    Platelets 191 140 - 450 10*3/mm3    Neutrophil % 64.4 42.7 - 76.0 %    Lymphocyte % 25.2 19.6 - 45.3 %    Monocyte % 6.3 5.0 - 12.0 %    Eosinophil % 3.4 0.3 - 6.2 %    Basophil % 0.6 0.0 - 1.5 %    Immature Grans % 0.1 0.0 - 0.5 %    Neutrophils, Absolute 4.37 1.70 - 7.00 10*3/mm3    Lymphocytes, Absolute 1.71 0.70 - 3.10 10*3/mm3    Monocytes, Absolute 0.43 0.10 - 0.90 10*3/mm3    Eosinophils, Absolute 0.23 0.00 - 0.40 10*3/mm3    Basophils, Absolute 0.04 0.00 - 0.20 10*3/mm3    Immature Grans, Absolute 0.01 0.00 - 0.05 10*3/mm3    nRBC 0.0 0.0 - 0.2 /100 WBC   Hemoglobin A1c   Result Value Ref Range    Hemoglobin A1C 7.20 (H) 4.80 - 5.60 %   Lipid  Panel   Result Value Ref Range    Total Cholesterol 157 0 - 200 mg/dL    Triglycerides 379 (H) 0 - 150 mg/dL    HDL Cholesterol 31 (L) 40 - 60 mg/dL    LDL Cholesterol  50 0 - 100 mg/dL    VLDL Cholesterol 75.8 (H) 5 - 40 mg/dL    LDL/HDL Ratio 1.62    Basic Metabolic Panel   Result Value Ref Range    Glucose 125 (H) 65 - 99 mg/dL    BUN 22 8 - 23 mg/dL    Creatinine 1.30 (H) 0.57 - 1.00 mg/dL    Sodium 141 136 - 145 mmol/L    Potassium 3.8 3.5 - 5.2 mmol/L    Chloride 101 98 - 107 mmol/L    CO2 30.9 (H) 22.0 - 29.0 mmol/L    Calcium 8.7 8.6 - 10.5 mg/dL    eGFR Non African Amer 41 (L) >60 mL/min/1.73    BUN/Creatinine Ratio 16.9 7.0 - 25.0    Anion Gap 9.1 5.0 - 15.0 mmol/L   CBC Auto Differential   Result Value Ref Range    WBC 7.29 3.40 - 10.80 10*3/mm3    RBC 4.23 3.77 - 5.28 10*6/mm3    Hemoglobin 10.5 (L) 12.0 - 15.9 g/dL    Hematocrit 32.6 (L) 34.0 - 46.6 %    MCV 77.1 (L) 79.0 - 97.0 fL    MCH 24.8 (L) 26.6 - 33.0 pg    MCHC 32.2 31.5 - 35.7 g/dL    RDW 17.8 (H) 12.3 - 15.4 %    RDW-SD 49.9 37.0 - 54.0 fl    MPV 9.0 6.0 - 12.0 fL    Platelets 200 140 - 450 10*3/mm3    Neutrophil % 65.6 42.7 - 76.0 %    Lymphocyte % 24.1 19.6 - 45.3 %    Monocyte % 5.9 5.0 - 12.0 %    Eosinophil % 3.3 0.3 - 6.2 %    Basophil % 0.8 0.0 - 1.5 %    Immature Grans % 0.3 0.0 - 0.5 %    Neutrophils, Absolute 4.78 1.70 - 7.00 10*3/mm3    Lymphocytes, Absolute 1.76 0.70 - 3.10 10*3/mm3    Monocytes, Absolute 0.43 0.10 - 0.90 10*3/mm3    Eosinophils, Absolute 0.24 0.00 - 0.40 10*3/mm3    Basophils, Absolute 0.06 0.00 - 0.20 10*3/mm3    Immature Grans, Absolute 0.02 0.00 - 0.05 10*3/mm3    nRBC 0.0 0.0 - 0.2 /100 WBC   Basic Metabolic Panel   Result Value Ref Range    Glucose 206 (H) 65 - 99 mg/dL    BUN 17 8 - 23 mg/dL    Creatinine 0.83 0.57 - 1.00 mg/dL    Sodium 139 136 - 145 mmol/L    Potassium 4.2 3.5 - 5.2 mmol/L    Chloride 101 98 - 107 mmol/L    CO2 26.0 22.0 - 29.0 mmol/L    Calcium 9.0 8.6 - 10.5 mg/dL    eGFR Non African  Amer 68 >60 mL/min/1.73    BUN/Creatinine Ratio 20.5 7.0 - 25.0    Anion Gap 12.0 5.0 - 15.0 mmol/L   CBC (No Diff)   Result Value Ref Range    WBC 6.57 3.40 - 10.80 10*3/mm3    RBC 4.24 3.77 - 5.28 10*6/mm3    Hemoglobin 10.5 (L) 12.0 - 15.9 g/dL    Hematocrit 32.6 (L) 34.0 - 46.6 %    MCV 76.9 (L) 79.0 - 97.0 fL    MCH 24.8 (L) 26.6 - 33.0 pg    MCHC 32.2 31.5 - 35.7 g/dL    RDW 17.9 (H) 12.3 - 15.4 %    RDW-SD 49.5 37.0 - 54.0 fl    MPV 9.0 6.0 - 12.0 fL    Platelets 162 140 - 450 10*3/mm3   Basic Metabolic Panel   Result Value Ref Range    Glucose 136 (H) 65 - 99 mg/dL    BUN 15 8 - 23 mg/dL    Creatinine 0.76 0.57 - 1.00 mg/dL    Sodium 141 136 - 145 mmol/L    Potassium 4.1 3.5 - 5.2 mmol/L    Chloride 101 98 - 107 mmol/L    CO2 30.1 (H) 22.0 - 29.0 mmol/L    Calcium 9.2 8.6 - 10.5 mg/dL    eGFR Non African Amer 75 >60 mL/min/1.73    BUN/Creatinine Ratio 19.7 7.0 - 25.0    Anion Gap 9.9 5.0 - 15.0 mmol/L   POC Glucose Once   Result Value Ref Range    Glucose 112 70 - 130 mg/dL   POC Glucose Once   Result Value Ref Range    Glucose 121 70 - 130 mg/dL   POC Glucose Once   Result Value Ref Range    Glucose 209 (H) 70 - 130 mg/dL   POC Glucose Once   Result Value Ref Range    Glucose 122 70 - 130 mg/dL   POC Glucose Once   Result Value Ref Range    Glucose 158 (H) 70 - 130 mg/dL   POC Glucose Once   Result Value Ref Range    Glucose 136 (H) 70 - 130 mg/dL   POC Glucose Once   Result Value Ref Range    Glucose 125 70 - 130 mg/dL   POC Glucose Once   Result Value Ref Range    Glucose 212 (H) 70 - 130 mg/dL   POC Glucose Once   Result Value Ref Range    Glucose 181 (H) 70 - 130 mg/dL   POC Glucose Once   Result Value Ref Range    Glucose 129 70 - 130 mg/dL   POC Glucose Once   Result Value Ref Range    Glucose 160 (H) 70 - 130 mg/dL     Imaging Results (Last 72 Hours)     Procedure Component Value Units Date/Time    MRI Brain Without Contrast [[id number]] Collected:  05/16/20 1013     Updated:  05/16/20 1036     Narrative:       MR SCAN OF THE BRAIN WITHOUT CONTRAST     HISTORY: History of seizures. Previous stroke. Ataxia and dysarthria.     The MR scan was performed with sagittal and axial images. There is mild  diffuse atrophy and chronic small vessel ischemic change. There are  several old right cerebellar infarcts unchanged from 12/05/2019. There  is no evidence of acute hemorrhage or mass effect. The diffusion  sequence shows no evidence of acute infarct. The sinuses and mastoid air  cells are clear. There were normal flow voids in the major vessels.     CONCLUSION: Mild diffuse atrophy and chronic small vessel ischemic  change. Old right cerebellar infarcts. No evidence of acute infarct.     This report was finalized on 5/16/2020 10:33 AM by Dr. Obed Rodarte M.D.       CT Head Without Contrast [[id number]] Collected:  05/15/20 1656     Updated:  05/15/20 1705    Narrative:       CT HEAD     HISTORY:This     TECHNIQUE: CT scan of the head was obtained with 3 mm axial images  without intravenous contrast.  Radiation dose reduction techniques were  utilized, including automated exposure control and exposure modulation  based on body size.     COMPARISON:Head CT 01/01/2020 and brain MRI 12/05/2019     FINDINGS:  There is no finding of acute infarct, hemorrhage, contusion or abnormal  extra-axial collection. No hydrocephalus is present. Old infarct within  the right cerebellar hemisphere, as before.       Impression:       1.  No findings of acute intracranial abnormality. Old right cerebellar  hemisphere infarct, as before.        This report was finalized on 5/15/2020 5:02 PM by Dr. Blayne Cortez M.D.                 aspirin 81 mg Oral Daily   atorvastatin 80 mg Oral Daily   budesonide-formoterol 2 puff Inhalation BID   clopidogrel 75 mg Oral Daily   gabapentin 400 mg Oral Q12H   hydrALAZINE 25 mg Oral Q8H   hydrocortisone  Topical Q12H   insulin glargine 20 Units Subcutaneous Nightly   insulin lispro 0-7 Units  Subcutaneous TID AC   ipratropium-albuterol 3 mL Nebulization 4x Daily - RT   levETIRAcetam 500 mg Oral BID   metoprolol tartrate 50 mg Oral Q12H   PARoxetine 10 mg Oral QAM   rOPINIRole 0.25 mg Oral Nightly   sodium chloride 10 mL Intravenous Q12H          Assessment/Plan   1.  Labile hypertension, blood pressure today was 150-160/72 9 earlier in the day now it is 112/52.  The patient is asymptomatic  2.  CKD stage II with GFR 75 mm/min for all intents and purposes her renal function is normal  3.  History of stroke with carotid disease and bilateral stent  4.  DM2  5.  Obstructive sleep apnea using trilogy system  6.  COPD  7.  CAD  8.  Fibromyalgia  9.  Morbid obesity        Plan:  1.  Restrict sodium intake to 2 g daily  2.  I agree with the present treatment  3.  Will check orthostatic blood pressure and adjust treatment as needed  4.  May need to add diuretics but I would refrain from doing that at this time until having more information about orthostatic blood pressures  5.  Check random urine for protein to creatinine ratio  6.  Surveillance labs        Thank you Dr. Grajeda for asking me to see this patient in consultation        I discussed the patients findings and my recommendations with the patient    Chaitanya Peña MD  05/18/20  12:27      Much of this encounter note is an electronic transcription/translation of spoken language to printed text. The electronic translation of spoken language may permit erroneous, or at times, nonsensical words or phrases to be inadvertently transcribed; Although I have reviewed the note for such errors, some may still exist

## 2020-05-18 NOTE — PLAN OF CARE
BP remains elevated. Medications adjusted. Positive orthostatics and elevated BP called to Dr. Lopez, who stated he doesn't want to add any medications at [facility] time because dose was just adjusted. Will CTM.

## 2020-05-18 NOTE — PLAN OF CARE
Problem: Patient Care Overview  Goal: Plan of Care Review  Outcome: Ongoing (interventions implemented as appropriate)  Flowsheets (Taken 5/18/2020 9314)  Progress: improving  Note:   A&Ox4. Given atarax for itching. Patient still had episode of high blood pressure last night despite d/c'd fluids. Seems to only occur at night. APRN asked for rounding MD to address issue and ordered clonidine as she was given this the previous night as well.

## 2020-05-18 NOTE — PROGRESS NOTES
Name: Vidhi Lopez ADMIT: 5/15/2020   : 1950  PCP: Abiodun Vila MD    MRN: [id number] LOS: 3 days   AGE/SEX: 70 y.o. female  ROOM: [facility]     Subjective   Subjective   Itching has improved with medications. Still w/elevated BP during night. Pt reports accompanied HA. Using O2 with sleep; has trilogy at home.     Review of Systems   Constitutional: Negative.    Respiratory: Negative.    Cardiovascular: Negative.    Gastrointestinal: Negative.    Genitourinary: Negative.    Musculoskeletal: Negative.    Skin: Negative.    Neurological: Positive for headaches.   Psychiatric/Behavioral: Negative.         Objective   Objective   Vital Signs  Temp:  [97.9 °F (36.6 °C)-98.5 °F (36.9 °C)] 97.9 °F (36.6 °C)  Heart Rate:  [48-72] 48  Resp:  [16-20] 16  BP: (155-198)/() 165/92  SpO2:  [90 %-100 %] 96 %  on  Flow (L/min):  [2] 2;   Device (Oxygen Therapy): nasal cannula  Body mass index is 30.69 kg/m².  Physical Exam   Constitutional: She is oriented to person, place, and time. No distress.   HENT:   Head: Normocephalic.   Eyes: EOM are normal.   Neck: No JVD present.   Cardiovascular: Regular rhythm. Bradycardia present.   Pulmonary/Chest: Effort normal and breath sounds normal. No respiratory distress.   Abdominal: Soft. Bowel sounds are normal.   Musculoskeletal: She exhibits no edema.   Neurological: She is alert and oriented to person, place, and time.   Skin: Skin is warm and dry.   Psychiatric: She has a normal mood and affect.   Vitals reviewed.      Results Review:       I reviewed the patient's new clinical results.  Results from last 7 days   Lab Units 20  0551 20  0454 05/15/20  1606   WBC 10*3/mm3 6.57 7.29 6.79   HEMOGLOBIN g/dL 10.5* 10.5* 11.0*   PLATELETS 10*3/mm3 162 200 191     Results from last 7 days   Lab Units 20  0551 20  1409 20  0454 05/15/20  1606   SODIUM mmol/L 141 139 141 143   POTASSIUM mmol/L 4.1 4.2 3.8 3.5   CHLORIDE mmol/L 101 101  101 101   CO2 mmol/L 30.1* 26.0 30.9* 28.6   BUN mg/dL 15 17 22 25*   CREATININE mg/dL 0.76 0.83 1.30* 2.03*   GLUCOSE mg/dL 136* 206* 125* 115*   Estimated Creatinine Clearance: 70.8 mL/min (by C-G formula based on SCr of 0.76 mg/dL).  Results from last 7 days   Lab Units 05/15/20  1606   ALBUMIN g/dL 4.00   BILIRUBIN mg/dL 0.2   ALK PHOS U/L 107   AST (SGOT) U/L 12   ALT (SGPT) U/L 13     Results from last 7 days   Lab Units 05/18/20  0551 05/17/20  1409 05/16/20  0454 05/15/20  1606   CALCIUM mg/dL 9.2 9.0 8.7 9.2   ALBUMIN g/dL  --   --   --  4.00   MAGNESIUM mg/dL  --   --   --  1.9       Hemoglobin A1C   Date/Time Value Ref Range Status   05/16/2020 0454 7.20 (H) 4.80 - 5.60 % Final     Glucose   Date/Time Value Ref Range Status   05/18/2020 1106 160 (H) 70 - 130 mg/dL Final   05/18/2020 0630 129 70 - 130 mg/dL Final   05/17/2020 2104 181 (H) 70 - 130 mg/dL Final   05/17/2020 1531 212 (H) 70 - 130 mg/dL Final   05/17/2020 1041 125 70 - 130 mg/dL Final   05/17/2020 0556 136 (H) 70 - 130 mg/dL Final   05/16/2020 2100 158 (H) 70 - 130 mg/dL Final       MRI Brain Without Contrast  MR SCAN OF THE BRAIN WITHOUT CONTRAST     HISTORY: History of seizures. Previous stroke. Ataxia and dysarthria.     The MR scan was performed with sagittal and axial images. There is mild  diffuse atrophy and chronic small vessel ischemic change. There are  several old right cerebellar infarcts unchanged from 12/05/2019. There  is no evidence of acute hemorrhage or mass effect. The diffusion  sequence shows no evidence of acute infarct. The sinuses and mastoid air  cells are clear. There were normal flow voids in the major vessels.     CONCLUSION: Mild diffuse atrophy and chronic small vessel ischemic  change. Old right cerebellar infarcts. No evidence of acute infarct.     This report was finalized on 5/16/2020 10:33 AM by Dr. Obed Rodarte M.D.           aspirin 81 mg Oral Daily   atorvastatin 80 mg Oral Daily   budesonide-formoterol  2 puff Inhalation BID   clopidogrel 75 mg Oral Daily   gabapentin 400 mg Oral Q12H   hydrALAZINE 25 mg Oral Q8H   hydrocortisone  Topical Q12H   insulin glargine 20 Units Subcutaneous Nightly   insulin lispro 0-7 Units Subcutaneous TID AC   ipratropium-albuterol 3 mL Nebulization 4x Daily - RT   levETIRAcetam 500 mg Oral BID   metoprolol tartrate 50 mg Oral Q12H   PARoxetine 10 mg Oral QAM   rOPINIRole 0.25 mg Oral Nightly   sodium chloride 10 mL Intravenous Q12H      Diet Regular; Consistent Carbohydrate       Assessment/Plan     Active Hospital Problems    Diagnosis  POA   • **Ataxia [R27.0]  Yes   • Dysarthria [R47.1]  Unknown   • Lethargy [R53.83]  Unknown   • Polypharmacy [Z79.899]  Not Applicable   • Seizure disorder (CMS/HCC) [G40.909]  Yes   • GASPER (acute kidney injury) (CMS/[facility]) [N17.9]  Yes   • URIEL (obstructive sleep apnea) [G47.33]  No   • Dyslipidemia [E78.5]  Yes   • Hypertension [I10]  Yes   • Anxiety (Chronic benzos) [F41.9]  Yes   • GERD (gastroesophageal reflux disease) [K21.9]  Yes   • Diabetes mellitus, type 2 (CMS/[facility]) [E11.9]  Yes   • Fibromyalgia [M79.7]  Yes   • COPD (chronic obstructive pulmonary disease) (CMS/[facility]) [J44.9]  Yes   • Migraines [G43.909]  Yes   • History of acute myocardial infarction [I25.2]  Not Applicable   • History of stroke [Z86.73]  Not Applicable   • CAD (coronary artery disease) [I25.10]  Yes      Resolved Hospital Problems   No resolved problems to display.       70 y.o. female admitted with Ataxia.    Ataxia/dysarthria/lethargy  -neurology has seen  -Mri no acute  -CT of the head was negative  -Polypharmacy, will hold sedating meds for now  Per patient's family she has had hallucinations while taking medications, unclear what medications they are talking about.  Oriented and appropriate at this time.  Speech is clear.  Denies hallucinations.  PT has evaluated; signed off     Acute kidney injury  Creatinine improved. S/P IVF's; dc'd due to elevated BP. Bumex remains on  hold.     Seizure disorder  Keppra and Neurontin doses reduced     Hypertension/dyslipidemia/CAD  Medications being adjusted. Clonidine was started but will dc and increase Hydralazine. Noted allergy to amlodipine. Bumex on hold. Metoprolol BID; mild bradycardia being monitored.  Ask Nephrology to assist in BP control     Type 2 diabetes  Glucose is acceptable/stable  -Accu-Cheks AC with correctional dose insulin  -We will continue long-acting insulin  -A1c 7.2     COPD/obstructive sleep apnea/ chronic resp failure  -Continuous O2 monitoring  -O2 to keep sats above 90, wears O2 at home as needed.   -triology at night      · SCDs for DVT prophylaxis.  · Full code.  · Discussed with patient and Dr. Grajeda..  · Anticipate discharge home 1-2 days.       JANI Hinton  [facility]  05/18/20  12:06

## 2020-05-18 NOTE — THERAPY DISCHARGE NOTE
Acute Care - Occupational Therapy Initial Eval/Discharge  [facility]     Patient Name: Vidhi Lopez  : 1950  MRN: [id number]  Today's Date: 2020               Admit Date: 5/15/2020       ICD-10-CM ICD-9-CM   1. Ataxia R27.0 781.3   2. Dysarthria R47.1 784.51   3. Acute kidney injury (GASPER) with acute tubular necrosis (ATN) (CMS/[facility]) N17.0 584.5     Patient Active Problem List   Diagnosis   • Dyslipidemia   • Hypertension   • Anxiety (Chronic benzos)   • Insomnia   • GERD (gastroesophageal reflux disease)   • Diabetes mellitus, type 2 (CMS/[facility])   • Fibromyalgia   • RLS (restless legs syndrome)   • Migraines   • COPD (chronic obstructive pulmonary disease) (CMS/[facility])   • Interstitial cystitis   • History of acute myocardial infarction   • History of cardiac murmur   • History of stroke   • CAD (coronary artery disease)   • Essential hypertension   • CKD (chronic kidney disease) stage 2, GFR 60-89 ml/min   • Bilateral carotid artery stenosis   • Chronic respiratory failure with hypoxia and hypercapnia (CMS/[facility])   • URIEL (obstructive sleep apnea)   • Obesity (BMI 30-39.9)   • Diabetes mellitus type 2 in obese (CMS/[facility])   • Obesity hypoventilation syndrome (CMS/HCC)   • Tobacco use   • GASPER (acute kidney injury) (CMS/[facility])   • Chronic pain (Chronic narcotics)   • Recurrent UTI/interstitial cystitis on chronic methenamine   • Clostridium difficile colitis diagnosed 2018. Persistent diarrhea despite oral vancomycin   • Demand ischemia (CMS/[facility])   • Hypoxemia requiring supplemental oxygen   • Stenosis of carotid artery   • Occlusion and stenosis of left carotid artery    • Chronic gout with tophus   • Depression   • Edema   • Restless leg syndrome   • Asthma   • Wellness examination   • Encounter for screening mammogram for malignant neoplasm of breast    • Seasonal allergies   • Hyperlipidemia   • Seizure disorder (CMS/[facility])   • L1 vertebral fracture (CMS/HCC)   • Rib fracture   • Lumbar compression  fracture (CMS/[facility])   • Polypharmacy   • Itching   • Ataxia   • Dysarthria   • Lethargy     Past Medical History:   Diagnosis Date   • Allergic rhinitis    • Asthma with COPD (CMS/[facility])     Asthma/COPD   • Bilateral ovarian cysts    • Coronary artery disease    • Depression with anxiety     Depression/Anxiety   • Diabetes (CMS/[facility])     pre   • Endometriosis     Dr. Jin; estradiol    • ESR raised 3/20/2018   • Fatigue    • Fibromyalgia    • Headache     Headaches   • High cholesterol    • History of gallstones    • History of myocardial infarction    • Hypertension    • Influenza B     DX'D 2/6/2018, TREATED WITH TAMIFLU. LAST DOSE 2/11/2018 AM.  PATIENT STATES DR FLORES IS AWARE. DENIES FEVERS OR CHILLS.   • Interstitial cystitis    • On home oxygen therapy     2 L NC   • URIEL on CPAP    • PONV (postoperative nausea and vomiting)    • Seizure disorder, nonconvulsive, with status epilepticus (CMS/[facility]) 3/20/2018   • Septic joint of right knee joint (CMS/[facility]) 3/21/2018   • Shortness of breath on exertion    • Stroke (CMS/HCC)     X1 8 YEARS AGO, GENERALIZED UPPER BODY WEAKNESS RESIDUAL PER PT    • Thyroid disorder    • Urinary leakage    • UTI (urinary tract infection)    • Wears glasses    • Yeast dermatitis      Past Surgical History:   Procedure Laterality Date   • ARTERIOGRAM N/A 2/12/2018    Procedure: Renal Arteriogram;  Surgeon: Lito Flores MD;  Location:  [facility] INVASIVE LOCATION;  Service:    • BREAST BIOPSY Right 1991   • CARDIAC CATHETERIZATION N/A 2/12/2018    Procedure: Right Heart Cath;  Surgeon: Lito Flores MD;  Location:  [facility] CATH INVASIVE LOCATION;  Service:    • CAROTID STENT      Transcath    • CAROTID STENT Left    • CHOLECYSTECTOMY     • CYSTOSTOMY W/ BLADDER BIOPSY     • DILATATION AND CURETTAGE     • KNEE ARTHROSCOPY Right 3/23/2018    Procedure: ARTHROSCOPY, RIGHT KNEE  INCISION AND DRAINAGE LOWER EXTREMITY WITH SYNOVIAL BIOPSY;  Surgeon: Dilip Giron MD;  Location:  [facility] OR;   Service: Orthopedics   • LAPAROSCOPIC CHOLECYSTECTOMY  1994    Lap rolando   • OOPHORECTOMY     • PAP SMEAR  05/10/2016   • SUBTOTAL HYSTERECTOMY            OT ASSESSMENT FLOWSHEET (last 12 hours)      Occupational Therapy Evaluation     Row Name 05/18/20 1410                   OT Evaluation Time/Intention    Document Type  evaluation;discharge evaluation/summary  -SG        Mode of Treatment  individual therapy;occupational therapy  -SG        Patient Effort  good  -SG           General Information    Patient Profile Reviewed?  yes  -SG        Patient Observations  alert;cooperative;agree to therapy  -SG        Prior Level of Function  independent:;ADL's  -SG           Cognitive Assessment/Intervention- PT/OT    Orientation Status (Cognition)  oriented x 3  -SG        Follows Commands (Cognition)  follows one step commands  -SG           ADL Assessment/Intervention    BADL Assessment/Intervention  lower body dressing;grooming;upper body dressing  -SG           Upper Body Dressing Assessment/Training    Upper Body Dressing [place] Level  upper body dressing skills;supervision  -SG        Upper Body Dressing Position  supported sitting sitting in chair  -SG           Lower Body Dressing Assessment/Training    Lower Body Dressing [place] Level  lower body dressing skills  -SG        Lower Body Dressing Position  supported sitting  -SG           General ROM    GENERAL ROM COMMENTS  BUE's WFL AROM  -SG           Static Sitting Balance    Level of [place] (Unsupported Sitting, Static Balance)  supervision  -SG           Dynamic Sitting Balance    Level of [place], Reaches Outside Midline (Sitting, Dynamic Balance)  supervision  -SG           Static Standing Balance    Level of [place] (Supported Standing, Static Balance)  supervision  -SG           Positioning and Restraints    Pre-Treatment Position  sitting in chair/recliner  -SG        Post Treatment Position  chair  -SG        In Chair   sitting;call light within reach;encouraged to call for assist;exit alarm on  -SG           Pain Scale: Numbers Pre/Post-Treatment    Pain Scale: Numbers, Pretreatment  0/10 - no pain  -SG        Pain Scale: Numbers, Post-Treatment  0/10 - no pain  -SG           Plan of Care Review    Plan of Care Reviewed With  patient  -SG           Clinical Impression (OT)    OT Diagnosis  need for assist with personal care  -SG        Care Plan Review (OT)  evaluation/treatment results reviewed  -SG           Patient Education Goal (OT)    Activity (Patient Education Goal, OT)  pt to state safety for adl.  -SG        [place]/Cues/Accuracy (Memory Goal 2, OT)  verbalizes understanding  -SG        Time Frame (Patient Education Goal, OT)  1 day  -SG        Progress/Outcome (Patient Education Goal, OT)  goal met  -SG          User Key  (r) = Recorded By, (t) = Taken By, (c) = Cosigned By    Initials Name Effective Dates     Zari Nickerson OTR 06/08/18 -           Occupational Therapy Education                 Title: PT OT SLP Therapies (Resolved)     Topic: Occupational Therapy (Resolved)     Point: ADL training (Resolved)     Description:   Instruct learner(s) on proper safety adaptation and remediation techniques during self care or transfers.   Instruct in proper use of assistive devices.              Learning Progress Summary           Patient Acceptance, E,TB,D, VU by  at 5/18/2020 8744    Comment:  role of OT and POC, safety for adl                               User Key     Initials Effective Dates Name Provider Type Discipline     06/08/18 -  Zari Nickerson OTR Occupational Therapist OT                OT Recommendation and Plan     Plan of Care Review  Plan of Care Reviewed With: patient  Plan of Care Reviewed With: patient  Outcome Summary: Pt presents to OT alert and agreeable to OT.  Pt with min decreased strength but using BUE;s for functional tasks.  Pt states feels baseline function. Will not follow for  acute OT. Pt agrees. Pt wearing face mask during therapy encounter. Therapist used apporopriate personal protective equipment including mask and gloves. Hand hygiene performed before and after session.     Rehab Goal Summary     Row Name 05/18/20 1410             Patient Education Goal (OT)    Activity (Patient Education Goal, OT)  pt to state safety for adl.  -SG      [place]/Cues/Accuracy (Memory Goal 2, OT)  verbalizes understanding  -SG      Time Frame (Patient Education Goal, OT)  1 day  -SG      Progress/Outcome (Patient Education Goal, OT)  goal met  -SG        User Key  (r) = Recorded By, (t) = Taken By, (c) = Cosigned By    Initials Name Provider Type Discipline    Zari Peters OTR Occupational Therapist OT          Outcome Measures     Row Name 05/18/20 1419             How much help from another is currently needed...    Putting on and taking off regular lower body clothing?  3  -SG      Bathing (including washing, rinsing, and drying)  3  -SG      Toileting (which includes using toilet bed pan or urinal)  3  -SG      Putting on and taking off regular upper body clothing  3  -SG      Taking care of personal grooming (such as brushing teeth)  3  -SG      Eating meals  4  -SG      AM-PAC 6 Clicks Score (OT)  19  -SG         Functional Assessment    Outcome Measure Options  AM-PAC 6 Clicks Daily Activity (OT)  -SG        User Key  (r) = Recorded By, (t) = Taken By, (c) = Cosigned By    Initials Name Provider Type    Zari Peters OTR Occupational Therapist          Time Calculation:   Time Calculation- OT     Row Name 05/18/20 1420             Time Calculation- OT    OT Received On  05/18/20  -SG        User Key  (r) = Recorded By, (t) = Taken By, (c) = Cosigned By    Initials Name Provider Type    Zari Peters OTR Occupational Therapist        Therapy Suggested Charges     Code   Minutes Charges    None           Therapy Charges for Today     Code Description Service Date Service  Provider Modifiers Qty    06338915778 HC OT EVAL LOW COMPLEXITY 2 5/18/2020 Zari Nickerson OTR GO 1               OT Discharge Summary  Reason for Discharge: (Pt states baseline function)    AVANI Sandhu  5/18/2020   YESENIA

## 2020-05-19 ENCOUNTER — APPOINTMENT (OUTPATIENT)
Dept: GENERAL RADIOLOGY | Facility: HOSPITAL | Age: 70
End: 2020-05-19

## 2020-05-19 LAB
ALBUMIN SERPL-MCNC: 4 G/DL (ref 3.5–5.2)
ANION GAP SERPL CALCULATED.3IONS-SCNC: 11.5 MMOL/L (ref 5–15)
BACTERIA UR QL AUTO: ABNORMAL /HPF
BILIRUB UR QL STRIP: NEGATIVE
BUN BLD-MCNC: 14 MG/DL (ref 8–23)
BUN/CREAT SERPL: 18.7 (ref 7–25)
CALCIUM SPEC-SCNC: 9.7 MG/DL (ref 8.6–10.5)
CHLORIDE SERPL-SCNC: 98 MMOL/L (ref 98–107)
CLARITY UR: CLEAR
CO2 SERPL-SCNC: 30.5 MMOL/L (ref 22–29)
COLOR UR: YELLOW
CREAT BLD-MCNC: 0.75 MG/DL (ref 0.57–1)
CREAT UR-MCNC: 66 MG/DL
GFR SERPL CREATININE-BSD FRML MDRD: 77 ML/MIN/1.73
GLUCOSE BLD-MCNC: 158 MG/DL (ref 65–99)
GLUCOSE BLDC GLUCOMTR-MCNC: 123 MG/DL (ref 70–130)
GLUCOSE BLDC GLUCOMTR-MCNC: 158 MG/DL (ref 70–130)
GLUCOSE BLDC GLUCOMTR-MCNC: 159 MG/DL (ref 70–130)
GLUCOSE BLDC GLUCOMTR-MCNC: 188 MG/DL (ref 70–130)
GLUCOSE UR STRIP-MCNC: NEGATIVE MG/DL
HGB UR QL STRIP.AUTO: NEGATIVE
HYALINE CASTS UR QL AUTO: ABNORMAL /LPF
KETONES UR QL STRIP: NEGATIVE
LEUKOCYTE ESTERASE UR QL STRIP.AUTO: ABNORMAL
MAGNESIUM SERPL-MCNC: 2.2 MG/DL (ref 1.6–2.4)
NITRITE UR QL STRIP: NEGATIVE
PH UR STRIP.AUTO: 8 [PH] (ref 5–8)
PHOSPHATE SERPL-MCNC: 3.6 MG/DL (ref 2.5–4.5)
POTASSIUM BLD-SCNC: 3.9 MMOL/L (ref 3.5–5.2)
PROT UR QL STRIP: ABNORMAL
PROT UR-MCNC: 23 MG/DL
RBC # UR: ABNORMAL /HPF
REF LAB TEST METHOD: ABNORMAL
SODIUM BLD-SCNC: 140 MMOL/L (ref 136–145)
SP GR UR STRIP: 1.01 (ref 1–1.03)
SQUAMOUS #/AREA URNS HPF: ABNORMAL /HPF
TSH SERPL DL<=0.05 MIU/L-ACNC: 3 UIU/ML (ref 0.27–4.2)
URATE SERPL-MCNC: 5.5 MG/DL (ref 2.4–5.7)
UROBILINOGEN UR QL STRIP: ABNORMAL
WBC UR QL AUTO: ABNORMAL /HPF

## 2020-05-19 PROCEDURE — 83735 ASSAY OF MAGNESIUM: CPT | Performed by: INTERNAL MEDICINE

## 2020-05-19 PROCEDURE — 84550 ASSAY OF BLOOD/URIC ACID: CPT | Performed by: INTERNAL MEDICINE

## 2020-05-19 PROCEDURE — 63710000001 INSULIN LISPRO (HUMAN) PER 5 UNITS: Performed by: NURSE PRACTITIONER

## 2020-05-19 PROCEDURE — 94799 UNLISTED PULMONARY SVC/PX: CPT

## 2020-05-19 PROCEDURE — 71046 X-RAY EXAM CHEST 2 VIEWS: CPT

## 2020-05-19 PROCEDURE — 87186 SC STD MICRODIL/AGAR DIL: CPT | Performed by: NURSE PRACTITIONER

## 2020-05-19 PROCEDURE — 82570 ASSAY OF URINE CREATININE: CPT | Performed by: INTERNAL MEDICINE

## 2020-05-19 PROCEDURE — 84156 ASSAY OF PROTEIN URINE: CPT | Performed by: INTERNAL MEDICINE

## 2020-05-19 PROCEDURE — 87086 URINE CULTURE/COLONY COUNT: CPT | Performed by: NURSE PRACTITIONER

## 2020-05-19 PROCEDURE — 63710000001 INSULIN GLARGINE PER 5 UNITS: Performed by: HOSPITALIST

## 2020-05-19 PROCEDURE — 87077 CULTURE AEROBIC IDENTIFY: CPT | Performed by: NURSE PRACTITIONER

## 2020-05-19 PROCEDURE — 81001 URINALYSIS AUTO W/SCOPE: CPT | Performed by: NURSE PRACTITIONER

## 2020-05-19 PROCEDURE — 84443 ASSAY THYROID STIM HORMONE: CPT | Performed by: INTERNAL MEDICINE

## 2020-05-19 PROCEDURE — 80069 RENAL FUNCTION PANEL: CPT | Performed by: INTERNAL MEDICINE

## 2020-05-19 PROCEDURE — 82962 GLUCOSE BLOOD TEST: CPT

## 2020-05-19 RX ORDER — IPRATROPIUM BROMIDE AND ALBUTEROL SULFATE 2.5; .5 MG/3ML; MG/3ML
3 SOLUTION RESPIRATORY (INHALATION) ONCE
Status: COMPLETED | OUTPATIENT
Start: 2020-05-19 | End: 2020-05-19

## 2020-05-19 RX ORDER — CLONIDINE HYDROCHLORIDE 0.1 MG/1
0.1 TABLET ORAL ONCE
Status: COMPLETED | OUTPATIENT
Start: 2020-05-20 | End: 2020-05-20

## 2020-05-19 RX ORDER — KETOROLAC TROMETHAMINE 15 MG/ML
15 INJECTION, SOLUTION INTRAMUSCULAR; INTRAVENOUS ONCE
Status: COMPLETED | OUTPATIENT
Start: 2020-05-20 | End: 2020-05-20

## 2020-05-19 RX ADMIN — BUDESONIDE AND FORMOTEROL FUMARATE DIHYDRATE 2 PUFF: 160; 4.5 AEROSOL RESPIRATORY (INHALATION) at 20:18

## 2020-05-19 RX ADMIN — ASPIRIN 81 MG: 81 TABLET, COATED ORAL at 08:21

## 2020-05-19 RX ADMIN — INSULIN GLARGINE 20 UNITS: 100 INJECTION, SOLUTION SUBCUTANEOUS at 23:16

## 2020-05-19 RX ADMIN — IPRATROPIUM BROMIDE AND ALBUTEROL SULFATE 3 ML: .5; 3 SOLUTION RESPIRATORY (INHALATION) at 16:20

## 2020-05-19 RX ADMIN — IPRATROPIUM BROMIDE AND ALBUTEROL SULFATE 3 ML: .5; 3 SOLUTION RESPIRATORY (INHALATION) at 11:59

## 2020-05-19 RX ADMIN — HYDROXYZINE HYDROCHLORIDE 50 MG: 50 TABLET ORAL at 11:36

## 2020-05-19 RX ADMIN — IPRATROPIUM BROMIDE AND ALBUTEROL SULFATE 3 ML: .5; 3 SOLUTION RESPIRATORY (INHALATION) at 01:27

## 2020-05-19 RX ADMIN — HYDROXYZINE HYDROCHLORIDE 50 MG: 50 TABLET ORAL at 03:31

## 2020-05-19 RX ADMIN — INSULIN LISPRO 2 UNITS: 100 INJECTION, SOLUTION INTRAVENOUS; SUBCUTANEOUS at 11:27

## 2020-05-19 RX ADMIN — ATORVASTATIN CALCIUM 80 MG: 80 TABLET, FILM COATED ORAL at 08:21

## 2020-05-19 RX ADMIN — HYDRALAZINE HYDROCHLORIDE 25 MG: 25 TABLET ORAL at 06:17

## 2020-05-19 RX ADMIN — METOPROLOL TARTRATE 50 MG: 50 TABLET, FILM COATED ORAL at 20:15

## 2020-05-19 RX ADMIN — TRIAMCINOLONE ACETONIDE: 1 OINTMENT TOPICAL at 20:16

## 2020-05-19 RX ADMIN — SODIUM CHLORIDE, PRESERVATIVE FREE 10 ML: 5 INJECTION INTRAVENOUS at 08:21

## 2020-05-19 RX ADMIN — ACETAMINOPHEN 650 MG: 325 TABLET, FILM COATED ORAL at 11:36

## 2020-05-19 RX ADMIN — CLOPIDOGREL 75 MG: 75 TABLET, FILM COATED ORAL at 08:21

## 2020-05-19 RX ADMIN — HYDROXYZINE HYDROCHLORIDE 50 MG: 50 TABLET ORAL at 20:15

## 2020-05-19 RX ADMIN — HYDRALAZINE HYDROCHLORIDE 25 MG: 25 TABLET ORAL at 14:30

## 2020-05-19 RX ADMIN — ACETAMINOPHEN 650 MG: 325 TABLET, FILM COATED ORAL at 20:15

## 2020-05-19 RX ADMIN — LEVETIRACETAM 500 MG: 500 TABLET, FILM COATED ORAL at 08:21

## 2020-05-19 RX ADMIN — GABAPENTIN 400 MG: 400 CAPSULE ORAL at 08:21

## 2020-05-19 RX ADMIN — HYDROCORTISONE: 1 CREAM TOPICAL at 08:22

## 2020-05-19 RX ADMIN — PAROXETINE HYDROCHLORIDE 10 MG: 10 TABLET, FILM COATED ORAL at 06:17

## 2020-05-19 RX ADMIN — INSULIN LISPRO 2 UNITS: 100 INJECTION, SOLUTION INTRAVENOUS; SUBCUTANEOUS at 08:21

## 2020-05-19 RX ADMIN — LEVETIRACETAM 500 MG: 500 TABLET, FILM COATED ORAL at 20:21

## 2020-05-19 RX ADMIN — METOPROLOL TARTRATE 50 MG: 50 TABLET, FILM COATED ORAL at 08:21

## 2020-05-19 RX ADMIN — SODIUM CHLORIDE, PRESERVATIVE FREE 10 ML: 5 INJECTION INTRAVENOUS at 20:17

## 2020-05-19 RX ADMIN — GABAPENTIN 400 MG: 400 CAPSULE ORAL at 20:16

## 2020-05-19 RX ADMIN — HYDROCORTISONE: 1 CREAM TOPICAL at 20:21

## 2020-05-19 RX ADMIN — HYDRALAZINE HYDROCHLORIDE 25 MG: 25 TABLET ORAL at 23:00

## 2020-05-19 RX ADMIN — ROPINIROLE HYDROCHLORIDE 0.25 MG: 0.25 TABLET, FILM COATED ORAL at 20:15

## 2020-05-19 RX ADMIN — BUDESONIDE AND FORMOTEROL FUMARATE DIHYDRATE 2 PUFF: 160; 4.5 AEROSOL RESPIRATORY (INHALATION) at 08:32

## 2020-05-19 NOTE — PROGRESS NOTES
"   LOS: 4 days    Patient Care Team:  Abiodun Vila MD as PCP - General (Family Medicine)  Arnoldo Newman MD as PCP - Claims Attributed  Lito Flores MD as Consulting Physician (Cardiology)  Jacky Hernandez MD as Consulting Physician (Infectious Diseases)    Chief Complaint:    Chief Complaint   Patient presents with   • Dizziness     Follow UP GASPER, HTN with orthostasis .  Subjective     Interval History:     Complains of rash on upper arms. Present for a year.  Pruritic.   Eating. Complains of dysuria \"like she is getting a UTI\".  Bowels moving. Only getting up to chair.    Dizzines when standing is improving .  Review of Systems:   See above.     Objective     Vital Signs  Temp:  [97.6 °F (36.4 °C)-99.1 °F (37.3 °C)] 97.6 °F (36.4 °C)  Heart Rate:  [50-67] 54  Resp:  [16-20] 16  BP: (170-213)/(68-95) 170/80    Flowsheet Rows      First Filed Value   Admission Height  165.1 cm (65\") Documented at 05/15/2020 1504   Admission Weight  85.7 kg (189 lb) Documented at 05/15/2020 1523          I/O this shift:  In: 600 [P.O.:600]  Out: -   I/O last 3 completed shifts:  In: 840 [P.O.:840]  Out: -     Intake/Output Summary (Last 24 hours) at 5/19/2020 1625  Last data filed at 5/19/2020 1100  Gross per 24 hour   Intake 960 ml   Output --   Net 960 ml       Physical Exam:  Sitting in chair.   Oral mucosa moist  No scleral icterus.   Neck no jvd   Heart RRR no s3 or rub  Lungs clear to auscultation, no wheezing  Abd + bs, soft, nontender.  Ext no edema  Skin fine erythematous macular rash over upper ext.      Results Review:    Results from last 7 days   Lab Units 05/19/20  0523 05/18/20  0551 05/17/20  1409  05/15/20  1606   SODIUM mmol/L 140 141 139   < > 143   POTASSIUM mmol/L 3.9 4.1 4.2   < > 3.5   CHLORIDE mmol/L 98 101 101   < > 101   CO2 mmol/L 30.5* 30.1* 26.0   < > 28.6   BUN mg/dL 14 15 17   < > 25*   CREATININE mg/dL 0.75 0.76 0.83   < > 2.03*   CALCIUM mg/dL 9.7 9.2 9.0   < > 9.2   BILIRUBIN " mg/dL  --   --   --   --  0.2   ALK PHOS U/L  --   --   --   --  107   ALT (SGPT) U/L  --   --   --   --  13   AST (SGOT) U/L  --   --   --   --  12   GLUCOSE mg/dL 158* 136* 206*   < > 115*    < > = values in this interval not displayed.       Estimated Creatinine Clearance: 70.8 mL/min (by C-G formula based on SCr of 0.75 mg/dL).    Results from last 7 days   Lab Units 05/19/20  0523 05/15/20  1606   MAGNESIUM mg/dL 2.2 1.9   PHOSPHORUS mg/dL 3.6  --        Results from last 7 days   Lab Units 05/19/20  0523   URIC ACID mg/dL 5.5       Results from last 7 days   Lab Units 05/18/20  0551 05/16/20  0454 05/15/20  1606   WBC 10*3/mm3 6.57 7.29 6.79   HEMOGLOBIN g/dL 10.5* 10.5* 11.0*   PLATELETS 10*3/mm3 162 200 191       Results from last 7 days   Lab Units 05/15/20  1607   INR  1.05         Imaging Results (Last 24 Hours)     ** No results found for the last 24 hours. **          aspirin 81 mg Oral Daily   atorvastatin 80 mg Oral Daily   budesonide-formoterol 2 puff Inhalation BID   clopidogrel 75 mg Oral Daily   gabapentin 400 mg Oral Q12H   hydrALAZINE 25 mg Oral Q8H   hydrocortisone  Topical Q12H   insulin glargine 20 Units Subcutaneous Nightly   insulin lispro 0-7 Units Subcutaneous TID AC   ipratropium-albuterol 3 mL Nebulization 4x Daily - RT   levETIRAcetam 500 mg Oral BID   metoprolol tartrate 50 mg Oral Q12H   PARoxetine 10 mg Oral QAM   rOPINIRole 0.25 mg Oral Nightly   sodium chloride 10 mL Intravenous Q12H   triamcinolone  Topical Q12H          Medication Review:   Current Facility-Administered Medications   Medication Dose Route Frequency Provider Last Rate Last Dose   • acetaminophen (TYLENOL) tablet 650 mg  650 mg Oral Q4H PRN Ev Finch APRN   650 mg at 05/19/20 1136    Or   • acetaminophen (TYLENOL) 160 MG/5ML solution 650 mg  650 mg Oral Q4H PRN Stef, Ev M, APRN        Or   • acetaminophen (TYLENOL) suppository 650 mg  650 mg Rectal Q4H PRN Ev Finch, APRN       •  aspirin EC tablet 81 mg  81 mg Oral Daily Helder Luu MD   81 mg at 05/19/20 0821   • atorvastatin (LIPITOR) tablet 80 mg  80 mg Oral Daily Helder Luu MD   80 mg at 05/19/20 0821   • budesonide-formoterol (SYMBICORT) 160-4.5 MCG/ACT inhaler 2 puff  2 puff Inhalation BID Helder Luu MD   2 puff at 05/19/20 0832   • clopidogrel (PLAVIX) tablet 75 mg  75 mg Oral Daily Helder Luu MD   75 mg at 05/19/20 0821   • dextrose (D50W) 25 g/ 50mL Intravenous Solution 25 g  25 g Intravenous Q15 Min PRN Ev Finch APRN       • dextrose (GLUTOSE) oral gel 15 g  15 g Oral Q15 Min PRN Ev Finch APRN       • gabapentin (NEURONTIN) capsule 400 mg  400 mg Oral Q12H Helder Luu MD   400 mg at 05/19/20 0821   • glucagon (human recombinant) (GLUCAGEN DIAGNOSTIC) injection 1 mg  1 mg Subcutaneous Q15 Min PRN Ev Finch APRN       • hydrALAZINE (APRESOLINE) tablet 25 mg  25 mg Oral Q8H Lenny Grajeda MD   25 mg at 05/19/20 1430   • hydrocortisone 1 % cream   Topical Q12H Helder Luu MD       • hydrOXYzine (ATARAX) tablet 50 mg  50 mg Oral TID PRN Helder Luu MD   50 mg at 05/19/20 1136   • insulin glargine (LANTUS) injection 20 Units  20 Units Subcutaneous Nightly Helder Luu MD   20 Units at 05/18/20 2117   • insulin lispro (humaLOG) injection 0-7 Units  0-7 Units Subcutaneous TID AC Ev Finch APRN   2 Units at 05/19/20 1127   • ipratropium-albuterol (DUO-NEB) nebulizer solution 3 mL  3 mL Nebulization 4x Daily - RT Helder Luu MD   3 mL at 05/19/20 1620   • levETIRAcetam (KEPPRA) tablet 500 mg  500 mg Oral BID Jose De Jesus Bolaños MD   500 mg at 05/19/20 0821   • metoprolol tartrate (LOPRESSOR) tablet 50 mg  50 mg Oral Q12H Helder Luu MD   50 mg at 05/19/20 0821   • nitroglycerin (NITROSTAT) SL tablet 0.4 mg  0.4 mg Sublingual Q5 Min PRN  Ev Finch APRN       • ondansetron (ZOFRAN) injection 4 mg  4 mg Intravenous Q6H PRN Ev Finch APRN   4 mg at 05/17/20 1357   • PARoxetine (PAXIL) tablet 10 mg  10 mg Oral QAM Helder Luu MD   10 mg at 05/19/20 0617   • rOPINIRole (REQUIP) tablet 0.25 mg  0.25 mg Oral Nightly Helder Luu MD   0.25 mg at 05/18/20 2117   • sodium chloride 0.9 % flush 10 mL  10 mL Intravenous PRN Jacky Eckert MD       • sodium chloride 0.9 % flush 10 mL  10 mL Intravenous Q12H Ev Finch APRN   10 mL at 05/19/20 0821   • sodium chloride 0.9 % flush 10 mL  10 mL Intravenous PRN Ev Finch APRN       • triamcinolone (KENALOG) 0.1 % ointment   Topical Q12H Nellie Chaidez MD           Assessment/Plan   1. GASPER resolved  2. HTN not controlled.  Hydralazine added yesterday.  Untreated URIEL makes BP harder to control.  Will DW Dr. Grajeda to see if CPAP can be added here at night.  3. DM2  4. Fibromyalgia.   5. Seizure disorder  6. URIEL .  Is supposed to use trilogy at night.  7. Dysuria with history of interstitial cystitis.  Checking UA.  8. Anemia, microcytic, check iron stores.     Needs to get up and walk.           Nellie Chaidez MD  05/19/20  16:25

## 2020-05-19 NOTE — CONSULTS
[facility]  [phone]  Dong Tinoco MD      Subjective   LOS: 4 days     Thank you for this consultation.  70-year-old female who sees pulmonology down in [place].  She is a retired nurse.  She was placed on a trilogy device.  She states for a diagnosis of sleep apnea which is hard to believe.  Trilogy device is reserved for respiratory failure that is usually chronic.  I see prior order for a sleep study which was never performed.  I see orders for PFTs and spirometry's.  Last 1 does not show any evidence of obstruction but in fact a restrictive defect is seen.  Patient states he has a diagnosis of COPD and remains a current smoker.  She smokes a few cigarettes a day.  Previously she used to smoke half pack of cigarettes a day and states she never smoked more than this.  She has had ABG in the past.  At least on one occasion this showed a hypercapnia.  However other ABGs show a normal carbon dioxide level.  Therefore unclear if she has obesity hypoventilation.  She is currently being treated for uncontrolled hypertension.  She also has chronic kidney disease for which she is being followed by renal service.  She has an element of acute kidney injury that has resolved.  Underlying diabetes which she states is a recent diagnosis.  She has had seizure disorder since a diagnosis of sepsis about a year ago.  She is on antiseizure medication.  She reportedly has fibromyalgia.  She has coronary artery disease and has had stent placed in the internal carotid artery as well.  She is not very compliant with a trilogy at night.  She uses oxygen 2 to 3 L at night.  She is supposed to use oxygen during the day but only rarely does so.    Vidhi oLpez  reports that she drinks about 1.0 standard drinks of alcohol per week.,  reports that she has been smoking cigarettes. She has a 10.00 pack-year smoking history. She has never used smokeless tobacco.     Past Hx:  has a past medical history of Allergic  rhinitis, Asthma with COPD (CMS/Formerly [facility]), Bilateral ovarian cysts, Coronary artery disease, Depression with anxiety, Diabetes (CMS/Formerly [facility]), Endometriosis, ESR raised (3/20/2018), Fatigue, Fibromyalgia, Headache, High cholesterol, History of gallstones, History of myocardial infarction, Hypertension, Influenza B, Interstitial cystitis, On home oxygen therapy, URIEL on CPAP, PONV (postoperative nausea and vomiting), Seizure disorder, nonconvulsive, with status epilepticus (CMS/Formerly [facility]) (3/20/2018), Septic joint of right knee joint (CMS/Formerly [facility]) (3/21/2018), Shortness of breath on exertion, Stroke (CMS/Formerly [facility]), Thyroid disorder, Urinary leakage, UTI (urinary tract infection), Wears glasses, and Yeast dermatitis.  Surg Hx:  has a past surgical history that includes Cystostomy w/ bladder biopsy; Subtotal Hysterectomy; Cholecystectomy; Oophorectomy; Dilation and curettage of uterus; Carotid stent; Breast biopsy (Right, 1991); Pap Smear (05/10/2016); Laparoscopic cholecystectomy (1994); Carotid stent (Left); Cardiac catheterization (N/A, 2/12/2018); Arteriogram (N/A, 2/12/2018); and Knee Arthroscopy (Right, 3/23/2018).  FH: family history includes Breast cancer in her mother; Cancer in an other family member; Colon cancer in her father; Diabetes in an other family member; Heart attack in an other family member; Hyperlipidemia in an other family member; Hypertension in an other family member; Osteoporosis in her mother and another family member; Stroke in an other family member.  SH:  reports that she has been smoking cigarettes. She has a 10.00 pack-year smoking history. She has never used smokeless tobacco. She reports that she drinks about 1.0 standard drinks of alcohol per week. She reports that she does not use drugs.    Medications Prior to Admission   Medication Sig Dispense Refill Last Dose   • acetaminophen (TYLENOL) 325 MG tablet Take 2 tablets by mouth Every 4 (Four) Hours As Needed for Mild Pain .   Past Week at Unknown time   •  albuterol sulfate  (90 Base) MCG/ACT inhaler Inhale 2 puffs Every 4 (Four) Hours As Needed for Wheezing. 18 g 5 5/14/2020 at Unknown time   • aspirin 81 MG EC tablet Take 81 mg by mouth Daily.   5/14/2020 at Unknown time   • atorvastatin (LIPITOR) 80 MG tablet take 1 tablet by mouth once daily 90 tablet 3 5/14/2020 at Unknown time   • budesonide-formoterol (SYMBICORT) 160-4.5 MCG/ACT inhaler Inhale 2 puffs 2 (Two) Times a Day. 1 inhaler 3 5/14/2020 at Unknown time   • bumetanide (BUMEX) 1 MG tablet Take 1 tablet by mouth Daily With Breakfast. 90 tablet 3 5/14/2020 at Unknown time   • clopidogrel (PLAVIX) 75 MG tablet take 1 tablet by mouth once daily 90 tablet 3 5/14/2020 at Unknown time   • clotrimazole-betamethasone (LOTRISONE) 1-0.05 % cream Apply  topically to the appropriate area as directed As Needed.  1 Past Week at Unknown time   • gabapentin (NEURONTIN) 800 MG tablet 800 mg 3 (Three) Times a Day.   5/14/2020 at Unknown time   • hydrALAZINE (APRESOLINE) 10 MG tablet Take 1 tablet by mouth 3 (Three) Times a Day. (Patient taking differently: Take 10 mg by mouth 3 (Three) Times a Day. Patient taking PRN SBP > 160) 90 tablet 3 Past Week at Unknown time   • hydrOXYzine (ATARAX) 50 MG tablet Take 1 tablet by mouth Every 8 (Eight) Hours As Needed for Itching. 90 tablet 2 Past Week at Unknown time   • Insulin aspart (FIASP FLEXTOUCH) 100 UNIT/ML solution pen-injector injection pen   0 5/14/2020 at Unknown time   • ipratropium-albuterol (DUO-NEB) 0.5-2.5 mg/3 ml nebulizer USE 3 ML VIA NEBULIZER FOUR TIMES DAILY 3 mL 0 5/14/2020 at Unknown time   • levETIRAcetam (KEPPRA) 750 MG tablet Take 1 tablet by mouth 2 (Two) Times a Day. 60 tablet 11 5/14/2020 at Unknown time   • metoprolol tartrate (LOPRESSOR) 50 MG tablet Take 1 tablet by mouth Every 12 (Twelve) Hours. 60 tablet 11 5/14/2020 at Unknown time   • nystatin (MYCOSTATIN) 286368 UNIT/GM ointment Apply  topically to the appropriate area as directed 2 (Two)  Times a Day. 30 g 0 2020 at Unknown time   • nystatin (MYCOSTATIN) 936658 UNIT/ML suspension Take 5 mL by mouth 4 (Four) Times a Day. 473 mL 0 2020 at Unknown time   • ondansetron (ZOFRAN) 8 MG tablet Take 1 tablet by mouth Every 8 (Eight) Hours As Needed for Nausea or Vomiting. 30 tablet 1 Past Week at Unknown time   • PARoxetine (PAXIL) 10 MG tablet Take 1 tablet by mouth Every Morning. 30 tablet 1 2020 at Unknown time   • Probiotic Product (PROBIOTIC DAILY PO) Take 1 tablet by mouth Daily.   2020 at Unknown time   • rOPINIRole (REQUIP) 0.25 MG tablet TK 1 T PO Q NIGHT 1 TO 3 H B BED   2020 at Unknown time   • SYMBICORT 160-4.5 MCG/ACT inhaler Inhale 2 puffs 2 (Two) Times a Day. 10.2 g 6 2020 at Unknown time   • tiZANidine (ZANAFLEX) 4 MG tablet LAST DOSE TAKEN 2020, PATIENT FEELS IT IS CAUSING HALLUCINATIONS   Taking   • TOUJEO SOLOSTAR 300 UNIT/ML solution pen-injector injection Use 25 Units at night (Patient taking differently: 20 Units. Use 25 Units at night) 1.5 mL 6 2020 at Unknown time   • celecoxib (CELEBREX) 100 MG capsule Every 12 (Twelve) Hours.   Taking   • nitroglycerin (NITROSTAT) 0.4 MG SL tablet Place 0.4 mg under the tongue Every 5 (Five) Minutes As Needed.  0 Taking     Allergies   Allergen Reactions   • Amlodipine Swelling   • Hydrocodone Hallucinations   • Reglan [Metoclopramide] Unknown - High Severity     DYSTONIC REACTION       Review of Systems  Vital Signs past 24hrs  BP range: BP: (170-213)/(68-95) 170/80  Pulse range: Heart Rate:  [50-67] 54  Resp rate range: Resp:  [16-20] 16  Temp range: Temp (24hrs), Av.2 °F (36.8 °C), Min:97.6 °F (36.4 °C), Max:99.1 °F (37.3 °C)    Oxygen range: SpO2:  [87 %-99 %] 97 %; Flow (L/min):  [1-2] 2;   Device (Oxygen Therapy): nasal cannula  83.6 kg (184 lb 6.4 oz); Body mass index is 30.69 kg/m².  I/O this shift:  In: 900 [P.O.:900]  Out: -     Adult female sitting up in a chair.  No acute distress.   Pupils equal and reactive to light.  Oropharynx class III Mallampati airway.  Normal sized tongue.  No posterior pharyngeal discharge.  Nasopharynx without discharge septum midline.  JVP not elevated trachea midline thyroid not enlarged.  Unable to properly assess due to patient sitting up.  Lungs reveal diminished breath sounds equal with no wheezing rales rhonchi.  Examination limited due to morbid obesity.  Percussion note resonant chest expansion equal no chest wall deformity or tenderness.  Heart examination S1-S2 present rhythm regular no murmurs.  No edema lower extremities.  Abdomen morbidly obese, soft, nontender.  Bowel sounds present no liver spleen enlargement.  No peripheral cyanosis clubbing.  Moves all 4 extremities sensorimotor intact.  No cervical, axillary, inguinal adenopathy.    Results Review:    I have reviewed the laboratory and imaging data from current admission. My annotations are as noted in assessment and plan.    Medication Review:  I have reviewed the current MAR. My annotations are as noted in assessment and plan.    Plan   PCCM Problems  Reported diagnosis COPD  Restrictive lung disease on PFTs  Chronic hypoxia, with exertion and sleep  ?  Chronic respiratory failure on trilogy device  ?  Sleep disordered breathing on trilogy device  ?  Obesity hypoventilation  Current cigarette smoker  Relevant Medical Diagnoses  Uncontrolled hypertension  CAD  Peripheral vascular disease with ICA stent  Seizure disorder  History CVA  Chronic kidney disease  Diabetes type 2    Plan of Treatment  Patient's respiratory status is unclear to me.  She has a reported diagnosis of COPD and is a current cigarette smoker.  Currently not in exacerbation.  She uses Brovana and budesonide at home.  This can certainly be continued over here and Symbicort would be just as adequate for now.    She has chronic hypoxia and uses oxygen with exertion and with sleep.  This should be continued.  Currently sitting at rest  but on room air.    Reported chronic respiratory failure on trilogy device.  Also reported that she has sleep apnea for which she is on a trilogy device.  This is typically not the case.  She is very concerned about getting a blood gas.  I will order an ABG for the morning to review.  In the meantime can trial a regular CPAP machine overnight to see what it shows.  If she is able to tolerate this can probably be continued safely and without need for a trilogy device.  She eventually requires a sleep study.  I suspect she was placed on a trilogy device after her diagnosis and admission for sepsis in 2019.  At least in the [facility] system I do not see a documented sleep study.    She was advised to quit smoking completely.    Management of blood pressure issues as per primary team and nephrology.          Part of this note may be an electronic transcription/translation of spoken language to printed text using the Dragon Dictation System.

## 2020-05-19 NOTE — PROGRESS NOTES
Discharge Planning Assessment  [facility]     Patient Name: Vidhi Lopez  MRN: [id number]  Today's Date: 5/19/2020    Admit Date: 5/15/2020    Discharge Needs Assessment     Row Name 05/19/20 1558       Living Environment    Lives With  facility resident    Unique Family Situation  [facility]    Current Living Arrangements  independent/assisted living facility    Primary Care Provided by  self    Provides Primary Care For  no one, unable/limited ability to care for self    Family Caregiver if Needed  child(sekou), adult;sibling(s)    Quality of Family Relationships  helpful;involved    Able to Return to Prior Arrangements  yes       Resource/Environmental Concerns    Resource/Environmental Concerns  none    Transportation Concerns  car, none       Transition Planning    Patient/Family Anticipates Transition to  home;home with help/services;other (see comments)    Transportation Anticipated  family or friend will provide       Discharge Needs Assessment    Readmission Within the Last 30 Days  no previous admission in last 30 days    Concerns to be Addressed  denies needs/concerns at this time;other (see comments)    Equipment Currently Used at Home  oxygen;nebulizer;cane, straight    Anticipated Changes Related to Illness  none    Equipment Needed After Discharge  walker, rolling    Current Discharge Risk  chronically ill        Discharge Plan     Row Name 05/19/20 1650       Plan    Plan  [place] back [place] at [facility]    Provided Post Acute Provider List?  Refused    Patient/Family in Agreement with Plan  yes    Plan Comments  Spoke with pt at bedside, introduced self and explained CCP role. Verified facesheet and confirmed local [facility] is [facility]. Pt denies problems with medications. Pt states she is [place]ADL with cane at [place] apartment at [facility]. Pt wears oxygen as needed during the day and 2-3 liters at night provided thru [facility]. Pt also has nebulzier also. Pt has  been to [facility] in the past. Pt denies HH hx. CCP explained HH services, pt declines, states plan will be to go back to [facility], and she will speak with them regarding transportation. CCP to follow. angelia lam/ccp        Destination      Coordination has not been started for this encounter.      Durable Medical Equipment      Coordination has not been started for this encounter.      Dialysis/Infusion      Coordination has not been started for this encounter.      Home Medical Care      Coordination has not been started for this encounter.      Therapy      Coordination has not been started for this encounter.      Community Resources      Coordination has not been started for this encounter.          Demographic Summary     Row Name 05/19/20 1656       General Information    Admission Type  inpatient    Referral Source  admission list    Reason for Consult  discharge planning    Preferred Language  English     Used During This Interaction  no       Contact Information    Permission Granted to Share Info With  family/designee        Functional Status     Row Name 05/19/20 2147       Functional Status    Usual Activity Tolerance  moderate    Current Activity Tolerance  moderate       Functional Status, IADL    Medications  independent    Meal Preparation  independent    Housekeeping  independent;assistive person    Laundry  independent    Shopping  assistive person       Mental Status    General Appearance WDL  WDL       Mental Status Summary    Recent Changes in Mental Status/Cognitive Functioning  no changes       Employment/    Employment Status  retired        Psychosocial    No documentation.       Abuse/Neglect    No documentation.       Legal     Row Name 05/19/20 1135       Financial/Legal    Finance Comments  pt declines advance directive        Substance Abuse    No documentation.       Patient Forms    No documentation.           Love Thomas RN  Continued Stay Note  [facility]  [place]     Patient Name: Vidhi Lopez  MRN: [id number]  Today's Date: 5/19/2020    Admit Date: 5/15/2020    Discharge Plan     Row Name 05/19/20 1651       Plan    Plan  Home back to [place] at [facility]    Provided Post Acute Provider List?  Refused    Patient/Family in Agreement with Plan  yes    Plan Comments  Spoke with pt at bedside, introduced self and explained CCP role. Verified facesheet and confirmed local [facility] is [facility] [address] [facility]. Pt denies problems with medications. Pt states she is [place]AD[place] with cane at her [place] apartment at [facility]. Pt wears oxygen as needed during the day and 2-3 liters at night provided thru [facility]. Pt also has nebulzier also. Pt has been to [facility] in the past. Pt denies HH hx. CCP explained HH services, pt declines, states plan will be to go back to [facility], and she will speak with them regarding transportation. CCP to follow. angelia lam/ccp        Discharge Codes    No documentation.             Love Thomas RN

## 2020-05-19 NOTE — PROGRESS NOTES
Name: Vidhi Lopez ADMIT: 5/15/2020   : 1950  PCP: Abiodun Vila MD    MRN: [id number] LOS: 4 days   AGE/SEX: 70 y.o. female  ROOM: [facility]     Subjective   Subjective   Concerned about 99.1 temp, reports associated dysuria. Denies dyspnea. Has had intermittent HA when BP elevated. Noted RN report of orthostasis; unsure if pt was symptomatic.    Review of Systems   Constitutional: Negative.    HENT: Negative.    Respiratory: Negative.    Cardiovascular: Negative.    Gastrointestinal: Negative.    Genitourinary: Positive for dysuria.   Musculoskeletal: Positive for back pain.        Chronic back pain   Skin: Negative.    Neurological: Positive for numbness.        Chronic numbness jesus feet   Psychiatric/Behavioral: Negative.         Objective   Objective   Vital Signs  Temp:  [97.8 °F (36.6 °C)-99.1 °F (37.3 °C)] 99.1 °F (37.3 °C)  Heart Rate:  [50-67] 67  Resp:  [16-20] 16  BP: (132-213)/() 197/95  SpO2:  [87 %-100 %] 97 %  on  Flow (L/min):  [2] 2;   Device (Oxygen Therapy): nasal cannula  Body mass index is 30.69 kg/m².  Physical Exam   Constitutional: She is oriented to person, place, and time. No distress.   HENT:   Head: Normocephalic.   Eyes: Conjunctivae are normal.   Neck: Neck supple.   Cardiovascular: Normal rate and regular rhythm.   Pulmonary/Chest: Effort normal and breath sounds normal. No respiratory distress.   Abdominal: Soft. Bowel sounds are normal.   Musculoskeletal: She exhibits no edema.   Neurological: She is alert and oriented to person, place, and time.   Skin: Skin is warm and dry.   Psychiatric: She has a normal mood and affect.   Vitals reviewed.      Results Review:       I reviewed the patient's new clinical results.  Results from last 7 days   Lab Units 20  0551 20  0454 05/15/20  1606   WBC 10*3/mm3 6.57 7.29 6.79   HEMOGLOBIN g/dL 10.5* 10.5* 11.0*   PLATELETS 10*3/mm3 162 200 191     Results from last 7 days   Lab Units 20  0523  05/18/20  0551 05/17/20  1409 05/16/20  0454   SODIUM mmol/L 140 141 139 141   POTASSIUM mmol/L 3.9 4.1 4.2 3.8   CHLORIDE mmol/L 98 101 101 101   CO2 mmol/L 30.5* 30.1* 26.0 30.9*   BUN mg/dL 14 15 17 22   CREATININE mg/dL 0.75 0.76 0.83 1.30*   GLUCOSE mg/dL 158* 136* 206* 125*   Estimated Creatinine Clearance: 70.8 mL/min (by C-G formula based on SCr of 0.75 mg/dL).  Results from last 7 days   Lab Units 05/19/20  0523 05/15/20  1606   ALBUMIN g/dL 4.00 4.00   BILIRUBIN mg/dL  --  0.2   ALK PHOS U/L  --  107   AST (SGOT) U/L  --  12   ALT (SGPT) U/L  --  13     Results from last 7 days   Lab Units 05/19/20  0523 05/18/20  0551 05/17/20  1409 05/16/20  0454 05/15/20  1606   CALCIUM mg/dL 9.7 9.2 9.0 8.7 9.2   ALBUMIN g/dL 4.00  --   --   --  4.00   MAGNESIUM mg/dL 2.2  --   --   --  1.9   PHOSPHORUS mg/dL 3.6  --   --   --   --        Glucose   Date/Time Value Ref Range Status   05/19/2020 1107 158 (H) 70 - 130 mg/dL Final   05/19/2020 0548 159 (H) 70 - 130 mg/dL Final   05/18/2020 2115 231 (H) 70 - 130 mg/dL Final   05/18/2020 1604 146 (H) 70 - 130 mg/dL Final   05/18/2020 1106 160 (H) 70 - 130 mg/dL Final   05/18/2020 0630 129 70 - 130 mg/dL Final   05/17/2020 2104 181 (H) 70 - 130 mg/dL Final       MRI Brain Without Contrast  MR SCAN OF THE BRAIN WITHOUT CONTRAST     HISTORY: History of seizures. Previous stroke. Ataxia and dysarthria.     The MR scan was performed with sagittal and axial images. There is mild  diffuse atrophy and chronic small vessel ischemic change. There are  several old right cerebellar infarcts unchanged from 12/05/2019. There  is no evidence of acute hemorrhage or mass effect. The diffusion  sequence shows no evidence of acute infarct. The sinuses and mastoid air  cells are clear. There were normal flow voids in the major vessels.     CONCLUSION: Mild diffuse atrophy and chronic small vessel ischemic  change. Old right cerebellar infarcts. No evidence of acute infarct.     This report was  finalized on 5/16/2020 10:33 AM by Dr. Obed Rodarte M.D.           aspirin 81 mg Oral Daily   atorvastatin 80 mg Oral Daily   budesonide-formoterol 2 puff Inhalation BID   clopidogrel 75 mg Oral Daily   gabapentin 400 mg Oral Q12H   hydrALAZINE 25 mg Oral Q8H   hydrocortisone  Topical Q12H   insulin glargine 20 Units Subcutaneous Nightly   insulin lispro 0-7 Units Subcutaneous TID AC   ipratropium-albuterol 3 mL Nebulization 4x Daily - RT   levETIRAcetam 500 mg Oral BID   metoprolol tartrate 50 mg Oral Q12H   PARoxetine 10 mg Oral QAM   rOPINIRole 0.25 mg Oral Nightly   sodium chloride 10 mL Intravenous Q12H      Diet Regular; Consistent Carbohydrate, Low Sodium; 2,000 mg Na       Assessment/Plan     Active Hospital Problems    Diagnosis  POA   • **Ataxia [R27.0]  Yes   • Dysarthria [R47.1]  Unknown   • Lethargy [R53.83]  Unknown   • Polypharmacy [Z79.899]  Not Applicable   • Seizure disorder (CMS/[facility]) [G40.909]  Yes   • GASPER (acute kidney injury) (CMS/Formerly [facility]) [N17.9]  Yes   • URIEL (obstructive sleep apnea) [G47.33]  No   • Dyslipidemia [E78.5]  Yes   • Hypertension [I10]  Yes   • Anxiety (Chronic benzos) [F41.9]  Yes   • GERD (gastroesophageal reflux disease) [K21.9]  Yes   • Diabetes mellitus, type 2 (CMS/[facility]ly [facility]) [E11.9]  Yes   • Fibromyalgia [M79.7]  Yes   • COPD (chronic obstructive pulmonary disease) (CMS/Formerly [facility]) [J44.9]  Yes   • Migraines [G43.909]  Yes   • History of acute myocardial infarction [I25.2]  Not Applicable   • History of stroke [Z86.73]  Not Applicable   • CAD (coronary artery disease) [I25.10]  Yes      Resolved Hospital Problems   No resolved problems to display.       70 y.o. female admitted with Ataxia.    Ataxia/dysarthria/lethargy  -neurology has seen  -Mri no acute  -CT of the head was negative  -Polypharmacy, will hold sedating meds for now  Per patient's family she has had hallucinations while taking medications, unclear what medications they are talking about.  Oriented and appropriate at this  time.  Speech is clear.  Denies hallucinations.  PT has evaluated; signed off     Acute kidney injury  Creatinine improved. S/P IVF's; dc'd due to elevated BP. Bumex remains on hold.     Seizure disorder  Keppra and Neurontin doses reduced     Hypertension/dyslipidemia/CAD  Medications being adjusted. Clonidine was started but will dc and increase Hydralazine. Noted allergy to amlodipine. Bumex on hold. Metoprolol BID; mild bradycardia being monitored.  Ask Nephrology to assist in BP control; appreciate assistance  Diet changed to 2 gm Na restriction  Monitoring orthostatic BP       Type 2 diabetes  Glucose is acceptable/stable  -Accu-Cheks AC with correctional dose insulin  -We will continue long-acting insulin  -A1c 7.2     COPD/obstructive sleep apnea/ chronic resp failure  -Continuous O2 monitoring  -O2 to keep sats above 90, wears O2 at home as needed.   -triology at night    Dysuria:  -Recheck UA  -TMax 99.1      · SCDs for DVT prophylaxis.  · Full code.  · Discussed with patient and Dr. Grajeda  · Anticipate discharge home, likely tomorrow if BP stable and Nephrology agrees.     JANI Hinton  [facility]  05/19/20  12:59

## 2020-05-19 NOTE — PLAN OF CARE
Problem: Patient Care Overview  Goal: Plan of Care Review  Outcome: Ongoing (interventions implemented as appropriate)  Flowsheets (Taken 5/19/2020 1616)  Progress: improving  Plan of Care Reviewed With: patient  Outcome Summary: A&O, up in chair all night, needs orthostatic BP each shift, notify nephrology is possible, VSS, will continue to monitor.

## 2020-05-19 NOTE — PLAN OF CARE
BP remains elevated. Pulmonology consulted for recommendations for CPAP settings. Patient states it is very hard for her to sleep with a machine on due to insomnia. Orthostatic BP called in to Dr. Peña. EEG ordered because patient missed her scheduled follow-up EEG during this hospital admission. No falls. Complains of chronic pain that is worsening after decreasing Gabapentin dose, treated with PRN tylenol. No other complaints at this time. Waiting for urine sample. Will CTM.

## 2020-05-20 ENCOUNTER — APPOINTMENT (OUTPATIENT)
Dept: NEUROLOGY | Facility: HOSPITAL | Age: 70
End: 2020-05-20

## 2020-05-20 ENCOUNTER — HOSPITAL ENCOUNTER (OUTPATIENT)
Dept: NEUROLOGY | Facility: HOSPITAL | Age: 70
End: 2020-05-20

## 2020-05-20 LAB
ALBUMIN SERPL-MCNC: 3.7 G/DL (ref 3.5–5.2)
ANION GAP SERPL CALCULATED.3IONS-SCNC: 10.9 MMOL/L (ref 5–15)
BUN BLD-MCNC: 23 MG/DL (ref 8–23)
BUN/CREAT SERPL: 27.4 (ref 7–25)
CALCIUM SPEC-SCNC: 9.4 MG/DL (ref 8.6–10.5)
CHLORIDE SERPL-SCNC: 100 MMOL/L (ref 98–107)
CO2 SERPL-SCNC: 27.1 MMOL/L (ref 22–29)
CREAT BLD-MCNC: 0.84 MG/DL (ref 0.57–1)
FERRITIN SERPL-MCNC: 39.7 NG/ML (ref 13–150)
GFR SERPL CREATININE-BSD FRML MDRD: 67 ML/MIN/1.73
GLUCOSE BLD-MCNC: 170 MG/DL (ref 65–99)
GLUCOSE BLDC GLUCOMTR-MCNC: 138 MG/DL (ref 70–130)
GLUCOSE BLDC GLUCOMTR-MCNC: 150 MG/DL (ref 70–130)
GLUCOSE BLDC GLUCOMTR-MCNC: 169 MG/DL (ref 70–130)
IRON 24H UR-MRATE: 50 MCG/DL (ref 37–145)
IRON SATN MFR SERPL: 13 % (ref 20–50)
PHOSPHATE SERPL-MCNC: 3.9 MG/DL (ref 2.5–4.5)
POTASSIUM BLD-SCNC: 3.6 MMOL/L (ref 3.5–5.2)
SODIUM BLD-SCNC: 138 MMOL/L (ref 136–145)
TIBC SERPL-MCNC: 374 MCG/DL (ref 298–536)
TRANSFERRIN SERPL-MCNC: 251 MG/DL (ref 200–360)

## 2020-05-20 PROCEDURE — 82962 GLUCOSE BLOOD TEST: CPT

## 2020-05-20 PROCEDURE — 95819 EEG AWAKE AND ASLEEP: CPT | Performed by: PSYCHIATRY & NEUROLOGY

## 2020-05-20 PROCEDURE — 94799 UNLISTED PULMONARY SVC/PX: CPT

## 2020-05-20 PROCEDURE — 25010000002 IRON SUCROSE PER 1 MG: Performed by: INTERNAL MEDICINE

## 2020-05-20 PROCEDURE — 94660 CPAP INITIATION&MGMT: CPT

## 2020-05-20 PROCEDURE — 83540 ASSAY OF IRON: CPT | Performed by: INTERNAL MEDICINE

## 2020-05-20 PROCEDURE — 63710000001 INSULIN GLARGINE PER 5 UNITS: Performed by: HOSPITALIST

## 2020-05-20 PROCEDURE — 84466 ASSAY OF TRANSFERRIN: CPT | Performed by: INTERNAL MEDICINE

## 2020-05-20 PROCEDURE — 25010000002 KETOROLAC TROMETHAMINE PER 15 MG: Performed by: NURSE PRACTITIONER

## 2020-05-20 PROCEDURE — 82728 ASSAY OF FERRITIN: CPT | Performed by: INTERNAL MEDICINE

## 2020-05-20 PROCEDURE — 95816 EEG AWAKE AND DROWSY: CPT

## 2020-05-20 PROCEDURE — 80069 RENAL FUNCTION PANEL: CPT | Performed by: INTERNAL MEDICINE

## 2020-05-20 PROCEDURE — 63710000001 INSULIN LISPRO (HUMAN) PER 5 UNITS: Performed by: NURSE PRACTITIONER

## 2020-05-20 PROCEDURE — 25010000002 ONDANSETRON PER 1 MG: Performed by: NURSE PRACTITIONER

## 2020-05-20 RX ORDER — OXYCODONE HYDROCHLORIDE AND ACETAMINOPHEN 5; 325 MG/1; MG/1
1 TABLET ORAL EVERY 8 HOURS PRN
Status: DISCONTINUED | OUTPATIENT
Start: 2020-05-20 | End: 2020-05-21 | Stop reason: HOSPADM

## 2020-05-20 RX ORDER — LISINOPRIL 10 MG/1
10 TABLET ORAL
Status: DISCONTINUED | OUTPATIENT
Start: 2020-05-20 | End: 2020-05-21 | Stop reason: HOSPADM

## 2020-05-20 RX ORDER — GABAPENTIN 300 MG/1
300 CAPSULE ORAL EVERY 12 HOURS SCHEDULED
Status: DISCONTINUED | OUTPATIENT
Start: 2020-05-20 | End: 2020-05-21 | Stop reason: HOSPADM

## 2020-05-20 RX ORDER — HYDRALAZINE HYDROCHLORIDE 50 MG/1
50 TABLET, FILM COATED ORAL EVERY 8 HOURS SCHEDULED
Status: DISCONTINUED | OUTPATIENT
Start: 2020-05-20 | End: 2020-05-21 | Stop reason: HOSPADM

## 2020-05-20 RX ORDER — BUMETANIDE 1 MG/1
1 TABLET ORAL DAILY
Status: DISCONTINUED | OUTPATIENT
Start: 2020-05-20 | End: 2020-05-21 | Stop reason: HOSPADM

## 2020-05-20 RX ORDER — ZOLPIDEM TARTRATE 5 MG/1
TABLET ORAL
Qty: 2 TABLET | Refills: 0 | Status: SHIPPED | OUTPATIENT
Start: 2020-05-20 | End: 2020-11-11

## 2020-05-20 RX ADMIN — BUDESONIDE AND FORMOTEROL FUMARATE DIHYDRATE 2 PUFF: 160; 4.5 AEROSOL RESPIRATORY (INHALATION) at 19:13

## 2020-05-20 RX ADMIN — IPRATROPIUM BROMIDE AND ALBUTEROL SULFATE 3 ML: .5; 3 SOLUTION RESPIRATORY (INHALATION) at 13:04

## 2020-05-20 RX ADMIN — INSULIN GLARGINE 20 UNITS: 100 INJECTION, SOLUTION SUBCUTANEOUS at 20:21

## 2020-05-20 RX ADMIN — BUMETANIDE 1 MG: 1 TABLET ORAL at 17:41

## 2020-05-20 RX ADMIN — PAROXETINE HYDROCHLORIDE 10 MG: 10 TABLET, FILM COATED ORAL at 06:00

## 2020-05-20 RX ADMIN — METOPROLOL TARTRATE 50 MG: 50 TABLET, FILM COATED ORAL at 20:21

## 2020-05-20 RX ADMIN — HYDROCORTISONE: 1 CREAM TOPICAL at 08:52

## 2020-05-20 RX ADMIN — GABAPENTIN 400 MG: 400 CAPSULE ORAL at 08:53

## 2020-05-20 RX ADMIN — HYDRALAZINE HYDROCHLORIDE 50 MG: 50 TABLET, FILM COATED ORAL at 22:06

## 2020-05-20 RX ADMIN — INSULIN LISPRO 2 UNITS: 100 INJECTION, SOLUTION INTRAVENOUS; SUBCUTANEOUS at 17:41

## 2020-05-20 RX ADMIN — TRIAMCINOLONE ACETONIDE: 1 OINTMENT TOPICAL at 20:23

## 2020-05-20 RX ADMIN — ACETAMINOPHEN 650 MG: 325 TABLET, FILM COATED ORAL at 16:44

## 2020-05-20 RX ADMIN — ASPIRIN 81 MG: 81 TABLET, COATED ORAL at 08:51

## 2020-05-20 RX ADMIN — HYDRALAZINE HYDROCHLORIDE 25 MG: 25 TABLET ORAL at 05:39

## 2020-05-20 RX ADMIN — GABAPENTIN 300 MG: 300 CAPSULE ORAL at 20:21

## 2020-05-20 RX ADMIN — CLONIDINE HYDROCHLORIDE 0.1 MG: 0.1 TABLET ORAL at 01:15

## 2020-05-20 RX ADMIN — CLOPIDOGREL 75 MG: 75 TABLET, FILM COATED ORAL at 08:51

## 2020-05-20 RX ADMIN — ONDANSETRON 4 MG: 2 INJECTION INTRAMUSCULAR; INTRAVENOUS at 08:52

## 2020-05-20 RX ADMIN — ATORVASTATIN CALCIUM 80 MG: 80 TABLET, FILM COATED ORAL at 08:51

## 2020-05-20 RX ADMIN — LEVETIRACETAM 500 MG: 500 TABLET, FILM COATED ORAL at 08:51

## 2020-05-20 RX ADMIN — IRON SUCROSE 200 MG: 20 INJECTION, SOLUTION INTRAVENOUS at 16:32

## 2020-05-20 RX ADMIN — METOPROLOL TARTRATE 50 MG: 50 TABLET, FILM COATED ORAL at 08:51

## 2020-05-20 RX ADMIN — SODIUM CHLORIDE, PRESERVATIVE FREE 10 ML: 5 INJECTION INTRAVENOUS at 08:53

## 2020-05-20 RX ADMIN — LISINOPRIL 10 MG: 10 TABLET ORAL at 13:15

## 2020-05-20 RX ADMIN — KETOROLAC TROMETHAMINE 15 MG: 15 INJECTION, SOLUTION INTRAMUSCULAR; INTRAVENOUS at 01:16

## 2020-05-20 RX ADMIN — HYDRALAZINE HYDROCHLORIDE 50 MG: 50 TABLET, FILM COATED ORAL at 13:17

## 2020-05-20 RX ADMIN — HYDROCORTISONE: 1 CREAM TOPICAL at 20:23

## 2020-05-20 RX ADMIN — ROPINIROLE HYDROCHLORIDE 0.25 MG: 0.25 TABLET, FILM COATED ORAL at 20:21

## 2020-05-20 RX ADMIN — SODIUM CHLORIDE, PRESERVATIVE FREE 10 ML: 5 INJECTION INTRAVENOUS at 20:22

## 2020-05-20 RX ADMIN — BUDESONIDE AND FORMOTEROL FUMARATE DIHYDRATE 2 PUFF: 160; 4.5 AEROSOL RESPIRATORY (INHALATION) at 08:11

## 2020-05-20 RX ADMIN — TRIAMCINOLONE ACETONIDE: 1 OINTMENT TOPICAL at 08:52

## 2020-05-20 RX ADMIN — LEVETIRACETAM 500 MG: 500 TABLET, FILM COATED ORAL at 20:21

## 2020-05-20 RX ADMIN — IPRATROPIUM BROMIDE AND ALBUTEROL SULFATE 3 ML: .5; 3 SOLUTION RESPIRATORY (INHALATION) at 16:17

## 2020-05-20 RX ADMIN — OXYCODONE HYDROCHLORIDE AND ACETAMINOPHEN 1 TABLET: 5; 325 TABLET ORAL at 13:15

## 2020-05-20 RX ADMIN — INSULIN LISPRO 2 UNITS: 100 INJECTION, SOLUTION INTRAVENOUS; SUBCUTANEOUS at 08:51

## 2020-05-20 NOTE — PLAN OF CARE
Problem: Patient Care Overview  Goal: Plan of Care Review  Outcome: Ongoing (interventions implemented as appropriate)  Flowsheets (Taken 5/20/2020 7592)  Progress: no change  Plan of Care Reviewed With: patient  Outcome Summary: Continue to monitor for increase bp. Called and talked to COLIN GUNTER last night with Clonidine and toradol IV x1 with relief of pain .Pt uses cpap for almost 2 hrs only today. Will continue to monitor.     Problem: Fall Risk (Adult)  Goal: Absence of Fall  Outcome: Ongoing (interventions implemented as appropriate)     Problem: Patient Care Overview  Goal: Individualization and Mutuality  Outcome: Ongoing (interventions implemented as appropriate)     Problem: Pain, Acute (Adult)  Goal: Acceptable Pain Control/Comfort Level  Outcome: Ongoing (interventions implemented as appropriate)     Problem: Hypertensive Disease/Crisis (Arterial) (Adult)  Goal: Signs and Symptoms of Listed Potential Problems Will be Absent, Minimized or Managed (Hypertensive Disease/Crisis)  Outcome: Ongoing (interventions implemented as appropriate)

## 2020-05-20 NOTE — PROGRESS NOTES
Name: Vidhi Lopez ADMIT: 5/15/2020   : 1950  PCP: Abiodun Vila MD    MRN: [id number] LOS: 5 days   AGE/SEX: 70 y.o. female  ROOM: [facility]     Subjective   Subjective   Tolerated Cpap x 2 hours; does not like full mask. Reports overall feeling better today.     Review of Systems   Constitutional: Negative.    HENT: Negative.    Respiratory: Negative.    Cardiovascular: Negative.    Gastrointestinal: Negative.    Genitourinary: Negative.    Musculoskeletal: Positive for back pain.   Skin: Negative.    Neurological: Negative.    Psychiatric/Behavioral: Positive for sleep disturbance.        Objective   Objective   Vital Signs  Temp:  [97.7 °F (36.5 °C)-98.3 °F (36.8 °C)] 97.7 °F (36.5 °C)  Heart Rate:  [53-62] 53  Resp:  [16] 16  BP: (129-200)/() 150/90  SpO2:  [82 %-99 %] 99 %  on  Flow (L/min):  [2] 2;   Device (Oxygen Therapy): nasal cannula  Body mass index is 30.89 kg/m².  Physical Exam   Constitutional: She is oriented to person, place, and time. No distress.   HENT:   Head: Normocephalic.   Eyes: Conjunctivae are normal.   Neck: No JVD present.   Cardiovascular: Normal rate and regular rhythm.   Pulmonary/Chest: Effort normal and breath sounds normal. No respiratory distress.   Abdominal: Soft. Bowel sounds are normal.   Musculoskeletal: She exhibits no edema.   Neurological: She is alert and oriented to person, place, and time.   Skin: Skin is warm and dry.   Psychiatric: She has a normal mood and affect.   Vitals reviewed.      Results Review:       I reviewed the patient's new clinical results.  Results from last 7 days   Lab Units 20  0551 20  0454 05/15/20  1606   WBC 10*3/mm3 6.57 7.29 6.79   HEMOGLOBIN g/dL 10.5* 10.5* 11.0*   PLATELETS 10*3/mm3 162 200 191     Results from last 7 days   Lab Units 20  0633 20  0523 20  0551 20  1409   SODIUM mmol/L 138 140 141 139   POTASSIUM mmol/L 3.6 3.9 4.1 4.2   CHLORIDE mmol/L 100 98 101 101   CO2  mmol/L 27.1 30.5* 30.1* 26.0   BUN mg/dL 23 14 15 17   CREATININE mg/dL 0.84 0.75 0.76 0.83   GLUCOSE mg/dL 170* 158* 136* 206*   Estimated Creatinine Clearance: 67.8 mL/min (by C-G formula based on SCr of 0.84 mg/dL).  Results from last 7 days   Lab Units 05/20/20  0633 05/19/20  0523 05/15/20  1606   ALBUMIN g/dL 3.70 4.00 4.00   BILIRUBIN mg/dL  --   --  0.2   ALK PHOS U/L  --   --  107   AST (SGOT) U/L  --   --  12   ALT (SGPT) U/L  --   --  13     Results from last 7 days   Lab Units 05/20/20  0633 05/19/20  0523 05/18/20  0551 05/17/20  1409  05/15/20  1606   CALCIUM mg/dL 9.4 9.7 9.2 9.0   < > 9.2   ALBUMIN g/dL 3.70 4.00  --   --   --  4.00   MAGNESIUM mg/dL  --  2.2  --   --   --  1.9   PHOSPHORUS mg/dL 3.9 3.6  --   --   --   --     < > = values in this interval not displayed.       Glucose   Date/Time Value Ref Range Status   05/20/2020 1209 138 (H) 70 - 130 mg/dL Final   05/20/2020 0532 150 (H) 70 - 130 mg/dL Final   05/19/2020 2304 188 (H) 70 - 130 mg/dL Final   05/19/2020 1639 123 70 - 130 mg/dL Final   05/19/2020 1107 158 (H) 70 - 130 mg/dL Final   05/19/2020 0548 159 (H) 70 - 130 mg/dL Final   05/18/2020 2115 231 (H) 70 - 130 mg/dL Final       EEG  Date of onset: May 20, 2020 at 11:07 AM  Date of offset: May 20, 2020 at 11:34 AM    Indication: Seizures    Technical description:  This is a 21 channel digital EEG recording that   began on May 20, 2020 11:07 AM and ended on May 20, 2020 at 11:34 AM.    Adria and seizure detection software programs were used.    Background:  Up to 8.5 Hz alpha activity is present over the posterior   head regions that symmetric, well formed and reactive to eye closure.  The   patient enters the drowsy state and sleeps during the record.  Sleep   activities are symmetric and contain vertex waves, sleep spindles, and K   complexes.  There is no abnormal activation with photic stimulation.    Hyperventilation was not performed.  There are no asymmetries between the   two  hemispheres.  No interictal activity is present.    The EKG monitor shows a heart rate that varies between 50 and 55 beats per   minute.    Clinical Interpretation:  This routine EEG recording is normal in the   awake and sleep states.  No potentially epileptogenic activity, seizure   activity, or focal slowing is present.        aspirin 81 mg Oral Daily   atorvastatin 80 mg Oral Daily   budesonide-formoterol 2 puff Inhalation BID   clopidogrel 75 mg Oral Daily   gabapentin 400 mg Oral Q12H   hydrALAZINE 50 mg Oral Q8H   hydrocortisone  Topical Q12H   insulin glargine 20 Units Subcutaneous Nightly   insulin lispro 0-7 Units Subcutaneous TID AC   ipratropium-albuterol 3 mL Nebulization 4x Daily - RT   levETIRAcetam 500 mg Oral BID   lisinopril 10 mg Oral Q24H   metoprolol tartrate 50 mg Oral Q12H   PARoxetine 10 mg Oral QAM   rOPINIRole 0.25 mg Oral Nightly   sodium chloride 10 mL Intravenous Q12H   triamcinolone  Topical Q12H      Diet Regular; Consistent Carbohydrate, Low Sodium; 2,000 mg Na       Assessment/Plan     Active Hospital Problems    Diagnosis  POA   • **Ataxia [R27.0]  Yes   • Dysarthria [R47.1]  Unknown   • Lethargy [R53.83]  Unknown   • Polypharmacy [Z79.899]  Not Applicable   • Seizure disorder (CMS/HCC) [G40.909]  Yes   • GASPER (acute kidney injury) (CMS/[facility]) [N17.9]  Yes   • URIEL (obstructive sleep apnea) [G47.33]  No   • Dyslipidemia [E78.5]  Yes   • Hypertension [I10]  Yes   • Anxiety (Chronic benzos) [F41.9]  Yes   • GERD (gastroesophageal reflux disease) [K21.9]  Yes   • Diabetes mellitus, type 2 (CMS/HCC) [E11.9]  Yes   • Fibromyalgia [M79.7]  Yes   • COPD (chronic obstructive pulmonary disease) (CMS/[facility]) [J44.9]  Yes   • Migraines [G43.909]  Yes   • History of acute myocardial infarction [I25.2]  Not Applicable   • History of stroke [Z86.73]  Not Applicable   • CAD (coronary artery disease) [I25.10]  Yes      Resolved Hospital Problems   No resolved problems to display.       70 y.o. female  admitted with Ataxia.    Ataxia/dysarthria/lethargy  -neurology has signed off  -Mri no acute  -CT of the head was negative  -Polypharmacy, will hold sedating meds for now  Per patient's family she has had hallucinations while taking medications, unclear what medications they are talking about.  Oriented and appropriate at this time.  Speech is clear.  Denies hallucinations.  PT has evaluated; signed off     Acute kidney injury  Creatinine improved. S/P IVF's; dc'd due to elevated BP. Bumex remains on hold.     Seizure disorder  Keppra and Neurontin doses reduced  Follow up EEG reported as normal in awake/sleep cycles  Follow up Neurologist Dr. Yanez in [place]     Hypertension/dyslipidemia/CAD  Medications being adjusted. Clonidine x 1 given overnight.  Hydralazine increased to 50 mg & lisinopril added.  Noted allergy to amlodipine due to swelling. Bumex on hold. Metoprolol BID; mild bradycardia being monitored.  Ask Nephrology to assist in BP control; appreciate assistance  Diet changed to 2 gm Na restriction  Monitoring orthostatic BP        Type 2 diabetes  Glucose is acceptable/stable  -Accu-Cheks AC with correctional dose insulin  -We will continue long-acting insulin  -A1c 7.2     COPD/obstructive sleep apnea/ chronic resp failure  -Continuous O2 monitoring  -O2 to keep sats above 90, wears O2 at home as needed.   -triology at night at home  -Pulmonology following; likely needs repeat sleep study outpatient     Dysuria:  -Repeat UA negative for infection    Chronic back pain:  -Oxycodone prn      · SCDs for DVT prophylaxis.  · Full code.  · Discussed with patient and Dr. Marina  · Anticipate discharge home, likely tomorrow if BP stable.       JANI Hinton  [facility]  05/20/20  14:11

## 2020-05-20 NOTE — PLAN OF CARE
Patient is doing well. Waiting BM for sample. Meds given. Pain treated with PRN meds. Rm air. CTM. Monitor Vitals    Problem: Fall Risk (Adult)  Goal: Absence of Fall  Outcome: Outcome(s) achieved     Problem: Patient Care Overview  Goal: Plan of Care Review  Outcome: Outcome(s) achieved  Goal: Individualization and Mutuality  Outcome: Outcome(s) achieved  Goal: Discharge Needs Assessment  Outcome: Outcome(s) achieved  Goal: Interprofessional Rounds/Family Conf  Outcome: Outcome(s) achieved     Problem: Pain, Acute (Adult)  Goal: Acceptable Pain Control/Comfort Level  Outcome: Outcome(s) achieved

## 2020-05-20 NOTE — PROGRESS NOTES
[facility]  [phone]  Dong Tinoco MD    Subjective:  LOS: 5    She was able to use auto CPAP last night only 3 hours.  She states it was too uncomfortable.  Denies any respiratory problems.  Denies cough or phlegm or wheezing.  No chest pain.    Objective   Vital Signs past 24hrs    Temp range: Temp (24hrs), Av °F (36.7 °C), Min:97.7 °F (36.5 °C), Max:98.3 °F (36.8 °C)    BP range: BP: (129-200)/() 133/83  Pulse range: Heart Rate:  [53-67] 67  Resp rate range: Resp:  [16] 16    Device (Oxygen Therapy): room airFlow (L/min):  [2] 2  Oxygen range:SpO2:  [82 %-99 %] 99 %      84.2 kg (185 lb 9.6 oz); Body mass index is 30.89 kg/m².    Intake/Output Summary (Last 24 hours) at 2020 1551  Last data filed at 2020 1300  Gross per 24 hour   Intake 960 ml   Output --   Net 960 ml       Physical Exam   Constitutional: She appears well-developed.   HENT:   Head: Normocephalic.   Eyes: Pupils are equal, round, and reactive to light.   Cardiovascular: Normal rate and regular rhythm.   No murmur heard.  Pulmonary/Chest: Effort normal. No respiratory distress. She has decreased breath sounds. She has no wheezes. She has no rales.   Abdominal: Soft. Bowel sounds are normal. She exhibits no mass. There is no tenderness.   Musculoskeletal: She exhibits no edema.   Skin: No rash noted.     Results Review:    I have reviewed the laboratory and imaging data since the last note by [facility] physician.  My annotations are noted in assessment and plan.    Medication Review:  I have reviewed the current MAR.  My annotations are noted in assessment and plan.       Plan   PCCM Problems  Reported diagnosis COPD  Restrictive lung disease on PFTs  Chronic hypoxia, with exertion and sleep  ?  Chronic respiratory failure on trilogy device  ?  Sleep disordered breathing on trilogy device  ?  Obesity hypoventilation  Current cigarette smoker  Relevant Medical Diagnoses  Uncontrolled hypertension  CAD  Peripheral  vascular disease with ICA stent  Seizure disorder  History CVA  Chronic kidney disease  Diabetes type 2  .    Plan of Treatment    Chest x-ray clear of infiltrates etc. She declined ABG.  Unclear if she has obesity hypoventilation/chronic respiratory failure.  She tells me she has a home trilogy device that she purchased.  Again I am unclear if she actually needs this.  I do not see any previous sleep study and she is agreeable for an outpatient study which has therefore been scheduled.    She has underlying COPD suspected.  Will need follow-up in our office with PFTs and I have placed an order for same.    She will continue her oxygen with exertion and sleep as before for now.    She remains a current cigarette smoker a few cigarettes a day.  Advised strongly to quit completely.    No objections to discharge tomorrow.    Dong Tinoco MD  05/20/20  15:51    Part of this note may be an electronic transcription/translation of spoken language to printed text using the Dragon Dictation System.

## 2020-05-20 NOTE — PROGRESS NOTES
"   LOS: 5 days    Patient Care Team:  Abiodun Vila MD as PCP - General (Family Medicine)  Arnoldo Newman MD as PCP - Claims Attributed  Lito Flores MD as Consulting Physician (Cardiology)  Jacky Hernandez MD as Consulting Physician (Infectious Diseases)    Chief Complaint:    Chief Complaint   Patient presents with   • Dizziness     Follow UP GASPER, HTN with orthostasis .  Subjective     Interval History:     Feels much better. BP up last night. Received Tordal for pain and oral clonidine.  Itching on arms better with triamcinolone.  Eating. Not dizzy on standing .  Wore CPAP 2.5 hours last night .  Sleepy.   Review of Systems:   See above.     Objective     Vital Signs  Temp:  [97.7 °F (36.5 °C)-98.3 °F (36.8 °C)] 97.7 °F (36.5 °C)  Heart Rate:  [53-67] 67  Resp:  [16] 16  BP: (129-200)/() 133/83    Flowsheet Rows      First Filed Value   Admission Height  165.1 cm (65\") Documented at 05/15/2020 1504   Admission Weight  85.7 kg (189 lb) Documented at 05/15/2020 1523          I/O this shift:  In: 600 [P.O.:600]  Out: -   I/O last 3 completed shifts:  In: 1260 [P.O.:1260]  Out: -     Intake/Output Summary (Last 24 hours) at 5/20/2020 1524  Last data filed at 5/20/2020 1300  Gross per 24 hour   Intake 960 ml   Output --   Net 960 ml       Physical Exam:  Sitting in chair.   Oral mucosa moist  No scleral icterus.   Neck no jvd   Heart RRR no s3 or rub  Lungs clear to auscultation, no wheezing  Abd + bs, soft, nontender.  Ext no edema  Skin fine erythematous macular rash over upper ext.      Results Review:    Results from last 7 days   Lab Units 05/20/20  0633 05/19/20  0523 05/18/20  0551  05/15/20  1606   SODIUM mmol/L 138 140 141   < > 143   POTASSIUM mmol/L 3.6 3.9 4.1   < > 3.5   CHLORIDE mmol/L 100 98 101   < > 101   CO2 mmol/L 27.1 30.5* 30.1*   < > 28.6   BUN mg/dL 23 14 15   < > 25*   CREATININE mg/dL 0.84 0.75 0.76   < > 2.03*   CALCIUM mg/dL 9.4 9.7 9.2   < > 9.2   BILIRUBIN " mg/dL  --   --   --   --  0.2   ALK PHOS U/L  --   --   --   --  107   ALT (SGPT) U/L  --   --   --   --  13   AST (SGOT) U/L  --   --   --   --  12   GLUCOSE mg/dL 170* 158* 136*   < > 115*    < > = values in this interval not displayed.       Estimated Creatinine Clearance: 67.8 mL/min (by C-G formula based on SCr of 0.84 mg/dL).    Results from last 7 days   Lab Units 05/20/20  0633 05/19/20  0523 05/15/20  1606   MAGNESIUM mg/dL  --  2.2 1.9   PHOSPHORUS mg/dL 3.9 3.6  --        Results from last 7 days   Lab Units 05/19/20  0523   URIC ACID mg/dL 5.5       Results from last 7 days   Lab Units 05/18/20  0551 05/16/20  0454 05/15/20  1606   WBC 10*3/mm3 6.57 7.29 6.79   HEMOGLOBIN g/dL 10.5* 10.5* 11.0*   PLATELETS 10*3/mm3 162 200 191       Results from last 7 days   Lab Units 05/15/20  1607   INR  1.05         Imaging Results (Last 24 Hours)     Procedure Component Value Units Date/Time    XR Chest 2 View [[id number]] Collected:  05/19/20 1949     Updated:  05/19/20 1955    Narrative:       AP AND LATERAL CHEST     HISTORY: Dyspnea, ataxia.     COMPARISON: AP chest 01/01/2020.     FINDINGS: Patient is rotated to the right. There is mild cardiac  enlargement. Mediastinal silhouette is within normal limits allowing for  patient rotation to the right. There is prominence of the right  epicardial fat pad. Lungs appear clear and there is no evidence for  pulmonary edema or pleural effusion or infiltrate. There is mild  endplate spur formation of the thoracic spine.       Impression:       No evidence for active disease in the chest.     This report was finalized on 5/19/2020 7:51 PM by Dr. Steve Brewer M.D.             aspirin 81 mg Oral Daily   atorvastatin 80 mg Oral Daily   budesonide-formoterol 2 puff Inhalation BID   bumetanide 1 mg Oral Daily   clopidogrel 75 mg Oral Daily   gabapentin 300 mg Oral Q12H   hydrALAZINE 50 mg Oral Q8H   hydrocortisone  Topical Q12H   insulin glargine 20 Units Subcutaneous  Nightly   insulin lispro 0-7 Units Subcutaneous TID AC   ipratropium-albuterol 3 mL Nebulization 4x Daily - RT   levETIRAcetam 500 mg Oral BID   lisinopril 10 mg Oral Q24H   metoprolol tartrate 50 mg Oral Q12H   PARoxetine 10 mg Oral QAM   rOPINIRole 0.25 mg Oral Nightly   sodium chloride 10 mL Intravenous Q12H   triamcinolone  Topical Q12H          Medication Review:   Current Facility-Administered Medications   Medication Dose Route Frequency Provider Last Rate Last Dose   • acetaminophen (TYLENOL) tablet 650 mg  650 mg Oral Q4H PRN Ev Finch APRN   650 mg at 05/19/20 2015    Or   • acetaminophen (TYLENOL) 160 MG/5ML solution 650 mg  650 mg Oral Q4H PRN Ev Finch APRN        Or   • acetaminophen (TYLENOL) suppository 650 mg  650 mg Rectal Q4H PRN Ev Finch APRN       • aspirin EC tablet 81 mg  81 mg Oral Daily Helder Luu MD   81 mg at 05/20/20 0851   • atorvastatin (LIPITOR) tablet 80 mg  80 mg Oral Daily Helder Luu MD   80 mg at 05/20/20 0851   • budesonide-formoterol (SYMBICORT) 160-4.5 MCG/ACT inhaler 2 puff  2 puff Inhalation BID Helder Luu MD   2 puff at 05/20/20 0811   • bumetanide (BUMEX) tablet 1 mg  1 mg Oral Daily Nellie Chaidez MD       • clopidogrel (PLAVIX) tablet 75 mg  75 mg Oral Daily Helder Luu MD   75 mg at 05/20/20 0851   • dextrose (D50W) 25 g/ 50mL Intravenous Solution 25 g  25 g Intravenous Q15 Min PRN Ev Finch APRN       • dextrose (GLUTOSE) oral gel 15 g  15 g Oral Q15 Min PRN Ev Finch APRN       • gabapentin (NEURONTIN) capsule 300 mg  300 mg Oral Q12H Nellie Chaidez MD       • glucagon (human recombinant) (GLUCAGEN DIAGNOSTIC) injection 1 mg  1 mg Subcutaneous Q15 Min PRN Stef, Ev M, APRN       • hydrALAZINE (APRESOLINE) tablet 50 mg  50 mg Oral Q8H Lenny Marina MD   50 mg at 05/20/20 1317   • hydrocortisone 1 % cream   Topical Q12H  Helder Luu MD       • hydrOXYzine (ATARAX) tablet 50 mg  50 mg Oral TID PRN Helder Luu MD   50 mg at 05/19/20 2015   • insulin glargine (LANTUS) injection 20 Units  20 Units Subcutaneous Nightly Helder Luu MD   20 Units at 05/19/20 2316   • insulin lispro (humaLOG) injection 0-7 Units  0-7 Units Subcutaneous TID AC Ev Finch APRN   2 Units at 05/20/20 0851   • ipratropium-albuterol (DUO-NEB) nebulizer solution 3 mL  3 mL Nebulization 4x Daily - RT Helder Luu MD   3 mL at 05/20/20 1304   • levETIRAcetam (KEPPRA) tablet 500 mg  500 mg Oral BID Jose De Jesus Bolaños MD   500 mg at 05/20/20 0851   • lisinopril (PRINIVIL,ZESTRIL) tablet 10 mg  10 mg Oral Q24H Lenny Marina MD   10 mg at 05/20/20 1315   • metoprolol tartrate (LOPRESSOR) tablet 50 mg  50 mg Oral Q12H Helder Luu MD   50 mg at 05/20/20 0851   • nitroglycerin (NITROSTAT) SL tablet 0.4 mg  0.4 mg Sublingual Q5 Min PRN Ev Finch APRN       • ondansetron (ZOFRAN) injection 4 mg  4 mg Intravenous Q6H PRN Ev Finch APRN   4 mg at 05/20/20 0852   • oxyCODONE-acetaminophen (PERCOCET) 5-325 MG per tablet 1 tablet  1 tablet Oral Q8H PRN Lenny Marina MD   1 tablet at 05/20/20 1315   • PARoxetine (PAXIL) tablet 10 mg  10 mg Oral QAM Helder Luu MD   10 mg at 05/20/20 0600   • rOPINIRole (REQUIP) tablet 0.25 mg  0.25 mg Oral Nightly Helder Luu MD   0.25 mg at 05/19/20 2015   • sodium chloride 0.9 % flush 10 mL  10 mL Intravenous PRN Jacky Eckert MD       • sodium chloride 0.9 % flush 10 mL  10 mL Intravenous Q12H Ev Finch APRN   10 mL at 05/20/20 0853   • sodium chloride 0.9 % flush 10 mL  10 mL Intravenous PRN Ev Finch APRN       • triamcinolone (KENALOG) 0.1 % ointment   Topical Q12H Nellie Chaidez MD           Assessment/Plan   1. GASPER resolved.  Avoid NSAIDS .  2. HTN not  controlled.  Hydralazine increased and lisinopril added.  Add back bumex po today .  Untreated URIEL makes BP harder to control. Back on CPAP here .  Orthostasis better.    3. DM2  4. Fibromyalgia. Dose of neurontin reduced on admission.  Take down to 300 mg bid since so sleepy .  5. Seizure disorder  6. URIEL .  Is supposed to use trilogy at night. CPAP here for now .  7. Dysuria with history of interstitial cystitis.  Ua few WBC and small Leuk.  Culture pending .  8. Anemia, microcytic, iron deficient.    Dose of iv iron today . Hemoccult stool .          Nellie Chaidez MD  05/20/20  15:24

## 2020-05-21 ENCOUNTER — READMISSION MANAGEMENT (OUTPATIENT)
Dept: CALL CENTER | Facility: HOSPITAL | Age: 70
End: 2020-05-21

## 2020-05-21 VITALS
OXYGEN SATURATION: 96 % | RESPIRATION RATE: 18 BRPM | TEMPERATURE: 97.3 F | BODY MASS INDEX: 30.92 KG/M2 | SYSTOLIC BLOOD PRESSURE: 111 MMHG | WEIGHT: 185.6 LBS | HEART RATE: 56 BPM | HEIGHT: 65 IN | DIASTOLIC BLOOD PRESSURE: 71 MMHG

## 2020-05-21 PROBLEM — R47.1 DYSARTHRIA: Status: RESOLVED | Noted: 2020-05-15 | Resolved: 2020-05-21

## 2020-05-21 PROBLEM — R53.83 LETHARGY: Status: RESOLVED | Noted: 2020-05-15 | Resolved: 2020-05-21

## 2020-05-21 PROBLEM — N17.9 AKI (ACUTE KIDNEY INJURY) (HCC): Status: RESOLVED | Noted: 2018-03-17 | Resolved: 2020-05-21

## 2020-05-21 PROBLEM — R27.0 ATAXIA: Status: RESOLVED | Noted: 2020-05-15 | Resolved: 2020-05-21

## 2020-05-21 LAB
ALBUMIN SERPL-MCNC: 3.8 G/DL (ref 3.5–5.2)
ANION GAP SERPL CALCULATED.3IONS-SCNC: 12.9 MMOL/L (ref 5–15)
BACTERIA SPEC AEROBE CULT: ABNORMAL
BUN BLD-MCNC: 26 MG/DL (ref 8–23)
BUN/CREAT SERPL: 30.6 (ref 7–25)
CALCIUM SPEC-SCNC: 9.2 MG/DL (ref 8.6–10.5)
CHLORIDE SERPL-SCNC: 97 MMOL/L (ref 98–107)
CO2 SERPL-SCNC: 27.1 MMOL/L (ref 22–29)
CREAT BLD-MCNC: 0.85 MG/DL (ref 0.57–1)
DEPRECATED RDW RBC AUTO: 50.9 FL (ref 37–54)
ERYTHROCYTE [DISTWIDTH] IN BLOOD BY AUTOMATED COUNT: 18.5 % (ref 12.3–15.4)
GFR SERPL CREATININE-BSD FRML MDRD: 66 ML/MIN/1.73
GLUCOSE BLD-MCNC: 157 MG/DL (ref 65–99)
GLUCOSE BLDC GLUCOMTR-MCNC: 172 MG/DL (ref 70–130)
GLUCOSE BLDC GLUCOMTR-MCNC: 210 MG/DL (ref 70–130)
HCT VFR BLD AUTO: 35.3 % (ref 34–46.6)
HGB BLD-MCNC: 11 G/DL (ref 12–15.9)
MCH RBC QN AUTO: 24.2 PG (ref 26.6–33)
MCHC RBC AUTO-ENTMCNC: 31.2 G/DL (ref 31.5–35.7)
MCV RBC AUTO: 77.6 FL (ref 79–97)
PHOSPHATE SERPL-MCNC: 4.4 MG/DL (ref 2.5–4.5)
PLATELET # BLD AUTO: 163 10*3/MM3 (ref 140–450)
PMV BLD AUTO: 9.7 FL (ref 6–12)
POTASSIUM BLD-SCNC: 4.1 MMOL/L (ref 3.5–5.2)
RBC # BLD AUTO: 4.55 10*6/MM3 (ref 3.77–5.28)
SARS-COV-2 RNA RESP QL NAA+PROBE: NOT DETECTED
SODIUM BLD-SCNC: 137 MMOL/L (ref 136–145)
URATE SERPL-MCNC: 6.3 MG/DL (ref 2.4–5.7)
WBC NRBC COR # BLD: 8.25 10*3/MM3 (ref 3.4–10.8)

## 2020-05-21 PROCEDURE — 80069 RENAL FUNCTION PANEL: CPT | Performed by: INTERNAL MEDICINE

## 2020-05-21 PROCEDURE — 94799 UNLISTED PULMONARY SVC/PX: CPT

## 2020-05-21 PROCEDURE — 82962 GLUCOSE BLOOD TEST: CPT

## 2020-05-21 PROCEDURE — 63710000001 INSULIN LISPRO (HUMAN) PER 5 UNITS: Performed by: NURSE PRACTITIONER

## 2020-05-21 PROCEDURE — 84550 ASSAY OF BLOOD/URIC ACID: CPT | Performed by: HOSPITALIST

## 2020-05-21 PROCEDURE — 87635 SARS-COV-2 COVID-19 AMP PRB: CPT | Performed by: HOSPITALIST

## 2020-05-21 PROCEDURE — 85027 COMPLETE CBC AUTOMATED: CPT | Performed by: HOSPITALIST

## 2020-05-21 RX ORDER — GABAPENTIN 300 MG/1
300 CAPSULE ORAL EVERY 12 HOURS SCHEDULED
Qty: 14 CAPSULE | Refills: 0 | Status: SHIPPED | OUTPATIENT
Start: 2020-05-21 | End: 2020-05-28

## 2020-05-21 RX ORDER — LEVETIRACETAM 500 MG/1
500 TABLET ORAL 2 TIMES DAILY
Qty: 60 TABLET | Refills: 0 | Status: SHIPPED | OUTPATIENT
Start: 2020-05-21 | End: 2020-09-10 | Stop reason: SDUPTHER

## 2020-05-21 RX ORDER — LISINOPRIL 10 MG/1
10 TABLET ORAL
Qty: 30 TABLET | Refills: 0 | Status: SHIPPED | OUTPATIENT
Start: 2020-05-22 | End: 2020-06-30

## 2020-05-21 RX ORDER — HYDRALAZINE HYDROCHLORIDE 50 MG/1
50 TABLET, FILM COATED ORAL 3 TIMES DAILY
Qty: 90 TABLET | Refills: 0 | Status: SHIPPED | OUTPATIENT
Start: 2020-05-21 | End: 2020-06-30

## 2020-05-21 RX ORDER — SULFAMETHOXAZOLE AND TRIMETHOPRIM 800; 160 MG/1; MG/1
1 TABLET ORAL 2 TIMES DAILY
Qty: 6 TABLET | Refills: 0 | Status: SHIPPED | OUTPATIENT
Start: 2020-05-21 | End: 2020-06-17

## 2020-05-21 RX ADMIN — OXYCODONE HYDROCHLORIDE AND ACETAMINOPHEN 1 TABLET: 5; 325 TABLET ORAL at 08:38

## 2020-05-21 RX ADMIN — PAROXETINE HYDROCHLORIDE 10 MG: 10 TABLET, FILM COATED ORAL at 06:06

## 2020-05-21 RX ADMIN — OXYCODONE HYDROCHLORIDE AND ACETAMINOPHEN 1 TABLET: 5; 325 TABLET ORAL at 01:13

## 2020-05-21 RX ADMIN — INSULIN LISPRO 2 UNITS: 100 INJECTION, SOLUTION INTRAVENOUS; SUBCUTANEOUS at 06:06

## 2020-05-21 RX ADMIN — ASPIRIN 81 MG: 81 TABLET, COATED ORAL at 08:38

## 2020-05-21 RX ADMIN — GABAPENTIN 300 MG: 300 CAPSULE ORAL at 08:38

## 2020-05-21 RX ADMIN — IPRATROPIUM BROMIDE AND ALBUTEROL SULFATE 3 ML: .5; 3 SOLUTION RESPIRATORY (INHALATION) at 15:58

## 2020-05-21 RX ADMIN — CLOPIDOGREL 75 MG: 75 TABLET, FILM COATED ORAL at 08:38

## 2020-05-21 RX ADMIN — INSULIN LISPRO 3 UNITS: 100 INJECTION, SOLUTION INTRAVENOUS; SUBCUTANEOUS at 11:59

## 2020-05-21 RX ADMIN — IPRATROPIUM BROMIDE AND ALBUTEROL SULFATE 3 ML: .5; 3 SOLUTION RESPIRATORY (INHALATION) at 11:50

## 2020-05-21 RX ADMIN — ATORVASTATIN CALCIUM 80 MG: 80 TABLET, FILM COATED ORAL at 08:38

## 2020-05-21 RX ADMIN — ACETAMINOPHEN 650 MG: 325 TABLET, FILM COATED ORAL at 16:25

## 2020-05-21 RX ADMIN — TRIAMCINOLONE ACETONIDE: 1 OINTMENT TOPICAL at 08:38

## 2020-05-21 RX ADMIN — BUDESONIDE AND FORMOTEROL FUMARATE DIHYDRATE 2 PUFF: 160; 4.5 AEROSOL RESPIRATORY (INHALATION) at 08:36

## 2020-05-21 RX ADMIN — LEVETIRACETAM 500 MG: 500 TABLET, FILM COATED ORAL at 08:38

## 2020-05-21 RX ADMIN — HYDRALAZINE HYDROCHLORIDE 50 MG: 50 TABLET, FILM COATED ORAL at 06:06

## 2020-05-21 RX ADMIN — METOPROLOL TARTRATE 50 MG: 50 TABLET, FILM COATED ORAL at 08:38

## 2020-05-21 RX ADMIN — BUMETANIDE 1 MG: 1 TABLET ORAL at 08:38

## 2020-05-21 RX ADMIN — HYDROCORTISONE: 1 CREAM TOPICAL at 08:38

## 2020-05-21 RX ADMIN — ACETAMINOPHEN 650 MG: 325 TABLET, FILM COATED ORAL at 06:06

## 2020-05-21 RX ADMIN — LISINOPRIL 10 MG: 10 TABLET ORAL at 08:38

## 2020-05-21 RX ADMIN — SODIUM CHLORIDE, PRESERVATIVE FREE 10 ML: 5 INJECTION INTRAVENOUS at 08:38

## 2020-05-21 NOTE — DISCHARGE SUMMARY
[facility]    Date of Discharge:  5/21/2020    PCP: Abiodun Vila MD    Discharge Diagnosis:   Active Hospital Problems    Diagnosis  POA   • Polypharmacy [Z79.899]  Not Applicable   • Seizure disorder (CMS/Formerly [facility]) [G40.909]  Yes   • URIEL (obstructive sleep apnea) [G47.33]  No   • Dyslipidemia [E78.5]  Yes   • Hypertension [I10]  Yes   • Anxiety (Chronic benzos) [F41.9]  Yes   • GERD (gastroesophageal reflux disease) [K21.9]  Yes   • Diabetes mellitus, type 2 (CMS/[facility]) [E11.9]  Yes   • Fibromyalgia [M79.7]  Yes   • COPD (chronic obstructive pulmonary disease) (CMS/Formerly [facility]) [J44.9]  Yes   • Migraines [G43.909]  Yes   • History of acute myocardial infarction [I25.2]  Not Applicable   • History of stroke [Z86.73]  Not Applicable   • CAD (coronary artery disease) [I25.10]  Yes      Resolved Hospital Problems    Diagnosis Date Resolved POA   • **Ataxia [R27.0] 05/21/2020 Yes   • Dysarthria [R47.1] 05/21/2020 Unknown   • Lethargy [R53.83] 05/21/2020 Unknown   • GASPER (acute kidney injury) (CMS/Formerly [facility]) [N17.9] 05/21/2020 Yes     Procedures Performed  none     Consults     Date and Time Order Name Status Description    5/19/2020 1635 Inpatient Pulmonology Consult Completed     5/18/2020 1147 Inpatient Nephrology Consult Completed     5/15/2020 2054 Inpatient Neurology Consult Stroke Completed     5/15/2020 1800 [facility] (on-call MD unless specified) Details Completed         Hospital Course  Please see history and physical for details. Patient is a 70 y.o. female that initially presented to the hospital from her ILF for ataxia and slurred speech. She was found to have GASPER on admission with a creatinine of 2.0 and admitted for further work up. She was seen in consultation by Neurology given her history of stroke. MRI was performed and showed mild chronic white matter disease and chronic right inferior cerebellar stroke. Neurology felt her symptoms were likely related to  gabapentin toxicity with reduced renal function and possible exacerbation of her chronic stroke from dehydration. Her gabapentin was reduced as well as her Keppra. Neurology signed off. She can follow up with her Neurologist, Dr. Yanez in [place] for further outpatient monitoring.   She was seen in consultation by Nephrology. She was initially given fluids and her diuretic was held. Her renal function improved and her Bumex was restarted yesterday given her uncontrolled/labile HTN. This was felt worsening due to her untreated URIEL. Her hydralazine was increased and lisinopril added. Her BP have much improved and HA has resolved. Nephrology is okay with her discharge today.    Given her chronic hypoxic respiratory failure and history of URIEL, Pulmonology was consulted. They wanted ABGs, but the patient refused. They were unsure if she has obesity hypoventilation or chronic respiratory failure. She reported a home trilogy device, but Pulmonology could not find evidence of a formal sleep study to ensure she needed this. They would like her to follow up outpatient for this as well as PFTs given her suspected underlying COPD. She was encouraged to stop smoking. They are okay with her discharge today.   The patient feels eager and ready to return to her ILF today. COVID19 will be checked according to facility guidelines. She denies any new cough/fever/chills or shortness of breath from her baseline. She will continue her home oxygen for MOORE as well as sleep. She denies any chest pain, nausea, vomiting or diarrhea. She understands the recommended follow up as above. Her sugars remained stable while inpatient and her A1c is 7.2% on 5/16/20. She can resume her home diabetes regimen. PT evaluated and she is clear to return to her facility.    I discussed the patient's findings and my recommendations with patient, nursing staff and Dr. Marina.    Condition on Discharge: Improved.     Temp:  [97.2 °F (36.2 °C)-98 °F (36.7 °C)]  97.2 °F (36.2 °C)  Heart Rate:  [52-67] 55  Resp:  [16-20] 20  BP: (130-193)/() 130/65  Body mass index is 30.89 kg/m².    Physical Exam   Constitutional: She is oriented to person, place, and time. She appears well-developed and well-nourished. No distress.   HENT:   Head: Normocephalic and atraumatic.   Nose: Nose normal.   Mouth/Throat: Oropharynx is clear and moist.   Eyes: Conjunctivae are normal. Right eye exhibits no discharge. Left eye exhibits no discharge.   Neck: Normal range of motion. Neck supple.   Cardiovascular: Normal rate, regular rhythm, normal heart sounds and intact distal pulses.   Pulmonary/Chest: Effort normal and breath sounds normal. No respiratory distress.   Abdominal: Soft. Bowel sounds are normal. She exhibits no distension. There is no tenderness.   Musculoskeletal: Normal range of motion. She exhibits no edema or tenderness.   Neurological: She is alert and oriented to person, place, and time. She exhibits normal muscle tone. Coordination normal.   Skin: Skin is warm and dry. She is not diaphoretic. No erythema.   Psychiatric: She has a normal mood and affect. Her behavior is normal.   Nursing note and vitals reviewed.       Discharge Medications      New Medications      Instructions Start Date   gabapentin 300 MG capsule  Commonly known as:  NEURONTIN  Replaces:  gabapentin 800 MG tablet   300 mg, Oral, Every 12 Hours Scheduled      lisinopril 10 MG tablet  Commonly known as:  PRINIVIL,ZESTRIL   10 mg, Oral, Every 24 Hours Scheduled   Start Date:  May 22, 2020     zolpidem 5 MG tablet  Commonly known as:  Ambien   Bring to sleep lab DO NOT use at home. PLEASE take if not asleep within 30 mins of start of study.         Changes to Medications      Instructions Start Date   hydrALAZINE 50 MG tablet  Commonly known as:  APRESOLINE  What changed:    · medication strength  · how much to take   50 mg, Oral, 3 Times Daily      levETIRAcetam 500 MG tablet  Commonly known as:   KEPPRA  What changed:    · medication strength  · how much to take   500 mg, Oral, 2 Times Daily      Toujeo SoloStar 300 UNIT/ML solution pen-injector injection  Generic drug:  Insulin Glargine (1 Unit Dial)  What changed:  how much to take   Use 25 Units at night         Continue These Medications      Instructions Start Date   acetaminophen 325 MG tablet  Commonly known as:  TYLENOL   650 mg, Oral, Every 4 Hours PRN      albuterol sulfate  (90 Base) MCG/ACT inhaler  Commonly known as:  PROVENTIL HFA;VENTOLIN HFA;PROAIR HFA   2 puffs, Inhalation, Every 4 Hours PRN      aspirin 81 MG EC tablet   81 mg, Oral, Daily      atorvastatin 80 MG tablet  Commonly known as:  LIPITOR   take 1 tablet by mouth once daily      bumetanide 1 MG tablet  Commonly known as:  BUMEX   1 mg, Oral, Daily With Breakfast      clopidogrel 75 MG tablet  Commonly known as:  PLAVIX   take 1 tablet by mouth once daily      clotrimazole-betamethasone 1-0.05 % cream  Commonly known as:  LOTRISONE   Topical, As Needed      hydrOXYzine 50 MG tablet  Commonly known as:  ATARAX   50 mg, Oral, Every 8 Hours PRN      Insulin aspart 100 UNIT/ML solution pen-injector injection pen  Commonly known as:  FIASP FLEXTOUCH   No dose, route, or frequency recorded.      ipratropium-albuterol 0.5-2.5 mg/3 ml nebulizer  Commonly known as:  DUO-NEB   USE 3 ML VIA NEBULIZER FOUR TIMES DAILY      metoprolol tartrate 50 MG tablet  Commonly known as:  LOPRESSOR   50 mg, Oral, Every 12 Hours Scheduled      nitroglycerin 0.4 MG SL tablet  Commonly known as:  NITROSTAT   0.4 mg, Sublingual, Every 5 Minutes PRN      nystatin 691266 UNIT/GM ointment  Commonly known as:  MYCOSTATIN   Topical, 2 Times Daily      nystatin [id number] UNIT/ML suspension  Commonly known as:  MYCOSTATIN   500,000 Units, Oral, 4 Times Daily      ondansetron 8 MG tablet  Commonly known as:  Zofran   8 mg, Oral, Every 8 Hours PRN      PARoxetine 10 MG tablet  Commonly known as:  Paxil   10 mg,  Oral, Every Morning      PROBIOTIC DAILY PO   1 tablet, Oral, Daily      rOPINIRole 0.25 MG tablet  Commonly known as:  REQUIP   TK 1 T PO Q NIGHT 1 TO 3 H B BED      Symbicort 160-4.5 MCG/ACT inhaler  Generic drug:  budesonide-formoterol   2 puffs, Inhalation, 2 Times Daily - RT      budesonide-formoterol 160-4.5 MCG/ACT inhaler  Commonly known as:  SYMBICORT   2 puffs, Inhalation, 2 Times Daily         Stop These Medications    CeleBREX 100 MG capsule  Generic drug:  celecoxib     gabapentin 800 MG tablet  Commonly known as:  NEURONTIN  Replaced by:  gabapentin 300 MG capsule     tiZANidine 4 MG tablet  Commonly known as:  ZANAFLEX             Additional Instructions for the Follow-ups that You Need to Schedule     Discharge Follow-up with PCP   As directed       Currently Documented PCP:    Abiodun Vila MD    PCP Phone Number:    [phone]     Follow Up Details:  see in 1-2 weeks         Discharge Follow-up with Specified Provider: Dr. Yanez with Neurology   As directed      To:  Dr. Yanez with Neurology    Follow Up Details:  as previously scheduled         Discharge Follow-up with Specified Provider: Dr. Tinoco with Pulmonology; 1 Month   As directed      To:  Dr. Tinoco with Pulmonology    Follow Up:  1 Month           Follow-up Information     Melissa Garcia APRN. Schedule an appointment as soon as possible for a visit in 1 month(s).    Specialties:  Pulmonary Disease, Sleep Medicine  Why:  Dr. Earnest DUGGAN With pft's on arrival  Contact information:  [address] [facility] [facility] [address]  [facility] [id number] [phone]             Abiodun Vila MD .    Specialty:  Family Medicine  Why:  see in 1-2 weeks  Contact information:  [address] [facility] [address]  [place] [address]  [phone]                 Test Results Pending at Discharge   Order Current Status    Urine Culture - Urine, Urine, Clean Catch Preliminary result         JANI Cooper  05/21/20  11:57    Discharge time spent  greater than 30 minutes.

## 2020-05-21 NOTE — PROGRESS NOTES
"   LOS: 6 days    Patient Care Team:  Abiodun Vila MD as PCP - General (Family Medicine)  Arnoldo Newman MD as PCP - Claims Attributed  Lito Flores MD as Consulting Physician (Cardiology)  Jacky Hernandez MD as Consulting Physician (Infectious Diseases)    Chief Complaint:    Chief Complaint   Patient presents with   • Dizziness     Follow-up acute kidney injury, hypertension and orthostasis  Subjective     Interval History:   Patient is feeling much better, her blood pressure has improved has 1 or 2 spikes yesterday but overall blood pressure is stable and reasonably controlled.  Denies any chest pain or shortness of air no nausea or vomiting, she is complaining of left thigh pain, no lower extremity edema, on her food tray so bag of potato chips so her salt is not restricted.     Review of Systems:   As noted above    Objective     Vital Signs  Temp:  [97.2 °F (36.2 °C)-98 °F (36.7 °C)] 97.2 °F (36.2 °C)  Heart Rate:  [52-67] 54  Resp:  [16-18] 18  BP: (129-193)/() 130/65    Flowsheet Rows      First Filed Value   Admission Height  165.1 cm (65\") Documented at 05/15/2020 1504   Admission Weight  85.7 kg (189 lb) Documented at 05/15/2020 1523          No intake/output data recorded.  I/O last 3 completed shifts:  In: 1140 [P.O.:1140]  Out: 400 [Urine:400]    Intake/Output Summary (Last 24 hours) at 5/21/2020 0839  Last data filed at 5/20/2020 2021  Gross per 24 hour   Intake 1140 ml   Output 400 ml   Net 740 ml       Physical Exam:  General Appearance: alert, oriented x 3, no acute distress, obese  Skin: warm and dry  HEENT: pupils round and reactive to light, oral mucosa normal,   Neck: supple, no JVD, trachea midline  Lungs: CTA, unlabored breathing effort  Heart: RRR, normal S1 and S2, no S3, no rub  Abdomen: soft, non-tender,  present bowel sounds to auscultation  : no palpable bladder,  Extremities: no edema, cyanosis or clubbing  Neuro: normal speech and mental status  . "      Results Review:    Results from last 7 days   Lab Units 05/21/20  0526 05/20/20  0633 05/19/20  0523  05/15/20  1606   SODIUM mmol/L 137 138 140   < > 143   POTASSIUM mmol/L 4.1 3.6 3.9   < > 3.5   CHLORIDE mmol/L 97* 100 98   < > 101   CO2 mmol/L 27.1 27.1 30.5*   < > 28.6   BUN mg/dL 26* 23 14   < > 25*   CREATININE mg/dL 0.85 0.84 0.75   < > 2.03*   CALCIUM mg/dL 9.2 9.4 9.7   < > 9.2   BILIRUBIN mg/dL  --   --   --   --  0.2   ALK PHOS U/L  --   --   --   --  107   ALT (SGPT) U/L  --   --   --   --  13   AST (SGOT) U/L  --   --   --   --  12   GLUCOSE mg/dL 157* 170* 158*   < > 115*    < > = values in this interval not displayed.       Estimated Creatinine Clearance: 67 mL/min (by C-G formula based on SCr of 0.85 mg/dL).    Results from last 7 days   Lab Units 05/21/20 0526 05/20/20  0633 05/19/20  0523 05/15/20  1606   MAGNESIUM mg/dL  --   --  2.2 1.9   PHOSPHORUS mg/dL 4.4 3.9 3.6  --        Results from last 7 days   Lab Units 05/21/20 0526 05/19/20  0523   URIC ACID mg/dL 6.3* 5.5       Results from last 7 days   Lab Units 05/21/20  0526 05/18/20  0551 05/16/20  0454 05/15/20  1606   WBC 10*3/mm3 8.25 6.57 7.29 6.79   HEMOGLOBIN g/dL 11.0* 10.5* 10.5* 11.0*   PLATELETS 10*3/mm3 163 162 200 191       Results from last 7 days   Lab Units 05/15/20  1607   INR  1.05         Imaging Results (Last 24 Hours)     ** No results found for the last 24 hours. **          aspirin 81 mg Oral Daily   atorvastatin 80 mg Oral Daily   budesonide-formoterol 2 puff Inhalation BID   bumetanide 1 mg Oral Daily   clopidogrel 75 mg Oral Daily   gabapentin 300 mg Oral Q12H   hydrALAZINE 50 mg Oral Q8H   hydrocortisone  Topical Q12H   insulin glargine 20 Units Subcutaneous Nightly   insulin lispro 0-7 Units Subcutaneous TID AC   ipratropium-albuterol 3 mL Nebulization 4x Daily - RT   levETIRAcetam 500 mg Oral BID   lisinopril 10 mg Oral Q24H   metoprolol tartrate 50 mg Oral Q12H   PARoxetine 10 mg Oral QAM   rOPINIRole 0.25  mg Oral Nightly   sodium chloride 10 mL Intravenous Q12H   triamcinolone  Topical Q12H          Medication Review:   Current Facility-Administered Medications   Medication Dose Route Frequency Provider Last Rate Last Dose   • acetaminophen (TYLENOL) tablet 650 mg  650 mg Oral Q4H PRN Ev Finch APRN   650 mg at 05/21/20 0606    Or   • acetaminophen (TYLENOL) 160 MG/5ML solution 650 mg  650 mg Oral Q4H PRN Ev Finch APRN        Or   • acetaminophen (TYLENOL) suppository 650 mg  650 mg Rectal Q4H PRN Ev Finch APRN       • aspirin EC tablet 81 mg  81 mg Oral Daily Helder Luu MD   81 mg at 05/21/20 0838   • atorvastatin (LIPITOR) tablet 80 mg  80 mg Oral Daily Helder Luu MD   80 mg at 05/21/20 0838   • budesonide-formoterol (SYMBICORT) 160-4.5 MCG/ACT inhaler 2 puff  2 puff Inhalation BID Helder Luu MD   2 puff at 05/21/20 0836   • bumetanide (BUMEX) tablet 1 mg  1 mg Oral Daily Nellie Chaidez MD   1 mg at 05/21/20 0838   • clopidogrel (PLAVIX) tablet 75 mg  75 mg Oral Daily Helder Luu MD   75 mg at 05/21/20 0838   • dextrose (D50W) 25 g/ 50mL Intravenous Solution 25 g  25 g Intravenous Q15 Min PRN Ev Finch APRN       • dextrose (GLUTOSE) oral gel 15 g  15 g Oral Q15 Min PRN Ev Finch APRN       • gabapentin (NEURONTIN) capsule 300 mg  300 mg Oral Q12H Nellie Chaidez MD   300 mg at 05/21/20 0838   • glucagon (human recombinant) (GLUCAGEN DIAGNOSTIC) injection 1 mg  1 mg Subcutaneous Q15 Min PRN Ev Finch APRN       • hydrALAZINE (APRESOLINE) tablet 50 mg  50 mg Oral Q8H Lenny Marina MD   50 mg at 05/21/20 0606   • hydrocortisone 1 % cream   Topical Q12H Helder Luu MD       • hydrOXYzine (ATARAX) tablet 50 mg  50 mg Oral TID PRN Helder Luu MD   50 mg at 05/19/20 2015   • insulin glargine (LANTUS) injection 20 Units  20 Units Subcutaneous  Nightly Helder Luu MD   20 Units at 05/20/20 2021   • insulin lispro (humaLOG) injection 0-7 Units  0-7 Units Subcutaneous TID AC Ev Finch APRN   2 Units at 05/21/20 0606   • ipratropium-albuterol (DUO-NEB) nebulizer solution 3 mL  3 mL Nebulization 4x Daily - RT Helder Luu MD   3 mL at 05/20/20 1617   • levETIRAcetam (KEPPRA) tablet 500 mg  500 mg Oral BID Jose De Jesus Bolaños MD   500 mg at 05/21/20 0838   • lisinopril (PRINIVIL,ZESTRIL) tablet 10 mg  10 mg Oral Q24H Lenny Marina MD   10 mg at 05/21/20 0838   • metoprolol tartrate (LOPRESSOR) tablet 50 mg  50 mg Oral Q12H Helder Luu MD   50 mg at 05/21/20 0838   • nitroglycerin (NITROSTAT) SL tablet 0.4 mg  0.4 mg Sublingual Q5 Min PRN Ev Finch APRN       • ondansetron (ZOFRAN) injection 4 mg  4 mg Intravenous Q6H PRN Ev Finch APRN   4 mg at 05/20/20 0852   • oxyCODONE-acetaminophen (PERCOCET) 5-325 MG per tablet 1 tablet  1 tablet Oral Q8H PRN Lenny Marina MD   1 tablet at 05/21/20 0838   • PARoxetine (PAXIL) tablet 10 mg  10 mg Oral QAM Helder Luu MD   10 mg at 05/21/20 0606   • rOPINIRole (REQUIP) tablet 0.25 mg  0.25 mg Oral Nightly Helder Luu MD   0.25 mg at 05/20/20 2021   • sodium chloride 0.9 % flush 10 mL  10 mL Intravenous PRN Jacky Eckert MD       • sodium chloride 0.9 % flush 10 mL  10 mL Intravenous Q12H Ev Finch APRN   10 mL at 05/21/20 0838   • sodium chloride 0.9 % flush 10 mL  10 mL Intravenous PRN Ev Finch APRN       • triamcinolone (KENALOG) 0.1 % ointment   Topical Q12H Nellie Chaidez MD           Assessment/Plan   1. GASPER resolved.  Avoid NSAIDS .  2.  Hypertension much better controlled, has normal electrolytes, there is no significant orthostatic changes at this time.  Need to continue the same current treatment.  And I encouraged the patient to have her sleep apnea treated  continuously but that will make it more difficult to control blood pressure if untreated.   3. DM2  4. Fibromyalgia. Dose of neurontin reduced on admission.    5. Seizure disorder  6. URIEL .  Is supposed to use trilogy at night. CPAP here for now .  7. Dysuria with history of interstitial cystitis.  No significant finding of pyuria based on the urinalysis  8. Anemia, microcytic, iron deficient.    Hemoglobin today is 11, the patient received IV iron      Plan:  1.  Continue the same treatment  2.  Continue to monitor orthostatic blood pressure  3.  Surveillance labs          Chaitanya Peña MD  05/21/20  08:39

## 2020-05-21 NOTE — OUTREACH NOTE
Prep Survey      Responses   List of hospitals in [place] patient discharged from?  [place]   Is LACE score < 7 ?  No   Eligibility  [facility]   Date of Admission  05/15/20   Date of Discharge  05/21/20   Discharge Disposition  Home or Self Care   Discharge diagnosis  Ataxia Polypharmacy GASPER   COVID-19 Test Status  Negative   Does the patient have one of the following disease processes/diagnoses(primary or secondary)?  Other   Does the patient have Home health ordered?  No   Is there a DME ordered?  No   Prep survey completed?  Yes          Zuleika Sue RN

## 2020-05-21 NOTE — PROGRESS NOTES
[facility]  [phone]  Dong Tinoco MD    Subjective:  LOS: 6    She did not use her CPAP last night.  She denies cough or phlegm.  Her shortness of breath is at baseline.    Objective   Vital Signs past 24hrs    Temp range: Temp (24hrs), Av.6 °F (36.4 °C), Min:97.2 °F (36.2 °C), Max:98 °F (36.7 °C)    BP range: BP: (130-193)/(65-96) 130/65  Pulse range: Heart Rate:  [52-67] 55  Resp rate range: Resp:  [16-20] 20    Device (Oxygen Therapy): room airFlow (L/min):  [2] 2  Oxygen range:SpO2:  [88 %-99 %] 96 %      84.2 kg (185 lb 9.6 oz); Body mass index is 30.89 kg/m².    Intake/Output Summary (Last 24 hours) at 2020 1221  Last data filed at 2020  Gross per 24 hour   Intake 900 ml   Output 400 ml   Net 500 ml       Physical Exam   Constitutional: She appears well-developed.   HENT:   Head: Normocephalic.   Eyes: Pupils are equal, round, and reactive to light.   Cardiovascular: Normal rate and regular rhythm.   No murmur heard.  Pulmonary/Chest: Effort normal. No respiratory distress. She has decreased breath sounds. She has no wheezes. She has no rales.   Abdominal: Soft. Bowel sounds are normal. She exhibits no mass. There is no tenderness.   Musculoskeletal: She exhibits no edema.   Skin: No rash noted.     Results Review:    I have reviewed the laboratory and imaging data since the last note by [facility] physician.  My annotations are noted in assessment and plan.    Medication Review:  I have reviewed the current MAR.  My annotations are noted in assessment and plan.       Plan   PCC Problems  Reported diagnosis COPD  Restrictive lung disease on PFTs  Chronic hypoxia, with exertion and sleep  ?  Chronic respiratory failure on trilogy device  ?  Sleep disordered breathing on trilogy device  ?  Obesity hypoventilation  Current cigarette smoker  Relevant Medical Diagnoses  Uncontrolled hypertension  CAD  Peripheral vascular disease with ICA stent  Seizure disorder  History CVA  Chronic  kidney disease  Diabetes type 2  .    Plan of Treatment    Chest x-ray clear of infiltrates etc. She declined ABG.  Unclear if she has obesity hypoventilation/chronic respiratory failure.  She tells me she has a home trilogy device that she purchased.  Again I am unclear if she actually needs this.  I do not see any previous sleep study and she is agreeable for an outpatient study which has therefore been scheduled.    She has underlying COPD suspected.  Will need follow-up in our office with PFTs and I have placed an order for same.    She will continue her oxygen with exertion and sleep as before for now.    She remains a current cigarette smoker a few cigarettes a day.  Advised strongly to quit completely.    No objections to discharge today.    Dong Tinoco MD  05/21/20  12:21    Part of this note may be an electronic transcription/translation of spoken language to printed text using the Dragon Dictation System.

## 2020-05-21 NOTE — PLAN OF CARE
VSS. BP mildly improved. Bradycardic while sleeping. Waiting for stool sample. Hopeful discharge today. Will CTM

## 2020-05-21 NOTE — PROGRESS NOTES
Continued Stay Note  [facility]     Patient Name: Vidhi Lopez  MRN: [id number]  Today's Date: 5/21/2020    Admit Date: 5/15/2020    Discharge Plan     Row Name 05/21/20 1350       Plan    Plan  Home back to [place] at [facility] via [facility], denies any other dc needs    Patient/Family in Agreement with Plan  yes    Plan Comments  Spoke with pt at bedside, plan is for dc home back to [facility] [place]. She states they will transport her back home. IMM given. angelia lam/ccp        Discharge Codes    No documentation.       Expected Discharge Date and Time     Expected Discharge Date Expected Discharge Time    May 21, 2020             Love Thomas, RN

## 2020-05-22 ENCOUNTER — TRANSITIONAL CARE MANAGEMENT TELEPHONE ENCOUNTER (OUTPATIENT)
Dept: CALL CENTER | Facility: HOSPITAL | Age: 70
End: 2020-05-22

## 2020-05-22 NOTE — OUTREACH NOTE
Call Center TCM Note      Responses   [facility] patient discharged from?  [place]   COVID-19 Test Status  Negative   Does the patient have one of the following disease processes/diagnoses(primary or secondary)?  Other   TCM attempt successful?  No   Unsuccessful attempts  Attempt 1          Nasrin Aquino RN    5/22/2020, 10:28

## 2020-05-22 NOTE — OUTREACH NOTE
Call Center TCM Note      Responses   [facility]   COVID-19 Test Status  Negative   Does the patient have one of the following disease processes/diagnoses(primary or secondary)?  Other   TCM attempt successful?  Yes   Call start time  1328   Call Status Comments  Pt is a retired RN   Call end time  1337   General alerts for this patient  Pt lives in independent living facility    Discharge diagnosis  Ataxia Polypharmacy GASPER   Meds reviewed with patient/caregiver?  Yes   Is the patient having any side effects they believe may be caused by any medication additions or changes?  No   Does the patient have all medications ordered at discharge?  No   What is keeping the patient from filling the prescriptions?  -- [Pt has been to pharmacy to  meds]   Nursing Interventions  Nurse provided patient education   Is the patient taking all medications as directed (includes completed medication regime)?  No   What is preventing the patient from taking all medications as directed?  Other [She arrived home later in the afternoon. She will pick meds up soon. ]   Comments regarding appointments  Sleep study appt on 6/18/20,  Pt is aware that appt with Mary Craft, needs to be scheduled in a month.   Does the patient have a primary care provider?   Yes   Does the patient have an appointment with their PCP within 7 days of discharge?  Yes   Comments regarding PCP  PCP appt 5/28/20   Has the patient kept scheduled appointments due by today?  N/A   DME comments  Pt has a trilogy machine   Pulse Ox monitoring  Intermittent   Pulse Ox device source  Patient   O2 Sat education comments  Pt uses 2-3 LPM of O2 at night and PRN during the day   Psychosocial issues?  No   Did the patient receive a copy of their discharge instructions?  Yes   Nursing interventions  Reviewed instructions with patient   What is the patient's perception of their health status since discharge?   Improving   Is the patient/caregiver able to teach back signs and symptoms related to disease process for when to call PCP?  Yes   Is the patient/caregiver able to teach back signs and symptoms related to disease process for when to call 911?  Yes   Is the patient/caregiver able to teach back the hierarchy of who to call/visit for symptoms/problems? PCP, Specialist, Home health nurse, Urgent Care, ED, 911  Yes   TCM call completed?  Yes          Nsarin Aquino RN    5/22/2020, 13:38

## 2020-05-25 NOTE — PROGRESS NOTES
Case Management Discharge Note      Final Note: Home to [place] at [facility] via [facility].    Provided Post Acute Provider List?: Refused    Destination      No service has been selected for the patient.      Durable Medical Equipment      No service has been selected for the patient.      Dialysis/Infusion      No service has been selected for the patient.      Home Medical Care      No service has been selected for the patient.      Therapy      No service has been selected for the patient.      Community Resources      No service has been selected for the patient.        Transportation Services  W/C Van: Other([facility])    Final Discharge Disposition Code: 01 - home or self-care

## 2020-05-28 ENCOUNTER — DOCUMENTATION (OUTPATIENT)
Dept: PULMONOLOGY | Facility: CLINIC | Age: 70
End: 2020-05-28

## 2020-05-28 RX ORDER — IPRATROPIUM BROMIDE AND ALBUTEROL SULFATE 2.5; .5 MG/3ML; MG/3ML
3 SOLUTION RESPIRATORY (INHALATION)
Qty: 360 ML | Refills: 0 | Status: SHIPPED | OUTPATIENT
Start: 2020-05-28 | End: 2020-09-09 | Stop reason: SDUPTHER

## 2020-05-28 NOTE — TELEPHONE ENCOUNTER
Fax refill request for Rx Duo-Neb approved for 30 day supply. Pt will need to schedule an apt before any further refills. Pt last seen on 1/25/19.

## 2020-05-29 ENCOUNTER — READMISSION MANAGEMENT (OUTPATIENT)
Dept: CALL CENTER | Facility: HOSPITAL | Age: 70
End: 2020-05-29

## 2020-05-29 NOTE — OUTREACH NOTE
Medical Week 2 Survey      Responses   [facility] patient discharged from?  [place]   COVID-19 Test Status  Negative   Does the patient have one of the following disease processes/diagnoses(primary or secondary)?  Other   Week 2 attempt successful?  No   Unsuccessful attempts  Attempt 1          Flores Howe RN

## 2020-06-02 ENCOUNTER — READMISSION MANAGEMENT (OUTPATIENT)
Dept: CALL CENTER | Facility: HOSPITAL | Age: 70
End: 2020-06-02

## 2020-06-02 ENCOUNTER — TELEPHONE (OUTPATIENT)
Dept: FAMILY MEDICINE CLINIC | Facility: CLINIC | Age: 70
End: 2020-06-02

## 2020-06-02 NOTE — OUTREACH NOTE
Medical Week 2 Survey      Responses   [facility] patient discharged from?  [place]   COVID-19 Test Status  Negative   Does the patient have one of the following disease processes/diagnoses(primary or secondary)?  Other   Week 2 attempt successful?  No   Unsuccessful attempts  Attempt 2          Flores Howe RN

## 2020-06-02 NOTE — TELEPHONE ENCOUNTER
Patient called in to schedule a hospital follow up...she's needing a re-fill on medication they prescribed to her.    Please call back [phone]

## 2020-06-04 ENCOUNTER — TELEPHONE (OUTPATIENT)
Dept: FAMILY MEDICINE CLINIC | Facility: CLINIC | Age: 70
End: 2020-06-04

## 2020-06-04 DIAGNOSIS — L50.9 URTICARIA: Primary | ICD-10-CM

## 2020-06-04 DIAGNOSIS — R21 RASH: Primary | ICD-10-CM

## 2020-06-04 RX ORDER — CIMETIDINE 300 MG/1
300 TABLET, FILM COATED ORAL 4 TIMES DAILY
Qty: 120 TABLET | Refills: 1 | Status: SHIPPED | OUTPATIENT
Start: 2020-06-04 | End: 2020-08-21

## 2020-06-04 RX ORDER — CLOTRIMAZOLE AND BETAMETHASONE DIPROPIONATE 10; .64 MG/G; MG/G
CREAM TOPICAL 2 TIMES DAILY
Qty: 45 G | Refills: 1 | Status: SHIPPED | OUTPATIENT
Start: 2020-06-04 | End: 2021-04-19 | Stop reason: ALTCHOICE

## 2020-06-04 NOTE — TELEPHONE ENCOUNTER
The pt returned my call. She stated she meant the 300mg tablet of the cimetidine. She was hoping to try that with the cream, to hold her over until she sees the dermatologist.

## 2020-06-04 NOTE — TELEPHONE ENCOUNTER
Wants to know if a script can be sent to her pharm for the rash & itching her appt with the dermatology is not for another 2 weeks, she believes the last thing was Kenalog cream & another cream

## 2020-06-04 NOTE — TELEPHONE ENCOUNTER
I spoke in depth with the patient. She stated the 2 creams that seemed to work the best were the    1. Clotrimazole-bethamethasone  (Lortisol) cream  1-0.05%    2. Hydrocortasone cream 1%maximum strength    Also, there is a pill that helps with the itching she was hoping to get as well. The name of it is cimetidine 30mg.     She stated she is having extreme itching and is currently taking the hydroxyzine.

## 2020-06-08 ENCOUNTER — READMISSION MANAGEMENT (OUTPATIENT)
Dept: CALL CENTER | Facility: HOSPITAL | Age: 70
End: 2020-06-08

## 2020-06-08 NOTE — OUTREACH NOTE
Medical Week 3 Survey      Responses   [facility]   COVID-19 Test Status  Negative   Does the patient have one of the following disease processes/diagnoses(primary or secondary)?  Other   Week 3 attempt successful?  Yes   Call start time  1805   Call end time  1815   Discharge diagnosis  Ataxia Polypharmacy GASPER   Meds reviewed with patient/caregiver?  Yes   Is the patient having any side effects they believe may be caused by any medication additions or changes?  No   Does the patient have all medications ordered at discharge?  Yes   Is the patient taking all medications as directed (includes completed medication regime)?  Yes   Does the patient have a primary care provider?   Yes   Does the patient have an appointment with their PCP within 7 days of discharge?  Yes   Has the patient kept scheduled appointments due by today?  Yes   What is the Home health agency?   none   Has home health visited the patient within 72 hours of discharge?  N/A   Pulse Ox monitoring  Intermittent   Psychosocial issues?  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  Yes   Nursing interventions  Reviewed instructions with patient   What is the patient's perception of their health status since discharge?  Improving   Is the patient/caregiver able to teach back signs and symptoms related to disease process for when to call PCP?  Yes   Is the patient/caregiver able to teach back signs and symptoms related to disease process for when to call 911?  Yes   Is the patient/caregiver able to teach back the hierarchy of who to call/visit for symptoms/problems? PCP, Specialist, Home health nurse, Urgent Care, ED, 911  Yes   Additional teach back comments  states may be interested in revisiting options of HH, advised pt to speak to  PCP, agrees with plan   Week 3 Call Completed?  Yes          Debo Donnelly RN

## 2020-06-12 NOTE — ED NOTES
This RN wearing mask, goggles, and gloves during this encounter. Pt wearing mask during encounter.        Kim Bowers, RN  05/15/20 1527     [Acute] : an acute visit [Mother] : mother [FreeTextEntry1] : fracture finger

## 2020-06-15 ENCOUNTER — TELEPHONE (OUTPATIENT)
Dept: FAMILY MEDICINE CLINIC | Facility: CLINIC | Age: 70
End: 2020-06-15

## 2020-06-15 RX ORDER — METOPROLOL TARTRATE 50 MG/1
TABLET, FILM COATED ORAL
Qty: 60 TABLET | Refills: 11 | OUTPATIENT
Start: 2020-06-15

## 2020-06-15 NOTE — TELEPHONE ENCOUNTER
I spoke to the pt and informed her we wanted her to come in to be seen. The pt was very hesitant. I explained that there were no sick people in our office, nor anyone showing symptoms. The pt stated she would call her urologist in [place] vs coming into our office to be seen and to get a urine sample.     As far as the dermatologist appt she spoke with me about, she had no set time for appt. I checked her referrals, there was not one from any dr. The pt stated she wasm making the appt on her own. I informed Dr. Mckeon on all of this.

## 2020-06-15 NOTE — TELEPHONE ENCOUNTER
PATIENT CALLED STATING THAT SHE IS HAVING A LOT OF JOINT PAIN AND ITCHING. SHE STATES THAT SHE USUALLY TAKE NEURONTIN & HYDROXYZINE FOR THESE ISSUES BUT THESE MEDICATIONS ARE NOT HELPING. SHE WAS CALLING TO SEE IF AN ALTERNATIVE MEDICATION BE CALLED [facility].     PATIENT ALSO STATES THAT LAST WEEK SHE HAD BLOOD IN HER URINE AND HAVE EXPERIENCED  PAINFUL URINATION WITH TERRIBLE LOW FLANK PAIN. PATIENT CAN BE REACHED [phone] TO DISCUSS FURTHER.

## 2020-06-16 ENCOUNTER — TELEPHONE (OUTPATIENT)
Dept: FAMILY MEDICINE CLINIC | Facility: CLINIC | Age: 70
End: 2020-06-16

## 2020-06-16 RX ORDER — METOPROLOL TARTRATE 50 MG/1
50 TABLET, FILM COATED ORAL EVERY 12 HOURS SCHEDULED
Qty: 180 TABLET | Refills: 0 | Status: SHIPPED | OUTPATIENT
Start: 2020-06-16 | End: 2020-09-17

## 2020-06-16 NOTE — TELEPHONE ENCOUNTER
I tried calling the pt to see if she is still taking the metoprolol so it can be refilled. No one answered. I left a message to call back.

## 2020-06-16 NOTE — TELEPHONE ENCOUNTER
Patient called back and states she is still taking the metoprolol.  She wants to know if you could refill it please, she says she will take the last one tonight.

## 2020-06-17 ENCOUNTER — TELEPHONE (OUTPATIENT)
Dept: FAMILY MEDICINE CLINIC | Facility: CLINIC | Age: 70
End: 2020-06-17

## 2020-06-17 ENCOUNTER — TELEMEDICINE (OUTPATIENT)
Dept: FAMILY MEDICINE CLINIC | Facility: CLINIC | Age: 70
End: 2020-06-17

## 2020-06-17 DIAGNOSIS — D64.9 ANEMIA, UNSPECIFIED TYPE: ICD-10-CM

## 2020-06-17 DIAGNOSIS — N39.0 URINARY TRACT INFECTION WITH HEMATURIA, SITE UNSPECIFIED: ICD-10-CM

## 2020-06-17 DIAGNOSIS — Z79.4 TYPE 2 DIABETES MELLITUS WITH OTHER SPECIFIED COMPLICATION, WITH LONG-TERM CURRENT USE OF INSULIN (HCC): Primary | ICD-10-CM

## 2020-06-17 DIAGNOSIS — E11.69 TYPE 2 DIABETES MELLITUS WITH OTHER SPECIFIED COMPLICATION, WITH LONG-TERM CURRENT USE OF INSULIN (HCC): Primary | ICD-10-CM

## 2020-06-17 DIAGNOSIS — R26.89 BALANCE PROBLEM: ICD-10-CM

## 2020-06-17 DIAGNOSIS — R53.83 OTHER FATIGUE: ICD-10-CM

## 2020-06-17 DIAGNOSIS — R31.9 URINARY TRACT INFECTION WITH HEMATURIA, SITE UNSPECIFIED: ICD-10-CM

## 2020-06-17 PROCEDURE — 99214 OFFICE O/P EST MOD 30 MIN: CPT | Performed by: FAMILY MEDICINE

## 2020-06-17 RX ORDER — SULFAMETHOXAZOLE AND TRIMETHOPRIM 800; 160 MG/1; MG/1
1 TABLET ORAL 2 TIMES DAILY
Qty: 14 TABLET | Refills: 0 | Status: SHIPPED | OUTPATIENT
Start: 2020-06-17 | End: 2020-11-11

## 2020-06-17 NOTE — PROGRESS NOTES
Subjective   Vidhi Lopez is a 70 y.o. female.     Chief Complaint   Patient presents with   • Fatigue   • Hypotension   • Urinary Tract Infection     possible-blood in urine   Hospital follow up  Pt agreed to be contacted, Doximity used, did not work, so phone used    History of Present Illness     Was off balance no stroke, was weak, fell 4 times in 2 days, saw Neurologist, Gabapentin 50 mg bid, on Lisinopril 10 mg good now, seen by pain mgt, seen by Nephrologist, BS good 90, Fiasp 10 Units, with mails, got iron Infusion, did not taking Bactrim ???? + Urgency, fever 100 F and and burning with urination  Patient was admitted to 5/15/20  ALL records were obtained and reviewed and /or discussed with admitting physician  Date of admission 5/15/20  Date of discharge 5/23/20  Diagnosis Ataxia,dysrthria, GASPER  Medications upon discharge see list  Disposition stable  Follow up today  Currently c/o none  Condition stable    The following portions of the patient's history were reviewed and updated as appropriate: allergies, current medications, past family history, past medical history, past social history, past surgical history and problem list.    Past Medical History:   Diagnosis Date   • Allergic rhinitis    • Asthma with COPD (CMS/[facility])     Asthma/COPD   • Bilateral ovarian cysts    • Coronary artery disease    • Depression with anxiety     Depression/Anxiety   • Diabetes (CMS/[facility])     pre   • Endometriosis     Dr. Jin; estradiol    • ESR raised 3/20/2018   • Fatigue    • Fibromyalgia    • Headache     Headaches   • High cholesterol    • History of gallstones    • History of myocardial infarction    • Hypertension    • Influenza B     DX'D 2/6/2018, TREATED WITH TAMIFLU. LAST DOSE 2/11/2018 AM.  PATIENT STATES DR RANGEL IS AWARE. DENIES FEVERS OR CHILLS.   • Interstitial cystitis    • On home oxygen therapy     2 L NC   • URIEL on CPAP    • PONV (postoperative nausea and vomiting)    • Seizure disorder, nonconvulsive,  with status epilepticus (CMS/[facility]) 3/20/2018   • Septic joint of right knee joint (CMS/[facility]) 3/21/2018   • Shortness of breath on exertion    • Stroke (CMS/[facility])     X1 8 YEARS AGO, GENERALIZED UPPER BODY WEAKNESS RESIDUAL PER PT    • Thyroid disorder    • Urinary leakage    • UTI (urinary tract infection)    • Wears glasses    • Yeast dermatitis        Past Surgical History:   Procedure Laterality Date   • ARTERIOGRAM N/A 2/12/2018    Procedure: Renal Arteriogram;  Surgeon: Lito Flores MD;  Location:  [facility] CATH INVASIVE LOCATION;  Service:    • BREAST BIOPSY Right 1991   • CARDIAC CATHETERIZATION N/A 2/12/2018    Procedure: Right Heart Cath;  Surgeon: Lito Flores MD;  Location:  [facility] CATH INVASIVE LOCATION;  Service:    • CAROTID STENT      Transcath    • CAROTID STENT Left    • CHOLECYSTECTOMY     • CYSTOSTOMY W/ BLADDER BIOPSY     • DILATATION AND CURETTAGE     • KNEE ARTHROSCOPY Right 3/23/2018    Procedure: ARTHROSCOPY, RIGHT KNEE  INCISION AND DRAINAGE LOWER EXTREMITY WITH SYNOVIAL BIOPSY;  Surgeon: Dilip Giron MD;  Location:  [facility] OR;  Service: Orthopedics   • LAPAROSCOPIC CHOLECYSTECTOMY  1994    Lap rolando   • OOPHORECTOMY     • PAP SMEAR  05/10/2016   • SUBTOTAL HYSTERECTOMY         Family History   Problem Relation Age of Onset   • Cancer Other    • Diabetes Other    • Heart attack Other    • Hyperlipidemia Other    • Hypertension Other    • Osteoporosis Other    • Stroke Other    • Breast cancer Mother    • Osteoporosis Mother    • Colon cancer Father        Social History     Socioeconomic History   • Marital status:      Spouse name: Not on file   • Number of children: Not on file   • Years of education: Not on file   • Highest education level: Not on file   Tobacco Use   • Smoking status: Current Every Day Smoker     Packs/day: 0.25     Years: 40.00     Pack years: 10.00     Types: Cigarettes   • Smokeless tobacco: Never Used   • Tobacco comment: in process of quiting--AVERAGE 1  PPD, DOWN TO 6 CIG PER DAY X2 WEEKS    Substance and Sexual Activity   • Alcohol use: Yes     Alcohol/week: 1.0 standard drinks     Types: 1 Glasses of wine per week     Comment: occasional   • Drug use: No   • Sexual activity: Defer   Social History Narrative    Lives alone.        Review of Systems   Constitutional: Negative.    HENT: Negative.    Respiratory: Negative.    Cardiovascular: Negative.    Gastrointestinal: Negative.    Genitourinary: Positive for dysuria, flank pain and frequency.   Skin: Negative.    Allergic/Immunologic: Negative.    Neurological: Negative.    Hematological: Negative.    Psychiatric/Behavioral: Negative.        Objective   There were no vitals filed for this visit.  There is no height or weight on file to calculate BMI.        Assessment/Plan   Vidhi was seen today for fatigue, hypotension and urinary tract infection.    Diagnoses and all orders for this visit:    Type 2 diabetes mellitus with other specified complication, with long-term current use of insulin (CMS/[facility])  -     Cancel: Comprehensive Metabolic Panel; Future  -     Cancel: CBC & Differential; Future  -     Cancel: Folate RBC; Future  -     Cancel: Vitamin B12; Future  -     Cancel: Iron and TIBC; Future  -     Cancel: Ferritin; Future  -     Cancel: POC Urinalysis Dipstick, Automated; Future  -     CBC & Differential  -     Comprehensive Metabolic Panel  -     POC Urinalysis Dipstick, Automated  -     Folate RBC  -     Vitamin B12  -     Iron and TIBC  -     Ferritin    Anemia, unspecified type  -     Cancel: Comprehensive Metabolic Panel; Future  -     Cancel: CBC & Differential; Future  -     Cancel: Folate RBC; Future  -     Cancel: Vitamin B12; Future  -     Cancel: Iron and TIBC; Future  -     Cancel: Ferritin; Future  -     CBC & Differential  -     Comprehensive Metabolic Panel  -     POC Urinalysis Dipstick, Automated  -     Folate RBC  -     Vitamin B12  -     Iron and TIBC  -     Ferritin    Urinary tract  infection with hematuria, site unspecified  -     sulfamethoxazole-trimethoprim (Bactrim DS) 800-160 MG per tablet; Take 1 tablet by mouth 2 (Two) Times a Day.  -     CBC & Differential  -     Comprehensive Metabolic Panel  -     POC Urinalysis Dipstick, Automated  -     Folate RBC  -     Vitamin B12  -     Iron and TIBC  -     Ferritin  -     Urine Culture - Urine, Urine, Clean Catch    Other fatigue    Balance problem    time spent 30 minutes

## 2020-06-18 ENCOUNTER — APPOINTMENT (OUTPATIENT)
Dept: SLEEP MEDICINE | Facility: HOSPITAL | Age: 70
End: 2020-06-18

## 2020-06-18 ENCOUNTER — TELEPHONE (OUTPATIENT)
Dept: FAMILY MEDICINE CLINIC | Facility: CLINIC | Age: 70
End: 2020-06-18

## 2020-06-18 ENCOUNTER — READMISSION MANAGEMENT (OUTPATIENT)
Dept: CALL CENTER | Facility: HOSPITAL | Age: 70
End: 2020-06-18

## 2020-06-18 LAB
BASOPHILS # BLD AUTO: 0.1 X10E3/UL (ref 0–0.2)
BASOPHILS NFR BLD AUTO: 1 %
EOSINOPHIL # BLD AUTO: 0.4 X10E3/UL (ref 0–0.4)
EOSINOPHIL NFR BLD AUTO: 6 %
ERYTHROCYTE [DISTWIDTH] IN BLOOD BY AUTOMATED COUNT: 17.6 % (ref 11.7–15.4)
HCT VFR BLD AUTO: 35.2 % (ref 34–46.6)
HGB BLD-MCNC: 11.2 G/DL (ref 11.1–15.9)
IMM GRANULOCYTES # BLD AUTO: 0 X10E3/UL (ref 0–0.1)
IMM GRANULOCYTES NFR BLD AUTO: 0 %
LYMPHOCYTES # BLD AUTO: 1.9 X10E3/UL (ref 0.7–3.1)
LYMPHOCYTES NFR BLD AUTO: 27 %
MCH RBC QN AUTO: 25.3 PG (ref 26.6–33)
MCHC RBC AUTO-ENTMCNC: 31.8 G/DL (ref 31.5–35.7)
MCV RBC AUTO: 80 FL (ref 79–97)
MONOCYTES # BLD AUTO: 0.5 X10E3/UL (ref 0.1–0.9)
MONOCYTES NFR BLD AUTO: 7 %
NEUTROPHILS # BLD AUTO: 4.1 X10E3/UL (ref 1.4–7)
NEUTROPHILS NFR BLD AUTO: 59 %
PLATELET # BLD AUTO: 210 X10E3/UL (ref 150–450)
RBC # BLD AUTO: 4.43 X10E6/UL (ref 3.77–5.28)
WBC # BLD AUTO: 7 X10E3/UL (ref 3.4–10.8)

## 2020-06-18 NOTE — OUTREACH NOTE
Medical Week 4 Survey      Responses   [facility] patient discharged from?  [place]   COVID-19 Test Status  Negative   Does the patient have one of the following disease processes/diagnoses(primary or secondary)?  Other   Week 4 attempt successful?  No          Zari Cunningham RN

## 2020-06-18 NOTE — TELEPHONE ENCOUNTER
We do not. She was advised to go to [address] for her labs but unsure if she did go down there and get results being her orders are still active. We have a call out to the pt to see if she did have her labs done. She did not answer, we left a message to return our call.

## 2020-06-18 NOTE — TELEPHONE ENCOUNTER
I'll check into it. There are no results in the system and her appt for our lab at 1:45 yesterday was cancelled. Let me see what I can find out

## 2020-06-18 NOTE — TELEPHONE ENCOUNTER
----- Message from Abiodun Calderon MD sent at 6/18/2020 11:06 AM EDT -----  Do we have the UA results on this pt?

## 2020-06-18 NOTE — TELEPHONE ENCOUNTER
[facility] to have someone look into Vidhi Lopez, if she had labs done yesterday or not. [facility] and [facility] and [facility] blood [facility]. [facility] only did a cbc and a urine culture, that that was all of the orders put in. They stated they could not use the urine sample they have for a UA. All we can do is wait for the urine culture results to come in. The woman I spoke with stated they would be automatically entered into our systems when they are done, which will be late tomorrow night or Monday.

## 2020-06-19 DIAGNOSIS — R31.9 HEMATURIA, UNSPECIFIED TYPE: Primary | ICD-10-CM

## 2020-06-19 LAB
BACTERIA UR CULT: NORMAL
BACTERIA UR CULT: NORMAL

## 2020-06-23 ENCOUNTER — TELEPHONE (OUTPATIENT)
Dept: FAMILY MEDICINE CLINIC | Facility: CLINIC | Age: 70
End: 2020-06-23

## 2020-06-23 DIAGNOSIS — R79.89 ELEVATED SERUM CREATININE: Primary | ICD-10-CM

## 2020-06-23 LAB
ALBUMIN SERPL-MCNC: 4.4 G/DL (ref 3.5–5.2)
ALBUMIN/GLOB SERPL: 1.6 G/DL
ALP SERPL-CCNC: 111 U/L (ref 39–117)
ALT SERPL-CCNC: 11 U/L (ref 1–33)
AST SERPL-CCNC: 11 U/L (ref 1–32)
BILIRUB SERPL-MCNC: 0.2 MG/DL (ref 0.2–1.2)
BUN SERPL-MCNC: 41 MG/DL (ref 8–23)
BUN/CREAT SERPL: 29.1 (ref 7–25)
CALCIUM SERPL-MCNC: 9.9 MG/DL (ref 8.6–10.5)
CHLORIDE SERPL-SCNC: 102 MMOL/L (ref 98–107)
CO2 SERPL-SCNC: 30.2 MMOL/L (ref 22–29)
CREAT SERPL-MCNC: 1.41 MG/DL (ref 0.57–1)
FERRITIN SERPL-MCNC: 105 NG/ML (ref 13–150)
FOLATE BLD-MCNC: 302 NG/ML
FOLATE RBC-MCNC: 858 NG/ML
GLOBULIN SER CALC-MCNC: 2.7 GM/DL
GLUCOSE SERPL-MCNC: 131 MG/DL (ref 65–99)
HCT VFR BLD AUTO: 35.2 % (ref 34–46.6)
IRON SATN MFR SERPL: 28 % (ref 20–50)
IRON SERPL-MCNC: 117 MCG/DL (ref 37–145)
POTASSIUM SERPL-SCNC: 5 MMOL/L (ref 3.5–5.2)
PROT SERPL-MCNC: 7.1 G/DL (ref 6–8.5)
SODIUM SERPL-SCNC: 141 MMOL/L (ref 136–145)
TIBC SERPL-MCNC: 419 MCG/DL
UIBC SERPL-MCNC: 302 MCG/DL (ref 112–346)
VIT B12 SERPL-MCNC: 308 PG/ML (ref 211–946)

## 2020-06-23 NOTE — TELEPHONE ENCOUNTER
Dr. Mckeon put in a referral for this pt to see a nephrologist. She stated she saw one in the hospital that she would prefer to see. She said his name was Dr. Peña. If there is any way you could put him/his office down to be referred to . Thank you!!

## 2020-06-25 ENCOUNTER — TELEPHONE (OUTPATIENT)
Dept: FAMILY MEDICINE CLINIC | Facility: CLINIC | Age: 70
End: 2020-06-25

## 2020-06-25 DIAGNOSIS — F32.A DEPRESSION, UNSPECIFIED DEPRESSION TYPE: ICD-10-CM

## 2020-06-25 RX ORDER — PAROXETINE HYDROCHLORIDE 20 MG/1
20 TABLET, FILM COATED ORAL EVERY MORNING
Qty: 90 TABLET | Refills: 3 | Status: SHIPPED | OUTPATIENT
Start: 2020-06-25 | End: 2020-11-11

## 2020-06-30 DIAGNOSIS — I10 HYPERTENSION, UNSPECIFIED TYPE: ICD-10-CM

## 2020-06-30 RX ORDER — LISINOPRIL 10 MG/1
10 TABLET ORAL
Qty: 30 TABLET | Refills: 0 | Status: SHIPPED | OUTPATIENT
Start: 2020-06-30 | End: 2020-08-07

## 2020-06-30 RX ORDER — HYDRALAZINE HYDROCHLORIDE 50 MG/1
TABLET, FILM COATED ORAL
Qty: 90 TABLET | Refills: 0 | Status: SHIPPED | OUTPATIENT
Start: 2020-06-30 | End: 2020-08-07 | Stop reason: SDUPTHER

## 2020-07-01 DIAGNOSIS — L29.9 ITCHING: ICD-10-CM

## 2020-07-01 RX ORDER — HYDROXYZINE 50 MG/1
50 TABLET, FILM COATED ORAL EVERY 8 HOURS PRN
Qty: 90 TABLET | Refills: 2 | Status: SHIPPED | OUTPATIENT
Start: 2020-07-01 | End: 2020-09-04

## 2020-07-06 RX ORDER — LISINOPRIL 10 MG/1
10 TABLET ORAL
Qty: 30 TABLET | Refills: 0 | OUTPATIENT
Start: 2020-07-06

## 2020-07-08 ENCOUNTER — TELEPHONE (OUTPATIENT)
Dept: NEUROLOGY | Facility: CLINIC | Age: 70
End: 2020-07-08

## 2020-07-08 NOTE — TELEPHONE ENCOUNTER
PATIENT CALLED BECAUSE SHE HAS BEEN HAVING HALLUCINATIONS SINCE OFF AND ON FOR ABOUT A YEAR AND SHE IS NEEDING TO KNOW IS SHE IF IT IS A MIX OF MEDICATION. AND NEEDS TO KNOW WHAT JANINE WOULD LIKE FOR HER TO DO.    PLEASE CALL [phone]

## 2020-07-09 DIAGNOSIS — G40.901 SEIZURE DISORDER, NONCONVULSIVE, WITH STATUS EPILEPTICUS (HCC): Primary | ICD-10-CM

## 2020-07-09 NOTE — TELEPHONE ENCOUNTER
Eusebia  Gricelda called back and we reviewed her medication list. She is no longer taking Ambien. She currently takes ropinirole .25mg, 2 tablets nightly. States she has been taking the Hydroxyzine for itching more often and also wonders if maybe she should have her Keppra level checked or if you feel that could be the issue if it is toxic again?   Gricelda mentioned she has chronic kidney and bladder issues and frequent UTI's, but what triggered her calling to ask Eusebia about this was the hallucinations. Also explained some confusion as she called her son the other day letting him know she could meet him for lunch that day if he would like, but he lives in [place] and was definitely not in town at this time.

## 2020-07-09 NOTE — TELEPHONE ENCOUNTER
We can check a Keppra level if she would like, though I doubt this would be the problem. The ropinirole is at a fairly low dose, though could potentially be causing the issues. The UTI's most certainly can contribute to her symptoms and I would highly recommend first talking with her PCP or urologist to potentially rule out an underlying medical condition, such as that.     Also, would you clarify if the hallucinations are visual vs. Auditory? Thanks!

## 2020-07-09 NOTE — TELEPHONE ENCOUNTER
Relayed this to Vidhi who stated verbal understanding. Also reminded her as I noticed on her last UA note with lab results on 6/17, she has an appt tomorrow with  tomorrow - 1st urology @ 9:30am so should tell them about her symptoms, have them check for an infection to which she stated understanding and appreciation because she could not remember whom she had made an appt with or where.     States she has also been experiencing Horrendous itching lately and yes Vidhi states they are visual- living color hallucinations.     She will keep us updated. Thanks!

## 2020-07-09 NOTE — TELEPHONE ENCOUNTER
We first need to confirm her medications, as I am not sure if the list is current. From a glance, the ropinirole could contribute to hallucinations or even Ambien.  However, we would first need to confirm her current meds. Thanks

## 2020-07-13 ENCOUNTER — TELEPHONE (OUTPATIENT)
Dept: FAMILY MEDICINE CLINIC | Facility: CLINIC | Age: 70
End: 2020-07-13

## 2020-07-13 RX ORDER — CLOPIDOGREL BISULFATE 75 MG/1
TABLET ORAL
Qty: 90 TABLET | Refills: 3 | Status: SHIPPED | OUTPATIENT
Start: 2020-07-13 | End: 2022-06-09

## 2020-07-14 NOTE — TELEPHONE ENCOUNTER
I did not call the pt regarding the samples, we have zero samples at our office. However, I am working on a PA for the Symbicort inhaler. I tried calling the pt but no answer, I LMTCB.

## 2020-07-14 NOTE — TELEPHONE ENCOUNTER
Pt returned call and stated do not worry about the PA, because even with approval the medication is still is almost $400.00

## 2020-07-14 NOTE — TELEPHONE ENCOUNTER
I tried calling the pt, no answer. LMTCB. I was not the one who called this pt but I checked and we do not have any samples at our office. I am however, currently working on a PA for this medication for this patient

## 2020-07-16 DIAGNOSIS — J45.909 ASTHMA, UNSPECIFIED ASTHMA SEVERITY, UNSPECIFIED WHETHER COMPLICATED, UNSPECIFIED WHETHER PERSISTENT: Primary | ICD-10-CM

## 2020-07-17 ENCOUNTER — TELEPHONE (OUTPATIENT)
Dept: CARDIOLOGY | Facility: CLINIC | Age: 70
End: 2020-07-17

## 2020-07-17 ENCOUNTER — TELEPHONE (OUTPATIENT)
Dept: FAMILY MEDICINE CLINIC | Facility: CLINIC | Age: 70
End: 2020-07-17

## 2020-07-17 NOTE — TELEPHONE ENCOUNTER
Patient called  to get her yearly appt. Last seen 5/9/19.No appt given. Has been in hospital and rehab for the last month for fractured discs from a fall. Takes Lisinopril 10 mg daily, metoprolol 50 mg twice a day and hydralazine 50 mg three times a day. B/P today 196/92 with a headache. No other readings reported.  Going to call her PCP today to be checked and going to keep a b/p log and report readings next week. Please advise for any further instructions.

## 2020-07-17 NOTE — TELEPHONE ENCOUNTER
I called this pt to inform her that Dr. Mckeon cancelled the Symbicort inhaler and sent another one to her pharmacy. Pt did not answer so I left her a voicemail.

## 2020-07-17 NOTE — TELEPHONE ENCOUNTER
Pt returned call and was informed of the information stated. Pt verbalized an understanding and will  new inhaler at pharmacy.

## 2020-07-21 NOTE — TELEPHONE ENCOUNTER
B/P remains elevated. Did not go to her PCP. States her PCP is hard to get into because of Covid.    B/P log     7/18/2020 182/84 HR 60    7/19/2020 186/90 HR 65    7/20/2020 104/90  HR 61    7/21/2020 156/87 HR 70    Takes Lisinopril 10 mg daily, Metoprolol 50 mg twice a day and Hydralazine 50 mg three times a day. Please advise.

## 2020-07-22 NOTE — TELEPHONE ENCOUNTER
Pt coming 7/23/20 to see BLAIR, creatinine is elevated, ?taking extra diuretics (she has done this in the past). She does not see a nephrologist. Will bring updated medication list tomorrow. THAIS

## 2020-07-28 DIAGNOSIS — R11.0 NAUSEA: ICD-10-CM

## 2020-07-29 RX ORDER — ONDANSETRON HYDROCHLORIDE 8 MG/1
TABLET, FILM COATED ORAL
Qty: 30 TABLET | Refills: 1 | Status: SHIPPED | OUTPATIENT
Start: 2020-07-29 | End: 2020-08-21 | Stop reason: SDUPTHER

## 2020-08-03 ENCOUNTER — TELEPHONE (OUTPATIENT)
Dept: NEUROLOGY | Facility: CLINIC | Age: 70
End: 2020-08-03

## 2020-08-03 DIAGNOSIS — G40.901 SEIZURE DISORDER, NONCONVULSIVE, WITH STATUS EPILEPTICUS (HCC): Primary | ICD-10-CM

## 2020-08-03 NOTE — TELEPHONE ENCOUNTER
BRIDGER ESTEBAN ORDERED AN MRI FOR PT BACK IN January THAT PT HAD REFUSED SO THE REFERRAL HAS SINCE BEEN CLOSED. PT CALLED TODAY TO REQUEST THAT JANINE PLACE ANOTHER REFERRAL TO A FACILITY THAT HAS AN OPEN MRI INSTEAD AS SHE DOES NOT LIKE THE CLOSED MRIS. PT SAID SHE HAS TO FIGURE OUT WHAT'S WRONG WITH HER OR SHE WILL BE STUCK GOING TO AN ASSISTED LIVING FACILITY WHICH SHE DOESN'T WANT. SHE SAID SHE SOMETIMES DOESN'T REMEMBER DOING CERTAIN THINGS AND SHE IS WANTING TO MOVE FORWARD WITH AN MRI SCAN. PT HAS UPCOMING APPT ON 8/5/20 WITH JANINE      CALL BACK- [phone]

## 2020-08-07 DIAGNOSIS — I10 HYPERTENSION, UNSPECIFIED TYPE: ICD-10-CM

## 2020-08-07 RX ORDER — LISINOPRIL 10 MG/1
TABLET ORAL
Qty: 30 TABLET | Refills: 0 | Status: SHIPPED | OUTPATIENT
Start: 2020-08-07 | End: 2020-09-17

## 2020-08-07 RX ORDER — HYDRALAZINE HYDROCHLORIDE 50 MG/1
50 TABLET, FILM COATED ORAL 3 TIMES DAILY
Qty: 90 TABLET | Refills: 0 | Status: SHIPPED | OUTPATIENT
Start: 2020-08-07 | End: 2020-09-17

## 2020-08-18 ENCOUNTER — APPOINTMENT (OUTPATIENT)
Dept: MRI IMAGING | Facility: HOSPITAL | Age: 70
End: 2020-08-18

## 2020-08-20 ENCOUNTER — APPOINTMENT (OUTPATIENT)
Dept: GENERAL RADIOLOGY | Facility: HOSPITAL | Age: 70
End: 2020-08-20

## 2020-08-20 ENCOUNTER — HOSPITAL ENCOUNTER (EMERGENCY)
Facility: HOSPITAL | Age: 70
Discharge: HOME | End: 2020-08-20
Attending: EMERGENCY MEDICINE | Admitting: EMERGENCY MEDICINE

## 2020-08-20 VITALS
HEART RATE: 51 BPM | SYSTOLIC BLOOD PRESSURE: 113 MMHG | RESPIRATION RATE: 20 BRPM | BODY MASS INDEX: 30.82 KG/M2 | HEIGHT: 65 IN | TEMPERATURE: 97.9 F | DIASTOLIC BLOOD PRESSURE: 85 MMHG | OXYGEN SATURATION: 99 % | WEIGHT: 185 LBS

## 2020-08-20 DIAGNOSIS — R55 VASOVAGAL EPISODE: ICD-10-CM

## 2020-08-20 DIAGNOSIS — N28.9 RENAL INSUFFICIENCY: ICD-10-CM

## 2020-08-20 DIAGNOSIS — M25.551 ACUTE RIGHT HIP PAIN: Primary | ICD-10-CM

## 2020-08-20 LAB
ALBUMIN SERPL-MCNC: 3.8 G/DL (ref 3.5–5.2)
ALBUMIN/GLOB SERPL: 1.7 G/DL
ALP SERPL-CCNC: 94 U/L (ref 39–117)
ALT SERPL W P-5'-P-CCNC: 9 U/L (ref 1–33)
ANION GAP SERPL CALCULATED.3IONS-SCNC: 11.5 MMOL/L (ref 5–15)
AST SERPL-CCNC: 8 U/L (ref 1–32)
BASOPHILS # BLD AUTO: 0.06 10*3/MM3 (ref 0–0.2)
BASOPHILS NFR BLD AUTO: 0.6 % (ref 0–1.5)
BILIRUB SERPL-MCNC: 0.2 MG/DL (ref 0–1.2)
BUN SERPL-MCNC: 30 MG/DL (ref 8–23)
BUN/CREAT SERPL: 18.3 (ref 7–25)
CALCIUM SPEC-SCNC: 8.9 MG/DL (ref 8.6–10.5)
CHLORIDE SERPL-SCNC: 98 MMOL/L (ref 98–107)
CO2 SERPL-SCNC: 28.5 MMOL/L (ref 22–29)
CREAT SERPL-MCNC: 1.64 MG/DL (ref 0.57–1)
DEPRECATED RDW RBC AUTO: 53.7 FL (ref 37–54)
EOSINOPHIL # BLD AUTO: 0.3 10*3/MM3 (ref 0–0.4)
EOSINOPHIL NFR BLD AUTO: 3.2 % (ref 0.3–6.2)
ERYTHROCYTE [DISTWIDTH] IN BLOOD BY AUTOMATED COUNT: 18.3 % (ref 12.3–15.4)
GFR SERPL CREATININE-BSD FRML MDRD: 31 ML/MIN/1.73
GLOBULIN UR ELPH-MCNC: 2.2 GM/DL
GLUCOSE SERPL-MCNC: 136 MG/DL (ref 65–99)
HCT VFR BLD AUTO: 29.8 % (ref 34–46.6)
HGB BLD-MCNC: 9.3 G/DL (ref 12–15.9)
IMM GRANULOCYTES # BLD AUTO: 0.04 10*3/MM3 (ref 0–0.05)
IMM GRANULOCYTES NFR BLD AUTO: 0.4 % (ref 0–0.5)
LYMPHOCYTES # BLD AUTO: 1.41 10*3/MM3 (ref 0.7–3.1)
LYMPHOCYTES NFR BLD AUTO: 15.2 % (ref 19.6–45.3)
MCH RBC QN AUTO: 24.9 PG (ref 26.6–33)
MCHC RBC AUTO-ENTMCNC: 31.2 G/DL (ref 31.5–35.7)
MCV RBC AUTO: 79.7 FL (ref 79–97)
MONOCYTES # BLD AUTO: 0.54 10*3/MM3 (ref 0.1–0.9)
MONOCYTES NFR BLD AUTO: 5.8 % (ref 5–12)
NEUTROPHILS NFR BLD AUTO: 6.92 10*3/MM3 (ref 1.7–7)
NEUTROPHILS NFR BLD AUTO: 74.8 % (ref 42.7–76)
NRBC BLD AUTO-RTO: 0 /100 WBC (ref 0–0.2)
PLATELET # BLD AUTO: 207 10*3/MM3 (ref 140–450)
PMV BLD AUTO: 8.9 FL (ref 6–12)
POTASSIUM SERPL-SCNC: 4.4 MMOL/L (ref 3.5–5.2)
PROT SERPL-MCNC: 6 G/DL (ref 6–8.5)
RBC # BLD AUTO: 3.74 10*6/MM3 (ref 3.77–5.28)
SODIUM SERPL-SCNC: 138 MMOL/L (ref 136–145)
TROPONIN T SERPL-MCNC: <0.01 NG/ML (ref 0–0.03)
WBC # BLD AUTO: 9.27 10*3/MM3 (ref 3.4–10.8)

## 2020-08-20 PROCEDURE — 80053 COMPREHEN METABOLIC PANEL: CPT | Performed by: EMERGENCY MEDICINE

## 2020-08-20 PROCEDURE — 93010 ELECTROCARDIOGRAM REPORT: CPT | Performed by: INTERNAL MEDICINE

## 2020-08-20 PROCEDURE — 85025 COMPLETE CBC W/AUTO DIFF WBC: CPT | Performed by: EMERGENCY MEDICINE

## 2020-08-20 PROCEDURE — 93005 ELECTROCARDIOGRAM TRACING: CPT | Performed by: EMERGENCY MEDICINE

## 2020-08-20 PROCEDURE — 99283 EMERGENCY DEPT VISIT LOW MDM: CPT

## 2020-08-20 PROCEDURE — 84484 ASSAY OF TROPONIN QUANT: CPT | Performed by: EMERGENCY MEDICINE

## 2020-08-20 PROCEDURE — 72110 X-RAY EXAM L-2 SPINE 4/>VWS: CPT

## 2020-08-20 PROCEDURE — 73502 X-RAY EXAM HIP UNI 2-3 VIEWS: CPT

## 2020-08-20 RX ORDER — TRAMADOL HYDROCHLORIDE 50 MG/1
50 TABLET ORAL EVERY 8 HOURS PRN
Qty: 12 TABLET | Refills: 0 | Status: SHIPPED | OUTPATIENT
Start: 2020-08-20 | End: 2020-11-11

## 2020-08-20 RX ORDER — SODIUM CHLORIDE 0.9 % (FLUSH) 0.9 %
10 SYRINGE (ML) INJECTION AS NEEDED
Status: DISCONTINUED | OUTPATIENT
Start: 2020-08-20 | End: 2020-08-20 | Stop reason: HOSPADM

## 2020-08-20 RX ORDER — LIDOCAINE 50 MG/G
1 PATCH TOPICAL EVERY 24 HOURS
Qty: 7 PATCH | Refills: 0 | Status: SHIPPED | OUTPATIENT
Start: 2020-08-20 | End: 2020-08-21 | Stop reason: SDUPTHER

## 2020-08-20 RX ADMIN — SODIUM CHLORIDE 500 ML: 9 INJECTION, SOLUTION INTRAVENOUS at 17:00

## 2020-08-20 NOTE — ED PROVIDER NOTES
EMERGENCY DEPARTMENT ENCOUNTER    Room Number:  16/16  Date seen:  8/20/2020  PCP: Abiodun Vila MD  Historian: Patient      HPI:  Chief Complaint: Right leg pain  A complete HPI/ROS/PMH/PSH/SH/FH are unobtainable due to: Nothing  Context: Vidhi Lopez is a 70 y.o. female who presents to the ED c/o right leg pain onset 3 to 4 days ago.  She reports the pain is localized to the lateral right thigh.  She also reports some mild low back pain.  She denies any injury or trauma.  She does have a history of trochanteric bursitis and usually has to get injection once or twice a year with her rheumatologist.  She had gone to [employer] prior to come to the ER today and had taken 2 Tylenol and also purchased a lidocaine patch which she applied to the affected area.  The pain was so severe that she felt like she was going to pass out.  She also had nausea.  She called ambulance and they report that her initial blood pressure was in the 80s over 50s with heart rate in the 50s.  They administered 350 cc of normal saline and her blood pressure improved and symptoms resolved.  She does have a history of hypertension and takes lisinopril and also metoprolol.  She has a history of CAD status post PCI and is compliant with her aspirin and Plavix.  The right thigh pain is actually improved now since taking the Tylenol and applying the lidocaine patch.  She denies any bowel or bladder incontinence.  No fever or chills.  She is had no lower extremity weakness or numbness.            PAST MEDICAL HISTORY  Active Ambulatory Problems     Diagnosis Date Noted   • Dyslipidemia 07/14/2016   • Hypertension 07/14/2016   • Anxiety (Chronic benzos) 07/14/2016   • Insomnia 07/14/2016   • GERD (gastroesophageal reflux disease) 07/14/2016   • Diabetes mellitus, type 2 (CMS/[facility]) 07/14/2016   • Fibromyalgia 07/14/2016   • RLS (restless legs syndrome) 07/14/2016   • Migraines 07/14/2016   • COPD (chronic obstructive pulmonary disease)  (CMS/[facility]) 07/14/2016   • Interstitial cystitis 07/14/2016   • History of acute myocardial infarction 07/14/2016   • History of cardiac murmur 07/14/2016   • History of stroke 07/14/2016   • CAD (coronary artery disease) 07/14/2016   • Essential hypertension 01/12/2017   • CKD (chronic kidney disease) stage 2, GFR 60-89 ml/min 09/20/2017   • Bilateral carotid artery stenosis 09/20/2017   • Chronic respiratory failure with hypoxia and hypercapnia (CMS/[facility]) 09/30/2017   • URIEL (obstructive sleep apnea) 09/30/2017   • Obesity (BMI 30-39.9) 09/30/2017   • Diabetes mellitus type 2 in obese (CMS/[facility]) 09/30/2017   • Obesity hypoventilation syndrome (CMS/[facility]) 03/17/2018   • Tobacco use 03/17/2018   • Chronic pain (Chronic narcotics) 10/15/2018   • Recurrent UTI/interstitial cystitis on chronic methenamine 10/15/2018   • Clostridium difficile colitis diagnosed September 2018. Persistent diarrhea despite oral vancomycin 10/16/2018   • Demand ischemia (CMS/[facility]) 11/06/2018   • Hypoxemia requiring supplemental oxygen 01/25/2019   • Stenosis of carotid artery 10/28/2019   • Occlusion and stenosis of left carotid artery  10/28/2019   • Chronic gout with tophus 10/28/2019   • Depression 10/28/2019   • Edema 10/28/2019   • Restless leg syndrome 10/28/2019   • Asthma 10/28/2019   • Wellness examination 10/28/2019   • Encounter for screening mammogram for malignant neoplasm of breast  10/28/2019   • Seasonal allergies 10/28/2019   • Hyperlipidemia 12/05/2019   • Seizure disorder (CMS/[facility]) 12/05/2019   • L1 vertebral fracture (CMS/[facility]) 12/05/2019   • Rib fracture 12/05/2019   • Lumbar compression fracture (CMS/[facility]) 12/06/2019   • Polypharmacy 12/07/2019   • Itching 03/23/2020   • Anemia 06/17/2020   • Urinary tract infection with hematuria 06/17/2020   • Other fatigue 06/17/2020   • Balance problem 06/17/2020     Resolved Ambulatory Problems     Diagnosis Date Noted   • Generalized edema 09/20/2017   • Pneumonia of right lower lobe due  to infectious organism 09/28/2017   • Acute metabolic encephalopathy due to hypercarbia, hypoxemia, benzodiazepines 03/17/2018   • GASPER (acute kidney injury) (CMS/HCC) 03/17/2018   • SIRS (systemic inflammatory response syndrome) (CMS/HCC) 03/17/2018   • Elevated LFTs 03/17/2018   • Right leg pain 03/19/2018   • Elevated d-dimer 03/19/2018   • ESR raised 03/20/2018   • Seizure disorder, nonconvulsive, with status epilepticus (CMS/HCC) 03/20/2018   • Septic joint of right knee joint (CMS/HCC) 03/21/2018   • R/O CAP (community acquired pneumonia) 10/15/2018   • Respiratory failure, acute and chronic (CMS/HCC) 10/15/2018   • Acute and chronic respiratory failure with hypercapnia (CMS/HCC) 11/06/2018   • Ataxia 05/15/2020   • Dysarthria 05/15/2020   • Lethargy 05/15/2020     Past Medical History:   Diagnosis Date   • Allergic rhinitis    • Asthma with COPD (CMS/HCC)    • Bilateral ovarian cysts    • Coronary artery disease    • Depression with anxiety    • Diabetes (CMS/HCC)    • Endometriosis    • Fatigue    • Headache    • High cholesterol    • History of gallstones    • History of myocardial infarction    • Influenza B    • On home oxygen therapy    • URIEL on CPAP    • PONV (postoperative nausea and vomiting)    • Shortness of breath on exertion    • Stroke (CMS/HCC)    • Thyroid disorder    • Urinary leakage    • UTI (urinary tract infection)    • Wears glasses    • Yeast dermatitis          PAST SURGICAL HISTORY  Past Surgical History:   Procedure Laterality Date   • ARTERIOGRAM N/A 2/12/2018    Procedure: Renal Arteriogram;  Surgeon: Lito Flores MD;  Location:  [facility] CATH INVASIVE LOCATION;  Service:    • BREAST BIOPSY Right 1991   • CARDIAC CATHETERIZATION N/A 2/12/2018    Procedure: Right Heart Cath;  Surgeon: Lito Flores MD;  Location:  [facility] CATH INVASIVE LOCATION;  Service:    • CAROTID STENT      Transcath    • CAROTID STENT Left    • CHOLECYSTECTOMY     • CYSTOSTOMY W/ BLADDER BIOPSY     •  DILATATION AND CURETTAGE     • KNEE ARTHROSCOPY Right 3/23/2018    Procedure: ARTHROSCOPY, RIGHT KNEE  INCISION AND DRAINAGE LOWER EXTREMITY WITH SYNOVIAL BIOPSY;  Surgeon: Dilip Giron MD;  Location: [facility];  Service: Orthopedics   • LAPAROSCOPIC CHOLECYSTECTOMY  1994    Lap rolando   • OOPHORECTOMY     • PAP SMEAR  05/10/2016   • SUBTOTAL HYSTERECTOMY           FAMILY HISTORY  Family History   Problem Relation Age of Onset   • Cancer Other    • Diabetes Other    • Heart attack Other    • Hyperlipidemia Other    • Hypertension Other    • Osteoporosis Other    • Stroke Other    • Breast cancer Mother    • Osteoporosis Mother    • Colon cancer Father          SOCIAL HISTORY  Social History     Socioeconomic History   • Marital status:      Spouse name: Not on file   • Number of children: Not on file   • Years of education: Not on file   • Highest education level: Not on file   Tobacco Use   • Smoking status: Current Every Day Smoker     Packs/day: 0.25     Years: 40.00     Pack years: 10.00     Types: Cigarettes   • Smokeless tobacco: Never Used   • Tobacco comment: in process of quiting--AVERAGE 1 PPD, DOWN TO 6 CIG PER DAY X2 WEEKS    Substance and Sexual Activity   • Alcohol use: Yes     Alcohol/week: 1.0 standard drinks     Types: 1 Glasses of wine per week     Comment: occasional   • Drug use: No   • Sexual activity: Defer   Social History Narrative    Lives alone.          ALLERGIES  Amlodipine; Hydrocodone; and Reglan [metoclopramide]        REVIEW OF SYSTEMS  Review of Systems   Review of all 14 systems is negative other than stated in the HPI above.      PHYSICAL EXAM  ED Triage Vitals [08/20/20 1517]   Temp Heart Rate Resp BP SpO2   97.9 °F (36.6 °C) 51 20 113/85 99 %      Temp src Heart Rate Source Patient Position BP Location FiO2 (%)   Oral -- -- -- --         GENERAL: Awake and alert, no acute distress  HENT: nares patent  EYES: no scleral icterus, pupils 3 mm reactive bilaterally  CV:  regular rhythm, normal rate, no murmur  RESPIRATORY: normal effort, lungs clear to auscultation bilaterally  ABDOMEN: soft, nondistended, nontender throughout  MUSCULOSKELETAL: no deformity.  No midline L-spine tenderness or step-off.  There is mild tenderness of the right upper buttock region.  There is also point tenderness over the right greater trochanter.  There is no pain with passive ranging of the right lower extremity.  2+ DP and PT pulses right lower extremity with brisk cap refill in all toes.  There is no right calf tenderness or swelling.  NEURO: alert, moves all extremities, follows commands, normal sensation to light touch throughout bilateral lower extremities.  Normal motor throughout bilateral lower extremities.  SKIN: warm, dry, normal to inspection, no rash    Vital signs and nursing notes reviewed.          LAB RESULTS  Recent Results (from the past 24 hour(s))   Comprehensive Metabolic Panel    Collection Time: 08/20/20  3:51 PM   Result Value Ref Range    Glucose 136 (H) 65 - 99 mg/dL    BUN 30 (H) 8 - 23 mg/dL    Creatinine 1.64 (H) 0.57 - 1.00 mg/dL    Sodium 138 136 - 145 mmol/L    Potassium 4.4 3.5 - 5.2 mmol/L    Chloride 98 98 - 107 mmol/L    CO2 28.5 22.0 - 29.0 mmol/L    Calcium 8.9 8.6 - 10.5 mg/dL    Total Protein 6.0 6.0 - 8.5 g/dL    Albumin 3.80 3.50 - 5.20 g/dL    ALT (SGPT) 9 1 - 33 U/L    AST (SGOT) 8 1 - 32 U/L    Alkaline Phosphatase 94 39 - 117 U/L    Total Bilirubin 0.2 0.0 - 1.2 mg/dL    eGFR Non African Amer 31 (L) >60 mL/min/1.73    Globulin 2.2 gm/dL    A/G Ratio 1.7 g/dL    BUN/Creatinine Ratio 18.3 7.0 - 25.0    Anion Gap 11.5 5.0 - 15.0 mmol/L   Troponin    Collection Time: 08/20/20  3:51 PM   Result Value Ref Range    Troponin T <0.010 0.000 - 0.030 ng/mL   CBC Auto Differential    Collection Time: 08/20/20  3:51 PM   Result Value Ref Range    WBC 9.27 3.40 - 10.80 10*3/mm3    RBC 3.74 (L) 3.77 - 5.28 10*6/mm3    Hemoglobin 9.3 (L) 12.0 - 15.9 g/dL    Hematocrit  29.8 (L) 34.0 - 46.6 %    MCV 79.7 79.0 - 97.0 fL    MCH 24.9 (L) 26.6 - 33.0 pg    MCHC 31.2 (L) 31.5 - 35.7 g/dL    RDW 18.3 (H) 12.3 - 15.4 %    RDW-SD 53.7 37.0 - 54.0 fl    MPV 8.9 6.0 - 12.0 fL    Platelets 207 140 - 450 10*3/mm3    Neutrophil % 74.8 42.7 - 76.0 %    Lymphocyte % 15.2 (L) 19.6 - 45.3 %    Monocyte % 5.8 5.0 - 12.0 %    Eosinophil % 3.2 0.3 - 6.2 %    Basophil % 0.6 0.0 - 1.5 %    Immature Grans % 0.4 0.0 - 0.5 %    Neutrophils, Absolute 6.92 1.70 - 7.00 10*3/mm3    Lymphocytes, Absolute 1.41 0.70 - 3.10 10*3/mm3    Monocytes, Absolute 0.54 0.10 - 0.90 10*3/mm3    Eosinophils, Absolute 0.30 0.00 - 0.40 10*3/mm3    Basophils, Absolute 0.06 0.00 - 0.20 10*3/mm3    Immature Grans, Absolute 0.04 0.00 - 0.05 10*3/mm3    nRBC 0.0 0.0 - 0.2 /100 WBC       Ordered the above labs and reviewed the results.        RADIOLOGY  Xr Spine Lumbar Complete 4+vw    Result Date: 8/20/2020  XR HIP W OR WO PELVIS 2-3 VIEW RIGHT-, XR SPINE LUMBAR COMPLETE 4+VW-  HISTORY: 70-year-old female with right hip and thigh pain. No history of trauma. Radiculopathy suspected.  FINDINGS: Lumbar spine: There is fairly advanced spondylosis at L5-S1 and there is mild to moderate spondylosis throughout the lumbar spine. There is no spondylolisthesis.  Right hip: There are moderate osteoarthritic changes at the right hip joint. No acute right hip abnormality is seen.  This report was finalized on 8/20/2020 8:02 PM by Dr. Melissa Castillo M.D.      Xr Hip With Or Without Pelvis 2 - 3 View Right    Result Date: 8/20/2020  XR HIP W OR WO PELVIS 2-3 VIEW RIGHT-, XR SPINE LUMBAR COMPLETE 4+VW-  HISTORY: 70-year-old female with right hip and thigh pain. No history of trauma. Radiculopathy suspected.  FINDINGS: Lumbar spine: There is fairly advanced spondylosis at L5-S1 and there is mild to moderate spondylosis throughout the lumbar spine. There is no spondylolisthesis.  Right hip: There are moderate osteoarthritic changes at the right hip  joint. No acute right hip abnormality is seen.  This report was finalized on 8/20/2020 8:02 PM by Dr. Melissa Castillo M.D.        Ordered the above noted radiological studies. Reviewed by me in PACS.            PROCEDURES  Procedures              MEDICATIONS GIVEN IN ER  Medications   sodium chloride 0.9 % bolus 500 mL (0 mL Intravenous Stopped 8/20/20 1730)                   MEDICAL DECISION MAKING, PROGRESS, and CONSULTS      All labs have been independently reviewed by me.  All radiology studies have been reviewed by me and discussed with radiologist dictating the report.   EKG's independently viewed and interpreted by me.  Discussion below represents my analysis of pertinent findings related to patient's condition, differential diagnosis, treatment plan and final disposition.      ED Course as of Aug 20 2111   Thu Aug 20, 2020   1546 70-year-old female with 4 days of right lateral thigh pain without trauma or injury.  Differential for the thigh pain includes lumbar radiculopathy, early herpes zoster without rash, trochanteric bursitis, iliotibial band syndrome, meralgia paresthetica.  Plain films of the right hip and lumbar spine were obtained for further evaluation.  She also then had a near syncopal episode with documented low blood pressure by EMS which was secondary to her pain associated with nausea.  Symptoms have now resolved.  I suspect that this was a vasovagal event secondary to her severe pain.  She is declining pain medication at this time.  I have ordered labs, EKG for further evaluation.    [JR]   1657 EKG          EKG time: 1645  Rhythm/Rate: Sinus bradycardia, 56  P waves and FL: Borderline first-degree AV block  QRS, axis: Normal axis  ST and T waves: No acute ischemic changes    Interpreted Contemporaneously by me, independently viewed  Similar compared to prior 5/15/2020          [JR]   1728 Patient updated on reassuring laboratory findings but with slight increase of her serum creatinine today  compared to prior.  I explained that we will provide her with a 500 cc fluid bolus prior to discharge.  She will be prescribed the lidocaine patches which have provided her some relief as well as a short course of tramadol PRN for pain.  Continue Tylenol PRN as well.  Follow-up with her rheumatologist and/or orthopedics for what is most likely trochanteric bursitis of the right hip, or possibly radicular pain from the lumbar spine.  Return precautions were discussed.    [JR]   1739 [place] reviewed #[id number]    [JR]   2111 Hemoglobin(!): 9.3 [JR]   2111 WBC: 9.27 [JR]   2111 Troponin T: <0.010 [JR]   2111 Creatinine(!): 1.64 [JR]   2111 Glucose(!): 136 [JR]   2111 Potassium: 4.4 [JR]      ED Course User Index  [JR] Bharat Mahajan MD       MDM       I wore a surgical mask, gloves, and eye protection during this patient encounter.  Patient also wearing a surgical mask.  Hand hygeine performed before and after seeing the patient.    DIAGNOSIS  Final diagnoses:   Acute right hip pain   Vasovagal episode   Renal insufficiency         DISPOSITION  DISCHARGE    Patient discharged in stable condition.    Reviewed implications of results, diagnosis, meds, responsibility to follow up, warning signs and symptoms of possible worsening, potential complications and reasons to return to ER.    Patient/Family voiced understanding of above instructions.    Discussed plan for discharge, as there is no emergent indication for admission. Patient referred to primary care provider for BP management due to today's BP. Pt/family is agreeable and understands need for follow up and repeat testing.  Pt is aware that discharge does not mean that nothing is wrong but it indicates no emergency is present that requires admission and they must continue care with follow-up as given below or physician of their choice.     FOLLOW-UP  Abiodun Vila MD  [address] [address] [address] [phone]    Schedule an  appointment as soon as possible for a visit       Harry Starr II, MD  [address] [facility]   [address]  [place] [address]  [phone]      As needed         Medication List      New Prescriptions    lidocaine 5 %  Commonly known as:  Lidoderm  Place 1 patch on the skin as directed by provider Daily. Remove & Discard   patch within 12 hours or as directed by MD     traMADol 50 MG tablet  Commonly known as:  ULTRAM  Take 1 tablet by mouth Every 8 (Eight) Hours As Needed for Moderate Pain .        Changed    Toujeo SoloStar 300 UNIT/ML solution pen-injector injection  Generic drug:  Insulin Glargine (1 Unit Dial)  Use 25 Units at night  What changed:  how much to take                      Latest Documented Vital Signs:  As of 21:11  BP- 113/85 HR- 51 Temp- 97.9 °F (36.6 °C) (Oral) O2 sat- 99%        --    Please note that portions of this were completed with a voice recognition program.          Bharat Mahajan MD  08/20/20 7033

## 2020-08-20 NOTE — ED NOTES
"Pt arrives via EMS from home with c/o of right leg pain that started three days ago, no trauma or injury noted. Per EMS when they arrived pt stated she '\"felt so bad I'm going to pass out\". Per EMS pt was bradycardic and hypotensive. BP 87/26, HR 52. Pt received 350 mL of NS and verbalized feeling better. Pt A&O x4.    Pt placed in mask. This RN wore mask and goggles during encounter.      Galina Adams, RN  08/20/20 8667    "

## 2020-08-21 ENCOUNTER — TELEPHONE (OUTPATIENT)
Dept: FAMILY MEDICINE CLINIC | Facility: CLINIC | Age: 70
End: 2020-08-21

## 2020-08-21 DIAGNOSIS — M79.651 PAIN OF RIGHT THIGH: Primary | ICD-10-CM

## 2020-08-21 DIAGNOSIS — R11.0 NAUSEA: ICD-10-CM

## 2020-08-21 RX ORDER — ONDANSETRON HYDROCHLORIDE 8 MG/1
8 TABLET, FILM COATED ORAL EVERY 8 HOURS PRN
Qty: 30 TABLET | Refills: 1 | Status: SHIPPED | OUTPATIENT
Start: 2020-08-21 | End: 2021-04-19 | Stop reason: ALTCHOICE

## 2020-08-21 RX ORDER — LIDOCAINE 50 MG/G
1 PATCH TOPICAL EVERY 24 HOURS
Qty: 7 PATCH | Refills: 0 | Status: SHIPPED | OUTPATIENT
Start: 2020-08-21 | End: 2020-08-27 | Stop reason: SDUPTHER

## 2020-08-21 NOTE — TELEPHONE ENCOUNTER
Was seen in ER last evening at [facility] & was given script for lidocaine patches however they needed a PA so they suggested she call here, get a new script sent in so we can do the PA / patient also needs a script for phenergan

## 2020-08-27 DIAGNOSIS — M79.651 PAIN OF RIGHT THIGH: ICD-10-CM

## 2020-08-27 RX ORDER — LIDOCAINE 50 MG/G
1 PATCH TOPICAL EVERY 24 HOURS
Qty: 30 PATCH | Refills: 1 | Status: SHIPPED | OUTPATIENT
Start: 2020-08-27 | End: 2021-10-28

## 2020-08-28 ENCOUNTER — APPOINTMENT (OUTPATIENT)
Dept: MRI IMAGING | Facility: HOSPITAL | Age: 70
End: 2020-08-28

## 2020-09-01 ENCOUNTER — TELEPHONE (OUTPATIENT)
Dept: FAMILY MEDICINE CLINIC | Facility: CLINIC | Age: 70
End: 2020-09-01

## 2020-09-02 ENCOUNTER — OFFICE VISIT (OUTPATIENT)
Dept: FAMILY MEDICINE CLINIC | Facility: CLINIC | Age: 70
End: 2020-09-02

## 2020-09-02 DIAGNOSIS — Z53.21 PATIENT LEFT WITHOUT BEING SEEN: Primary | ICD-10-CM

## 2020-09-02 NOTE — PROGRESS NOTES
Subjective   Vidhi Lopez is a 70 y.o. female.     No chief complaint on file.    Sinusitis, pt agreed to be contacted by phone  History of Present Illness I called once, no answer, LMTCB      The following portions of the patient's history were reviewed and updated as appropriate: allergies, current medications, past family history, past medical history, past social history, past surgical history and problem list.    Past Medical History:   Diagnosis Date   • Allergic rhinitis    • Asthma with COPD (CMS/[facility])     Asthma/COPD   • Bilateral ovarian cysts    • Coronary artery disease    • Depression with anxiety     Depression/Anxiety   • Diabetes (CMS/[facility])     pre   • Endometriosis     Dr. Jin; estradiol    • ESR raised 3/20/2018   • Fatigue    • Fibromyalgia    • Headache     Headaches   • High cholesterol    • History of gallstones    • History of myocardial infarction    • Hypertension    • Influenza B     DX'D 2/6/2018, TREATED WITH TAMIFLU. LAST DOSE 2/11/2018 AM.  PATIENT STATES DR FLORES IS AWARE. DENIES FEVERS OR CHILLS.   • Interstitial cystitis    • On home oxygen therapy     2 L NC   • URIEL on CPAP    • PONV (postoperative nausea and vomiting)    • Seizure disorder, nonconvulsive, with status epilepticus (CMS/[facility]) 3/20/2018   • Septic joint of right knee joint (CMS/[facility]) 3/21/2018   • Shortness of breath on exertion    • Stroke (CMS/[facility])     X1 8 YEARS AGO, GENERALIZED UPPER BODY WEAKNESS RESIDUAL PER PT    • Thyroid disorder    • Urinary leakage    • UTI (urinary tract infection)    • Wears glasses    • Yeast dermatitis        Past Surgical History:   Procedure Laterality Date   • ARTERIOGRAM N/A 2/12/2018    Procedure: Renal Arteriogram;  Surgeon: Lito Flores MD;  Location:  [facility] CATH INVASIVE LOCATION;  Service:    • BREAST BIOPSY Right 1991   • CARDIAC CATHETERIZATION N/A 2/12/2018    Procedure: Right Heart Cath;  Surgeon: Lito Flores MD;  Location:  [facility] CATH INVASIVE LOCATION;  Service:     • CAROTID STENT      Transcath    • CAROTID STENT Left    • CHOLECYSTECTOMY     • CYSTOSTOMY W/ BLADDER BIOPSY     • DILATATION AND CURETTAGE     • KNEE ARTHROSCOPY Right 3/23/2018    Procedure: ARTHROSCOPY, RIGHT KNEE  INCISION AND DRAINAGE LOWER EXTREMITY WITH SYNOVIAL BIOPSY;  Surgeon: Dilip Giron MD;  Location: [facility];  Service: Orthopedics   • LAPAROSCOPIC CHOLECYSTECTOMY  1994    Lap rolando   • OOPHORECTOMY     • PAP SMEAR  05/10/2016   • SUBTOTAL HYSTERECTOMY         Family History   Problem Relation Age of Onset   • Cancer Other    • Diabetes Other    • Heart attack Other    • Hyperlipidemia Other    • Hypertension Other    • Osteoporosis Other    • Stroke Other    • Breast cancer Mother    • Osteoporosis Mother    • Colon cancer Father        Social History     Socioeconomic History   • Marital status:      Spouse name: Not on file   • Number of children: Not on file   • Years of education: Not on file   • Highest education level: Not on file   Tobacco Use   • Smoking status: Current Every Day Smoker     Packs/day: 0.25     Years: 40.00     Pack years: 10.00     Types: Cigarettes   • Smokeless tobacco: Never Used   • Tobacco comment: in process of quiting--AVERAGE 1 PPD, DOWN TO 6 CIG PER DAY X2 WEEKS    Substance and Sexual Activity   • Alcohol use: Yes     Alcohol/week: 1.0 standard drinks     Types: 1 Glasses of wine per week     Comment: occasional   • Drug use: No   • Sexual activity: Defer   Social History Narrative    Lives alone.        Review of Systems    Objective   There were no vitals filed for this visit.  There is no height or weight on file to calculate BMI.        Assessment/Plan   There are no diagnoses linked to this encounter.

## 2020-09-04 DIAGNOSIS — L29.9 ITCHING: ICD-10-CM

## 2020-09-04 RX ORDER — HYDROXYZINE 50 MG/1
TABLET, FILM COATED ORAL
Qty: 90 TABLET | Refills: 2 | Status: SHIPPED | OUTPATIENT
Start: 2020-09-04 | End: 2021-01-27

## 2020-09-08 RX ORDER — ROPINIROLE 0.25 MG/1
TABLET, FILM COATED ORAL
Qty: 90 TABLET | Refills: 1 | Status: SHIPPED | OUTPATIENT
Start: 2020-09-08 | End: 2021-04-26 | Stop reason: SDUPTHER

## 2020-09-09 ENCOUNTER — APPOINTMENT (OUTPATIENT)
Dept: MRI IMAGING | Facility: HOSPITAL | Age: 70
End: 2020-09-09

## 2020-09-09 DIAGNOSIS — J45.909 ASTHMA, UNSPECIFIED ASTHMA SEVERITY, UNSPECIFIED WHETHER COMPLICATED, UNSPECIFIED WHETHER PERSISTENT: ICD-10-CM

## 2020-09-09 RX ORDER — IPRATROPIUM BROMIDE AND ALBUTEROL SULFATE 2.5; .5 MG/3ML; MG/3ML
3 SOLUTION RESPIRATORY (INHALATION)
Qty: 360 ML | Refills: 3 | Status: SHIPPED | OUTPATIENT
Start: 2020-09-09

## 2020-09-09 NOTE — TELEPHONE ENCOUNTER
Refilled Rx Duo-Neb Sol and Wixela inhaler per chart via fax to [facility] per pt's request. Pt has cancelled her apt's numerous of times since last seen in the office. Called pt  LVM informing her of refill request approved but she will need to be seen before any further refills. Office # given if any questions.

## 2020-09-10 ENCOUNTER — TELEPHONE (OUTPATIENT)
Dept: NEUROLOGY | Facility: CLINIC | Age: 70
End: 2020-09-10

## 2020-09-10 NOTE — TELEPHONE ENCOUNTER
SHE IS COMPLETELY OUT OF MEDICATION levETIRAcetam (KEPPRA) 500 MG tablet ([facility]:  IN THE REHAB PLACE INCREASED THE KEPPRA)

## 2020-09-11 RX ORDER — LEVETIRACETAM 500 MG/1
500 TABLET ORAL 2 TIMES DAILY
Qty: 60 TABLET | Refills: 0 | Status: SHIPPED | OUTPATIENT
Start: 2020-09-11 | End: 2020-09-11 | Stop reason: SDUPTHER

## 2020-09-11 RX ORDER — LEVETIRACETAM 500 MG/1
500 TABLET ORAL 2 TIMES DAILY
Qty: 60 TABLET | Refills: 11 | Status: SHIPPED | OUTPATIENT
Start: 2020-09-11 | End: 2020-11-11 | Stop reason: SDUPTHER

## 2020-09-11 NOTE — TELEPHONE ENCOUNTER
Pt states that she has been taking Keppra 500mg 1 po bid since December while she was in patient rehab.      Pt states that both legs hurt/burn for the last week, mostly the right leg.  Do you have any suggestions?  Pt states that she has a f/u on Monday with rheumatology.

## 2020-09-11 NOTE — TELEPHONE ENCOUNTER
I just sent the refill in.     Unfortunately, that is a bit out of my scope. I would recommend that she first talk with rheumatology or her PCP. Thanks!

## 2020-09-17 DIAGNOSIS — I10 HYPERTENSION, UNSPECIFIED TYPE: ICD-10-CM

## 2020-09-17 RX ORDER — METOPROLOL TARTRATE 50 MG/1
TABLET, FILM COATED ORAL
Qty: 180 TABLET | Refills: 0 | Status: SHIPPED | OUTPATIENT
Start: 2020-09-17 | End: 2020-11-11 | Stop reason: SDUPTHER

## 2020-09-17 RX ORDER — HYDRALAZINE HYDROCHLORIDE 50 MG/1
TABLET, FILM COATED ORAL
Qty: 90 TABLET | Refills: 0 | Status: SHIPPED | OUTPATIENT
Start: 2020-09-17 | End: 2020-11-11 | Stop reason: SDUPTHER

## 2020-09-17 RX ORDER — LISINOPRIL 10 MG/1
TABLET ORAL
Qty: 30 TABLET | Refills: 0 | Status: SHIPPED | OUTPATIENT
Start: 2020-09-17 | End: 2020-11-11 | Stop reason: SDUPTHER

## 2020-09-22 ENCOUNTER — APPOINTMENT (OUTPATIENT)
Dept: MRI IMAGING | Facility: HOSPITAL | Age: 70
End: 2020-09-22

## 2020-09-23 ENCOUNTER — TELEPHONE (OUTPATIENT)
Dept: FAMILY MEDICINE CLINIC | Facility: CLINIC | Age: 70
End: 2020-09-23

## 2020-09-30 ENCOUNTER — APPOINTMENT (OUTPATIENT)
Dept: CT IMAGING | Facility: HOSPITAL | Age: 70
End: 2020-09-30

## 2020-09-30 ENCOUNTER — HOSPITAL ENCOUNTER (EMERGENCY)
Facility: HOSPITAL | Age: 70
Discharge: HOME | End: 2020-09-30
Attending: EMERGENCY MEDICINE | Admitting: EMERGENCY MEDICINE

## 2020-09-30 VITALS
RESPIRATION RATE: 18 BRPM | SYSTOLIC BLOOD PRESSURE: 161 MMHG | HEIGHT: 65 IN | BODY MASS INDEX: 31.65 KG/M2 | OXYGEN SATURATION: 98 % | TEMPERATURE: 97.8 F | DIASTOLIC BLOOD PRESSURE: 89 MMHG | HEART RATE: 105 BPM | WEIGHT: 190 LBS

## 2020-09-30 DIAGNOSIS — R79.89 ELEVATED LFTS: Primary | ICD-10-CM

## 2020-09-30 DIAGNOSIS — D72.829 LEUKOCYTOSIS, UNSPECIFIED TYPE: ICD-10-CM

## 2020-09-30 DIAGNOSIS — R19.7 DIARRHEA, UNSPECIFIED TYPE: ICD-10-CM

## 2020-09-30 LAB
ALBUMIN SERPL-MCNC: 4.1 G/DL (ref 3.5–5.2)
ALBUMIN/GLOB SERPL: 0.9 G/DL
ALP SERPL-CCNC: 141 U/L (ref 39–117)
ALT SERPL W P-5'-P-CCNC: 51 U/L (ref 1–33)
ANION GAP SERPL CALCULATED.3IONS-SCNC: 18.8 MMOL/L (ref 5–15)
AST SERPL-CCNC: 129 U/L (ref 1–32)
BACTERIA UR QL AUTO: ABNORMAL /HPF
BASOPHILS # BLD AUTO: 0.03 10*3/MM3 (ref 0–0.2)
BASOPHILS NFR BLD AUTO: 0.2 % (ref 0–1.5)
BILIRUB SERPL-MCNC: 0.9 MG/DL (ref 0–1.2)
BILIRUB UR QL STRIP: NEGATIVE
BUN SERPL-MCNC: 49 MG/DL (ref 8–23)
BUN/CREAT SERPL: 30.2 (ref 7–25)
CALCIUM SPEC-SCNC: 10.8 MG/DL (ref 8.6–10.5)
CHLORIDE SERPL-SCNC: 95 MMOL/L (ref 98–107)
CLARITY UR: ABNORMAL
CO2 SERPL-SCNC: 23.2 MMOL/L (ref 22–29)
COD CRY URNS QL: ABNORMAL /HPF
COLOR UR: YELLOW
CREAT SERPL-MCNC: 1.62 MG/DL (ref 0.57–1)
D-LACTATE SERPL-SCNC: 1.9 MMOL/L (ref 0.5–2)
DEPRECATED RDW RBC AUTO: 55 FL (ref 37–54)
EOSINOPHIL # BLD AUTO: 0 10*3/MM3 (ref 0–0.4)
EOSINOPHIL NFR BLD AUTO: 0 % (ref 0.3–6.2)
ERYTHROCYTE [DISTWIDTH] IN BLOOD BY AUTOMATED COUNT: 19.6 % (ref 12.3–15.4)
GFR SERPL CREATININE-BSD FRML MDRD: 32 ML/MIN/1.73
GLOBULIN UR ELPH-MCNC: 4.4 GM/DL
GLUCOSE SERPL-MCNC: 153 MG/DL (ref 65–99)
GLUCOSE UR STRIP-MCNC: NEGATIVE MG/DL
HCT VFR BLD AUTO: 38.3 % (ref 34–46.6)
HGB BLD-MCNC: 12.5 G/DL (ref 12–15.9)
HGB UR QL STRIP.AUTO: ABNORMAL
HOLD SPECIMEN: NORMAL
HOLD SPECIMEN: NORMAL
HYALINE CASTS UR QL AUTO: ABNORMAL /LPF
IMM GRANULOCYTES # BLD AUTO: 0.06 10*3/MM3 (ref 0–0.05)
IMM GRANULOCYTES NFR BLD AUTO: 0.4 % (ref 0–0.5)
KETONES UR QL STRIP: ABNORMAL
LEUKOCYTE ESTERASE UR QL STRIP.AUTO: ABNORMAL
LIPASE SERPL-CCNC: 11 U/L (ref 13–60)
LYMPHOCYTES # BLD AUTO: 0.98 10*3/MM3 (ref 0.7–3.1)
LYMPHOCYTES NFR BLD AUTO: 6.2 % (ref 19.6–45.3)
MCH RBC QN AUTO: 25.9 PG (ref 26.6–33)
MCHC RBC AUTO-ENTMCNC: 32.6 G/DL (ref 31.5–35.7)
MCV RBC AUTO: 79.5 FL (ref 79–97)
MONOCYTES # BLD AUTO: 0.82 10*3/MM3 (ref 0.1–0.9)
MONOCYTES NFR BLD AUTO: 5.2 % (ref 5–12)
NEUTROPHILS NFR BLD AUTO: 13.81 10*3/MM3 (ref 1.7–7)
NEUTROPHILS NFR BLD AUTO: 88 % (ref 42.7–76)
NITRITE UR QL STRIP: NEGATIVE
NRBC BLD AUTO-RTO: 0 /100 WBC (ref 0–0.2)
PH UR STRIP.AUTO: <=5 [PH] (ref 5–8)
PLATELET # BLD AUTO: 269 10*3/MM3 (ref 140–450)
PMV BLD AUTO: 9.8 FL (ref 6–12)
POTASSIUM SERPL-SCNC: 3.8 MMOL/L (ref 3.5–5.2)
PROT SERPL-MCNC: 8.5 G/DL (ref 6–8.5)
PROT UR QL STRIP: ABNORMAL
RBC # BLD AUTO: 4.82 10*6/MM3 (ref 3.77–5.28)
RBC # UR: ABNORMAL /HPF
REF LAB TEST METHOD: ABNORMAL
SODIUM SERPL-SCNC: 137 MMOL/L (ref 136–145)
SP GR UR STRIP: 1.03 (ref 1–1.03)
SQUAMOUS #/AREA URNS HPF: ABNORMAL /HPF
UROBILINOGEN UR QL STRIP: ABNORMAL
WBC # BLD AUTO: 15.7 10*3/MM3 (ref 3.4–10.8)
WBC UR QL AUTO: ABNORMAL /HPF
WHOLE BLOOD HOLD SPECIMEN: NORMAL
WHOLE BLOOD HOLD SPECIMEN: NORMAL

## 2020-09-30 PROCEDURE — 25010000002 ONDANSETRON PER 1 MG: Performed by: EMERGENCY MEDICINE

## 2020-09-30 PROCEDURE — P9612 CATHETERIZE FOR URINE SPEC: HCPCS

## 2020-09-30 PROCEDURE — 96374 THER/PROPH/DIAG INJ IV PUSH: CPT

## 2020-09-30 PROCEDURE — 80053 COMPREHEN METABOLIC PANEL: CPT | Performed by: EMERGENCY MEDICINE

## 2020-09-30 PROCEDURE — 83690 ASSAY OF LIPASE: CPT | Performed by: EMERGENCY MEDICINE

## 2020-09-30 PROCEDURE — 74176 CT ABD & PELVIS W/O CONTRAST: CPT

## 2020-09-30 PROCEDURE — 81001 URINALYSIS AUTO W/SCOPE: CPT | Performed by: EMERGENCY MEDICINE

## 2020-09-30 PROCEDURE — 83605 ASSAY OF LACTIC ACID: CPT | Performed by: EMERGENCY MEDICINE

## 2020-09-30 PROCEDURE — 85025 COMPLETE CBC W/AUTO DIFF WBC: CPT | Performed by: EMERGENCY MEDICINE

## 2020-09-30 PROCEDURE — 99283 EMERGENCY DEPT VISIT LOW MDM: CPT

## 2020-09-30 RX ORDER — LEVOFLOXACIN 500 MG/1
500 TABLET, FILM COATED ORAL DAILY
Qty: 7 TABLET | Refills: 0 | Status: SHIPPED | OUTPATIENT
Start: 2020-09-30 | End: 2020-11-11 | Stop reason: ALTCHOICE

## 2020-09-30 RX ORDER — SODIUM CHLORIDE 0.9 % (FLUSH) 0.9 %
10 SYRINGE (ML) INJECTION AS NEEDED
Status: DISCONTINUED | OUTPATIENT
Start: 2020-09-30 | End: 2020-10-01 | Stop reason: HOSPADM

## 2020-09-30 RX ORDER — ONDANSETRON 4 MG/1
4 TABLET, FILM COATED ORAL EVERY 6 HOURS PRN
Qty: 10 TABLET | Refills: 0 | Status: SHIPPED | OUTPATIENT
Start: 2020-09-30 | End: 2021-04-19 | Stop reason: ALTCHOICE

## 2020-09-30 RX ORDER — METRONIDAZOLE 500 MG/1
500 TABLET ORAL 3 TIMES DAILY
Qty: 21 TABLET | Refills: 0 | Status: SHIPPED | OUTPATIENT
Start: 2020-09-30 | End: 2021-04-19 | Stop reason: ALTCHOICE

## 2020-09-30 RX ORDER — ONDANSETRON 2 MG/ML
4 INJECTION INTRAMUSCULAR; INTRAVENOUS ONCE
Status: COMPLETED | OUTPATIENT
Start: 2020-09-30 | End: 2020-09-30

## 2020-09-30 RX ADMIN — SODIUM CHLORIDE 500 ML: 9 INJECTION, SOLUTION INTRAVENOUS at 17:41

## 2020-09-30 RX ADMIN — ONDANSETRON 4 MG: 2 INJECTION INTRAMUSCULAR; INTRAVENOUS at 17:45

## 2020-10-02 ENCOUNTER — EPISODE CHANGES (OUTPATIENT)
Dept: CASE MANAGEMENT | Facility: OTHER | Age: 70
End: 2020-10-02

## 2020-10-08 ENCOUNTER — PATIENT OUTREACH (OUTPATIENT)
Dept: CASE MANAGEMENT | Facility: OTHER | Age: 70
End: 2020-10-08

## 2020-10-08 ENCOUNTER — EPISODE CHANGES (OUTPATIENT)
Dept: CASE MANAGEMENT | Facility: OTHER | Age: 70
End: 2020-10-08

## 2020-10-08 NOTE — OUTREACH NOTE
Patient Outreach Note    Attempted to reach pt x3 with messages left and no return calls following ED visit 9/30. Will make no further attempt. Call routed to PCP.     Brenda Brown RN  Ambulatory     10/8/2020, 10:55 EDT

## 2020-11-11 ENCOUNTER — HOSPITAL ENCOUNTER (OUTPATIENT)
Dept: CARDIOLOGY | Facility: HOSPITAL | Age: 70
End: 2020-11-11

## 2020-11-11 ENCOUNTER — OFFICE VISIT (OUTPATIENT)
Dept: FAMILY MEDICINE CLINIC | Facility: CLINIC | Age: 70
End: 2020-11-11

## 2020-11-11 VITALS
BODY MASS INDEX: 28.37 KG/M2 | DIASTOLIC BLOOD PRESSURE: 40 MMHG | HEART RATE: 53 BPM | OXYGEN SATURATION: 93 % | WEIGHT: 170.5 LBS | SYSTOLIC BLOOD PRESSURE: 87 MMHG | TEMPERATURE: 97.7 F

## 2020-11-11 DIAGNOSIS — M79.604 RIGHT LEG PAIN: ICD-10-CM

## 2020-11-11 DIAGNOSIS — E78.00 HIGH CHOLESTEROL: ICD-10-CM

## 2020-11-11 DIAGNOSIS — E83.52 HYPERCALCEMIA: ICD-10-CM

## 2020-11-11 DIAGNOSIS — R07.2 PRECORDIAL PAIN: ICD-10-CM

## 2020-11-11 DIAGNOSIS — R44.3 HALLUCINATIONS: ICD-10-CM

## 2020-11-11 DIAGNOSIS — R56.9 SEIZURE (HCC): ICD-10-CM

## 2020-11-11 DIAGNOSIS — I10 HYPERTENSION, UNSPECIFIED TYPE: ICD-10-CM

## 2020-11-11 DIAGNOSIS — Z79.899 HIGH RISK MEDICATION USE: ICD-10-CM

## 2020-11-11 DIAGNOSIS — R30.0 DYSURIA: Primary | ICD-10-CM

## 2020-11-11 PROCEDURE — 93000 ELECTROCARDIOGRAM COMPLETE: CPT | Performed by: FAMILY MEDICINE

## 2020-11-11 PROCEDURE — 99215 OFFICE O/P EST HI 40 MIN: CPT | Performed by: FAMILY MEDICINE

## 2020-11-11 RX ORDER — ATORVASTATIN CALCIUM 80 MG/1
80 TABLET, FILM COATED ORAL DAILY
Qty: 90 TABLET | Refills: 3 | Status: SHIPPED | OUTPATIENT
Start: 2020-11-11 | End: 2021-11-23

## 2020-11-11 RX ORDER — ACETAMINOPHEN AND CODEINE PHOSPHATE 300; 30 MG/1; MG/1
1 TABLET ORAL 2 TIMES DAILY PRN
Qty: 14 TABLET | Refills: 0 | Status: SHIPPED | OUTPATIENT
Start: 2020-11-11 | End: 2020-12-10 | Stop reason: SDUPTHER

## 2020-11-11 RX ORDER — TRAZODONE HYDROCHLORIDE 50 MG/1
50 TABLET ORAL NIGHTLY
COMMUNITY
End: 2021-04-19 | Stop reason: SDUPTHER

## 2020-11-11 RX ORDER — LISINOPRIL 10 MG/1
10 TABLET ORAL DAILY
Qty: 90 TABLET | Refills: 3 | Status: SHIPPED | OUTPATIENT
Start: 2020-11-11 | End: 2020-12-16 | Stop reason: SDUPTHER

## 2020-11-11 RX ORDER — HYDRALAZINE HYDROCHLORIDE 50 MG/1
50 TABLET, FILM COATED ORAL 3 TIMES DAILY
Qty: 90 TABLET | Refills: 5 | Status: SHIPPED | OUTPATIENT
Start: 2020-11-11 | End: 2021-04-19 | Stop reason: SDUPTHER

## 2020-11-11 RX ORDER — SULFAMETHOXAZOLE AND TRIMETHOPRIM 800; 160 MG/1; MG/1
1 TABLET ORAL 2 TIMES DAILY
Qty: 14 TABLET | Refills: 0 | Status: SHIPPED | OUTPATIENT
Start: 2020-11-11 | End: 2020-12-10 | Stop reason: ALTCHOICE

## 2020-11-11 RX ORDER — LEVETIRACETAM 500 MG/1
500 TABLET ORAL 2 TIMES DAILY
Qty: 60 TABLET | Refills: 11 | Status: SHIPPED | OUTPATIENT
Start: 2020-11-11 | End: 2021-04-19 | Stop reason: ALTCHOICE

## 2020-11-11 NOTE — PROGRESS NOTES
Subjective   Vidhi Lopez is a 70 y.o. female.     Chief Complaint   Patient presents with   • Edema     Right foot and ankle for last month    • Urinary Tract Infection   • Pain     Left side        History of Present Illness   Possible UTI, burning and urgency and no fever, and L flank pain, and radiates to Chest, left side, under left ribs,, severe, since 6 weeks ago, also R foot and R ankle and edema x 3 years, seen by Nephrologist, blood sugar well controlled, but her calcium is elevated, patient would like to get Tylenol 3 for pain, she said tramadol does not help, and declining hydrocodone due to hallucinations in the past, no history of DVT, no shortness of breath, no coughing,also visual hallucinations recently    ECG 12 Lead    Date/Time: 11/11/2020 2:28 PM  Performed by: Abiodun Vila MD  Authorized by: Abiodun Vila MD   Comparison: compared with previous ECG from 8/20/2020  Rhythm: sinus bradycardia  Rate: normal  Conduction: conduction normal  Q waves: I, aVL, V5 and V6    ST Segments: ST segments normal  T Waves: T waves normal  QRS axis: normal    Clinical impression: abnormal EKG              The following portions of the patient's history were reviewed and updated as appropriate: allergies, current medications, past family history, past medical history, past social history, past surgical history and problem list.    Past Medical History:   Diagnosis Date   • Allergic rhinitis    • Asthma with COPD (CMS/[facility])     Asthma/COPD   • Bilateral ovarian cysts    • Coronary artery disease    • Depression with anxiety     Depression/Anxiety   • Diabetes (CMS/[facility])     pre   • Endometriosis     Dr. Jin; estradiol    • ESR raised 3/20/2018   • Fatigue    • Fibromyalgia    • Headache     Headaches   • High cholesterol    • History of gallstones    • History of myocardial infarction    • Hypertension    • Influenza B     DX'D 2/6/2018, TREATED WITH TAMIFLU. LAST DOSE 2/11/2018 AM.  PATIENT  STATES DR FLORES IS AWARE. DENIES FEVERS OR CHILLS.   • Interstitial cystitis    • On home oxygen therapy     2 L NC   • URIEL on CPAP    • PONV (postoperative nausea and vomiting)    • Seizure disorder, nonconvulsive, with status epilepticus (CMS/[facility]) 3/20/2018   • Septic joint of right knee joint (CMS/[facility]) 3/21/2018   • Shortness of breath on exertion    • Stroke (CMS/[facility])     X1 8 YEARS AGO, GENERALIZED UPPER BODY WEAKNESS RESIDUAL PER PT    • Thyroid disorder    • Urinary leakage    • UTI (urinary tract infection)    • Wears glasses    • Yeast dermatitis        Past Surgical History:   Procedure Laterality Date   • ARTERIOGRAM N/A 2/12/2018    Procedure: Renal Arteriogram;  Surgeon: Lito Flores MD;  Location:  [facility] CATH INVASIVE LOC[facility];  Service:    • BREAST BIOPSY Right 1991   • CARDIAC CATHETERIZATION N/A 2/12/2018    Procedure: Right Heart Cath;  Surgeon: Lito Flores MD;  Location:  [facility] CATH INVASIVE LOCATION;  Service:    • CAROTID STENT      Transcath    • CAROTID STENT Left    • CHOLECYSTECTOMY     • CYSTOSTOMY W/ BLADDER BIOPSY     • DILATATION AND CURETTAGE     • KNEE ARTHROSCOPY Right 3/23/2018    Procedure: ARTHROSCOPY, RIGHT KNEE  INCISION AND DRAINAGE LOWER EXTREMITY WITH SYNOVIAL BIOPSY;  Surgeon: Dilip Giron MD;  Location:  [facility] OR;  Service: Orthopedics   • LAPAROSCOPIC CHOLECYSTECTOMY  1994    Lap rolando   • OOPHORECTOMY     • PAP SMEAR  05/10/2016   • SUBTOTAL HYSTERECTOMY         Family History   Problem Relation Age of Onset   • Cancer Other    • Diabetes Other    • Heart attack Other    • Hyperlipidemia Other    • Hypertension Other    • Osteoporosis Other    • Stroke Other    • Breast cancer Mother    • Osteoporosis Mother    • Colon cancer Father        Social History     Socioeconomic History   • Marital status:      Spouse name: Not on file   • Number of children: Not on file   • Years of education: Not on file   • Highest education level: Not on file   Tobacco Use    • Smoking status: Former Smoker     Packs/day: 0.25     Years: 40.00     Pack years: 10.00     Types: Cigarettes     Quit date:      Years since quittin.8   • Smokeless tobacco: Never Used   • Tobacco comment: in process of quiting--AVERAGE 1 PPD, DOWN TO 6 CIG PER DAY X2 WEEKS    Substance and Sexual Activity   • Alcohol use: Yes     Alcohol/week: 1.0 standard drinks     Types: 1 Glasses of wine per week     Comment: occasional   • Drug use: No   • Sexual activity: Defer   Social History Narrative    Lives alone.        Review of Systems   Constitutional: Negative.  Negative for chills and fever.   HENT: Negative.    Eyes: Negative.    Respiratory: Negative.    Cardiovascular: Positive for chest pain and leg swelling.   Gastrointestinal: Negative.  Negative for abdominal pain and vomiting.   Endocrine: Negative.    Genitourinary: Positive for dysuria, flank pain and frequency. Negative for decreased urine volume, difficulty urinating, urgency and urinary incontinence.   Skin: Negative.    Allergic/Immunologic: Negative.    Neurological: Negative.    Hematological: Negative.    Psychiatric/Behavioral: Negative.    All other systems reviewed and are negative.      Objective   Vitals:    20 1415   BP: (!) 87/40   Pulse: 53   Temp: 97.7 °F (36.5 °C)   SpO2: 93%     Body mass index is 28.37 kg/m².  Physical Exam  Vitals signs and nursing note reviewed.   Constitutional:       Appearance: She is well-developed.   HENT:      Head: Normocephalic and atraumatic.      Right Ear: External ear normal.      Left Ear: External ear normal.      Nose: Nose normal.   Eyes:      General: No scleral icterus.        Right eye: No discharge.         Left eye: No discharge.      Conjunctiva/sclera: Conjunctivae normal.      Pupils: Pupils are equal, round, and reactive to light.   Neck:      Musculoskeletal: Normal range of motion and neck supple.      Thyroid: No thyromegaly.      Vascular: No JVD.      Trachea: No  tracheal deviation.   Cardiovascular:      Rate and Rhythm: Normal rate and regular rhythm.      Heart sounds: Normal heart sounds. No murmur. No friction rub. No gallop.    Pulmonary:      Effort: Pulmonary effort is normal. No respiratory distress.      Breath sounds: Normal breath sounds. No wheezing or rales.   Chest:      Chest wall: No tenderness.   Abdominal:      General: Bowel sounds are normal. There is no distension.      Palpations: Abdomen is soft. There is no mass.      Tenderness: There is no abdominal tenderness. There is no guarding or rebound.      Hernia: No hernia is present.   Musculoskeletal: Normal range of motion.         General: Tenderness present.      Right lower leg: Edema present.      Comments: Tender right calf, positive varicosities, slight edema   Lymphadenopathy:      Cervical: No cervical adenopathy.   Skin:     General: Skin is warm and dry.   Neurological:      General: No focal deficit present.      Cranial Nerves: No cranial nerve deficit.      Sensory: No sensory deficit.      Motor: No abnormal muscle tone.      Coordination: Coordination normal.      Deep Tendon Reflexes: Reflexes normal.   Psychiatric:         Mood and Affect: Mood normal.         Behavior: Behavior normal.         Thought Content: Thought content normal.         Judgment: Judgment normal.           Assessment/Plan   Diagnoses and all orders for this visit:    1. Dysuria (Primary)  -     Cancel: POC Urinalysis Dipstick, Automated  -     Cancel: Urine Culture - Urine, Urine, Clean Catch  -     Comprehensive Metabolic Panel  -     Cancel: POC Urinalysis Dipstick, Automated  -     Urine Culture - Urine, Urine, Clean Catch  -     Cancel: Compliance Drug Analysis, Ur - Urine, Clean Catch  -     PTH, Intact  -     sulfamethoxazole-trimethoprim (Bactrim DS) 800-160 MG per tablet; Take 1 tablet by mouth 2 (Two) Times a Day.  Dispense: 14 tablet; Refill: 0  -     Compliance Drug Analysis, Ur - Urine, Clean Catch;  Future  -     POC Urinalysis Dipstick, Automated; Future    2. Right leg pain  -     Duplex Venous Lower Extremity - Right CAR  -     acetaminophen-codeine (TYLENOL #3) 300-30 MG per tablet; Take 1 tablet by mouth 2 (Two) Times a Day As Needed for Moderate Pain .  Dispense: 14 tablet; Refill: 0    3. Hypercalcemia  -     Cancel: Comprehensive Metabolic Panel  -     Cancel: PTH, Intact  -     Comprehensive Metabolic Panel  -     Cancel: POC Urinalysis Dipstick, Automated  -     Urine Culture - Urine, Urine, Clean Catch  -     Cancel: Compliance Drug Analysis, Ur - Urine, Clean Catch  -     PTH, Intact  -     Compliance Drug Analysis, Ur - Urine, Clean Catch; Future  -     POC Urinalysis Dipstick, Automated; Future    4. High risk medication use  -     Cancel: Compliance Drug Analysis, Ur - Urine, Clean Catch  -     Comprehensive Metabolic Panel  -     Cancel: POC Urinalysis Dipstick, Automated  -     Urine Culture - Urine, Urine, Clean Catch  -     Cancel: Compliance Drug Analysis, Ur - Urine, Clean Catch  -     PTH, Intact  -     Compliance Drug Analysis, Ur - Urine, Clean Catch; Future  -     POC Urinalysis Dipstick, Automated; Future    5. Precordial pain  -     ECG 12 Lead  -     Ambulatory Referral to Cardiology  -     XR Chest 2 View    6. Hypertension, unspecified type  -     metoprolol tartrate (LOPRESSOR) 25 MG tablet; Take 1 tablet by mouth 2 (Two) Times a Day.  Dispense: 180 tablet; Refill: 3  -     lisinopril (PRINIVIL,ZESTRIL) 10 MG tablet; Take 1 tablet by mouth Daily.  Dispense: 90 tablet; Refill: 3  -     hydrALAZINE (APRESOLINE) 50 MG tablet; Take 1 tablet by mouth 3 (Three) Times a Day.  Dispense: 90 tablet; Refill: 5    7. Hallucinations  -     Ambulatory Referral to Psychiatry    8. High cholesterol  -     atorvastatin (LIPITOR) 80 MG tablet; Take 1 tablet by mouth Daily.  Dispense: 90 tablet; Refill: 3    9. Seizure (CMS/HCC)  -     levETIRAcetam (KEPPRA) 500 MG tablet; Take 1 tablet by mouth 2  (Two) Times a Day.  Dispense: 60 tablet; Refill: 11      Blood pressure is low, I will decrease her metoprolol to 25 twice daily, monitor blood pressure, if no better go to the emergency room  Discussed with patient risks of taking Tylenol 3 included but not limited to shortness of breath and can affect seizure control  Time spent 1 hr and 50 minutes   I called but the patient, she changed her mind, she decided not to go for the ultrasound, she will go tomorrow, I explained to her that this is very dangerous,if it is a blood clot it can move and  the longer we wait the  more dangerous it gets, patient understand the risk, she preferred to wait until tomorrow, she agreed to go to the ER if anything bad happened tonight, patient also was unable to leave us a urine sample today,  Patient understands risks including but not limited to DVT, PE, death, etc.

## 2020-11-12 ENCOUNTER — TELEPHONE (OUTPATIENT)
Dept: FAMILY MEDICINE CLINIC | Facility: CLINIC | Age: 70
End: 2020-11-12

## 2020-11-12 ENCOUNTER — HOSPITAL ENCOUNTER (OUTPATIENT)
Dept: CARDIOLOGY | Facility: HOSPITAL | Age: 70
End: 2020-11-12

## 2020-11-12 DIAGNOSIS — R79.89 ELEVATED SERUM CREATININE: Primary | ICD-10-CM

## 2020-11-12 DIAGNOSIS — R79.89 HIGH SERUM PARATHYROID HORMONE (PTH): ICD-10-CM

## 2020-11-12 LAB
ALBUMIN SERPL-MCNC: 4 G/DL (ref 3.8–4.8)
ALBUMIN/GLOB SERPL: 1.2 {RATIO} (ref 1.2–2.2)
ALP SERPL-CCNC: 122 IU/L (ref 39–117)
ALT SERPL-CCNC: 12 IU/L (ref 0–32)
AST SERPL-CCNC: 15 IU/L (ref 0–40)
BILIRUB SERPL-MCNC: 0.4 MG/DL (ref 0–1.2)
BUN SERPL-MCNC: 66 MG/DL (ref 8–27)
BUN/CREAT SERPL: 27 (ref 12–28)
CALCIUM SERPL-MCNC: 10 MG/DL (ref 8.7–10.3)
CHLORIDE SERPL-SCNC: 97 MMOL/L (ref 96–106)
CO2 SERPL-SCNC: 23 MMOL/L (ref 20–29)
CREAT SERPL-MCNC: 2.41 MG/DL (ref 0.57–1)
GLOBULIN SER CALC-MCNC: 3.3 G/DL (ref 1.5–4.5)
GLUCOSE SERPL-MCNC: 110 MG/DL (ref 65–99)
POTASSIUM SERPL-SCNC: 5.1 MMOL/L (ref 3.5–5.2)
PROT SERPL-MCNC: 7.3 G/DL (ref 6–8.5)
PTH-INTACT SERPL-MCNC: 76 PG/ML (ref 15–65)
SODIUM SERPL-SCNC: 139 MMOL/L (ref 134–144)
UNABLE TO VOID: NORMAL

## 2020-11-12 NOTE — TELEPHONE ENCOUNTER
Patient called and stated she was in the office yesterday and forgot to mention she needs a refill for a medication that was started in rehab. Patient is needing a new RX for nexium 40 MG sent to     [id number] - [place], [place]  [facility] AT [facility] - [phone] [facility] [phone] FX     Please advise     [phone]

## 2020-11-16 ENCOUNTER — TELEPHONE (OUTPATIENT)
Dept: FAMILY MEDICINE CLINIC | Facility: CLINIC | Age: 70
End: 2020-11-16

## 2020-11-16 NOTE — TELEPHONE ENCOUNTER
ANITHA for patient:    HUB TO READ:  Your pharmacy sent us a note that you have a codeine allergy.  We do not have it listed on your allergy list.  Do you have an allergy to codeine and what is the reaction?  If you do not, please call and fix this with your pharmacy.

## 2020-11-18 ENCOUNTER — RESULTS ENCOUNTER (OUTPATIENT)
Dept: FAMILY MEDICINE CLINIC | Facility: CLINIC | Age: 70
End: 2020-11-18

## 2020-11-18 DIAGNOSIS — E83.52 HYPERCALCEMIA: ICD-10-CM

## 2020-11-18 DIAGNOSIS — Z79.899 HIGH RISK MEDICATION USE: ICD-10-CM

## 2020-11-18 DIAGNOSIS — R30.0 DYSURIA: ICD-10-CM

## 2020-11-24 ENCOUNTER — APPOINTMENT (OUTPATIENT)
Dept: CARDIOLOGY | Facility: HOSPITAL | Age: 70
End: 2020-11-24

## 2020-12-03 DIAGNOSIS — R60.9 EDEMA, UNSPECIFIED TYPE: ICD-10-CM

## 2020-12-06 RX ORDER — BUMETANIDE 1 MG/1
1 TABLET ORAL
Qty: 90 TABLET | Refills: 3 | OUTPATIENT
Start: 2020-12-06

## 2020-12-10 ENCOUNTER — OFFICE VISIT (OUTPATIENT)
Dept: FAMILY MEDICINE CLINIC | Facility: CLINIC | Age: 70
End: 2020-12-10

## 2020-12-10 ENCOUNTER — HOSPITAL ENCOUNTER (OUTPATIENT)
Dept: CARDIOLOGY | Facility: HOSPITAL | Age: 70
Discharge: HOME | End: 2020-12-10
Admitting: FAMILY MEDICINE

## 2020-12-10 VITALS
WEIGHT: 170 LBS | HEIGHT: 65 IN | OXYGEN SATURATION: 91 % | BODY MASS INDEX: 28.32 KG/M2 | SYSTOLIC BLOOD PRESSURE: 114 MMHG | HEART RATE: 62 BPM | DIASTOLIC BLOOD PRESSURE: 79 MMHG | TEMPERATURE: 98.6 F

## 2020-12-10 DIAGNOSIS — Z00.00 WELLNESS EXAMINATION: ICD-10-CM

## 2020-12-10 DIAGNOSIS — M79.89 LEG SWELLING: Primary | ICD-10-CM

## 2020-12-10 DIAGNOSIS — Z23 ENCOUNTER FOR IMMUNIZATION: ICD-10-CM

## 2020-12-10 DIAGNOSIS — M79.604 RIGHT LEG PAIN: ICD-10-CM

## 2020-12-10 DIAGNOSIS — Z79.899 HIGH RISK MEDICATION USE: ICD-10-CM

## 2020-12-10 PROCEDURE — 99213 OFFICE O/P EST LOW 20 MIN: CPT | Performed by: FAMILY MEDICINE

## 2020-12-10 PROCEDURE — 93971 EXTREMITY STUDY: CPT

## 2020-12-10 RX ORDER — ACETAMINOPHEN AND CODEINE PHOSPHATE 300; 30 MG/1; MG/1
1 TABLET ORAL 2 TIMES DAILY PRN
Qty: 30 TABLET | Refills: 0 | Status: SHIPPED | OUTPATIENT
Start: 2020-12-10 | End: 2021-04-19 | Stop reason: ALTCHOICE

## 2020-12-10 RX ORDER — POTASSIUM CHLORIDE 750 MG/1
10 TABLET, FILM COATED, EXTENDED RELEASE ORAL DAILY
Qty: 30 TABLET | Refills: 3 | Status: SHIPPED | OUTPATIENT
Start: 2020-12-10 | End: 2021-03-19

## 2020-12-10 RX ORDER — FUROSEMIDE 20 MG/1
20 TABLET ORAL DAILY
Qty: 30 TABLET | Refills: 2 | Status: SHIPPED | OUTPATIENT
Start: 2020-12-10 | End: 2021-03-17

## 2020-12-10 NOTE — PROGRESS NOTES
Subjective   Vidhi Lopze is a 70 y.o. female.     Chief Complaint   Patient presents with   • Leg Swelling       History of Present Illness   R leg swelling, x a week, no SOA, no pain, had a DVT 25 years ago same leg, no CP, bumex not working bid    The following portions of the patient's history were reviewed and updated as appropriate: allergies, current medications, past family history, past medical history, past social history, past surgical history and problem list.    Past Medical History:   Diagnosis Date   • Allergic rhinitis    • Asthma with COPD (CMS/[facility])     Asthma/COPD   • Bilateral ovarian cysts    • Coronary artery disease    • Depression with anxiety     Depression/Anxiety   • Diabetes (CMS/[facility])     pre   • Endometriosis     Dr. Jin; estradiol    • ESR raised 3/20/2018   • Fatigue    • Fibromyalgia    • Headache     Headaches   • High cholesterol    • History of gallstones    • History of myocardial infarction    • Hypertension    • Influenza B     DX'D 2/6/2018, TREATED WITH TAMIFLU. LAST DOSE 2/11/2018 AM.  PATIENT STATES DR FLORES IS AWARE. DENIES FEVERS OR CHILLS.   • Interstitial cystitis    • On home oxygen therapy     2 L NC   • URIEL on CPAP    • PONV (postoperative nausea and vomiting)    • Seizure disorder, nonconvulsive, with status epilepticus (CMS/[facility]) 3/20/2018   • Septic joint of right knee joint (CMS/[facility]) 3/21/2018   • Shortness of breath on exertion    • Stroke (CMS/[facility])     X1 8 YEARS AGO, GENERALIZED UPPER BODY WEAKNESS RESIDUAL PER PT    • Thyroid disorder    • Urinary leakage    • UTI (urinary tract infection)    • Wears glasses    • Yeast dermatitis        Past Surgical History:   Procedure Laterality Date   • ARTERIOGRAM N/A 2/12/2018    Procedure: Renal Arteriogram;  Surgeon: Lito Flores MD;  Location: [facility] INVASIVE LOCATION;  Service:    • BREAST BIOPSY Right 1991   • CARDIAC CATHETERIZATION N/A 2/12/2018    Procedure: Right Heart Cath;  Surgeon: Lito Flores  MD;  Location:  [facility] CATH INVASIVE LOCATION;  Service:    • CAROTID STENT      Transcath    • CAROTID STENT Left    • CHOLECYSTECTOMY     • CYSTOSTOMY W/ BLADDER BIOPSY     • DILATATION AND CURETTAGE     • KNEE ARTHROSCOPY Right 3/23/2018    Procedure: ARTHROSCOPY, RIGHT KNEE  INCISION AND DRAINAGE LOWER EXTREMITY WITH SYNOVIAL BIOPSY;  Surgeon: Dilip Giron MD;  Location:  [facility] OR;  Service: Orthopedics   • LAPAROSCOPIC CHOLECYSTECTOMY      Lap rolando   • OOPHORECTOMY     • PAP SMEAR  05/10/2016   • SUBTOTAL HYSTERECTOMY         Family History   Problem Relation Age of Onset   • Cancer Other    • Diabetes Other    • Heart attack Other    • Hyperlipidemia Other    • Hypertension Other    • Osteoporosis Other    • Stroke Other    • Breast cancer Mother    • Osteoporosis Mother    • Colon cancer Father        Social History     Socioeconomic History   • Marital status:      Spouse name: Not on file   • Number of children: Not on file   • Years of education: Not on file   • Highest education level: Not on file   Tobacco Use   • Smoking status: Former Smoker     Packs/day: 0.25     Years: 40.00     Pack years: 10.00     Types: Cigarettes     Quit date:      Years since quittin.9   • Smokeless tobacco: Never Used   • Tobacco comment: in process of quiting--AVERAGE 1 PPD, DOWN TO 6 CIG PER DAY X2 WEEKS    Substance and Sexual Activity   • Alcohol use: Yes     Alcohol/week: 1.0 standard drinks     Types: 1 Glasses of wine per week     Comment: occasional   • Drug use: No   • Sexual activity: Defer   Social History Narrative    Lives alone.        Review of Systems   Constitutional: Negative.  Negative for fatigue.   HENT: Negative.    Eyes: Negative.  Negative for blurred vision.   Respiratory: Negative.  Negative for cough, chest tightness and shortness of breath.    Cardiovascular: Positive for leg swelling. Negative for chest pain and palpitations.   Gastrointestinal: Negative.    Endocrine:  Negative.    Genitourinary: Negative.    Musculoskeletal: Negative.    Skin: Negative.    Allergic/Immunologic: Negative.    Neurological: Negative.  Negative for dizziness and light-headedness.   Hematological: Negative.    Psychiatric/Behavioral: Negative.    All other systems reviewed and are negative.      Objective   Vitals:    12/10/20 1634   BP: 114/79   Pulse: 62   Temp: 98.6 °F (37 °C)   SpO2: 91%     Body mass index is 28.29 kg/m².  Physical Exam  Vitals signs and nursing note reviewed.   Constitutional:       Appearance: She is well-developed.   HENT:      Head: Normocephalic and atraumatic.      Right Ear: External ear normal.      Left Ear: External ear normal.      Nose: Nose normal.   Eyes:      General: No scleral icterus.        Right eye: No discharge.         Left eye: No discharge.      Conjunctiva/sclera: Conjunctivae normal.      Pupils: Pupils are equal, round, and reactive to light.   Neck:      Musculoskeletal: Normal range of motion and neck supple.      Thyroid: No thyromegaly.      Vascular: No JVD.      Trachea: No tracheal deviation.   Cardiovascular:      Rate and Rhythm: Normal rate and regular rhythm.      Heart sounds: Normal heart sounds. No murmur. No friction rub. No gallop.    Pulmonary:      Effort: Pulmonary effort is normal. No respiratory distress.      Breath sounds: Normal breath sounds. No wheezing or rales.   Chest:      Chest wall: No tenderness.   Abdominal:      General: Bowel sounds are normal. There is no distension.      Palpations: Abdomen is soft. There is no mass.      Tenderness: There is no abdominal tenderness. There is no guarding or rebound.      Hernia: No hernia is present.   Musculoskeletal: Normal range of motion.         General: Swelling present. No tenderness.      Comments: RLE edema 3 + , Fifi's negative   Lymphadenopathy:      Cervical: No cervical adenopathy.   Skin:     General: Skin is warm and dry.   Neurological:      Cranial Nerves: No  cranial nerve deficit.      Sensory: No sensory deficit.      Motor: No abnormal muscle tone.      Coordination: Coordination normal.      Deep Tendon Reflexes: Reflexes normal.   Psychiatric:         Behavior: Behavior normal.         Thought Content: Thought content normal.         Judgment: Judgment normal.           Assessment/Plan   Diagnoses and all orders for this visit:    1. Leg swelling (Primary)  -     Duplex Venous Lower Extremity - Right CAR  -     Ambulatory Referral to Vascular Surgery  -     furosemide (Lasix) 20 MG tablet; Take 1 tablet by mouth Daily.  Dispense: 30 tablet; Refill: 2    2. Wellness examination  -     Fluad Quad 65+ yrs ([id number])    3. Encounter for immunization   -     Fluad Quad 65+ yrs ([id number])    4. High risk medication use  -     potassium chloride 10 MEQ CR tablet; Take 1 tablet by mouth Daily.  Dispense: 30 tablet; Refill: 3        Side effects discussed with patient in detail, all including but not limited to every single topic discussed

## 2020-12-11 LAB
BH CV LOWER VASCULAR LEFT COMMON FEMORAL AUGMENT: NORMAL
BH CV LOWER VASCULAR LEFT COMMON FEMORAL COMPETENT: NORMAL
BH CV LOWER VASCULAR LEFT COMMON FEMORAL COMPRESS: NORMAL
BH CV LOWER VASCULAR LEFT COMMON FEMORAL PHASIC: NORMAL
BH CV LOWER VASCULAR LEFT COMMON FEMORAL SPONT: NORMAL
BH CV LOWER VASCULAR RIGHT COMMON FEMORAL AUGMENT: NORMAL
BH CV LOWER VASCULAR RIGHT COMMON FEMORAL COMPETENT: NORMAL
BH CV LOWER VASCULAR RIGHT COMMON FEMORAL COMPRESS: NORMAL
BH CV LOWER VASCULAR RIGHT COMMON FEMORAL PHASIC: NORMAL
BH CV LOWER VASCULAR RIGHT COMMON FEMORAL SPONT: NORMAL
BH CV LOWER VASCULAR RIGHT DISTAL FEMORAL COMPRESS: NORMAL
BH CV LOWER VASCULAR RIGHT GASTRONEMIUS COMPRESS: NORMAL
BH CV LOWER VASCULAR RIGHT GREATER SAPH AK COMPRESS: NORMAL
BH CV LOWER VASCULAR RIGHT GREATER SAPH BK COMPRESS: NORMAL
BH CV LOWER VASCULAR RIGHT LESSER SAPH COMPRESS: NORMAL
BH CV LOWER VASCULAR RIGHT MID FEMORAL AUGMENT: NORMAL
BH CV LOWER VASCULAR RIGHT MID FEMORAL COMPETENT: NORMAL
BH CV LOWER VASCULAR RIGHT MID FEMORAL COMPRESS: NORMAL
BH CV LOWER VASCULAR RIGHT MID FEMORAL PHASIC: NORMAL
BH CV LOWER VASCULAR RIGHT MID FEMORAL SPONT: NORMAL
BH CV LOWER VASCULAR RIGHT PERONEAL COMPRESS: NORMAL
BH CV LOWER VASCULAR RIGHT POPLITEAL AUGMENT: NORMAL
BH CV LOWER VASCULAR RIGHT POPLITEAL COMPETENT: NORMAL
BH CV LOWER VASCULAR RIGHT POPLITEAL COMPRESS: NORMAL
BH CV LOWER VASCULAR RIGHT POPLITEAL PHASIC: NORMAL
BH CV LOWER VASCULAR RIGHT POPLITEAL SPONT: NORMAL
BH CV LOWER VASCULAR RIGHT POSTERIOR TIBIAL COMPRESS: NORMAL
BH CV LOWER VASCULAR RIGHT PROFUNDA FEMORAL COMPRESS: NORMAL
BH CV LOWER VASCULAR RIGHT PROXIMAL FEMORAL COMPRESS: NORMAL
BH CV LOWER VASCULAR RIGHT SAPHENOFEMORAL JUNCTION COMPRESS: NORMAL

## 2020-12-11 NOTE — PROGRESS NOTES
Right LE venous duplex complete.  Preliminary report: negative for DVT and SVT in right LE, reported to Dr. Mckeon.

## 2020-12-16 ENCOUNTER — TELEPHONE (OUTPATIENT)
Dept: FAMILY MEDICINE CLINIC | Facility: CLINIC | Age: 70
End: 2020-12-16

## 2020-12-16 DIAGNOSIS — I10 HYPERTENSION, UNSPECIFIED TYPE: ICD-10-CM

## 2020-12-16 RX ORDER — LISINOPRIL 10 MG/1
15 TABLET ORAL DAILY
Qty: 135 TABLET | Refills: 3 | Status: SHIPPED | OUTPATIENT
Start: 2020-12-16 | End: 2020-12-18 | Stop reason: ALTCHOICE

## 2020-12-16 NOTE — TELEPHONE ENCOUNTER
Mila zhang/ Rajiv called to give the following information. Pt had a BP this morning 180/90, but had not taken her medication. Also pt fell in living room while getting up without cane.

## 2020-12-17 NOTE — TELEPHONE ENCOUNTER
Spoke with patient and informed that the lisinopril was increased.  Per patient is was only taking th Hydralazine. And since it was high she added the metoprolol but has not had the lisinopril.  Advised I would speak to provider because he is under the impression that she is taking all her meds.     Spoke with patient, per Dr. Mckeon, she needs to take the hydralazine and the metoprolol daily and see how her BP is doing.

## 2020-12-17 NOTE — PROGRESS NOTES
"  CC: SOB. Acute Hypoxic Respiratory Failure.     S: Less SOB. Still becomes SOB when she walks. Was able to use CPAP last night for \"many hours\".      O: /58  Pulse 76  Temp 97.7 °F (36.5 °C)  Resp 20  Ht 64\" (162.6 cm)  Wt 214 lb (97.1 kg)  LMP  (LMP Unknown)  SpO2 96%  BMI 36.73 kg/m2    General: No respiratory distress noted.  Neck: ? JVD   Cardiovascular: S1 + S2. Regular.   Respiratory: No wheezing heard. B/L crackles noted  Extremities: Trace edema noted.  Neurologic: AAOx3. Was able to follow commands     Labs: Reviewed.       Results from last 7 days  Lab Units 10/02/17  0840 10/01/17  0640 09/30/17  0611  09/28/17  1947   SODIUM mmol/L 140 142 145  < > 143   POTASSIUM mmol/L 3.3* 3.3* 3.3*  < > 3.3*   CHLORIDE mmol/L 84* 84* 88*  < > 92*   CO2 mmol/L 42.0* 44.0* 43.0*  < > 38.0*   BUN mg/dL 20 20 20  < > 19   CREATININE mg/dL 0.80 0.90 0.80  < > 0.90   CALCIUM mg/dL 9.3 9.0 8.7  < > 8.9   BILIRUBIN mg/dL  --   --   --   --  0.4   ALK PHOS U/L  --   --   --   --  105   ALT (SGPT) U/L  --   --   --   --  58   AST (SGOT) U/L  --   --   --   --  21   GLUCOSE mg/dL 180* 167* 176*  < > 162*   < > = values in this interval not displayed.    Lab Results   Component Value Date    PHART 7.455 09/30/2017    NZG0BBQ 67.3 (C) 09/30/2017    PO2ART 40.2 (C) 09/30/2017    HGBBG 11.2 (L) 09/30/2017    D5NGLSDG 75.1 09/30/2017    CARBOXYHGB 2.0 09/30/2017           Results from last 7 days  Lab Units 10/01/17  0640 09/29/17  0510 09/28/17 1947   WBC 10*3/mm3 12.85* 11.92* 12.25*   HEMOGLOBIN g/dL 11.3* 10.9* 10.8*   PLATELETS 10*3/mm3 230 217 221       CXRay: No significant change.     Assessment & Recommendations/Plan:   1.  Acute Respiratory Failure.    2.  Dyspnea.    3.  ?URIEL?  - Used CPAP at 10, last night, for 5-6 hours.     4.  Chronic respiratory Failure.  - Last compensated PCO2 was 67.    5.  Smoking.    6.  Likely Pulmonary Edema  - Will do lasix, diamox and diuril again today.   - Will do Chest  X " Ray in AM.  - May need ABG tomorrow AM.      We have reviewed patient's current orders and changes, if any, have been suggested to primary care team. Plan was also discussed with nursing staff, as necessary.       Phoebe Mancilla MD  10/02/17  10:29 AM    Dictated utilizing Dragon dictation.     You can access the FollowMyHealth Patient Portal offered by [facility] by registering at the following website: [email or web address]. By joining [facility]’s FollowMyHealth portal, you will also be able to view your health information using other applications (apps) compatible with our system.

## 2020-12-18 ENCOUNTER — TELEPHONE (OUTPATIENT)
Dept: FAMILY MEDICINE CLINIC | Facility: CLINIC | Age: 70
End: 2020-12-18

## 2020-12-18 ENCOUNTER — OFFICE VISIT (OUTPATIENT)
Dept: FAMILY MEDICINE CLINIC | Facility: CLINIC | Age: 70
End: 2020-12-18

## 2020-12-18 DIAGNOSIS — I10 HYPERTENSION, UNSPECIFIED TYPE: ICD-10-CM

## 2020-12-18 DIAGNOSIS — R42 DIZZY: Primary | ICD-10-CM

## 2020-12-18 PROCEDURE — 99213 OFFICE O/P EST LOW 20 MIN: CPT | Performed by: FAMILY MEDICINE

## 2020-12-18 RX ORDER — MECLIZINE HYDROCHLORIDE 25 MG/1
25 TABLET ORAL 2 TIMES DAILY PRN
Qty: 60 TABLET | Refills: 0 | Status: SHIPPED | OUTPATIENT
Start: 2020-12-18 | End: 2021-01-27

## 2020-12-18 NOTE — TELEPHONE ENCOUNTER
PATIENT CALLED TO INFORM DR. OLIVARES THAT SHE IS STILL HAVING DIZZINESS, BLOOD PRESSURE - 156/83 - 12/18/20 AFTER MEDICATION.    PATIENT WAS NOT SURE WHO BEST TO INFORM OF THIS INFORMATION. PATIENT ALSO STATED THAT SHE HAS GOTTEN RID OF HER PHYSICAL THERAPIST, DUE TO A DISAGREEMENT.    PLEASE ADVISE  [phone]     [id number] - [place] [id number] [facility] AT [facility] [address] - [phone]  - [phone]   [phone]

## 2020-12-18 NOTE — TELEPHONE ENCOUNTER
Hub can relay message: CHRISTOFER for patient with details of her appointment with [facility]. Her appointment will be on 1/29/21 at 9:15 am at the [facility] location. Their phone number is [phone].

## 2020-12-18 NOTE — PROGRESS NOTES
Subjective   Vidhi Lopez is a 70 y.o. female.     No chief complaint on file.  dizzy  Pt agreed to be contacted by [facility]    History of Present Illness   Dizzines all week, since Tuesday after cardio referral, better today, /83, just restarted Hydralazine last week, and Metoprolol 50 bid, on Lasix 40 mg bid, on KCL 10 meq  2 in AM, lightheaded and room spinning, the Lasix was just raised recently by her cardiologist, denies any chest pain or shortness of breath, no vomiting no diarrhea no dysuria no abdominal pain, no tinnitus no ear ache  The following portions of the patient's history were reviewed and updated as appropriate: allergies, current medications, past family history, past medical history, past social history, past surgical history and problem list.    Past Medical History:   Diagnosis Date   • Allergic rhinitis    • Asthma with COPD (CMS/[facility])     Asthma/COPD   • Bilateral ovarian cysts    • Coronary artery disease    • Depression with anxiety     Depression/Anxiety   • Diabetes (CMS/[facility])     pre   • Endometriosis     Dr. Jin; estradiol    • ESR raised 3/20/2018   • Fatigue    • Fibromyalgia    • Headache     Headaches   • High cholesterol    • History of gallstones    • History of myocardial infarction    • Hypertension    • Influenza B     DX'D 2/6/2018, TREATED WITH TAMIFLU. LAST DOSE 2/11/2018 AM.  PATIENT STATES DR RANGEL IS AWARE. DENIES FEVERS OR CHILLS.   • Interstitial cystitis    • On home oxygen therapy     2 L NC   • URIEL on CPAP    • PONV (postoperative nausea and vomiting)    • Seizure disorder, nonconvulsive, with status epilepticus (CMS/[facility]) 3/20/2018   • Septic joint of right knee joint (CMS/[facility]) 3/21/2018   • Shortness of breath on exertion    • Stroke (CMS/[facility])     X1 8 YEARS AGO, GENERALIZED UPPER BODY WEAKNESS RESIDUAL PER PT    • Thyroid disorder    • Urinary leakage    • UTI (urinary tract infection)    • Wears glasses    • Yeast dermatitis        Past Surgical History:    Procedure Laterality Date   • ARTERIOGRAM N/A 2018    Procedure: Renal Arteriogram;  Surgeon: Lito Flores MD;  Location:  [facility]ATH [facility]ASIVE [facility];  Service:    • BREAST BIOPSY Right 1991   • CARDIAC CATHETERIZATION N/A 2018    Procedure: Right Heart Cath;  Surgeon: Lito Flores MD;  Location:  [facility] CATH [facility]VASIVE [facility];  Service:    • CAROTID STENT      Transcath    • CAROTID STENT Left    • CHOLECYSTECTOMY     • CYSTOSTOMY W/ BLADDER BIOPSY     • DILATATION AND CURETTAGE     • KNEE ARTHROSCOPY Right 3/23/2018    Procedure: ARTHROSCOPY, RIGHT KNEE  INCISION AND DRAINAGE LOWER EXTREMITY WITH SYNOVIAL BIOPSY;  Surgeon: Dilip Giron MD;  Location:  [facility] OR;  Service: Orthopedics   • LAPAROSCOPIC CHOLECYSTECTOMY      Lap rolando   • OOPHORECTOMY     • PAP SMEAR  05/10/2016   • SUBTOTAL HYSTERECTOMY         Family History   Problem Relation Age of Onset   • Cancer Other    • Diabetes Other    • Heart attack Other    • Hyperlipidemia Other    • Hypertension Other    • Osteoporosis Other    • Stroke Other    • Breast cancer Mother    • Osteoporosis Mother    • Colon cancer Father        Social History     Socioeconomic History   • Marital status:      Spouse name: Not on file   • Number of children: Not on file   • Years of education: Not on file   • Highest education level: Not on file   Tobacco Use   • Smoking status: Former Smoker     Packs/day: 0.25     Years: 40.00     Pack years: 10.00     Types: Cigarettes     Quit date: 2020     Years since quittin.9   • Smokeless tobacco: Never Used   • Tobacco comment: in process of quiting--AVERAGE 1 PPD, DOWN TO 6 CIG PER DAY X2 WEEKS    Substance and Sexual Activity   • Alcohol use: Yes     Alcohol/week: 1.0 standard drinks     Types: 1 Glasses of wine per week     Comment: occasional   • Drug use: No   • Sexual activity: Defer   Social History Narrative    Lives alone.        Review of Systems   Constitutional: Negative.  Negative  for fatigue.   HENT: Negative.    Eyes: Negative.  Negative for blurred vision.   Respiratory: Negative.  Negative for cough, chest tightness and shortness of breath.    Cardiovascular: Negative.  Negative for chest pain, palpitations and leg swelling.   Gastrointestinal: Negative.    Endocrine: Negative.    Genitourinary: Negative.    Musculoskeletal: Negative.    Skin: Negative.    Allergic/Immunologic: Negative.    Neurological: Positive for dizziness. Negative for light-headedness.   Hematological: Negative.    Psychiatric/Behavioral: Negative.    All other systems reviewed and are negative.      Objective   There were no vitals filed for this visit.  There is no height or weight on file to calculate BMI.        Assessment/Plan   Diagnoses and all orders for this visit:    1. Dizzy (Primary)  -     meclizine (ANTIVERT) 25 MG tablet; Take 1 tablet by mouth 2 (Two) Times a Day As Needed for Dizziness for up to 10 days.  Dispense: 60 tablet; Refill: 0  -     CBC & Differential; Future  -     Comprehensive Metabolic Panel; Future  -     TSH; Future  -     POC Urinalysis Dipstick, Automated; Future    2. Hypertension, unspecified type  -     CBC & Differential; Future  -     Comprehensive Metabolic Panel; Future  -     TSH; Future  -     POC Urinalysis Dipstick, Automated; Future        The dizziness could be because of the sudden increase on the Lasix plus the hydralazine and beta-blocker, this medication was raised just recently, I will just give it more time to monitor blood pressure, we will not increase any higher, monitor blood pressure, stop by for labs next week, meclizine have been given for dizziness, if any worse go to ER, plenty of fluids  Time spent 20 minutes  Side effects discussed with patient in detail, all including but not limited to every single topic discussed

## 2020-12-30 ENCOUNTER — LAB (OUTPATIENT)
Dept: FAMILY MEDICINE CLINIC | Facility: CLINIC | Age: 70
End: 2020-12-30

## 2020-12-30 DIAGNOSIS — R42 DIZZY: ICD-10-CM

## 2020-12-30 DIAGNOSIS — I10 HYPERTENSION, UNSPECIFIED TYPE: ICD-10-CM

## 2020-12-30 DIAGNOSIS — R35.0 URINE FREQUENCY: Primary | ICD-10-CM

## 2020-12-30 LAB
BILIRUB BLD-MCNC: NEGATIVE MG/DL
CLARITY, POC: CLEAR
COLOR UR: YELLOW
GLUCOSE UR STRIP-MCNC: NEGATIVE MG/DL
KETONES UR QL: NEGATIVE
LEUKOCYTE EST, POC: NEGATIVE
NITRITE UR-MCNC: NEGATIVE MG/ML
PH UR: 5.5 [PH] (ref 5–8)
PROT UR STRIP-MCNC: ABNORMAL MG/DL
RBC # UR STRIP: NEGATIVE /UL
SP GR UR: 1.03 (ref 1–1.03)
UROBILINOGEN UR QL: NORMAL

## 2021-01-01 LAB
BACTERIA UR CULT: NORMAL
BACTERIA UR CULT: NORMAL

## 2021-01-04 ENCOUNTER — TELEPHONE (OUTPATIENT)
Dept: FAMILY MEDICINE CLINIC | Facility: CLINIC | Age: 71
End: 2021-01-04

## 2021-01-04 NOTE — TELEPHONE ENCOUNTER
----- Message from Maddison Kenney MD sent at 1/4/2021  7:53 AM EST -----  Please let her know that the culture of her urine showed no infection.

## 2021-01-26 DIAGNOSIS — R42 DIZZY: ICD-10-CM

## 2021-01-26 DIAGNOSIS — L29.9 ITCHING: ICD-10-CM

## 2021-01-27 RX ORDER — HYDROXYZINE 50 MG/1
TABLET, FILM COATED ORAL
Qty: 90 TABLET | Refills: 2 | Status: SHIPPED | OUTPATIENT
Start: 2021-01-27 | End: 2021-04-19 | Stop reason: SDUPTHER

## 2021-01-27 RX ORDER — MECLIZINE HYDROCHLORIDE 25 MG/1
TABLET ORAL
Qty: 60 TABLET | Refills: 0 | Status: SHIPPED | OUTPATIENT
Start: 2021-01-27 | End: 2021-02-22

## 2021-01-27 NOTE — TELEPHONE ENCOUNTER
LOV 12/18/2020   Due for F/U   Last labs 12/30/2020   Meclizine not on current med list   Hydroxyzine last filled 09/04/2020 qty 90 w/ 2 refills

## 2021-02-21 DIAGNOSIS — R42 DIZZY: ICD-10-CM

## 2021-02-22 RX ORDER — MECLIZINE HYDROCHLORIDE 25 MG/1
TABLET ORAL
Qty: 60 TABLET | Refills: 0 | Status: SHIPPED | OUTPATIENT
Start: 2021-02-22 | End: 2021-03-19

## 2021-03-16 DIAGNOSIS — M79.89 LEG SWELLING: ICD-10-CM

## 2021-03-17 RX ORDER — FUROSEMIDE 20 MG/1
40 TABLET ORAL 2 TIMES DAILY
Qty: 60 TABLET | Refills: 0 | Status: SHIPPED | OUTPATIENT
Start: 2021-03-17 | End: 2021-10-28

## 2021-03-17 NOTE — TELEPHONE ENCOUNTER
PATIENT HAS SCHEDULED APPT FOR April 12 WOULD LIKE TO SEE IF SHE  CAN GET MEDICATION REFILLED UP UNTIL APPT TIME.    furosemide (Lasix) 20 MG    PATIENT STATED SHE HAS APPROX A WEEK LEFT.    [id number] - [facility] [address] AT [facility] - [phone] [facility] [phone] FX    PLEASE ADVISE  [phone]    PATIENT ALSO QUESTIONED WHO DR OLIVARES REFERRED HER TO FOR A PODIATRIST.  PATIENT HAS BEEN UNABLE TO LOCATE THE REFERRAL.

## 2021-03-18 DIAGNOSIS — R42 DIZZY: ICD-10-CM

## 2021-03-18 DIAGNOSIS — Z79.899 HIGH RISK MEDICATION USE: ICD-10-CM

## 2021-03-19 RX ORDER — MECLIZINE HYDROCHLORIDE 25 MG/1
25 TABLET ORAL 3 TIMES DAILY PRN
Qty: 60 TABLET | Refills: 0 | Status: SHIPPED | OUTPATIENT
Start: 2021-03-19 | End: 2022-03-15 | Stop reason: HOSPADM

## 2021-03-19 RX ORDER — POTASSIUM CHLORIDE 750 MG/1
20 TABLET, FILM COATED, EXTENDED RELEASE ORAL DAILY
Qty: 60 TABLET | Refills: 0 | Status: SHIPPED | OUTPATIENT
Start: 2021-03-19 | End: 2021-04-29

## 2021-03-24 ENCOUNTER — TELEPHONE (OUTPATIENT)
Dept: FAMILY MEDICINE CLINIC | Facility: CLINIC | Age: 71
End: 2021-03-24

## 2021-03-24 NOTE — TELEPHONE ENCOUNTER
PATIENT IS NEEDING NEW REFERRALS TO PODIATRY AND GASTROENTEROLOGY.        Caller: Vidhi Lopez    Relationship: Self    Best call back number: [phone]    Medication needed:   Requested Prescriptions     Pending Prescriptions Disp Refills   • nystatin (MYCOSTATIN) 454521 UNIT/GM ointment 30 g 0     Sig: Apply  topically to the appropriate area as directed 2 (Two) Times a Day.       When do you need the refill by: ASAP    What additional details did the patient provide when requesting the medication: HAS VAGINAL ITCHING AND PAIN    Does the patient have less than a 3 day supply:  [x] Yes  [] No    What is the patient's preferred pharmacy: [id number] - [address] AT [facility] [phone] [facility] [phone] FX

## 2021-03-24 NOTE — TELEPHONE ENCOUNTER
LOV 12/18/2020   NOV 04/12/2021  Medication never filled by provider      PATIENT IS NEEDING NEW REFERRALS TO PODIATRY AND GASTROENTEROLOGY.

## 2021-03-25 RX ORDER — NYSTATIN 100000 U/G
OINTMENT TOPICAL 2 TIMES DAILY
Qty: 30 G | Refills: 0 | OUTPATIENT
Start: 2021-03-25

## 2021-03-25 NOTE — TELEPHONE ENCOUNTER
[facility]     Cele for HUB to read     Dr. Mckeon states he has not put these referrals she is requesting for, he is needing to know what she needs the referrals for, patient to talk to Dr. Mckeon

## 2021-03-25 NOTE — TELEPHONE ENCOUNTER
[facility] RELAYED MESSAGE THEN WARM TRANSFERRED BECAUSE PATIENT WANTED TO TALK TO OFFICE ABOUT REFERRALS AND SEVERAL MEDICATION REFILLS

## 2021-03-25 NOTE — TELEPHONE ENCOUNTER
Tried to call patient, no vm to leave message    Okay for HUB to read and respond    Patient asking for referrals to gastro and podiatry, Dr. Mckeon needs to know the reasoning as to why she needs these

## 2021-03-25 NOTE — TELEPHONE ENCOUNTER
He had already put a referral in for a Podiatry and Gastroenterology but she didn't go due to Covid.  She is wanting both of these schedule now.  If she doesn't answer her phone please leave voice mail.  She is cleaning out her voice mail

## 2021-04-05 ENCOUNTER — EPISODE CHANGES (OUTPATIENT)
Dept: CASE MANAGEMENT | Facility: OTHER | Age: 71
End: 2021-04-05

## 2021-04-09 DIAGNOSIS — J44.9 CHRONIC OBSTRUCTIVE PULMONARY DISEASE, UNSPECIFIED COPD TYPE (HCC): ICD-10-CM

## 2021-04-09 RX ORDER — ALBUTEROL SULFATE 90 UG/1
AEROSOL, METERED RESPIRATORY (INHALATION)
Qty: 8.5 G | Refills: 1 | Status: SHIPPED | OUTPATIENT
Start: 2021-04-09 | End: 2021-04-09 | Stop reason: SDUPTHER

## 2021-04-12 RX ORDER — ALBUTEROL SULFATE 90 UG/1
2 AEROSOL, METERED RESPIRATORY (INHALATION) EVERY 4 HOURS PRN
Qty: 8.5 G | Refills: 1 | Status: SHIPPED | OUTPATIENT
Start: 2021-04-12

## 2021-04-19 ENCOUNTER — OFFICE VISIT (OUTPATIENT)
Dept: FAMILY MEDICINE CLINIC | Facility: CLINIC | Age: 71
End: 2021-04-19

## 2021-04-19 VITALS
HEIGHT: 65 IN | TEMPERATURE: 98 F | OXYGEN SATURATION: 97 % | BODY MASS INDEX: 27.66 KG/M2 | SYSTOLIC BLOOD PRESSURE: 80 MMHG | WEIGHT: 166 LBS | DIASTOLIC BLOOD PRESSURE: 50 MMHG | HEART RATE: 67 BPM

## 2021-04-19 DIAGNOSIS — F32.A DEPRESSION, UNSPECIFIED DEPRESSION TYPE: ICD-10-CM

## 2021-04-19 DIAGNOSIS — Z79.4 TYPE 2 DIABETES MELLITUS WITH OTHER SPECIFIED COMPLICATION, WITH LONG-TERM CURRENT USE OF INSULIN (HCC): ICD-10-CM

## 2021-04-19 DIAGNOSIS — F51.01 PRIMARY INSOMNIA: ICD-10-CM

## 2021-04-19 DIAGNOSIS — Z00.00 MEDICARE ANNUAL WELLNESS VISIT, SUBSEQUENT: Primary | ICD-10-CM

## 2021-04-19 DIAGNOSIS — E78.00 HIGH CHOLESTEROL: ICD-10-CM

## 2021-04-19 DIAGNOSIS — D64.9 ANEMIA, UNSPECIFIED TYPE: ICD-10-CM

## 2021-04-19 DIAGNOSIS — I10 HYPERTENSION, UNSPECIFIED TYPE: ICD-10-CM

## 2021-04-19 DIAGNOSIS — R21 RASH: ICD-10-CM

## 2021-04-19 DIAGNOSIS — Z78.0 POST-MENOPAUSAL: ICD-10-CM

## 2021-04-19 DIAGNOSIS — L29.9 ITCHING: ICD-10-CM

## 2021-04-19 DIAGNOSIS — E43 UNSPECIFIED SEVERE PROTEIN-CALORIE MALNUTRITION (HCC): ICD-10-CM

## 2021-04-19 DIAGNOSIS — Z00.00 WELLNESS EXAMINATION: ICD-10-CM

## 2021-04-19 DIAGNOSIS — E11.69 TYPE 2 DIABETES MELLITUS WITH OTHER SPECIFIED COMPLICATION, WITH LONG-TERM CURRENT USE OF INSULIN (HCC): ICD-10-CM

## 2021-04-19 DIAGNOSIS — M21.619 BUNION: ICD-10-CM

## 2021-04-19 DIAGNOSIS — R11.0 NAUSEA: ICD-10-CM

## 2021-04-19 PROBLEM — Z91.89 OTHER SPECIFIED PERSONAL RISK FACTORS, NOT ELSEWHERE CLASSIFIED: Status: ACTIVE | Noted: 2020-11-11

## 2021-04-19 PROBLEM — M19.90 OSTEOARTHRITIS: Status: ACTIVE | Noted: 2020-10-01

## 2021-04-19 PROBLEM — J30.9 ATOPIC RHINITIS: Status: ACTIVE | Noted: 2021-04-19

## 2021-04-19 PROBLEM — Z86.19 HISTORY OF CLOSTRIDIOIDES DIFFICILE INFECTION: Status: ACTIVE | Noted: 2019-02-04

## 2021-04-19 PROBLEM — E11.42 DIABETIC PERIPHERAL NEUROPATHY: Status: ACTIVE | Noted: 2019-04-05

## 2021-04-19 PROBLEM — E04.2 MULTINODULAR GOITER: Status: ACTIVE | Noted: 2019-02-07

## 2021-04-19 PROBLEM — Z98.61 CORONARY ANGIOPLASTY STATUS: Status: ACTIVE | Noted: 2019-02-04

## 2021-04-19 PROBLEM — R29.6 REPEATED FALLS: Status: ACTIVE | Noted: 2019-03-08

## 2021-04-19 PROBLEM — I21.9 ACUTE MYOCARDIAL INFARCTION (HCC): Status: ACTIVE | Noted: 2019-02-04

## 2021-04-19 PROBLEM — K40.20 BILATERAL INGUINAL HERNIA, WITHOUT OBSTRUCTION OR GANGRENE, NOT SPECIFIED AS RECURRENT: Status: ACTIVE | Noted: 2019-02-27

## 2021-04-19 PROBLEM — B37.2 CANDIDIASIS OF SKIN: Status: ACTIVE | Noted: 2021-04-19

## 2021-04-19 PROBLEM — R94.4 DECREASED GFR: Status: ACTIVE | Noted: 2019-02-07

## 2021-04-19 PROBLEM — I63.9 CEREBROVASCULAR ACCIDENT (CVA) (HCC): Status: ACTIVE | Noted: 2019-02-04

## 2021-04-19 PROCEDURE — 99214 OFFICE O/P EST MOD 30 MIN: CPT | Performed by: FAMILY MEDICINE

## 2021-04-19 PROCEDURE — G0439 PPPS, SUBSEQ VISIT: HCPCS | Performed by: FAMILY MEDICINE

## 2021-04-19 RX ORDER — ROSUVASTATIN CALCIUM 10 MG/1
TABLET, COATED ORAL
COMMUNITY
End: 2021-11-11

## 2021-04-19 RX ORDER — TIZANIDINE 4 MG/1
4 TABLET ORAL 2 TIMES DAILY PRN
COMMUNITY
Start: 2021-04-16 | End: 2021-08-05 | Stop reason: SDUPTHER

## 2021-04-19 RX ORDER — TRIAMCINOLONE ACETONIDE 1 MG/G
CREAM TOPICAL
COMMUNITY
Start: 2021-03-23 | End: 2022-03-07

## 2021-04-19 RX ORDER — DIAPER,BRIEF,INFANT-TODD,DISP
EACH MISCELLANEOUS
COMMUNITY
End: 2022-03-07

## 2021-04-19 RX ORDER — TRAZODONE HYDROCHLORIDE 100 MG/1
100 TABLET ORAL NIGHTLY
Qty: 90 TABLET | Refills: 3 | Status: SHIPPED | OUTPATIENT
Start: 2021-04-19 | End: 2022-03-23

## 2021-04-19 RX ORDER — FERROUS SULFATE 325(65) MG
TABLET ORAL
COMMUNITY
Start: 2020-10-28

## 2021-04-19 RX ORDER — HYDROXYZINE 50 MG/1
50 TABLET, FILM COATED ORAL EVERY 8 HOURS PRN
Qty: 90 TABLET | Refills: 2 | Status: SHIPPED | OUTPATIENT
Start: 2021-04-19 | End: 2021-07-27

## 2021-04-19 RX ORDER — KETOCONAZOLE 20 MG/ML
SHAMPOO TOPICAL
COMMUNITY
Start: 2021-03-23 | End: 2021-04-19 | Stop reason: ALTCHOICE

## 2021-04-19 RX ORDER — BUMETANIDE 1 MG/1
TABLET ORAL
COMMUNITY
End: 2021-10-28

## 2021-04-19 RX ORDER — HYDRALAZINE HYDROCHLORIDE 25 MG/1
25 TABLET, FILM COATED ORAL 3 TIMES DAILY
Qty: 270 TABLET | Refills: 3 | Status: SHIPPED | OUTPATIENT
Start: 2021-04-19 | End: 2022-03-07 | Stop reason: SDUPTHER

## 2021-04-19 RX ORDER — MONTELUKAST SODIUM 10 MG/1
TABLET ORAL
COMMUNITY
End: 2022-03-15 | Stop reason: HOSPADM

## 2021-04-19 RX ORDER — PROMETHAZINE HYDROCHLORIDE 25 MG/1
25 TABLET ORAL EVERY 8 HOURS PRN
Qty: 30 TABLET | Refills: 1 | Status: SHIPPED | OUTPATIENT
Start: 2021-04-19 | End: 2021-08-05 | Stop reason: ALTCHOICE

## 2021-04-19 RX ORDER — NYSTATIN 100000 [USP'U]/G
POWDER TOPICAL 2 TIMES DAILY
Qty: 30 G | Refills: 1 | Status: SHIPPED | OUTPATIENT
Start: 2021-04-19 | End: 2021-06-11

## 2021-04-19 RX ORDER — FLUOCINONIDE TOPICAL SOLUTION USP, 0.05% 0.5 MG/ML
SOLUTION TOPICAL
COMMUNITY
End: 2021-10-28

## 2021-04-19 RX ORDER — INSULIN LISPRO 100 [IU]/ML
10 INJECTION, SOLUTION INTRAVENOUS; SUBCUTANEOUS
COMMUNITY
End: 2022-10-04 | Stop reason: HOSPADM

## 2021-04-19 RX ORDER — GABAPENTIN 400 MG/1
CAPSULE ORAL
COMMUNITY
Start: 2021-03-26 | End: 2021-10-28 | Stop reason: SDUPTHER

## 2021-04-19 RX ORDER — TRAZODONE HYDROCHLORIDE 50 MG/1
50 TABLET ORAL NIGHTLY
Qty: 90 TABLET | Refills: 3 | Status: SHIPPED | OUTPATIENT
Start: 2021-04-19 | End: 2021-04-19 | Stop reason: SDUPTHER

## 2021-04-19 RX ORDER — ALLOPURINOL 100 MG/1
TABLET ORAL
COMMUNITY
Start: 2020-11-02 | End: 2021-10-28

## 2021-04-19 NOTE — PROGRESS NOTES
The ABCs of the Annual Wellness Visit  Subsequent Medicare Wellness Visit    Chief Complaint   Patient presents with   • Medicare Wellness-subsequent   • Med Refill     medication reviews       Subjective   History of Present Illness:  Vidhi Lopez is a 71 y.o. female who presents for a Subsequent Medicare Wellness Visit.  On Toujeo and Lispro , Lispro 10 Units tid, 8 unts qhs, also GI referral x Colonoscopy last one 5 years ago, father with colon cancer, no CP/SOA, ,  BP low, also depression x a while, not suicidal, homicidal ideation, also rash inguiinal area wants Nystatin powder  HEALTH RISK ASSESSMENT    Recent Hospitalizations:  Recently treated at the following:  [facility]    Current Medical Providers:  Patient Care Team:  Abiodun Vila MD as PCP - General (Family Medicine)  Lito Flores MD as Consulting Physician (Cardiology)  Jacky Hernandez MD as Consulting Physician (Infectious Diseases)  Brenda Brown RN as Ambulatory  (Population Health)    Smoking Status:  Social History     Tobacco Use   Smoking Status Former Smoker   • Packs/day: 0.25   • Years: 40.00   • Pack years: 10.00   • Types: Cigarettes   • Quit date:    • Years since quittin.2   Smokeless Tobacco Never Used   Tobacco Comment    in process of quiting--AVERAGE 1 PPD, DOWN TO 6 CIG PER DAY X2 WEEKS        Alcohol Consumption:  Social History     Substance and Sexual Activity   Alcohol Use Yes   • Alcohol/week: 1.0 standard drinks   • Types: 1 Glasses of wine per week    Comment: occasional       Depression Screen:   PHQ-2/PHQ-9 Depression Screening 2021   Little interest or pleasure in doing things 1   Feeling down, depressed, or hopeless 1   Trouble falling or staying asleep, or sleeping too much 2   Feeling tired or having little energy 2   Poor appetite or overeating 1   Feeling bad about yourself - or that you are a failure or have let yourself or your family down 0    Trouble concentrating on things, such as reading the newspaper or watching television 0   Moving or speaking so slowly that other people could have noticed. Or the opposite - being so fidgety or restless that you have been moving around a lot more than usual 0   Thoughts that you would be better off dead, or of hurting yourself in some way 0   Total Score 7   If you checked off any problems, how difficult have these problems made it for you to do your work, take care of things at home, or get along with other people? Not difficult at all       Fall Risk Screen:  STEADI Fall Risk Assessment was completed, and patient is at LOW risk for falls.Assessment completed on:4/19/2021    Health Habits and Functional and Cognitive Screening:  Functional & Cognitive Status 4/19/2021   Do you have difficulty preparing food and eating? No   Do you have difficulty bathing yourself, getting dressed or grooming yourself? No   Do you have difficulty using the toilet? No   Do you have difficulty moving around from place to place? No   Do you have trouble with steps or getting out of a bed or a chair? No   Current Diet Well Balanced Diet   Dental Exam Not up to date   Eye Exam Not up to date   Exercise (times per week) 4 times per week   Current Exercises Include Aerobics   Current Exercise Activities Include -   Do you need help using the phone?  No   Are you deaf or do you have serious difficulty hearing?  No   Do you need help with transportation? No   Do you need help shopping? No   Do you need help preparing meals?  No   Do you need help with housework?  No   Do you need help with laundry? No   Do you need help taking your medications? No   Do you need help managing money? No   Do you ever drive or ride in a car without wearing a seat belt? No   Have you felt unusual stress, anger or loneliness in the last month? No   Who do you live with? Alone   If you need help, do you have trouble finding someone available to you? No   Have  you been bothered in the last four weeks by sexual problems? No   Do you have difficulty concentrating, remembering or making decisions? No         Does the patient have evidence of cognitive impairment? Yes    Asprin use counseling:Start ASA 81 mg daily     Age-appropriate Screening Schedule:  Refer to the list below for future screening recommendations based on patient's age, sex and/or medical conditions. Orders for these recommended tests are listed in the plan section. The patient has been provided with a written plan.    Health Maintenance   Topic Date Due   • DXA SCAN  Never done   • DIABETIC EYE EXAM  06/07/2021 (Originally 10/28/2020)   • INFLUENZA VACCINE  08/01/2021   • LIPID PANEL  10/01/2021   • HEMOGLOBIN A1C  10/19/2021   • MAMMOGRAM  10/28/2021   • DIABETIC FOOT EXAM  04/19/2022   • URINE MICROALBUMIN  04/19/2022   • TDAP/TD VACCINES (3 - Td) 09/18/2027   • COLONOSCOPY  06/11/2029   • ZOSTER VACCINE  Discontinued          The following portions of the patient's history were reviewed and updated as appropriate: allergies, current medications, past family history, past medical history, past social history, past surgical history and problem list.    Outpatient Medications Prior to Visit   Medication Sig Dispense Refill   • albuterol sulfate  (90 Base) MCG/ACT inhaler Inhale 2 puffs Every 4 (Four) Hours As Needed for Wheezing. for wheezing 8.5 g 1   • allopurinol (ZYLOPRIM) 100 MG tablet      • aspirin 81 MG EC tablet Take 81 mg by mouth Daily.     • atorvastatin (LIPITOR) 80 MG tablet Take 1 tablet by mouth Daily. 90 tablet 3   • bumetanide (BUMEX) 1 MG tablet every 12 hours     • clopidogrel (PLAVIX) 75 MG tablet TAKE 1 TABLET BY MOUTH EVERY DAY 90 tablet 3   • clotrimazole-betamethasone (LOTRISONE) 1-0.05 % cream Apply  topically to the appropriate area as directed 2 (Two) Times a Day. X up to 2 weeks only 45 g 1   • Diclofenac Sodium (VOLTAREN) 1 % gel gel diclofenac 1 % topical gel   apply 2  grams to affected area four times a day     • esomeprazole (nexIUM) 20 MG capsule TK ONE C PO  BID     • ferrous sulfate 325 (65 FE) MG tablet TK 1 T PO  QD     • fluocinonide (LIDEX) 0.05 % external solution fluocinonide 0.05 % topical solution     • fluticasone-salmeterol (Wixela Inhub) 100-50 MCG/DOSE DISKUS Inhale 1 puff 2 (Two) Times a Day. 60 each 0   • furosemide (LASIX) 20 MG tablet Take 2 tablets by mouth 2 (Two) Times a Day. 60 tablet 0   • gabapentin (NEURONTIN) 400 MG capsule TAKE 1 CAPSULE BY MOUTH THREE TIMES DAILY AS DIRECTED     • hydrocortisone 1 % cream hydrocortisone 1 % topical cream     • Insulin Lispro, 1 Unit Dial, (HUMALOG) 100 UNIT/ML solution pen-injector insulin lispro (U-100) 100 unit/mL subcutaneous pen     • ipratropium-albuterol (DUO-NEB) 0.5-2.5 mg/3 ml nebulizer Take 3 mL by nebulization 4 (Four) Times a Day. 360 mL 3   • ketoconazole (NIZORAL) 2 % shampoo      • lidocaine (Lidoderm) 5 % Place 1 patch on the skin as directed by provider Daily. Remove & Discard patch within 12 hours or as directed by MD 30 patch 1   • meclizine (ANTIVERT) 25 MG tablet Take 1 tablet by mouth 3 (Three) Times a Day As Needed for Dizziness. 60 tablet 0   • metoprolol tartrate (LOPRESSOR) 25 MG tablet Take 1 tablet by mouth 2 (Two) Times a Day. (Patient taking differently: Take 50 mg by mouth 2 (Two) Times a Day.) 180 tablet 3   • montelukast (SINGULAIR) 10 MG tablet Singulair 10 mg tablet   Daily     • nitroglycerin (NITROSTAT) 0.4 MG SL tablet Place 0.4 mg under the tongue Every 5 (Five) Minutes As Needed.  0   • potassium chloride 10 MEQ CR tablet Take 2 tablets by mouth Daily. 60 tablet 0   • Probiotic Product (PROBIOTIC DAILY PO) Take 1 tablet by mouth Daily.     • rOPINIRole (REQUIP) 0.25 MG tablet TAKE 1 TABLET BY MOUTH EVERY NIGHT 1 TO 3 HOURS BEFORE BEDTIME 90 tablet 1   • rosuvastatin (CRESTOR) 10 MG tablet Crestor 10 mg tablet   Daily     • tiZANidine (ZANAFLEX) 4 MG tablet Take 4 mg by mouth 2  (Two) Times a Day As Needed.     • TOUJEO SOLOSTAR 300 UNIT/ML solution pen-injector injection Use 25 Units at night (Patient taking differently: 20 Units. Use 25 Units at night) 1.5 mL 6   • triamcinolone (KENALOG) 0.1 % cream APPLY TOPICALLY TO THE AFFECTED AREA TWICE DAILY AS NEEDED     • hydrALAZINE (APRESOLINE) 50 MG tablet Take 1 tablet by mouth 3 (Three) Times a Day. 90 tablet 5   • hydrOXYzine (ATARAX) 50 MG tablet TAKE 1 TABLET BY MOUTH EVERY 8 HOURS AS NEEDED FOR ITCHING 90 tablet 2   • Insulin aspart (FIASP FLEXTOUCH) 100 UNIT/ML solution pen-injector injection pen   0   • traZODone (DESYREL) 50 MG tablet Take 50 mg by mouth Every Night.     • acetaminophen (TYLENOL) 325 MG tablet Take 2 tablets by mouth Every 4 (Four) Hours As Needed for Mild Pain .     • acetaminophen-codeine (TYLENOL #3) 300-30 MG per tablet Take 1 tablet by mouth 2 (Two) Times a Day As Needed for Moderate Pain . 30 tablet 0   • levETIRAcetam (KEPPRA) 500 MG tablet Take 1 tablet by mouth 2 (Two) Times a Day. 60 tablet 11   • metroNIDAZOLE (FLAGYL) 500 MG tablet Take 1 tablet by mouth 3 (Three) Times a Day. (Patient taking differently: Take 500 mg by mouth As Needed.) 21 tablet 0   • nystatin (MYCOSTATIN) 239534 UNIT/GM ointment Apply  topically to the appropriate area as directed 2 (Two) Times a Day. 30 g 0   • nystatin (MYCOSTATIN) 942291 UNIT/ML suspension Take 5 mL by mouth 4 (Four) Times a Day. 473 mL 0   • ondansetron (Zofran) 4 MG tablet Take 1 tablet by mouth Every 6 (Six) Hours As Needed for Nausea or Vomiting. 10 tablet 0   • ondansetron (ZOFRAN) 8 MG tablet Take 1 tablet by mouth Every 8 (Eight) Hours As Needed for Nausea or Vomiting. 30 tablet 1     No facility-administered medications prior to visit.       Patient Active Problem List   Diagnosis   • Dyslipidemia   • Hypertension   • Anxiety (Chronic benzos)   • Insomnia   • GERD (gastroesophageal reflux disease)   • Diabetes mellitus, type 2 (CMS/HCC)   • Fibromyalgia   •  RLS (restless legs syndrome)   • Migraines   • COPD (chronic obstructive pulmonary disease) (CMS/HCC)   • Interstitial cystitis   • History of acute myocardial infarction   • History of cardiac murmur   • History of stroke   • CAD (coronary artery disease)   • Essential hypertension   • CKD (chronic kidney disease) stage 2, GFR 60-89 ml/min   • Bilateral carotid artery stenosis   • Chronic respiratory failure with hypoxia and hypercapnia (CMS/[facility])   • URIEL (obstructive sleep apnea)   • Obesity (BMI 30-39.9)   • Diabetes mellitus type 2 in obese (CMS/HCC)   • Obesity hypoventilation syndrome (CMS/HCC)   • Tobacco use   • Chronic pain (Chronic narcotics)   • Recurrent UTI/interstitial cystitis on chronic methenamine   • Clostridium difficile colitis diagnosed September 2018. Persistent diarrhea despite oral vancomycin   • Demand ischemia (CMS/[facility])   • Hypoxemia requiring supplemental oxygen   • Stenosis of carotid artery   • Occlusion and stenosis of left carotid artery    • Chronic gout with tophus   • Depression   • Edema   • Restless leg syndrome   • Asthma   • Wellness examination   • Encounter for screening mammogram for malignant neoplasm of breast    • Seasonal allergies   • Hyperlipidemia   • Seizure disorder (CMS/[facility])   • L1 vertebral fracture (CMS/HCC)   • Rib fracture   • Lumbar compression fracture (CMS/HCC)   • Polypharmacy   • Itching   • Anemia   • Urinary tract infection with hematuria   • Other fatigue   • Balance problem   • Dysuria   • Right leg pain   • Hypercalcemia   • High risk medication use   • Precordial pain   • Leg swelling   • Encounter for immunization    • Dizzy   • Acute myocardial infarction (CMS/[facility])   • Atopic rhinitis   • Bilateral inguinal hernia, without obstruction or gangrene, not specified as recurrent   • Candidiasis of skin   • Cerebrovascular accident (CVA) (CMS/[facility])   • Coronary angioplasty status   • Decreased GFR   • Diabetic peripheral neuropathy (CMS/[facility])   • History of  "Clostridioides difficile infection   • Osteoarthritis   • Other specified personal risk factors, not elsewhere classified   • Repeated falls   • Multinodular goiter       Advanced Care Planning:  ACP discussion was held with the patient during this visit. Patient does not have an advance directive, declines further assistance.    Review of Systems   Constitutional: Negative for chills.   HENT: Negative.    Eyes: Negative.    Respiratory: Negative.  Negative for cough.    Cardiovascular: Negative.  Negative for chest pain.   Gastrointestinal: Negative.  Negative for abdominal pain, constipation and diarrhea.   Endocrine: Negative.    Genitourinary: Negative.    Musculoskeletal: Negative.    Skin: Negative.    Allergic/Immunologic: Negative.    Neurological: Negative.    Hematological: Negative.    Psychiatric/Behavioral: Negative.  The patient is not nervous/anxious.    All other systems reviewed and are negative.      Compared to one year ago, the patient feels her physical health is better.  Compared to one year ago, the patient feels her mental health is the same.    Reviewed chart for potential of high risk medication in the elderly: yes  Reviewed chart for potential of harmful drug interactions in the elderly:yes    Objective         Vitals:    04/19/21 1619   BP: (!) 80/50   BP Location: Left arm   Patient Position: Sitting   Pulse: 67   Temp: 98 °F (36.7 °C)   TempSrc: Temporal   SpO2: 97%   Weight: 75.3 kg (166 lb)   Height: 165.1 cm (65\")       Body mass index is 27.62 kg/m².  Discussed the patient's BMI with her. The BMI is in the acceptable range.    Physical Exam  Vitals and nursing note reviewed.   Constitutional:       Appearance: She is well-developed.   HENT:      Head: Normocephalic and atraumatic.      Right Ear: External ear normal.      Left Ear: External ear normal.      Nose: Nose normal.   Eyes:      General: No scleral icterus.        Right eye: No discharge.         Left eye: No discharge.    "   Conjunctiva/sclera: Conjunctivae normal.      Pupils: Pupils are equal, round, and reactive to light.   Neck:      Thyroid: No thyromegaly.      Vascular: No JVD.      Trachea: No tracheal deviation.   Cardiovascular:      Rate and Rhythm: Normal rate and regular rhythm.      Heart sounds: Normal heart sounds. No murmur heard.   No friction rub. No gallop.    Pulmonary:      Effort: Pulmonary effort is normal. No respiratory distress.      Breath sounds: Normal breath sounds. No wheezing or rales.   Chest:      Chest wall: No tenderness.   Abdominal:      General: Bowel sounds are normal. There is no distension.      Palpations: Abdomen is soft. There is no mass.      Tenderness: There is no abdominal tenderness. There is no guarding or rebound.      Hernia: No hernia is present.   Musculoskeletal:         General: No tenderness. Normal range of motion.      Cervical back: Normal range of motion and neck supple.      Comments: L hammer toe and bunion   Lymphadenopathy:      Cervical: No cervical adenopathy.   Skin:     General: Skin is warm and dry.   Neurological:      General: No focal deficit present.      Mental Status: She is alert.      Cranial Nerves: No cranial nerve deficit.      Sensory: No sensory deficit.      Motor: No abnormal muscle tone.      Coordination: Coordination normal.      Deep Tendon Reflexes: Reflexes normal.      Comments: Normal monofilamant b feet   Psychiatric:         Mood and Affect: Mood normal.         Behavior: Behavior normal.         Thought Content: Thought content normal.         Judgment: Judgment normal.               Assessment/Plan   Medicare Risks and Personalized Health Plan  CMS Preventative Services Quick Reference  Fall Risk    The above risks/problems have been discussed with the patient.  Pertinent information has been shared with the patient in the After Visit Summary.  Follow up plans and orders are seen below in the Assessment/Plan Section.    Diagnoses and all  orders for this visit:    1. Wellness examination (Primary)  -     DEXA Bone Density Axial  -     Ambulatory Referral to Gastroenterology    2. Hypertension, unspecified type  -     hydrALAZINE (APRESOLINE) 25 MG tablet; Take 1 tablet by mouth 3 (Three) Times a Day.  Dispense: 270 tablet; Refill: 3  -     CBC & Differential  -     Comprehensive Metabolic Panel  -     Hemoglobin A1c  -     Lipid Panel  -     TSH  -     POC Urinalysis Dipstick, Automated  -     MicroAlbumin, Urine, Random - Urine, Clean Catch  -     Vitamin D 25 hydroxy  -     Sedimentation Rate  -     Uric Acid    3. Post-menopausal  -     DEXA Bone Density Axial  -     CBC & Differential  -     Comprehensive Metabolic Panel  -     Hemoglobin A1c  -     Lipid Panel  -     TSH  -     POC Urinalysis Dipstick, Automated  -     MicroAlbumin, Urine, Random - Urine, Clean Catch  -     Vitamin D 25 hydroxy  -     Sedimentation Rate  -     Uric Acid    4. Type 2 diabetes mellitus with other specified complication, with long-term current use of insulin (CMS/[facility])  -     Ambulatory Referral to Ophthalmology  -     CBC & Differential  -     Comprehensive Metabolic Panel  -     Hemoglobin A1c  -     Lipid Panel  -     TSH  -     POC Urinalysis Dipstick, Automated  -     MicroAlbumin, Urine, Random - Urine, Clean Catch  -     Vitamin D 25 hydroxy  -     Sedimentation Rate  -     Uric Acid  -     Ambulatory Referral to Podiatry    5. High cholesterol  -     CBC & Differential  -     Comprehensive Metabolic Panel  -     Hemoglobin A1c  -     Lipid Panel  -     TSH  -     POC Urinalysis Dipstick, Automated  -     MicroAlbumin, Urine, Random - Urine, Clean Catch  -     Vitamin D 25 hydroxy  -     Sedimentation Rate  -     Uric Acid    6. Anemia, unspecified type  -     CBC & Differential  -     Comprehensive Metabolic Panel  -     Hemoglobin A1c  -     Lipid Panel  -     TSH  -     POC Urinalysis Dipstick, Automated  -     MicroAlbumin, Urine, Random - Urine, Clean  Catch  -     Vitamin D 25 hydroxy  -     Sedimentation Rate  -     Uric Acid    7. Unspecified severe protein-calorie malnutrition (CMS/HCC)   -     Vitamin D 25 hydroxy    8. Itching  -     hydrOXYzine (ATARAX) 50 MG tablet; Take 1 tablet by mouth Every 8 (Eight) Hours As Needed for Itching.  Dispense: 90 tablet; Refill: 2    9. Primary insomnia  -     Discontinue: traZODone (DESYREL) 50 MG tablet; Take 1 tablet by mouth Every Night.  Dispense: 90 tablet; Refill: 3  -     traZODone (DESYREL) 100 MG tablet; Take 1 tablet by mouth Every Night.  Dispense: 90 tablet; Refill: 3    10. Bunion  -     Ambulatory Referral to Podiatry    11. Depression, unspecified depression type  -     traZODone (DESYREL) 100 MG tablet; Take 1 tablet by mouth Every Night.  Dispense: 90 tablet; Refill: 3    12. Nausea  -     promethazine (PHENERGAN) 25 MG tablet; Take 1 tablet by mouth Every 8 (Eight) Hours As Needed for Nausea or Vomiting.  Dispense: 30 tablet; Refill: 1      Follow Up:  No follow-ups on file.     An After Visit Summary and PPPS were given to the patient.         Side effects discussed with patient in detail, all including but not limited to every single topic discussed

## 2021-04-21 ENCOUNTER — PATIENT OUTREACH (OUTPATIENT)
Dept: CASE MANAGEMENT | Facility: OTHER | Age: 71
End: 2021-04-21

## 2021-04-21 NOTE — OUTREACH NOTE
Patient Outreach Note    Attempted to outreach to pt x4 with no answer or return call to message left. Goal was to assist pt with management of her diabetes as well other comorbidities. Call routed to PCP    Brenda Brown RN  Ambulatory     4/21/2021, 10:28 EDT

## 2021-04-26 ENCOUNTER — TELEPHONE (OUTPATIENT)
Dept: FAMILY MEDICINE CLINIC | Facility: CLINIC | Age: 71
End: 2021-04-26

## 2021-04-26 RX ORDER — ROPINIROLE 0.25 MG/1
0.25 TABLET, FILM COATED ORAL NIGHTLY
Qty: 90 TABLET | Refills: 3 | Status: SHIPPED | OUTPATIENT
Start: 2021-04-26 | End: 2022-04-06 | Stop reason: SDUPTHER

## 2021-04-26 RX ORDER — ROPINIROLE 0.25 MG/1
0.25 TABLET, FILM COATED ORAL NIGHTLY
Qty: 90 TABLET | Refills: 3 | Status: SHIPPED | OUTPATIENT
Start: 2021-04-26 | End: 2021-04-26 | Stop reason: SDUPTHER

## 2021-04-26 NOTE — TELEPHONE ENCOUNTER
PATIENT CALLED AND STATED THAT HER PHARMACY ADVISED HER THAT THE HYDROXYZINE 50MG, AND PROMETHAZINE 25MG, BOTH NEED A PRIOR AUTHORIZATION THROUGH HER INSURANCE.      PATIENT CALLBACK: [phone]

## 2021-04-26 NOTE — TELEPHONE ENCOUNTER
Caller: Vidhi Lopez    Relationship: Self    Best call back number: [phone]*    Medication needed:   Requested Prescriptions     Pending Prescriptions Disp Refills   • rOPINIRole (REQUIP) 0.25 MG tablet 90 tablet 1     Sig: Take 1 hour before bedtime.       When do you need the refill by: ASAP    What additional details did the patient provide when requesting the medication: PATIENT COMPLETELY OUT OF MEDICATION    Does the patient have less than a 3 day supply:  [x] Yes  [] No    What is the patient's preferred pharmacy: [id number] [address] AT [facility] [phone] [facility] [phone]

## 2021-04-28 NOTE — TELEPHONE ENCOUNTER
I called patient's insurance, both PA's initiated   Hydroxyzine ref# [id number]  Promethazine ref# [id number]    Insurance stated could take up to 72 hours for determination

## 2021-04-29 ENCOUNTER — PRIOR AUTHORIZATION (OUTPATIENT)
Dept: FAMILY MEDICINE CLINIC | Facility: CLINIC | Age: 71
End: 2021-04-29

## 2021-04-29 DIAGNOSIS — Z79.899 HIGH RISK MEDICATION USE: ICD-10-CM

## 2021-04-29 RX ORDER — POTASSIUM CHLORIDE 750 MG/1
20 TABLET, FILM COATED, EXTENDED RELEASE ORAL DAILY
Qty: 180 TABLET | Refills: 2 | Status: SHIPPED | OUTPATIENT
Start: 2021-04-29 | End: 2022-05-09

## 2021-04-29 NOTE — TELEPHONE ENCOUNTER
Prior Authorization approved starting from 01/27/2021 until 04/28/2022 for both Hydroxyzine HCL 50 MG tabs and Promethazine 25 MG tabs

## 2021-05-28 DIAGNOSIS — Z01.812 BLOOD TESTS PRIOR TO TREATMENT OR PROCEDURE: Primary | ICD-10-CM

## 2021-06-04 ENCOUNTER — TELEPHONE (OUTPATIENT)
Dept: FAMILY MEDICINE CLINIC | Facility: CLINIC | Age: 71
End: 2021-06-04

## 2021-06-04 DIAGNOSIS — B37.9 YEAST INFECTION: Primary | ICD-10-CM

## 2021-06-04 RX ORDER — FLUCONAZOLE 150 MG/1
150 TABLET ORAL ONCE
Qty: 1 TABLET | Refills: 0 | Status: SHIPPED | OUTPATIENT
Start: 2021-06-04 | End: 2021-06-04

## 2021-06-04 NOTE — TELEPHONE ENCOUNTER
Called patient, left detailed message letting her know we have sent in medication she requested. Patient to call back if she is needing anything else

## 2021-06-04 NOTE — TELEPHONE ENCOUNTER
The following patient called and stated she received the letter regarding the labs. Pt stated still wants to have the labs done, but she has not been feeling well. Pt states she will attempt to have lab done next week.    Also pt is requesting Diflucan she states she has a yeast infection.

## 2021-06-10 DIAGNOSIS — R21 RASH: ICD-10-CM

## 2021-06-11 RX ORDER — NYSTATIN 100000 [USP'U]/G
POWDER TOPICAL
Qty: 30 G | Refills: 1 | Status: SHIPPED | OUTPATIENT
Start: 2021-06-11 | End: 2022-03-03 | Stop reason: SDUPTHER

## 2021-07-09 ENCOUNTER — TELEPHONE (OUTPATIENT)
Dept: NEUROLOGY | Facility: CLINIC | Age: 71
End: 2021-07-09

## 2021-07-09 ENCOUNTER — TELEMEDICINE (OUTPATIENT)
Dept: FAMILY MEDICINE CLINIC | Facility: CLINIC | Age: 71
End: 2021-07-09

## 2021-07-09 DIAGNOSIS — R05.9 COUGH: ICD-10-CM

## 2021-07-09 DIAGNOSIS — J01.00 SUBACUTE MAXILLARY SINUSITIS: Primary | ICD-10-CM

## 2021-07-09 PROCEDURE — 99213 OFFICE O/P EST LOW 20 MIN: CPT | Performed by: FAMILY MEDICINE

## 2021-07-09 RX ORDER — DOXYCYCLINE HYCLATE 100 MG
100 TABLET ORAL 2 TIMES DAILY
Qty: 20 TABLET | Refills: 0 | Status: SHIPPED | OUTPATIENT
Start: 2021-07-09 | End: 2021-07-19

## 2021-07-09 RX ORDER — BENZONATATE 100 MG/1
100 CAPSULE ORAL 3 TIMES DAILY PRN
Qty: 30 CAPSULE | Refills: 1 | Status: SHIPPED | OUTPATIENT
Start: 2021-07-09 | End: 2021-10-28

## 2021-07-09 NOTE — PROGRESS NOTES
Chief Complaint  No chief complaint on file.    Subjective          Vidhi Lopez presents to [facility]  History of Present Illness  Patient agreed to be contacted by Jean Paul  Cough and H/A x 3 weeks, like a cold, yellow sputum, high fever 101F, sore throat, sinuses headache, no V/D, nausea, has Phenergan, vaccinated against COVID, wheezing a little , per pt BS under control  Objective   Vital Signs:   There were no vitals taken for this visit.      Result Review :                 Assessment and Plan    Diagnoses and all orders for this visit:    1. Subacute maxillary sinusitis (Primary)  -     doxycycline (VIBRAMYICN) 100 MG tablet; Take 1 tablet by mouth 2 (Two) Times a Day for 10 days.  Dispense: 20 tablet; Refill: 0    2. Cough  -     benzonatate (Tessalon Perles) 100 MG capsule; Take 1 capsule by mouth 3 (Three) Times a Day As Needed for Cough.  Dispense: 30 capsule; Refill: 1        Follow Up   No follow-ups on file.  Patient was given instructions and counseling regarding her condition or for health maintenance advice. Please see specific information pulled into the AVS if appropriate.     Time spent 15  Minutes  Side effects discussed with patient in detail, all including but not limited to every single topic discussed   Patient agreed to go to the ER if no improvement

## 2021-07-09 NOTE — TELEPHONE ENCOUNTER
Provider: CRISSY  Caller: PT  Relationship to Patient: SELF  Pharmacy: N/A  Phone Number: [phone]  Reason for Call: OVER THE COURSE OF THE PAST COUPLE OF WEEKS PT IS EXPERIENCING THE INABILITY TO SPEAK OR HAVE CLEAR THOUGHT PROCESS; PT NOT SURE IF SHE HAS HAD A TIA OR A STROKE; PT HAS NOT GONE TO THE E.R.    IN THE MIDDLE OF HER CONVERSATION WITH ME SHE DECIDED TO GO TO THE E.R. AND ENDED THE CONVERSATION.    PLEASE BE ADVISED.    THANK YOU.

## 2021-07-26 DIAGNOSIS — L29.9 ITCHING: ICD-10-CM

## 2021-07-27 RX ORDER — HYDROXYZINE 50 MG/1
TABLET, FILM COATED ORAL
Qty: 90 TABLET | Refills: 2 | Status: SHIPPED | OUTPATIENT
Start: 2021-07-27 | End: 2021-11-03

## 2021-08-05 ENCOUNTER — TELEMEDICINE (OUTPATIENT)
Dept: FAMILY MEDICINE CLINIC | Facility: CLINIC | Age: 71
End: 2021-08-05

## 2021-08-05 DIAGNOSIS — J02.9 SORE THROAT: ICD-10-CM

## 2021-08-05 DIAGNOSIS — R25.2 SPASM: ICD-10-CM

## 2021-08-05 DIAGNOSIS — R50.9 FEVER, UNSPECIFIED FEVER CAUSE: Primary | ICD-10-CM

## 2021-08-05 DIAGNOSIS — R11.0 NAUSEA: ICD-10-CM

## 2021-08-05 DIAGNOSIS — R30.0 DYSURIA: ICD-10-CM

## 2021-08-05 DIAGNOSIS — M54.9 ACUTE MIDLINE BACK PAIN, UNSPECIFIED BACK LOCATION: ICD-10-CM

## 2021-08-05 LAB
EXPIRATION DATE: NORMAL
INTERNAL CONTROL: NORMAL
Lab: NORMAL
S PYO AG THROAT QL: NEGATIVE

## 2021-08-05 PROCEDURE — 99214 OFFICE O/P EST MOD 30 MIN: CPT | Performed by: FAMILY MEDICINE

## 2021-08-05 PROCEDURE — 87880 STREP A ASSAY W/OPTIC: CPT | Performed by: FAMILY MEDICINE

## 2021-08-05 RX ORDER — TIZANIDINE 4 MG/1
4 TABLET ORAL EVERY 8 HOURS PRN
Qty: 30 TABLET | Refills: 1 | Status: SHIPPED | OUTPATIENT
Start: 2021-08-05 | End: 2022-10-04 | Stop reason: HOSPADM

## 2021-08-05 RX ORDER — ONDANSETRON 4 MG/1
4 TABLET, FILM COATED ORAL EVERY 8 HOURS PRN
Qty: 30 TABLET | Refills: 1 | Status: SHIPPED | OUTPATIENT
Start: 2021-08-05 | End: 2021-08-26

## 2021-08-05 NOTE — PROGRESS NOTES
Chief Complaint  No chief complaint on file.  Fever,chills nausea,   Subjective          Vidhi Lopez presents to [facility]  History of Present Illness  Patient agreed to be contacted by [employer], patient did not know how to use [employer] so we ended up using phone  Weak and worn out and sore throat and burning with urination, did try Doxy in July, nausea, low grade fever, low back pain, flank like a kidney stone, x week, cough some, no chest congestion, diarrhea  also, had C.diff  before, got COVID vaccine , BP and BS under control no wheezing, DOES NOT WANT TO GO TO ER    Objective   Vital Signs:   There were no vitals taken for this visit.      Result Review :                 Assessment and Plan    Diagnoses and all orders for this visit:    1. Fever, unspecified fever cause (Primary)  -     COVID-19,[facility] ROUTINE, NP/OP SWAB IN TRANSPORT MEDIA OR ESWAB 72 HR TAT - Swab, Nasopharynx    2. Sore throat  -     POC Rapid Strep A    3. Dysuria  -     Cancel: POC Urinalysis Dipstick, Automated  -     Cancel: POC Urinalysis Dipstick, Automated; Future  -     POC Urinalysis Dipstick, Automated; Future    4. Nausea  -     ondansetron (Zofran) 4 MG tablet; Take 1 tablet by mouth Every 8 (Eight) Hours As Needed for Nausea or Vomiting.  Dispense: 30 tablet; Refill: 1    5. Spasm  -     tiZANidine (ZANAFLEX) 4 MG tablet; Take 1 tablet by mouth Every 8 (Eight) Hours As Needed for Muscle Spasms.  Dispense: 30 tablet; Refill: 1        Follow Up   Return if symptoms worsen or fail to improve.  Patient was given instructions and counseling regarding her condition or for health maintenance advice. Please see specific information pulled into the AVS if appropriate.   Plenty of fluids, if no better or signs of dehydration or any worsening to go to ER, discussed with patient also side effect taking Zofran, observe, time spent 15 minutes  Side effects discussed with patient in detail, all including  but not limited to every single topic discussed

## 2021-08-06 ENCOUNTER — TELEPHONE (OUTPATIENT)
Dept: FAMILY MEDICINE CLINIC | Facility: CLINIC | Age: 71
End: 2021-08-06

## 2021-08-06 LAB
LABCORP SARS-COV-2, NAA 2 DAY TAT: NORMAL
SARS-COV-2 RNA RESP QL NAA+PROBE: NOT DETECTED

## 2021-08-09 ENCOUNTER — TELEPHONE (OUTPATIENT)
Dept: FAMILY MEDICINE CLINIC | Facility: CLINIC | Age: 71
End: 2021-08-09

## 2021-08-09 NOTE — TELEPHONE ENCOUNTER
----- Message from Abiodun Calderon MD sent at 8/7/2021  7:48 AM EDT -----  Please call the patient regarding her result. Negative for COVID

## 2021-08-25 ENCOUNTER — TELEPHONE (OUTPATIENT)
Dept: FAMILY MEDICINE CLINIC | Facility: CLINIC | Age: 71
End: 2021-08-25

## 2021-08-25 NOTE — TELEPHONE ENCOUNTER
Lmtcb    Okay for hub to read    Promethazine is not a good idea because can cause seizures, patient  has history of seizures, but I can give her Zofran

## 2021-08-25 NOTE — TELEPHONE ENCOUNTER
Patient called in stating low grade fever. mohinder eastff is also coming back. Currently had body chills and aches. States only wants to see dr vivas. Advised to seek immediate care at an urgent care center.

## 2021-08-25 NOTE — TELEPHONE ENCOUNTER
Patient calling to ask for med refill on  Promethazine. Could not find on chart.  Patient currently experiencing extreme nausea. Has apt set for 08/27. Advised may NOT write scipt until seen.  Best contact info   [phone]

## 2021-08-26 DIAGNOSIS — R11.0 NAUSEA: ICD-10-CM

## 2021-08-26 RX ORDER — ONDANSETRON 4 MG/1
4 TABLET, FILM COATED ORAL EVERY 8 HOURS PRN
Qty: 30 TABLET | Refills: 1 | Status: SHIPPED | OUTPATIENT
Start: 2021-08-26 | End: 2022-03-07

## 2021-09-07 ENCOUNTER — APPOINTMENT (OUTPATIENT)
Dept: BONE DENSITY | Facility: HOSPITAL | Age: 71
End: 2021-09-07

## 2021-09-27 DIAGNOSIS — Z01.812 BLOOD TESTS PRIOR TO TREATMENT OR PROCEDURE: Primary | ICD-10-CM

## 2021-10-06 NOTE — CONSULTS
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 I'm not homicidal, or anything else\". Reporting on prior HC interactions where she told provider that of course she has been depressed at times, but that everyone has and if they say otherwise they're lying.     Concerned about possibility of UTI, I reviewed pt's UA with her.     Reports she is concerned about feeling weak recently. Reports she has been weak for years \"since I had my first set of TIA's, probably 15 years ago ... And I have coronary artery disease ... I'm had 2 MI's, no damage, knock on wood ... And I've had a left carotid stent placement ... And then I've had a lot of issues with my kidneys and bladder\".     Talking about concerns that she had a surgery with poor aseptic technique in the past, continuing with her multiple somatic concerns.     Denies any hx of self harm or of suicide attempts. Denies recent thoughts of harming self/others, \"no I never have\" had those thoughts on f/u.     Reports she drinks one glass of wine every month or every 6 weeks. Denies illicit drug use (or hx of such use). Reports she always takes meds as prescribed.     Denies hx of inpatient psych admit. Reports she talked to a counselor after she  her ex-. Reports that the last mental health treatment she got was initially by PCP about 6 years ago, was getting xanax from PCP, and then went to psychiatrist one time, who advised that her xanax prescription was appropriate. Reports she only saw the psychiatrist one time.     Reports her only 2 current psychiatric medications are fluoxetine 10 mg daily and hydorxyzine 50 mg q8H PRN (generally takes about once daily).     Reports she lives in independent living facility, [facility]. Reports she was discharged from rehab facility ([facility]) 5 days ago, planning to do outpatient rehab now. States she is planning to " Infusion Nursing Note:  Mainor Grande presents today for Entyvio.    Patient seen by provider today: No   present during visit today: Not Applicable.    Note: N/A.      Intravenous Access:  Peripheral IV placed.    Treatment Conditions:  Biological Infusion Checklist:  ~~~ NOTE: If the patient answers yes to any of the questions below, hold the infusion and contact ordering provider or on-call provider.    1. Have you recently had an elevated temperature, fever, chills, productive cough, coughing for 3 weeks or longer or hemoptysis, abnormal vital signs, night sweats,  chest pain or have you noticed a decrease in your appetite, unexplained weight loss or fatigue? No  2. Do you have any open wounds or new incisions? No  3. Do you have any recent or upcoming hospitalizations, surgeries or dental procedures? No  4. Do you currently have or recently have had any signs of illness or infection or are you on any antibiotics? No  5. Have you had any new, sudden or worsening abdominal pain? No  6. Have you or anyone in your household received a live vaccination in the past 4 weeks? Please note:  No live vaccines while on biologic/chemotherapy until 6 months after the last treatment.  Patient can receive the flu vaccine (shot only) and the pneumovax.  It is optimal for the patient to get these vaccines mid cycle, but they can be given at any time as long as it is not on the day of the infusion. No  7. Have you recently been diagnosed with any new nervous system diseases (ie. Multiple sclerosis, Guillain [place], seizures, neurological changes) or cancer diagnosis? No  8. Are you on any form of radiation or chemotherapy? No  9. Are you pregnant or breast feeding or do you have plans of pregnancy in the future? No  10. Have you been having any signs of worsening depression or suicidal ideations?  (benlysta only) No  11. Have there been any other new onset medical symptoms? No        Post Infusion Assessment:  Patient  "schedule this - \"I hope to do that in the next week or so, next week\" - future oriented. Reports feeling sad, because she was in rehab in [facility], although son visited her in rehab.     Reports that she has visual hallucinations (VH's) only, and that \"I'm with it enough now to know that, I know that that really isn't a rooster that climbed up on the counter ... That was one of the more bizarre ones, that happened about a month ago\". Reports that when this happened she (appropriately) asked \"one of the workers where I live\" for a reality check. Denies hallucinations on any other sensory modality (and appears well versed when educated about auditory, gustatory, tactile and olfactory types).     Educated about command hallucinations, and reports that if she ever were to experience command hallucinations telling her to harm anyone, \"I wouldn't do it\".     Reports that one of her dtr's said to her recently, \"mom, you're getting so forgetful, I think it's time we talked about putting you in the home\", pt reports she told her \"if you say that to me one more time, you're out of the will\". States she has intention of putting son as her POA or HC surrogate, hasn't done this yet, but would like to (fuure oriented).     Then asks if elevated uric acid levels in blood could cause VH, I politely deferred to ED PA about this issue (which I discussed with Gutierrez later, advised unlikely to be related).     AOX4.     Educated about level of care evaluation. Advised that I would be recommending outpatient treatment options, pt states this is acceptable to her. On exiting room, pt stated, \"hey doc, can you ask him if I can get anything for pain\". Asked what has helped her with pain in the past, \"hydrocodone 5\".     UDS noted to be negative.     Plan to d/c with outpatient psychiatric resources, BRIDGER Whitley agrees with plan.     On approach with d/c instructions, pt again asks what can be done about pain in her legs. I asked if we could finish " tolerated infusion without incident.  Site patent and intact, free from redness, edema or discomfort.  No evidence of extravasations.  Access discontinued per protocol.  Biologic Infusion Post Education: Call the triage nurse at your clinic or seek medical attention if you have chills and/or temperature greater than or equal to 100.5, uncontrolled nausea/vomiting, diarrhea, constipation, dizziness, shortness of breath, chest pain, heart palpitations, weakness or any other new or concerning symptoms, questions or concerns.  You cannot have any live virus vaccines prior to or during treatment or up to 6 months post infusion.  If you have an upcoming surgery, medical procedure or dental procedure during treatment, this should be discussed with your ordering physician and your surgeon/dentist.  If you are having any concerning symptom, if you are unsure if you should get your next infusion or wish to speak to a provider before your next infusion, please call your care coordinator or triage nurse at your clinic to notify them so we can adequately serve you.       Discharge Plan:   Discharge instructions reviewed with: Patient.  Patient discharged in stable condition accompanied by: self.  Departure Mode: Ambulatory.      Gilda Mays RN                     "discussing her psychiatric referrals, to which pt replied, \"well that sounds kind of like a dive to me\" but allowed me to do so.     I asked pt if she means she would like a pain management, replies she is already in [facility] pain management, but having to await something with them. I again politely deferred to ED staff, to which pt replied, \"I'm gonna quit coming to the ER then. I'll call my son to come get me\". Advised that I have cleared her from a psychiatric point of view, and that ED staff will still need to d/c her, \"well he can clear me from the other too, he can clear me right now\" - indicating possibly pt's reported resistance to d/c earlier could be related to this concern.                       "

## 2021-10-22 DIAGNOSIS — Z01.812 BLOOD TESTS PRIOR TO TREATMENT OR PROCEDURE: Primary | ICD-10-CM

## 2021-10-27 ENCOUNTER — TELEPHONE (OUTPATIENT)
Dept: FAMILY MEDICINE CLINIC | Facility: CLINIC | Age: 71
End: 2021-10-27

## 2021-10-27 DIAGNOSIS — M54.9 ACUTE MIDLINE BACK PAIN, UNSPECIFIED BACK LOCATION: Primary | ICD-10-CM
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
## 2021-10-28 ENCOUNTER — OFFICE VISIT (OUTPATIENT)
Dept: FAMILY MEDICINE CLINIC | Facility: CLINIC | Age: 71
End: 2021-10-28

## 2021-10-28 VITALS
OXYGEN SATURATION: 99 % | DIASTOLIC BLOOD PRESSURE: 66 MMHG | HEART RATE: 64 BPM | HEIGHT: 65 IN | SYSTOLIC BLOOD PRESSURE: 124 MMHG | WEIGHT: 177 LBS | TEMPERATURE: 98.2 F | BODY MASS INDEX: 29.49 KG/M2

## 2021-10-28 DIAGNOSIS — Z79.899 HIGH RISK MEDICATION USE: ICD-10-CM

## 2021-10-28 DIAGNOSIS — E78.00 HIGH CHOLESTEROL: ICD-10-CM

## 2021-10-28 DIAGNOSIS — Z79.4 TYPE 2 DIABETES MELLITUS WITH OTHER SPECIFIED COMPLICATION, WITH LONG-TERM CURRENT USE OF INSULIN (HCC): ICD-10-CM

## 2021-10-28 DIAGNOSIS — Z00.00 WELLNESS EXAMINATION: ICD-10-CM

## 2021-10-28 DIAGNOSIS — G62.9 NEUROPATHY: Primary | ICD-10-CM

## 2021-10-28 DIAGNOSIS — I10 HYPERTENSION, UNSPECIFIED TYPE: ICD-10-CM

## 2021-10-28 DIAGNOSIS — E11.69 TYPE 2 DIABETES MELLITUS WITH OTHER SPECIFIED COMPLICATION, WITH LONG-TERM CURRENT USE OF INSULIN (HCC): ICD-10-CM

## 2021-10-28 DIAGNOSIS — Z23 ENCOUNTER FOR IMMUNIZATION: ICD-10-CM

## 2021-10-28 PROCEDURE — 99214 OFFICE O/P EST MOD 30 MIN: CPT | Performed by: FAMILY MEDICINE

## 2021-10-28 RX ORDER — GABAPENTIN 400 MG/1
400 CAPSULE ORAL 3 TIMES DAILY
Qty: 90 CAPSULE | Refills: 0 | Status: SHIPPED | OUTPATIENT
Start: 2021-10-28 | End: 2021-11-24

## 2021-10-28 RX ORDER — FUROSEMIDE 40 MG/1
40 TABLET ORAL
COMMUNITY
End: 2022-03-17 | Stop reason: SDUPTHER

## 2021-10-28 RX ORDER — LOSARTAN POTASSIUM 50 MG/1
50 TABLET ORAL DAILY
COMMUNITY
End: 2022-03-15 | Stop reason: HOSPADM

## 2021-10-28 NOTE — PROGRESS NOTES
"Chief Complaint  Pain (needs refill on neurontin)    Subjective          Vidhi Lopez presents to [facility]  History of Present Illness  WANTS A DIFFERENT pain mgt bc was late and would not see her , on gabapentin x neuropathy and Fibromyalgia, 400 tid, x 20 years blood sugars under control, no labs done in a while, not seen by endocrinologist, also would like to get a flu vaccine  Objective   Vital Signs:   /66   Pulse 64   Temp 98.2 °F (36.8 °C) (Infrared)   Ht 165.1 cm (65\")   Wt 80.3 kg (177 lb)   SpO2 99%   BMI 29.45 kg/m²     Physical Exam  Vitals and nursing note reviewed.   Constitutional:       Appearance: She is well-developed.   HENT:      Head: Normocephalic and atraumatic.      Right Ear: External ear normal.      Left Ear: External ear normal.      Nose: Nose normal.   Eyes:      General: No scleral icterus.        Right eye: No discharge.         Left eye: No discharge.      Conjunctiva/sclera: Conjunctivae normal.      Pupils: Pupils are equal, round, and reactive to light.   Neck:      Thyroid: No thyromegaly.      Vascular: No JVD.      Trachea: No tracheal deviation.   Cardiovascular:      Rate and Rhythm: Normal rate and regular rhythm.      Heart sounds: Normal heart sounds. No murmur heard.  No friction rub. No gallop.    Pulmonary:      Effort: Pulmonary effort is normal. No respiratory distress.      Breath sounds: Normal breath sounds. No wheezing or rales.   Chest:      Chest wall: No tenderness.   Abdominal:      General: Bowel sounds are normal. There is no distension.      Palpations: Abdomen is soft. There is no mass.      Tenderness: There is no abdominal tenderness. There is no guarding or rebound.      Hernia: No hernia is present.   Musculoskeletal:         General: No tenderness. Normal range of motion.      Cervical back: Normal range of motion and neck supple.   Lymphadenopathy:      Cervical: No cervical adenopathy.   Skin:     " General: Skin is warm and dry.   Neurological:      General: No focal deficit present.      Mental Status: She is alert.      Cranial Nerves: No cranial nerve deficit.      Sensory: No sensory deficit.      Motor: No abnormal muscle tone.      Coordination: Coordination normal.      Deep Tendon Reflexes: Reflexes normal.   Psychiatric:         Mood and Affect: Mood normal.         Behavior: Behavior normal.         Thought Content: Thought content normal.         Judgment: Judgment normal.        Result Review :                 Assessment and Plan    Diagnoses and all orders for this visit:    1. Neuropathy (Primary)  -     gabapentin (NEURONTIN) 400 MG capsule; Take 1 capsule by mouth 3 (Three) Times a Day.  Dispense: 90 capsule; Refill: 0    2. High risk medication use  -     Compliance Drug Analysis, Ur - Urine, Clean Catch    3. Wellness examination  -     Fluzone High-Dose 65+yrs (8139-8820); Future  -     CBC & Differential; Future  -     Comprehensive Metabolic Panel; Future  -     Hemoglobin A1c; Future  -     Lipid Panel; Future  -     POC Urinalysis Dipstick, Automated; Future  -     MicroAlbumin, Urine, Random - Urine, Clean Catch; Future  -     TSH; Future    4. Type 2 diabetes mellitus with other specified complication, with long-term current use of insulin (HCC)  -     Fluzone High-Dose 65+yrs (3896-7141); Future  -     CBC & Differential; Future  -     Comprehensive Metabolic Panel; Future  -     Hemoglobin A1c; Future  -     Lipid Panel; Future  -     POC Urinalysis Dipstick, Automated; Future  -     MicroAlbumin, Urine, Random - Urine, Clean Catch; Future  -     TSH; Future  -     gabapentin (NEURONTIN) 400 MG capsule; Take 1 capsule by mouth 3 (Three) Times a Day.  Dispense: 90 capsule; Refill: 0    5. High cholesterol  -     Fluzone High-Dose 65+yrs (8061-8850); Future  -     CBC & Differential; Future  -     Comprehensive Metabolic Panel; Future  -     Hemoglobin A1c; Future  -     Lipid Panel;  Future  -     POC Urinalysis Dipstick, Automated; Future  -     MicroAlbumin, Urine, Random - Urine, Clean Catch; Future  -     TSH; Future    6. Hypertension, unspecified type  -     Fluzone High-Dose 65+yrs (1720-4092); Future  -     CBC & Differential; Future  -     Comprehensive Metabolic Panel; Future  -     Hemoglobin A1c; Future  -     Lipid Panel; Future  -     POC Urinalysis Dipstick, Automated; Future  -     MicroAlbumin, Urine, Random - Urine, Clean Catch; Future  -     TSH; Future    7. Encounter for immunization   -     Fluzone High-Dose 65+yrs (3874-7369); Future        Follow Up   Return in about 3 months (around 1/28/2022).  Patient was given instructions and counseling regarding her condition or for health maintenance advice. Please see specific information pulled into the AVS if appropriate.

## 2021-11-02 DIAGNOSIS — I10 HYPERTENSION, UNSPECIFIED TYPE: ICD-10-CM

## 2021-11-02 DIAGNOSIS — L29.9 ITCHING: ICD-10-CM

## 2021-11-03 LAB — DRUGS UR: NORMAL

## 2021-11-03 NOTE — TELEPHONE ENCOUNTER
me from pharmacy: METOPROLOL TARTRATE 25MG TABLETS          Will file in chart as: metoprolol tartrate (LOPRESSOR) 25 MG tablet    The original prescription was discontinued on 10/28/2021 by rBenda Zamora MA for the following reason: *Therapy completed. Renewing this prescription may not be appropriate.    Sig: TAKE 1 TABLET BY MOUTH TWICE DAILY    Disp:  180 tablet    Refills:  3 (Pharmacy requested: Not specified)    Start: 11/2/2021    Class: Normal    Non-formulary For: Hypertension, unspecified type     Pt said she hasn't taken this med since 11/11/20 was last refill. Do you want to fill it.    Rx Refill Note  Requested Prescriptions     Pending Prescriptions Disp Refills   • metoprolol tartrate (LOPRESSOR) 25 MG tablet [Pharmacy Med Name: METOPROLOL TARTRATE 25MG TABLETS] 180 tablet 3     Sig: TAKE 1 TABLET BY MOUTH TWICE DAILY   • hydrOXYzine (ATARAX) 50 MG tablet [Pharmacy Med Name: HYDROXYZINE HCL 50MG TABS (WHITE)] 90 tablet 2     Sig: Take 1 tablet by mouth Every 8 (Eight) Hours As Needed for Itching.      Last office visit with prescribing clinician: 10/28/2021      Next office visit with prescribing clinician:Due 1/28/22   }  Brenda Zamora MA  11/03/21, 16:53 EDT

## 2021-11-04 DIAGNOSIS — Z01.812 BLOOD TESTS PRIOR TO TREATMENT OR PROCEDURE: Primary | ICD-10-CM

## 2021-11-08 RX ORDER — HYDROXYZINE 50 MG/1
50 TABLET, FILM COATED ORAL EVERY 8 HOURS PRN
Qty: 90 TABLET | Refills: 2 | Status: SHIPPED | OUTPATIENT
Start: 2021-11-08 | End: 2022-01-27 | Stop reason: SDUPTHER

## 2021-11-08 NOTE — TELEPHONE ENCOUNTER
Left message to return call.   If she is still taking metoprolol ?    Pt said the lisinopril is not working for her and knows the metoprolol works better. Sent this to pharm.  Not very happy with her nephrologist.    Would like a referral to go to a [facility] [place] nephrologist

## 2021-11-11 ENCOUNTER — TELEPHONE (OUTPATIENT)
Dept: FAMILY MEDICINE CLINIC | Facility: CLINIC | Age: 71
End: 2021-11-11

## 2021-11-11 NOTE — TELEPHONE ENCOUNTER
She says Losartan works for her, nephrology put her on lisinopril but it does not work for her.  She is taking atorvastatin only.    She wants to be sure she can take metoprolol with the losartan.    Also she knows she has a yeast inf and nystatin powder always works well for her, can she get that called in.

## 2021-11-11 NOTE — TELEPHONE ENCOUNTER
Pt informed and also told her no powder in th vaginal area, she is using vagisil - told her to try monistat or the generic OTC, she understood and agreed.

## 2021-11-21 DIAGNOSIS — E78.00 HIGH CHOLESTEROL: ICD-10-CM

## 2021-11-23 RX ORDER — ATORVASTATIN CALCIUM 80 MG/1
80 TABLET, FILM COATED ORAL DAILY
Qty: 90 TABLET | Refills: 3 | Status: SHIPPED | OUTPATIENT
Start: 2021-11-23 | End: 2022-06-09 | Stop reason: SDUPTHER

## 2021-11-23 NOTE — TELEPHONE ENCOUNTER
Rx Refill Note  Requested Prescriptions     Pending Prescriptions Disp Refills   • atorvastatin (LIPITOR) 80 MG tablet [Pharmacy Med Name: ATORVASTATIN 80MG TABLETS] 90 tablet 3     Sig: TAKE 1 TABLET BY MOUTH DAILY      Last office visit with prescribing clinician: 10/28/2021      Next office visit with prescribing clinician: Visit date not found            Christopher Perea MA  11/23/21, 07:25 EST

## 2021-11-24 DIAGNOSIS — Z79.4 TYPE 2 DIABETES MELLITUS WITH OTHER SPECIFIED COMPLICATION, WITH LONG-TERM CURRENT USE OF INSULIN (HCC): ICD-10-CM

## 2021-11-24 DIAGNOSIS — E11.69 TYPE 2 DIABETES MELLITUS WITH OTHER SPECIFIED COMPLICATION, WITH LONG-TERM CURRENT USE OF INSULIN (HCC): ICD-10-CM

## 2021-11-24 DIAGNOSIS — G62.9 NEUROPATHY: ICD-10-CM

## 2021-11-24 RX ORDER — GABAPENTIN 400 MG/1
CAPSULE ORAL
Qty: 90 CAPSULE | Refills: 0 | Status: SHIPPED | OUTPATIENT
Start: 2021-11-24 | End: 2021-12-23

## 2021-11-24 RX ORDER — LEVETIRACETAM 500 MG/1
TABLET ORAL
Qty: 60 TABLET | Refills: 1 | Status: SHIPPED | OUTPATIENT
Start: 2021-11-24 | End: 2021-11-24

## 2021-11-24 RX ORDER — LEVETIRACETAM 500 MG/1
TABLET ORAL
Qty: 180 TABLET | Refills: 0 | Status: SHIPPED | OUTPATIENT
Start: 2021-11-24 | End: 2022-03-07 | Stop reason: SDUPTHER

## 2021-11-24 NOTE — TELEPHONE ENCOUNTER
Rx Refill Note  Requested Prescriptions     Pending Prescriptions Disp Refills   • levETIRAcetam (KEPPRA) 500 MG tablet [Pharmacy Med Name: LEVETIRACETAM 500MG TABLETS] 60 tablet      Sig: TAKE 1 TABLET BY MOUTH TWICE DAILY      Last office visit with prescribing clinician: 10/28/2021      Next office visit with prescribing clinician: Visit date not found            Christopher Perea MA  11/24/21, 16:31 EST

## 2021-11-24 NOTE — TELEPHONE ENCOUNTER
Rx Refill Note  Requested Prescriptions     Pending Prescriptions Disp Refills   • gabapentin (NEURONTIN) 400 MG capsule [Pharmacy Med Name: GABAPENTIN 400MG CAPSULES] 90 capsule      Sig: TAKE 1 CAPSULE BY MOUTH THREE TIMES DAILY      Last office visit with prescribing clinician: 10/28/2021      Next office visit with prescribing clinician: Visit date not found            Christopher Perea MA  11/24/21, 13:04 EST

## 2021-11-24 NOTE — TELEPHONE ENCOUNTER
Rx Refill Note  Requested Prescriptions     Pending Prescriptions Disp Refills   • levETIRAcetam (KEPPRA) 500 MG tablet [Pharmacy Med Name: LEVETIRACETAM 500MG TABLETS] 180 tablet      Sig: TAKE 1 TABLET BY MOUTH TWICE DAILY      Last office visit with prescribing clinician: 10/28/2021      Next office visit with prescribing clinician: Visit date not found            Christopher Perea MA  11/24/21, 16:47 EST

## 2021-12-09 DIAGNOSIS — Z01.812 BLOOD TESTS PRIOR TO TREATMENT OR PROCEDURE: Primary | ICD-10-CM

## 2021-12-23 DIAGNOSIS — G62.9 NEUROPATHY: ICD-10-CM

## 2021-12-23 DIAGNOSIS — E11.69 TYPE 2 DIABETES MELLITUS WITH OTHER SPECIFIED COMPLICATION, WITH LONG-TERM CURRENT USE OF INSULIN (HCC): ICD-10-CM

## 2021-12-23 DIAGNOSIS — Z79.4 TYPE 2 DIABETES MELLITUS WITH OTHER SPECIFIED COMPLICATION, WITH LONG-TERM CURRENT USE OF INSULIN (HCC): ICD-10-CM

## 2021-12-23 RX ORDER — GABAPENTIN 400 MG/1
400 CAPSULE ORAL 3 TIMES DAILY
Qty: 90 CAPSULE | Refills: 0 | Status: SHIPPED | OUTPATIENT
Start: 2021-12-23 | End: 2022-01-27 | Stop reason: SDUPTHER

## 2021-12-23 NOTE — TELEPHONE ENCOUNTER
Rx Refill Note  Requested Prescriptions     Pending Prescriptions Disp Refills   • gabapentin (NEURONTIN) 400 MG capsule [Pharmacy Med Name: GABAPENTIN 400MG CAPSULES] 90 capsule      Sig: TAKE 1 CAPSULE BY MOUTH THREE TIMES DAILY      Last office visit with prescribing clinician: 10/28/2021      Next office visit with prescribing clinician: due 1/28/2022     Brenda Zamora MA  12/23/21, 11:55 EST

## 2022-01-12 NOTE — THERAPY EVALUATION
Acute Care - Occupational Therapy Initial Evaluation  [facility]     Patient Name: Vidhi Lopez  : 1950  MRN: [id number]  Today's Date: 2019             Admit Date: 2019     No diagnosis found.  Patient Active Problem List   Diagnosis   • Dyslipidemia   • Hypertension   • Anxiety (Chronic benzos)   • Insomnia   • GERD (gastroesophageal reflux disease)   • Diabetes mellitus, type 2 (CMS/HCC)   • Fibromyalgia   • RLS (restless legs syndrome)   • Migraines   • COPD (chronic obstructive pulmonary disease) (CMS/HCC)   • Interstitial cystitis   • History of acute myocardial infarction   • History of cardiac murmur   • History of stroke   • CAD (coronary artery disease)   • Essential hypertension   • CKD (chronic kidney disease) stage 2, GFR 60-89 ml/min   • Bilateral carotid artery stenosis   • Chronic respiratory failure with hypoxia and hypercapnia (CMS/HCC)   • Obstructive sleep apnea   • Obesity (BMI 30-39.9)   • Diabetes mellitus type 2 in obese (CMS/HCC)   • Obesity hypoventilation syndrome (CMS/HCC)   • Tobacco use   • Chronic pain (Chronic narcotics)   • Recurrent UTI/interstitial cystitis on chronic methenamine   • Clostridium difficile colitis diagnosed 2018. Persistent diarrhea despite oral vancomycin   • Demand ischemia (CMS/HCC)   • Hypoxemia requiring supplemental oxygen   • Stenosis of carotid artery   • Occlusion and stenosis of left carotid artery    • Chronic gout with tophus   • Depression   • Edema   • Restless leg syndrome   • Asthma   • Wellness examination   • Encounter for screening mammogram for malignant neoplasm of breast    • Seasonal allergies   • Hyperlipidemia   • Seizure disorder (CMS/HCC)   • L1 vertebral fracture (CMS/HCC)   • Rib fracture     Past Medical History:   Diagnosis Date   • Allergic rhinitis    • Asthma with COPD (CMS/HCC)     Asthma/COPD   • Bilateral ovarian cysts    • Coronary artery disease    • Depression with anxiety      Depression/Anxiety   • Diabetes (CMS/HCC)     pre   • Endometriosis     Dr. Jin; estradiol    • ESR raised 3/20/2018   • Fatigue    • Fibromyalgia    • Headache     Headaches   • High cholesterol    • History of gallstones    • History of myocardial infarction    • Hypertension    • Influenza B     DX'D 2/6/2018, TREATED WITH TAMIFLU. LAST DOSE 2/11/2018 AM.  PATIENT STATES DR FLORES IS AWARE. DENIES FEVERS OR CHILLS.   • Interstitial cystitis    • On home oxygen therapy     2 L NC   • URIEL on CPAP    • PONV (postoperative nausea and vomiting)    • Seizure disorder, nonconvulsive, with status epilepticus (CMS/[facility] located [facility]) 3/20/2018   • Septic joint of right knee joint (CMS/[facility] located within [facility]) 3/21/2018   • Shortness of breath on exertion    • Stroke (CMS/[facility] located [facility])     X1 8 YEARS AGO, GENERALIZED UPPER BODY WEAKNESS RESIDUAL PER PT    • Thyroid disorder    • Urinary leakage    • UTI (urinary tract infection)    • Wears glasses    • Yeast dermatitis      Past Surgical History:   Procedure Laterality Date   • ARTERIOGRAM N/A 2/12/2018    Procedure: Renal Arteriogram;  Surgeon: Lito Flores MD;  Location:  [facility] CATH INVASIVE LOCATION;  Service:    • BREAST BIOPSY Right 1991   • CARDIAC CATHETERIZATION N/A 2/12/2018    Procedure: Right Heart Cath;  Surgeon: Lito Flores MD;  Location:  [facility] CATH INVASIVE LOCATION;  Service:    • CAROTID STENT      Transcath    • CAROTID STENT Left    • CHOLECYSTECTOMY     • CYSTOSTOMY W/ BLADDER BIOPSY     • DILATATION AND CURETTAGE     • KNEE ARTHROSCOPY Right 3/23/2018    Procedure: ARTHROSCOPY, RIGHT KNEE  INCISION AND DRAINAGE LOWER EXTREMITY WITH SYNOVIAL BIOPSY;  Surgeon: Dilip Giron MD;  Location:  [facility] OR;  Service: Orthopedics   • LAPAROSCOPIC CHOLECYSTECTOMY  1994    Lap rolando   • OOPHORECTOMY     • PAP SMEAR  05/10/2016   • PARTIAL HYSTERECTOMY            OT ASSESSMENT FLOWSHEET (last 12 hours)      Occupational Therapy Evaluation     Row Name 12/06/19 1935                   OT Evaluation  Time/Intention    Document Type  evaluation  -SG        Mode of Treatment  individual therapy;occupational therapy  -SG           General Information    Patient Profile Reviewed?  yes  -SG        Prior Level of Function  independent:;ADL's  -SG        Existing Precautions/Restrictions  fall  -SG           Cognitive Assessment/Intervention- PT/OT    Orientation Status (Cognition)  oriented x 3  -SG           Bed Mobility Assessment/Treatment    Comment (Bed Mobility)  pt up in chair  -           Sit-Stand Transfer    Sit-Stand Clayton (Transfers)  contact guard  -        Assistive Device (Sit-Stand Transfers)  walker, front-wheeled  -SG           ADL Assessment/Intervention    BADL Assessment/Intervention  upper body dressing;lower body dressing;grooming  -SG           Upper Body Dressing Assessment/Training    Upper Body Dressing Clayton Level  upper body dressing skills;doff;don;supervision  -        Upper Body Dressing Position  supported sitting  -           Lower Body Dressing Assessment/Training    Lower Body Dressing Clayton Level  lower body dressing skills;dependent (less than 25% patient effort)  -        Lower Body Dressing Position  supported sitting sitting up in chair  -           BADL Safety/Performance    Impairments, BADL Safety/Performance  strength;balance  -        Skilled BADL Treatment/Intervention  BADL process/adaptation training  -           General ROM    GENERAL ROM COMMENTS  Char WFL AROM  -SG           Static Sitting Balance    Level of Clayton (Unsupported Sitting, Static Balance)  supervision  -           Static Standing Balance    Level of Clayton (Supported Standing, Static Balance)  contact guard assist  -           Sensory Assessment/Intervention    Sensory General Assessment  no sensation deficits identified BUJANIE's  -SG           Positioning and Restraints    Pre-Treatment Position  sitting in chair/recliner  -SG        Post Treatment  Position  chair  -SG        In Chair  call light within reach;encouraged to call for assist;exit alarm on;with other staff wound care nurse with pt  -SG           Pain Assessment    Additional Documentation  Pain Scale: Word Pre/Post-Treatment (Group)  -SG           Pain Scale: Word Pre/Post-Treatment    Pain: Word Scale, Pretreatment  4 - moderate pain  -SG        Pain: Word Scale, Post-Treatment  4 - moderate pain  -SG           Clinical Impression (OT)    OT Diagnosis  need for assist with personal care  -SG        Criteria for Skilled Therapeutic Interventions Met (OT Eval)  yes;treatment indicated  -SG        Care Plan Review (OT)  evaluation/treatment results reviewed  -SG           Planned OT Interventions    Planned Therapy Interventions (OT Eval)  BADL retraining;adaptive equipment training  -SG           OT Goals    Transfer Goal Selection (OT)  transfer, OT goal 1  -SG        Dressing Goal Selection (OT)  dressing, OT goal 1  -SG           Transfer Goal 1 (OT)    Activity/Assistive Device (Transfer Goal 1, OT)  toilet  -SG        [place] Level/Cues Needed (Transfer Goal 1, OT)  supervision required  -SG        Time Frame (Transfer Goal 1, OT)  1 week  -SG        Progress/Outcome (Transfer Goal 1, OT)  goal ongoing  -SG           Dressing Goal 1 (OT)    Activity/Assistive Device (Dressing Goal 1, OT)  dressing skills, all  -SG        [place]/Cues Needed (Dressing Goal 1, OT)  minimum assist (75% or more patient effort)  -SG        Time Frame (Dressing Goal 1, OT)  1 week  -SG        Progress/Outcome (Dressing Goal 1, OT)  goal ongoing  -SG           Patient Education Goal (OT)    Activity (Patient Education Goal, OT)  pt to state safety for adls  -SG        [place]/Cues/Accuracy (Memory Goal 2, OT)  verbalizes understanding  -SG        Time Frame (Patient Education Goal, OT)  1 week  -SG        Progress/Outcome (Patient Education Goal, OT)  goal ongoing  -SG          User Key  (r) = Recorded  By, (t) = Taken By, (c) = Cosigned By    Initials Name Effective Dates     Zari Nickerson OTR 06/08/18 -          Occupational Therapy Education     Title: PT OT SLP Therapies (In Progress)     Topic: Occupational Therapy (In Progress)     Point: ADL training (Done)     Description: Instruct learner(s) on proper safety adaptation and remediation techniques during self care or transfers.   Instruct in proper use of assistive devices.    Learning Progress Summary           Patient Acceptance, E,TB,D, VU,NR by  at 12/6/2019  3:11 PM    Comment:  role of OT and POC. Safety for adl                               User Key     Initials Effective Dates Name Provider Type Discipline     06/08/18 -  Zari Nickerson OTR Occupational Therapist OT                  OT Recommendation and Plan  Planned Therapy Interventions (OT Eval): BADL retraining, adaptive equipment training  Plan of Care Review  Plan of Care Reviewed With: patient  Plan of Care Reviewed With: patient  Outcome Summary: Pt presents to OT with decreased independence and safety for adls and functional transfers. Pt will continue to benefit from OT     Outcome Measures     Row Name 12/06/19 1512             How much help from another is currently needed...    Putting on and taking off regular lower body clothing?  2  -SG      Bathing (including washing, rinsing, and drying)  2  -SG      Toileting (which includes using toilet bed pan or urinal)  3  -SG      Putting on and taking off regular upper body clothing  3  -SG      Taking care of personal grooming (such as brushing teeth)  3  -SG      Eating meals  4  -SG      AM-PAC 6 Clicks Score (OT)  17  -SG         Functional Assessment    Outcome Measure Options  AM-PAC 6 Clicks Daily Activity (OT)  -SG        User Key  (r) = Recorded By, (t) = Taken By, (c) = Cosigned By    Initials Name Provider Type    SG Zari Nickerson OTR Occupational Therapist          Time Calculation:   Time Calculation- OT     Row  Name 12/06/19 1512             Time Calculation- OT    OT Start Time  0906  -SG      OT Stop Time  0920  -      OT Time Calculation (min)  14 min  -      Total Timed Code Minutes- OT  8 minute(s)  -      OT Received On  12/06/19  -      OT Goal Re-Cert Due Date  12/13/19  -        User Key  (r) = Recorded By, (t) = Taken By, (c) = Cosigned By    Initials Name Provider Type     Zari Nickersno OTR Occupational Therapist        Therapy Charges for Today     Code Description Service Date Service Provider Modifiers Qty    88981989631  OT EVAL LOW COMPLEXITY 2 12/6/2019 Zari Nickerson OTR GO 1    [id number]  OT SELF CARE/MGMT/TRAIN EA 15 MIN 12/6/2019 Zari Nickerson OTR GO 1               AAVNI Sandhu  12/6/2019   Detail Level: Detailed Biopsy Type: H and E Bill As?: Malignant Destruction Size Of Lesion In Cm (Optional): 1.8 Size Of Lesion After Curettage: 2.4 Anesthesia Type: 1% lidocaine with epinephrine Anesthesia Volume In Cc: 3 Hemostasis: Electrocautery Destruction Type: electrodesiccation Number Of Curettages: 2 Wound Care: Vaseline Lab: [id number] Lab Facility: [id number] Notes In Note?: No Consent: Written consent was obtained and risks were reviewed including but not limited to scarring, infection, bleeding, scabbing, incomplete removal, nerve damage and allergy to anesthesia. Render Post-Care Instructions In Note?: yes Post-Care Instructions: I reviewed with the patient in detail post-care instructions. Patient is to keep the biopsy site dry overnight, and then apply Vaseline daily until healed. Patient may apply hydrogen peroxide soaks to remove any crusting. Notification Instructions: Patient will be notified of biopsy results. However, patient instructed to call the office if not contacted within 2 weeks. Billing Type: Third-Party Bill

## 2022-01-24 DIAGNOSIS — I10 HYPERTENSION, UNSPECIFIED TYPE: ICD-10-CM

## 2022-01-24 RX ORDER — LISINOPRIL 10 MG/1
TABLET ORAL
Qty: 135 TABLET | Refills: 3 | OUTPATIENT
Start: 2022-01-24

## 2022-01-27 DIAGNOSIS — L29.9 ITCHING: ICD-10-CM

## 2022-01-27 DIAGNOSIS — Z79.4 TYPE 2 DIABETES MELLITUS WITH OTHER SPECIFIED COMPLICATION, WITH LONG-TERM CURRENT USE OF INSULIN: ICD-10-CM

## 2022-01-27 DIAGNOSIS — E11.69 TYPE 2 DIABETES MELLITUS WITH OTHER SPECIFIED COMPLICATION, WITH LONG-TERM CURRENT USE OF INSULIN: ICD-10-CM

## 2022-01-27 DIAGNOSIS — G62.9 NEUROPATHY: ICD-10-CM

## 2022-01-27 RX ORDER — HYDROXYZINE 50 MG/1
50 TABLET, FILM COATED ORAL EVERY 8 HOURS PRN
Qty: 90 TABLET | Refills: 2 | Status: SHIPPED | OUTPATIENT
Start: 2022-01-27 | End: 2022-03-15 | Stop reason: HOSPADM

## 2022-01-27 RX ORDER — GABAPENTIN 400 MG/1
400 CAPSULE ORAL 3 TIMES DAILY
Qty: 90 CAPSULE | Refills: 0 | Status: SHIPPED | OUTPATIENT
Start: 2022-01-27 | End: 2022-03-07 | Stop reason: SDUPTHER

## 2022-01-27 RX ORDER — GABAPENTIN 400 MG/1
CAPSULE ORAL
Qty: 90 CAPSULE | OUTPATIENT
Start: 2022-01-27

## 2022-01-27 NOTE — TELEPHONE ENCOUNTER
Rx Refill Note  Requested Prescriptions     Pending Prescriptions Disp Refills   • hydrOXYzine (ATARAX) 50 MG tablet 90 tablet 2     Sig: Take 1 tablet by mouth Every 8 (Eight) Hours As Needed for Itching.   • gabapentin (NEURONTIN) 400 MG capsule 90 capsule 0     Sig: Take 1 capsule by mouth 3 (Three) Times a Day.      Last office visit with prescribing clinician: 10/28/2021      Next office visit with prescribing clinician: 2/3/2022     Brenda Zamora MA  01/27/22, 16:44 EST

## 2022-01-27 NOTE — TELEPHONE ENCOUNTER
Caller: Vidhi Lopez    Relationship: Self    Best call back number: [phone]    Requested Prescriptions:   Requested Prescriptions     Pending Prescriptions Disp Refills   • hydrOXYzine (ATARAX) 50 MG tablet 90 tablet 2     Sig: Take 1 tablet by mouth Every 8 (Eight) Hours As Needed for Itching.   • gabapentin (NEURONTIN) 400 MG capsule 90 capsule 0     Sig: Take 1 capsule by mouth 3 (Three) Times a Day.        Pharmacy where request should be sent: [id number] [address] AT [facility] [phone] [facility] [phone] FX     Additional details provided by patient: PATIENT ONLY HAS ONE DAY ON THE GABAPENTIN    Does the patient have less than a 3 day supply:  [x] Yes  [] No    Agapito Bhakta Rep   01/27/22 14:49 EST

## 2022-02-01 DIAGNOSIS — Z01.812 BLOOD TESTS PRIOR TO TREATMENT OR PROCEDURE: Primary | ICD-10-CM

## 2022-02-18 ENCOUNTER — DOCUMENTATION (OUTPATIENT)
Dept: PULMONOLOGY | Facility: CLINIC | Age: 72
End: 2022-02-18

## 2022-02-18 NOTE — PROGRESS NOTES
Ms. Lopez was wanting to do a telephone visit today due to not feeling well to come into the office.  She was requesting an antibiotic.  I did advise her that she had not been seen in the office since 2019 and I would not be able to prescribe any medication for her unless she is seen physically in this office.    I did encourage her to come into the office with full PFTs and a chest x-ray before any new medications would be sent in for her at this time.  She is agreeable this plan and will try to set up an appointment in the near future.

## 2022-02-20 DIAGNOSIS — I10 HYPERTENSION, UNSPECIFIED TYPE: ICD-10-CM

## 2022-02-21 NOTE — TELEPHONE ENCOUNTER
Rx Refill Note  Requested Prescriptions     Pending Prescriptions Disp Refills   • metoprolol tartrate (LOPRESSOR) 25 MG tablet [Pharmacy Med Name: METOPROLOL TARTRATE 25MG TABLETS] 180 tablet 1     Sig: TAKE 1 TABLET BY MOUTH TWICE DAILY      Last office visit with prescribing clinician: 10/28/2021      Next office visit with prescribing clinician: Visit date not found            Christopher Perea MA  02/21/22, 09:50 EST

## 2022-02-25 ENCOUNTER — TELEPHONE (OUTPATIENT)
Dept: NEUROLOGY | Facility: OTHER | Age: 72
End: 2022-02-25

## 2022-02-25 NOTE — TELEPHONE ENCOUNTER
PT CALLED BECAUSE SHE HAS 2 DR APPTS SAME DAY. TRIED RESCHEDULE APPT WITH [facility] BUT FIRST AVAILABLE WAS NOT UNTIL AUG.    PT WILL KEEP APPT AND TRY TO RESCHEDULE HER OTHER DR APPT.

## 2022-03-01 PROCEDURE — U0005 INFEC AGEN DETEC AMPLI PROBE: HCPCS | Performed by: FAMILY MEDICINE

## 2022-03-01 PROCEDURE — U0004 COV-19 TEST NON-CDC HGH THRU: HCPCS | Performed by: FAMILY MEDICINE

## 2022-03-03 ENCOUNTER — OFFICE VISIT (OUTPATIENT)
Dept: GASTROENTEROLOGY | Facility: CLINIC | Age: 72
End: 2022-03-03

## 2022-03-03 VITALS
HEART RATE: 67 BPM | HEIGHT: 64 IN | SYSTOLIC BLOOD PRESSURE: 130 MMHG | TEMPERATURE: 97.7 F | DIASTOLIC BLOOD PRESSURE: 68 MMHG | WEIGHT: 172 LBS | OXYGEN SATURATION: 94 % | BODY MASS INDEX: 29.37 KG/M2

## 2022-03-03 DIAGNOSIS — E11.69 TYPE 2 DIABETES MELLITUS WITH OTHER SPECIFIED COMPLICATION, WITH LONG-TERM CURRENT USE OF INSULIN: ICD-10-CM

## 2022-03-03 DIAGNOSIS — G62.9 NEUROPATHY: ICD-10-CM

## 2022-03-03 DIAGNOSIS — Z86.19 HISTORY OF CLOSTRIDIUM DIFFICILE INFECTION: ICD-10-CM

## 2022-03-03 DIAGNOSIS — B37.31 YEAST INFECTION OF THE VAGINA: ICD-10-CM

## 2022-03-03 DIAGNOSIS — R19.7 DIARRHEA, UNSPECIFIED TYPE: Primary | Chronic | ICD-10-CM

## 2022-03-03 DIAGNOSIS — K21.9 GASTROESOPHAGEAL REFLUX DISEASE, UNSPECIFIED WHETHER ESOPHAGITIS PRESENT: ICD-10-CM

## 2022-03-03 DIAGNOSIS — R13.10 DYSPHAGIA, UNSPECIFIED TYPE: ICD-10-CM

## 2022-03-03 DIAGNOSIS — Z80.0 FAMILY HISTORY OF COLON CANCER IN FATHER: ICD-10-CM

## 2022-03-03 DIAGNOSIS — R21 RASH: ICD-10-CM

## 2022-03-03 DIAGNOSIS — Z79.4 TYPE 2 DIABETES MELLITUS WITH OTHER SPECIFIED COMPLICATION, WITH LONG-TERM CURRENT USE OF INSULIN: ICD-10-CM

## 2022-03-03 PROCEDURE — 99214 OFFICE O/P EST MOD 30 MIN: CPT | Performed by: NURSE PRACTITIONER

## 2022-03-03 RX ORDER — NYSTATIN 100000 [USP'U]/G
POWDER TOPICAL SEE ADMIN INSTRUCTIONS
Qty: 30 G | Refills: 1 | Status: SHIPPED | OUTPATIENT
Start: 2022-03-03

## 2022-03-03 RX ORDER — PROMETHAZINE HYDROCHLORIDE 25 MG/1
25 TABLET ORAL EVERY 6 HOURS PRN
COMMUNITY

## 2022-03-03 RX ORDER — GABAPENTIN 400 MG/1
400 CAPSULE ORAL 3 TIMES DAILY
Qty: 90 CAPSULE | Refills: 0 | Status: CANCELLED | OUTPATIENT
Start: 2022-03-03

## 2022-03-03 RX ORDER — FLUCONAZOLE 200 MG/1
TABLET ORAL
Qty: 3 TABLET | Refills: 0 | Status: SHIPPED | OUTPATIENT
Start: 2022-03-03 | End: 2022-03-15 | Stop reason: HOSPADM

## 2022-03-03 RX ORDER — ESOMEPRAZOLE MAGNESIUM 40 MG/1
40 CAPSULE, DELAYED RELEASE ORAL
Qty: 90 CAPSULE | Refills: 3 | Status: SHIPPED | OUTPATIENT
Start: 2022-03-03 | End: 2022-09-02 | Stop reason: SDUPTHER

## 2022-03-03 NOTE — PROGRESS NOTES
Chief Complaint   Patient presents with   • Establish Care         History of Present Illness  71-year-old female presents the office today for evaluation with reports of diarrhea.  She has a history of C. difficile approximately 4 years ago which occurred after antibiotic use.  She is currently reporting an onset of diarrhea that began about 2 weeks ago.  She reports a  stools with approximately 5-6 diarrhea stools per day.  She does continue a daily probiotic.  She denies any melena or hematochezia.  She has had a couple episodes of fecal incontinence.  She has tried Imodium thus far which does seem to slow her bowel habits down a little bit, but has concerns over taking the medication if she has a stool infection.  She denies any recent antibiotic use, well water exposure, recent travel, or exposure to ill individuals that she is aware of.  She underwent cholecystectomy in 1994.  She reports that her last colonoscopy was performed approximately 1.5 years ago with Dr. Maldonado in [facility].  She denies any personal history of colon polyps.  She does report a history of internal and external hemorrhoids.  She reports a family history of colon cancer in her father in his 70s.    Her weight is currently stable, but approximately over a year ago she reported a weight loss of about 45 pounds.    She has a history of heartburn and reflux and takes esomeprazole 20 mg daily.  She continues to report breakthrough epigastric discomfort that occurs at least once every couple of days.  She cannot any finding specific foods that worsen symptoms and symptoms are no worse at any given point during the day.  She denies any nausea or vomiting.  She does report history of dysphagia with symptoms occurring 4-5 times per week.  She has never undergone previous esophageal dilation.  She denies regurgitation.    She reports an upcoming procedure to have full extraction of her teeth with either follow-up use of dentures or  "dental implants.  This has not yet been scheduled.  Does express concerns over the need for antibiotic use during this time given her history of C. Difficile.    She has a history of diabetes and currently reports having a yeast infection.  She typically will use a regimen of Diflucan for 3 days as well as nystatin powder which seems to clear up her yeast infection.  She does not feel that she needs an exam.  She does have an upcoming appointment with her PCP next week, however, she is uncomfortable and requests treatment.    Review of Systems   Constitutional: Positive for fever. Negative for unexpected weight change.   HENT: Positive for trouble swallowing.    Cardiovascular: Negative for chest pain.   Gastrointestinal: Positive for diarrhea. Negative for abdominal distention, abdominal pain, anal bleeding, blood in stool, constipation, nausea, rectal pain and vomiting.   Genitourinary: Positive for vaginal discharge.      Result Review :       TSH (12/30/2020 13:26)  Comprehensive Metabolic Panel (12/30/2020 13:26)  CBC & Differential (12/30/2020 13:26)    Vital Signs:   /68   Pulse 67   Temp 97.7 °F (36.5 °C)   Ht 162.6 cm (64\")   Wt 78 kg (172 lb)   SpO2 94%   BMI 29.52 kg/m²     Body mass index is 29.52 kg/m².     Physical Exam  Vitals reviewed.   Constitutional:       Appearance: Normal appearance.   HENT:      Head: Normocephalic.      Nose: Nose normal.      Mouth/Throat:      Mouth: Mucous membranes are moist.   Eyes:      General: No scleral icterus.     Extraocular Movements: Extraocular movements intact.   Cardiovascular:      Rate and Rhythm: Normal rate and regular rhythm.      Pulses: Normal pulses.      Heart sounds: Normal heart sounds.   Pulmonary:      Effort: Pulmonary effort is normal. No respiratory distress.      Breath sounds: Normal breath sounds.   Abdominal:      General: Abdomen is flat. Bowel sounds are normal. There is no distension.      Palpations: Abdomen is soft. There " is no mass.      Tenderness: There is no abdominal tenderness. There is no guarding.   Musculoskeletal:         General: Normal range of motion.      Cervical back: Normal range of motion and neck supple.   Skin:     General: Skin is warm and dry.      Coloration: Skin is not jaundiced or pale.   Neurological:      General: No focal deficit present.      Mental Status: She is alert and oriented to person, place, and time.   Psychiatric:         Mood and Affect: Mood normal.         Thought Content: Thought content normal.         Judgment: Judgment normal.       Assessment and Plan    Diagnoses and all orders for this visit:    1. Diarrhea, unspecified type (Primary)  -     Clostridium Difficile Toxin, PCR - Stool, Per Rectum  -     Giardia Antigen - Stool, Per Rectum  -     Ova & Parasite Examination - Stool, Per Rectum  -     Stool Culture (Reference Lab) - Stool, Per Rectum    2. History of Clostridium difficile infection  -     Clostridium Difficile Toxin, PCR - Stool, Per Rectum  -     Giardia Antigen - Stool, Per Rectum  -     Ova & Parasite Examination - Stool, Per Rectum  -     Stool Culture (Reference Lab) - Stool, Per Rectum    3. Gastroesophageal reflux disease, unspecified whether esophagitis present    4. Dysphagia, unspecified type    5. Family history of colon cancer in father    6. Rash  -     nystatin (nystatin) 539262 UNIT/GM powder; Apply  topically to the appropriate area as directed See Admin Instructions. Apply topically to the affected area twice daily as directed  Dispense: 30 g; Refill: 1    7. Yeast infection of the vagina    Other orders  -     esomeprazole (nexIUM) 40 MG capsule; Take 1 capsule by mouth Every Morning Before Breakfast.  Dispense: 90 capsule; Refill: 3  -     fluconazole (Diflucan) 200 MG tablet; Take 1 tab daily x 3 days  Dispense: 3 tablet; Refill: 0           Patient Instructions   1.  For further evaluation of diarrhea with your history of C. difficile, we will check  your stool for C. difficile, we will check a stool culture, stool for ova and parasites, and Giardia.  You will be provided with a stool collection kit and this will need to be returned back to the lab once your specimens collected.  Your stool specimen needs to be as liquid as possible to could accurately complete the testing.    2.  For worsening GERD we will increase your esomeprazole to 40 mg once daily.  New prescription has been sent to your pharmacy.    3.  For further evaluation of dysphagia, EGD is indicated.  However, we will await stool study findings to determine if you have a stool infection responsible for your diarrhea.  If stool studies are negative, we will plan to proceed with flexible sigmoidoscopy to take random biopsies to assess for microscopic colitis.    4.  For reports of recurrent vaginal yeast infection, a prescription for Diflucan has been sent to your pharmacy and refills of nystatin powder have also been provided.    5.  Continue daily probiotic.    6.  Next office follow-up to be determined based upon stool study results and symptoms.      Discussion:    For worsening GERD, we will increase PPI therapy to 40 mg once daily.  New prescription sent to pharmacy.  For reports of dysphagia, EGD is warranted.  However, we will await stool study findings to determine if patient will require a flexible sigmoidoscopy to take random biopsies to discuss microscopic colitis-as we will want to perform both procedures at the same time if indicated.    For further evaluation of diarrhea, given her history of C. difficile, we will proceed with stool studies first.  If stool studies are negative, we will plan to proceed with flexible sigmoidoscopy and take random biopsies to assess for microscopic colitis.  Patient to continue daily probiotic.    For reports of recurrent vaginal yeast infection, prescription for Diflucan has been sent to the patient's pharmacy and refills of nystatin powder have been  provided.    Patient reports upcoming appoint with PCP next week and will have lab work drawn at that time.  Additional recommendations pending outcome of stool studies and symptoms.  Patient verbalized understanding of above plan of care and is in agreement.  All questions answered and support divided.       EMR Dragon/Transcription Disclaimer:  This document has been Dictated utilizing Dragon dictation.

## 2022-03-03 NOTE — TELEPHONE ENCOUNTER
Rx Refill Note  Requested Prescriptions     Pending Prescriptions Disp Refills   • gabapentin (NEURONTIN) 400 MG capsule 90 capsule 0     Sig: Take 1 capsule by mouth 3 (Three) Times a Day.      Last office visit with prescribing clinician: 10/28/2021      Next office visit with prescribing clinician: 3/7/2022            Christopher Perea MA  03/03/22, 16:39 EST

## 2022-03-03 NOTE — TELEPHONE ENCOUNTER
Caller: LopezVidhi    Relationship: Self    Best call back number: [phone]    Requested Prescriptions:   Requested Prescriptions     Pending Prescriptions Disp Refills   • gabapentin (NEURONTIN) 400 MG capsule 90 capsule 0     Sig: Take 1 capsule by mouth 3 (Three) Times a Day.        Pharmacy where request should be sent: [facility] [address] AT [facility] & ([facility] [phone] [facility] [phone] FX     Additional details provided by patient: PATIENT HAS ONE PILL LEFT. PATIENT HAD AN APPOINTMENT TODAY BUT IS STILL AT HER OTHER APPOINTMENT THAT WAS ALSO SCHEDULED FOR TODAY AND HAD TO RESCHEDULE. PATIENT IS RESCHEDULED FOR MONDAY 03/07/2022. PATIENT WOULD LIKE TO KNOW IF A SHORT SUPPLY COULD BE CALLED IN TO LAST HER UNTIL HER APPOINTMENT.    Does the patient have less than a 3 day supply:  [x] Yes  [] No    Agapito Briceno Rep   03/03/22 16:05 EST

## 2022-03-03 NOTE — PATIENT INSTRUCTIONS
1.  For further evaluation of diarrhea with your history of C. difficile, we will check your stool for C. difficile, we will check a stool culture, stool for ova and parasites, and Giardia.  You will be provided with a stool collection kit and this will need to be returned back to the lab once your specimens collected.  Your stool specimen needs to be as liquid as possible to could accurately complete the testing.    2.  For worsening GERD we will increase your esomeprazole to 40 mg once daily.  New prescription has been sent to your pharmacy.    3.  For further evaluation of dysphagia, EGD is indicated.  However, we will await stool study findings to determine if you have a stool infection responsible for your diarrhea.  If stool studies are negative, we will plan to proceed with flexible sigmoidoscopy to take random biopsies to assess for microscopic colitis.    4.  For reports of recurrent vaginal yeast infection, a prescription for Diflucan has been sent to your pharmacy and refills of nystatin powder have also been provided.    5.  Continue daily probiotic.    6.  Next office follow-up to be determined based upon stool study results and symptoms.

## 2022-03-04 NOTE — TELEPHONE ENCOUNTER
Patient is completely out of medication and is scheduled for an appointment 3/7/22.  Please call her and let her know if it will be called in.  [phone].

## 2022-03-07 ENCOUNTER — OFFICE VISIT (OUTPATIENT)
Dept: FAMILY MEDICINE CLINIC | Facility: CLINIC | Age: 72
End: 2022-03-07

## 2022-03-07 VITALS
TEMPERATURE: 97.7 F | OXYGEN SATURATION: 100 % | WEIGHT: 169 LBS | HEIGHT: 64 IN | HEART RATE: 52 BPM | SYSTOLIC BLOOD PRESSURE: 110 MMHG | DIASTOLIC BLOOD PRESSURE: 66 MMHG | BODY MASS INDEX: 28.85 KG/M2

## 2022-03-07 DIAGNOSIS — Z79.899 HIGH RISK MEDICATION USE: ICD-10-CM

## 2022-03-07 DIAGNOSIS — R26.89 BALANCE DISORDER: ICD-10-CM

## 2022-03-07 DIAGNOSIS — R56.9 SEIZURES: ICD-10-CM

## 2022-03-07 DIAGNOSIS — E11.69 TYPE 2 DIABETES MELLITUS WITH OTHER SPECIFIED COMPLICATION, WITH LONG-TERM CURRENT USE OF INSULIN: Primary | ICD-10-CM

## 2022-03-07 DIAGNOSIS — Z79.4 TYPE 2 DIABETES MELLITUS WITH OTHER SPECIFIED COMPLICATION, WITH LONG-TERM CURRENT USE OF INSULIN: Primary | ICD-10-CM

## 2022-03-07 DIAGNOSIS — R82.90 ABNORMAL URINE: Primary | ICD-10-CM

## 2022-03-07 DIAGNOSIS — R11.0 NAUSEA: ICD-10-CM

## 2022-03-07 DIAGNOSIS — R19.7 DIARRHEA OF PRESUMED INFECTIOUS ORIGIN: ICD-10-CM

## 2022-03-07 DIAGNOSIS — E78.00 HIGH CHOLESTEROL: ICD-10-CM

## 2022-03-07 DIAGNOSIS — A09 DIARRHEA OF INFECTIOUS ORIGIN: ICD-10-CM

## 2022-03-07 DIAGNOSIS — Z00.00 WELLNESS EXAMINATION: ICD-10-CM

## 2022-03-07 DIAGNOSIS — G62.9 NEUROPATHY: ICD-10-CM

## 2022-03-07 DIAGNOSIS — I10 HYPERTENSION, UNSPECIFIED TYPE: ICD-10-CM

## 2022-03-07 LAB
BILIRUB BLD-MCNC: NEGATIVE MG/DL
CLARITY, POC: CLEAR
COLOR UR: YELLOW
EXPIRATION DATE: ABNORMAL
GLUCOSE UR STRIP-MCNC: NEGATIVE MG/DL
KETONES UR QL: NEGATIVE
LEUKOCYTE EST, POC: ABNORMAL
Lab: ABNORMAL
NITRITE UR-MCNC: POSITIVE MG/ML
PH UR: 6 [PH] (ref 5–8)
PROT UR STRIP-MCNC: NEGATIVE MG/DL
RBC # UR STRIP: NEGATIVE /UL
SP GR UR: 1.01 (ref 1–1.03)
UROBILINOGEN UR QL: NORMAL

## 2022-03-07 PROCEDURE — 0051A COVID-19 (PFIZER) 12+ YRS: CPT | Performed by: FAMILY MEDICINE

## 2022-03-07 PROCEDURE — 91305 COVID-19 (PFIZER) 12+ YRS: CPT | Performed by: FAMILY MEDICINE

## 2022-03-07 PROCEDURE — 99214 OFFICE O/P EST MOD 30 MIN: CPT | Performed by: FAMILY MEDICINE

## 2022-03-07 PROCEDURE — 81003 URINALYSIS AUTO W/O SCOPE: CPT | Performed by: FAMILY MEDICINE

## 2022-03-07 RX ORDER — GABAPENTIN 400 MG/1
400 CAPSULE ORAL 3 TIMES DAILY
Qty: 90 CAPSULE | Refills: 0 | Status: SHIPPED | OUTPATIENT
Start: 2022-03-07 | End: 2022-04-28

## 2022-03-07 RX ORDER — LEVETIRACETAM 500 MG/1
500 TABLET ORAL 2 TIMES DAILY
Qty: 180 TABLET | Refills: 3 | Status: SHIPPED | OUTPATIENT
Start: 2022-03-07

## 2022-03-07 RX ORDER — ONDANSETRON 4 MG/1
4 TABLET, FILM COATED ORAL EVERY 8 HOURS PRN
Qty: 60 TABLET | Refills: 1 | Status: SHIPPED | OUTPATIENT
Start: 2022-03-07 | End: 2022-08-31 | Stop reason: SDUPTHER

## 2022-03-07 RX ORDER — HYDRALAZINE HYDROCHLORIDE 25 MG/1
25 TABLET, FILM COATED ORAL 3 TIMES DAILY
Qty: 270 TABLET | Refills: 3 | Status: SHIPPED | OUTPATIENT
Start: 2022-03-07 | End: 2022-03-15 | Stop reason: HOSPADM

## 2022-03-07 NOTE — PROGRESS NOTES
"Chief Complaint  Hypertension and Peripheral Neuropathy (Out of refills)    Subjective          Vidhi Lopez presents to [facility]  History of Present Illness  No labs since 2020 BS at home 120, also diarrhea seen by GI and needs labs, diarrhea, watery and nausea, no fever, x 2 weeks, seen by Neurologist also x Seizures, not fasting but pt not compliance, no CP/SOA,  Also lack of balance ,wants Gabapentin, Zofran and keppra  Objective   Vital Signs:   /66   Pulse 52   Temp 97.7 °F (36.5 °C) (Infrared)   Ht 162.6 cm (64\")   Wt 76.7 kg (169 lb)   SpO2 100%   BMI 29.01 kg/m²     Physical Exam  Vitals and nursing note reviewed.   Constitutional:       Appearance: She is well-developed.   HENT:      Head: Normocephalic and atraumatic.      Right Ear: Tympanic membrane, ear canal and external ear normal.      Left Ear: Tympanic membrane, ear canal and external ear normal.      Nose: Nose normal.   Eyes:      General: No scleral icterus.        Right eye: No discharge.         Left eye: No discharge.      Conjunctiva/sclera: Conjunctivae normal.      Pupils: Pupils are equal, round, and reactive to light.   Neck:      Thyroid: No thyromegaly.      Vascular: No JVD.      Trachea: No tracheal deviation.   Cardiovascular:      Rate and Rhythm: Normal rate and regular rhythm.      Heart sounds: Normal heart sounds. No murmur heard.    No friction rub. No gallop.   Pulmonary:      Effort: Pulmonary effort is normal. No respiratory distress.      Breath sounds: Normal breath sounds. No wheezing or rales.   Chest:      Chest wall: No tenderness.   Abdominal:      General: Bowel sounds are normal. There is no distension.      Palpations: Abdomen is soft. There is no mass.      Tenderness: There is no abdominal tenderness. There is no guarding or rebound.      Hernia: No hernia is present.   Musculoskeletal:         General: No tenderness. Normal range of motion.      Cervical back: " Normal range of motion and neck supple.   Lymphadenopathy:      Cervical: No cervical adenopathy.   Skin:     General: Skin is warm and dry.   Neurological:      General: No focal deficit present.      Mental Status: She is alert.      Cranial Nerves: No cranial nerve deficit.      Sensory: No sensory deficit.      Motor: No abnormal muscle tone.      Coordination: Coordination normal.      Deep Tendon Reflexes: Reflexes normal.      Comments: Romberg +    Psychiatric:         Mood and Affect: Mood normal.         Behavior: Behavior normal.         Thought Content: Thought content normal.         Judgment: Judgment normal.        Result Review :                 Assessment and Plan    Diagnoses and all orders for this visit:    1. Type 2 diabetes mellitus with other specified complication, with long-term current use of insulin (HCC) (Primary)  -     Cancel: Comprehensive Metabolic Panel  -     Cancel: CBC & Differential  -     Cancel: Hemoglobin A1c  -     Cancel: TSH  -     Cancel: POC Urinalysis Dipstick, Automated  -     Cancel: Lipid Panel  -     gabapentin (NEURONTIN) 400 MG capsule; Take 1 capsule by mouth 3 (Three) Times a Day.  Dispense: 90 capsule; Refill: 0  -     Cancel: CBC & Differential  -     Cancel: Comprehensive Metabolic Panel  -     Cancel: Hemoglobin A1c  -     Cancel: Lipid Panel  -     Cancel: TSH  -     Cancel: POC Urinalysis Dipstick, Automated  -     Cancel: MicroAlbumin, Urine, Random - Urine, Clean Catch  -     Cancel: Clostridium Difficile Toxin - Stool, Per Rectum  -     Cancel: Ova & Parasite Examination - Stool, Per Rectum  -     CBC & Differential  -     Comprehensive Metabolic Panel  -     Hemoglobin A1c  -     Lipid Panel  -     TSH  -     POC Urinalysis Dipstick, Automated  -     MicroAlbumin, Urine, Random - Urine, Clean Catch  -     Uric Acid    2. Hypertension, unspecified type  -     Cancel: Comprehensive Metabolic Panel  -     Cancel: CBC & Differential  -     Cancel:  Hemoglobin A1c  -     Cancel: TSH  -     Cancel: POC Urinalysis Dipstick, Automated  -     Cancel: Lipid Panel  -     Cancel: CBC & Differential  -     Cancel: Comprehensive Metabolic Panel  -     Cancel: Hemoglobin A1c  -     Cancel: Lipid Panel  -     Cancel: TSH  -     Cancel: POC Urinalysis Dipstick, Automated  -     Cancel: MicroAlbumin, Urine, Random - Urine, Clean Catch  -     Cancel: Clostridium Difficile Toxin - Stool, Per Rectum  -     Cancel: Ova & Parasite Examination - Stool, Per Rectum  -     hydrALAZINE (APRESOLINE) 25 MG tablet; Take 1 tablet by mouth 3 (Three) Times a Day.  Dispense: 270 tablet; Refill: 3  -     CBC & Differential  -     Comprehensive Metabolic Panel  -     Hemoglobin A1c  -     Lipid Panel  -     TSH  -     POC Urinalysis Dipstick, Automated  -     MicroAlbumin, Urine, Random - Urine, Clean Catch  -     Uric Acid    3. High cholesterol  -     Cancel: Comprehensive Metabolic Panel  -     Cancel: CBC & Differential  -     Cancel: Hemoglobin A1c  -     Cancel: TSH  -     Cancel: POC Urinalysis Dipstick, Automated  -     Cancel: Lipid Panel  -     Cancel: CBC & Differential  -     Cancel: Comprehensive Metabolic Panel  -     Cancel: Hemoglobin A1c  -     Cancel: Lipid Panel  -     Cancel: TSH  -     Cancel: POC Urinalysis Dipstick, Automated  -     Cancel: MicroAlbumin, Urine, Random - Urine, Clean Catch  -     Cancel: Clostridium Difficile Toxin - Stool, Per Rectum  -     Cancel: Ova & Parasite Examination - Stool, Per Rectum  -     CBC & Differential  -     Comprehensive Metabolic Panel  -     Hemoglobin A1c  -     Lipid Panel  -     TSH  -     POC Urinalysis Dipstick, Automated  -     MicroAlbumin, Urine, Random - Urine, Clean Catch  -     Uric Acid    4. High risk medication use  -     Compliance Drug Analysis, Ur - Urine, Clean Catch  -     Cancel: CBC & Differential  -     Cancel: Comprehensive Metabolic Panel  -     Cancel: Hemoglobin A1c  -     Cancel: Lipid Panel  -      Cancel: TSH  -     Cancel: POC Urinalysis Dipstick, Automated  -     Cancel: MicroAlbumin, Urine, Random - Urine, Clean Catch  -     Cancel: Clostridium Difficile Toxin - Stool, Per Rectum  -     Cancel: Ova & Parasite Examination - Stool, Per Rectum  -     CBC & Differential  -     Comprehensive Metabolic Panel  -     Hemoglobin A1c  -     Lipid Panel  -     TSH  -     POC Urinalysis Dipstick, Automated  -     MicroAlbumin, Urine, Random - Urine, Clean Catch  -     Uric Acid    5. Neuropathy  -     gabapentin (NEURONTIN) 400 MG capsule; Take 1 capsule by mouth 3 (Three) Times a Day.  Dispense: 90 capsule; Refill: 0  -     Cancel: CBC & Differential  -     Cancel: Comprehensive Metabolic Panel  -     Cancel: Hemoglobin A1c  -     Cancel: Lipid Panel  -     Cancel: TSH  -     Cancel: POC Urinalysis Dipstick, Automated  -     Cancel: MicroAlbumin, Urine, Random - Urine, Clean Catch  -     Cancel: Clostridium Difficile Toxin - Stool, Per Rectum  -     Cancel: Ova & Parasite Examination - Stool, Per Rectum  -     CBC & Differential  -     Comprehensive Metabolic Panel  -     Hemoglobin A1c  -     Lipid Panel  -     TSH  -     POC Urinalysis Dipstick, Automated  -     MicroAlbumin, Urine, Random - Urine, Clean Catch  -     Uric Acid    6. Diarrhea of infectious origin  -     Cancel: CBC & Differential  -     Cancel: Comprehensive Metabolic Panel  -     Cancel: Hemoglobin A1c  -     Cancel: Lipid Panel  -     Cancel: TSH  -     Cancel: POC Urinalysis Dipstick, Automated  -     Cancel: MicroAlbumin, Urine, Random - Urine, Clean Catch  -     Cancel: Clostridium Difficile Toxin - Stool, Per Rectum  -     Cancel: Ova & Parasite Examination - Stool, Per Rectum  -     CBC & Differential  -     Comprehensive Metabolic Panel  -     Hemoglobin A1c  -     Lipid Panel  -     TSH  -     POC Urinalysis Dipstick, Automated  -     MicroAlbumin, Urine, Random - Urine, Clean Catch  -     Uric Acid    7. Nausea  -     ondansetron (Zofran)  4 MG tablet; Take 1 tablet by mouth Every 8 (Eight) Hours As Needed for Nausea or Vomiting.  Dispense: 60 tablet; Refill: 1  -     CBC & Differential  -     Comprehensive Metabolic Panel  -     Hemoglobin A1c  -     Lipid Panel  -     TSH  -     POC Urinalysis Dipstick, Automated  -     MicroAlbumin, Urine, Random - Urine, Clean Catch  -     Uric Acid    8. Seizures (HCC)  -     levETIRAcetam (KEPPRA) 500 MG tablet; Take 1 tablet by mouth 2 (Two) Times a Day.  Dispense: 180 tablet; Refill: 3  -     CBC & Differential  -     Comprehensive Metabolic Panel  -     Hemoglobin A1c  -     Lipid Panel  -     TSH  -     POC Urinalysis Dipstick, Automated  -     MicroAlbumin, Urine, Random - Urine, Clean Catch  -     Uric Acid    9. Balance disorder  -     Ambulatory Referral to Physical Therapy Evaluate and treat  -     CBC & Differential  -     Comprehensive Metabolic Panel  -     Hemoglobin A1c  -     Lipid Panel  -     TSH  -     POC Urinalysis Dipstick, Automated  -     MicroAlbumin, Urine, Random - Urine, Clean Catch  -     Uric Acid    10. Wellness examination  -     COVID-19 Vaccine (Pfizer) Gray Cap    11. Diarrhea of presumed infectious origin  -     Ova & Parasite Examination - Stool, Per Rectum  -     Clostridium Difficile Toxin - Stool, Per Rectum        Follow Up   Return in about 3 months (around 6/7/2022).  Patient was given instructions and counseling regarding her condition or for health maintenance advice. Please see specific information pulled into the AVS if appropriate.

## 2022-03-08 ENCOUNTER — TELEPHONE (OUTPATIENT)
Dept: FAMILY MEDICINE CLINIC | Facility: CLINIC | Age: 72
End: 2022-03-08

## 2022-03-08 LAB
ALBUMIN SERPL-MCNC: 4.7 G/DL (ref 3.8–4.8)
ALBUMIN/GLOB SERPL: 1.6 {RATIO} (ref 1.2–2.2)
ALP SERPL-CCNC: 116 IU/L (ref 44–121)
ALT SERPL-CCNC: 10 IU/L (ref 0–32)
AST SERPL-CCNC: 14 IU/L (ref 0–40)
BASOPHILS # BLD AUTO: 0.1 X10E3/UL (ref 0–0.2)
BASOPHILS NFR BLD AUTO: 1 %
BILIRUB SERPL-MCNC: 0.5 MG/DL (ref 0–1.2)
BUN SERPL-MCNC: 35 MG/DL (ref 8–27)
BUN/CREAT SERPL: 18 (ref 12–28)
CALCIUM SERPL-MCNC: 10.5 MG/DL (ref 8.7–10.3)
CHLORIDE SERPL-SCNC: 94 MMOL/L (ref 96–106)
CHOLEST SERPL-MCNC: 214 MG/DL (ref 100–199)
CO2 SERPL-SCNC: 23 MMOL/L (ref 20–29)
CREAT SERPL-MCNC: 1.95 MG/DL (ref 0.57–1)
EGFR GENE MUT ANL BLD/T: 27 ML/MIN/1.73
EOSINOPHIL # BLD AUTO: 0.3 X10E3/UL (ref 0–0.4)
EOSINOPHIL NFR BLD AUTO: 3 %
ERYTHROCYTE [DISTWIDTH] IN BLOOD BY AUTOMATED COUNT: 13.4 % (ref 11.7–15.4)
GLOBULIN SER CALC-MCNC: 2.9 G/DL (ref 1.5–4.5)
GLUCOSE SERPL-MCNC: 98 MG/DL (ref 65–99)
HBA1C MFR BLD: 6.8 % (ref 4.8–5.6)
HCT VFR BLD AUTO: 38.6 % (ref 34–46.6)
HDLC SERPL-MCNC: 37 MG/DL
HGB BLD-MCNC: 12.7 G/DL (ref 11.1–15.9)
IMM GRANULOCYTES # BLD AUTO: 0 X10E3/UL (ref 0–0.1)
IMM GRANULOCYTES NFR BLD AUTO: 0 %
LDLC SERPL CALC-MCNC: 120 MG/DL (ref 0–99)
LYMPHOCYTES # BLD AUTO: 1.6 X10E3/UL (ref 0.7–3.1)
LYMPHOCYTES NFR BLD AUTO: 18 %
MCH RBC QN AUTO: 29.5 PG (ref 26.6–33)
MCHC RBC AUTO-ENTMCNC: 32.9 G/DL (ref 31.5–35.7)
MCV RBC AUTO: 90 FL (ref 79–97)
MICROALBUMIN UR-MCNC: 15.4 UG/ML
MONOCYTES # BLD AUTO: 0.6 X10E3/UL (ref 0.1–0.9)
MONOCYTES NFR BLD AUTO: 6 %
NEUTROPHILS # BLD AUTO: 6.7 X10E3/UL (ref 1.4–7)
NEUTROPHILS NFR BLD AUTO: 72 %
PLATELET # BLD AUTO: 227 X10E3/UL (ref 150–450)
POTASSIUM SERPL-SCNC: 6.2 MMOL/L (ref 3.5–5.2)
PROT SERPL-MCNC: 7.6 G/DL (ref 6–8.5)
RBC # BLD AUTO: 4.3 X10E6/UL (ref 3.77–5.28)
SODIUM SERPL-SCNC: 134 MMOL/L (ref 134–144)
TRIGL SERPL-MCNC: 326 MG/DL (ref 0–149)
TSH SERPL DL<=0.005 MIU/L-ACNC: 1.8 UIU/ML (ref 0.45–4.5)
URATE SERPL-MCNC: 8.5 MG/DL (ref 3–7.2)
VLDLC SERPL CALC-MCNC: 57 MG/DL (ref 5–40)
WBC # BLD AUTO: 9.2 X10E3/UL (ref 3.4–10.8)

## 2022-03-08 NOTE — TELEPHONE ENCOUNTER
I did call pt  Regarding elevated potassium, no answer, left message to go to the ER now, and then get a follow up appt to go over abnormal labs

## 2022-03-08 NOTE — TELEPHONE ENCOUNTER
"I was able to reach pt and explained MANY times, her Potassium is on Life threatening value, pt still declined ER at this moment, \" Maybe this afternoon or tomorrow\" per pt, I explained she can have a Heart arrhythmia and die, she understood, also explained all her abnormal labs, will get an appt and stop Potassium  "

## 2022-03-08 NOTE — TELEPHONE ENCOUNTER
Spoke to pt.  She is not refusing to goto the ER, but she said she will go tomorrow.  She will stop potassium - she knows how important it is but she cannot go today.    I told her I would let you know.  Pt was advised to go to the ER STAT

## 2022-03-09 ENCOUNTER — APPOINTMENT (OUTPATIENT)
Dept: CT IMAGING | Facility: HOSPITAL | Age: 72
End: 2022-03-09

## 2022-03-09 ENCOUNTER — APPOINTMENT (OUTPATIENT)
Dept: GENERAL RADIOLOGY | Facility: HOSPITAL | Age: 72
End: 2022-03-09

## 2022-03-09 ENCOUNTER — HOSPITAL ENCOUNTER (INPATIENT)
Facility: HOSPITAL | Age: 72
LOS: 6 days | Discharge: HOME | End: 2022-03-15
Attending: EMERGENCY MEDICINE | Admitting: HOSPITALIST

## 2022-03-09 DIAGNOSIS — R57.9 SHOCK: Primary | ICD-10-CM

## 2022-03-09 DIAGNOSIS — E86.0 DEHYDRATION: ICD-10-CM

## 2022-03-09 DIAGNOSIS — N17.9 AKI (ACUTE KIDNEY INJURY): ICD-10-CM

## 2022-03-09 LAB
ALBUMIN SERPL-MCNC: 3.3 G/DL (ref 3.5–5.2)
ALBUMIN/GLOB SERPL: 1.1 G/DL
ALP SERPL-CCNC: 95 U/L (ref 39–117)
ALT SERPL W P-5'-P-CCNC: 6 U/L (ref 1–33)
ANION GAP SERPL CALCULATED.3IONS-SCNC: 11 MMOL/L (ref 5–15)
ANION GAP SERPL CALCULATED.3IONS-SCNC: 14.8 MMOL/L (ref 5–15)
AST SERPL-CCNC: 7 U/L (ref 1–32)
BASOPHILS # BLD AUTO: 0.05 10*3/MM3 (ref 0–0.2)
BASOPHILS NFR BLD AUTO: 0.8 % (ref 0–1.5)
BILIRUB SERPL-MCNC: 0.2 MG/DL (ref 0–1.2)
BUN SERPL-MCNC: 54 MG/DL (ref 8–23)
BUN SERPL-MCNC: 55 MG/DL (ref 8–23)
BUN/CREAT SERPL: 16.7 (ref 7–25)
BUN/CREAT SERPL: 18.1 (ref 7–25)
CALCIUM SPEC-SCNC: 8 MG/DL (ref 8.6–10.5)
CALCIUM SPEC-SCNC: 8.2 MG/DL (ref 8.6–10.5)
CHLORIDE SERPL-SCNC: 95 MMOL/L (ref 98–107)
CHLORIDE SERPL-SCNC: 99 MMOL/L (ref 98–107)
CO2 SERPL-SCNC: 21 MMOL/L (ref 22–29)
CO2 SERPL-SCNC: 22.2 MMOL/L (ref 22–29)
CORTIS SERPL-MCNC: 10.1 MCG/DL
CREAT SERPL-MCNC: 2.99 MG/DL (ref 0.57–1)
CREAT SERPL-MCNC: 3.3 MG/DL (ref 0.57–1)
D-LACTATE SERPL-SCNC: 0.9 MMOL/L (ref 0.5–2)
DEPRECATED RDW RBC AUTO: 47.2 FL (ref 37–54)
EGFRCR SERPLBLD CKD-EPI 2021: 14.5 ML/MIN/1.73
EGFRCR SERPLBLD CKD-EPI 2021: 16.3 ML/MIN/1.73
EOSINOPHIL # BLD AUTO: 0.22 10*3/MM3 (ref 0–0.4)
EOSINOPHIL NFR BLD AUTO: 3.5 % (ref 0.3–6.2)
ERYTHROCYTE [DISTWIDTH] IN BLOOD BY AUTOMATED COUNT: 13.2 % (ref 12.3–15.4)
GLOBULIN UR ELPH-MCNC: 2.9 GM/DL
GLUCOSE BLDC GLUCOMTR-MCNC: 166 MG/DL (ref 70–130)
GLUCOSE SERPL-MCNC: 133 MG/DL (ref 65–99)
GLUCOSE SERPL-MCNC: 201 MG/DL (ref 65–99)
HCT VFR BLD AUTO: 32.7 % (ref 34–46.6)
HGB BLD-MCNC: 10.3 G/DL (ref 12–15.9)
IMM GRANULOCYTES # BLD AUTO: 0.02 10*3/MM3 (ref 0–0.05)
IMM GRANULOCYTES NFR BLD AUTO: 0.3 % (ref 0–0.5)
LYMPHOCYTES # BLD AUTO: 1.39 10*3/MM3 (ref 0.7–3.1)
LYMPHOCYTES NFR BLD AUTO: 22.3 % (ref 19.6–45.3)
MCH RBC QN AUTO: 30.3 PG (ref 26.6–33)
MCHC RBC AUTO-ENTMCNC: 31.5 G/DL (ref 31.5–35.7)
MCV RBC AUTO: 96.2 FL (ref 79–97)
MONOCYTES # BLD AUTO: 0.56 10*3/MM3 (ref 0.1–0.9)
MONOCYTES NFR BLD AUTO: 9 % (ref 5–12)
NEUTROPHILS NFR BLD AUTO: 3.99 10*3/MM3 (ref 1.7–7)
NEUTROPHILS NFR BLD AUTO: 64.1 % (ref 42.7–76)
NRBC BLD AUTO-RTO: 0 /100 WBC (ref 0–0.2)
NT-PROBNP SERPL-MCNC: 444 PG/ML (ref 0–900)
PLATELET # BLD AUTO: 164 10*3/MM3 (ref 140–450)
PMV BLD AUTO: 9.6 FL (ref 6–12)
POTASSIUM SERPL-SCNC: 5 MMOL/L (ref 3.5–5.2)
POTASSIUM SERPL-SCNC: 5.4 MMOL/L (ref 3.5–5.2)
PROCALCITONIN SERPL-MCNC: 0.21 NG/ML (ref 0–0.25)
PROT SERPL-MCNC: 6.2 G/DL (ref 6–8.5)
QT INTERVAL: 498 MS
RBC # BLD AUTO: 3.4 10*6/MM3 (ref 3.77–5.28)
SARS-COV-2 RNA PNL SPEC NAA+PROBE: NOT DETECTED
SODIUM SERPL-SCNC: 131 MMOL/L (ref 136–145)
SODIUM SERPL-SCNC: 132 MMOL/L (ref 136–145)
TROPONIN T SERPL-MCNC: <0.01 NG/ML (ref 0–0.03)
TSH SERPL DL<=0.05 MIU/L-ACNC: 0.45 UIU/ML (ref 0.27–4.2)
WBC NRBC COR # BLD: 6.23 10*3/MM3 (ref 3.4–10.8)

## 2022-03-09 PROCEDURE — 25010000002 DOPAMINE PER 40 MG: Performed by: EMERGENCY MEDICINE

## 2022-03-09 PROCEDURE — 93005 ELECTROCARDIOGRAM TRACING: CPT | Performed by: EMERGENCY MEDICINE

## 2022-03-09 PROCEDURE — 70450 CT HEAD/BRAIN W/O DYE: CPT

## 2022-03-09 PROCEDURE — 87040 BLOOD CULTURE FOR BACTERIA: CPT | Performed by: EMERGENCY MEDICINE

## 2022-03-09 PROCEDURE — 82533 TOTAL CORTISOL: CPT | Performed by: INTERNAL MEDICINE

## 2022-03-09 PROCEDURE — 93010 ELECTROCARDIOGRAM REPORT: CPT | Performed by: INTERNAL MEDICINE

## 2022-03-09 PROCEDURE — 87150 DNA/RNA AMPLIFIED PROBE: CPT | Performed by: EMERGENCY MEDICINE

## 2022-03-09 PROCEDURE — 84443 ASSAY THYROID STIM HORMONE: CPT | Performed by: INTERNAL MEDICINE

## 2022-03-09 PROCEDURE — 25010000002 HEPARIN (PORCINE) PER 1000 UNITS: Performed by: INTERNAL MEDICINE

## 2022-03-09 PROCEDURE — 83880 ASSAY OF NATRIURETIC PEPTIDE: CPT | Performed by: EMERGENCY MEDICINE

## 2022-03-09 PROCEDURE — 82962 GLUCOSE BLOOD TEST: CPT

## 2022-03-09 PROCEDURE — 71045 X-RAY EXAM CHEST 1 VIEW: CPT

## 2022-03-09 PROCEDURE — 87147 CULTURE TYPE IMMUNOLOGIC: CPT | Performed by: EMERGENCY MEDICINE

## 2022-03-09 PROCEDURE — 84145 PROCALCITONIN (PCT): CPT | Performed by: INTERNAL MEDICINE

## 2022-03-09 PROCEDURE — 84484 ASSAY OF TROPONIN QUANT: CPT | Performed by: EMERGENCY MEDICINE

## 2022-03-09 PROCEDURE — 83605 ASSAY OF LACTIC ACID: CPT | Performed by: EMERGENCY MEDICINE

## 2022-03-09 PROCEDURE — 85025 COMPLETE CBC W/AUTO DIFF WBC: CPT | Performed by: EMERGENCY MEDICINE

## 2022-03-09 PROCEDURE — 80053 COMPREHEN METABOLIC PANEL: CPT | Performed by: EMERGENCY MEDICINE

## 2022-03-09 PROCEDURE — 25010000002 PIPERACILLIN SOD-TAZOBACTAM PER 1 G: Performed by: EMERGENCY MEDICINE

## 2022-03-09 PROCEDURE — 36415 COLL VENOUS BLD VENIPUNCTURE: CPT

## 2022-03-09 PROCEDURE — 99291 CRITICAL CARE FIRST HOUR: CPT

## 2022-03-09 PROCEDURE — 63710000001 INSULIN REGULAR HUMAN PER 5 UNITS: Performed by: INTERNAL MEDICINE

## 2022-03-09 PROCEDURE — 25010000002 ATROPINE SULFATE: Performed by: EMERGENCY MEDICINE

## 2022-03-09 PROCEDURE — 87635 SARS-COV-2 COVID-19 AMP PRB: CPT | Performed by: EMERGENCY MEDICINE

## 2022-03-09 PROCEDURE — 0 EPINEPHRINE 1 MG/ML SOLUTION: Performed by: INTERNAL MEDICINE

## 2022-03-09 PROCEDURE — 25010000002 CALCIUM GLUCONATE-NACL 1-0.675 GM/50ML-% SOLUTION: Performed by: EMERGENCY MEDICINE

## 2022-03-09 PROCEDURE — 25010000002 VANCOMYCIN 10 G RECONSTITUTED SOLUTION: Performed by: EMERGENCY MEDICINE

## 2022-03-09 RX ORDER — HEPARIN SODIUM 5000 [USP'U]/ML
5000 INJECTION, SOLUTION INTRAVENOUS; SUBCUTANEOUS EVERY 8 HOURS SCHEDULED
Status: DISCONTINUED | OUTPATIENT
Start: 2022-03-09 | End: 2022-03-15 | Stop reason: HOSPADM

## 2022-03-09 RX ORDER — PANTOPRAZOLE SODIUM 40 MG/1
40 TABLET, DELAYED RELEASE ORAL EVERY MORNING
Refills: 3 | Status: DISCONTINUED | OUTPATIENT
Start: 2022-03-10 | End: 2022-03-15 | Stop reason: HOSPADM

## 2022-03-09 RX ORDER — GABAPENTIN 100 MG/1
100 CAPSULE ORAL 3 TIMES DAILY
Status: DISCONTINUED | OUTPATIENT
Start: 2022-03-09 | End: 2022-03-10

## 2022-03-09 RX ORDER — CALCIUM GLUCONATE 20 MG/ML
1 INJECTION, SOLUTION INTRAVENOUS ONCE
Status: COMPLETED | OUTPATIENT
Start: 2022-03-09 | End: 2022-03-09

## 2022-03-09 RX ORDER — DEXTROSE MONOHYDRATE 25 G/50ML
25 INJECTION, SOLUTION INTRAVENOUS
Status: DISCONTINUED | OUTPATIENT
Start: 2022-03-09 | End: 2022-03-15 | Stop reason: HOSPADM

## 2022-03-09 RX ORDER — SODIUM CHLORIDE 0.9 % (FLUSH) 0.9 %
10 SYRINGE (ML) INJECTION EVERY 12 HOURS SCHEDULED
Status: DISCONTINUED | OUTPATIENT
Start: 2022-03-09 | End: 2022-03-15 | Stop reason: HOSPADM

## 2022-03-09 RX ORDER — NICOTINE POLACRILEX 4 MG
15 LOZENGE BUCCAL
Status: DISCONTINUED | OUTPATIENT
Start: 2022-03-09 | End: 2022-03-15 | Stop reason: HOSPADM

## 2022-03-09 RX ORDER — BISACODYL 5 MG/1
5 TABLET, DELAYED RELEASE ORAL DAILY PRN
Status: DISCONTINUED | OUTPATIENT
Start: 2022-03-09 | End: 2022-03-15 | Stop reason: HOSPADM

## 2022-03-09 RX ORDER — POLYETHYLENE GLYCOL 3350 17 G/17G
17 POWDER, FOR SOLUTION ORAL DAILY PRN
Status: DISCONTINUED | OUTPATIENT
Start: 2022-03-09 | End: 2022-03-15 | Stop reason: HOSPADM

## 2022-03-09 RX ORDER — ACETAMINOPHEN 325 MG/1
650 TABLET ORAL EVERY 4 HOURS PRN
Status: DISCONTINUED | OUTPATIENT
Start: 2022-03-09 | End: 2022-03-15 | Stop reason: HOSPADM

## 2022-03-09 RX ORDER — LEVETIRACETAM 500 MG/1
500 TABLET ORAL 2 TIMES DAILY
Status: DISCONTINUED | OUTPATIENT
Start: 2022-03-09 | End: 2022-03-15 | Stop reason: HOSPADM

## 2022-03-09 RX ORDER — BISACODYL 10 MG
10 SUPPOSITORY, RECTAL RECTAL DAILY PRN
Status: DISCONTINUED | OUTPATIENT
Start: 2022-03-09 | End: 2022-03-15 | Stop reason: HOSPADM

## 2022-03-09 RX ORDER — AMOXICILLIN 250 MG
2 CAPSULE ORAL 2 TIMES DAILY
Status: DISCONTINUED | OUTPATIENT
Start: 2022-03-09 | End: 2022-03-15 | Stop reason: HOSPADM

## 2022-03-09 RX ORDER — SODIUM CHLORIDE 9 MG/ML
125 INJECTION, SOLUTION INTRAVENOUS CONTINUOUS
Status: DISCONTINUED | OUTPATIENT
Start: 2022-03-09 | End: 2022-03-11

## 2022-03-09 RX ORDER — METRONIDAZOLE 500 MG/1
500 TABLET ORAL EVERY 8 HOURS SCHEDULED
Status: DISCONTINUED | OUTPATIENT
Start: 2022-03-09 | End: 2022-03-10

## 2022-03-09 RX ORDER — SODIUM CHLORIDE 0.9 % (FLUSH) 0.9 %
10 SYRINGE (ML) INJECTION AS NEEDED
Status: DISCONTINUED | OUTPATIENT
Start: 2022-03-09 | End: 2022-03-15 | Stop reason: HOSPADM

## 2022-03-09 RX ORDER — DOPAMINE HYDROCHLORIDE 160 MG/100ML
2-20 INJECTION, SOLUTION INTRAVENOUS
Status: DISCONTINUED | OUTPATIENT
Start: 2022-03-09 | End: 2022-03-12

## 2022-03-09 RX ORDER — ACETAMINOPHEN 650 MG/1
650 SUPPOSITORY RECTAL EVERY 4 HOURS PRN
Status: DISCONTINUED | OUTPATIENT
Start: 2022-03-09 | End: 2022-03-15 | Stop reason: HOSPADM

## 2022-03-09 RX ORDER — CLOPIDOGREL BISULFATE 75 MG/1
75 TABLET ORAL DAILY
Status: DISCONTINUED | OUTPATIENT
Start: 2022-03-09 | End: 2022-03-15 | Stop reason: HOSPADM

## 2022-03-09 RX ADMIN — VANCOMYCIN HYDROCHLORIDE 1500 MG: 10 INJECTION, POWDER, LYOPHILIZED, FOR SOLUTION INTRAVENOUS at 20:13

## 2022-03-09 RX ADMIN — HEPARIN SODIUM 5000 UNITS: 5000 INJECTION INTRAVENOUS; SUBCUTANEOUS at 17:13

## 2022-03-09 RX ADMIN — CLOPIDOGREL 75 MG: 75 TABLET, FILM COATED ORAL at 15:46

## 2022-03-09 RX ADMIN — INSULIN HUMAN 2 UNITS: 100 INJECTION, SOLUTION PARENTERAL at 17:16

## 2022-03-09 RX ADMIN — SODIUM CHLORIDE 175 ML/HR: 9 INJECTION, SOLUTION INTRAVENOUS at 23:55

## 2022-03-09 RX ADMIN — SODIUM CHLORIDE 175 ML/HR: 9 INJECTION, SOLUTION INTRAVENOUS at 16:19

## 2022-03-09 RX ADMIN — SODIUM CHLORIDE 1000 ML: 9 INJECTION, SOLUTION INTRAVENOUS at 15:34

## 2022-03-09 RX ADMIN — TAZOBACTAM SODIUM AND PIPERACILLIN SODIUM 3.38 G: 375; 3 INJECTION, SOLUTION INTRAVENOUS at 15:46

## 2022-03-09 RX ADMIN — HEPARIN SODIUM 5000 UNITS: 5000 INJECTION INTRAVENOUS; SUBCUTANEOUS at 22:00

## 2022-03-09 RX ADMIN — CALCIUM GLUCONATE 1 G: 20 INJECTION, SOLUTION INTRAVENOUS at 14:15

## 2022-03-09 RX ADMIN — METRONIDAZOLE 500 MG: 500 TABLET, FILM COATED ORAL at 19:31

## 2022-03-09 RX ADMIN — SODIUM CHLORIDE 1000 ML: 9 INJECTION, SOLUTION INTRAVENOUS at 15:35

## 2022-03-09 RX ADMIN — DOCUSATE SODIUM 50MG AND SENNOSIDES 8.6MG 2 TABLET: 8.6; 5 TABLET, FILM COATED ORAL at 20:59

## 2022-03-09 RX ADMIN — GABAPENTIN 100 MG: 100 CAPSULE ORAL at 20:59

## 2022-03-09 RX ADMIN — DOPAMINE HYDROCHLORIDE 10 MCG/KG/MIN: 160 INJECTION, SOLUTION INTRAVENOUS at 14:24

## 2022-03-09 RX ADMIN — ACETAMINOPHEN 650 MG: 325 TABLET, FILM COATED ORAL at 20:59

## 2022-03-09 RX ADMIN — ATROPINE SULFATE 1 MG: 0.1 INJECTION INTRAVENOUS at 14:14

## 2022-03-09 RX ADMIN — GABAPENTIN 100 MG: 100 CAPSULE ORAL at 16:00

## 2022-03-09 RX ADMIN — Medication 0.02 MCG/KG/MIN: at 14:42

## 2022-03-09 NOTE — PLAN OF CARE
Pt admitted from ED . Presented with c/o weakness and lightheaded. Arrived via ems with low BP 70/40 bradycardic heart rate in 40's . Was given atropine 0.5mg and no response. Pt is currently on Epi 0.04 mcg/kg/min and dopamine 11mcg/kg/min along with IVF's 175cc hr . Pt is alert and oriented x4 HOFFMAN . Skin intact . Pt has not voided yet .   Problem: Fall Injury Risk  Goal: Absence of Fall and Fall-Related Injury  Outcome: Ongoing, Progressing     Problem: Adult Inpatient Plan of Care  Goal: Plan of Care Review  Outcome: Ongoing, Progressing  Goal: Patient-Specific Goal (Individualized)  Outcome: Ongoing, Progressing  Goal: Absence of Hospital-Acquired Illness or Injury  Outcome: Ongoing, Progressing  Intervention: Prevent and Manage VTE (Venous Thromboembolism) Risk  Recent Flowsheet Documentation  Taken 3/9/2022 1700 by Karma Farfan RN  VTE Prevention/Management:   sequential compression devices on   bilateral  Goal: Optimal Comfort and Wellbeing  Outcome: Ongoing, Progressing  Intervention: Provide Person-Centered Care  Recent Flowsheet Documentation  Taken 3/9/2022 1700 by Karma Farfan RN  Trust Relationship/Rapport:   care explained   choices provided   questions answered   questions encouraged   reassurance provided  Goal: Readiness for Transition of Care  Outcome: Ongoing, Progressing     Problem: Adjustment to Illness (Sepsis/Septic Shock)  Goal: Optimal Coping  Outcome: Ongoing, Progressing  Intervention: Optimize Psychosocial Adjustment to Illness  Recent Flowsheet Documentation  Taken 3/9/2022 1700 by Karma Farfan RN  Family/Support System Care:   presence promoted   self-care encouraged     Problem: Bleeding (Sepsis/Septic Shock)  Goal: Absence of Bleeding  Outcome: Ongoing, Progressing     Problem: Glycemic Control Impaired (Sepsis/Septic Shock)  Goal: Blood Glucose Level Within Desired Range  Outcome: Ongoing, Progressing  Intervention: Optimize Glycemic Control  Recent Flowsheet  Documentation  Taken 3/9/2022 1700 by Karma Farfan, RN  Glycemic Management:   blood glucose monitored   supplemental insulin given     Problem: Infection Progression (Sepsis/Septic Shock)  Goal: Absence of Infection Signs and Symptoms  Outcome: Ongoing, Progressing     Problem: Nutrition Impaired (Sepsis/Septic Shock)  Goal: Optimal Nutrition Intake  Outcome: Ongoing, Progressing     Problem: Fluid and Electrolyte Imbalance (Acute Kidney Injury/Impairment)  Goal: Fluid and Electrolyte Balance  Outcome: Ongoing, Progressing     Problem: Oral Intake Inadequate (Acute Kidney Injury/Impairment)  Goal: Optimal Nutrition Intake  Outcome: Ongoing, Progressing     Problem: Renal Function Impairment (Acute Kidney Injury/Impairment)  Goal: Effective Renal Function  Outcome: Ongoing, Progressing     Problem: Fluid Volume Deficit  Goal: Fluid Balance  Outcome: Ongoing, Progressing   Goal Outcome Evaluation:

## 2022-03-09 NOTE — ED TRIAGE NOTES
Pt to ER via EMS from [place] assisted [facility] for cc of weakness according to facility staff. Upon EMS arrival BP was 70/40 and HR 40bpm. Pt given 0.5mg of atropine and 250ml NS en route. Pt a&ox4.     Pt and RN wearing mask throughout encounter.

## 2022-03-09 NOTE — ED PROVIDER NOTES
EMERGENCY DEPARTMENT ENCOUNTER    Room Number:  39/39  PCP: Abiodun Vila MD  Historian: Patient  History Limited By: Nothing      HPI  Chief Complaint: Lightheaded  Context: Vidhi Lopez is a 71 y.o. female who presents to the ED c/o lightheaded.  Patient states she was seen by her doctor on Monday and told her potassium was elevated.  Patient was supposed to go to the ER yesterday.  Patient states she did not come yesterday states she is felt lightheaded today.  Has had no chest pain or shortness of breath.  Has felt lightheaded.  Was seen by EMS and given atropine any change.      Location: [facility]  Radiation: None  Character: Standing  Duration: Today  Severity: Moderate  Progression: None  Aggravating Factors: Nothing  Alleviating Factors: Nothing        MEDICAL RECORD REVIEW    Patient with renal insufficiency          PAST MEDICAL HISTORY  Active Ambulatory Problems     Diagnosis Date Noted   • Dyslipidemia 07/14/2016   • Hypertension 07/14/2016   • Anxiety (Chronic benzos) 07/14/2016   • Insomnia 07/14/2016   • GERD (gastroesophageal reflux disease) 07/14/2016   • Diabetes mellitus, type 2 (formerly [facility]) 07/14/2016   • Fibromyalgia 07/14/2016   • RLS (restless legs syndrome) 07/14/2016   • Migraines 07/14/2016   • COPD (chronic obstructive pulmonary disease) (formerly [facility]) 07/14/2016   • Interstitial cystitis 07/14/2016   • History of acute myocardial infarction 07/14/2016   • History of cardiac murmur 07/14/2016   • History of stroke 07/14/2016   • CAD (coronary artery disease) 07/14/2016   • Essential hypertension 01/12/2017   • CKD (chronic kidney disease) stage 2, GFR 60-89 ml/min 09/20/2017   • Bilateral carotid artery stenosis 09/20/2017   • Chronic respiratory failure with hypoxia and hypercapnia (formerly [facility]) 09/30/2017   • URIEL (obstructive sleep apnea) 09/30/2017   • Obesity (BMI 30-39.9) 09/30/2017   • Diabetes mellitus type 2 in obese (formerly [facility]) 09/30/2017   • Obesity hypoventilation syndrome (formerly [facility]) 03/17/2018    • Tobacco use 03/17/2018   • Chronic pain (Chronic narcotics) 10/15/2018   • Recurrent UTI/interstitial cystitis on chronic methenamine 10/15/2018   • Clostridium difficile colitis diagnosed September 2018. Persistent diarrhea despite oral vancomycin 10/16/2018   • Demand ischemia ([facility]) 11/06/2018   • Hypoxemia requiring supplemental oxygen 01/25/2019   • Stenosis of carotid artery 10/28/2019   • Occlusion and stenosis of left carotid artery  10/28/2019   • Chronic gout with tophus 10/28/2019   • Depression 10/28/2019   • Edema 10/28/2019   • Restless leg syndrome 10/28/2019   • Asthma 10/28/2019   • Wellness examination 10/28/2019   • Encounter for screening mammogram for malignant neoplasm of breast  10/28/2019   • Seasonal allergies 10/28/2019   • Hyperlipidemia 12/05/2019   • Seizure disorder ([facility]) 12/05/2019   • L1 vertebral fracture ([facility]) 12/05/2019   • Rib fracture 12/05/2019   • Lumbar compression fracture ([facility]) 12/06/2019   • Polypharmacy 12/07/2019   • Itching 03/23/2020   • Anemia 06/17/2020   • Urinary tract infection with hematuria 06/17/2020   • Other fatigue 06/17/2020   • Balance problem 06/17/2020   • Dysuria 11/11/2020   • Right leg pain 11/11/2020   • Hypercalcemia 11/11/2020   • High risk medication use 11/11/2020   • Precordial pain 11/11/2020   • Leg swelling 12/10/2020   • Encounter for immunization  12/10/2020   • Dizzy 12/18/2020   • Acute myocardial infarction ([facility]) 02/04/2019   • Atopic rhinitis 04/19/2021   • Bilateral inguinal hernia, without obstruction or gangrene, not specified as recurrent 02/27/2019   • Candidiasis of skin 04/19/2021   • Cerebrovascular accident (CVA) ([facility]) 02/04/2019   • Coronary angioplasty status 02/04/2019   • Decreased GFR 02/07/2019   • Diabetic peripheral neuropathy ([facility]) 04/05/2019   • History of Clostridioides difficile infection 02/04/2019   • Osteoarthritis 10/01/2020   • Other specified personal risk factors, not elsewhere classified 11/11/2020   •  Repeated falls 03/08/2019   • Multinodular goiter 02/07/2019   • Medicare annual wellness visit, subsequent 04/19/2021   • Post-menopausal 04/19/2021   • High cholesterol 04/19/2021   • Bunion 04/19/2021   • Unspecified severe protein-calorie malnutrition (CMS/HCC)  04/19/2021   • Nausea 04/19/2021   • Rash 04/19/2021   • Subacute maxillary sinusitis 07/09/2021   • Cough 07/09/2021   • Fever 08/05/2021   • Sore throat 08/05/2021   • Spasm 08/05/2021   • Acute midline back pain 08/05/2021   • Neuropathy 10/28/2021     Resolved Ambulatory Problems     Diagnosis Date Noted   • Generalized edema 09/20/2017   • Pneumonia of right lower lobe due to infectious organism 09/28/2017   • Acute metabolic encephalopathy due to hypercarbia, hypoxemia, benzodiazepines 03/17/2018   • GASPER (acute kidney injury) ([facility]) 03/17/2018   • SIRS (systemic inflammatory response syndrome) ([facility]) 03/17/2018   • Elevated LFTs 03/17/2018   • Right leg pain 03/19/2018   • Elevated d-dimer 03/19/2018   • ESR raised 03/20/2018   • Seizure disorder, nonconvulsive, with status epilepticus ([facility]) 03/20/2018   • Septic joint of right knee joint ([facility]) 03/21/2018   • R/O CAP (community acquired pneumonia) 10/15/2018   • Respiratory failure, acute and chronic ([facility]) 10/15/2018   • Acute and chronic respiratory failure with hypercapnia ([facility]) 11/06/2018   • Ataxia 05/15/2020   • Dysarthria 05/15/2020   • Lethargy 05/15/2020     Past Medical History:   Diagnosis Date   • Allergic rhinitis    • Asthma with COPD ([facility])    • Bilateral ovarian cysts    • Coronary artery disease    • Depression with anxiety    • Diabetes ([facility])    • Endometriosis    • Fatigue    • Headache    • History of gallstones    • History of myocardial infarction    • Influenza B    • On home oxygen therapy    • URIEL on CPAP    • PONV (postoperative nausea and vomiting)    • Shortness of breath on exertion    • Stroke ([facility])    • Thyroid disorder    • Urinary leakage    • UTI (urinary tract  infection)    • Wears glasses    • Yeast dermatitis          PAST SURGICAL HISTORY  Past Surgical History:   Procedure Laterality Date   • ARTERIOGRAM N/A 2018    Procedure: Renal Arteriogram;  Surgeon: Lito Flores MD;  Location:  [facility] CATH INVASIVE LOCATION;  Service:    • BREAST BIOPSY Right 1991   • CARDIAC CATHETERIZATION N/A 2018    Procedure: Right Heart Cath;  Surgeon: Lito Flores MD;  Location:  [facility] CATH INVASIVE LOCATION;  Service:    • CAROTID STENT      Transcath    • CAROTID STENT Left    • CHOLECYSTECTOMY     • CYSTOSTOMY W/ BLADDER BIOPSY     • DILATATION AND CURETTAGE     • KNEE ARTHROSCOPY Right 3/23/2018    Procedure: ARTHROSCOPY, RIGHT KNEE  INCISION AND DRAINAGE LOWER EXTREMITY WITH SYNOVIAL BIOPSY;  Surgeon: Dilip Giron MD;  Location:  [facility] OR;  Service: Orthopedics   • LAPAROSCOPIC CHOLECYSTECTOMY      Lap rolando   • OOPHORECTOMY     • PAP SMEAR  05/10/2016   • SUBTOTAL HYSTERECTOMY           FAMILY HISTORY  Family History   Problem Relation Age of Onset   • Cancer Other    • Diabetes Other    • Heart attack Other    • Hyperlipidemia Other    • Hypertension Other    • Osteoporosis Other    • Stroke Other    • Breast cancer Mother    • Osteoporosis Mother    • Colon cancer Father    • Colon polyps Father    • Irritable bowel syndrome Neg Hx    • Ulcerative colitis Neg Hx    • Crohn's disease Neg Hx          SOCIAL HISTORY  Social History     Socioeconomic History   • Marital status:    Tobacco Use   • Smoking status: Former Smoker     Packs/day: 0.25     Years: 40.00     Pack years: 10.00     Types: Cigarettes     Quit date:      Years since quittin.1   • Smokeless tobacco: Never Used   • Tobacco comment: in process of quiting--AVERAGE 1 PPD, DOWN TO 6 CIG PER DAY X2 WEEKS    Substance and Sexual Activity   • Alcohol use: Yes     Alcohol/week: 1.0 standard drink     Types: 1 Glasses of wine per week     Comment: occasional   • Drug use: No   • Sexual  activity: Defer         ALLERGIES  Amlodipine and Reglan [metoclopramide]        REVIEW OF SYSTEMS  Review of Systems   Constitutional: Negative for fever.   HENT: Negative for sore throat.    Eyes: Negative.    Respiratory: Negative for cough and shortness of breath.    Cardiovascular: Negative for chest pain.   Gastrointestinal: Positive for diarrhea. Negative for abdominal pain and vomiting.   Genitourinary: Negative for dysuria.   Musculoskeletal: Negative for neck pain.   Skin: Negative for rash.   Allergic/Immunologic: Negative.    Neurological: Positive for light-headedness. Negative for weakness, numbness and headaches.   Hematological: Negative.    Psychiatric/Behavioral: Negative.    All other systems reviewed and are negative.           PHYSICAL EXAM  ED Triage Vitals   Temp Heart Rate Resp BP SpO2   03/09/22 1402 03/09/22 1401 03/09/22 1401 03/09/22 1401 03/09/22 1401   97.5 °F (36.4 °C) (!) 40 18 (!) 70/40 (!) 89 %      Temp src Heart Rate Source Patient Position BP Location FiO2 (%)   -- -- -- -- --              Physical Exam  Vitals and nursing note reviewed.   Constitutional:       General: She is not in acute distress.  HENT:      Head: Normocephalic and atraumatic.   Eyes:      Pupils: Pupils are equal, round, and reactive to light.   Cardiovascular:      Rate and Rhythm: Regular rhythm. Bradycardia present.      Heart sounds: Normal heart sounds.   Pulmonary:      Effort: Pulmonary effort is normal. No respiratory distress.      Breath sounds: Normal breath sounds.   Abdominal:      Palpations: Abdomen is soft.      Tenderness: There is no abdominal tenderness. There is no guarding or rebound.   Musculoskeletal:         General: Normal range of motion.      Cervical back: Normal range of motion and neck supple.   Skin:     General: Skin is warm and dry.      Findings: No rash.   Neurological:      Mental Status: She is alert and oriented to person, place, and time.      Sensory: Sensation is  intact. No sensory deficit.      Motor: No weakness.   Psychiatric:         Mood and Affect: Mood and affect normal.       Patient was wearing a face mask when I entered the room and they continued to wear a mask throughout their stay in the ED.  I wore PPE, including  gloves, face mask with shield or face mask with goggles whenever I was in the room with patient.     LAB RESULTS  Recent Results (from the past 24 hour(s))   ECG 12 Lead    Collection Time: 03/09/22  2:02 PM   Result Value Ref Range    QT Interval 498 ms   Comprehensive Metabolic Panel    Collection Time: 03/09/22  2:16 PM    Specimen: Blood   Result Value Ref Range    Glucose 133 (H) 65 - 99 mg/dL    BUN 55 (H) 8 - 23 mg/dL    Creatinine 3.30 (H) 0.57 - 1.00 mg/dL    Sodium 132 (L) 136 - 145 mmol/L    Potassium 5.4 (H) 3.5 - 5.2 mmol/L    Chloride 95 (L) 98 - 107 mmol/L    CO2 22.2 22.0 - 29.0 mmol/L    Calcium 8.2 (L) 8.6 - 10.5 mg/dL    Total Protein 6.2 6.0 - 8.5 g/dL    Albumin 3.30 (L) 3.50 - 5.20 g/dL    ALT (SGPT) 6 1 - 33 U/L    AST (SGOT) 7 1 - 32 U/L    Alkaline Phosphatase 95 39 - 117 U/L    Total Bilirubin 0.2 0.0 - 1.2 mg/dL    Globulin 2.9 gm/dL    A/G Ratio 1.1 g/dL    BUN/Creatinine Ratio 16.7 7.0 - 25.0    Anion Gap 14.8 5.0 - 15.0 mmol/L    eGFR 14.5 (L) >60.0 mL/min/1.73   Troponin    Collection Time: 03/09/22  2:16 PM    Specimen: Blood   Result Value Ref Range    Troponin T <0.010 0.000 - 0.030 ng/mL   BNP    Collection Time: 03/09/22  2:16 PM    Specimen: Blood   Result Value Ref Range    proBNP 444.0 0.0 - 900.0 pg/mL   CBC Auto Differential    Collection Time: 03/09/22  2:16 PM    Specimen: Blood   Result Value Ref Range    WBC 6.23 3.40 - 10.80 10*3/mm3    RBC 3.40 (L) 3.77 - 5.28 10*6/mm3    Hemoglobin 10.3 (L) 12.0 - 15.9 g/dL    Hematocrit 32.7 (L) 34.0 - 46.6 %    MCV 96.2 79.0 - 97.0 fL    MCH 30.3 26.6 - 33.0 pg    MCHC 31.5 31.5 - 35.7 g/dL    RDW 13.2 12.3 - 15.4 %    RDW-SD 47.2 37.0 - 54.0 fl    MPV 9.6 6.0 - 12.0  fL    Platelets 164 140 - 450 10*3/mm3    Neutrophil % 64.1 42.7 - 76.0 %    Lymphocyte % 22.3 19.6 - 45.3 %    Monocyte % 9.0 5.0 - 12.0 %    Eosinophil % 3.5 0.3 - 6.2 %    Basophil % 0.8 0.0 - 1.5 %    Immature Grans % 0.3 0.0 - 0.5 %    Neutrophils, Absolute 3.99 1.70 - 7.00 10*3/mm3    Lymphocytes, Absolute 1.39 0.70 - 3.10 10*3/mm3    Monocytes, Absolute 0.56 0.10 - 0.90 10*3/mm3    Eosinophils, Absolute 0.22 0.00 - 0.40 10*3/mm3    Basophils, Absolute 0.05 0.00 - 0.20 10*3/mm3    Immature Grans, Absolute 0.02 0.00 - 0.05 10*3/mm3    nRBC 0.0 0.0 - 0.2 /100 WBC   TSH Rfx On Abnormal To Free T4    Collection Time: 03/09/22  2:16 PM    Specimen: Blood   Result Value Ref Range    TSH 0.450 0.270 - 4.200 uIU/mL       Ordered the above labs and reviewed the results.        RADIOLOGY  XR Chest 1 View   Final Result   No focal pulmonary consolidation. Borderline heart size.   Follow-up as clinical indications persist.       This report was finalized on 3/9/2022 2:33 PM by Dr. Danial Livingston M.D.          CT Head Without Contrast    (Results Pending)        Ordered the above noted radiological studies. Reviewed by me in PACS.          PROCEDURES  Critical Care  Performed by: Blade Palacios MD  Authorized by: Kevin Singh MD     Critical care provider statement:     Critical care time (minutes):  60    Critical care time was exclusive of:  Separately billable procedures and treating other patients    Critical care was necessary to treat or prevent imminent or life-threatening deterioration of the following conditions:  Shock    Critical care was time spent personally by me on the following activities:  Discussions with consultants, discussions with primary provider, ordering and performing treatments and interventions, ordering and review of laboratory studies and ordering and review of radiographic studies          EKG:          EKG time: 1402  Rhythm/Rate: Sinus bradycardia 43  P waves and MT: Normal P  waves  QRS, axis: Normal QRS  ST and T waves: Normal ST-T wave    Interpreted Contemporaneously by me, independently viewed  Unchanged compared to prior 8/20/2020        MEDICATIONS GIVEN IN ER  Medications   DOPamine 400 mg in 250 mL D5W infusion (11 mcg/kg/min × 76.7 kg Intravenous Restarted 3/9/22 1516)   EPINEPHrine 5 mg in 250 mL NS infusion (0.04 mcg/kg/min × 76.7 kg Intravenous Rate/Dose Change 3/9/22 1458)   sodium chloride 0.9 % infusion (has no administration in time range)   clopidogrel (PLAVIX) tablet 75 mg (75 mg Oral Given 3/9/22 1546)   pantoprazole (PROTONIX) EC tablet 40 mg (has no administration in time range)   gabapentin (NEURONTIN) capsule 100 mg (100 mg Oral Given 3/9/22 1600)   levETIRAcetam (KEPPRA) tablet 500 mg (has no administration in time range)   piperacillin-tazobactam (ZOSYN) 3.375 g in iso-osmotic dextrose 50 ml (premix) (3.375 g Intravenous New Bag 3/9/22 1546)   vancomycin 1500 mg/500 mL 0.9% NS IVPB ([facility]) (has no administration in time range)   dextrose (GLUTOSE) oral gel 15 g (has no administration in time range)   dextrose (D50W) (25 g/50 mL) IV injection 25 g (has no administration in time range)   glucagon (human recombinant) (GLUCAGEN DIAGNOSTIC) injection 1 mg (has no administration in time range)   sodium chloride 0.9 % flush 10 mL (has no administration in time range)   sodium chloride 0.9 % flush 10 mL (has no administration in time range)   heparin (porcine) 5000 UNIT/ML injection 5,000 Units (has no administration in time range)   acetaminophen (TYLENOL) tablet 650 mg (has no administration in time range)     Or   acetaminophen (TYLENOL) suppository 650 mg (has no administration in time range)   insulin regular (humuLIN R,novoLIN R) injection 0-9 Units (has no administration in time range)   sennosides-docusate (PERICOLACE) 8.6-50 MG per tablet 2 tablet (has no administration in time range)     And   polyethylene glycol (MIRALAX) packet 17 g (has no administration in  time range)     And   bisacodyl (DULCOLAX) EC tablet 5 mg (has no administration in time range)     And   bisacodyl (DULCOLAX) suppository 10 mg (has no administration in time range)   metroNIDAZOLE (FLAGYL) tablet 500 mg (has no administration in time range)   atropine sulfate injection 1 mg (1 mg Intravenous Given 3/9/22 1414)   calcium gluconate 1g/50ml 0.675% NaCl IV SOLN (0 g Intravenous Stopped 3/9/22 1435)   sodium chloride 0.9 % bolus 1,000 mL (0 mL Intravenous Stopped 3/9/22 1535)   sodium chloride 0.9 % bolus 1,000 mL (1,000 mL Intravenous New Bag 3/9/22 1535)             PROGRESS AND CONSULTS  ED Course as of 03/09/22 1602   Wed Mar 09, 2022   1508 15:08 EST  Patient presents for lightheadedness.  Patient markedly hypotensive and bradycardic.  Patient was given atropine without improvement.  Patient given calcium with a history of hyperkalemia.  Has been given fluids.  Patient also given dopamine that did not seem to help.  Has been switched over to epinephrine that seems to be working.  Patient discussed with interventional cardiologist and EKGs have been sent to her.  She feels this is not from bradycardia.  Patient discussed with Dr. Singh.  Will admit to the ICU.  Patient will have empiric antibiotics given. [SL]      ED Course User Index  [SL] Blade Palacios MD           MEDICAL DECISION MAKING      MDM  Number of Diagnoses or Management Options     Amount and/or Complexity of Data Reviewed  Clinical lab tests: reviewed and ordered (Creatinine 3.3)  Tests in the radiology section of CPT®: reviewed and ordered (Normal)  Decide to obtain previous medical records or to obtain history from someone other than the patient: yes  Discuss the patient with other providers: yes (Discussed with Dr. Mchugh who feels hypotension not from tachycardia.  Discussed with Dr. Singh who is here to see patient.)               DIAGNOSIS  Final diagnoses:   Shock (HCC)   Dehydration   GASPER (acute kidney injury) (HCC)            DISPOSITION  admit        Latest Documented Vital Signs:  As of 16:02 EST  BP- 100/48 HR- 52 Temp- 97.5 °F (36.4 °C) O2 sat- 97%                         Blade Palacios MD  03/09/22 [id number]

## 2022-03-09 NOTE — H&P
[facility]    CC: lightheadedness    HPI:  Mrs. Lopez is a 70yo female with chronic diarrhea.  She was brought to ER today when she had sudden onset of feeling lightheaded and dizzy for about three weeks. During that time she has felt nauseated as well and has not been eating well, her diarrhea has been more than usual.  She thinks she has c. Diff.  She has some abodminal pain.  She says she fell and landed on her face a few weeks ago.  She was told the other day to come to the ER by her doctor for high K level.  She was bradycardic and hypotensive when EMS evaluated her.  She has had some improvement with this in the ER with epinephrine.  She has GASPER on labs.  She says she started cymbalta about a week ago    Past Medical History:   Diagnosis Date   • Allergic rhinitis    • Asthma with COPD ([facility])     Asthma/COPD   • Bilateral ovarian cysts    • Coronary artery disease    • Depression with anxiety     Depression/Anxiety   • Diabetes ([facility])     pre   • Endometriosis     Dr. Jin; estradiol    • ESR raised 3/20/2018   • Fatigue    • Fibromyalgia    • Headache     Headaches   • High cholesterol    • History of gallstones    • History of myocardial infarction    • Hypertension    • Influenza B     DX'D 2/6/2018, TREATED WITH TAMIFLU. LAST DOSE 2/11/2018 AM.  PATIENT STATES DR RANGEL IS AWARE. DENIES FEVERS OR CHILLS.   • Interstitial cystitis    • On home oxygen therapy     2 L NC   • URIEL on CPAP    • PONV (postoperative nausea and vomiting)    • Seizure disorder, nonconvulsive, with status epilepticus ([facility]) 3/20/2018   • Septic joint of right knee joint ([facility]) 3/21/2018   • Shortness of breath on exertion    • Stroke ([facility])     X1 8 YEARS AGO, GENERALIZED UPPER BODY WEAKNESS RESIDUAL PER PT    • Thyroid disorder    • Urinary leakage    • UTI (urinary tract infection)    • Wears glasses    • Yeast dermatitis      Social History     Socioeconomic History   • Marital status:    Tobacco Use   • Smoking  status: Former Smoker     Packs/day: 0.25     Years: 40.00     Pack years: 10.00     Types: Cigarettes     Quit date:      Years since quittin.1   • Smokeless tobacco: Never Used   • Tobacco comment: in process of quiting--AVERAGE 1 PPD, DOWN TO 6 CIG PER DAY X2 WEEKS    Substance and Sexual Activity   • Alcohol use: Yes     Alcohol/week: 1.0 standard drink     Types: 1 Glasses of wine per week     Comment: occasional   • Drug use: No   • Sexual activity: Defer     Family History   Problem Relation Age of Onset   • Cancer Other    • Diabetes Other    • Heart attack Other    • Hyperlipidemia Other    • Hypertension Other    • Osteoporosis Other    • Stroke Other    • Breast cancer Mother    • Osteoporosis Mother    • Colon cancer Father    • Colon polyps Father    • Irritable bowel syndrome Neg Hx    • Ulcerative colitis Neg Hx    • Crohn's disease Neg Hx      MEDS: reviewed as per chart  ALL: amlopdipine, reglan  ROS: 12 point negative except as in HPI    Vital Sign Min/Max for last 24 hours  Temp  Min: 97.5 °F (36.4 °C)  Max: 97.5 °F (36.4 °C)   BP  Min: 53/29  Max: 103/40   Pulse  Min: 40  Max: 49   Resp  Min: 18  Max: 18   SpO2  Min: 81 %  Max: 99 %   Flow (L/min)  Min: 2  Max: 2   Weight  Min: 76.7 kg (169 lb)  Max: 76.7 kg (169 lb)     GEN:  appears ill,  AxOx3  HEENT: PERRL, EOMI, normal sclera, mm dry, no jvd, trachea midline, neck supple  CHEST: CTA bilat, no wheezes, no crackles, no use of accessory muscles  CV: nidhi, regular, no m/g/r  ABD: soft, nt, nd +bs, no hepatosplenomegaly  EXT: no c/c/e  SKIN: no rashes, no xanthomas, nl turgor, warm, dry  LYMPH: no palpable cervical or supraclavicular lymphadenopathy  NEURO: CN 2-12 intact and symmetric bilaterally  PSYCH: nl affect, nl orientation, nl judgement, nl mood  MSK: no kyphoscoliosis, 5/5 strength ue and le bilaterally    Labs: 3/9: reviewed:  Glucose 133  Bun 55  Cr 3.3  Na 132  k 5.4  Bicarb 22  Trop 0.01  probnp 444  Wbc 6  hgb 10.3  plts  164    CXR: 3/9: NAD    A/P:  1. Shock -- hypovolemic? Continue pressor and iv fluids.  Check cortisol and TSH.  Check procal. Monitor in ICU. Check cadiac echo  2. Bradycardia -- unclear etiology strange particularly with hypotension -- will check tsh and cortisol, echo, will ask cards to see  3. GASPER on CKD -- iv fluids, support BP, avoid nephrotoxins, will ask nephrology to see  4. Hyperkalemia -- monitor  5. Hyponatremia  6. Anemia  7. COPD  8. Chronic diarrhea  9. URIEL  10. Seizure disorder -- continue home med  11. DMII -- ssi  12. HTN -- hold meds givne shock      CC 35 mins

## 2022-03-10 ENCOUNTER — APPOINTMENT (OUTPATIENT)
Dept: CARDIOLOGY | Facility: HOSPITAL | Age: 72
End: 2022-03-10

## 2022-03-10 ENCOUNTER — APPOINTMENT (OUTPATIENT)
Dept: ULTRASOUND IMAGING | Facility: HOSPITAL | Age: 72
End: 2022-03-10

## 2022-03-10 LAB
ANION GAP SERPL CALCULATED.3IONS-SCNC: 10.3 MMOL/L (ref 5–15)
ANION GAP SERPL CALCULATED.3IONS-SCNC: 11 MMOL/L (ref 5–15)
ANION GAP SERPL CALCULATED.3IONS-SCNC: 9 MMOL/L (ref 5–15)
AORTIC DIMENSIONLESS INDEX: 0.7 (DI)
BACTERIA UR QL AUTO: ABNORMAL /HPF
BASOPHILS # BLD AUTO: 0.02 10*3/MM3 (ref 0–0.2)
BASOPHILS NFR BLD AUTO: 0.2 % (ref 0–1.5)
BH CV ECHO MEAS - AO MAX PG: 16.6 MMHG
BH CV ECHO MEAS - AO MEAN PG: 9 MMHG
BH CV ECHO MEAS - AO V2 MAX: 203.9 CM/SEC
BH CV ECHO MEAS - AO V2 VTI: 43.8 CM
BH CV ECHO MEAS - AVA(I,D): 1.73 CM2
BH CV ECHO MEAS - EDV(CUBED): 126.6 ML
BH CV ECHO MEAS - EDV(MOD-SP2): 87 ML
BH CV ECHO MEAS - EDV(MOD-SP4): 64 ML
BH CV ECHO MEAS - EF(MOD-BP): 62.4 %
BH CV ECHO MEAS - EF(MOD-SP2): 63.2 %
BH CV ECHO MEAS - EF(MOD-SP4): 59.4 %
BH CV ECHO MEAS - ESV(CUBED): 29 ML
BH CV ECHO MEAS - ESV(MOD-SP2): 32 ML
BH CV ECHO MEAS - ESV(MOD-SP4): 26 ML
BH CV ECHO MEAS - FS: 38.8 %
BH CV ECHO MEAS - IVS/LVPW: 0.98 CM
BH CV ECHO MEAS - IVSD: 0.9 CM
BH CV ECHO MEAS - LAT PEAK E' VEL: 11.1 CM/SEC
BH CV ECHO MEAS - LV MASS(C)D: 161.5 GRAMS
BH CV ECHO MEAS - LV MAX PG: 7.8 MMHG
BH CV ECHO MEAS - LV MEAN PG: 3.9 MMHG
BH CV ECHO MEAS - LV V1 MAX: 139.6 CM/SEC
BH CV ECHO MEAS - LV V1 VTI: 28.8 CM
BH CV ECHO MEAS - LVIDD: 5 CM
BH CV ECHO MEAS - LVIDS: 3.1 CM
BH CV ECHO MEAS - LVOT AREA: 2.6 CM2
BH CV ECHO MEAS - LVOT DIAM: 1.83 CM
BH CV ECHO MEAS - LVPWD: 0.92 CM
BH CV ECHO MEAS - MED PEAK E' VEL: 10.1 CM/SEC
BH CV ECHO MEAS - MV A DUR: 0.13 SEC
BH CV ECHO MEAS - MV A MAX VEL: 128.1 CM/SEC
BH CV ECHO MEAS - MV DEC SLOPE: 605.6 CM/SEC2
BH CV ECHO MEAS - MV DEC TIME: 0.17 MSEC
BH CV ECHO MEAS - MV E MAX VEL: 108 CM/SEC
BH CV ECHO MEAS - MV E/A: 0.84
BH CV ECHO MEAS - MV MAX PG: 6.5 MMHG
BH CV ECHO MEAS - MV MEAN PG: 2.28 MMHG
BH CV ECHO MEAS - MV V2 VTI: 32 CM
BH CV ECHO MEAS - MVA(VTI): 2.37 CM2
BH CV ECHO MEAS - PULM A REVS DUR: 0.16 SEC
BH CV ECHO MEAS - PULM A REVS VEL: 44.4 CM/SEC
BH CV ECHO MEAS - PULM DIAS VEL: 54.5 CM/SEC
BH CV ECHO MEAS - PULM SYS VEL: 79.3 CM/SEC
BH CV ECHO MEAS - RAP SYSTOLE: 3 MMHG
BH CV ECHO MEAS - RVOT DIAM: 2.21 CM
BH CV ECHO MEAS - RVSP: 45 MMHG
BH CV ECHO MEAS - SV(LVOT): 75.9 ML
BH CV ECHO MEAS - SV(MOD-SP2): 55 ML
BH CV ECHO MEAS - SV(MOD-SP4): 38 ML
BH CV ECHO MEAS - TAPSE (>1.6): 2.7 CM
BH CV ECHO MEAS - TR MAX PG: 41.9 MMHG
BH CV ECHO MEAS - TR MAX VEL: 323.6 CM/SEC
BH CV ECHO MEASUREMENTS AVERAGE E/E' RATIO: 10.19
BH CV XLRA - TDI S': 15.4 CM/SEC
BILIRUB UR QL STRIP: NEGATIVE
BUN SERPL-MCNC: 35 MG/DL (ref 8–23)
BUN SERPL-MCNC: 35 MG/DL (ref 8–23)
BUN SERPL-MCNC: 48 MG/DL (ref 8–23)
BUN/CREAT SERPL: 19.6 (ref 7–25)
BUN/CREAT SERPL: 21.5 (ref 7–25)
BUN/CREAT SERPL: 23 (ref 7–25)
C DIFF TOX GENS STL QL NAA+PROBE: NEGATIVE
CALCIUM SPEC-SCNC: 7.8 MG/DL (ref 8.6–10.5)
CALCIUM SPEC-SCNC: 8 MG/DL (ref 8.6–10.5)
CHLORIDE SERPL-SCNC: 103 MMOL/L (ref 98–107)
CHLORIDE SERPL-SCNC: 107 MMOL/L (ref 98–107)
CHLORIDE SERPL-SCNC: 107 MMOL/L (ref 98–107)
CLARITY UR: ABNORMAL
CO2 SERPL-SCNC: 20 MMOL/L (ref 22–29)
CO2 SERPL-SCNC: 20.7 MMOL/L (ref 22–29)
CO2 SERPL-SCNC: 22 MMOL/L (ref 22–29)
COLOR UR: YELLOW
CREAT SERPL-MCNC: 1.52 MG/DL (ref 0.57–1)
CREAT SERPL-MCNC: 1.63 MG/DL (ref 0.57–1)
CREAT SERPL-MCNC: 2.45 MG/DL (ref 0.57–1)
DEPRECATED RDW RBC AUTO: 43.5 FL (ref 37–54)
EGFRCR SERPLBLD CKD-EPI 2021: 20.7 ML/MIN/1.73
EGFRCR SERPLBLD CKD-EPI 2021: 33.8 ML/MIN/1.73
EGFRCR SERPLBLD CKD-EPI 2021: 36.7 ML/MIN/1.73
EOSINOPHIL # BLD AUTO: 0.04 10*3/MM3 (ref 0–0.4)
EOSINOPHIL NFR BLD AUTO: 0.5 % (ref 0.3–6.2)
ERYTHROCYTE [DISTWIDTH] IN BLOOD BY AUTOMATED COUNT: 12.8 % (ref 12.3–15.4)
GLUCOSE BLDC GLUCOMTR-MCNC: 133 MG/DL (ref 70–130)
GLUCOSE BLDC GLUCOMTR-MCNC: 182 MG/DL (ref 70–130)
GLUCOSE BLDC GLUCOMTR-MCNC: 80 MG/DL (ref 70–130)
GLUCOSE SERPL-MCNC: 173 MG/DL (ref 65–99)
GLUCOSE SERPL-MCNC: 191 MG/DL (ref 65–99)
GLUCOSE SERPL-MCNC: 194 MG/DL (ref 65–99)
GLUCOSE UR STRIP-MCNC: NEGATIVE MG/DL
HCT VFR BLD AUTO: 30.5 % (ref 34–46.6)
HGB BLD-MCNC: 9.8 G/DL (ref 12–15.9)
HGB UR QL STRIP.AUTO: ABNORMAL
HYALINE CASTS UR QL AUTO: ABNORMAL /LPF
IMM GRANULOCYTES # BLD AUTO: 0.03 10*3/MM3 (ref 0–0.05)
IMM GRANULOCYTES NFR BLD AUTO: 0.3 % (ref 0–0.5)
KETONES UR QL STRIP: NEGATIVE
LEUKOCYTE ESTERASE UR QL STRIP.AUTO: ABNORMAL
LIPASE SERPL-CCNC: 23 U/L (ref 13–60)
LV EF 2D ECHO EST: 65 %
LYMPHOCYTES # BLD AUTO: 1.23 10*3/MM3 (ref 0.7–3.1)
LYMPHOCYTES NFR BLD AUTO: 14.3 % (ref 19.6–45.3)
MAXIMAL PREDICTED HEART RATE: 150 BPM
MCH RBC QN AUTO: 29.8 PG (ref 26.6–33)
MCHC RBC AUTO-ENTMCNC: 32.1 G/DL (ref 31.5–35.7)
MCV RBC AUTO: 92.7 FL (ref 79–97)
MONOCYTES # BLD AUTO: 0.65 10*3/MM3 (ref 0.1–0.9)
MONOCYTES NFR BLD AUTO: 7.6 % (ref 5–12)
NEUTROPHILS NFR BLD AUTO: 6.63 10*3/MM3 (ref 1.7–7)
NEUTROPHILS NFR BLD AUTO: 77.1 % (ref 42.7–76)
NITRITE UR QL STRIP: NEGATIVE
NRBC BLD AUTO-RTO: 0 /100 WBC (ref 0–0.2)
PH UR STRIP.AUTO: <=5 [PH] (ref 5–8)
PLATELET # BLD AUTO: 144 10*3/MM3 (ref 140–450)
PMV BLD AUTO: 9.8 FL (ref 6–12)
POTASSIUM SERPL-SCNC: 4.8 MMOL/L (ref 3.5–5.2)
POTASSIUM SERPL-SCNC: 5.1 MMOL/L (ref 3.5–5.2)
POTASSIUM SERPL-SCNC: 5.1 MMOL/L (ref 3.5–5.2)
PROT UR QL STRIP: NEGATIVE
PTH-INTACT SERPL-MCNC: 166 PG/ML (ref 15–65)
QT INTERVAL: 407 MS
RBC # BLD AUTO: 3.29 10*6/MM3 (ref 3.77–5.28)
RBC # UR STRIP: ABNORMAL /HPF
REF LAB TEST METHOD: ABNORMAL
SODIUM SERPL-SCNC: 134 MMOL/L (ref 136–145)
SODIUM SERPL-SCNC: 138 MMOL/L (ref 136–145)
SODIUM SERPL-SCNC: 138 MMOL/L (ref 136–145)
SODIUM UR-SCNC: 65 MMOL/L
SP GR UR STRIP: 1.01 (ref 1–1.03)
SQUAMOUS #/AREA URNS HPF: ABNORMAL /HPF
STRESS TARGET HR: 128 BPM
UROBILINOGEN UR QL STRIP: ABNORMAL
WBC # UR STRIP: ABNORMAL /HPF
WBC NRBC COR # BLD: 8.6 10*3/MM3 (ref 3.4–10.8)

## 2022-03-10 PROCEDURE — 81001 URINALYSIS AUTO W/SCOPE: CPT | Performed by: HOSPITALIST

## 2022-03-10 PROCEDURE — 83690 ASSAY OF LIPASE: CPT | Performed by: INTERNAL MEDICINE

## 2022-03-10 PROCEDURE — 84300 ASSAY OF URINE SODIUM: CPT | Performed by: HOSPITALIST

## 2022-03-10 PROCEDURE — 93306 TTE W/DOPPLER COMPLETE: CPT

## 2022-03-10 PROCEDURE — 80048 BASIC METABOLIC PNL TOTAL CA: CPT | Performed by: INTERNAL MEDICINE

## 2022-03-10 PROCEDURE — 25010000002 DOPAMINE PER 40 MG: Performed by: EMERGENCY MEDICINE

## 2022-03-10 PROCEDURE — 76775 US EXAM ABDO BACK WALL LIM: CPT

## 2022-03-10 PROCEDURE — 93005 ELECTROCARDIOGRAM TRACING: CPT | Performed by: INTERNAL MEDICINE

## 2022-03-10 PROCEDURE — 25010000002 HEPARIN (PORCINE) PER 1000 UNITS: Performed by: INTERNAL MEDICINE

## 2022-03-10 PROCEDURE — 83970 ASSAY OF PARATHORMONE: CPT | Performed by: HOSPITALIST

## 2022-03-10 PROCEDURE — 99221 1ST HOSP IP/OBS SF/LOW 40: CPT | Performed by: INTERNAL MEDICINE

## 2022-03-10 PROCEDURE — 93306 TTE W/DOPPLER COMPLETE: CPT | Performed by: INTERNAL MEDICINE

## 2022-03-10 PROCEDURE — 63710000001 INSULIN REGULAR HUMAN PER 5 UNITS: Performed by: INTERNAL MEDICINE

## 2022-03-10 PROCEDURE — 93010 ELECTROCARDIOGRAM REPORT: CPT | Performed by: INTERNAL MEDICINE

## 2022-03-10 PROCEDURE — 25010000002 CALCIUM GLUCONATE-NACL 1-0.675 GM/50ML-% SOLUTION: Performed by: HOSPITALIST

## 2022-03-10 PROCEDURE — 85025 COMPLETE CBC W/AUTO DIFF WBC: CPT | Performed by: INTERNAL MEDICINE

## 2022-03-10 PROCEDURE — 87493 C DIFF AMPLIFIED PROBE: CPT | Performed by: INTERNAL MEDICINE

## 2022-03-10 PROCEDURE — 82962 GLUCOSE BLOOD TEST: CPT

## 2022-03-10 RX ORDER — IPRATROPIUM BROMIDE AND ALBUTEROL SULFATE 2.5; .5 MG/3ML; MG/3ML
3 SOLUTION RESPIRATORY (INHALATION)
Status: DISCONTINUED | OUTPATIENT
Start: 2022-03-11 | End: 2022-03-15 | Stop reason: HOSPADM

## 2022-03-10 RX ORDER — MORPHINE SULFATE 2 MG/ML
1 INJECTION, SOLUTION INTRAMUSCULAR; INTRAVENOUS EVERY 4 HOURS PRN
Status: DISCONTINUED | OUTPATIENT
Start: 2022-03-10 | End: 2022-03-13

## 2022-03-10 RX ORDER — CALCIUM GLUCONATE 20 MG/ML
1 INJECTION, SOLUTION INTRAVENOUS ONCE
Status: COMPLETED | OUTPATIENT
Start: 2022-03-10 | End: 2022-03-10

## 2022-03-10 RX ORDER — GABAPENTIN 100 MG/1
CAPSULE ORAL 3 TIMES DAILY
Status: CANCELLED | OUTPATIENT
Start: 2022-03-10

## 2022-03-10 RX ORDER — GABAPENTIN 400 MG/1
400 CAPSULE ORAL 3 TIMES DAILY
Status: DISCONTINUED | OUTPATIENT
Start: 2022-03-10 | End: 2022-03-15 | Stop reason: HOSPADM

## 2022-03-10 RX ORDER — HYDROCODONE BITARTRATE AND ACETAMINOPHEN 5; 325 MG/1; MG/1
1 TABLET ORAL EVERY 6 HOURS PRN
Status: DISCONTINUED | OUTPATIENT
Start: 2022-03-10 | End: 2022-03-15 | Stop reason: HOSPADM

## 2022-03-10 RX ORDER — TIZANIDINE 4 MG/1
4 TABLET ORAL EVERY 8 HOURS PRN
Status: DISCONTINUED | OUTPATIENT
Start: 2022-03-10 | End: 2022-03-15 | Stop reason: HOSPADM

## 2022-03-10 RX ADMIN — GABAPENTIN 100 MG: 100 CAPSULE ORAL at 09:10

## 2022-03-10 RX ADMIN — LEVETIRACETAM 500 MG: 500 TABLET, FILM COATED ORAL at 00:33

## 2022-03-10 RX ADMIN — HEPARIN SODIUM 5000 UNITS: 5000 INJECTION INTRAVENOUS; SUBCUTANEOUS at 21:50

## 2022-03-10 RX ADMIN — INSULIN HUMAN 2 UNITS: 100 INJECTION, SOLUTION PARENTERAL at 01:14

## 2022-03-10 RX ADMIN — DOCUSATE SODIUM 50MG AND SENNOSIDES 8.6MG 2 TABLET: 8.6; 5 TABLET, FILM COATED ORAL at 09:10

## 2022-03-10 RX ADMIN — SODIUM CHLORIDE 125 ML/HR: 9 INJECTION, SOLUTION INTRAVENOUS at 12:32

## 2022-03-10 RX ADMIN — ACETAMINOPHEN 650 MG: 325 TABLET, FILM COATED ORAL at 06:12

## 2022-03-10 RX ADMIN — CALCIUM GLUCONATE 1 G: 20 INJECTION, SOLUTION INTRAVENOUS at 12:32

## 2022-03-10 RX ADMIN — METRONIDAZOLE 500 MG: 500 TABLET, FILM COATED ORAL at 06:25

## 2022-03-10 RX ADMIN — HEPARIN SODIUM 5000 UNITS: 5000 INJECTION INTRAVENOUS; SUBCUTANEOUS at 06:12

## 2022-03-10 RX ADMIN — GABAPENTIN 400 MG: 400 CAPSULE ORAL at 20:35

## 2022-03-10 RX ADMIN — INSULIN HUMAN 2 UNITS: 100 INJECTION, SOLUTION PARENTERAL at 12:45

## 2022-03-10 RX ADMIN — CLOPIDOGREL 75 MG: 75 TABLET, FILM COATED ORAL at 09:10

## 2022-03-10 RX ADMIN — GABAPENTIN 400 MG: 400 CAPSULE ORAL at 15:08

## 2022-03-10 RX ADMIN — LEVETIRACETAM 500 MG: 500 TABLET, FILM COATED ORAL at 09:10

## 2022-03-10 RX ADMIN — LEVETIRACETAM 500 MG: 500 TABLET, FILM COATED ORAL at 20:35

## 2022-03-10 RX ADMIN — TIZANIDINE 4 MG: 4 TABLET ORAL at 17:02

## 2022-03-10 RX ADMIN — ACETAMINOPHEN 650 MG: 325 TABLET, FILM COATED ORAL at 11:12

## 2022-03-10 RX ADMIN — Medication 10 ML: at 09:08

## 2022-03-10 RX ADMIN — DOPAMINE HYDROCHLORIDE 11 MCG/KG/MIN: 160 INJECTION, SOLUTION INTRAVENOUS at 00:33

## 2022-03-10 RX ADMIN — Medication 10 ML: at 20:34

## 2022-03-10 RX ADMIN — METRONIDAZOLE 500 MG: 500 TABLET, FILM COATED ORAL at 14:11

## 2022-03-10 RX ADMIN — SODIUM CHLORIDE 175 ML/HR: 9 INJECTION, SOLUTION INTRAVENOUS at 06:12

## 2022-03-10 RX ADMIN — PANTOPRAZOLE SODIUM 40 MG: 40 TABLET, DELAYED RELEASE ORAL at 06:13

## 2022-03-10 RX ADMIN — HEPARIN SODIUM 5000 UNITS: 5000 INJECTION INTRAVENOUS; SUBCUTANEOUS at 14:12

## 2022-03-10 NOTE — PROGRESS NOTES
[facility]         Dr Merly Jackson   LOS: 1 day   Patient Care Team:  Abiodun Vila MD as PCP - General (Family Medicine)  Lito Flores MD as Consulting Physician (Cardiology)  Jacky Hernandez MD as Consulting Physician (Infectious Diseases)  Stephanie Almanzar APRN as Nurse Practitioner (Gastroenterology)    Chief Complaint: Shock hypovolemic with bradycardia acute kidney injury with hyperkalemia electrolyte abnormalities multiple medical problems and issues    Interval History: Events noted chart reviewed.  Currently on dopamine with epinephrine drip.  IV fluids going at 175 cc an hour.  Patient feels better this morning.  Complains of diffuse abdominal pain    REVIEW OF SYSTEMS:   CARDIOVASCULAR: No chest pain, chest pressure or chest discomfort. No palpitations or edema.   RESPIRATORY: No shortness of breath, cough or sputum.   GASTROINTESTINAL: No anorexia, nausea, vomiting or diarrhea.   HEMATOLOGIC: No bleeding or bruising.     Ventilator/Non-Invasive Ventilation Settings (From admission, onward)            None            Vital Signs  Temp:  [97.5 °F (36.4 °C)-98.4 °F (36.9 °C)] 98.4 °F (36.9 °C)  Heart Rate:  [40-77] 72  Resp:  [14-25] 16  BP: ()/(21-71) 149/68    Intake/Output Summary (Last 24 hours) at 3/10/2022 1041  Last data filed at 3/10/2022 0612  Gross per 24 hour   Intake 5460 ml   Output --   Net 5460 ml     Flowsheet Rows    Flowsheet Row First Filed Value   Admission Height --   Admission Weight 76.7 kg (169 lb) Documented at 03/09/2022 1425          Physical Exam:  Patient is examined using the personal protective equipment as per guidelines from infection control for this particular patient as enacted.  Hand hygiene was performed before and after patient interaction.   General Appearance:    Alert, cooperative, in no acute distress.  Following simple commands  ENT Mallampati between 3 and 4 no nasal congestion  Neck midline trachea, no thyromegaly    Lungs:    Diminished breath sounds no wheezing or crackles    Heart:    Regular rhythm and normal rate, normal S1 and S2, no            murmur, no gallop, no rub, no click   Chest Wall:    No abnormalities observed   Abdomen:    Mild diffuse tenderness no rebound or guarding no masses felt   Extremities:   Moves all extremities well, no edema, no cyanosis, no             redness  CNS no focal neurological deficits normal sensory exam  Skin no rashes no nodules  Musculoskeletal no cyanosis no clubbing normal range of motion     Results Review:        Results from last 7 days   Lab Units 03/10/22  0016 03/09/22  1757 03/09/22  1416   SODIUM mmol/L 134* 131* 132*   POTASSIUM mmol/L 4.8 5.0 5.4*   CHLORIDE mmol/L 103 99 95*   CO2 mmol/L 20.0* 21.0* 22.2   BUN mg/dL 48* 54* 55*   CREATININE mg/dL 2.45* 2.99* 3.30*   GLUCOSE mg/dL 191* 201* 133*   CALCIUM mg/dL 7.8* 8.0* 8.2*     Results from last 7 days   Lab Units 03/09/22  1416   TROPONIN T ng/mL <0.010     Results from last 7 days   Lab Units 03/10/22  0242 03/09/22  1416 03/07/22  1616   WBC 10*3/mm3 8.60 6.23 9.2   HEMOGLOBIN g/dL 9.8* 10.3* 12.7   HEMATOCRIT % 30.5* 32.7* 38.6   PLATELETS 10*3/mm3 144 164 227                 Results from last 7 days   Lab Units 03/07/22  1616   TRIGLYCERIDES mg/dL 326*   HDL CHOL mg/dL 37*   LDL CHOL mg/dL 120*           I reviewed the patient's new clinical results.  I personally viewed and interpreted the patient's chest x-ray.        Medication Review:   clopidogrel, 75 mg, Oral, Daily  gabapentin, 100 mg, Oral, TID  heparin (porcine), 5,000 Units, Subcutaneous, Q8H  insulin regular, 0-9 Units, Subcutaneous, Q6H  levETIRAcetam, 500 mg, Oral, BID  metroNIDAZOLE, 500 mg, Oral, Q8H  pantoprazole, 40 mg, Oral, QAM  senna-docusate sodium, 2 tablet, Oral, BID  sodium chloride, 10 mL, Intravenous, Q12H        DOPamine, 2-20 mcg/kg/min, Last Rate: 3 mcg/kg/min (03/10/22 0949)  EPINEPHrine, 0.02-0.3 mcg/kg/min, Last Rate: 0.02  mcg/kg/min (03/10/22 0245)  sodium chloride, 175 mL/hr, Last Rate: 175 mL/hr (03/10/22 0612)        ASSESSMENT:   Shock hypovolemic  Bradycardia  Acute kidney injury on chronic kidney disease  Hyperkalemia  Hyponatremia  Anemia  COPD  URIEL  Chronic diarrhea  Seizure disorder  Diabetes mellitus  Hypertension      PLAN:  Events noted chart reviewed.  Wean down pressors as tolerated.  Will wean dopamine first followed by epinephrine drip.  Continue IV fluids.   Bradycardia felt to be due to electrolyte abnormalities per cardiology.  They are currently following  Acute kidney injury on chronic kidney disease with electrolyte abnormalities.  Creatinine stable and improving with improving electrolytes.  Awaiting nephrology input  COPD currently not in exacerbation.  Continue bronchodilators  Once off pressors will mobilize ambulate  Home meds for seizure disorder  ICU core measures  Discussed plan of care in detail with the patient    Total care time 35 minutes    Merly Jackson MD  03/10/22  10:41 EST

## 2022-03-10 NOTE — CONSULTS
Hospital Consult    Patient Name: Vidhi Lopez  Age/Sex: 71 y.o. female  : 1950  MRN: [id number]    Date of Admission: 3/9/2022  Date of Encounter Visit: 03/10/22  Encounter Provider: Mark Barry MD        Referring Provider: Kevin Singh MD  Patient Care Team:  Abiodun Vila MD as PCP - General (Family Medicine)  Lito Flores MD as Consulting Physician (Cardiology)  Jacky Hernandez MD as Consulting Physician (Infectious Diseases)  Stephanie Almanzar APRN as Nurse Practitioner (Gastroenterology)    Subjective:   Consulted for: bradycardia     Chief Complaint: lightheadness    History of Present Illness:  Vidhi Lopez is a 71 y.o. female with history of hypertension, hyperlipidemia, diabetes, chronic kidney disease, COPD/URIEL (CPAP), and known coronary artery disease with prior PCI to chuy distal left circumflex in . She also has known associated nonischemic cardiomyopathy with an echocardiogram completed in  demonstrating normal LVSF, mild concentric LVH and mild MR/TR. She was previously followed by Dr. Lito Flores in [place], before switching to Dr. Clemons at [facility] when she moved to the [place] area.     She presented to [facility] ED 3/9/22 with reports of lightheadedness. She had seen her PCP on Monday and was told her potassium was elevated.    EKG obtained upon arrival to the ED noted sinus bradycardia, rates in the 40's, and hypotensive in the 70's systolic.     CXR negative acutely.     Troponin negative. proBNP 444. Sodium 132. K 5.4. Creatinine 3.30 ([previously 1.95 22). Hgb 10.3 (previously 12.7 3/7/22).      Previous Cardiac Testing:    Stress Test 10/3/20  Summary    Normal pharmacological stress SPECT Myocardial Perfusion Imaging.    No evidence of stress induced myocardial ischemia or infarction.    Summary of LV Function    Normal LV systolic function.   Risk Stratification    This is a low risk study.      Echocardiogram 10/1/20   Summary    Normal LV systolic function    Mild concentric LVH    Mild mitral regurgitation    Trace/mild tricuspid regurgitation    Cardiac Catheterization 2/12/18  Final Impression: #1: Mild pulmonary artery hypertension and elevation in right atrial pressure is noted during this study.                              #2: Right lower lobe subselective pulmonary angiography is normal.                              #3: Bilateral renal artery angiography is normal.                              #4: Severe stenosis (by angiographic and physiologic criteria) is noted in the distal nondominant circumflex artery.  This was treated with an everolimus eluting stent, this sitting.      Past Medical History:  Past Medical History:   Diagnosis Date   • Allergic rhinitis    • Asthma with COPD (Former[facility])     Asthma/COPD   • Bilateral ovarian cysts    • Coronary artery disease    • Depression with anxiety     Depression/Anxiety   • Diabetes (Former[facility])     pre   • Elevated cholesterol    • Endometriosis     Dr. Jin; estradiol    • ESR raised 03/20/2018   • Fatigue    • Fibromyalgia    • Gout    • Headache     Headaches   • High cholesterol    • History of gallstones    • History of myocardial infarction    • Hypertension    • Influenza B     DX'D 2/6/2018, TREATED WITH TAMIFLU. LAST DOSE 2/11/2018 AM.  PATIENT STATES DR RANGEL IS AWARE. DENIES FEVERS OR CHILLS.   • Interstitial cystitis    • On home oxygen therapy     2 L NC   • URIEL on CPAP    • PONV (postoperative nausea and vomiting)    • Rheumatoid arthritis (Former[facility])    • Seizure (Formerly [facility])    • Seizure disorder, nonconvulsive, with status epilepticus (Formerly [facility]) 03/20/2018   • Sepsis (Formerly [facility])    • Septic joint of right knee joint ([facility]) 03/21/2018   • Shortness of breath on exertion    • Stroke (Former[facility])     X1 8 YEARS AGO, GENERALIZED UPPER BODY WEAKNESS RESIDUAL PER PT    • Thyroid disorder    • Urinary leakage    • UTI (urinary tract infection)    • Wears glasses    • Yeast  dermatitis        Past Surgical History:   Procedure Laterality Date   • ARTERIOGRAM N/A 2/12/2018    Procedure: Renal Arteriogram;  Surgeon: Lito Flores MD;  Location:  [facility] CATH INVASIVE LOCATION;  Service:    • BREAST BIOPSY Right 1991   • CARDIAC CATHETERIZATION N/A 2/12/2018    Procedure: Right Heart Cath;  Surgeon: Lito Flores MD;  Location:  [facility] CATH INVASIVE LOCATION;  Service:    • CAROTID STENT      Transcath    • CAROTID STENT Left    • CHOLECYSTECTOMY     • CYSTOSTOMY W/ BLADDER BIOPSY     • DILATATION AND CURETTAGE     • KNEE ARTHROSCOPY Right 3/23/2018    Procedure: ARTHROSCOPY, RIGHT KNEE  INCISION AND DRAINAGE LOWER EXTREMITY WITH SYNOVIAL BIOPSY;  Surgeon: Dilip Giron MD;  Location:  [facility] OR;  Service: Orthopedics   • LAPAROSCOPIC CHOLECYSTECTOMY  1994    Lap rolando   • OOPHORECTOMY     • PAP SMEAR  05/10/2016   • SUBTOTAL HYSTERECTOMY         Home Medications:   Medications Prior to Admission   Medication Sig Dispense Refill Last Dose   • aspirin 81 MG EC tablet Take 81 mg by mouth Daily.   3/9/2022 at 0900   • atorvastatin (LIPITOR) 80 MG tablet TAKE 1 TABLET BY MOUTH DAILY 90 tablet 3 3/9/2022 at 0900   • clopidogrel (PLAVIX) 75 MG tablet TAKE 1 TABLET BY MOUTH EVERY DAY 90 tablet 3 3/9/2022 at 0900   • esomeprazole (nexIUM) 40 MG capsule Take 1 capsule by mouth Every Morning Before Breakfast. 90 capsule 3 3/9/2022 at 0900   • fluconazole (Diflucan) 200 MG tablet Take 1 tab daily x 3 days 3 tablet 0 3/9/2022 at 0900   • furosemide (LASIX) 40 MG tablet Take 40 mg by mouth. M/W/F TAKE BID  OTHER DAYS TAKE 1 TABLET DAILY   3/9/2022 at 0900   • gabapentin (NEURONTIN) 400 MG capsule Take 1 capsule by mouth 3 (Three) Times a Day. 90 capsule 0 3/9/2022 at 0900   • Insulin Lispro, 1 Unit Dial, (HUMALOG) 100 UNIT/ML solution pen-injector Inject 10 Units under the skin into the appropriate area as directed 3 (Three) Times a Day Before Meals.   3/8/2022 at Unknown time   • levETIRAcetam  (KEPPRA) 500 MG tablet Take 1 tablet by mouth 2 (Two) Times a Day. 180 tablet 3 3/9/2022 at 0900   • losartan (COZAAR) 50 MG tablet Take 50 mg by mouth Daily.   3/9/2022 at 0900   • metoprolol tartrate (LOPRESSOR) 25 MG tablet TAKE 1 TABLET BY MOUTH TWICE DAILY 60 tablet 0 3/9/2022 at 0900   • nystatin (nystatin) 078690 UNIT/GM powder Apply  topically to the appropriate area as directed See Admin Instructions. Apply topically to the affected area twice daily as directed 30 g 1 Past Week at Unknown time   • potassium chloride 10 MEQ CR tablet TAKE 2 TABLETS BY MOUTH DAILY 180 tablet 2 3/6/2022   • Probiotic Product (PROBIOTIC DAILY PO) Take 1 tablet by mouth Daily.   3/9/2022 at 0900   • tiZANidine (ZANAFLEX) 4 MG tablet Take 1 tablet by mouth Every 8 (Eight) Hours As Needed for Muscle Spasms. 30 tablet 1 3/8/2022   • TOUJEO SOLOSTAR 300 UNIT/ML solution pen-injector injection Use 25 Units at night (Patient taking differently: 10 Units. Use 10   Units at night) 1.5 mL 6 3/8/2022 at 2000   • traZODone (DESYREL) 100 MG tablet Take 1 tablet by mouth Every Night. 90 tablet 3 3/8/2022 at 2000   • albuterol sulfate  (90 Base) MCG/ACT inhaler Inhale 2 puffs Every 4 (Four) Hours As Needed for Wheezing. for wheezing 8.5 g 1 1/1/2022   • Diclofenac Sodium (VOLTAREN) 1 % gel gel diclofenac 1 % topical gel   apply 2 grams to affected area four times a day   1/1/2022   • ferrous sulfate 325 (65 FE) MG tablet TK 1 T PO  QD   More than a month at Unknown time   • hydrALAZINE (APRESOLINE) 25 MG tablet Take 1 tablet by mouth 3 (Three) Times a Day. 270 tablet 3 3/7/2022 at 0900   • hydrOXYzine (ATARAX) 50 MG tablet Take 1 tablet by mouth Every 8 (Eight) Hours As Needed for Itching. 90 tablet 2 More than a month at Unknown time   • ipratropium-albuterol (DUO-NEB) 0.5-2.5 mg/3 ml nebulizer Take 3 mL by nebulization 4 (Four) Times a Day. 360 mL 3 More than a month at Unknown time   • meclizine (ANTIVERT) 25 MG tablet Take 1 tablet  by mouth 3 (Three) Times a Day As Needed for Dizziness. 60 tablet 0 More than a month at Unknown time   • montelukast (SINGULAIR) 10 MG tablet Singulair 10 mg tablet   Daily   More than a month at Unknown time   • nitroglycerin (NITROSTAT) 0.4 MG SL tablet Place 0.4 mg under the tongue Every 5 (Five) Minutes As Needed.  0 Unknown at Unknown time   • ondansetron (Zofran) 4 MG tablet Take 1 tablet by mouth Every 8 (Eight) Hours As Needed for Nausea or Vomiting. 60 tablet 1 More than a month at Unknown time   • promethazine (PHENERGAN) 25 MG tablet Take 25 mg by mouth Daily.   More than a month at Unknown time   • rOPINIRole (REQUIP) 0.25 MG tablet Take 1 tablet by mouth Every Night. 90 tablet 3 Unknown at Unknown time       Allergies:  Allergies   Allergen Reactions   • Amlodipine Swelling   • Reglan [Metoclopramide] Unknown - High Severity     DYSTONIC REACTION       Past Social History:  Social History     Socioeconomic History   • Marital status:    Tobacco Use   • Smoking status: Current Some Day Smoker     Packs/day: 0.25     Years: 40.00     Pack years: 10.00     Types: Cigarettes     Last attempt to quit: 2020     Years since quittin.1   • Smokeless tobacco: Never Used   • Tobacco comment: in process of quiting--AVERAGE 1 PPD, DOWN TO 6 CIG PER DAY X2 WEEKS    Substance and Sexual Activity   • Alcohol use: Yes     Alcohol/week: 1.0 standard drink     Types: 1 Glasses of wine per week     Comment: occasional   • Drug use: No   • Sexual activity: Defer        Past Family History:  Family History   Problem Relation Age of Onset   • Cancer Other    • Diabetes Other    • Heart attack Other    • Hyperlipidemia Other    • Hypertension Other    • Osteoporosis Other    • Stroke Other    • Breast cancer Mother    • Osteoporosis Mother    • Colon cancer Father    • Colon polyps Father    • Irritable bowel syndrome Neg Hx    • Ulcerative colitis Neg Hx    • Crohn's disease Neg Hx        Review of Systems: All  systems reviewed. Pertinent positives identified in HPI. All other systems are negative.     REVIEW OF SYSTEMS:   CONSTITUTIONAL: No weight loss, fever, chills, weakness or fatigue.   HEENT: Eyes: No visual loss, blurred vision, double vision or yellow sclerae. Ears, Nose, Throat: No hearing loss, sneezing, congestion, runny nose or sore throat.   SKIN: No rash or itching.     RESPIRATORY: No shortness of breath, hemoptysis, cough or sputum.   GASTROINTESTINAL: No anorexia, nausea, vomiting or diarrhea. No abdominal pain, bright red blood per rectum or melena.  GENITOURINARY: No burning on urination, hematuria or increased frequency.  NEUROLOGICAL: No headache, dizziness, syncope, paralysis, ataxia, numbness or tingling in the extremities. No change in bowel or bladder control.   MUSCULOSKELETAL: No muscle, back pain, joint pain or stiffness.   HEMATOLOGIC: No anemia, bleeding or bruising.   LYMPHATICS: No enlarged nodes. No history of splenectomy.   PSYCHIATRIC: No history of depression, anxiety, hallucinations.   ENDOCRINOLOGIC: No reports of sweating, cold or heat intolerance. No polyuria or polydipsia.       Objective:     Objective:  Temp:  [97.5 °F (36.4 °C)-98.1 °F (36.7 °C)] 98.1 °F (36.7 °C)  Heart Rate:  [40-68] 68  Resp:  [14-18] 15  BP: ()/(21-63) 116/56    Intake/Output Summary (Last 24 hours) at 3/10/2022 0812  Last data filed at 3/10/2022 0612  Gross per 24 hour   Intake 5460 ml   Output --   Net 5460 ml     Body mass index is 29.02 kg/m².      03/09/22  1425 03/10/22  0600   Weight: 76.7 kg (169 lb) 76.7 kg (169 lb 1.5 oz)           Physical Exam:   Vitals reviewed.   Constitutional:       Appearance: Healthy appearance. Well-developed and not in distress.   HENT:      Head: Normocephalic.   Neck:      Thyroid: No thyromegaly.      Vascular: No carotid bruit or JVD.   Pulmonary:      Effort: Pulmonary effort is normal.      Breath sounds: Normal breath sounds.   Cardiovascular:      Normal  rate. Regular rhythm. Normal S1. Normal S2.      Murmurs: There is no murmur.      No gallop. No click. No rub.   Pulses:     Intact distal pulses.   Edema:     Peripheral edema present.     Ankle: bilateral 1+ edema of the ankle.     Feet: 1+ edema of the left foot.  Skin:     General: Skin is warm and dry.      Findings: No erythema.   Neurological:      Mental Status: Alert and oriented to person, place, and time.           Lab Review:     Results from last 7 days   Lab Units 03/10/22  0016 03/09/22  1757 03/09/22  1416   SODIUM mmol/L 134* 131* 132*   POTASSIUM mmol/L 4.8 5.0 5.4*   CHLORIDE mmol/L 103 99 95*   CO2 mmol/L 20.0* 21.0* 22.2   BUN mg/dL 48* 54* 55*   CREATININE mg/dL 2.45* 2.99* 3.30*   GLUCOSE mg/dL 191* 201* 133*   CALCIUM mg/dL 7.8* 8.0* 8.2*       Results from last 7 days   Lab Units 03/09/22  1416   TROPONIN T ng/mL <0.010     Results from last 7 days   Lab Units 03/10/22  0242   WBC 10*3/mm3 8.60   HEMOGLOBIN g/dL 9.8*   HEMATOCRIT % 30.5*   PLATELETS 10*3/mm3 144                 Results from last 7 days   Lab Units 03/07/22  1616   TRIGLYCERIDES mg/dL 326*   HDL CHOL mg/dL 37*   LDL CHOL mg/dL 120*     Results from last 7 days   Lab Units 03/09/22  1416   PROBNP pg/mL 444.0         Results from last 7 days   Lab Units 03/09/22  1416   TSH uIU/mL 0.450       Imaging:      Imaging Results (Most Recent)     Procedure Component Value Units Date/Time    CT Head Without Contrast [[id number]] Collected: 03/09/22 1852     Updated: 03/09/22 1857    Narrative:      CT HEAD WO CONTRAST-     INDICATIONS: Trauma     TECHNIQUE: Radiation dose reduction techniques were utilized, including  automated exposure control and exposure modulation based on body size.  Noncontrast head CT     COMPARISON: None available     FINDINGS:     No acute intracranial hemorrhage, midline shift or mass effect. No acute  territorial infarct is identified. Encephalomalacia/old infarct change  is apparent in the right  cerebellum.     Mild periventricular hypodensities suggest chronic small vessel ischemic  change in a patient this age.     Ventricles, cisterns, cerebral sulci are unremarkable for patient age.     The visualized paranasal sinuses, orbits, mastoid air cells are  unremarkable.       Impression:         No acute intracranial hemorrhage or hydrocephalus. Chronic changes in  the brain. If there is further clinical concern, MRI could be considered  for further evaluation.     This report was finalized on 3/9/2022 6:54 PM by Dr. Danial Livingston M.D.       XR Chest 1 View [[id number]] Collected: 03/09/22 1429     Updated: 03/09/22 1436    Narrative:      XR CHEST 1 VW-     HISTORY: Female who is 71 years-old,  short of breath     TECHNIQUE: Frontal views of the chest     COMPARISON: 05/19/2020     FINDINGS: The heart size is borderline. Pulmonary vasculature is  unremarkable. No focal pulmonary consolidation, pleural effusion, or  pneumothorax. A metallic radiodensity projecting over the right lung  base, about 1.3 cm, presumably outside the patient, but correlate  clinically to exclude any possibility of foreign body. No acute osseous  process.       Impression:      No focal pulmonary consolidation. Borderline heart size.  Follow-up as clinical indications persist.     This report was finalized on 3/9/2022 2:33 PM by Dr. Danial Livingston M.D.             EKG 3/9/22    Previous EKG 11/11/20                Assessment:   Assessment/Plan         Shock (HCC)  1.  Hypovolemic shock.  The patient's blood pressure has been improved with IV hydration as well as pressors.  The pressors and fluid boluses will be decreased.  Echocardiogram pending  2.  Coronary artery disease: Previous left circumflex intervention 2018.  Continues on aspirin and clopidogrel  3.  Acute renal failure/chronic kidney disease: Per nephrology.  They are managing fluid and antihypertensive medications at the present time  4.  Hypertension: We will  need to judiciously restart her medications  5.  Bradycardia: Probably electrolyte abnormality induced.  Patient now with normal sinus rhythm with a normal rate.  We will recheck electrocardiogram      There does not appear to be evidence of an acute myocardial infarction.  I do not believe her shock is cardiac based but rather hypovolemic.  Await electrocardiogram and echocardiogram for further recommendations.    Portions of the above entered by Emily Persaud RN have been validated by myself.      Thank you for allowing me to participate in the care of Vidhi BUI Lopez. Feel free to contact me directly with any further questions or concerns.    Mark Barry MD  [facility]  03/10/22  08:12 EST

## 2022-03-10 NOTE — PROGRESS NOTES
Discharge Planning Assessment  [facility]     Patient Name: Vidhi Lopez  MRN: [id number]  Today's Date: 3/10/2022    Admit Date: 3/9/2022     Discharge Needs Assessment     Row Name 03/10/22 1327       Living Environment    People in Home facility resident    Current Living Arrangements assisted living facility    Primary Care Provided by other (see comments)    Able to Return to Prior Arrangements yes       Resource/Environmental Concerns    Resource/Environmental Concerns none       Transition Planning    Patient/Family Anticipates Transition to home    Patient/Family Anticipated Services at Transition rehabilitation services    Transportation Anticipated family or friend will provide       Discharge Needs Assessment    Current Outpatient/Agency/Support Group homecare agency    Equipment Currently Used at Home none    Concerns to be Addressed discharge planning    Anticipated Changes Related to Illness none    Equipment Needed After Discharge none    Outpatient/Agency/Support Group Needs assisted living facility    Discharge Facility/Level of Care Needs home with home health    Provided Post Acute Provider List? N/A    Provided Post Acute Provider Quality & Resource List? N/A               Discharge Plan     Row Name 03/10/22 1329       Plan    Plan Plan is to return to Assisted Living at [facility]    Plan Comments IMM noted.  CCP spoke to patient at bedside.  CCP role explained and discharge planning discussed. Face sheet verified.  Pt‘s PCP is Dr. Abiodun Calderon    Pt’s emergency contact is her daughter Dorina, Pt lives in a senior [facility] assisted living facility.   She uses no DME to ambulate.  She is independent with most ADL’s.  Pt obtains her medications from [facility] on [facility].  Plan is home at [facility].  Pt would like PT order if it is available at facility.  CCP following for therapy evaluations.  CCP following for discharge needs               Continued Care and Services - Admitted Since 3/9/2022    Coordination has not been started for this encounter.          Demographic Summary    No documentation.                Functional Status    No documentation.                Psychosocial    No documentation.                Abuse/Neglect    No documentation.                Legal    No documentation.                Substance Abuse    No documentation.                Patient Forms    No documentation.                   Rupali Mota RN

## 2022-03-10 NOTE — DISCHARGE PLACEMENT REQUEST
"Vidhi Lopez (71 y.o. Female)             Date of Birth   1950    Social Security Number       Address   [address] [place] [address] [address] [place], [place] [address]    Home Phone   [phone]    MRN   [id number]       [employer]    Marital Status                               Admission Date   3/9/22    Admission Type   Emergency    Admitting Provider   Kevin Singh MD    Attending Provider   Kevin Singh MD    Department, Room/Bed   [facility], N340/1       Discharge Date       Discharge Disposition       Discharge Destination                               Attending Provider: Kevin Singh MD    Allergies: Amlodipine, Reglan [Metoclopramide]    Isolation: Spore   Infection: C.difficile (rule out) (03/03/22)   Code Status: CPR   Advance Care Planning Activity    Ht: 162.6 cm (64\")   Wt: 76.7 kg (169 lb 1.5 oz)    Admission Cmt: None   Principal Problem: None                Active Insurance as of 3/9/2022     Primary Coverage     Payor Plan Insurance Group Employer/Plan Group    MEDICARE MEDICARE A & B      Payor Plan Address Payor Plan Phone Number Payor Plan Fax Number Effective Dates    [address] [phone]  3/1/2016 - None Entered    [facility] [id number]       Subscriber Name Subscriber Birth Date Member ID       VIDHI LOPEZ 1950 [id number]           Secondary Coverage     Payor Plan Insurance Group Employer/Plan Group    ANTHEM BLUE CROSS [facility] SUPP [id number]     Payor Plan Address Payor Plan Phone Number Payor Plan Fax Number Effective Dates    [address]   6/1/2016 - None Entered    [facility] [id number]       Subscriber Name Subscriber Birth Date Member ID       VIDHI LOPEZ 1950 [id number]                 Emergency Contacts      (Rel.) Home Phone Work Phone Mobile Phone    SHARON KONG (Sister) [phone] -- [phone]    GONZÁLESNAATLYA (Daughter) -- -- [phone]              "

## 2022-03-10 NOTE — CONSULTS
[facility] Consult Note      Patient Name: Vidhi Lopez  : 1950  MRN: [id number]  Primary Care Physician:  Abiodun Vila MD  Referring Physician: Kevin Singh MD  Date of admission: 3/9/2022    Subjective     Reason for Consult:  GASPER     HPI:   Vidhi Lopez is a 71 y.o. female known to have history of chronic kidney disease stage III with baseline creatinine around 1.5 followed by Dr. Lenny Dotson, history of hypertension, history of coronary artery disease, history of diabetes mellitus type 2 who presented to the hospital with dizziness and lightheadedness.  The history goes back to 3 weeks prior to presentation with patient complaining of diarrhea and recurrent episode of vomiting.  She had more than 6 bowel movements per day on most days for the past couple of weeks.  Her creatinine baseline has been around 1.4-1.5 since .  In ER she was found to be hypotensive with pressures around 60 and bradycardic with heart rate in the 40s.  Creatinine increased to 3.3 and potassium was 6.6.  The patient was started on IV fluids with improvement of her creatinine down to 2.45.  Potassium is much better today.  Patient is currently on norepinephrine and dopamine.  Patient was seen today in ICU doing much better.  Nausea nausea no vomiting no fever no chills.      Review of Systems:   14 point review of systems is otherwise negative except for mentioned above on HPI    Personal History     Past Medical History:   Diagnosis Date   • Allergic rhinitis    • Asthma with COPD (HCC)     Asthma/COPD   • Bilateral ovarian cysts    • Coronary artery disease    • Depression with anxiety     Depression/Anxiety   • Diabetes (HCC)     pre   • Elevated cholesterol    • Endometriosis     Dr. Jin; estradiol    • ESR raised 2018   • Fatigue    • Fibromyalgia    • Gout    • Headache     Headaches   • High cholesterol    • History of gallstones    • History of myocardial  infarction    • Hypertension    • Influenza B     DX'D 2/6/2018, TREATED WITH TAMIFLU. LAST DOSE 2/11/2018 AM.  PATIENT STATES DR FLORES IS AWARE. DENIES FEVERS OR CHILLS.   • Interstitial cystitis    • On home oxygen therapy     2 L NC   • URIEL on CPAP    • PONV (postoperative nausea and vomiting)    • Rheumatoid arthritis (HCC)    • Seizure (HCC)    • Seizure disorder, nonconvulsive, with status epilepticus (HCC) 03/20/2018   • Sepsis (HCC)    • Septic joint of right knee joint ([facility]) 03/21/2018   • Shortness of breath on exertion    • Stroke ([facility])     X1 8 YEARS AGO, GENERALIZED UPPER BODY WEAKNESS RESIDUAL PER PT    • Thyroid disorder    • Urinary leakage    • UTI (urinary tract infection)    • Wears glasses    • Yeast dermatitis        Past Surgical History:   Procedure Laterality Date   • ARTERIOGRAM N/A 2/12/2018    Procedure: Renal Arteriogram;  Surgeon: Lito Flores MD;  Location:  [facility] CATH INVASIVE LOCATION;  Service:    • BREAST BIOPSY Right 1991   • CARDIAC CATHETERIZATION N/A 2/12/2018    Procedure: Right Heart Cath;  Surgeon: Lito Flores MD;  Location:  [facility] CATH INVASIVE LOCATION;  Service:    • CAROTID STENT      Transcath    • CAROTID STENT Left    • CHOLECYSTECTOMY     • CYSTOSTOMY W/ BLADDER BIOPSY     • DILATATION AND CURETTAGE     • KNEE ARTHROSCOPY Right 3/23/2018    Procedure: ARTHROSCOPY, RIGHT KNEE  INCISION AND DRAINAGE LOWER EXTREMITY WITH SYNOVIAL BIOPSY;  Surgeon: Dilip Giron MD;  Location: [facility] OR;  Service: Orthopedics   • LAPAROSCOPIC CHOLECYSTECTOMY  1994    Lap [place]   • OOPHORECTOMY     • PAP SMEAR  05/10/2016   • SUBTOTAL HYSTERECTOMY         Family History: family history includes Breast cancer in her mother; Cancer in an other family member; Colon cancer in her father; Colon polyps in her father; Diabetes in an other family member; Heart attack in an other family member; Hyperlipidemia in an other family member; Hypertension in an other family member; Osteoporosis  in her mother and another family member; Stroke in an other family member.    Social History:  reports that she has been smoking cigarettes. She has a 10.00 pack-year smoking history. She has never used smokeless tobacco. She reports current alcohol use of about 1.0 standard drink of alcohol per week. She reports that she does not use drugs.    Home Medications:  Prior to Admission medications    Medication Sig Start Date End Date Taking? Authorizing Provider   aspirin 81 MG EC tablet Take 81 mg by mouth Daily.   Yes Mercy Zhao MD   atorvastatin (LIPITOR) 80 MG tablet TAKE 1 TABLET BY MOUTH DAILY 11/23/21  Yes Abiodun Vila MD   clopidogrel (PLAVIX) 75 MG tablet TAKE 1 TABLET BY MOUTH EVERY DAY 7/13/20  Yes Lito Flores MD   esomeprazole (nexIUM) 40 MG capsule Take 1 capsule by mouth Every Morning Before Breakfast. 3/3/22  Yes Stephanie Almanzar APRN   fluconazole (Diflucan) 200 MG tablet Take 1 tab daily x 3 days 3/3/22  Yes Stephanie Almanzar APRN   furosemide (LASIX) 40 MG tablet Take 40 mg by mouth. M/W/F TAKE BID  OTHER DAYS TAKE 1 TABLET DAILY   Yes Mercy Zhao MD   gabapentin (NEURONTIN) 400 MG capsule Take 1 capsule by mouth 3 (Three) Times a Day. 3/7/22  Yes Abiodun Vila MD   Insulin Lispro, 1 Unit Dial, (HUMALOG) 100 UNIT/ML solution pen-injector Inject 10 Units under the skin into the appropriate area as directed 3 (Three) Times a Day Before Meals.   Yes Mercy Zhao MD   levETIRAcetam (KEPPRA) 500 MG tablet Take 1 tablet by mouth 2 (Two) Times a Day. 3/7/22  Yes Abiodun Vila MD   losartan (COZAAR) 50 MG tablet Take 50 mg by mouth Daily.   Yes Mercy Zhao MD   metoprolol tartrate (LOPRESSOR) 25 MG tablet TAKE 1 TABLET BY MOUTH TWICE DAILY 2/21/22  Yes Abiodun Vila MD   nystatin (nystatin) 669272 UNIT/GM powder Apply  topically to the appropriate area as directed See Admin Instructions. Apply topically to the affected  area twice daily as directed 3/3/22  Yes Stephanie Almanzar APRN   potassium chloride 10 MEQ CR tablet TAKE 2 TABLETS BY MOUTH DAILY 4/29/21  Yes Abiodun Vila MD   Probiotic Product (PROBIOTIC DAILY PO) Take 1 tablet by mouth Daily.   Yes ProviderMercy MD   tiZANidine (ZANAFLEX) 4 MG tablet Take 1 tablet by mouth Every 8 (Eight) Hours As Needed for Muscle Spasms. 8/5/21  Yes Abiodun Vila MD   TOUJEO SOLOSTAR 300 UNIT/ML solution pen-injector injection Use 25 Units at night  Patient taking differently: 10 Units. Use 10   Units at night 10/30/19  Yes Abiodun Vila MD   traZODone (DESYREL) 100 MG tablet Take 1 tablet by mouth Every Night. 4/19/21  Yes Abiodun Vila MD   albuterol sulfate  (90 Base) MCG/ACT inhaler Inhale 2 puffs Every 4 (Four) Hours As Needed for Wheezing. for wheezing 4/12/21   Ana Mena APRN   Diclofenac Sodium (VOLTAREN) 1 % gel gel diclofenac 1 % topical gel   apply 2 grams to affected area four times a day    ProviderMercy MD   ferrous sulfate 325 (65 FE) MG tablet TK 1 T PO  QD 10/28/20   ProviderMercy MD   hydrALAZINE (APRESOLINE) 25 MG tablet Take 1 tablet by mouth 3 (Three) Times a Day. 3/7/22   Abiodun Vila MD   hydrOXYzine (ATARAX) 50 MG tablet Take 1 tablet by mouth Every 8 (Eight) Hours As Needed for Itching. 1/27/22   Abiodun Vila MD   ipratropium-albuterol (DUO-NEB) 0.5-2.5 mg/3 ml nebulizer Take 3 mL by nebulization 4 (Four) Times a Day. 9/9/20   Oliver Garcia MD   meclizine (ANTIVERT) 25 MG tablet Take 1 tablet by mouth 3 (Three) Times a Day As Needed for Dizziness. 3/19/21   Abiodun Vila MD   montelukast (SINGULAIR) 10 MG tablet Singulair 10 mg tablet   Daily    ProviderMercy MD   nitroglycerin (NITROSTAT) 0.4 MG SL tablet Place 0.4 mg under the tongue Every 5 (Five) Minutes As Needed. 2/11/18   Provider, Mercy, MD   ondansetron (Zofran) 4 MG  tablet Take 1 tablet by mouth Every 8 (Eight) Hours As Needed for Nausea or Vomiting. 3/7/22   Abiodun Vila MD   promethazine (PHENERGAN) 25 MG tablet Take 25 mg by mouth Daily.    Provider, MD Mercy   rOPINIRole (REQUIP) 0.25 MG tablet Take 1 tablet by mouth Every Night. 4/26/21   Abiodun Vila MD       Allergies:  Allergies   Allergen Reactions   • Amlodipine Swelling   • Reglan [Metoclopramide] Unknown - High Severity     DYSTONIC REACTION       Objective     Vitals:   Temp:  [97.5 °F (36.4 °C)-98.4 °F (36.9 °C)] 98.4 °F (36.9 °C)  Heart Rate:  [40-77] 72  Resp:  [14-25] 16  BP: ()/(21-71) 149/68  Flow (L/min):  [2] 2    Intake/Output Summary (Last 24 hours) at 3/10/2022 1052  Last data filed at 3/10/2022 0612  Gross per 24 hour   Intake 5460 ml   Output --   Net 5460 ml       Physical Exam:   Constitutional: ill looking  HEENT: Sclera anicteric, no conjunctival injection  Neck: Supple, no thyromegaly, no lymphadenopathy, trachea at midline, no JVD  Respiratory: Clear to auscultation bilaterally, nonlabored respiration  Cardiovascular: RRR, no murmurs, no rubs or gallops, no carotid bruit  Gastrointestinal: Positive bowel sounds, abdomen is soft, nontender and nondistended  : No palpable bladder  Musculoskeletal: No edema, no clubbing or cyanosis  Psychiatric: Appropriate affect, cooperative  Neurologic: Oriented x3, moving all extremities, normal speech and mental status  Skin: Warm and dry       Scheduled Meds:     clopidogrel, 75 mg, Oral, Daily  gabapentin, 100 mg, Oral, TID  heparin (porcine), 5,000 Units, Subcutaneous, Q8H  insulin regular, 0-9 Units, Subcutaneous, Q6H  levETIRAcetam, 500 mg, Oral, BID  metroNIDAZOLE, 500 mg, Oral, Q8H  pantoprazole, 40 mg, Oral, QAM  senna-docusate sodium, 2 tablet, Oral, BID  sodium chloride, 10 mL, Intravenous, Q12H      IV Meds:   DOPamine, 2-20 mcg/kg/min, Last Rate: 3 mcg/kg/min (03/10/22 0949)  EPINEPHrine, 0.02-0.3 mcg/kg/min,  Last Rate: 0.02 mcg/kg/min (03/10/22 0245)  sodium chloride, 175 mL/hr, Last Rate: 175 mL/hr (03/10/22 0612)        Results Reviewed:   I have personally reviewed the results from the time of this admission to 3/10/2022 10:52 EST     Lab Results   Component Value Date    GLUCOSE 191 (H) 03/10/2022    CALCIUM 7.8 (L) 03/10/2022     (L) 03/10/2022    K 4.8 03/10/2022    CO2 20.0 (L) 03/10/2022     03/10/2022    BUN 48 (H) 03/10/2022    CREATININE 2.45 (H) 03/10/2022    EGFRIFAFRI 45 (L) 12/30/2020    EGFRIFNONA 37 (L) 12/30/2020    BCR 19.6 03/10/2022    ANIONGAP 11.0 03/10/2022      Lab Results   Component Value Date    MG 1.8 10/01/2020    PHOS 4.4 05/21/2020    ALBUMIN 3.30 (L) 03/09/2022           Assessment / Plan     ASSESSMENT:  1.  Acute kidney injury on top of chronic kidney disease stage IIIa with baseline creatinine around 1.4-1.5.  Creatinine up to 3.3 likely secondary to prerenal/ATN due to hypovolemia, hypotension, and bradycardia all in setting of losartan use.  Her volume status has improved with IV hydration and creatinine is trending down.  Electrolytes are acceptable  2.  Hyperkalemia with potassium 6.6 improved.  3.  Hypovolemic shock/septic shock on pressors.  Patient was started on Flagyl for possible C. difficile colitis.  4.  Severe bradycardia improved.  Cardiology consulted   5.  Hypertension with CKD.  Blood pressure is low on admission, currently on IV fluids and pressors  6.  Metabolic acidosis: Likely combination of normal and high anion gap metabolic acidosis due to diarrhea, acute kidney injury, hypotension.  No indication for bicarbonate therapy at this time.  7.  Diabetes mellitus  type II with CKD  8. COPD  9. Chronic diarrhea: Seems to have improved  10 .  Anemia of CKD  11.  Hypocalcemia we will replace    Plan:  -The patient volume status has been improving with IV hydration.  -We will decrease IV fluid to 125 cc normal saline.  -Continue to hold losartan.  -We will  check urine sodium, urine protein to creatinine ratio.  -Check renal ultrasound  -Await for urine culture to rule out UTI  -Avoid nephrotoxic agents  -Surveillance lab.  -Check PTH, vitamin D.          Thank you for involving us in the care of Vidhi BUI John.  Please feel free to call with any questions.    Esther Washburn MD  03/10/22  10:52 EST    [facility]  [phone]      Much of this encounter note is an electronic transcription/translation of spoken language to printed text. The electronic translation of spoken language may permit erroneous, or at times, nonsensical words or phrases to be inadvertently transcribed; Although I have reviewed the note for such errors, some may still exist.

## 2022-03-11 ENCOUNTER — APPOINTMENT (OUTPATIENT)
Dept: GENERAL RADIOLOGY | Facility: HOSPITAL | Age: 72
End: 2022-03-11

## 2022-03-11 ENCOUNTER — APPOINTMENT (OUTPATIENT)
Dept: CT IMAGING | Facility: HOSPITAL | Age: 72
End: 2022-03-11

## 2022-03-11 LAB
25(OH)D3 SERPL-MCNC: 15.8 NG/ML (ref 30–100)
ALBUMIN SERPL-MCNC: 3.4 G/DL (ref 3.5–5.2)
ANION GAP SERPL CALCULATED.3IONS-SCNC: 7 MMOL/L (ref 5–15)
BACTERIA BLD CULT: ABNORMAL
BASOPHILS # BLD AUTO: 0.04 10*3/MM3 (ref 0–0.2)
BASOPHILS NFR BLD AUTO: 0.5 % (ref 0–1.5)
BOTTLE TYPE: ABNORMAL
BUN SERPL-MCNC: 22 MG/DL (ref 8–23)
BUN/CREAT SERPL: 24.4 (ref 7–25)
CALCIUM SPEC-SCNC: 8.7 MG/DL (ref 8.6–10.5)
CHLORIDE SERPL-SCNC: 111 MMOL/L (ref 98–107)
CO2 SERPL-SCNC: 21 MMOL/L (ref 22–29)
CREAT SERPL-MCNC: 0.9 MG/DL (ref 0.57–1)
DEPRECATED RDW RBC AUTO: 42.5 FL (ref 37–54)
EGFRCR SERPLBLD CKD-EPI 2021: 68.9 ML/MIN/1.73
EOSINOPHIL # BLD AUTO: 0.15 10*3/MM3 (ref 0–0.4)
EOSINOPHIL NFR BLD AUTO: 2 % (ref 0.3–6.2)
ERYTHROCYTE [DISTWIDTH] IN BLOOD BY AUTOMATED COUNT: 13 % (ref 12.3–15.4)
GLUCOSE BLDC GLUCOMTR-MCNC: 100 MG/DL (ref 70–130)
GLUCOSE BLDC GLUCOMTR-MCNC: 116 MG/DL (ref 70–130)
GLUCOSE BLDC GLUCOMTR-MCNC: 117 MG/DL (ref 70–130)
GLUCOSE BLDC GLUCOMTR-MCNC: 194 MG/DL (ref 70–130)
GLUCOSE SERPL-MCNC: 90 MG/DL (ref 65–99)
HCT VFR BLD AUTO: 30.2 % (ref 34–46.6)
HGB BLD-MCNC: 10.1 G/DL (ref 12–15.9)
IMM GRANULOCYTES # BLD AUTO: 0.03 10*3/MM3 (ref 0–0.05)
IMM GRANULOCYTES NFR BLD AUTO: 0.4 % (ref 0–0.5)
LYMPHOCYTES # BLD AUTO: 0.78 10*3/MM3 (ref 0.7–3.1)
LYMPHOCYTES NFR BLD AUTO: 10.4 % (ref 19.6–45.3)
MCH RBC QN AUTO: 30 PG (ref 26.6–33)
MCHC RBC AUTO-ENTMCNC: 33.4 G/DL (ref 31.5–35.7)
MCV RBC AUTO: 89.6 FL (ref 79–97)
MONOCYTES # BLD AUTO: 0.45 10*3/MM3 (ref 0.1–0.9)
MONOCYTES NFR BLD AUTO: 6 % (ref 5–12)
NEUTROPHILS NFR BLD AUTO: 6.03 10*3/MM3 (ref 1.7–7)
NEUTROPHILS NFR BLD AUTO: 80.7 % (ref 42.7–76)
NRBC BLD AUTO-RTO: 0 /100 WBC (ref 0–0.2)
PHOSPHATE SERPL-MCNC: 2.1 MG/DL (ref 2.5–4.5)
PLATELET # BLD AUTO: 139 10*3/MM3 (ref 140–450)
PMV BLD AUTO: 10.1 FL (ref 6–12)
POTASSIUM SERPL-SCNC: 4.7 MMOL/L (ref 3.5–5.2)
RBC # BLD AUTO: 3.37 10*6/MM3 (ref 3.77–5.28)
SODIUM SERPL-SCNC: 139 MMOL/L (ref 136–145)
WBC NRBC COR # BLD: 7.48 10*3/MM3 (ref 3.4–10.8)

## 2022-03-11 PROCEDURE — 82962 GLUCOSE BLOOD TEST: CPT

## 2022-03-11 PROCEDURE — 94799 UNLISTED PULMONARY SVC/PX: CPT

## 2022-03-11 PROCEDURE — 82306 VITAMIN D 25 HYDROXY: CPT | Performed by: INTERNAL MEDICINE

## 2022-03-11 PROCEDURE — 85025 COMPLETE CBC W/AUTO DIFF WBC: CPT | Performed by: INTERNAL MEDICINE

## 2022-03-11 PROCEDURE — 80069 RENAL FUNCTION PANEL: CPT | Performed by: HOSPITALIST

## 2022-03-11 PROCEDURE — 25010000002 HEPARIN (PORCINE) PER 1000 UNITS: Performed by: INTERNAL MEDICINE

## 2022-03-11 PROCEDURE — 94640 AIRWAY INHALATION TREATMENT: CPT

## 2022-03-11 PROCEDURE — 25010000002 IOPAMIDOL 61 % SOLUTION: Performed by: INTERNAL MEDICINE

## 2022-03-11 PROCEDURE — 63710000001 INSULIN REGULAR HUMAN PER 5 UNITS: Performed by: INTERNAL MEDICINE

## 2022-03-11 PROCEDURE — 71045 X-RAY EXAM CHEST 1 VIEW: CPT

## 2022-03-11 PROCEDURE — 94760 N-INVAS EAR/PLS OXIMETRY 1: CPT

## 2022-03-11 PROCEDURE — 94761 N-INVAS EAR/PLS OXIMETRY MLT: CPT

## 2022-03-11 PROCEDURE — 74177 CT ABD & PELVIS W/CONTRAST: CPT

## 2022-03-11 PROCEDURE — 99232 SBSQ HOSP IP/OBS MODERATE 35: CPT | Performed by: INTERNAL MEDICINE

## 2022-03-11 PROCEDURE — 94664 DEMO&/EVAL PT USE INHALER: CPT

## 2022-03-11 PROCEDURE — 25010000002 MORPHINE PER 10 MG: Performed by: INTERNAL MEDICINE

## 2022-03-11 PROCEDURE — 0 DIATRIZOATE MEGLUMINE & SODIUM PER 1 ML: Performed by: INTERNAL MEDICINE

## 2022-03-11 RX ORDER — NICOTINE 21 MG/24HR
1 PATCH, TRANSDERMAL 24 HOURS TRANSDERMAL
Status: DISCONTINUED | OUTPATIENT
Start: 2022-03-11 | End: 2022-03-15 | Stop reason: HOSPADM

## 2022-03-11 RX ORDER — TIZANIDINE 4 MG/1
4 TABLET ORAL EVERY 8 HOURS PRN
Status: DISCONTINUED | OUTPATIENT
Start: 2022-03-11 | End: 2022-03-11 | Stop reason: SDUPTHER

## 2022-03-11 RX ORDER — ROPINIROLE 0.5 MG/1
0.25 TABLET, FILM COATED ORAL NIGHTLY
Status: DISCONTINUED | OUTPATIENT
Start: 2022-03-11 | End: 2022-03-15 | Stop reason: HOSPADM

## 2022-03-11 RX ADMIN — LEVETIRACETAM 500 MG: 500 TABLET, FILM COATED ORAL at 21:20

## 2022-03-11 RX ADMIN — Medication 1 PATCH: at 12:07

## 2022-03-11 RX ADMIN — DIATRIZOATE MEGLUMINE AND DIATRIZOATE SODIUM 30 ML: 600; 100 SOLUTION ORAL; RECTAL at 13:43

## 2022-03-11 RX ADMIN — TIZANIDINE 4 MG: 4 TABLET ORAL at 21:20

## 2022-03-11 RX ADMIN — LEVETIRACETAM 500 MG: 500 TABLET, FILM COATED ORAL at 08:35

## 2022-03-11 RX ADMIN — GABAPENTIN 400 MG: 400 CAPSULE ORAL at 08:35

## 2022-03-11 RX ADMIN — CLOPIDOGREL 75 MG: 75 TABLET, FILM COATED ORAL at 08:35

## 2022-03-11 RX ADMIN — HYDROCODONE BITARTRATE AND ACETAMINOPHEN 1 TABLET: 5; 325 TABLET ORAL at 00:12

## 2022-03-11 RX ADMIN — Medication 10 ML: at 21:20

## 2022-03-11 RX ADMIN — GABAPENTIN 400 MG: 400 CAPSULE ORAL at 21:20

## 2022-03-11 RX ADMIN — PANTOPRAZOLE SODIUM 40 MG: 40 TABLET, DELAYED RELEASE ORAL at 06:54

## 2022-03-11 RX ADMIN — IOPAMIDOL 85 ML: 612 INJECTION, SOLUTION INTRAVENOUS at 17:02

## 2022-03-11 RX ADMIN — MORPHINE SULFATE 1 MG: 2 INJECTION, SOLUTION INTRAMUSCULAR; INTRAVENOUS at 08:35

## 2022-03-11 RX ADMIN — GABAPENTIN 400 MG: 400 CAPSULE ORAL at 16:17

## 2022-03-11 RX ADMIN — IPRATROPIUM BROMIDE AND ALBUTEROL SULFATE 3 ML: 2.5; .5 SOLUTION RESPIRATORY (INHALATION) at 20:01

## 2022-03-11 RX ADMIN — HEPARIN SODIUM 5000 UNITS: 5000 INJECTION INTRAVENOUS; SUBCUTANEOUS at 21:20

## 2022-03-11 RX ADMIN — HYDROCODONE BITARTRATE AND ACETAMINOPHEN 1 TABLET: 5; 325 TABLET ORAL at 06:54

## 2022-03-11 RX ADMIN — MORPHINE SULFATE 1 MG: 2 INJECTION, SOLUTION INTRAMUSCULAR; INTRAVENOUS at 03:42

## 2022-03-11 RX ADMIN — IPRATROPIUM BROMIDE AND ALBUTEROL SULFATE 3 ML: 2.5; .5 SOLUTION RESPIRATORY (INHALATION) at 16:04

## 2022-03-11 RX ADMIN — IPRATROPIUM BROMIDE AND ALBUTEROL SULFATE 3 ML: 2.5; .5 SOLUTION RESPIRATORY (INHALATION) at 12:28

## 2022-03-11 RX ADMIN — HYDROCODONE BITARTRATE AND ACETAMINOPHEN 1 TABLET: 5; 325 TABLET ORAL at 13:18

## 2022-03-11 RX ADMIN — HYDROCODONE BITARTRATE AND ACETAMINOPHEN 1 TABLET: 5; 325 TABLET ORAL at 19:11

## 2022-03-11 RX ADMIN — IPRATROPIUM BROMIDE AND ALBUTEROL SULFATE 3 ML: 2.5; .5 SOLUTION RESPIRATORY (INHALATION) at 00:17

## 2022-03-11 RX ADMIN — HEPARIN SODIUM 5000 UNITS: 5000 INJECTION INTRAVENOUS; SUBCUTANEOUS at 13:44

## 2022-03-11 RX ADMIN — Medication 10 ML: at 08:25

## 2022-03-11 RX ADMIN — HEPARIN SODIUM 5000 UNITS: 5000 INJECTION INTRAVENOUS; SUBCUTANEOUS at 06:30

## 2022-03-11 RX ADMIN — IPRATROPIUM BROMIDE AND ALBUTEROL SULFATE 3 ML: 2.5; .5 SOLUTION RESPIRATORY (INHALATION) at 09:11

## 2022-03-11 RX ADMIN — INSULIN HUMAN 2 UNITS: 100 INJECTION, SOLUTION PARENTERAL at 12:09

## 2022-03-11 RX ADMIN — ROPINIROLE HYDROCHLORIDE 0.25 MG: 0.5 TABLET, FILM COATED ORAL at 22:08

## 2022-03-11 RX ADMIN — TIZANIDINE 4 MG: 4 TABLET ORAL at 13:18

## 2022-03-11 NOTE — PROGRESS NOTES
"Vidhi Lopez  1950 71 y.o.  [id number]      Patient Care Team:  Abiodun Vila MD as PCP - General (Family Medicine)  Lito Flores MD as Consulting Physician (Cardiology)  Jacky Hernandez MD as Consulting Physician (Infectious Diseases)  Stephanie Almanzar APRN as Nurse Practitioner (Gastroenterology)    CC: Bradycardia hypotension on admission with acute renal failure    Interval History: Little bit better now blood pressure is high      Objective   Vital Signs  Temp:  [98.5 °F (36.9 °C)-99.1 °F (37.3 °C)] 98.5 °F (36.9 °C)  Heart Rate:  [] 100  Resp:  [15-27] 22  BP: (100-176)/() 167/81    Intake/Output Summary (Last 24 hours) at 3/11/2022 1046  Last data filed at 3/11/2022 0803  Gross per 24 hour   Intake 1867.24 ml   Output 2500 ml   Net -632.76 ml     Flowsheet Rows    Flowsheet Row First Filed Value   Admission Height 162.5 cm (63.98\") Documented at 03/09/2022 1545   Admission Weight 76.7 kg (169 lb) Documented at 03/09/2022 1425          Physical Exam:   General Appearance:    Alert,oriented, in no acute distress   Lungs:     Clear to auscultation,BS are equal    Heart:    Normal S1 and S2, RRR without murmur, gallop or rub   HEENT:    Sclerae are clear, no JVD or adenopathy   Abdomen:     Normal bowel sounds, soft nontender, nondistended, no HSM   Extremities:   Moves all extremities well, no edema, no cyanosis, no             Redness, no rash     Medication Review:      clopidogrel, 75 mg, Oral, Daily  gabapentin, 400 mg, Oral, TID  heparin (porcine), 5,000 Units, Subcutaneous, Q8H  insulin regular, 0-9 Units, Subcutaneous, Q6H  ipratropium-albuterol, 3 mL, Nebulization, 4x Daily - RT  levETIRAcetam, 500 mg, Oral, BID  nicotine, 1 patch, Transdermal, Q24H  pantoprazole, 40 mg, Oral, QAM  senna-docusate sodium, 2 tablet, Oral, BID  sodium chloride, 10 mL, Intravenous, Q12H      DOPamine, 2-20 mcg/kg/min, Last Rate: Stopped (03/10/22 1232)  EPINEPHrine, 0.02-0.3 mcg/kg/min, " Last Rate: Stopped (03/10/22 5383)          I reviewed the patient's new clinical results.  I personally viewed and interpreted the patient's EKG/Telemetry data    Assessment/Plan  Active Hospital Problems    Diagnosis  POA   • Shock (HCC) [R57.9]  Yes      Resolved Hospital Problems   No resolved problems to display.       I think her cardiac status is actually pretty good she may need some medication for blood pressure if it stays high I might try a calcium channel blocker on her.  But her rhythm is fine I would probably avoid beta-blockers for her and I would also avoid ACEs and arbs see her as needed    Gurpreet Cullen MD  03/11/22  10:46 EST

## 2022-03-11 NOTE — PROGRESS NOTES
[facility] Progress Note      Patient Name: Vidhi Lopez  : 1950  MRN: [id number]  Primary Care Physician:  Abiodun Vila MD  Date of admission: 3/9/2022    Subjective     Interval History:   Patient seems to be doing better today.  She does complain of mild shortness of breath this morning.  Had episode of nausea.  No vomiting overnight.  Diarrhea seems to have improved.  She denies any chest pain.  She is off pressors.  Appetite seems to be better    Review of Systems:   As noted above    Objective     Vitals:   Temp:  [98.5 °F (36.9 °C)-99.1 °F (37.3 °C)] 98.5 °F (36.9 °C)  Heart Rate:  [70-80] 80  Resp:  [15-27] 21  BP: (100-176)/() 167/81  Flow (L/min):  [2] 2    Intake/Output Summary (Last 24 hours) at 3/11/2022 0858  Last data filed at 3/11/2022 0803  Gross per 24 hour   Intake 1867.24 ml   Output 2500 ml   Net -632.76 ml       Physical Exam:    General Appearance: Frail, ill-looking.  More awake today  Skin: warm and dry  HEENT: oral mucosa normal, nonicteric sclera  Neck: supple, no JVD  Lungs: Bilateral rhonchi noted  Heart: RRR, normal S1 and S2  Abdomen: soft, nontender, nondistended  : no palpable bladder  Extremities: No edema, cyanosis or clubbing  Neuro: normal speech and mental status     Scheduled Meds:     clopidogrel, 75 mg, Oral, Daily  gabapentin, 400 mg, Oral, TID  heparin (porcine), 5,000 Units, Subcutaneous, Q8H  insulin regular, 0-9 Units, Subcutaneous, Q6H  ipratropium-albuterol, 3 mL, Nebulization, 4x Daily - RT  levETIRAcetam, 500 mg, Oral, BID  pantoprazole, 40 mg, Oral, QAM  senna-docusate sodium, 2 tablet, Oral, BID  sodium chloride, 10 mL, Intravenous, Q12H      IV Meds:   DOPamine, 2-20 mcg/kg/min, Last Rate: Stopped (03/10/22 1232)  EPINEPHrine, 0.02-0.3 mcg/kg/min, Last Rate: Stopped (03/10/22 1413)        Results Reviewed:   I have personally reviewed the results from the time of this admission to 3/11/2022 08:58 EST      Results from last 7 days   Lab Units 03/11/22  0334 03/10/22  1242 03/10/22  1111 03/09/22  1757 03/09/22  1416 03/07/22  1616   SODIUM mmol/L 139 138 138   < > 132* 134   POTASSIUM mmol/L 4.7 5.1 5.1   < > 5.4* 6.2*   CHLORIDE mmol/L 111* 107 107   < > 95* 94*   TOTAL CO2 mmol/L  --   --   --   --   --  23   CO2 mmol/L 21.0* 22.0 20.7*   < > 22.2  --    BUN mg/dL 22 35* 35*   < > 55* 35*   CREATININE mg/dL 0.90 1.52* 1.63*   < > 3.30* 1.95*   CALCIUM mg/dL 8.7 8.0*  8.0* 8.0*   < > 8.2* 10.5*   BILIRUBIN mg/dL  --   --   --   --  0.2 0.5   ALK PHOS U/L  --   --   --   --  95 116   ALT (SGPT) U/L  --   --   --   --  6 10   AST (SGOT) U/L  --   --   --   --  7 14   GLUCOSE mg/dL 90 194* 173*   < > 133* 98    < > = values in this interval not displayed.       Estimated Creatinine Clearance: 58.2 mL/min (by C-G formula based on SCr of 0.9 mg/dL).    Results from last 7 days   Lab Units 03/11/22  0334   PHOSPHORUS mg/dL 2.1*       Results from last 7 days   Lab Units 03/07/22  1616   URIC ACID mg/dL 8.5*       Results from last 7 days   Lab Units 03/11/22  0334 03/10/22  0242 03/09/22  1416 03/07/22  1616   WBC 10*3/mm3 7.48 8.60 6.23 9.2   HEMOGLOBIN g/dL 10.1* 9.8* 10.3* 12.7   PLATELETS 10*3/mm3 139* 144 164 227             Assessment / Plan     ASSESSMENT:  1.  Acute kidney injury on top of chronic kidney disease stage IIIa with baseline creatinine around 1.4-1.5.  Creatinine up to 3.3 likely secondary to prerenal/ATN due to hypovolemia, hypotension, and bradycardia all in setting of losartan use.  Creatinine trended down to 0.9 with IV fluids.    2.  Hyperkalemia with potassium 6.6 improved.  3.  Hypovolemic shock/septic shock on pressors.  Patient was started on Flagyl for possible C. difficile colitis.  4.  Severe bradycardia improved.  Cardiology consulted   5.  Hypertension with CKD.  Blood pressure is low on admission, currently on IV fluids and pressors  6.  Metabolic acidosis: Likely combination of  normal and high anion gap metabolic acidosis due to diarrhea, acute kidney injury, hypotension.  No indication for bicarbonate therapy at this time.  7.  Diabetes mellitus  type II with CKD  8. COPD  9. Chronic diarrhea: Seems to have improved  10 .  Anemia of CKD  11.  Hypocalcemia : Resolved with replacement.  Vitamin D level was normal and PTH was appropriately elevated  12.  Hypophosphatemia     Plan:  Her creatinine trended down to 0.9 with IV fluid but now she complains of shortness of breath with lung examination showing bilateral rhonchi and fine crackles.   We will hold on IV fluid for now given dyspnea  We will check chest x-ray.  Continue to monitor creatinine and  electrolyte trends  Check iron studies    Thank you for involving us in the care of Vidhi Lopez.  Please feel free to call with any questions.    Esther Washburn MD  03/11/22  08:58 [facility]    [facility]  [phone]      Much of this encounter note is an electronic transcription/translation of spoken language to printed text. The electronic translation of spoken language may permit erroneous, or at times, nonsensical words or phrases to be inadvertently transcribed; Although I have reviewed the note for such errors, some may still exist.

## 2022-03-11 NOTE — PROGRESS NOTES
[facility]         Dr Merly Jackson   LOS: 2 days   Patient Care Team:  Abiodun Vila MD as PCP - General (Family Medicine)  Lito Flores MD as Consulting Physician (Cardiology)  Jacky Hernandez MD as Consulting Physician (Infectious Diseases)  Stephanie Almanzar APRN as Nurse Practitioner (Gastroenterology)    Chief Complaint: Shock hypovolemic with bradycardia acute kidney injury with hyperkalemia electrolyte abnormalities multiple medical problems and issues    Interval History: She is currently off all pressors and hemodynamically stable.  Reported 3 episodes of diarrhea C. difficile negative.  Continues to have abdominal and back pain.    REVIEW OF SYSTEMS:   CARDIOVASCULAR: No chest pain, chest pressure or chest discomfort. No palpitations or edema.   RESPIRATORY: No shortness of breath, cough or sputum.   GASTROINTESTINAL: Ongoing diarrhea and abdominal pain as above  HEMATOLOGIC: No bleeding or bruising.     Ventilator/Non-Invasive Ventilation Settings (From admission, onward)            None            Vital Signs  Temp:  [98.5 °F (36.9 °C)-99.1 °F (37.3 °C)] 98.5 °F (36.9 °C)  Heart Rate:  [] 100  Resp:  [15-27] 22  BP: (100-176)/() 167/81    Intake/Output Summary (Last 24 hours) at 3/11/2022 0957  Last data filed at 3/11/2022 0803  Gross per 24 hour   Intake 1867.24 ml   Output 2500 ml   Net -632.76 ml     Flowsheet Rows    Flowsheet Row First Filed Value   Admission Height --   Admission Weight 76.7 kg (169 lb) Documented at 03/09/2022 1425          Physical Exam:  Patient is examined using the personal protective equipment as per guidelines from infection control for this particular patient as enacted.  Hand hygiene was performed before and after patient interaction.   General Appearance:    Alert, cooperative, in no acute distress.  Following simple commands  ENT Mallampati between 3 and 4 no nasal congestion  Neck midline trachea, no thyromegaly    Lungs:    Diminished breath sounds no wheezing or crackles    Heart:    Regular rhythm and normal rate, normal S1 and S2, no            murmur, no gallop, no rub, no click   Chest Wall:    No abnormalities observed   Abdomen:    Soft nontender no rebound no guarding   Extremities:   Moves all extremities well, no edema, no cyanosis, no             redness  CNS no focal neurological deficits normal sensory exam  Skin no rashes no nodules  Musculoskeletal no cyanosis no clubbing normal range of motion     Results Review:        Results from last 7 days   Lab Units 03/11/22  0334 03/10/22  1242 03/10/22  1111   SODIUM mmol/L 139 138 138   POTASSIUM mmol/L 4.7 5.1 5.1   CHLORIDE mmol/L 111* 107 107   CO2 mmol/L 21.0* 22.0 20.7*   BUN mg/dL 22 35* 35*   CREATININE mg/dL 0.90 1.52* 1.63*   GLUCOSE mg/dL 90 194* 173*   CALCIUM mg/dL 8.7 8.0*  8.0* 8.0*     Results from last 7 days   Lab Units 03/09/22  1416   TROPONIN T ng/mL <0.010     Results from last 7 days   Lab Units 03/11/22  0334 03/10/22  0242 03/09/22  1416   WBC 10*3/mm3 7.48 8.60 6.23   HEMOGLOBIN g/dL 10.1* 9.8* 10.3*   HEMATOCRIT % 30.2* 30.5* 32.7*   PLATELETS 10*3/mm3 139* 144 164                 Results from last 7 days   Lab Units 03/07/22  1616   TRIGLYCERIDES mg/dL 326*   HDL CHOL mg/dL 37*   LDL CHOL mg/dL 120*           I reviewed the patient's new clinical results.  I personally viewed and interpreted the patient's chest x-ray.        Medication Review:   clopidogrel, 75 mg, Oral, Daily  gabapentin, 400 mg, Oral, TID  heparin (porcine), 5,000 Units, Subcutaneous, Q8H  insulin regular, 0-9 Units, Subcutaneous, Q6H  ipratropium-albuterol, 3 mL, Nebulization, 4x Daily - RT  levETIRAcetam, 500 mg, Oral, BID  nicotine, 1 patch, Transdermal, Q24H  pantoprazole, 40 mg, Oral, QAM  senna-docusate sodium, 2 tablet, Oral, BID  sodium chloride, 10 mL, Intravenous, Q12H        DOPamine, 2-20 mcg/kg/min, Last Rate: Stopped (03/10/22 1232)  EPINEPHrine, 0.02-0.3  mcg/kg/min, Last Rate: Stopped (03/10/22 1413)        ASSESSMENT:   Shock hypovolemic  Bradycardia  Acute kidney injury on chronic kidney disease  Abdominal pain with diarrhea  Hyperkalemia  Hyponatremia  Anemia  COPD  URIEL  Chronic diarrhea  Seizure disorder  Diabetes mellitus  Hypertension      PLAN:  Hemodynamically and heart rate more stable off all pressors and dopamine.  Bradycardia felt to be due to electrolyte abnormalities per cardiology.  Heart rate has not improved post correction of hyperkalemia.  Hyperkalemia and creatinine improved.  Further IV fluids per nephrology  COPD currently not in exacerbation.  Continue bronchodilators  Ongoing abdominal pain with diarrhea.  C. difficile negative.  I will check CT abdomen pelvis to evaluate further  Home meds for seizure disorder  ICU core measures  Transfer patient out of the ICU  Discussed plan of care in detail with the patient        Merly Jackson MD  03/11/22  09:57 EST

## 2022-03-12 LAB
ALBUMIN SERPL-MCNC: 3.5 G/DL (ref 3.5–5.2)
ANION GAP SERPL CALCULATED.3IONS-SCNC: 9 MMOL/L (ref 5–15)
BACTERIA SPEC AEROBE CULT: ABNORMAL
BASOPHILS # BLD AUTO: 0.04 10*3/MM3 (ref 0–0.2)
BASOPHILS NFR BLD AUTO: 0.5 % (ref 0–1.5)
BUN SERPL-MCNC: 13 MG/DL (ref 8–23)
BUN/CREAT SERPL: 16.7 (ref 7–25)
CALCIUM SPEC-SCNC: 9 MG/DL (ref 8.6–10.5)
CHLORIDE SERPL-SCNC: 103 MMOL/L (ref 98–107)
CO2 SERPL-SCNC: 24 MMOL/L (ref 22–29)
CREAT SERPL-MCNC: 0.78 MG/DL (ref 0.57–1)
DEPRECATED RDW RBC AUTO: 41.3 FL (ref 37–54)
EGFRCR SERPLBLD CKD-EPI 2021: 81.8 ML/MIN/1.73
EOSINOPHIL # BLD AUTO: 0.21 10*3/MM3 (ref 0–0.4)
EOSINOPHIL NFR BLD AUTO: 2.7 % (ref 0.3–6.2)
ERYTHROCYTE [DISTWIDTH] IN BLOOD BY AUTOMATED COUNT: 13.1 % (ref 12.3–15.4)
FERRITIN SERPL-MCNC: 103 NG/ML (ref 13–150)
GLUCOSE BLDC GLUCOMTR-MCNC: 100 MG/DL (ref 70–130)
GLUCOSE BLDC GLUCOMTR-MCNC: 114 MG/DL (ref 70–130)
GLUCOSE BLDC GLUCOMTR-MCNC: 116 MG/DL (ref 70–130)
GLUCOSE BLDC GLUCOMTR-MCNC: 143 MG/DL (ref 70–130)
GLUCOSE BLDC GLUCOMTR-MCNC: 144 MG/DL (ref 70–130)
GLUCOSE SERPL-MCNC: 91 MG/DL (ref 65–99)
GRAM STN SPEC: ABNORMAL
HCT VFR BLD AUTO: 27.8 % (ref 34–46.6)
HGB BLD-MCNC: 9.5 G/DL (ref 12–15.9)
IMM GRANULOCYTES # BLD AUTO: 0.03 10*3/MM3 (ref 0–0.05)
IMM GRANULOCYTES NFR BLD AUTO: 0.4 % (ref 0–0.5)
IRON 24H UR-MRATE: 31 MCG/DL (ref 37–145)
IRON SATN MFR SERPL: 10 % (ref 20–50)
ISOLATED FROM: ABNORMAL
LYMPHOCYTES # BLD AUTO: 1.2 10*3/MM3 (ref 0.7–3.1)
LYMPHOCYTES NFR BLD AUTO: 15.5 % (ref 19.6–45.3)
MCH RBC QN AUTO: 30 PG (ref 26.6–33)
MCHC RBC AUTO-ENTMCNC: 34.2 G/DL (ref 31.5–35.7)
MCV RBC AUTO: 87.7 FL (ref 79–97)
MONOCYTES # BLD AUTO: 0.71 10*3/MM3 (ref 0.1–0.9)
MONOCYTES NFR BLD AUTO: 9.2 % (ref 5–12)
NEUTROPHILS NFR BLD AUTO: 5.55 10*3/MM3 (ref 1.7–7)
NEUTROPHILS NFR BLD AUTO: 71.7 % (ref 42.7–76)
NRBC BLD AUTO-RTO: 0 /100 WBC (ref 0–0.2)
PHOSPHATE SERPL-MCNC: 1.4 MG/DL (ref 2.5–4.5)
PLATELET # BLD AUTO: 142 10*3/MM3 (ref 140–450)
PMV BLD AUTO: 9.6 FL (ref 6–12)
POTASSIUM SERPL-SCNC: 4.3 MMOL/L (ref 3.5–5.2)
PROCALCITONIN SERPL-MCNC: 0.25 NG/ML (ref 0–0.25)
RBC # BLD AUTO: 3.17 10*6/MM3 (ref 3.77–5.28)
SODIUM SERPL-SCNC: 136 MMOL/L (ref 136–145)
TIBC SERPL-MCNC: 308 MCG/DL (ref 298–536)
TRANSFERRIN SERPL-MCNC: 207 MG/DL (ref 200–360)
WBC NRBC COR # BLD: 7.74 10*3/MM3 (ref 3.4–10.8)

## 2022-03-12 PROCEDURE — 94761 N-INVAS EAR/PLS OXIMETRY MLT: CPT

## 2022-03-12 PROCEDURE — 25010000002 HEPARIN (PORCINE) PER 1000 UNITS: Performed by: INTERNAL MEDICINE

## 2022-03-12 PROCEDURE — 94799 UNLISTED PULMONARY SVC/PX: CPT

## 2022-03-12 PROCEDURE — 82962 GLUCOSE BLOOD TEST: CPT

## 2022-03-12 PROCEDURE — 82728 ASSAY OF FERRITIN: CPT | Performed by: INTERNAL MEDICINE

## 2022-03-12 PROCEDURE — 99222 1ST HOSP IP/OBS MODERATE 55: CPT | Performed by: INTERNAL MEDICINE

## 2022-03-12 PROCEDURE — 25010000002 ONDANSETRON PER 1 MG: Performed by: INTERNAL MEDICINE

## 2022-03-12 PROCEDURE — 87040 BLOOD CULTURE FOR BACTERIA: CPT | Performed by: STUDENT IN AN ORGANIZED HEALTH CARE EDUCATION/TRAINING PROGRAM

## 2022-03-12 PROCEDURE — 83540 ASSAY OF IRON: CPT | Performed by: INTERNAL MEDICINE

## 2022-03-12 PROCEDURE — 85025 COMPLETE CBC W/AUTO DIFF WBC: CPT | Performed by: INTERNAL MEDICINE

## 2022-03-12 PROCEDURE — 80069 RENAL FUNCTION PANEL: CPT | Performed by: INTERNAL MEDICINE

## 2022-03-12 PROCEDURE — 84466 ASSAY OF TRANSFERRIN: CPT | Performed by: INTERNAL MEDICINE

## 2022-03-12 PROCEDURE — 84145 PROCALCITONIN (PCT): CPT | Performed by: STUDENT IN AN ORGANIZED HEALTH CARE EDUCATION/TRAINING PROGRAM

## 2022-03-12 PROCEDURE — 25010000002 MORPHINE PER 10 MG: Performed by: INTERNAL MEDICINE

## 2022-03-12 PROCEDURE — 94664 DEMO&/EVAL PT USE INHALER: CPT

## 2022-03-12 RX ORDER — ONDANSETRON 2 MG/ML
4 INJECTION INTRAMUSCULAR; INTRAVENOUS EVERY 6 HOURS PRN
Status: DISCONTINUED | OUTPATIENT
Start: 2022-03-12 | End: 2022-03-15 | Stop reason: HOSPADM

## 2022-03-12 RX ORDER — ALBUTEROL SULFATE 2.5 MG/3ML
2.5 SOLUTION RESPIRATORY (INHALATION) EVERY 4 HOURS PRN
Status: DISCONTINUED | OUTPATIENT
Start: 2022-03-12 | End: 2022-03-15 | Stop reason: HOSPADM

## 2022-03-12 RX ORDER — BUDESONIDE AND FORMOTEROL FUMARATE DIHYDRATE 160; 4.5 UG/1; UG/1
2 AEROSOL RESPIRATORY (INHALATION)
Status: DISCONTINUED | OUTPATIENT
Start: 2022-03-12 | End: 2022-03-15 | Stop reason: HOSPADM

## 2022-03-12 RX ORDER — LOSARTAN POTASSIUM 50 MG/1
50 TABLET ORAL
Status: DISCONTINUED | OUTPATIENT
Start: 2022-03-12 | End: 2022-03-14

## 2022-03-12 RX ORDER — ALBUTEROL SULFATE 2.5 MG/3ML
2.5 SOLUTION RESPIRATORY (INHALATION)
Status: DISCONTINUED | OUTPATIENT
Start: 2022-03-12 | End: 2022-03-12

## 2022-03-12 RX ORDER — FUROSEMIDE 40 MG/1
40 TABLET ORAL DAILY
Status: DISCONTINUED | OUTPATIENT
Start: 2022-03-12 | End: 2022-03-15 | Stop reason: HOSPADM

## 2022-03-12 RX ORDER — ALBUTEROL SULFATE 2.5 MG/3ML
2.5 SOLUTION RESPIRATORY (INHALATION) EVERY 6 HOURS PRN
Status: DISCONTINUED | OUTPATIENT
Start: 2022-03-12 | End: 2022-03-15 | Stop reason: HOSPADM

## 2022-03-12 RX ADMIN — HEPARIN SODIUM 5000 UNITS: 5000 INJECTION INTRAVENOUS; SUBCUTANEOUS at 14:29

## 2022-03-12 RX ADMIN — ROPINIROLE HYDROCHLORIDE 0.25 MG: 0.5 TABLET, FILM COATED ORAL at 20:32

## 2022-03-12 RX ADMIN — HEPARIN SODIUM 5000 UNITS: 5000 INJECTION INTRAVENOUS; SUBCUTANEOUS at 09:21

## 2022-03-12 RX ADMIN — Medication 10 ML: at 09:22

## 2022-03-12 RX ADMIN — ONDANSETRON 4 MG: 2 INJECTION INTRAMUSCULAR; INTRAVENOUS at 02:15

## 2022-03-12 RX ADMIN — HEPARIN SODIUM 5000 UNITS: 5000 INJECTION INTRAVENOUS; SUBCUTANEOUS at 20:32

## 2022-03-12 RX ADMIN — PANTOPRAZOLE SODIUM 40 MG: 40 TABLET, DELAYED RELEASE ORAL at 09:20

## 2022-03-12 RX ADMIN — GABAPENTIN 400 MG: 400 CAPSULE ORAL at 20:32

## 2022-03-12 RX ADMIN — GABAPENTIN 400 MG: 400 CAPSULE ORAL at 09:20

## 2022-03-12 RX ADMIN — GABAPENTIN 400 MG: 400 CAPSULE ORAL at 14:28

## 2022-03-12 RX ADMIN — IPRATROPIUM BROMIDE AND ALBUTEROL SULFATE 3 ML: 2.5; .5 SOLUTION RESPIRATORY (INHALATION) at 11:16

## 2022-03-12 RX ADMIN — Medication 10 ML: at 20:33

## 2022-03-12 RX ADMIN — LOSARTAN POTASSIUM 50 MG: 50 TABLET, FILM COATED ORAL at 11:54

## 2022-03-12 RX ADMIN — MORPHINE SULFATE 1 MG: 2 INJECTION, SOLUTION INTRAMUSCULAR; INTRAVENOUS at 01:32

## 2022-03-12 RX ADMIN — FUROSEMIDE 40 MG: 40 TABLET ORAL at 11:54

## 2022-03-12 RX ADMIN — LEVETIRACETAM 500 MG: 500 TABLET, FILM COATED ORAL at 20:32

## 2022-03-12 RX ADMIN — HYDROCODONE BITARTRATE AND ACETAMINOPHEN 1 TABLET: 5; 325 TABLET ORAL at 14:28

## 2022-03-12 RX ADMIN — IPRATROPIUM BROMIDE AND ALBUTEROL SULFATE 3 ML: 2.5; .5 SOLUTION RESPIRATORY (INHALATION) at 16:29

## 2022-03-12 RX ADMIN — LEVETIRACETAM 500 MG: 500 TABLET, FILM COATED ORAL at 09:20

## 2022-03-12 RX ADMIN — ACETAMINOPHEN 650 MG: 325 TABLET, FILM COATED ORAL at 05:11

## 2022-03-12 RX ADMIN — IPRATROPIUM BROMIDE AND ALBUTEROL SULFATE 3 ML: 2.5; .5 SOLUTION RESPIRATORY (INHALATION) at 06:57

## 2022-03-12 RX ADMIN — CLOPIDOGREL 75 MG: 75 TABLET, FILM COATED ORAL at 09:20

## 2022-03-12 RX ADMIN — BUDESONIDE AND FORMOTEROL FUMARATE DIHYDRATE 2 PUFF: 160; 4.5 AEROSOL RESPIRATORY (INHALATION) at 19:08

## 2022-03-12 RX ADMIN — Medication 1 PATCH: at 09:21

## 2022-03-12 RX ADMIN — SODIUM PHOSPHATE, MONOBASIC, MONOHYDRATE 30 MMOL: 276; 142 INJECTION, SOLUTION INTRAVENOUS at 09:20

## 2022-03-12 RX ADMIN — TIZANIDINE 4 MG: 4 TABLET ORAL at 14:43

## 2022-03-12 RX ADMIN — ALBUTEROL SULFATE 2.5 MG: 2.5 SOLUTION RESPIRATORY (INHALATION) at 01:39

## 2022-03-12 NOTE — PLAN OF CARE
Goal Outcome Evaluation:      Patient is alert and oriented x 4. Transfer from CCU. Contact spore precautions. 4L NC; NSR on monitor. Purewick in place. Refused briefs. Requires multiple incontinence pad changes. Weakness therefore unable to get up at this time. Cardiology, pulmonology and nephrology following for GASPER, acute respiratory failure and hypovolemic shock.

## 2022-03-12 NOTE — PROGRESS NOTES
Name: Vidhi Lopez ADMIT: 3/9/2022   : 1950  PCP: Abiodun Vila MD    MRN: [id number] LOS: 3 days   AGE/SEX: 71 y.o. female  ROOM: [facility]     Subjective   Subjective   Patient seen this morning.  Transferred from ICU to the floor today.  Patient was admitted on  was diagnosed with hypovolemic shock.  Status post IV fluids and pressors.  Currently normotensive.  Sitting up in a bed , eating lunch.  Denies any diarrhea today, however had up to 9 bowel movement yesterday, but denies diarrhea today.  Does have shortness of breath.  No chest pain, no fevers, chills, nausea, vomiting.    Review of Systems    as above  Objective   Objective   Vital Signs  Temp:  [98.3 °F (36.8 °C)-99.6 °F (37.6 °C)] 98.3 °F (36.8 °C)  Heart Rate:  [] 92  Resp:  [18-20] 18  BP: (126-165)/(67-73) 165/68  SpO2:  [92 %-98 %] 93 %  on  Flow (L/min):  [2-5] 4;   Device (Oxygen Therapy): nasal cannula  Body mass index is 30.86 kg/m².  Physical Exam    General: Alert and oriented x3, no acute distress, ill-appearing  HEENT: Normocephalic, atraumatic  CV: Regular rate and rhythm.  No murmurs rubs or gallops.  Lungs: Decreased air movement, positive for crackles at bases.  Abdomen: Soft, nontender, nondistended  Extremities: No significant peripheral edema , no cyanosis     Results Review     I reviewed the patient's new clinical results.  Results from last 7 days   Lab Units 22  0514 22  0334 03/10/22  0242 22  1416   WBC 10*3/mm3 7.74 7.48 8.60 6.23   HEMOGLOBIN g/dL 9.5* 10.1* 9.8* 10.3*   PLATELETS 10*3/mm3 142 139* 144 164     Results from last 7 days   Lab Units 22  0514 22  0334 03/10/22  1242 03/10/22  1111   SODIUM mmol/L 136 139 138 138   POTASSIUM mmol/L 4.3 4.7 5.1 5.1   CHLORIDE mmol/L 103 111* 107 107   CO2 mmol/L 24.0 21.0* 22.0 20.7*   BUN mg/dL 13 22 35* 35*   CREATININE mg/dL 0.78 0.90 1.52* 1.63*   GLUCOSE mg/dL 91 90 194* 173*   Estimated Creatinine Clearance: 67.7  mL/min (by C-G formula based on SCr of 0.78 mg/dL).  Results from last 7 days   Lab Units 03/12/22  0514 03/11/22  0334 03/09/22  1416 03/07/22  1616   ALBUMIN g/dL 3.50 3.40* 3.30* 4.7   BILIRUBIN mg/dL  --   --  0.2 0.5   ALK PHOS U/L  --   --  95 116   AST (SGOT) U/L  --   --  7 14   ALT (SGPT) U/L  --   --  6 10     Results from last 7 days   Lab Units 03/12/22  0514 03/11/22  0334 03/10/22  1242 03/10/22  1111 03/09/22  1757 03/09/22  1416 03/07/22  1616   CALCIUM mg/dL 9.0 8.7 8.0*  8.0* 8.0*   < > 8.2* 10.5*   ALBUMIN g/dL 3.50 3.40*  --   --   --  3.30* 4.7   PHOSPHORUS mg/dL 1.4* 2.1*  --   --   --   --   --     < > = values in this interval not displayed.     Results from last 7 days   Lab Units 03/09/22  1531 03/09/22  1416   PROCALCITONIN ng/mL  --  0.21   LACTATE mmol/L 0.9  --      COVID19   Date Value Ref Range Status   03/09/2022 Not Detected Not Detected - Ref. Range Final     SARS-CoV-2 SONG   Date Value Ref Range Status   03/01/2022 Not Detected Not Detected Final     Glucose   Date/Time Value Ref Range Status   03/12/2022 1105 116 70 - 130 mg/dL Final     Comment:     Meter: [id number] : [id number] Ankush ROSEN   03/12/2022 0637 100 70 - 130 mg/dL Final     Comment:     Meter: [id number] : [id number] Saniya Barajas CNA   03/12/2022 0148 114 70 - 130 mg/dL Final     Comment:     Meter: [id number] : [id number] Saniya Barajas CNA   03/11/2022 1748 116 70 - 130 mg/dL Final     Comment:     Meter: [id number] : [id number] Catherine ROSEN   03/11/2022 1146 194 (H) 70 - 130 mg/dL Final     Comment:     Meter: [id number] : [id number] Catherine ROSEN   03/11/2022 0614 117 70 - 130 mg/dL Final     Comment:     Meter: [id number] : [id number] Barry ROSEN   03/11/2022 0038 100 70 - 130 mg/dL Final     Comment:     Meter: [id number] : [id number] Reddy Crystal SILAS       CT Abdomen Pelvis With Contrast  Narrative: CT ABDOMEN AND PELVIS WITH IV CONTRAST     HISTORY:  Abdominal pain, diarrhea     TECHNIQUE: Radiation dose reduction techniques were utilized, including  automated exposure control and exposure modulation based on body size.   3 mm images were obtained through the abdomen and pelvis after the  administration of IV contrast.      COMPARISON: CT abdomen and pelvis 09/30/2020     FINDINGS:     Small-to-moderate bilateral pleural effusions are present with overlying  atelectasis and incompletely visualized.     There are no findings of small bowel obstruction. The appendix is  unremarkable. The gallbladder there is surgically absent. The liver,  pancreas, spleen, adrenal glands and kidneys have an unremarkable  postcontrast CT appearance. No hydronephrosis is present.     No abdominopelvic adenopathy is present by size criteria. A few colonic  diverticula are present. The bladder is underdistended and not well  evaluated; however, there appears to be mucosal hyperenhancement.     Compression deformities of L1 is present and grossly unchanged since  09/30/2020.. Uterus is surgically absent. Small amount of ascites is  present. No free intraperitoneal air is seen. There is moderate right  hip osteoarthritis.     Impression: 1.  Findings suggestive of cystitis; however, the bladder is  underdistended which limits evaluation. Confirmation with urinalysis is  recommended.  2.  Small-to-moderate bilateral pleural effusions with overlying  atelectasis. There is also small amount of ascites.  3.  Other findings as above.     This report was finalized on 3/11/2022 8:30 PM by Dr. Blayne Cortez M.D.     XR Chest 1 View  X-ray chest 1 view     COMPARISON 3/9/2022     Clinical: Shortness of breath     FINDINGS: There is subtle diffuse increase in the overall interstitial  pattern with some prominence of the central vasculature consistent with  edema. The cardiomediastinal silhouette is stable. Minimal blunting of  left costophrenic angle suggests trace pleural effusion. No focal  area  of consolidation is demonstrated. The remainder is unremarkable.     CONCLUSION: Interval development of vascular congestion with possible  trace left effusion.     This report was finalized on 3/11/2022 10:18 AM by Dr. Ramon Rocha M.D.       Scheduled Medications  clopidogrel, 75 mg, Oral, Daily  furosemide, 40 mg, Oral, Daily  gabapentin, 400 mg, Oral, TID  heparin (porcine), 5,000 Units, Subcutaneous, Q8H  insulin regular, 0-9 Units, Subcutaneous, Q6H  ipratropium-albuterol, 3 mL, Nebulization, 4x Daily - RT  levETIRAcetam, 500 mg, Oral, BID  losartan, 50 mg, Oral, Q24H  nicotine, 1 patch, Transdermal, Q24H  pantoprazole, 40 mg, Oral, QAM  rOPINIRole, 0.25 mg, Oral, Nightly  senna-docusate sodium, 2 tablet, Oral, BID  sodium chloride, 10 mL, Intravenous, Q12H  sodium phosphate IVPB, 30 mmol, Intravenous, Once    Infusions  DOPamine, 2-20 mcg/kg/min, Last Rate: Stopped (03/10/22 1232)  EPINEPHrine, 0.02-0.3 mcg/kg/min, Last Rate: Stopped (03/10/22 1413)    Diet  Diet Regular       Assessment/Plan     Active Hospital Problems    Diagnosis  POA   • Shock (HCC) [R57.9]  Yes   • CKD (chronic kidney disease) stage 2, GFR 60-89 ml/min [N18.2]  Yes   • Essential hypertension [I10]  Yes   • Dyslipidemia [E78.5]  Yes   • Hypertension [I10]  Yes   • GERD (gastroesophageal reflux disease) [K21.9]  Yes   • COPD (chronic obstructive pulmonary disease) (HCC) [J44.9]  Yes   • CAD (coronary artery disease) [I25.10]  Yes   • History of stroke [Z86.73]  Not Applicable      Resolved Hospital Problems   No resolved problems to display.         HPI:  Mrs. Lopez is a 70yo female with chronic diarrhea.  She was brought to ER today when she had sudden onset of feeling lightheaded and dizzy for about three weeks    Shock  -Thought to be hypovolemic secondary to chronic diarrhea which worsened prior to admission.  -Patient blood pressure was on the lower 60s over 40s,  -Initially admitted to the ICU on 03/09.  Started on  pressors and IV fluids.  -Random cortisol was 10, TSH was low level normal.  -Currently normotensive    CAD/bradycardia: Patient heart rate was as low as 40 even though she was in hypovolemic shock.  Cardiology was consulted.  Thought to be secondary to electrolyte abnormalities.  Patient was on beta-blocker as well which was since discontinued.  Echocardiogram pending.  GASPER on CKD stage IIIa/hyperkalemia: Initial potassium was 6.6 on admission.  Creatinine was 3.3.  Improved with IV fluids.  Nephrology following.  Chronic diarrhea: C. difficile negative, GI consulted.  Full gastrointestinal PCR panel pending.  Possibly will need EGD and colonoscopy.  Type II DM: Continue home scale insulin,  Positive blood cultures.  Blood cultures from 03/09 grew staph species, not aureus.  Patient did get Zosyn, vancomycin, and metronidazole initially which has since been discontinued as  shock thought to be secondary to dehydration.  As patient did present hypotensive and did have antibiotics initially will consult infectious disease.  Repeat blood cultures..  COPD: Continue /hypoxemia: Patient currently on 4 L nasal cannula satting in low 90s.  Chest x-ray showed pulmonary congestion.  Started on diuretics per nephrology.  IV fluids discontinued patient is not wheezing however does have decreased air movement.  Likely will benefit from steroids however will hold in the setting of positive blood cultures pending evaluation by ID.  Continue DuoNebs.  Anemia: Daily CBC, and work-up pending.  Hypophosphatemia: Phosphorus low at 1.4, ordered IV replacement.    DVT prophylaxis: Heparin  CODE STATUS: Full code  Disposition: To be determined    Chris Lei MD  [facility]  03/12/22  12:02 EST

## 2022-03-12 NOTE — PROGRESS NOTES
[facility] Progress Note      Patient Name: Vidhi Lopez  : 1950  MRN: [id number]  Primary Care Physician:  Abiodun Vila MD  Date of admission: 3/9/2022    Subjective     Interval History:   Patient was transferred to regular floor today.  Has been complaining of mild shortness of breath on this a.m.  Oxygen requirements are slightly worse.  Diarrhea have resolved.  Urine output has been excellent.  Chest x-ray reviewed as of yesterday and showed some vascular congestion.  Blood pressure is elevated    Review of Systems:   As noted above    Objective     Vitals:   Temp:  [98.3 °F (36.8 °C)-99.6 °F (37.6 °C)] 98.3 °F (36.8 °C)  Heart Rate:  [] 116  Resp:  [18-20] 20  BP: (126-165)/(67-73) 165/68  Flow (L/min):  [2-5] 4    Intake/Output Summary (Last 24 hours) at 3/12/2022 0915  Last data filed at 3/12/2022 0500  Gross per 24 hour   Intake 1062 ml   Output 2600 ml   Net -1538 ml       Physical Exam:    General Appearance: Pleasant doing much better.  Skin: warm and dry  HEENT: oral mucosa normal, nonicteric sclera  Neck: supple, no JVD  Lungs: Bilateral rhonchi noted  Heart: RRR, normal S1 and S2  Abdomen: soft, nontender, nondistended  : no palpable bladder  Extremities: Trace edema, cyanosis or clubbing  Neuro: normal speech and mental status     Scheduled Meds:     clopidogrel, 75 mg, Oral, Daily  gabapentin, 400 mg, Oral, TID  heparin (porcine), 5,000 Units, Subcutaneous, Q8H  insulin regular, 0-9 Units, Subcutaneous, Q6H  ipratropium-albuterol, 3 mL, Nebulization, 4x Daily - RT  levETIRAcetam, 500 mg, Oral, BID  nicotine, 1 patch, Transdermal, Q24H  pantoprazole, 40 mg, Oral, QAM  rOPINIRole, 0.25 mg, Oral, Nightly  senna-docusate sodium, 2 tablet, Oral, BID  sodium chloride, 10 mL, Intravenous, Q12H  sodium phosphate IVPB, 30 mmol, Intravenous, Once      IV Meds:   DOPamine, 2-20 mcg/kg/min, Last Rate: Stopped (03/10/22 5563)  EPINEPHrine, 0.02-0.3  Spiritual Plan of Care    Pt Name: Krista Pierce  Pt : 1934  Date: May 16, 2020    Referral source: Interdisciplinary team    Reason for visit: Consult    Visited with: Patient    Taxonomy:    · Intended Effects: Build relationship of care and support, Convey a calming presence, Demonstrate caring and concern, Establish rapport and connectedness, Lessen anxiety, Meaning-Making, Promote a sense of peace  · Methods: Collaborate with care team member, Encourage sharing of feelings, Encourage self reflection, Encourage story-telling, Exploring hope, Offer emotional support, Offer spiritual/Voodoo support, Offer support  · Interventions: Active listening, Discuss concerns, Explain  role, Facilitate life review, Prayer for healing    Patient Assessment: Anxious, Fear    Patient  Intervention: Affirmation, Clarify Feelings, Empathic Listening, Emotional Support, Prayer     received from nurse stating that patient wanted to talk to a . Patient is in room laying in bed and watching TV. Since patient is COVID-Positive,  conducted visit via phone. Patient is alert but kept saying, \"Help me\", \"Im scared and lonely\". When  went deeper into that and asked what can I help you with or \"why are you scared\", patient would just continue to repeat those phrases. Patient has four children and stated, \"I love them very much and I know they love me too.\" Patient grew up in the [employer], but then became Rastafari.  provided emotional and spiritual support including prayer.  informed patient that we are here 24 hours and that we will be there to be with you in the times you fee scared and alone.  concluded visit with a prayer.    EUGENE SMITH, M.Div     mcg/kg/min, Last Rate: Stopped (03/10/22 1413)        Results Reviewed:   I have personally reviewed the results from the time of this admission to 3/12/2022 09:15 EST     Results from last 7 days   Lab Units 03/12/22  0514 03/11/22  0334 03/10/22  1242 03/09/22  1757 03/09/22  1416 03/07/22  1616   SODIUM mmol/L 136 139 138   < > 132* 134   POTASSIUM mmol/L 4.3 4.7 5.1   < > 5.4* 6.2*   CHLORIDE mmol/L 103 111* 107   < > 95* 94*   TOTAL CO2 mmol/L  --   --   --   --   --  23   CO2 mmol/L 24.0 21.0* 22.0   < > 22.2  --    BUN mg/dL 13 22 35*   < > 55* 35*   CREATININE mg/dL 0.78 0.90 1.52*   < > 3.30* 1.95*   CALCIUM mg/dL 9.0 8.7 8.0*  8.0*   < > 8.2* 10.5*   BILIRUBIN mg/dL  --   --   --   --  0.2 0.5   ALK PHOS U/L  --   --   --   --  95 116   ALT (SGPT) U/L  --   --   --   --  6 10   AST (SGOT) U/L  --   --   --   --  7 14   GLUCOSE mg/dL 91 90 194*   < > 133* 98    < > = values in this interval not displayed.       Estimated Creatinine Clearance: 67.7 mL/min (by C-G formula based on SCr of 0.78 mg/dL).    Results from last 7 days   Lab Units 03/12/22 0514 03/11/22  0334   PHOSPHORUS mg/dL 1.4* 2.1*       Results from last 7 days   Lab Units 03/07/22  1616   URIC ACID mg/dL 8.5*       Results from last 7 days   Lab Units 03/12/22  0514 03/11/22  0334 03/10/22  0242 03/09/22  1416 03/07/22  1616   WBC 10*3/mm3 7.74 7.48 8.60 6.23 9.2   HEMOGLOBIN g/dL 9.5* 10.1* 9.8* 10.3* 12.7   PLATELETS 10*3/mm3 142 139* 144 164 227             Assessment / Plan     ASSESSMENT:  1.  Acute kidney injury on top of chronic kidney disease stage IIIa with baseline creatinine around 1.4-1.5.  Creatinine up to 3.3 likely secondary to prerenal/ATN due to hypovolemia, hypotension, and bradycardia all in setting of losartan use.  Creatinine trended down to 0.9 with IV fluids.  Now hypervolemic    2.  Hyperkalemia with potassium 6.6 improved.  3.  Hypovolemic shock/septic shock on pressors.  Patient was started on Flagyl for possible  C. difficile colitis.  4.  Severe bradycardia improved.  Cardiology consulted   5.  Hypertension with CKD.  Blood pressure is low on admission, currently on IV fluids and pressors  6.  Metabolic acidosis: Likely combination of normal and high anion gap metabolic acidosis due to diarrhea, acute kidney injury, hypotension.  No indication for bicarbonate therapy at this time.  7.  Diabetes mellitus  type II with CKD  8. COPD  9. Chronic diarrhea: Seems to have improved  10 .  Anemia of CKD  11.  Hypocalcemia : Resolved with replacement.  Vitamin D level was normal and PTH was appropriately elevated  12.  Hypophosphatemia     Plan:  I believe the patient is mildly hypervolemic today.    We will restart home dose of Lasix 40 mg daily.  Restart losartan at 50 mg daily  Continue surveillance labs      Thank you for involving us in the care of Vidhi Lopez.  Please feel free to call with any questions.    Esther Washburn MD  03/12/22  09:15 [facility]    [facility]  [phone]      Much of this encounter note is an electronic transcription/translation of spoken language to printed text. The electronic translation of spoken language may permit erroneous, or at times, nonsensical words or phrases to be inadvertently transcribed; Although I have reviewed the note for such errors, some may still exist.

## 2022-03-12 NOTE — SIGNIFICANT NOTE
"Pt declined PT evaluation today due to severe fatigue. \" I only slept 1 1/2 hours last night.\" Pt is a retired RN. I encouraged her to get uic or complete general BLE supine later if able. PT will check on pt tomorrow.   "

## 2022-03-12 NOTE — PROGRESS NOTES
"  Daily Progress Note.   [address]  3/12/2022    Patient:  Name:  Vidhi Lopez  MRN:  [id number]  1950  71 y.o.  female         CC: soa copd gretel    Interval History:  Still with some soa some chest tightness no pleuritic pain no hemoptysis  Cant tolerate cpap has tried  Uses symbicort as outpt    Physical Exam:  /83 (BP Location: Left arm, Patient Position: Lying)   Pulse 92   Temp 98.9 °F (37.2 °C) (Oral)   Resp 19   Ht 162.6 cm (64\")   Wt 81.6 kg (179 lb 12.8 oz)   LMP  (LMP Unknown) Comment: LAST MAMMOGRAM 2017  SpO2 93%   BMI 30.86 kg/m²   Body mass index is 30.86 kg/m².    Intake/Output Summary (Last 24 hours) at 3/12/2022 1613  Last data filed at 3/12/2022 1428  Gross per 24 hour   Intake 1072 ml   Output 3250 ml   Net -2178 ml     General appearance: non toxic, conversant   Eyes: anicteric sclerae, moist conjunctivae; no lid-lag;    HENT: Atraumatic; oropharynx clear with moist mucous membranes    Lungs: william with faint exp wheeze crackles at bases diminshed, with normal respiratory effort and no intercostal retractions  CV: RRR, no rub  Extremities:  peripheral edema    Skin: warm dry no diffuse visible rash  Psych: Appropriate affect, alert and oriented    Neuro moves all ext, cns 2-12 intact speech intact    Data Review:  Notable Labs:  Results from last 7 days   Lab Units 03/12/22  0514 03/11/22  0334 03/10/22  0242 03/09/22  1416 03/07/22  1616   WBC 10*3/mm3 7.74 7.48 8.60 6.23 9.2   HEMOGLOBIN g/dL 9.5* 10.1* 9.8* 10.3* 12.7   PLATELETS 10*3/mm3 142 139* 144 164 227     Results from last 7 days   Lab Units 03/12/22  0514 03/11/22  0334 03/10/22  1242 03/10/22  1111 03/10/22  0016 03/09/22  1757 03/09/22  1416   SODIUM mmol/L 136 139 138 138 134* 131* 132*   POTASSIUM mmol/L 4.3 4.7 5.1 5.1 4.8 5.0 5.4*   CHLORIDE mmol/L 103 111* 107 107 103 99 95*   CO2 mmol/L 24.0 21.0* 22.0 20.7* 20.0* 21.0* 22.2   BUN mg/dL 13 22 35* 35* 48* 54* 55*   CREATININE mg/dL 0.78 " 0.90 1.52* 1.63* 2.45* 2.99* 3.30*   GLUCOSE mg/dL 91 90 194* 173* 191* 201* 133*   CALCIUM mg/dL 9.0 8.7 8.0*  8.0* 8.0* 7.8* 8.0* 8.2*   PHOSPHORUS mg/dL 1.4* 2.1*  --   --   --   --   --    Estimated Creatinine Clearance: 67.7 mL/min (by C-G formula based on SCr of 0.78 mg/dL).    Results from last 7 days   Lab Units 03/12/22  0514 03/11/22  0334 03/10/22  0242 03/09/22  1531 03/09/22  1416 03/09/22  1416 03/07/22  1616   AST (SGOT) U/L  --   --   --   --   --  7 14   ALT (SGPT) U/L  --   --   --   --   --  6 10   PROCALCITONIN ng/mL  --   --   --   --   --  0.21  --    LACTATE mmol/L  --   --   --  0.9  --   --   --    FERRITIN ng/mL 103.00  --   --   --   --   --   --    PLATELETS 10*3/mm3 142 139* 144  --    < > 164 227    < > = values in this interval not displayed.             Imaging:  Reviewed chest images personally from past 3 days    Scheduled meds:    clopidogrel, 75 mg, Oral, Daily  furosemide, 40 mg, Oral, Daily  gabapentin, 400 mg, Oral, TID  heparin (porcine), 5,000 Units, Subcutaneous, Q8H  insulin regular, 0-9 Units, Subcutaneous, Q6H  ipratropium-albuterol, 3 mL, Nebulization, 4x Daily - RT  levETIRAcetam, 500 mg, Oral, BID  losartan, 50 mg, Oral, Q24H  nicotine, 1 patch, Transdermal, Q24H  pantoprazole, 40 mg, Oral, QAM  rOPINIRole, 0.25 mg, Oral, Nightly  senna-docusate sodium, 2 tablet, Oral, BID  sodium chloride, 10 mL, Intravenous, Q12H        ASSESSMENT  /  PLAN:  Shock hypovolemic  Bradycardia  Acute kidney injury on chronic kidney disease  Abdominal pain with diarrhea  Hyperkalemia  Hyponatremia  Anemia  COPD  URIEL  Chronic diarrhea  Seizure disorder  Diabetes mellitus  Hypertension    From a pulmonary perspective copd with mild wheeze, will restart her home symbicort, schedule duonebs if doesn't improve consider steroids  uriel - cant tolerate cpap,she follows with sleep md in [place], considering inspyre    All issues new to me today.  Prior hospital course, labs and imaging reviewed.  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        Hipolito Singh MD  [facility]  03/12/22  16:13 EST

## 2022-03-12 NOTE — PROGRESS NOTES
Evaluated patient with extensive interview regarding HPI & reviewed chart with plans for ID consult due to positive blood cultures x2; however, RN reports another provider from [facility] service also saw patient today prior to my knowledge; therefore, will defer follow-up to Dr. Lei.  Discussed with Dr. Grubbs.  JANI Garcia.

## 2022-03-12 NOTE — CONSULTS
Gastroenterology   Initial Inpatient Consult Note    Referring Provider: Chris Lei    Reason for Consultation: Diarrhea    Subjective     History of present illness:    71 y.o. female with a history of chronic diarrhea.  She was actually admitted through the emergency room with lightheaded and dizziness for several weeks.  This has been associated with diarrhea increased over her baseline and nausea.  Recent addition of Cymbalta to.  She was in the emergency.  She has been been followed by cardiology pulmonology and nephrology during this hospitalization.  P 1.4 today, hgb 9.5 iron saturation 10%    C. difficile -310  Recent CT scan with cystitis    Patient has a history of very difficult to control C. difficile colitis which occurred after antibiotic use 4 years ago.  Recently seen in our office with Luz Maria Almanzar nurse practitioner and sees Dr. Sahil Viera.  Her diarrhea has been going on for several weeks with 5-6 bowel movements per day.  She had not had any recent antibiotic use when she was seen in the office C. difficile was checked and was negative family history of colon cancer in her father she did have a colonoscopy about 1-1/2 years ago which was negative for colon polyps and positive for only hemorrhoids.  She is status post cholecystectomy in 1994    She does endorse heartburn and reflux she does have dysphagia    As of today she reports her diarrhea is actually better she has not had diarrhea since yesterday however she is having some low pelvic abdominal discomfort    The patient is on oxygen and is still complaining of some shortness of air  past Medical History:  Past Medical History:   Diagnosis Date   • Allergic rhinitis    • Asthma with COPD (HCC)     Asthma/COPD   • Bilateral ovarian cysts    • Coronary artery disease    • Depression with anxiety     Depression/Anxiety   • Diabetes (HCC)     pre   • Elevated cholesterol    • Endometriosis     Dr. Jin; estradiol    • ESR raised  03/20/2018   • Fatigue    • Fibromyalgia    • Gout    • Headache     Headaches   • High cholesterol    • History of gallstones    • History of myocardial infarction    • Hypertension    • Influenza B     DX'D 2/6/2018, TREATED WITH TAMIFLU. LAST DOSE 2/11/2018 AM.  PATIENT STATES DR FLORES IS AWARE. DENIES FEVERS OR CHILLS.   • Interstitial cystitis    • On home oxygen therapy     2 L NC   • URIEL on CPAP    • PONV (postoperative nausea and vomiting)    • Rheumatoid arthritis (HCC)    • Seizure (HCC)    • Seizure disorder, nonconvulsive, with status epilepticus (HCC) 03/20/2018   • Sepsis (HCC)    • Septic joint of right knee joint (HCC) 03/21/2018   • Shortness of breath on exertion    • Stroke (HCC)     X1 8 YEARS AGO, GENERALIZED UPPER BODY WEAKNESS RESIDUAL PER PT    • Thyroid disorder    • Urinary leakage    • UTI (urinary tract infection)    • Wears glasses    • Yeast dermatitis      Past Surgical History:  Past Surgical History:   Procedure Laterality Date   • ARTERIOGRAM N/A 2/12/2018    Procedure: Renal Arteriogram;  Surgeon: Lito Flores MD;  Location:  [facility];  Service:    • BREAST BIOPSY Right 1991   • CARDIAC CATHETERIZATION N/A 2/12/2018    Procedure: Right Heart Cath;  Surgeon: Lito Flores MD;  Location:  [facility] CATH [facility];  Service:    • CAROTID STENT      Transcath    • CAROTID STENT Left    • CHOLECYSTECTOMY     • CYSTOSTOMY W/ BLADDER BIOPSY     • DILATATION AND CURETTAGE     • KNEE ARTHROSCOPY Right 3/23/2018    Procedure: ARTHROSCOPY, RIGHT KNEE  INCISION AND DRAINAGE LOWER EXTREMITY WITH SYNOVIAL BIOPSY;  Surgeon: Dilip Giron MD;  Location:  [facility] OR;  Service: Orthopedics   • LAPAROSCOPIC CHOLECYSTECTOMY  1994    Lap rolando   • OOPHORECTOMY     • PAP SMEAR  05/10/2016   • SUBTOTAL HYSTERECTOMY        Social History:   Social History     Tobacco Use   • Smoking status: Current Some Day Smoker     Packs/day: 0.25     Years: 40.00     Pack years: 10.00      Types: Cigarettes     Last attempt to quit: 2020     Years since quittin.1   • Smokeless tobacco: Never Used   • Tobacco comment: in process of quiting--AVERAGE 1 PPD, DOWN TO 6 CIG PER DAY X2 WEEKS    Substance Use Topics   • Alcohol use: Yes     Alcohol/week: 1.0 standard drink     Types: 1 Glasses of wine per week     Comment: occasional      Family History:  Family History   Problem Relation Age of Onset   • Cancer Other    • Diabetes Other    • Heart attack Other    • Hyperlipidemia Other    • Hypertension Other    • Osteoporosis Other    • Stroke Other    • Breast cancer Mother    • Osteoporosis Mother    • Colon cancer Father    • Colon polyps Father    • Irritable bowel syndrome Neg Hx    • Ulcerative colitis Neg Hx    • Crohn's disease Neg Hx        Home Meds:  Medications Prior to Admission   Medication Sig Dispense Refill Last Dose   • aspirin 81 MG EC tablet Take 81 mg by mouth Daily.   3/9/2022 at 0900   • atorvastatin (LIPITOR) 80 MG tablet TAKE 1 TABLET BY MOUTH DAILY 90 tablet 3 3/9/2022 at 0900   • clopidogrel (PLAVIX) 75 MG tablet TAKE 1 TABLET BY MOUTH EVERY DAY 90 tablet 3 3/9/2022 at 0900   • Diclofenac Sodium (VOLTAREN) 1 % gel gel diclofenac 1 % topical gel   apply 2 grams to affected area four times a day   Past Week at Unknown time   • esomeprazole (nexIUM) 40 MG capsule Take 1 capsule by mouth Every Morning Before Breakfast. 90 capsule 3 3/9/2022 at 0900   • fluconazole (Diflucan) 200 MG tablet Take 1 tab daily x 3 days 3 tablet 0 3/9/2022 at 0900   • furosemide (LASIX) 40 MG tablet Take 40 mg by mouth. M/W/F TAKE BID  OTHER DAYS TAKE 1 TABLET DAILY   3/9/2022 at 0900   • gabapentin (NEURONTIN) 400 MG capsule Take 1 capsule by mouth 3 (Three) Times a Day. 90 capsule 0 3/9/2022 at 0900   • Insulin Lispro, 1 Unit Dial, (HUMALOG) 100 UNIT/ML solution pen-injector Inject 10 Units under the skin into the appropriate area as directed 3 (Three) Times a Day Before Meals.   3/8/2022 at Unknown  time   • levETIRAcetam (KEPPRA) 500 MG tablet Take 1 tablet by mouth 2 (Two) Times a Day. 180 tablet 3 3/9/2022 at 0900   • losartan (COZAAR) 50 MG tablet Take 50 mg by mouth Daily.   3/9/2022 at 0900   • metoprolol tartrate (LOPRESSOR) 25 MG tablet TAKE 1 TABLET BY MOUTH TWICE DAILY 60 tablet 0 3/9/2022 at 0900   • nystatin (nystatin) 405038 UNIT/GM powder Apply  topically to the appropriate area as directed See Admin Instructions. Apply topically to the affected area twice daily as directed 30 g 1 Past Week at Unknown time   • potassium chloride 10 MEQ CR tablet TAKE 2 TABLETS BY MOUTH DAILY 180 tablet 2 3/6/2022   • Probiotic Product (PROBIOTIC DAILY PO) Take 1 tablet by mouth Daily.   3/9/2022 at 0900   • tiZANidine (ZANAFLEX) 4 MG tablet Take 1 tablet by mouth Every 8 (Eight) Hours As Needed for Muscle Spasms. 30 tablet 1 3/8/2022   • traZODone (DESYREL) 100 MG tablet Take 1 tablet by mouth Every Night. 90 tablet 3 Past Week at 2000   • albuterol sulfate  (90 Base) MCG/ACT inhaler Inhale 2 puffs Every 4 (Four) Hours As Needed for Wheezing. for wheezing 8.5 g 1 1/1/2022   • ferrous sulfate 325 (65 FE) MG tablet TK 1 T PO  QD   More than a month at Unknown time   • hydrALAZINE (APRESOLINE) 25 MG tablet Take 1 tablet by mouth 3 (Three) Times a Day. 270 tablet 3 3/7/2022 at 0900   • hydrOXYzine (ATARAX) 50 MG tablet Take 1 tablet by mouth Every 8 (Eight) Hours As Needed for Itching. 90 tablet 2 More than a month at Unknown time   • ipratropium-albuterol (DUO-NEB) 0.5-2.5 mg/3 ml nebulizer Take 3 mL by nebulization 4 (Four) Times a Day. 360 mL 3 More than a month at Unknown time   • meclizine (ANTIVERT) 25 MG tablet Take 1 tablet by mouth 3 (Three) Times a Day As Needed for Dizziness. 60 tablet 0 More than a month at Unknown time   • montelukast (SINGULAIR) 10 MG tablet Singulair 10 mg tablet   Daily   More than a month at Unknown time   • nitroglycerin (NITROSTAT) 0.4 MG SL tablet Place 0.4 mg under the  tongue Every 5 (Five) Minutes As Needed.  0 Unknown at Unknown time   • ondansetron (Zofran) 4 MG tablet Take 1 tablet by mouth Every 8 (Eight) Hours As Needed for Nausea or Vomiting. 60 tablet 1 More than a month at Unknown time   • promethazine (PHENERGAN) 25 MG tablet Take 25 mg by mouth Daily.   More than a month at Unknown time   • rOPINIRole (REQUIP) 0.25 MG tablet Take 1 tablet by mouth Every Night. 90 tablet 3 Unknown at Unknown time   • TOUJEO SOLOSTAR 300 UNIT/ML solution pen-injector injection Use 25 Units at night (Patient taking differently: 10 Units. Use 10   Units at night) 1.5 mL 6 Unknown at 2000     Current Meds:   clopidogrel, 75 mg, Oral, Daily  furosemide, 40 mg, Oral, Daily  gabapentin, 400 mg, Oral, TID  heparin (porcine), 5,000 Units, Subcutaneous, Q8H  insulin regular, 0-9 Units, Subcutaneous, Q6H  ipratropium-albuterol, 3 mL, Nebulization, 4x Daily - RT  levETIRAcetam, 500 mg, Oral, BID  losartan, 50 mg, Oral, Q24H  nicotine, 1 patch, Transdermal, Q24H  pantoprazole, 40 mg, Oral, QAM  rOPINIRole, 0.25 mg, Oral, Nightly  senna-docusate sodium, 2 tablet, Oral, BID  sodium chloride, 10 mL, Intravenous, Q12H  sodium phosphate IVPB, 30 mmol, Intravenous, Once      Allergies:  Allergies   Allergen Reactions   • Amlodipine Swelling   • Reglan [Metoclopramide] Unknown - High Severity     DYSTONIC REACTION     Review of Systems  Pertinent items are noted in HPI, all other systems reviewed and negative    Objective     Vital Signs  Temp:  [98.3 °F (36.8 °C)-99.6 °F (37.6 °C)] 98.3 °F (36.8 °C)  Heart Rate:  [] 92  Resp:  [18-20] 18  BP: (126-165)/(67-73) 165/68    Physical Exam:  CONSTITUTIONAL:  today's vital signs reviewed, mild tachypneic at rest on O2  EARS NOSE THROAT: trachea midline and no deformity of the nares  EYES: no scleral icterus  GASTROINTESTINAL: abdomen is soft, nontender, nondistended with normal active bowel sounds, no masses are appreciated  PSYCHIATRIC: appropriate mood  and affect  RESPIRATORY: normal inspiratory effort with no increased work of breathing  NEUROLOGIC: patient is awake and alert  DERMATOLOGIC: skin is warm with no cyanosis  LYMPHATIC: no periumbilical lymphadenopathy     Results Review:              I reviewed the patient's new clinical results.    Results from last 7 days   Lab Units 03/12/22  0514 03/11/22  0334 03/10/22  0242   WBC 10*3/mm3 7.74 7.48 8.60   HEMOGLOBIN g/dL 9.5* 10.1* 9.8*   HEMATOCRIT % 27.8* 30.2* 30.5*   PLATELETS 10*3/mm3 142 139* 144     Results from last 7 days   Lab Units 03/12/22  0514 03/11/22  0334 03/10/22  1242 03/09/22  1757 03/09/22  1416 03/07/22  1616   SODIUM mmol/L 136 139 138   < > 132* 134   POTASSIUM mmol/L 4.3 4.7 5.1   < > 5.4* 6.2*   CHLORIDE mmol/L 103 111* 107   < > 95* 94*   TOTAL CO2 mmol/L  --   --   --   --   --  23   CO2 mmol/L 24.0 21.0* 22.0   < > 22.2  --    BUN mg/dL 13 22 35*   < > 55* 35*   CREATININE mg/dL 0.78 0.90 1.52*   < > 3.30* 1.95*   CALCIUM mg/dL 9.0 8.7 8.0*  8.0*   < > 8.2* 10.5*   BILIRUBIN mg/dL  --   --   --   --  0.2 0.5   ALK PHOS U/L  --   --   --   --  95 116   ALT (SGPT) U/L  --   --   --   --  6 10   AST (SGOT) U/L  --   --   --   --  7 14   GLUCOSE mg/dL 91 90 194*   < > 133* 98    < > = values in this interval not displayed.         Lab Results   Lab Value Date/Time    LIPASE 23 03/10/2022 1242    LIPASE 36 10/01/2020 0524    LIPASE 11 (L) 09/30/2020 1738    LIPASE 17 08/02/2019 2200    LIPASE 87 02/05/2015 0009       Radiology:  CT Abdomen Pelvis With Contrast   Final Result   1.  Findings suggestive of cystitis; however, the bladder is   underdistended which limits evaluation. Confirmation with urinalysis is   recommended.   2.  Small-to-moderate bilateral pleural effusions with overlying   atelectasis. There is also small amount of ascites.   3.  Other findings as above.       This report was finalized on 3/11/2022 8:30 PM by Dr. Blayne Cortez M.D.          XR Chest 1 View   Final  Result      US Renal Bilateral   Final Result   Negative renal ultrasound       This report was finalized on 3/10/2022 5:27 PM by Dr. Brandon Jacome M.D.          CT Head Without Contrast   Final Result       No acute intracranial hemorrhage or hydrocephalus. Chronic changes in   the brain. If there is further clinical concern, MRI could be considered   for further evaluation.       This report was finalized on 3/9/2022 6:54 PM by Dr. Danial Livingston M.D.          XR Chest 1 View   Final Result   No focal pulmonary consolidation. Borderline heart size.   Follow-up as clinical indications persist.       This report was finalized on 3/9/2022 2:33 PM by Dr. Danial Livingston M.D.              Assessment/Plan   Patient Active Problem List   Diagnosis   • Dyslipidemia   • Hypertension   • Anxiety (Chronic benzos)   • Insomnia   • GERD (gastroesophageal reflux disease)   • Diabetes mellitus, type 2 ([facility])   • Fibromyalgia   • RLS (restless legs syndrome)   • Migraines   • COPD (chronic obstructive pulmonary disease) ([facility])   • Interstitial cystitis   • History of acute myocardial infarction   • History of cardiac murmur   • History of stroke   • CAD (coronary artery disease)   • Essential hypertension   • CKD (chronic kidney disease) stage 2, GFR 60-89 ml/min   • Bilateral carotid artery stenosis   • Chronic respiratory failure with hypoxia and hypercapnia ([facility])   • URIEL (obstructive sleep apnea)   • Obesity (BMI 30-39.9)   • Diabetes mellitus type 2 in obese ([facility])   • Obesity hypoventilation syndrome ([facility])   • Tobacco use   • Chronic pain (Chronic narcotics)   • Recurrent UTI/interstitial cystitis on chronic methenamine   • Clostridium difficile colitis diagnosed September 2018. Persistent diarrhea despite oral vancomycin   • Demand ischemia ([facility])   • Hypoxemia requiring supplemental oxygen   • Stenosis of carotid artery   • Occlusion and stenosis of left carotid artery    • Chronic gout with tophus   •  Depression   • Edema   • Restless leg syndrome   • Asthma   • Wellness examination   • Encounter for screening mammogram for malignant neoplasm of breast    • Seasonal allergies   • Hyperlipidemia   • Seizure disorder (HCC)   • L1 vertebral fracture (HCC)   • Rib fracture   • Lumbar compression fracture (HCC)   • Polypharmacy   • Itching   • Anemia   • Urinary tract infection with hematuria   • Other fatigue   • Balance problem   • Dysuria   • Right leg pain   • Hypercalcemia   • High risk medication use   • Precordial pain   • Leg swelling   • Encounter for immunization    • Dizzy   • Acute myocardial infarction (HCC)   • Atopic rhinitis   • Bilateral inguinal hernia, without obstruction or gangrene, not specified as recurrent   • Candidiasis of skin   • Cerebrovascular accident (CVA) (HCC)   • Coronary angioplasty status   • Decreased GFR   • Diabetic peripheral neuropathy (HCC)   • History of Clostridioides difficile infection   • Osteoarthritis   • Other specified personal risk factors, not elsewhere classified   • Repeated falls   • Multinodular goiter   • Medicare annual wellness visit, subsequent   • Post-menopausal   • High cholesterol   • Bunion   • Unspecified severe protein-calorie malnutrition (CMS/HCC)    • Nausea   • Rash   • Subacute maxillary sinusitis   • Cough   • Fever   • Sore throat   • Spasm   • Acute midline back pain   • Neuropathy   • Shock ([facility])       Assessment:  1. Diarrhea in the setting of a patient had a history of C. difficile that was very difficult to control in the past.  C. difficile has been negative.  Fortunately she has had some reprieve of her diarrheal symptoms over the last 24 hours.  Her C. difficile study here was negative.  2. Low abdominal discomfort with a CT scan consistent with cystitis  3. Cystitis  4. Dysphagia  5. GERD    Plan:  · Patient does seem to be doing somewhat better symptomatically from her diarrheal standpoint.  However just as recently as yesterday she  was reporting up to 9 bowel movements per day and she did present quite dehydrated.  She does have a history of C. difficile but this was negative when checked last week.  She also could have a side effect to medication or a microscopic colitis which could also fit this pattern.  She has had colonoscopy within the last couple of years but could be a candidate for flexible sigmoidoscopy with biopsies to rule out microscopic colitis.  · Patient has GERD and dysphagia and warrants EGD.  · Currently being treated for her shortness of breath with oxygen and still symptomatic.  My recommendation is that we get a stool sample for a full GI panel and white cells and C. difficile if she has recurrence of her diarrhea while she is here in the hospital.  When her oxygen is improved and her breathing is improved could consider EGD with flexible sigmoidoscopy and biopsy or EGD and full colonoscopy      I discussed the patients findings and my recommendations with patient and nursing staff.           Shekhar López M.D.  [facility] [place] [address]  Office: [phone]

## 2022-03-13 ENCOUNTER — APPOINTMENT (OUTPATIENT)
Dept: GENERAL RADIOLOGY | Facility: HOSPITAL | Age: 72
End: 2022-03-13

## 2022-03-13 LAB
ALBUMIN SERPL-MCNC: 3.1 G/DL (ref 3.5–5.2)
ALBUMIN SERPL-MCNC: 3.4 G/DL (ref 3.5–5.2)
ANION GAP SERPL CALCULATED.3IONS-SCNC: 8 MMOL/L (ref 5–15)
ANION GAP SERPL CALCULATED.3IONS-SCNC: 8 MMOL/L (ref 5–15)
BASOPHILS # BLD AUTO: 0.04 10*3/MM3 (ref 0–0.2)
BASOPHILS NFR BLD AUTO: 0.7 % (ref 0–1.5)
BUN SERPL-MCNC: 15 MG/DL (ref 8–23)
BUN SERPL-MCNC: 15 MG/DL (ref 8–23)
BUN/CREAT SERPL: 16.1 (ref 7–25)
BUN/CREAT SERPL: 17.4 (ref 7–25)
CALCIUM SPEC-SCNC: 8.8 MG/DL (ref 8.6–10.5)
CALCIUM SPEC-SCNC: 8.9 MG/DL (ref 8.6–10.5)
CHLORIDE SERPL-SCNC: 103 MMOL/L (ref 98–107)
CHLORIDE SERPL-SCNC: 103 MMOL/L (ref 98–107)
CO2 SERPL-SCNC: 27 MMOL/L (ref 22–29)
CO2 SERPL-SCNC: 28 MMOL/L (ref 22–29)
CREAT SERPL-MCNC: 0.86 MG/DL (ref 0.57–1)
CREAT SERPL-MCNC: 0.93 MG/DL (ref 0.57–1)
DEPRECATED RDW RBC AUTO: 42.5 FL (ref 37–54)
EGFRCR SERPLBLD CKD-EPI 2021: 66.3 ML/MIN/1.73
EGFRCR SERPLBLD CKD-EPI 2021: 72.8 ML/MIN/1.73
EOSINOPHIL # BLD AUTO: 0.31 10*3/MM3 (ref 0–0.4)
EOSINOPHIL NFR BLD AUTO: 5.4 % (ref 0.3–6.2)
ERYTHROCYTE [DISTWIDTH] IN BLOOD BY AUTOMATED COUNT: 12.9 % (ref 12.3–15.4)
GLUCOSE BLDC GLUCOMTR-MCNC: 128 MG/DL (ref 70–130)
GLUCOSE BLDC GLUCOMTR-MCNC: 134 MG/DL (ref 70–130)
GLUCOSE BLDC GLUCOMTR-MCNC: 137 MG/DL (ref 70–130)
GLUCOSE BLDC GLUCOMTR-MCNC: 151 MG/DL (ref 70–130)
GLUCOSE BLDC GLUCOMTR-MCNC: 175 MG/DL (ref 70–130)
GLUCOSE SERPL-MCNC: 113 MG/DL (ref 65–99)
GLUCOSE SERPL-MCNC: 129 MG/DL (ref 65–99)
HCT VFR BLD AUTO: 28.1 % (ref 34–46.6)
HGB BLD-MCNC: 9.1 G/DL (ref 12–15.9)
IMM GRANULOCYTES # BLD AUTO: 0.03 10*3/MM3 (ref 0–0.05)
IMM GRANULOCYTES NFR BLD AUTO: 0.5 % (ref 0–0.5)
LYMPHOCYTES # BLD AUTO: 1.28 10*3/MM3 (ref 0.7–3.1)
LYMPHOCYTES NFR BLD AUTO: 22.4 % (ref 19.6–45.3)
MAGNESIUM SERPL-MCNC: 1.6 MG/DL (ref 1.6–2.4)
MCH RBC QN AUTO: 29.7 PG (ref 26.6–33)
MCHC RBC AUTO-ENTMCNC: 32.4 G/DL (ref 31.5–35.7)
MCV RBC AUTO: 91.8 FL (ref 79–97)
MONOCYTES # BLD AUTO: 0.55 10*3/MM3 (ref 0.1–0.9)
MONOCYTES NFR BLD AUTO: 9.6 % (ref 5–12)
NEUTROPHILS NFR BLD AUTO: 3.5 10*3/MM3 (ref 1.7–7)
NEUTROPHILS NFR BLD AUTO: 61.4 % (ref 42.7–76)
NRBC BLD AUTO-RTO: 0 /100 WBC (ref 0–0.2)
PHOSPHATE SERPL-MCNC: 1.8 MG/DL (ref 2.5–4.5)
PHOSPHATE SERPL-MCNC: 1.9 MG/DL (ref 2.5–4.5)
PLATELET # BLD AUTO: 148 10*3/MM3 (ref 140–450)
PMV BLD AUTO: 9.3 FL (ref 6–12)
POTASSIUM SERPL-SCNC: 3.8 MMOL/L (ref 3.5–5.2)
POTASSIUM SERPL-SCNC: 3.9 MMOL/L (ref 3.5–5.2)
QT INTERVAL: 453 MS
RBC # BLD AUTO: 3.06 10*6/MM3 (ref 3.77–5.28)
SODIUM SERPL-SCNC: 138 MMOL/L (ref 136–145)
SODIUM SERPL-SCNC: 139 MMOL/L (ref 136–145)
WBC NRBC COR # BLD: 5.71 10*3/MM3 (ref 3.4–10.8)

## 2022-03-13 PROCEDURE — 80069 RENAL FUNCTION PANEL: CPT | Performed by: HOSPITALIST

## 2022-03-13 PROCEDURE — 82962 GLUCOSE BLOOD TEST: CPT

## 2022-03-13 PROCEDURE — 71045 X-RAY EXAM CHEST 1 VIEW: CPT

## 2022-03-13 PROCEDURE — 94799 UNLISTED PULMONARY SVC/PX: CPT

## 2022-03-13 PROCEDURE — 97161 PT EVAL LOW COMPLEX 20 MIN: CPT

## 2022-03-13 PROCEDURE — 99232 SBSQ HOSP IP/OBS MODERATE 35: CPT | Performed by: INTERNAL MEDICINE

## 2022-03-13 PROCEDURE — 94664 DEMO&/EVAL PT USE INHALER: CPT

## 2022-03-13 PROCEDURE — 25010000002 HEPARIN (PORCINE) PER 1000 UNITS: Performed by: INTERNAL MEDICINE

## 2022-03-13 PROCEDURE — 93005 ELECTROCARDIOGRAM TRACING: CPT | Performed by: HOSPITALIST

## 2022-03-13 PROCEDURE — 83735 ASSAY OF MAGNESIUM: CPT | Performed by: STUDENT IN AN ORGANIZED HEALTH CARE EDUCATION/TRAINING PROGRAM

## 2022-03-13 PROCEDURE — 93010 ELECTROCARDIOGRAM REPORT: CPT | Performed by: INTERNAL MEDICINE

## 2022-03-13 PROCEDURE — 99222 1ST HOSP IP/OBS MODERATE 55: CPT | Performed by: INTERNAL MEDICINE

## 2022-03-13 PROCEDURE — 74018 RADEX ABDOMEN 1 VIEW: CPT

## 2022-03-13 PROCEDURE — 85025 COMPLETE CBC W/AUTO DIFF WBC: CPT | Performed by: INTERNAL MEDICINE

## 2022-03-13 RX ORDER — CLONIDINE HYDROCHLORIDE 0.1 MG/1
0.1 TABLET ORAL ONCE
Status: COMPLETED | OUTPATIENT
Start: 2022-03-13 | End: 2022-03-13

## 2022-03-13 RX ORDER — TRAZODONE HYDROCHLORIDE 100 MG/1
100 TABLET ORAL NIGHTLY
Status: DISCONTINUED | OUTPATIENT
Start: 2022-03-13 | End: 2022-03-15 | Stop reason: HOSPADM

## 2022-03-13 RX ORDER — ATORVASTATIN CALCIUM 80 MG/1
80 TABLET, FILM COATED ORAL DAILY
Status: DISCONTINUED | OUTPATIENT
Start: 2022-03-13 | End: 2022-03-15 | Stop reason: HOSPADM

## 2022-03-13 RX ADMIN — ACETAMINOPHEN 650 MG: 325 TABLET, FILM COATED ORAL at 21:32

## 2022-03-13 RX ADMIN — CLOPIDOGREL 75 MG: 75 TABLET, FILM COATED ORAL at 09:33

## 2022-03-13 RX ADMIN — IPRATROPIUM BROMIDE AND ALBUTEROL SULFATE 3 ML: 2.5; .5 SOLUTION RESPIRATORY (INHALATION) at 14:40

## 2022-03-13 RX ADMIN — LEVETIRACETAM 500 MG: 500 TABLET, FILM COATED ORAL at 21:33

## 2022-03-13 RX ADMIN — FUROSEMIDE 40 MG: 40 TABLET ORAL at 09:25

## 2022-03-13 RX ADMIN — LOSARTAN POTASSIUM 50 MG: 50 TABLET, FILM COATED ORAL at 09:25

## 2022-03-13 RX ADMIN — PANTOPRAZOLE SODIUM 40 MG: 40 TABLET, DELAYED RELEASE ORAL at 06:16

## 2022-03-13 RX ADMIN — ROPINIROLE HYDROCHLORIDE 0.25 MG: 0.5 TABLET, FILM COATED ORAL at 21:33

## 2022-03-13 RX ADMIN — HEPARIN SODIUM 5000 UNITS: 5000 INJECTION INTRAVENOUS; SUBCUTANEOUS at 14:11

## 2022-03-13 RX ADMIN — GABAPENTIN 400 MG: 400 CAPSULE ORAL at 21:33

## 2022-03-13 RX ADMIN — BUDESONIDE AND FORMOTEROL FUMARATE DIHYDRATE 2 PUFF: 160; 4.5 AEROSOL RESPIRATORY (INHALATION) at 19:59

## 2022-03-13 RX ADMIN — GABAPENTIN 400 MG: 400 CAPSULE ORAL at 15:28

## 2022-03-13 RX ADMIN — CLONIDINE HYDROCHLORIDE 0.1 MG: 0.1 TABLET ORAL at 21:49

## 2022-03-13 RX ADMIN — TIZANIDINE 4 MG: 4 TABLET ORAL at 04:17

## 2022-03-13 RX ADMIN — HYDROCODONE BITARTRATE AND ACETAMINOPHEN 1 TABLET: 5; 325 TABLET ORAL at 14:12

## 2022-03-13 RX ADMIN — HEPARIN SODIUM 5000 UNITS: 5000 INJECTION INTRAVENOUS; SUBCUTANEOUS at 06:16

## 2022-03-13 RX ADMIN — Medication 1 PATCH: at 09:26

## 2022-03-13 RX ADMIN — Medication 10 ML: at 09:27

## 2022-03-13 RX ADMIN — ATORVASTATIN CALCIUM 80 MG: 80 TABLET, FILM COATED ORAL at 21:33

## 2022-03-13 RX ADMIN — Medication 10 ML: at 21:33

## 2022-03-13 RX ADMIN — TRAZODONE HYDROCHLORIDE 100 MG: 100 TABLET ORAL at 21:32

## 2022-03-13 RX ADMIN — ACETAMINOPHEN 650 MG: 325 TABLET, FILM COATED ORAL at 04:17

## 2022-03-13 RX ADMIN — BUDESONIDE AND FORMOTEROL FUMARATE DIHYDRATE 2 PUFF: 160; 4.5 AEROSOL RESPIRATORY (INHALATION) at 07:30

## 2022-03-13 RX ADMIN — IPRATROPIUM BROMIDE AND ALBUTEROL SULFATE 3 ML: 2.5; .5 SOLUTION RESPIRATORY (INHALATION) at 10:28

## 2022-03-13 RX ADMIN — LEVETIRACETAM 500 MG: 500 TABLET, FILM COATED ORAL at 09:25

## 2022-03-13 RX ADMIN — POTASSIUM PHOSPHATE, MONOBASIC AND POTASSIUM PHOSPHATE, DIBASIC 30 MMOL: 224; 236 INJECTION, SOLUTION, CONCENTRATE INTRAVENOUS at 21:32

## 2022-03-13 RX ADMIN — GABAPENTIN 400 MG: 400 CAPSULE ORAL at 09:25

## 2022-03-13 NOTE — PLAN OF CARE
Goal Outcome Evaluation:  Plan of Care Reviewed With: patient        Progress: improving  Outcome Evaluation: aox4 SR remains on 2LNC patient dangles on the rahul of the bed with meals.  Pulm rec restart home symbicort. Pain well controlled today.  Improving well. CTM

## 2022-03-13 NOTE — PLAN OF CARE
Goal Outcome Evaluation:  Plan of Care Reviewed With: patient      Pt admit from her apt due to hypovolemic shock, bradycardia, GASPER on CKD,abdominal pain with diarrhea, anemia. Pt lives alone in apt. Recent fall with pt reporting onset of BLE weakness and impaired endurance. Pt reports onset of need for O2 at night and now at rest 2 L. Pt 89% at eob on 2 L prior to amb. O2 increased to 4 L for amb 240' with cga. No lob noted. O2 sat decreased to 86% immediately post ambulation today with quick return to >90%. O2 titrated back down to 2 L and O2 sat at 95%. Pt tolerated activity. SHe demonstrated general weakness, impaired endurance, impaired balance and impaired mobility. Pt will benefit from cont skilled PT to address these deficits and for progression toward indep amb with least AD household distances as well as least O2. Pt has hx of COPD and asthma. She is requesting rollator for community distances. Her latest fall was while amb into Dr hardy.

## 2022-03-13 NOTE — PROGRESS NOTES
[facility]  Inpatient Progress Note    Reason for Followup:  Diarrhea    Subjective     Interval History:   She reports no current issues with diarrhea.  Some lower abdominal discomfort.     Current Facility-Administered Medications:   •  acetaminophen (TYLENOL) tablet 650 mg, 650 mg, Oral, Q4H PRN, 650 mg at 03/13/22 0417 **OR** acetaminophen (TYLENOL) suppository 650 mg, 650 mg, Rectal, Q4H PRN, Merly Jackson MD  •  albuterol (PROVENTIL) nebulizer solution 0.083% 2.5 mg/3mL, 2.5 mg, Nebulization, Q4H PRN, Kevin Singh MD, 2.5 mg at 03/12/22 0139  •  albuterol (PROVENTIL) nebulizer solution 0.083% 2.5 mg/3mL, 2.5 mg, Nebulization, Q6H PRN, Chris Lei MD  •  sennosides-docusate (PERICOLACE) 8.6-50 MG per tablet 2 tablet, 2 tablet, Oral, BID, 2 tablet at 03/10/22 0910 **AND** polyethylene glycol (MIRALAX) packet 17 g, 17 g, Oral, Daily PRN **AND** bisacodyl (DULCOLAX) EC tablet 5 mg, 5 mg, Oral, Daily PRN **AND** bisacodyl (DULCOLAX) suppository 10 mg, 10 mg, Rectal, Daily PRN, Merly Jackson MD  •  budesonide-formoterol (SYMBICORT) 160-4.5 MCG/ACT inhaler 2 puff, 2 puff, Inhalation, BID - RT, Hipolito Singh MD, 2 puff at 03/13/22 0730  •  clopidogrel (PLAVIX) tablet 75 mg, 75 mg, Oral, Daily, Merly Jackson MD, 75 mg at 03/13/22 0933  •  dextrose (D50W) (25 g/50 mL) IV injection 25 g, 25 g, Intravenous, Q15 Min PRN, Merly Jackson MD  •  dextrose (GLUTOSE) oral gel 15 g, 15 g, Oral, Q15 Min PRN, Merly Jackson MD  •  furosemide (LASIX) tablet 40 mg, 40 mg, Oral, Daily, Esther Washburn MD, 40 mg at 03/13/22 0925  •  gabapentin (NEURONTIN) capsule 400 mg, 400 mg, Oral, TID, Merly Jackson MD, 400 mg at 03/13/22 0925  •  glucagon (human recombinant) (GLUCAGEN DIAGNOSTIC) injection 1 mg, 1 mg, Intramuscular, Q15 Min PRN, Merly Jackson MD  •  heparin (porcine) 5000 UNIT/ML injection 5,000 Units, 5,000 Units, Subcutaneous, Q8H, Merly Jackson MD, 5,000 Units at  03/13/22 0616  •  HYDROcodone-acetaminophen (NORCO) 5-325 MG per tablet 1 tablet, 1 tablet, Oral, Q6H PRN, Merly Jackson MD, 1 tablet at 03/12/22 1428  •  insulin regular (humuLIN R,novoLIN R) injection 0-9 Units, 0-9 Units, Subcutaneous, Q6H, Merly Jackson MD, 2 Units at 03/11/22 1209  •  ipratropium-albuterol (DUO-NEB) nebulizer solution 3 mL, 3 mL, Nebulization, 4x Daily - RT, Merly Jackson MD, 3 mL at 03/12/22 1629  •  levETIRAcetam (KEPPRA) tablet 500 mg, 500 mg, Oral, BID, Merly Jackson MD, 500 mg at 03/13/22 0925  •  losartan (COZAAR) tablet 50 mg, 50 mg, Oral, Q24H, Esther Washburn MD, 50 mg at 03/13/22 0925  •  morphine injection 1 mg, 1 mg, Intravenous, Q4H PRN, Merly Jackson MD, 1 mg at 03/12/22 0132  •  nicotine (NICODERM CQ) 14 MG/24HR patch 1 patch, 1 patch, Transdermal, Q24H, Merly Jackson MD, 1 patch at 03/13/22 0926  •  ondansetron (ZOFRAN) injection 4 mg, 4 mg, Intravenous, Q6H PRN, Kevin Singh MD, 4 mg at 03/12/22 0215  •  pantoprazole (PROTONIX) EC tablet 40 mg, 40 mg, Oral, QAM, Merly Jackson MD, 40 mg at 03/13/22 0616  •  rOPINIRole (REQUIP) tablet 0.25 mg, 0.25 mg, Oral, Nightly, Merly Jackson MD, 0.25 mg at 03/12/22 2032  •  sodium chloride 0.9 % flush 10 mL, 10 mL, Intravenous, Q12H, Merly Jackson MD, 10 mL at 03/13/22 0927  •  sodium chloride 0.9 % flush 10 mL, 10 mL, Intravenous, PRN, Merly Jackson MD  •  tiZANidine (ZANAFLEX) tablet 4 mg, 4 mg, Oral, Q8H PRN, Merly Jackson MD, 4 mg at 03/13/22 0417  Review of Systems:    All systems were reviewed and negative except for:  Genitourinary: postivie for  pain    Objective     Vital Signs  Temp:  [97.9 °F (36.6 °C)-98.9 °F (37.2 °C)] 97.9 °F (36.6 °C)  Heart Rate:  [] 56  Resp:  [18-20] 20  BP: (103-162)/(63-84) 104/63  Body mass index is 30.77 kg/m².    Intake/Output Summary (Last 24 hours) at 3/13/2022 0957  Last data filed at 3/13/2022 0730  Gross per 24 hour   Intake 450 ml   Output 2000 ml   Net -1550  ml     I/O this shift:  In: 210 [P.O.:210]  Out: -      Physical Exam:   General: patient awake, alert and cooperative   Eyes: Normal lids and lashes, no scleral icterus   Neck: supple, normal ROM   Skin: warm and dry, not jaundiced   Cardiovascular: regular rhythm and rate, no murmurs auscultated   Pulm: clear to auscultation bilaterally, regular and unlabored   Abdomen: soft, nontender, nondistended; normal bowel sounds   Rectal: deferred   Extremities: no rash or edema   Psychiatric: Normal mood and behavior; memory intact     Results Review:     I reviewed the patient's new clinical results.    Results from last 7 days   Lab Units 03/12/22  0514 03/11/22  0334 03/10/22  0242   WBC 10*3/mm3 7.74 7.48 8.60   HEMOGLOBIN g/dL 9.5* 10.1* 9.8*   HEMATOCRIT % 27.8* 30.2* 30.5*   PLATELETS 10*3/mm3 142 139* 144     Results from last 7 days   Lab Units 03/12/22  0514 03/11/22  0334 03/10/22  1242 03/09/22  1757 03/09/22  1416 03/07/22  1616   SODIUM mmol/L 136 139 138   < > 132* 134   POTASSIUM mmol/L 4.3 4.7 5.1   < > 5.4* 6.2*   CHLORIDE mmol/L 103 111* 107   < > 95* 94*   TOTAL CO2 mmol/L  --   --   --   --   --  23   CO2 mmol/L 24.0 21.0* 22.0   < > 22.2  --    BUN mg/dL 13 22 35*   < > 55* 35*   CREATININE mg/dL 0.78 0.90 1.52*   < > 3.30* 1.95*   CALCIUM mg/dL 9.0 8.7 8.0*  8.0*   < > 8.2* 10.5*   BILIRUBIN mg/dL  --   --   --   --  0.2 0.5   ALK PHOS U/L  --   --   --   --  95 116   ALT (SGPT) U/L  --   --   --   --  6 10   AST (SGOT) U/L  --   --   --   --  7 14   GLUCOSE mg/dL 91 90 194*   < > 133* 98    < > = values in this interval not displayed.         Lab Results   Lab Value Date/Time    LIPASE 23 03/10/2022 1242    LIPASE 36 10/01/2020 0524    LIPASE 11 (L) 09/30/2020 1738    LIPASE 17 08/02/2019 2200    LIPASE 87 02/05/2015 0009       Reviewed KUB and CT A/P this admission - non obstructive bowel gas pattern    Assessment/Plan   Assessment:   1.  Chronic diarrhea  2.  Dysphagia, long standing  3.   Hypotensive shock  4.  COPD    All problems new to me today    Plan:   Diarrhea seems to have resolved, has been an intermittent issue for her over last few months.  Will eventually need colonoscopy with random biopsies to r/o microscopic colitis, possibly outpatient depending on her hospital course.  Would perform EGD as well for her vague chronic dysphagia. She is being worked up for positive blood cultures and being treated for COPD exacerbation at this time.      I discussed the patient's findings and my recommendations with patient.         Josué Garces M.D.  [facility]  [address] [place] [address].[facility]. [facility]  [phone]

## 2022-03-13 NOTE — PROGRESS NOTES
[facility] Progress Note      Patient Name: Vidhi Lopez  : 1950  MRN: [id number]  Primary Care Physician:  Abiodun Vila MD  Date of admission: 3/9/2022    Subjective     Interval History:   Patient doing much better.  Continues to complain of mild shortness of breath.  Denies any nausea, no vomiting, no fever, no chills.  Oxygen murmurs has improved prior to yesterday   Review of Systems:   As noted above    Objective     Vitals:   Temp:  [97.9 °F (36.6 °C)-98.9 °F (37.2 °C)] 97.9 °F (36.6 °C)  Heart Rate:  [] 68  Resp:  [18-20] 20  BP: (103-162)/(63-84) 104/63  Flow (L/min):  [2-4] 2    Intake/Output Summary (Last 24 hours) at 3/13/2022 1131  Last data filed at 3/13/2022 0930  Gross per 24 hour   Intake 660 ml   Output 2000 ml   Net -1340 ml       Physical Exam:    General Appearance: Pleasant doing much better.  Skin: warm and dry  HEENT: oral mucosa normal, nonicteric sclera  Neck: supple, no JVD  Lungs: Bilateral rhonchi noted  Heart: RRR, normal S1 and S2  Abdomen: soft, nontender, nondistended  : no palpable bladder  Extremities: Trace edema, cyanosis or clubbing  Neuro: normal speech and mental status     Scheduled Meds:     budesonide-formoterol, 2 puff, Inhalation, BID - RT  clopidogrel, 75 mg, Oral, Daily  furosemide, 40 mg, Oral, Daily  gabapentin, 400 mg, Oral, TID  heparin (porcine), 5,000 Units, Subcutaneous, Q8H  insulin regular, 0-9 Units, Subcutaneous, Q6H  ipratropium-albuterol, 3 mL, Nebulization, 4x Daily - RT  levETIRAcetam, 500 mg, Oral, BID  losartan, 50 mg, Oral, Q24H  nicotine, 1 patch, Transdermal, Q24H  pantoprazole, 40 mg, Oral, QAM  rOPINIRole, 0.25 mg, Oral, Nightly  senna-docusate sodium, 2 tablet, Oral, BID  sodium chloride, 10 mL, Intravenous, Q12H      IV Meds:        Results Reviewed:   I have personally reviewed the results from the time of this admission to 3/13/2022 11:31 EDT     Results from last 7 days   Lab Units  03/12/22  0514 03/11/22  0334 03/10/22  1242 03/09/22  1757 03/09/22  1416 03/07/22  1616   SODIUM mmol/L 136 139 138   < > 132* 134   POTASSIUM mmol/L 4.3 4.7 5.1   < > 5.4* 6.2*   CHLORIDE mmol/L 103 111* 107   < > 95* 94*   TOTAL CO2 mmol/L  --   --   --   --   --  23   CO2 mmol/L 24.0 21.0* 22.0   < > 22.2  --    BUN mg/dL 13 22 35*   < > 55* 35*   CREATININE mg/dL 0.78 0.90 1.52*   < > 3.30* 1.95*   CALCIUM mg/dL 9.0 8.7 8.0*  8.0*   < > 8.2* 10.5*   BILIRUBIN mg/dL  --   --   --   --  0.2 0.5   ALK PHOS U/L  --   --   --   --  95 116   ALT (SGPT) U/L  --   --   --   --  6 10   AST (SGOT) U/L  --   --   --   --  7 14   GLUCOSE mg/dL 91 90 194*   < > 133* 98    < > = values in this interval not displayed.       Estimated Creatinine Clearance: 67.5 mL/min (by C-G formula based on SCr of 0.78 mg/dL).    Results from last 7 days   Lab Units 03/13/22 1007 03/12/22  0514 03/11/22  0334   MAGNESIUM mg/dL 1.6  --   --    PHOSPHORUS mg/dL  --  1.4* 2.1*       Results from last 7 days   Lab Units 03/07/22  1616   URIC ACID mg/dL 8.5*       Results from last 7 days   Lab Units 03/13/22 1007 03/12/22  0514 03/11/22  0334 03/10/22  0242 03/09/22  1416   WBC 10*3/mm3 5.71 7.74 7.48 8.60 6.23   HEMOGLOBIN g/dL 9.1* 9.5* 10.1* 9.8* 10.3*   PLATELETS 10*3/mm3 148 142 139* 144 164             Assessment / Plan     ASSESSMENT:  1.  Acute kidney injury on top of chronic kidney disease stage IIIa with baseline creatinine around 1.4-1.5.  Creatinine was up 3.3 likely secondary to prerenal/ATN due to hypovolemia, hypotension, and bradycardia all in setting of losartan use.  Creatinine trended down to 0.8 after IV fluids administration     2.  Hyperkalemia with potassium 6.6,  resolved  3.  Hypovolemic shock/septic shock on pressors.  Patient was started on Flagyl for possible C. difficile colitis.  4.  Severe bradycardia improved.  Cardiology consulted   5.  Hypertension with CKD.    Restarted losartan yesterday  6.  Metabolic  acidosis: Likely combination of normal and high anion gap metabolic acidosis due to diarrhea, acute kidney injury, hypotension.    Improved  7.  Diabetes mellitus  type II with CKD  8. COPD  9. Chronic diarrhea:  Much better today  10 .  Anemia of CKD  11.  Hypocalcemia : Resolved with replacement.  Vitamin D level was normal and PTH was appropriately elevated  12.  Hypophosphatemia on replacement     Plan:  Volume status and vitals are acceptable.  Labs are still pending as of today.  Will await for the results of the renal panel.  We will continue current therapy at the time being.  Continue surveillance labs.        Thank you for involving us in the care of Vidhi Lopez.  Please feel free to call with any questions.    Esther Washburn MD  03/13/22  11:31 EDT    [facility]  [phone]      Much of this encounter note is an electronic transcription/translation of spoken language to printed text. The electronic translation of spoken language may permit erroneous, or at times, nonsensical words or phrases to be inadvertently transcribed; Although I have reviewed the note for such errors, some may still exist.

## 2022-03-13 NOTE — NURSING NOTE
Phos called to Dr Peña; Saw Dr Washburn in the hallway while waiting for return call from Dr Peña and he states he is also aware. No new orders will CTM with am labs

## 2022-03-13 NOTE — PLAN OF CARE
Goal Outcome Evaluation:      Patient is alert and oriented x 4. Purewick on. 3L NC. NSR on monitor. Abdominal distention present; Dr. López ordered KUB- awaiting for results. Tylenol given for headache. Have more appetite during shift. Adequate urine output during shift.

## 2022-03-13 NOTE — THERAPY EVALUATION
Patient Name: Vidhi Lopez  : 1950    MRN: [id number]                              Today's Date: 3/13/2022       Admit Date: 3/9/2022    Visit Dx:     ICD-10-CM ICD-9-CM   1. Shock ([facility])  R57.9 785.50   2. Dehydration  E86.0 276.51   3. GASPER (acute kidney injury) ([facility])  N17.9 584.9     Patient Active Problem List   Diagnosis   • Dyslipidemia   • Hypertension   • Anxiety (Chronic benzos)   • Insomnia   • GERD (gastroesophageal reflux disease)   • Diabetes mellitus, type 2 ([facility])   • Fibromyalgia   • RLS (restless legs syndrome)   • Migraines   • COPD (chronic obstructive pulmonary disease) ([facility])   • Interstitial cystitis   • History of acute myocardial infarction   • History of cardiac murmur   • History of stroke   • CAD (coronary artery disease)   • Essential hypertension   • CKD (chronic kidney disease) stage 2, GFR 60-89 ml/min   • Bilateral carotid artery stenosis   • Chronic respiratory failure with hypoxia and hypercapnia ([facility])   • URIEL (obstructive sleep apnea)   • Obesity (BMI 30-39.9)   • Diabetes mellitus type 2 in obese ([facility])   • Obesity hypoventilation syndrome ([facility])   • Tobacco use   • Chronic pain (Chronic narcotics)   • Recurrent UTI/interstitial cystitis on chronic methenamine   • Clostridium difficile colitis diagnosed 2018. Persistent diarrhea despite oral vancomycin   • Demand ischemia ([facility])   • Hypoxemia requiring supplemental oxygen   • Stenosis of carotid artery   • Occlusion and stenosis of left carotid artery    • Chronic gout with tophus   • Depression   • Edema   • Restless leg syndrome   • Asthma   • Wellness examination   • Encounter for screening mammogram for malignant neoplasm of breast    • Seasonal allergies   • Hyperlipidemia   • Seizure disorder ([facility])   • L1 vertebral fracture ([facility])   • Rib fracture   • Lumbar compression fracture ([facility])   • Polypharmacy   • Itching   • Anemia   • Urinary tract infection with hematuria   • Other fatigue   • Balance problem   •  Dysuria   • Right leg pain   • Hypercalcemia   • High risk medication use   • Precordial pain   • Leg swelling   • Encounter for immunization    • Dizzy   • Acute myocardial infarction ([facility])   • Atopic rhinitis   • Bilateral inguinal hernia, without obstruction or gangrene, not specified as recurrent   • Candidiasis of skin   • Cerebrovascular accident (CVA) ([facility])   • Coronary angioplasty status   • Decreased GFR   • Diabetic peripheral neuropathy ([facility])   • History of Clostridioides difficile infection   • Osteoarthritis   • Other specified personal risk factors, not elsewhere classified   • Repeated falls   • Multinodular goiter   • Medicare annual wellness visit, subsequent   • Post-menopausal   • High cholesterol   • Bunion   • Unspecified severe protein-calorie malnutrition (CMS/HCC)    • Nausea   • Rash   • Subacute maxillary sinusitis   • Cough   • Fever   • Sore throat   • Spasm   • Acute midline back pain   • Neuropathy   • Shock ([facility])     Past Medical History:   Diagnosis Date   • Allergic rhinitis    • Asthma with COPD ([facility])     Asthma/COPD   • Bilateral ovarian cysts    • Coronary artery disease    • Depression with anxiety     Depression/Anxiety   • Diabetes ([facility])     pre   • Elevated cholesterol    • Endometriosis     Dr. Jin; estradiol    • ESR raised 03/20/2018   • Fatigue    • Fibromyalgia    • Gout    • Headache     Headaches   • High cholesterol    • History of gallstones    • History of myocardial infarction    • Hypertension    • Influenza B     DX'D 2/6/2018, TREATED WITH TAMIFLU. LAST DOSE 2/11/2018 AM.  PATIENT STATES DR RANGEL IS AWARE. DENIES FEVERS OR CHILLS.   • Interstitial cystitis    • On home oxygen therapy     2 L NC   • URIEL on CPAP    • PONV (postoperative nausea and vomiting)    • Rheumatoid arthritis ([facility])    • Seizure ([facility])    • Seizure disorder, nonconvulsive, with status epilepticus ([facility]) 03/20/2018   • Sepsis ([facility])    • Septic joint of right knee joint ([facility]) 03/21/2018   •  Shortness of breath on exertion    • Stroke (HCC)     X1 8 YEARS AGO, GENERALIZED UPPER BODY WEAKNESS RESIDUAL PER PT    • Thyroid disorder    • Urinary leakage    • UTI (urinary tract infection)    • Wears glasses    • Yeast dermatitis      Past Surgical History:   Procedure Laterality Date   • ARTERIOGRAM N/A 2/12/2018    Procedure: Renal Arteriogram;  Surgeon: Lito Flores MD;  Location:  [facility] CATH [facility]VASIVE [facility];  Service:    • BREAST BIOPSY Right 1991   • CARDIAC CATHETERIZATION N/A 2/12/2018    Procedure: Right Heart Cath;  Surgeon: Lito Flores MD;  Location:  [facility] CATH INVASIVE [facility]ATION;  Service:    • CAROTID STENT      Transcath    • CAROTID STENT Left    • CHOLECYSTECTOMY     • CYSTOSTOMY W/ BLADDER BIOPSY     • DILATATION AND CURETTAGE     • KNEE ARTHROSCOPY Right 3/23/2018    Procedure: ARTHROSCOPY, RIGHT KNEE  INCISION AND DRAINAGE LOWER EXTREMITY WITH SYNOVIAL BIOPSY;  Surgeon: Dilip Giron MD;  Location:  [facility] OR;  Service: Orthopedics   • LAPAROSCOPIC CHOLECYSTECTOMY  1994    Lap rolando   • OOPHORECTOMY     • PAP SMEAR  05/10/2016   • SUBTOTAL HYSTERECTOMY        General Information     Row Name 03/13/22 1300          Physical Therapy Time and Intention    Document Type evaluation  -SV     Mode of Treatment individual therapy;physical therapy  -     Row Name 03/13/22 1300          General Information    Patient Profile Reviewed yes  -SV     Prior Level of Function --  indep with recent hx of falls , has rwx , wants rollator  -SV     Existing Precautions/Restrictions fall;oxygen therapy device and L/min  -SV     Barriers to Rehab medically complex;previous functional deficit  -SV     Row Name 03/13/22 1300          Living Environment    People in Home alone  -SV     Row Name 03/13/22 1300          Home Main Entrance    Number of Stairs, Main Entrance none  -SV     Stair Railings, Main Entrance none  -SV     Row Name 03/13/22 1300          Stairs Within Home, Primary    Stairs, Within  Home, Primary lives in indep apartment near assisted living but she is not in assisted living  -     Row Name 03/13/22 1300          Cognition    Orientation Status (Cognition) oriented x 4  -SV     Row Name 03/13/22 1300          Safety Issues, Functional Mobility    Impairments Affecting Function (Mobility) balance;endurance/activity tolerance;shortness of breath;strength  -SV           User Key  (r) = Recorded By, (t) = Taken By, (c) = Cosigned By    Initials Name Provider Type     Willa Soto, PT Physical Therapist               Mobility     Row Name 03/13/22 1407          Bed Mobility    Bed Mobility supine-sit;sit-supine  -SV     Supine-Sit Iroquois (Bed Mobility) contact guard  -SV     Sit-Supine [place]roquois (Bed Mobility) contact guard  -SV     Assistive Device (Bed Mobility) bed rails;head of bed elevated  -     Row Name 03/13/22 1407          Sit-Stand Transfer    Sit-Stand [place]roquois (Transfers) contact guard  -SV     Assistive Device (Sit-Stand Transfers) walker, front-wheeled  -     Row Name 03/13/22 1407          Gait/Stairs (Locomotion)    Iroquois Level (Gait) 1 person to manage equipment;contact guard  -SV     Assistive Device (Gait) walker, front-wheeled  -SV     Distance in Feet (Gait) 240' 4 L O2, shuffle pattern, slow guarded gait but no lob or unsteadiness  -           User Key  (r) = Recorded By, (t) = Taken By, (c) = Cosigned By    Initials Name Provider Type    SV Willa Soto PT Physical Therapist               Obj/Interventions     Row Name 03/13/22 1408          Range of Motion Comprehensive    Comment, General Range of Motion BUE appears wfl except shoulders limited Left >right, BLE arom appears wfl, pt reports bone on bone OA right knee  -     Row Name 03/13/22 1408          Strength Comprehensive (MMT)    Comment, General Manual Muscle Testing (MMT) Assessment Zane shoulders 2-/5 all other BUE/BLE grossly obs at > 3/5  -     Row Name 03/13/22 1408           Motor Skills    Therapeutic Exercise --  PLB encouraged  -SV     Row Name 03/13/22 1408          Balance    Comment, Balance sit bal indep, standing bal cga/sba with rwx  -SV     Row Name 03/13/22 1408          Sensory Assessment (Somatosensory)    Sensory Assessment (Somatosensory) not tested  -SV           User Key  (r) = Recorded By, (t) = Taken By, (c) = Cosigned By    Initials Name Provider Type    SV Willa Soto, PT Physical Therapist               Goals/Plan     Row Name 03/13/22 1411          Transfer Goal 1 (PT)    Activity/Assistive Device (Transfer Goal 1, PT) sit-to-stand/stand-to-sit  -SV     Lar[place] Level/Cues Needed (Transfer Goal 1, PT) independent  -SV     Time Frame (Transfer Goal 1, PT) 10 days  -SV     Strategies/Barriers (Transfers Goal 1, PT) least AD  -SV     Row Name 03/13/22 1411          Gait Training Goal 1 (PT)    Activity/Assistive Device (Gait Training Goal 1, PT) gait (walking locomotion)  -SV     Larimer Level (Gait Training Goal 1, PT) independent  -SV     Distance (Gait Training Goal 1, PT) 300' least vs no AD  -SV     Time Frame (Gait Training Goal 1, PT) 10 days  -SV           User Key  (r) = Recorded By, (t) = Taken By, (c) = Cosigned By    Initials Name Provider Type    SV Willa Soto, PT Physical Therapist               Clinical Impression     Row Name 03/13/22 1409          Pain    Pre/Posttreatment Pain Comment no report of pain  -SV     Pain Intervention(s) Ambulation/increased activity;Repositioned  -SV     Row Name 03/13/22 1409          Plan of Care Review    Plan of Care Reviewed With patient  -SV     Row Name 03/13/22 1409          Therapy Assessment/Plan (PT)    Patient/Family Therapy Goals Statement (PT) Pt plans for home at d/c with HHPT  -SV     Rehab Potential (PT) good, to achieve stated therapy goals  -SV     Criteria for Skilled Interventions Met (PT) yes  -SV     Predicted Duration of Therapy Intervention (PT) 2-4 weeks  -     Row  Name 03/13/22 1409          Vital Signs    Pre SpO2 (%) 89  2L  -SV     O2 Delivery Pre Treatment supplemental O2  -SV     Intra SpO2 (%) 86  4L  -SV     O2 Delivery Intra Treatment supplemental O2  -SV     Post SpO2 (%) 91  2L  -SV     O2 Delivery Post Treatment supplemental O2  -SV     Pre Patient Position Supine  -SV     Intra Patient Position Standing  -SV     Post Patient Position Supine  -SV     Row Name 03/13/22 1409          Positioning and Restraints    Pre-Treatment Position in bed  -SV     Post Treatment Position bed  -SV     In Bed call light within reach;encouraged to call for assist  -SV           User Key  (r) = Recorded By, (t) = Taken By, (c) = Cosigned By    Initials Name Provider Type    Willa Carty, PT Physical Therapist               Outcome Measures     Row Name 03/13/22 1412          How much help from another person do you currently need...    Turning from your back to your side while in flat bed without using bedrails? 4  -SV     Moving from lying on back to sitting on the side of a flat bed without bedrails? 4  -SV     Moving to and from a bed to a chair (including a wheelchair)? 3  -SV     Standing up from a chair using your arms (e.g., wheelchair, bedside chair)? 3  -SV     Climbing 3-5 steps with a railing? 3  -SV     To walk in hospital room? 3  -SV     AM-PAC 6 Clicks Score (PT) 20  -SV     Row Name 03/13/22 1412          Functional Assessment    Outcome Measure Options AM-PAC 6 Clicks Basic Mobility (PT)  -SV           User Key  (r) = Recorded By, (t) = Taken By, (c) = Cosigned By    Initials Name Provider Type    Willa Carty, PT Physical Therapist                             Physical Therapy Education                 Title: PT OT SLP Therapies (In Progress)     Topic: Physical Therapy (In Progress)     Point: Mobility training (In Progress)     Learning Progress Summary           Patient Acceptance, E,TB, NR by JINNY at 3/13/2022 1412                   Point: Home  exercise program (In Progress)     Learning Progress Summary           Patient Acceptance, E,TB, NR by  at 3/13/2022 1412                               User Key     Initials Effective Dates Name Provider Type Discipline     06/16/21 -  Willa Soto, PT Physical Therapist PT              PT Recommendation and Plan  Planned Therapy Interventions (PT): balance training, bed mobility training, gait training, home exercise program, strengthening, vestibular therapy, patient/family education  Plan of Care Reviewed With: patient     Time Calculation:    PT Charges     Row Name 03/13/22 1419             Time Calculation    Start Time 1300  -      Stop Time 1319  -      Time Calculation (min) 19 min  -      PT Received On 03/13/22  -      PT - Next Appointment 03/14/22  -      PT Goal Re-Cert Due Date 03/23/22  -            User Key  (r) = Recorded By, (t) = Taken By, (c) = Cosigned By    Initials Name Provider Type    SV Willa Soto, PT Physical Therapist              Therapy Charges for Today     Code Description Service Date Service Provider Modifiers Qty    19989384562 HC PT EVAL LOW COMPLEXITY 2 3/13/2022 Willa Soto, PT GP 1          PT G-Codes  Outcome Measure Options: AM-PAC 6 Clicks Basic Mobility (PT)  AM-PAC 6 Clicks Score (PT): 20    Willa Soto PT  3/13/2022

## 2022-03-13 NOTE — NURSING NOTE
Patient's abdomen is distended during safety checks. Notified gastroenterology Dr. López. Awaiting for a call back.

## 2022-03-13 NOTE — CONSULTS
"Referring Provider: Dr Lei    Reason for Consultation: \"Positive blood cultures.  Initially presented with hypovolemic shock which felt to be secondary to diarrhea.  Initially treated with antibiotics which have since been discontinued.\"    History of present illness:  Vidhi Lopez is a 71 y.o. who I am asked to evaluate and give opinion for \"Positive blood cultures.  Initially presented with hypovolemic shock which felt to be secondary to diarrhea.  Initially treated with antibiotics which have since been discontinued.\" History is obtained from the patient and review of the old medical records which I summarize/synthesize as follows: She presented to the ER on 3/9/22 with lightheadedness and bradycardia. She had been referred there due to hyperkalemia as an outpatient. She has chronic diarrhea but it had recently worsened. She had associated poor PO intake. In the ER, she was afebrile with a HR of 40 and BP 70/40. Labs notable for Crt 3.3 (above baseline of 1.5), WBC 6, and negative RPP. She was started on empiric vancomycin, Zosyn and Flagyl. However, these were stopped after 1-2 days each. ID asked to evaluate. She is now out of the ICU and normotensive. Diarrhea is improved. The only positive culture is a coag-negative Staph contaminant. C diff test was negative. She says she is feeling much better.     Past Medical History:   Diagnosis Date   • Allergic rhinitis    • Asthma with COPD ([facility])     Asthma/COPD   • Bilateral ovarian cysts    • Coronary artery disease    • Depression with anxiety     Depression/Anxiety   • Diabetes ([facility])     pre   • Elevated cholesterol    • Endometriosis     Dr. Jin; estradiol    • ESR raised 03/20/2018   • Fatigue    • Fibromyalgia    • Gout    • Headache     Headaches   • High cholesterol    • History of gallstones    • History of myocardial infarction    • Hypertension    • Influenza B     DX'D 2/6/2018, TREATED WITH TAMIFLU. LAST DOSE 2/11/2018 AM.  PATIENT STATES DR RANGEL " IS AWARE. DENIES FEVERS OR CHILLS.   • Interstitial cystitis    • On home oxygen therapy     2 L NC   • URIEL on CPAP    • PONV (postoperative nausea and vomiting)    • Rheumatoid arthritis (HCC)    • Seizure (HCC)    • Seizure disorder, nonconvulsive, with status epilepticus ([facility]) 03/20/2018   • Sepsis (HCC)    • Septic joint of right knee joint ([facility]) 03/21/2018   • Shortness of breath on exertion    • Stroke ([facility])     X1 8 YEARS AGO, GENERALIZED UPPER BODY WEAKNESS RESIDUAL PER PT    • Thyroid disorder    • Urinary leakage    • UTI (urinary tract infection)    • Wears glasses    • Yeast dermatitis        Past Surgical History:   Procedure Laterality Date   • ARTERIOGRAM N/A 2/12/2018    Procedure: Renal Arteriogram;  Surgeon: Lito Flores MD;  Location:  [facility] CATH [facility]VAS[facility];  Service:    • BREAST BIOPSY Right 1991   • CARDIAC CATHETERIZATION N/A 2/12/2018    Procedure: Right Heart Cath;  Surgeon: Lito Flores MD;  Location:  [facility] CATH [facility]VAS[facility];  Service:    • CAROTID STENT      Transcath    • CAROTID STENT Left    • CHOLECYSTECTOMY     • CYSTOSTOMY W/ BLADDER BIOPSY     • DILATATION AND CURETTAGE     • KNEE ARTHROSCOPY Right 3/23/2018    Procedure: ARTHROSCOPY, RIGHT KNEE  INCISION AND DRAINAGE LOWER EXTREMITY WITH SYNOVIAL BIOPSY;  Surgeon: Dilip Giron MD;  Location:  [facility] OR;  Service: Orthopedics   • LAPAROSCOPIC CHOLECYSTECTOMY  1994    Lap rolando   • OOPHORECTOMY     • PAP SMEAR  05/10/2016   • SUBTOTAL HYSTERECTOMY         Social History:  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 [place]    Family History:  Mom: breast cancer      Antibiotic allergies and intolerances:  None    Medications:    Current Facility-Administered Medications:   •  acetaminophen (TYLENOL) tablet 650 mg, 650 mg, Oral, Q4H PRN, 650 mg at 03/13/22 0417 **OR** acetaminophen (TYLENOL) suppository 650 mg, 650 mg, Rectal, Q4H PRN, Merly Jackson MD  •  albuterol (PROVENTIL) nebulizer solution 0.083% 2.5 mg/3mL,  2.5 mg, Nebulization, Q4H PRN, Kevin Singh MD, 2.5 mg at 03/12/22 0139  •  albuterol (PROVENTIL) nebulizer solution 0.083% 2.5 mg/3mL, 2.5 mg, Nebulization, Q6H PRN, Chris Lei MD  •  sennosides-docusate (PERICOLACE) 8.6-50 MG per tablet 2 tablet, 2 tablet, Oral, BID, 2 tablet at 03/10/22 0910 **AND** polyethylene glycol (MIRALAX) packet 17 g, 17 g, Oral, Daily PRN **AND** bisacodyl (DULCOLAX) EC tablet 5 mg, 5 mg, Oral, Daily PRN **AND** bisacodyl (DULCOLAX) suppository 10 mg, 10 mg, Rectal, Daily PRN, Merly Jackson MD  •  budesonide-formoterol (SYMBICORT) 160-4.5 MCG/ACT inhaler 2 puff, 2 puff, Inhalation, BID - RT, Hipolito Singh MD, 2 puff at 03/13/22 0730  •  clopidogrel (PLAVIX) tablet 75 mg, 75 mg, Oral, Daily, Merly Jackson MD, 75 mg at 03/13/22 0933  •  dextrose (D50W) (25 g/50 mL) IV injection 25 g, 25 g, Intravenous, Q15 Min PRN, Merly Jackson MD  •  dextrose (GLUTOSE) oral gel 15 g, 15 g, Oral, Q15 Min PRN, Merly Jackson MD  •  furosemide (LASIX) tablet 40 mg, 40 mg, Oral, Daily, Esther Washburn MD, 40 mg at 03/13/22 0925  •  gabapentin (NEURONTIN) capsule 400 mg, 400 mg, Oral, TID, Merly Jackson MD, 400 mg at 03/13/22 0925  •  glucagon (human recombinant) (GLUCAGEN DIAGNOSTIC) injection 1 mg, 1 mg, Intramuscular, Q15 Min PRN, Merly Jackson MD  •  heparin (porcine) 5000 UNIT/ML injection 5,000 Units, 5,000 Units, Subcutaneous, Q8H, Merly Jackson MD, 5,000 Units at 03/13/22 0616  •  HYDROcodone-acetaminophen (NORCO) 5-325 MG per tablet 1 tablet, 1 tablet, Oral, Q6H PRN, Merly Jackson MD, 1 tablet at 03/12/22 1428  •  insulin regular (humuLIN R,novoLIN R) injection 0-9 Units, 0-9 Units, Subcutaneous, Q6H, Merly Jackson MD, 2 Units at 03/11/22 1209  •  ipratropium-albuterol (DUO-NEB) nebulizer solution 3 mL, 3 mL, Nebulization, 4x Daily - RT, Merly Jackson MD, 3 mL at 03/13/22 1028  •  levETIRAcetam (KEPPRA) tablet 500 mg, 500 mg, Oral, BID, Merly Jackson MD,  500 mg at 03/13/22 0925  •  losartan (COZAAR) tablet 50 mg, 50 mg, Oral, Q24H, Esther Washburn MD, 50 mg at 03/13/22 0925  •  morphine injection 1 mg, 1 mg, Intravenous, Q4H PRN, Merly Jackson MD, 1 mg at 03/12/22 0132  •  nicotine (NICODERM CQ) 14 MG/24HR patch 1 patch, 1 patch, Transdermal, Q24H, Merly Jackson MD, 1 patch at 03/13/22 0926  •  ondansetron (ZOFRAN) injection 4 mg, 4 mg, Intravenous, Q6H PRN, Kevin Singh MD, 4 mg at 03/12/22 0215  •  pantoprazole (PROTONIX) EC tablet 40 mg, 40 mg, Oral, QAM, Merly Jackson MD, 40 mg at 03/13/22 0616  •  rOPINIRole (REQUIP) tablet 0.25 mg, 0.25 mg, Oral, Nightly, Merly Jackson MD, 0.25 mg at 03/12/22 2032  •  sodium chloride 0.9 % flush 10 mL, 10 mL, Intravenous, Q12H, Merly Jackson MD, 10 mL at 03/13/22 0927  •  sodium chloride 0.9 % flush 10 mL, 10 mL, Intravenous, PRN, Merly Jackson MD  •  tiZANidine (ZANAFLEX) tablet 4 mg, 4 mg, Oral, Q8H PRN, Merly Jackson MD, 4 mg at 03/13/22 0417    Review of Systems  All systems were reviewed and are negative unless otherwise stated above in the HPI    Objective   Vital Signs   Temp:  [97.9 °F (36.6 °C)-98.9 °F (37.2 °C)] 97.9 °F (36.6 °C)  Heart Rate:  [] 68  Resp:  [18-20] 20  BP: (103-162)/(63-84) 104/63    Physical Exam:   General: awake, alert, very nice  Head: atraumatic  Eyes: no scleral icterus  ENT: no thrush  Cardiovascular: NR, RR, no murmurs, no LE edema  Respiratory: no wheezing; normal work of breathing on nasal cannula  GI: Abdomen is soft, nontender, mildly distended  :  no Melissa catheter  Musculoskeletal: normal musculature  Skin: No rashes  Neurological: Alert and oriented x 3  Psychiatric: Normal mood and affect   Vasc: no cyanosis    Labs:     Lab Results   Component Value Date    WBC 5.71 03/13/2022    HGB 9.1 (L) 03/13/2022    HCT 28.1 (L) 03/13/2022    MCV 91.8 03/13/2022     03/13/2022       Lab Results   Component Value Date    GLUCOSE 91 03/12/2022    BUN 13  03/12/2022    CREATININE 0.78 03/12/2022    EGFRIFNONA 37 (L) 12/30/2020    EGFRIFAFRI 45 (L) 12/30/2020    BCR 16.7 03/12/2022    CO2 24.0 03/12/2022    CALCIUM 9.0 03/12/2022    PROTENTOTREF 7.6 03/07/2022    ALBUMIN 3.50 03/12/2022    LABIL2 1.6 03/07/2022    AST 7 03/09/2022    ALT 6 03/09/2022     Lab Results   Component Value Date    HGBA1C 6.8 (H) 03/07/2022     Procal 0.25  UA TNTC WBCs, large LE, neg nitrite    Microbiology:  3/1 COVID: negative  3/9 COVID: negative  3/9 BCx: coag-neg Staph in 1/2 sets  3/10 C diff PCR: negative  3/12 BCx: NGTD     Radiology (personally reviewed report):  CT A/P:   1.  Findings suggestive of cystitis; however, the bladder is underdistended which limits evaluation. Confirmation with urinalysis is recommended.   2.  Small-to-moderate bilateral pleural effusions with overlying atelectasis. There is also small amount of ascites.   3.  Other findings as above.     KUB: Residual enteric contrast material seen in the colon. The bowel gas pattern is nonobstructive. No supine evidence for free intraperitoneal gas. Follow-up as clinical indications persist.     Assessment/Plan   1. Acute on chronic diarrhea  2. Hypovolemic shock - resolved  3. Obesity BMI 30  4. Asymptomatic pyuria  5. GASPER on CKD 3 - better  6. COPD  7. Hyperkalemia    She is currently afebrile with a normal WBC. BP and HR are improved. Cultures are negative except for a contaminant with coag-negative Staph. C diff test was negative.  She may have had a transient GI illness that spontaneously resolved as many do.  I do not recommend antibiotics at this time since no infection identified and since she has improved.    Thank you for allowing me to be involved in the care of this patient. Infectious diseases will sign off at this time, but please call me at [phone] if any further ID questions or new ID concerns.

## 2022-03-13 NOTE — PROGRESS NOTES
"DAILY PROGRESS NOTE  [facility]    Patient Identification:  Name: Vidhi Lopez  Age: 71 y.o.  Sex: female  :  1950  MRN: [id number]         Primary Care Physician: Abiodun Vila MD      Subjective  Patient overall feeling much better.  Nausea vomiting diarrhea resolved.  She is very concerned about how much she is urinating.  Discussed the Lasix and her recent fluid resuscitation etc.  She is also wishing to have her trazodone resumed.  I discussed her given history of chronic diarrhea.  She states actually that is not the case.  She goes back and forth between diarrhea and constipation.  What led to this admission was an acute bout of nausea vomiting and diarrhea.    Objective:  General Appearance:  Comfortable, well-appearing, in no acute distress and not in pain (Obese.).    Vital signs: (most recent): Blood pressure 164/79, pulse 82, temperature 98.3 °F (36.8 °C), temperature source Oral, resp. rate 18, height 162.6 cm (64\"), weight 81.3 kg (179 lb 3.7 oz), SpO2 92 %, not currently breastfeeding.    Lungs:  Normal effort and normal respiratory rate.  Breath sounds clear to auscultation.    Heart: Normal rate.  Regular rhythm.    Abdomen: Abdomen is soft and non-distended.    Extremities: There is no dependent edema.    Neurological: Patient is alert and oriented to person, place and time.    Skin:  Warm and dry.                Vital signs in last 24 hours:  Temp:  [97.9 °F (36.6 °C)-98.3 °F (36.8 °C)] 98.3 °F (36.8 °C)  Heart Rate:  [] 82  Resp:  [18-20] 18  BP: (103-164)/(63-84) 164/79    Intake/Output:    Intake/Output Summary (Last 24 hours) at 3/13/2022 1438  Last data filed at 3/13/2022 1315  Gross per 24 hour   Intake 840 ml   Output 1100 ml   Net -260 ml         Results from last 7 days   Lab Units 22  1007 22  0514 22  0334 03/10/22  0242 22  1416 22  1616   WBC 10*3/mm3 5.71 7.74 7.48 8.60 6.23 9.2   HEMOGLOBIN g/dL 9.1* 9.5* " 10.1* 9.8* 10.3* 12.7   PLATELETS 10*3/mm3 148 142 139* 144 164 227     Results from last 7 days   Lab Units 03/13/22  1158 03/13/22 1007 03/12/22  0514 03/11/22  0334 03/10/22  1242 03/10/22  1111 03/10/22  0016   SODIUM mmol/L 139 138 136 139 138 138 134*   POTASSIUM mmol/L 3.8 3.9 4.3 4.7 5.1 5.1 4.8   CHLORIDE mmol/L 103 103 103 111* 107 107 103   CO2 mmol/L 28.0 27.0 24.0 21.0* 22.0 20.7* 20.0*   BUN mg/dL 15 15 13 22 35* 35* 48*   CREATININE mg/dL 0.86 0.93 0.78 0.90 1.52* 1.63* 2.45*   GLUCOSE mg/dL 113* 129* 91 90 194* 173* 191*   Estimated Creatinine Clearance: 62.7 mL/min (by C-G formula based on SCr of 0.86 mg/dL).  Results from last 7 days   Lab Units 03/13/22  1158 03/13/22 1007 03/12/22  0514 03/11/22  0334 03/10/22  1242 03/10/22  1111 03/10/22  0016 03/09/22  1757 03/09/22  1416 03/07/22  1616   CALCIUM mg/dL 8.9 8.8 9.0 8.7 8.0*  8.0* 8.0* 7.8*   < > 8.2* 10.5*   ALBUMIN g/dL 3.40* 3.10* 3.50 3.40*  --   --   --   --  3.30* 4.7   MAGNESIUM mg/dL  --  1.6  --   --   --   --   --   --   --   --    PHOSPHORUS mg/dL 1.8* 1.9* 1.4* 2.1*  --   --   --   --   --   --     < > = values in this interval not displayed.     Results from last 7 days   Lab Units 03/13/22  1158 03/13/22 1007 03/12/22  0514 03/11/22  0334 03/09/22  1416 03/07/22  1616   ALBUMIN g/dL 3.40* 3.10* 3.50 3.40* 3.30* 4.7   BILIRUBIN mg/dL  --   --   --   --  0.2 0.5   ALK PHOS U/L  --   --   --   --  95 116   AST (SGOT) U/L  --   --   --   --  7 14   ALT (SGPT) U/L  --   --   --   --  6 10       Assessment:  Hypovolemic shock: Resolved.  Secondary to below.  Nausea vomiting diarrhea: Resolved.  Suspect viral gastroenteritis.  GASPER: Secondary to above.  Resolved.  Diabetes type 2: Reasonable control.  Requiring low doses of insulin.  Hyperkalemia: Associated with GASPER.  Resolved.  Hypophosphatemia: Replacing.  Seizure disorder: Continue home meds.  Hypertension  COPD: Stable.  Obstructive sleep apnea    All problems new to this  examiner.    Plan:  Please see above.  Increase activity.    Discharge planning.  Discussed with RN.    Luiz Mccabe MD  3/13/2022  14:38 EDT

## 2022-03-13 NOTE — PROGRESS NOTES
"  Daily Progress Note.   [address]  3/13/2022    Patient:  Name:  Vidhi Lopez  MRN:  [id number]  1950  71 y.o.  female         CC: soa copd gretel    Interval History:   seen and examined this am.  Feels breathing is better  Having some abd discomfort  No hemoptysis, feels that symbicort helped no wheeze  Alert appropriate no lethargy    Physical Exam:  /84 (BP Location: Left arm, Patient Position: Lying)   Pulse 71   Temp 98 °F (36.7 °C) (Oral)   Resp 18   Ht 162.6 cm (64\")   Wt 81.3 kg (179 lb 3.7 oz)   LMP  (LMP Unknown) Comment: LAST MAMMOGRAM 2017  SpO2 98%   BMI 30.77 kg/m²   Body mass index is 30.77 kg/m².    Intake/Output Summary (Last 24 hours) at 3/13/2022 0728  Last data filed at 3/13/2022 0441  Gross per 24 hour   Intake 610 ml   Output 2000 ml   Net -1390 ml     General appearance: non toxic, conversant   Eyes: anicteric sclerae, moist conjunctivae; no lid-lag;    HENT: Atraumatic; oropharynx clear with moist mucous membranes    Lungs: william with out exp wheeze  Faint crackles at bases diminshed, with normal respiratory effort and no intercostal retractions  CV: RRR, no rub  Extremities:  peripheral edema    Skin: warm dry no diffuse visible rash  Psych: Appropriate affect, alert and oriented    Neuro moves all ext, cns 2-12 intact speech intact    Data Review:  Notable Labs:  Results from last 7 days   Lab Units 03/12/22  0514 03/11/22  0334 03/10/22  0242 03/09/22  1416 03/07/22  1616   WBC 10*3/mm3 7.74 7.48 8.60 6.23 9.2   HEMOGLOBIN g/dL 9.5* 10.1* 9.8* 10.3* 12.7   PLATELETS 10*3/mm3 142 139* 144 164 227     Results from last 7 days   Lab Units 03/12/22  0514 03/11/22  0334 03/10/22  1242 03/10/22  1111 03/10/22  0016 03/09/22  1757 03/09/22  1416   SODIUM mmol/L 136 139 138 138 134* 131* 132*   POTASSIUM mmol/L 4.3 4.7 5.1 5.1 4.8 5.0 5.4*   CHLORIDE mmol/L 103 111* 107 107 103 99 95*   CO2 mmol/L 24.0 21.0* 22.0 20.7* 20.0* 21.0* 22.2   BUN mg/dL 13 22 " 35* 35* 48* 54* 55*   CREATININE mg/dL 0.78 0.90 1.52* 1.63* 2.45* 2.99* 3.30*   GLUCOSE mg/dL 91 90 194* 173* 191* 201* 133*   CALCIUM mg/dL 9.0 8.7 8.0*  8.0* 8.0* 7.8* 8.0* 8.2*   PHOSPHORUS mg/dL 1.4* 2.1*  --   --   --   --   --    Estimated Creatinine Clearance: 67.5 mL/min (by C-G formula based on SCr of 0.78 mg/dL).    Results from last 7 days   Lab Units 03/12/22  1536 03/12/22  0514 03/11/22  0334 03/10/22  0242 03/09/22  1531 03/09/22  1416 03/09/22  1416 03/07/22  1616   AST (SGOT) U/L  --   --   --   --   --   --  7 14   ALT (SGPT) U/L  --   --   --   --   --   --  6 10   PROCALCITONIN ng/mL 0.25  --   --   --   --   --  0.21  --    LACTATE mmol/L  --   --   --   --  0.9  --   --   --    FERRITIN ng/mL  --  103.00  --   --   --   --   --   --    PLATELETS 10*3/mm3  --  142 139* 144  --    < > 164 227    < > = values in this interval not displayed.             Imaging:  Reviewed chest images personally from past 3 days    Scheduled meds:    budesonide-formoterol, 2 puff, Inhalation, BID - RT  clopidogrel, 75 mg, Oral, Daily  furosemide, 40 mg, Oral, Daily  gabapentin, 400 mg, Oral, TID  heparin (porcine), 5,000 Units, Subcutaneous, Q8H  insulin regular, 0-9 Units, Subcutaneous, Q6H  ipratropium-albuterol, 3 mL, Nebulization, 4x Daily - RT  levETIRAcetam, 500 mg, Oral, BID  losartan, 50 mg, Oral, Q24H  nicotine, 1 patch, Transdermal, Q24H  pantoprazole, 40 mg, Oral, QAM  rOPINIRole, 0.25 mg, Oral, Nightly  senna-docusate sodium, 2 tablet, Oral, BID  sodium chloride, 10 mL, Intravenous, Q12H        ASSESSMENT  /  PLAN:  Shock hypovolemic  Bradycardia  Acute kidney injury on chronic kidney disease  Abdominal pain with diarrhea  Hyperkalemia  Hyponatremia  Anemia  COPD  URIEL  Chronic diarrhea  Seizure disorder  Diabetes mellitus  Hypertension    From a pulmonary perspective copd resolved wheeze, w  Cont home symbicort  Cont schedule duonebs   uriel - cant tolerate cpap,she follows with sleep md in [place],  considering inspyre     cxr today - oxygen need?    Hipolito Singh MD  [facility]  03/13/22  1242 EST

## 2022-03-14 LAB
ALBUMIN SERPL-MCNC: 3.3 G/DL (ref 3.5–5.2)
ANION GAP SERPL CALCULATED.3IONS-SCNC: 9 MMOL/L (ref 5–15)
BACTERIA SPEC AEROBE CULT: NORMAL
BUN SERPL-MCNC: 13 MG/DL (ref 8–23)
BUN/CREAT SERPL: 16 (ref 7–25)
CALCIUM SPEC-SCNC: 8.9 MG/DL (ref 8.6–10.5)
CHLORIDE SERPL-SCNC: 101 MMOL/L (ref 98–107)
CO2 SERPL-SCNC: 31 MMOL/L (ref 22–29)
CREAT SERPL-MCNC: 0.81 MG/DL (ref 0.57–1)
EGFRCR SERPLBLD CKD-EPI 2021: 78.2 ML/MIN/1.73
GLUCOSE BLDC GLUCOMTR-MCNC: 117 MG/DL (ref 70–130)
GLUCOSE BLDC GLUCOMTR-MCNC: 119 MG/DL (ref 70–130)
GLUCOSE BLDC GLUCOMTR-MCNC: 140 MG/DL (ref 70–130)
GLUCOSE BLDC GLUCOMTR-MCNC: 158 MG/DL (ref 70–130)
GLUCOSE BLDC GLUCOMTR-MCNC: 180 MG/DL (ref 70–130)
GLUCOSE SERPL-MCNC: 129 MG/DL (ref 65–99)
MAGNESIUM SERPL-MCNC: 1.5 MG/DL (ref 1.6–2.4)
PHOSPHATE SERPL-MCNC: 2.9 MG/DL (ref 2.5–4.5)
POTASSIUM SERPL-SCNC: 4.7 MMOL/L (ref 3.5–5.2)
SODIUM SERPL-SCNC: 141 MMOL/L (ref 136–145)

## 2022-03-14 PROCEDURE — 82962 GLUCOSE BLOOD TEST: CPT

## 2022-03-14 PROCEDURE — 25010000002 HEPARIN (PORCINE) PER 1000 UNITS: Performed by: INTERNAL MEDICINE

## 2022-03-14 PROCEDURE — 63710000001 INSULIN REGULAR HUMAN PER 5 UNITS: Performed by: INTERNAL MEDICINE

## 2022-03-14 PROCEDURE — 97530 THERAPEUTIC ACTIVITIES: CPT

## 2022-03-14 PROCEDURE — 99232 SBSQ HOSP IP/OBS MODERATE 35: CPT | Performed by: INTERNAL MEDICINE

## 2022-03-14 PROCEDURE — 94664 DEMO&/EVAL PT USE INHALER: CPT

## 2022-03-14 PROCEDURE — 94799 UNLISTED PULMONARY SVC/PX: CPT

## 2022-03-14 PROCEDURE — 25010000002 MAGNESIUM SULFATE 2 GM/50ML SOLUTION: Performed by: INTERNAL MEDICINE

## 2022-03-14 PROCEDURE — 83735 ASSAY OF MAGNESIUM: CPT | Performed by: STUDENT IN AN ORGANIZED HEALTH CARE EDUCATION/TRAINING PROGRAM

## 2022-03-14 PROCEDURE — 97110 THERAPEUTIC EXERCISES: CPT

## 2022-03-14 PROCEDURE — 94760 N-INVAS EAR/PLS OXIMETRY 1: CPT

## 2022-03-14 PROCEDURE — 80069 RENAL FUNCTION PANEL: CPT | Performed by: HOSPITALIST

## 2022-03-14 RX ORDER — MAGNESIUM SULFATE HEPTAHYDRATE 40 MG/ML
2 INJECTION, SOLUTION INTRAVENOUS ONCE
Status: COMPLETED | OUTPATIENT
Start: 2022-03-14 | End: 2022-03-14

## 2022-03-14 RX ORDER — POLYETHYLENE GLYCOL 3350 17 G/17G
17 POWDER, FOR SOLUTION ORAL DAILY
Status: DISCONTINUED | OUTPATIENT
Start: 2022-03-14 | End: 2022-03-15 | Stop reason: HOSPADM

## 2022-03-14 RX ORDER — LOSARTAN POTASSIUM 50 MG/1
50 TABLET ORAL 2 TIMES DAILY
Status: DISCONTINUED | OUTPATIENT
Start: 2022-03-14 | End: 2022-03-15 | Stop reason: HOSPADM

## 2022-03-14 RX ORDER — DICYCLOMINE HYDROCHLORIDE 10 MG/1
20 CAPSULE ORAL 4 TIMES DAILY
Status: DISCONTINUED | OUTPATIENT
Start: 2022-03-14 | End: 2022-03-15 | Stop reason: HOSPADM

## 2022-03-14 RX ORDER — FUROSEMIDE 40 MG/1
40 TABLET ORAL ONCE
Status: COMPLETED | OUTPATIENT
Start: 2022-03-14 | End: 2022-03-14

## 2022-03-14 RX ADMIN — BUDESONIDE AND FORMOTEROL FUMARATE DIHYDRATE 2 PUFF: 160; 4.5 AEROSOL RESPIRATORY (INHALATION) at 19:21

## 2022-03-14 RX ADMIN — Medication 10 ML: at 08:48

## 2022-03-14 RX ADMIN — FUROSEMIDE 40 MG: 40 TABLET ORAL at 08:46

## 2022-03-14 RX ADMIN — LOSARTAN POTASSIUM 50 MG: 50 TABLET, FILM COATED ORAL at 08:47

## 2022-03-14 RX ADMIN — ATORVASTATIN CALCIUM 80 MG: 80 TABLET, FILM COATED ORAL at 08:48

## 2022-03-14 RX ADMIN — FUROSEMIDE 40 MG: 40 TABLET ORAL at 17:56

## 2022-03-14 RX ADMIN — HYDROCODONE BITARTRATE AND ACETAMINOPHEN 1 TABLET: 5; 325 TABLET ORAL at 01:59

## 2022-03-14 RX ADMIN — LOSARTAN POTASSIUM 50 MG: 50 TABLET, FILM COATED ORAL at 21:29

## 2022-03-14 RX ADMIN — DICYCLOMINE HYDROCHLORIDE 20 MG: 10 CAPSULE ORAL at 21:29

## 2022-03-14 RX ADMIN — DICYCLOMINE HYDROCHLORIDE 20 MG: 10 CAPSULE ORAL at 17:55

## 2022-03-14 RX ADMIN — ROPINIROLE HYDROCHLORIDE 0.25 MG: 0.5 TABLET, FILM COATED ORAL at 21:28

## 2022-03-14 RX ADMIN — LEVETIRACETAM 500 MG: 500 TABLET, FILM COATED ORAL at 21:29

## 2022-03-14 RX ADMIN — IPRATROPIUM BROMIDE AND ALBUTEROL SULFATE 3 ML: 2.5; .5 SOLUTION RESPIRATORY (INHALATION) at 10:58

## 2022-03-14 RX ADMIN — CLOPIDOGREL 75 MG: 75 TABLET, FILM COATED ORAL at 08:46

## 2022-03-14 RX ADMIN — HYDROCODONE BITARTRATE AND ACETAMINOPHEN 1 TABLET: 5; 325 TABLET ORAL at 15:44

## 2022-03-14 RX ADMIN — HEPARIN SODIUM 5000 UNITS: 5000 INJECTION INTRAVENOUS; SUBCUTANEOUS at 15:36

## 2022-03-14 RX ADMIN — TRAZODONE HYDROCHLORIDE 100 MG: 100 TABLET ORAL at 21:29

## 2022-03-14 RX ADMIN — MAGNESIUM OXIDE 400 MG (241.3 MG MAGNESIUM) TABLET 400 MG: TABLET at 13:26

## 2022-03-14 RX ADMIN — LEVETIRACETAM 500 MG: 500 TABLET, FILM COATED ORAL at 08:46

## 2022-03-14 RX ADMIN — MAGNESIUM SULFATE HEPTAHYDRATE 2 G: 2 INJECTION, SOLUTION INTRAVENOUS at 16:11

## 2022-03-14 RX ADMIN — PANTOPRAZOLE SODIUM 40 MG: 40 TABLET, DELAYED RELEASE ORAL at 06:05

## 2022-03-14 RX ADMIN — GABAPENTIN 400 MG: 400 CAPSULE ORAL at 16:10

## 2022-03-14 RX ADMIN — INSULIN HUMAN 2 UNITS: 100 INJECTION, SOLUTION PARENTERAL at 08:43

## 2022-03-14 RX ADMIN — HEPARIN SODIUM 5000 UNITS: 5000 INJECTION INTRAVENOUS; SUBCUTANEOUS at 21:28

## 2022-03-14 RX ADMIN — HEPARIN SODIUM 5000 UNITS: 5000 INJECTION INTRAVENOUS; SUBCUTANEOUS at 06:06

## 2022-03-14 RX ADMIN — ALBUTEROL SULFATE 2.5 MG: 2.5 SOLUTION RESPIRATORY (INHALATION) at 22:47

## 2022-03-14 RX ADMIN — GABAPENTIN 400 MG: 400 CAPSULE ORAL at 08:47

## 2022-03-14 RX ADMIN — Medication 10 ML: at 21:29

## 2022-03-14 RX ADMIN — METOPROLOL TARTRATE 25 MG: 25 TABLET, FILM COATED ORAL at 21:28

## 2022-03-14 RX ADMIN — METOPROLOL TARTRATE 25 MG: 25 TABLET, FILM COATED ORAL at 04:21

## 2022-03-14 RX ADMIN — GABAPENTIN 400 MG: 400 CAPSULE ORAL at 21:47

## 2022-03-14 RX ADMIN — Medication 1 PATCH: at 08:45

## 2022-03-14 RX ADMIN — ACETAMINOPHEN 650 MG: 325 TABLET, FILM COATED ORAL at 09:04

## 2022-03-14 RX ADMIN — BUDESONIDE AND FORMOTEROL FUMARATE DIHYDRATE 2 PUFF: 160; 4.5 AEROSOL RESPIRATORY (INHALATION) at 06:55

## 2022-03-14 RX ADMIN — IPRATROPIUM BROMIDE AND ALBUTEROL SULFATE 3 ML: 2.5; .5 SOLUTION RESPIRATORY (INHALATION) at 15:03

## 2022-03-14 RX ADMIN — HYDROCODONE BITARTRATE AND ACETAMINOPHEN 1 TABLET: 5; 325 TABLET ORAL at 21:47

## 2022-03-14 NOTE — PROGRESS NOTES
[facility]  Inpatient Progress Note    Reason for Followup:  Diarrhea    Subjective     Interval History:   Her acute GI issues seem to have resolved.  She reports some abdominal cramping.      Current Facility-Administered Medications:   •  acetaminophen (TYLENOL) tablet 650 mg, 650 mg, Oral, Q4H PRN, 650 mg at 03/14/22 0904 **OR** acetaminophen (TYLENOL) suppository 650 mg, 650 mg, Rectal, Q4H PRN, Merly Jackson MD  •  albuterol (PROVENTIL) nebulizer solution 0.083% 2.5 mg/3mL, 2.5 mg, Nebulization, Q4H PRN, Kevin Singh MD, 2.5 mg at 03/12/22 0139  •  albuterol (PROVENTIL) nebulizer solution 0.083% 2.5 mg/3mL, 2.5 mg, Nebulization, Q6H PRN, Chris Lei MD  •  atorvastatin (LIPITOR) tablet 80 mg, 80 mg, Oral, Daily, Luiz Mccabe MD, 80 mg at 03/14/22 0848  •  sennosides-docusate (PERICOLACE) 8.6-50 MG per tablet 2 tablet, 2 tablet, Oral, BID, 2 tablet at 03/10/22 0910 **AND** polyethylene glycol (MIRALAX) packet 17 g, 17 g, Oral, Daily PRN **AND** bisacodyl (DULCOLAX) EC tablet 5 mg, 5 mg, Oral, Daily PRN **AND** bisacodyl (DULCOLAX) suppository 10 mg, 10 mg, Rectal, Daily PRN, Merly Jackson MD  •  budesonide-formoterol (SYMBICORT) 160-4.5 MCG/ACT inhaler 2 puff, 2 puff, Inhalation, BID - RT, Hipolito Singh MD, 2 puff at 03/14/22 0655  •  clopidogrel (PLAVIX) tablet 75 mg, 75 mg, Oral, Daily, Merly Jackson MD, 75 mg at 03/14/22 0846  •  dextrose (D50W) (25 g/50 mL) IV injection 25 g, 25 g, Intravenous, Q15 Min PRN, Merly Jackson MD  •  dextrose (GLUTOSE) oral gel 15 g, 15 g, Oral, Q15 Min PRN, Merly Jackson MD  •  furosemide (LASIX) tablet 40 mg, 40 mg, Oral, Daily, Esther Washburn MD, 40 mg at 03/14/22 0846  •  gabapentin (NEURONTIN) capsule 400 mg, 400 mg, Oral, TID, Merly Jackson MD, 400 mg at 03/14/22 0847  •  glucagon (human recombinant) (GLUCAGEN DIAGNOSTIC) injection 1 mg, 1 mg, Intramuscular, Q15 Min PRN, Merly Jackson MD  •  heparin  (porcine) 5000 UNIT/ML injection 5,000 Units, 5,000 Units, Subcutaneous, Q8H, Merly Jackson MD, 5,000 Units at 03/14/22 0606  •  HYDROcodone-acetaminophen (NORCO) 5-325 MG per tablet 1 tablet, 1 tablet, Oral, Q6H PRN, Merly Jackson MD, 1 tablet at 03/14/22 0159  •  insulin regular (humuLIN R,novoLIN R) injection 0-9 Units, 0-9 Units, Subcutaneous, Q6H, Merly Jackson MD, 2 Units at 03/14/22 0843  •  ipratropium-albuterol (DUO-NEB) nebulizer solution 3 mL, 3 mL, Nebulization, 4x Daily - RT, Merly Jackson MD, 3 mL at 03/14/22 1058  •  levETIRAcetam (KEPPRA) tablet 500 mg, 500 mg, Oral, BID, Merly Jackson MD, 500 mg at 03/14/22 0846  •  losartan (COZAAR) tablet 50 mg, 50 mg, Oral, Q24H, Esther Washburn MD, 50 mg at 03/14/22 0847  •  metoprolol tartrate (LOPRESSOR) tablet 25 mg, 25 mg, Oral, BID, Ev Finch APRN, 25 mg at 03/14/22 0421  •  nicotine (NICODERM CQ) 14 MG/24HR patch 1 patch, 1 patch, Transdermal, Q24H, Merly Jackson MD, 1 patch at 03/14/22 0845  •  ondansetron (ZOFRAN) injection 4 mg, 4 mg, Intravenous, Q6H PRN, Kevin Singh MD, 4 mg at 03/12/22 0215  •  pantoprazole (PROTONIX) EC tablet 40 mg, 40 mg, Oral, QAM, Merly Jackson MD, 40 mg at 03/14/22 0605  •  polyethylene glycol (MIRALAX) packet 17 g, 17 g, Oral, Daily, Luiz Mccabe MD  •  rOPINIRole (REQUIP) tablet 0.25 mg, 0.25 mg, Oral, Nightly, Merly Jackson MD, 0.25 mg at 03/13/22 2133  •  sodium chloride 0.9 % flush 10 mL, 10 mL, Intravenous, Q12H, Merly Jackson MD, 10 mL at 03/14/22 0848  •  sodium chloride 0.9 % flush 10 mL, 10 mL, Intravenous, PRN, Merly Jackson MD  •  tiZANidine (ZANAFLEX) tablet 4 mg, 4 mg, Oral, Q8H PRN, Merly Jackson MD, 4 mg at 03/13/22 0417  •  traZODone (DESYREL) tablet 100 mg, 100 mg, Oral, Nightly, Luiz Mccabe MD, 100 mg at 03/13/22 2132  Review of Systems:    All systems were reviewed and negative except for:  Genitourinary: postivie for  pain    Objective     Vital  Signs  Temp:  [97.8 °F (36.6 °C)-98.3 °F (36.8 °C)] 97.8 °F (36.6 °C)  Heart Rate:  [53-85] 53  Resp:  [18-20] 18  BP: (152-210)/() 162/82  Body mass index is 29.37 kg/m².    Intake/Output Summary (Last 24 hours) at 3/14/2022 1130  Last data filed at 3/14/2022 1045  Gross per 24 hour   Intake 960 ml   Output 3100 ml   Net -2140 ml     I/O this shift:  In: 210 [P.O.:210]  Out: 550 [Urine:550]     Physical Exam:   General: patient awake, alert and cooperative   Abdomen: soft, nontender, nondistended; normal bowel sounds   Rectal: deferred   Extremities: no rash or edema   Psychiatric: Normal mood and behavior; memory intact     Results Review:     I reviewed the patient's new clinical results.    Results from last 7 days   Lab Units 03/13/22  1007 03/12/22  0514 03/11/22  0334   WBC 10*3/mm3 5.71 7.74 7.48   HEMOGLOBIN g/dL 9.1* 9.5* 10.1*   HEMATOCRIT % 28.1* 27.8* 30.2*   PLATELETS 10*3/mm3 148 142 139*     Results from last 7 days   Lab Units 03/13/22  1158 03/13/22  1007 03/12/22  0514 03/09/22  1757 03/09/22  1416 03/07/22  1616   SODIUM mmol/L 139 138 136   < > 132* 134   POTASSIUM mmol/L 3.8 3.9 4.3   < > 5.4* 6.2*   CHLORIDE mmol/L 103 103 103   < > 95* 94*   TOTAL CO2 mmol/L  --   --   --   --   --  23   CO2 mmol/L 28.0 27.0 24.0   < > 22.2  --    BUN mg/dL 15 15 13   < > 55* 35*   CREATININE mg/dL 0.86 0.93 0.78   < > 3.30* 1.95*   CALCIUM mg/dL 8.9 8.8 9.0   < > 8.2* 10.5*   BILIRUBIN mg/dL  --   --   --   --  0.2 0.5   ALK PHOS U/L  --   --   --   --  95 116   ALT (SGPT) U/L  --   --   --   --  6 10   AST (SGOT) U/L  --   --   --   --  7 14   GLUCOSE mg/dL 113* 129* 91   < > 133* 98    < > = values in this interval not displayed.         Lab Results   Lab Value Date/Time    LIPASE 23 03/10/2022 1242    LIPASE 36 10/01/2020 0524    LIPASE 11 (L) 09/30/2020 1738    LIPASE 17 08/02/2019 2200    LIPASE 87 02/05/2015 0009       Reviewed KUB and CT A/P this admission - non obstructive bowel gas  pattern    Assessment/Plan   Assessment:   1.  Nausea/vomiting, diarrhea  2.  Dysphagia, long standing  3.  Hypotensive shock  4.  COPD    Plan:   Her acute GI symptoms seem to have resolved.  Some ? Of chronic diarrhea, but may have had an element of acute GE on presentation.  Start bentyl for element of intestinal spasm.  She will eventually need consideration for EGD/colonoscopy as an outpt with Dr Viera.      I discussed the patient's findings and my recommendations with patient.         Josué Garces M.D.  [facility]  [address] [place] [address].[facility]. [facility]  [phone]

## 2022-03-14 NOTE — CASE MANAGEMENT/SOCIAL WORK
Continued Stay Note  [facility]     Patient Name: Vidhi Lopez  MRN: [id number]  Today's Date: 3/14/2022    Admit Date: 3/9/2022     Discharge Plan     Row Name 03/14/22 1701       Plan    Plan Return to [facility]    Patient/Family in Agreement with Plan yes    Plan Comments CCP met with patient at bedside re: discharge planning. Patient [place] plans to return to [place]. She is currently on 2L nasal cannula and states she is current with [facility]. She requests a new portable oxygen concentrator (POC) from [employer]. CCP spoke with Cherrie/Ellen who states patient is current for 2L continuous o2 and has a nebulizer, trilogy vent, concentrator, and POC that she received Jan 5, 2018. Per Cherrie, patient is not eligible for a new POC from insurance until January of 2023. Notified patient who would like to speak with Ellen. Outbound call to Cherrie who will call patient to discuss. KENYETTA requested portable oxygen tank be delivered to hospital for discharge home and Cherrie confirms one can be delivered. Patient normally picks up medications at [facility] and states she plans to get PT/OT at [facility]. KENYETTA spoke with Karma/Ignacio who confirms patient can receive therapy through [facility] and requests printed copies of ambulatory PT/OT referrals. Secure chat to Dr. Mccabe to request orders. Karma also confirms [facility] can transport patient home and requests CCP call back tomorrow after 10 am to request transportation. Karma states [facility] can provide transportation on Wednesday and Fridays for errands in [place] and can assist patient with transportation for grocery shopping and picking up medications at [employer] [facility] until patient feels comfortable driving herself again. Patient states she is IADL, owns a walker, and denies other discharges planning needs. Soledad NAVA RN/CCP               Discharge Codes    No documentation.               Expected Discharge Date and Time     Expected  Discharge Date Expected Discharge Time    Mar 16, 2022             Paty Lee

## 2022-03-14 NOTE — PLAN OF CARE
Goal Outcome Evaluation:  Plan of Care Reviewed With: patient           Outcome Evaluation: Pt. has had high BP. She expressed concern about her BP and discussed it with Dr. Mccabe she would not feel comfortable leaving the hospital if her BP  not stable.  encouraged her to ambulate to bathroom and not use purewick. Pt. was advised to try to use bedside comode if not confortable using the restroom. Safety maintained. CTM.

## 2022-03-14 NOTE — PROGRESS NOTES
"  Daily Progress Note.   [address]  3/14/2022    Patient:  Name:  Vidhi Lopez  MRN:  [id number]  1950  71 y.o.  female         CC: soa copd gretel    Interval History:   seen and examined this am.  Feels breathing is better today and without complaint no wheezing and was able to walk around unit today  Afebrile  Hypertensive to 210/100 overnight/yesterday evenitng  Maintains on ra  C/o abd discomfort    Physical Exam:  /82 (BP Location: Right arm, Patient Position: Lying)   Pulse 53   Temp 97.8 °F (36.6 °C) (Oral)   Resp 18   Ht 162.6 cm (64\")   Wt 77.6 kg (171 lb 1.6 oz)   LMP  (LMP Unknown) Comment: LAST MAMMOGRAM 2017  SpO2 94%   BMI 29.37 kg/m²   Body mass index is 29.37 kg/m².    Intake/Output Summary (Last 24 hours) at 3/14/2022 1422  Last data filed at 3/14/2022 1120  Gross per 24 hour   Intake 960 ml   Output 2500 ml   Net -1540 ml     General appearance: non toxic, conversant   Eyes: anicteric sclerae, moist conjunctivae; no lid-lag;    HENT: Atraumatic; oropharynx clear with moist mucous membranes    Lungs: william with out exp wheeze  +crackles at bases diminshed, with normal respiratory effort and no intercostal retractions  CV: RRR, no rub  Extremities:  peripheral edema    Skin: warm dry no diffuse visible rash  Psych: Appropriate affect, alert and oriented    Neuro moves all ext, cns 2-12 intact speech intact    Data Review:  Notable Labs:  Results from last 7 days   Lab Units 03/13/22  1007 03/12/22  0514 03/11/22  0334 03/10/22  0242 03/09/22  1416 03/07/22  1616   WBC 10*3/mm3 5.71 7.74 7.48 8.60 6.23 9.2   HEMOGLOBIN g/dL 9.1* 9.5* 10.1* 9.8* 10.3* 12.7   PLATELETS 10*3/mm3 148 142 139* 144 164 227     Results from last 7 days   Lab Units 03/14/22  1028 03/13/22  1158 03/13/22  1007 03/12/22  0514 03/11/22  0334 03/10/22  1242 03/10/22  1111   SODIUM mmol/L 141 139 138 136 139 138 138   POTASSIUM mmol/L 4.7 3.8 3.9 4.3 4.7 5.1 5.1   CHLORIDE mmol/L 101 103 " 103 103 111* 107 107   CO2 mmol/L 31.0* 28.0 27.0 24.0 21.0* 22.0 20.7*   BUN mg/dL 13 15 15 13 22 35* 35*   CREATININE mg/dL 0.81 0.86 0.93 0.78 0.90 1.52* 1.63*   GLUCOSE mg/dL 129* 113* 129* 91 90 194* 173*   CALCIUM mg/dL 8.9 8.9 8.8 9.0 8.7 8.0*  8.0* 8.0*   MAGNESIUM mg/dL 1.5*  --  1.6  --   --   --   --    PHOSPHORUS mg/dL 2.9 1.8* 1.9* 1.4* 2.1*  --   --    Estimated Creatinine Clearance: 65.2 mL/min (by C-G formula based on SCr of 0.81 mg/dL).    Results from last 7 days   Lab Units 03/13/22  1007 03/12/22  1536 03/12/22  0514 03/11/22  0334 03/10/22  0242 03/09/22  1531 03/09/22  1416 03/07/22  1616   AST (SGOT) U/L  --   --   --   --   --   --  7 14   ALT (SGPT) U/L  --   --   --   --   --   --  6 10   PROCALCITONIN ng/mL  --  0.25  --   --   --   --  0.21  --    LACTATE mmol/L  --   --   --   --   --  0.9  --   --    FERRITIN ng/mL  --   --  103.00  --   --   --   --   --    PLATELETS 10*3/mm3 148  --  142 139*   < >  --  164 227    < > = values in this interval not displayed.             Imaging:  Reviewed chest images personally from past 3 days  cxr volume up no focal consodlidation      Scheduled meds:    atorvastatin, 80 mg, Oral, Daily  budesonide-formoterol, 2 puff, Inhalation, BID - RT  clopidogrel, 75 mg, Oral, Daily  dicyclomine, 20 mg, Oral, 4x Daily  furosemide, 40 mg, Oral, Daily  gabapentin, 400 mg, Oral, TID  heparin (porcine), 5,000 Units, Subcutaneous, Q8H  insulin regular, 0-9 Units, Subcutaneous, Q6H  ipratropium-albuterol, 3 mL, Nebulization, 4x Daily - RT  levETIRAcetam, 500 mg, Oral, BID  losartan, 50 mg, Oral, BID  magnesium oxide, 400 mg, Oral, Daily  metoprolol tartrate, 25 mg, Oral, BID  nicotine, 1 patch, Transdermal, Q24H  pantoprazole, 40 mg, Oral, QAM  polyethylene glycol, 17 g, Oral, Daily  rOPINIRole, 0.25 mg, Oral, Nightly  senna-docusate sodium, 2 tablet, Oral, BID  sodium chloride, 10 mL, Intravenous, Q12H  traZODone, 100 mg, Oral, Nightly        ASSESSMENT  /   PLAN:  Shock hypovolemic  Bradycardia  Acute kidney injury on chronic kidney disease  Abdominal pain with diarrhea  Hyperkalemia  Hyponatremia  Anemia  COPD  URIEL  Chronic diarrhea  Seizure disorder  Diabetes mellitus  Hypertension    From a pulmonary perspective copd resolved wheeze, w  Cont home symbicort  Cont schedule duonebs   uriel - cant tolerate cpap,she follows with sleep md in [place], considering inspyre     BP control per primary  Diuresis may need to be increased     From pulm perspective seems stable copd is not in ae, will sign off.    Hipolito Singh MD  [facility]  03/14/22  1535 EST

## 2022-03-14 NOTE — PLAN OF CARE
Goal Outcome Evaluation:      Patient is alert and oriented x 4. Improving except for elevated B/P and headache all night. NSR on monitor. 2L oxygen. Heparin for DVT prophylaxis. Ambulated with PT yesterday. Phosphorus replaced.

## 2022-03-14 NOTE — THERAPY TREATMENT NOTE
Patient Name: Vidhi Lopez  : 1950    MRN: [id number]                              Today's Date: 3/14/2022       Admit Date: 3/9/2022    Visit Dx:     ICD-10-CM ICD-9-CM   1. Shock (Formerly [facility])  R57.9 785.50   2. Dehydration  E86.0 276.51   3. GASPER (acute kidney injury) (Formerly [facility])  N17.9 584.9     Patient Active Problem List   Diagnosis   • Dyslipidemia   • Hypertension   • Anxiety (Chronic benzos)   • Insomnia   • GERD (gastroesophageal reflux disease)   • Diabetes mellitus, type 2 (Formerly [facility])   • Fibromyalgia   • RLS (restless legs syndrome)   • Migraines   • COPD (chronic obstructive pulmonary disease) (Formerly [facility])   • Interstitial cystitis   • History of acute myocardial infarction   • History of cardiac murmur   • History of stroke   • CAD (coronary artery disease)   • Essential hypertension   • CKD (chronic kidney disease) stage 2, GFR 60-89 ml/min   • Bilateral carotid artery stenosis   • Chronic respiratory failure with hypoxia and hypercapnia (Formerly [facility])   • URIEL (obstructive sleep apnea)   • Obesity (BMI 30-39.9)   • Diabetes mellitus type 2 in obese (Formerly [facility])   • Obesity hypoventilation syndrome (Formerly [facility])   • Tobacco use   • Chronic pain (Chronic narcotics)   • Recurrent UTI/interstitial cystitis on chronic methenamine   • Clostridium difficile colitis diagnosed 2018. Persistent diarrhea despite oral vancomycin   • Demand ischemia (Formerly [facility])   • Hypoxemia requiring supplemental oxygen   • Stenosis of carotid artery   • Occlusion and stenosis of left carotid artery    • Chronic gout with tophus   • Depression   • Edema   • Restless leg syndrome   • Asthma   • Wellness examination   • Encounter for screening mammogram for malignant neoplasm of breast    • Seasonal allergies   • Hyperlipidemia   • Seizure disorder ([facility])   • L1 vertebral fracture ([facility]ly [facility])   • Rib fracture   • Lumbar compression fracture ([facility]ly [facility])   • Polypharmacy   • Itching   • Anemia   • Urinary tract infection with hematuria   • Other fatigue   • Balance problem   •  Dysuria   • Right leg pain   • Hypercalcemia   • High risk medication use   • Precordial pain   • Leg swelling   • Encounter for immunization    • Dizzy   • Acute myocardial infarction (Formerly [facility])   • Atopic rhinitis   • Bilateral inguinal hernia, without obstruction or gangrene, not specified as recurrent   • Candidiasis of skin   • Cerebrovascular accident (CVA) (Formerly [facility])   • Coronary angioplasty status   • Decreased GFR   • Diabetic peripheral neuropathy ([facility])   • History of Clostridioides difficile infection   • Osteoarthritis   • Other specified personal risk factors, not elsewhere classified   • Repeated falls   • Multinodular goiter   • Medicare annual wellness visit, subsequent   • Post-menopausal   • High cholesterol   • Bunion   • Unspecified severe protein-calorie malnutrition (CMS/HCC)    • Nausea   • Rash   • Subacute maxillary sinusitis   • Cough   • Fever   • Sore throat   • Spasm   • Acute midline back pain   • Neuropathy   • Shock (Formerly [facility])     Past Medical History:   Diagnosis Date   • Allergic rhinitis    • Asthma with COPD ([facility])     Asthma/COPD   • Bilateral ovarian cysts    • Coronary artery disease    • Depression with anxiety     Depression/Anxiety   • Diabetes ([facility]ly [facility])     pre   • Elevated cholesterol    • Endometriosis     Dr. Jin; estradiol    • ESR raised 03/20/2018   • Fatigue    • Fibromyalgia    • Gout    • Headache     Headaches   • High cholesterol    • History of gallstones    • History of myocardial infarction    • Hypertension    • Influenza B     DX'D 2/6/2018, TREATED WITH TAMIFLU. LAST DOSE 2/11/2018 AM.  PATIENT STATES DR RANGEL IS AWARE. DENIES FEVERS OR CHILLS.   • Interstitial cystitis    • On home oxygen therapy     2 L NC   • URIEL on CPAP    • PONV (postoperative nausea and vomiting)    • Rheumatoid arthritis ([facility]ly [facility])    • Seizure (Formerly [facility])    • Seizure disorder, nonconvulsive, with status epilepticus (Formerly [facility]) 03/20/2018   • Sepsis ([facility]ly [facility])    • Septic joint of right knee joint (Formerly [facility]) 03/21/2018   •  Shortness of breath on exertion    • Stroke (HCC)     X1 8 YEARS AGO, GENERALIZED UPPER BODY WEAKNESS RESIDUAL PER PT    • Thyroid disorder    • Urinary leakage    • UTI (urinary tract infection)    • Wears glasses    • Yeast dermatitis      Past Surgical History:   Procedure Laterality Date   • ARTERIOGRAM N/A 2/12/2018    Procedure: Renal Arteriogram;  Surgeon: Lito Flores MD;  Location:  [facility] CATH INVASIVE LOC[facility];  Service:    • BREAST BIOPSY Right 1991   • CARDIAC CATHETERIZATION N/A 2/12/2018    Procedure: Right Heart Cath;  Surgeon: Lito Flores MD;  Location:  [facility] CATH INVASIVE LOCATION;  Service:    • CAROTID STENT      Transcath    • CAROTID STENT Left    • CHOLECYSTECTOMY     • CYSTOSTOMY W/ BLADDER BIOPSY     • DILATATION AND CURETTAGE     • KNEE ARTHROSCOPY Right 3/23/2018    Procedure: ARTHROSCOPY, RIGHT KNEE  INCISION AND DRAINAGE LOWER EXTREMITY WITH SYNOVIAL BIOPSY;  Surgeon: Dilip Giron MD;  Location:  [facility] OR;  Service: Orthopedics   • LAPAROSCOPIC CHOLECYSTECTOMY  1994    Lap rolando   • OOPHORECTOMY     • PAP SMEAR  05/10/2016   • SUBTOTAL HYSTERECTOMY        General Information     Row Name 03/14/22 1520          Physical Therapy Time and Intention    Document Type therapy note (daily note)  -     Mode of Treatment physical therapy  -     Row Name 03/14/22 1520          General Information    Existing Precautions/Restrictions fall;oxygen therapy device and L/min  -     Row Name 03/14/22 1520          Cognition    Orientation Status (Cognition) oriented x 4  -           User Key  (r) = Recorded By, (t) = Taken By, (c) = Cosigned By    Initials Name Provider Type     Paty Rivera PTA Physical Therapy Assistant               Mobility     Row Name 03/14/22 1520          Bed Mobility    Bed Mobility supine-sit;sit-supine  -     Supine-Sit Garvin (Bed Mobility) supervision  -     Sit-Supine Garvin (Bed Mobility) supervision  -     Assistive Device  (Bed Mobility) bed rails;head of bed elevated  -     Row Name 03/14/22 1520          Sit-Stand Transfer    Sit-Stand Butler (Transfers) standby assist  -     Row Name 03/14/22 1520          Gait/Stairs (Locomotion)    Butler Level (Gait) contact guard;standby assist  -     Assistive Device (Gait) walker, front-wheeled  -     Distance in Feet (Gait) 250  -SM     Deviations/Abnormal Patterns (Gait) jannie decreased;stride length decreased  -     Bilateral Gait Deviations forward flexed posture  -SM     Comment, (Gait/Stairs) monitored O2 sats throughout on RA per RN; sats between 86-91%  -SM           User Key  (r) = Recorded By, (t) = Taken By, (c) = Cosigned By    Initials Name Provider Type    Paty Poole PTA Physical Therapy Assistant               Obj/Interventions    No documentation.                Goals/Plan    No documentation.                Clinical Impression     Row Name 03/14/22 1524          Pain    Pretreatment Pain Rating 0/10 - no pain  -SM     Posttreatment Pain Rating 0/10 - no pain  -SM     [facility] Name 03/14/22 1524          Positioning and Restraints    Pre-Treatment Position in bed  -SM     Post Treatment Position bed  -SM     In Bed supine;call light within reach;encouraged to call for assist;exit alarm on;with nsg  -SM           User Key  (r) = Recorded By, (t) = Taken By, (c) = Cosigned By    Initials Name Provider Type    Paty Poole PTA Physical Therapy Assistant               Outcome Measures     Row Name 03/14/22 1526          How much help from another person do you currently need...    Turning from your back to your side while in flat bed without using bedrails? 4  -SM     Moving from lying on back to sitting on the side of a flat bed without bedrails? 3  -SM     Moving to and from a bed to a chair (including a wheelchair)? 3  -SM     Standing up from a chair using your arms (e.g., wheelchair, bedside chair)? 4  -SM     Climbing 3-5 steps with  a railing? 3  -SM     To walk in hospital room? 3  -SM     AM-PAC 6 Clicks Score (PT) 20  -     Row Name 03/14/22 1526          Functional Assessment    Outcome Measure Options AM-PAC 6 Clicks Basic Mobility (PT)  -           User Key  (r) = Recorded By, (t) = Taken By, (c) = Cosigned By    Initials Name Provider Type     Paty Rivera PTA Physical Therapy Assistant                             Physical Therapy Education                 Title: PT OT SLP Therapies (Done)     Topic: Physical Therapy (Done)     Point: Mobility training (Done)     Learning Progress Summary           Patient Acceptance, E,TB,D, VU,NR by  at 3/14/2022 1526    Acceptance, E,TB, NR by  at 3/13/2022 1412                   Point: Home exercise program (Done)     Learning Progress Summary           Patient Acceptance, E,TB,D, VU,NR by  at 3/14/2022 1526    Acceptance, E,TB, NR by  at 3/13/2022 1412                               User Key     Initials Effective Dates Name Provider Type Discipline     06/16/21 -  Willa Soto, PT Physical Therapist PT     03/07/18 -  Paty Rivera PTA Physical Therapy Assistant PT              PT Recommendation and Plan     Plan of Care Reviewed With: patient  Progress: improving  Outcome Evaluation: Pt tolerated treatment well this date. Ambulated 250ft w/ Rw and SBA-CGA. RN requested walking oximetry during session, so sats were monitored w/ pt on RA throughout, ranging between 86-91%. Pt maintained mostly around 88%, though constantly talking. After returning to the room, pt was back on 2L O2 and sats went up to 96% at highest.     Time Calculation:    PT Charges     Row Name 03/14/22 1532             Time Calculation    Start Time 1340  -      Stop Time 1428  -      Time Calculation (min) 48 min  -      PT Received On 03/14/22  -      PT - Next Appointment 03/15/22  -            User Key  (r) = Recorded By, (t) = Taken By, (c) = Cosigned By    Initials Name  Provider Type     Paty Rivera PTA Physical Therapy Assistant              Therapy Charges for Today     Code Description Service Date Service Provider Modifiers Qty    50265353416 HC PT THER PROC EA 15 MIN 3/14/2022 Paty Rivera, SIMONE GP 1    55801924675 HC PT THERAPEUTIC ACT EA 15 MIN 3/14/2022 Paty Rivera PTA GP 2          PT G-Codes  Outcome Measure Options: AM-PAC 6 Clicks Basic Mobility (PT)  AM-PAC 6 Clicks Score (PT): 20    Paty Rivera PTA  3/14/2022

## 2022-03-14 NOTE — PROGRESS NOTES
"DAILY PROGRESS NOTE  [facility]    Patient Identification:  Name: Vidhi Lopez  Age: 71 y.o.  Sex: female  :  1950  MRN: [id number]         Primary Care Physician: Abiodun Vila MD      Subjective  On first attempt to see the patient she was in the bathroom.  Spoke to the NA who noted that the patient refused to walk this morning because she was \"too tired\".    Patient is now back in the room.  Since the above she has gone 250 feet with PT.  Presently she is lying in bed again with a pure wick on.  I explained to her that the pure wick needed to come off and she needed to get up to go to the bathroom especially if she plans to go home after discharge.  She states \"I am about the blood gasket\".  She is very upset about her blood pressure.  I explained to her again that she came in hypovolemic shock, required a lot of fluid resuscitation and now the blood pressure is coming back up.  Also explained to her that I had made changes in her blood pressure medication earlier this morning already.    Objective:  General Appearance:  Comfortable, well-appearing, in no acute distress and not in pain.    Vital signs: (most recent): Blood pressure 167/99, pulse 63, temperature 97.9 °F (36.6 °C), temperature source Oral, resp. rate 18, height 162.6 cm (64\"), weight 77.6 kg (171 lb 1.6 oz), SpO2 93 %, not currently breastfeeding.    Lungs:  Normal effort and normal respiratory rate.  Breath sounds clear to auscultation.    Heart: Normal rate.  Regular rhythm.    Extremities: There is no dependent edema.    Neurological: Patient is alert and oriented to person, place and time.    Skin:  Warm and dry.                Vital signs in last 24 hours:  Temp:  [97.8 °F (36.6 °C)-98.3 °F (36.8 °C)] 97.8 °F (36.6 °C)  Heart Rate:  [53-85] 53  Resp:  [18-20] 18  BP: (152-210)/() 162/82    Intake/Output:    Intake/Output Summary (Last 24 hours) at 3/14/2022 1201  Last data filed at 3/14/2022 " 1120  Gross per 24 hour   Intake 1170 ml   Output 3100 ml   Net -1930 ml         Results from last 7 days   Lab Units 03/13/22  1007 03/12/22  0514 03/11/22  0334 03/10/22  0242 03/09/22  1416 03/07/22  1616   WBC 10*3/mm3 5.71 7.74 7.48 8.60 6.23 9.2   HEMOGLOBIN g/dL 9.1* 9.5* 10.1* 9.8* 10.3* 12.7   PLATELETS 10*3/mm3 148 142 139* 144 164 227     Results from last 7 days   Lab Units 03/14/22  1028 03/13/22  1158 03/13/22  1007 03/12/22  0514 03/11/22  0334 03/10/22  1242 03/10/22  1111   SODIUM mmol/L 141 139 138 136 139 138 138   POTASSIUM mmol/L 4.7 3.8 3.9 4.3 4.7 5.1 5.1   CHLORIDE mmol/L 101 103 103 103 111* 107 107   CO2 mmol/L 31.0* 28.0 27.0 24.0 21.0* 22.0 20.7*   BUN mg/dL 13 15 15 13 22 35* 35*   CREATININE mg/dL 0.81 0.86 0.93 0.78 0.90 1.52* 1.63*   GLUCOSE mg/dL 129* 113* 129* 91 90 194* 173*   Estimated Creatinine Clearance: 65.2 mL/min (by C-G formula based on SCr of 0.81 mg/dL).  Results from last 7 days   Lab Units 03/14/22  1028 03/13/22  1158 03/13/22  1007 03/12/22  0514 03/11/22  0334 03/10/22  1242 03/10/22  1111 03/09/22  1757 03/09/22  1416 03/07/22  1616   CALCIUM mg/dL 8.9 8.9 8.8 9.0 8.7 8.0*  8.0* 8.0*   < > 8.2* 10.5*   ALBUMIN g/dL 3.30* 3.40* 3.10* 3.50 3.40*  --   --   --  3.30* 4.7   MAGNESIUM mg/dL 1.5*  --  1.6  --   --   --   --   --   --   --    PHOSPHORUS mg/dL 2.9 1.8* 1.9* 1.4* 2.1*  --   --   --   --   --     < > = values in this interval not displayed.     Results from last 7 days   Lab Units 03/14/22  1028 03/13/22  1158 03/13/22  1007 03/12/22  0514 03/11/22  0334 03/09/22  1416 03/07/22  1616   ALBUMIN g/dL 3.30* 3.40* 3.10* 3.50 3.40* 3.30* 4.7   BILIRUBIN mg/dL  --   --   --   --   --  0.2 0.5   ALK PHOS U/L  --   --   --   --   --  95 116   AST (SGOT) U/L  --   --   --   --   --  7 14   ALT (SGPT) U/L  --   --   --   --   --  6 10       Assessment:  Hypovolemic shock: Resolved.  Secondary to below.  Nausea vomiting diarrhea: Resolved.  Suspect viral  gastroenteritis.  GASPER: Secondary to above.  Resolved.  Diabetes type 2: Reasonable control.  Requiring low doses of insulin.  Hyperkalemia: Associated with GASPER.  Resolved.  Hypophosphatemia:  Corrected.  Hypomagnesemia: Borderline low.  Mag-Ox added.  Seizure disorder: Continue home meds.  Hypertension: Blood pressure increasing again.  The Coreg is not holding her 24 hours.  It has been increased to twice daily.  I will also give her an extra dose of Lasix this afternoon noting the suggestion of pulmonary vascular ingestion on chest x-ray.  COPD: Stable.  Obstructive sleep apnea: Patient reports that she does not tolerate CPAP.  Polypharmacy:  Obesity with a sedentary lifestyle:      Plan:  Please see above.  Reviewed again with the patient that she needs to get up and be more active.  She is not allowed to lie in bed for the pure wick.  Discussed with patient.  Discussed with RN.  Probable discharge tomorrow.    Luiz Mccabe MD  3/14/2022  12:01 EDT

## 2022-03-14 NOTE — PROGRESS NOTES
Nephrology    Labs reviewed; GASPER has resolved   SCR well below 1 mg/dL; electrolytes are stable than low magnesium  Will replace magnesium and sign off  Please call if any questions    --Brian Thomson MD

## 2022-03-14 NOTE — PLAN OF CARE
Goal Outcome Evaluation:  Plan of Care Reviewed With: patient        Progress: improving  Outcome Evaluation: Pt tolerated treatment well this date. Ambulated 250ft w/ Rw and SBA-CGA. RN requested walking oximetry during session, so sats were monitored w/ pt on RA throughout, ranging between 86-91%. Pt maintained mostly around 88%, though constantly talking. After returning to the room, pt was back on 2L O2 and sats went up to 96% at highest.

## 2022-03-15 ENCOUNTER — READMISSION MANAGEMENT (OUTPATIENT)
Dept: CALL CENTER | Facility: HOSPITAL | Age: 72
End: 2022-03-15

## 2022-03-15 VITALS
RESPIRATION RATE: 18 BRPM | TEMPERATURE: 97.9 F | SYSTOLIC BLOOD PRESSURE: 152 MMHG | OXYGEN SATURATION: 95 % | HEIGHT: 64 IN | DIASTOLIC BLOOD PRESSURE: 84 MMHG | WEIGHT: 168.2 LBS | BODY MASS INDEX: 28.71 KG/M2 | HEART RATE: 62 BPM

## 2022-03-15 LAB
ANION GAP SERPL CALCULATED.3IONS-SCNC: 11.9 MMOL/L (ref 5–15)
BASOPHILS # BLD AUTO: 0.08 10*3/MM3 (ref 0–0.2)
BASOPHILS NFR BLD AUTO: 1 % (ref 0–1.5)
BUN SERPL-MCNC: 16 MG/DL (ref 8–23)
BUN/CREAT SERPL: 18 (ref 7–25)
CALCIUM SPEC-SCNC: 9.9 MG/DL (ref 8.6–10.5)
CHLORIDE SERPL-SCNC: 94 MMOL/L (ref 98–107)
CO2 SERPL-SCNC: 33.1 MMOL/L (ref 22–29)
CREAT SERPL-MCNC: 0.89 MG/DL (ref 0.57–1)
DEPRECATED RDW RBC AUTO: 42.7 FL (ref 37–54)
DRUGS UR: NORMAL
EGFRCR SERPLBLD CKD-EPI 2021: 69.8 ML/MIN/1.73
EOSINOPHIL # BLD AUTO: 0.47 10*3/MM3 (ref 0–0.4)
EOSINOPHIL NFR BLD AUTO: 6 % (ref 0.3–6.2)
ERYTHROCYTE [DISTWIDTH] IN BLOOD BY AUTOMATED COUNT: 13.1 % (ref 12.3–15.4)
GLUCOSE BLDC GLUCOMTR-MCNC: 148 MG/DL (ref 70–130)
GLUCOSE BLDC GLUCOMTR-MCNC: 187 MG/DL (ref 70–130)
GLUCOSE SERPL-MCNC: 129 MG/DL (ref 65–99)
HCT VFR BLD AUTO: 35.4 % (ref 34–46.6)
HGB BLD-MCNC: 11.5 G/DL (ref 12–15.9)
IMM GRANULOCYTES # BLD AUTO: 0.05 10*3/MM3 (ref 0–0.05)
IMM GRANULOCYTES NFR BLD AUTO: 0.6 % (ref 0–0.5)
LYMPHOCYTES # BLD AUTO: 1.9 10*3/MM3 (ref 0.7–3.1)
LYMPHOCYTES NFR BLD AUTO: 24.1 % (ref 19.6–45.3)
MAGNESIUM SERPL-MCNC: 1.9 MG/DL (ref 1.6–2.4)
MCH RBC QN AUTO: 29.6 PG (ref 26.6–33)
MCHC RBC AUTO-ENTMCNC: 32.5 G/DL (ref 31.5–35.7)
MCV RBC AUTO: 91 FL (ref 79–97)
MONOCYTES # BLD AUTO: 0.67 10*3/MM3 (ref 0.1–0.9)
MONOCYTES NFR BLD AUTO: 8.5 % (ref 5–12)
NEUTROPHILS NFR BLD AUTO: 4.7 10*3/MM3 (ref 1.7–7)
NEUTROPHILS NFR BLD AUTO: 59.8 % (ref 42.7–76)
NRBC BLD AUTO-RTO: 0 /100 WBC (ref 0–0.2)
PHOSPHATE SERPL-MCNC: 2.6 MG/DL (ref 2.5–4.5)
PLATELET # BLD AUTO: 215 10*3/MM3 (ref 140–450)
PMV BLD AUTO: 8.6 FL (ref 6–12)
POTASSIUM SERPL-SCNC: 3.9 MMOL/L (ref 3.5–5.2)
RBC # BLD AUTO: 3.89 10*6/MM3 (ref 3.77–5.28)
SODIUM SERPL-SCNC: 139 MMOL/L (ref 136–145)
WBC NRBC COR # BLD: 7.87 10*3/MM3 (ref 3.4–10.8)

## 2022-03-15 PROCEDURE — 94799 UNLISTED PULMONARY SVC/PX: CPT

## 2022-03-15 PROCEDURE — 94664 DEMO&/EVAL PT USE INHALER: CPT

## 2022-03-15 PROCEDURE — 82962 GLUCOSE BLOOD TEST: CPT

## 2022-03-15 PROCEDURE — 99231 SBSQ HOSP IP/OBS SF/LOW 25: CPT | Performed by: INTERNAL MEDICINE

## 2022-03-15 PROCEDURE — 94761 N-INVAS EAR/PLS OXIMETRY MLT: CPT

## 2022-03-15 PROCEDURE — 85025 COMPLETE CBC W/AUTO DIFF WBC: CPT | Performed by: HOSPITALIST

## 2022-03-15 PROCEDURE — 83735 ASSAY OF MAGNESIUM: CPT | Performed by: STUDENT IN AN ORGANIZED HEALTH CARE EDUCATION/TRAINING PROGRAM

## 2022-03-15 PROCEDURE — 80048 BASIC METABOLIC PNL TOTAL CA: CPT | Performed by: HOSPITALIST

## 2022-03-15 PROCEDURE — 94762 N-INVAS EAR/PLS OXIMTRY CONT: CPT

## 2022-03-15 PROCEDURE — 84100 ASSAY OF PHOSPHORUS: CPT | Performed by: STUDENT IN AN ORGANIZED HEALTH CARE EDUCATION/TRAINING PROGRAM

## 2022-03-15 PROCEDURE — 63710000001 INSULIN REGULAR HUMAN PER 5 UNITS: Performed by: INTERNAL MEDICINE

## 2022-03-15 PROCEDURE — 25010000002 HEPARIN (PORCINE) PER 1000 UNITS: Performed by: INTERNAL MEDICINE

## 2022-03-15 RX ORDER — LOSARTAN POTASSIUM 50 MG/1
50 TABLET ORAL 2 TIMES DAILY
Qty: 60 TABLET | Refills: 0 | Status: SHIPPED | OUTPATIENT
Start: 2022-03-15 | End: 2022-08-24 | Stop reason: SDUPTHER

## 2022-03-15 RX ADMIN — GABAPENTIN 400 MG: 400 CAPSULE ORAL at 09:54

## 2022-03-15 RX ADMIN — INSULIN HUMAN 2 UNITS: 100 INJECTION, SOLUTION PARENTERAL at 12:29

## 2022-03-15 RX ADMIN — ATORVASTATIN CALCIUM 80 MG: 80 TABLET, FILM COATED ORAL at 10:09

## 2022-03-15 RX ADMIN — DICYCLOMINE HYDROCHLORIDE 20 MG: 10 CAPSULE ORAL at 12:29

## 2022-03-15 RX ADMIN — CLOPIDOGREL 75 MG: 75 TABLET, FILM COATED ORAL at 09:56

## 2022-03-15 RX ADMIN — BUDESONIDE AND FORMOTEROL FUMARATE DIHYDRATE 2 PUFF: 160; 4.5 AEROSOL RESPIRATORY (INHALATION) at 08:09

## 2022-03-15 RX ADMIN — Medication 1 PATCH: at 09:53

## 2022-03-15 RX ADMIN — HEPARIN SODIUM 5000 UNITS: 5000 INJECTION INTRAVENOUS; SUBCUTANEOUS at 06:25

## 2022-03-15 RX ADMIN — TIZANIDINE 4 MG: 4 TABLET ORAL at 04:35

## 2022-03-15 RX ADMIN — METOPROLOL TARTRATE 25 MG: 25 TABLET, FILM COATED ORAL at 09:55

## 2022-03-15 RX ADMIN — MAGNESIUM OXIDE 400 MG (241.3 MG MAGNESIUM) TABLET 400 MG: TABLET at 10:10

## 2022-03-15 RX ADMIN — ACETAMINOPHEN 650 MG: 325 TABLET, FILM COATED ORAL at 14:24

## 2022-03-15 RX ADMIN — Medication 10 ML: at 09:57

## 2022-03-15 RX ADMIN — LOSARTAN POTASSIUM 50 MG: 50 TABLET, FILM COATED ORAL at 09:55

## 2022-03-15 RX ADMIN — GABAPENTIN 400 MG: 400 CAPSULE ORAL at 15:30

## 2022-03-15 RX ADMIN — LEVETIRACETAM 500 MG: 500 TABLET, FILM COATED ORAL at 09:54

## 2022-03-15 RX ADMIN — HEPARIN SODIUM 5000 UNITS: 5000 INJECTION INTRAVENOUS; SUBCUTANEOUS at 14:20

## 2022-03-15 RX ADMIN — DICYCLOMINE HYDROCHLORIDE 20 MG: 10 CAPSULE ORAL at 09:56

## 2022-03-15 RX ADMIN — FUROSEMIDE 40 MG: 40 TABLET ORAL at 09:55

## 2022-03-15 NOTE — PROGRESS NOTES
"Exercise Oximetry    Patient Name:Vidhi Lopez   MRN: [id number]   Date: 03/15/22             ROOM AIR BASELINE   SpO2%   88   Heart Rate    Blood Pressure      EXERCISE ON ROOM AIR SpO2% EXERCISE ON O2 @ LPM SpO2%   1 MINUTE  1 MINUTE    2 MINUTES  2 MINUTES    3 MINUTES  3 MINUTES    4 MINUTES  4 MINUTES    5 MINUTES  5 MINUTES    6 MINUTES  6 MINUTES               Distance Walked   Distance Walked   Dyspnea (Meche Scale)   Dyspnea (Meche Scale)   Fatigue ([place]eche Scale)   Fatigue ([place]eche Scale)   SpO2% Post Exercise   SpO2% Post Exercise   HR Post Exercise  HR Post Exercise   Time to Recovery   Time to Recovery     Comments:   Turned PT on Room Air Sats dropped to 88.Placed back on 2 liters Educated PT on the Walk test that was ordered. PT refused to do anything with me. \"[facility] already came and done everything that's why theres a oxygen tank right there.\" \"Shes not doing anything and is in no rush to leave\"       Left PT on 2l NC   RN notified   "

## 2022-03-15 NOTE — DISCHARGE SUMMARY
PHYSICIAN DISCHARGE SUMMARY                                                                        [facility]    Patient Identification:  Name: Vidhi Lopez  Age: 71 y.o.  Sex: female  :  1950  MRN: [id number]  Primary Care Physician: Abiodun Vila MD    Admit date: 3/9/2022  Discharge date and time: 3/15/2022     Discharged Condition: good    Discharge Diagnoses:  Hypovolemic shock: Resolved.  Secondary to below.  Nausea vomiting diarrhea: Resolved.  Suspect viral gastroenteritis.  GASPER: Secondary to above.  Resolved.  Hyperkalemia: Associated with GASPER.  Resolved.  Hypophosphatemia:    Hypomagnesemia:   Diabetes type 2:   Seizure disorder:   Hypertension:   COPD:   Obstructive sleep apnea:   Polypharmacy:  Sedentary lifestyle:      Hospital Course:  71-year-old female with multiple medical issues presents with hypovolemic shock associated nausea vomiting diarrhea.  She required aggressive fluid resuscitation was initially admitted to ICU.  With aggressive fluid resuscitation she was stabilized and transferred back to telemetry.  There was a question of this being acute on chronic diarrhea.  She gives a history to me however of having history of alternating diarrhea and constipation the past.  I suspect the recent bout was probably a viral gastroenteritis which resolved on its own.  There is significant GASPER associated with the presentation and also hyperkalemia.  Both of these resolved with hydration also.  All blood pressure medication records were put on hold on presentation and they have been slowly added back on.  It is noted that her Cozaar is not covering her for 24 hours and this has been increased to twice daily with improved results.  Hydralazine is still on hold but as she resumes her usual lifestyle and oral intake this might need to be added back on.  There are also she is hypo-[place] anemia  hypophosphatemia with her presentation which have been corrected.  She does have a propensity towards a very sedentary lifestyle and did take significant encouragement to get her up and moving again.  She has improved considerably less couple days however.        Consults:     Consults     Date and Time Order Name Status Description    3/12/2022  2:38 PM Inpatient Infectious Diseases Consult Completed     3/12/2022 12:00 PM Inpatient Gastroenterology Consult Completed     3/12/2022  1:25 AM Inpatient Internal Medicine Consult      3/9/2022  3:53 PM Inpatient Cardiology Consult Completed     3/9/2022  3:06 PM Inpatient Nephrology Consult Completed     3/9/2022  2:49 PM Pulmonology (on-call MD unless specified)              Discharge Exam:  Afebrile vital signs stable.  Well-developed well-nourished female no apparent distress.  Lungs clear to auscultation good air movement.  Heart regular rate and rhythm.  Extremities no clubbing sinus edema.  Alert oriented conversant cooperative pleasant.     Disposition:  Home    Patient Instructions:      Discharge Medications      Changes to Medications      Instructions Start Date   losartan 50 MG tablet  Commonly known as: COZAAR  What changed: when to take this   50 mg, Oral, 2 Times Daily      Todivya SoloStar 300 UNIT/ML solution pen-injector injection  Generic drug: Insulin Glargine (1 Unit Dial)  What changed:   · how much to take  · additional instructions   Use 25 Units at night         Continue These Medications      Instructions Start Date   albuterol sulfate  (90 Base) MCG/ACT inhaler  Commonly known as: PROVENTIL HFA;VENTOLIN HFA;PROAIR HFA   2 puffs, Inhalation, Every 4 Hours PRN, for wheezing      aspirin 81 MG EC tablet   81 mg, Oral, Daily      atorvastatin 80 MG tablet  Commonly known as: LIPITOR   80 mg, Oral, Daily      clopidogrel 75 MG tablet  Commonly known as: PLAVIX   TAKE 1 TABLET BY MOUTH EVERY DAY      Diclofenac Sodium 1 % gel gel  Commonly  known as: VOLTAREN   diclofenac 1 % topical gel   apply 2 grams to affected area four times a day      esomeprazole 40 MG capsule  Commonly known as: nexIUM   40 mg, Oral, Every Morning Before Breakfast      ferrous sulfate 325 (65 FE) MG tablet   TK 1 T PO  QD      furosemide 40 MG tablet  Commonly known as: LASIX   40 mg, Oral, M/W/F TAKE BID  OTHER DAYS TAKE 1 TABLET DAILY       gabapentin 400 MG capsule  Commonly known as: NEURONTIN   400 mg, Oral, 3 Times Daily      Insulin Lispro (1 Unit Dial) 100 UNIT/ML solution pen-injector  Commonly known as: HUMALOG   10 Units, Subcutaneous, 3 Times Daily Before Meals      ipratropium-albuterol 0.5-2.5 mg/3 ml nebulizer  Commonly known as: DUO-NEB   3 mL, Nebulization, 4 Times Daily - RT      levETIRAcetam 500 MG tablet  Commonly known as: KEPPRA   500 mg, Oral, 2 Times Daily      metoprolol tartrate 25 MG tablet  Commonly known as: LOPRESSOR   TAKE 1 TABLET BY MOUTH TWICE DAILY      nitroglycerin 0.4 MG SL tablet  Commonly known as: NITROSTAT   0.4 mg, Sublingual, Every 5 Minutes PRN      nystatin 625317 UNIT/GM powder  Commonly known as: nystatin   Topical, See Admin Instructions, Apply topically to the affected area twice daily as directed      ondansetron 4 MG tablet  Commonly known as: Zofran   4 mg, Oral, Every 8 Hours PRN      potassium chloride 10 MEQ CR tablet   20 mEq, Oral, Daily      PROBIOTIC DAILY PO   1 tablet, Oral, Daily      promethazine 25 MG tablet  Commonly known as: PHENERGAN   25 mg, Oral, Daily      rOPINIRole 0.25 MG tablet  Commonly known as: REQUIP   0.25 mg, Oral, Nightly      tiZANidine 4 MG tablet  Commonly known as: ZANAFLEX   4 mg, Oral, Every 8 Hours PRN      traZODone 100 MG tablet  Commonly known as: DESYREL   100 mg, Oral, Nightly         Stop These Medications    fluconazole 200 MG tablet  Commonly known as: Diflucan     hydrALAZINE 25 MG tablet  Commonly known as: APRESOLINE     hydrOXYzine 50 MG tablet  Commonly known as: ATARAX      meclizine 25 MG tablet  Commonly known as: ANTIVERT     montelukast 10 MG tablet  Commonly known as: SINGULAIR          Diet Instructions     Diet: Consistent Carbohydrate      Discharge Diet: Consistent Carbohydrate        Future Appointments   Date Time Provider Department Center   6/8/2022  2:00 PM Abiodun Vila MD [id number]     Additional Instructions for the Follow-ups that You Need to Schedule     Discharge Follow-up with PCP   As directed       Currently Documented PCP:    Abiodun Vila MD    PCP Phone Number:    [phone]     Follow Up Details: 1 week         Basic Metabolic Panel    Mar 20, 2022 (Approximate)      Release to patient: Immediate            Follow-up Information     Abiodun Vila MD .    Specialty: Family Medicine  Why: 1 week  Contact information:  [address] [place] [address]  [phone]                       Discharge Order (From admission, onward)     Start     Ordered    03/15/22 1105  Discharge patient  Once        Expected Discharge Date: 03/15/22    Discharge Disposition: Home or Self Care    Physician of Record for Attribution - Please select from Treatment Team: LUIZ MCCABE [[id number]]    Review needed by CMO to determine Physician of Record: No       Question Answer Comment   Physician of Record for Attribution - Please select from Treatment Team LUIZ MCCABE    Review needed by CMO to determine Physician of Record No        03/15/22 1113                  Total time spent discharging patient including evaluation,post hospitalization follow up,  medication and post hospitalization instructions and education total time exceeds 30 minutes.    Signed:  Luiz Mccabe MD  3/15/2022  11:29 EDT    EMR Dragon/Transcription disclaimer:   Much of this encounter note is an electronic transcription/translation of spoken language to printed text. The electronic translation of spoken language may permit  erroneous, or at times, nonsensical words or phrases to be inadvertently transcribed; Although I have reviewed the note for such errors, some may still exist.

## 2022-03-15 NOTE — OUTREACH NOTE
Prep Survey    Flowsheet Row Responses   Religion facility patient discharged from? [facility]   Is LACE score < 7 ? No   Emergency Room discharge w/ pulse ox? No   Eligibility [facility]   Date of Admission 03/09/22   Date of Discharge 03/15/22   Discharge Disposition Home or Self Care   Discharge diagnosis Hypovolemic shock   Does the patient have one of the following disease processes/diagnoses(primary or secondary)? Other   Does the patient have Home health ordered? No   Is there a DME ordered? No   Prep survey completed? Yes          MARCY A - Registered Nurse

## 2022-03-15 NOTE — PLAN OF CARE
Problem: NPPV/CPAP (Adult)  Intervention: Monitor and Manage Anxiety Related to NPPV/CPAP   11/09/18 0314   Interventions   Trust Relationship/Rapport care explained;choices provided     Intervention: Prevent Aspiration During Therapy   11/09/18 0314   Positioning   Head of Bed (HOB) HOB elevated       Goal: Signs and Symptoms of Listed Potential Problems Will be Absent, Minimized or Managed (NPPV/CPAP)  Outcome: Ongoing (interventions implemented as appropriate)   11/09/18 0314   Goal/Outcome Evaluation   Problems Assessed (NPPV/CPAP) situational response;dry mucous membranes;hypoxia/hypoxemia;skin breakdown   Problems Present (NPPV/CPAP) none          S: 29 y.o. male with   Chief Complaint   Patient presents with   [place] ED Follow-up      urgent care for chest pain       Chief complaint, [place], and all pertinent details of the case reviewed with the resident. Please see resident's note for specific details discussed at today's visit. Snores very loudly, lives alone, strong family history of heart disease    BP Readings from Last 3 Encounters:   03/15/22 118/80   03/14/22 (!) 148/92     Wt Readings from Last 3 Encounters:   03/15/22 (!) 364 lb 3.2 oz (165.2 kg)   03/14/22 (!) 350 lb (158.8 kg)       O: VS:  height is 6' 2\" (1.88 m) and weight is 364 lb 3.2 oz (165.2 kg) (abnormal). His skin temperature is 97.1 °F (36.2 °C). His blood pressure is 118/80 and his pulse is 80. His respiration is 20 and oxygen saturation is 96%. AAO/NAD, appropriate affect for mood, obese  CV:  RRR, no murmur  Resp: CTAB  Throat: unremarkable- no tonsillar enlargement       Diagnosis Orders   1. Chest pain in adult     2. Snoring     3. Lipid screening     4. Screening for diabetes mellitus     5. Severe obesity (BMI >= 40) (HCC)         Plan:  Lipid, BMP screening fasting glucose, CXR, cbc  Sleep referral  Enc. Weight loss, offered dietary consult, but not yet interested  Stop smoking- he thinks he can do it on his own. Only smokes 2-3/day in the car to stay awake  Getting on my chart, f/u in 1 month of as needed    Health Maintenance Due   Topic Date Due    Hepatitis C screen  Never done    Varicella vaccine (1 of 2 - 2-dose childhood series) Never done    COVID-19 Vaccine (1) Never done    Pneumococcal 0-64 years Vaccine (1 of 2 - PPSV23) Never done    Depression Screen  Never done    HIV screen  Never done    DTaP/Tdap/Td vaccine (1 - Tdap) Never done    Flu vaccine (1) Never done       Attending Physician Statement  I have discussed the case, including pertinent history and exam findings with the resident.  I did see the patient and did pertinent parts of the exam..  I

## 2022-03-15 NOTE — PLAN OF CARE
Goal Outcome Evaluation:              Outcome Evaluation: tPt discharged back to Independent living, IV and heart monitor removed, pt verbalized understanding of dc instructions. pt transport by family friend. CL

## 2022-03-15 NOTE — PROGRESS NOTES
[facility]  Inpatient Progress Note    Reason for Followup:  Diarrhea    Subjective     Interval History:   No new GI issues     Current Facility-Administered Medications:   •  acetaminophen (TYLENOL) tablet 650 mg, 650 mg, Oral, Q4H PRN, 650 mg at 03/14/22 0904 **OR** acetaminophen (TYLENOL) suppository 650 mg, 650 mg, Rectal, Q4H PRN, Merly Jackson MD  •  albuterol (PROVENTIL) nebulizer solution 0.083% 2.5 mg/3mL, 2.5 mg, Nebulization, Q4H PRN, Kevin Singh MD, 2.5 mg at 03/14/22 2247  •  albuterol (PROVENTIL) nebulizer solution 0.083% 2.5 mg/3mL, 2.5 mg, Nebulization, Q6H PRN, Chris Lei MD  •  atorvastatin (LIPITOR) tablet 80 mg, 80 mg, Oral, Daily, Luiz Mccabe MD, 80 mg at 03/14/22 0848  •  sennosides-docusate (PERICOLACE) 8.6-50 MG per tablet 2 tablet, 2 tablet, Oral, BID, 2 tablet at 03/10/22 0910 **AND** polyethylene glycol (MIRALAX) packet 17 g, 17 g, Oral, Daily PRN **AND** bisacodyl (DULCOLAX) EC tablet 5 mg, 5 mg, Oral, Daily PRN **AND** bisacodyl (DULCOLAX) suppository 10 mg, 10 mg, Rectal, Daily PRN, Merly Jackson MD  •  budesonide-formoterol (SYMBICORT) 160-4.5 MCG/ACT inhaler 2 puff, 2 puff, Inhalation, BID - RT, Hipolito Singh MD, 2 puff at 03/14/22 1921  •  clopidogrel (PLAVIX) tablet 75 mg, 75 mg, Oral, Daily, Merly Jackson MD, 75 mg at 03/14/22 0846  •  dextrose (D50W) (25 g/50 mL) IV injection 25 g, 25 g, Intravenous, Q15 Min PRN, Merly Jackson MD  •  dextrose (GLUTOSE) oral gel 15 g, 15 g, Oral, Q15 Min PRN, Merly Jackson MD  •  dicyclomine (BENTYL) capsule 20 mg, 20 mg, Oral, 4x Daily, Josué Garces MD, 20 mg at 03/14/22 2129  •  furosemide (LASIX) tablet 40 mg, 40 mg, Oral, Daily, Esther Washburn MD, 40 mg at 03/14/22 0846  •  gabapentin (NEURONTIN) capsule 400 mg, 400 mg, Oral, TID, Merly Jackson MD, 400 mg at 03/14/22 2147  •  glucagon (human recombinant) (GLUCAGEN DIAGNOSTIC) injection 1 mg, 1 mg,  Intramuscular, Q15 Min PRN, Merly Jackson MD  •  heparin (porcine) 5000 UNIT/ML injection 5,000 Units, 5,000 Units, Subcutaneous, Q8H, Merly Jackson MD, 5,000 Units at 03/15/22 0625  •  HYDROcodone-acetaminophen (NORCO) 5-325 MG per tablet 1 tablet, 1 tablet, Oral, Q6H PRN, Merly Jackson MD, 1 tablet at 03/14/22 2147  •  insulin regular (humuLIN R,novoLIN R) injection 0-9 Units, 0-9 Units, Subcutaneous, Q6H, Merly Jakcson MD, 2 Units at 03/14/22 0843  •  ipratropium-albuterol (DUO-NEB) nebulizer solution 3 mL, 3 mL, Nebulization, 4x Daily - RT, Merly Jackson MD, 3 mL at 03/14/22 1503  •  levETIRAcetam (KEPPRA) tablet 500 mg, 500 mg, Oral, BID, Merly Jackson MD, 500 mg at 03/14/22 2129  •  losartan (COZAAR) tablet 50 mg, 50 mg, Oral, BID, Luiz Mccabe MD, 50 mg at 03/14/22 2129  •  magnesium oxide (MAG-OX) tablet 400 mg, 400 mg, Oral, Daily, Luiz Mccabe MD, 400 mg at 03/14/22 1326  •  metoprolol tartrate (LOPRESSOR) tablet 25 mg, 25 mg, Oral, BID, Ev Finch APRN, 25 mg at 03/14/22 2128  •  nicotine (NICODERM CQ) 14 MG/24HR patch 1 patch, 1 patch, Transdermal, Q24H, Merly Jackson MD, 1 patch at 03/14/22 0845  •  ondansetron (ZOFRAN) injection 4 mg, 4 mg, Intravenous, Q6H PRN, Kevin Singh MD, 4 mg at 03/12/22 0215  •  pantoprazole (PROTONIX) EC tablet 40 mg, 40 mg, Oral, QAM, Merly Jackson MD, 40 mg at 03/14/22 0605  •  polyethylene glycol (MIRALAX) packet 17 g, 17 g, Oral, Daily, Luiz Mccabe MD  •  rOPINIRole (REQUIP) tablet 0.25 mg, 0.25 mg, Oral, Nightly, Merly Jackson MD, 0.25 mg at 03/14/22 2128  •  sodium chloride 0.9 % flush 10 mL, 10 mL, Intravenous, Q12H, Merly Jackson MD, 10 mL at 03/14/22 2129  •  sodium chloride 0.9 % flush 10 mL, 10 mL, Intravenous, PRN, Merly Jackson MD  •  tiZANidine (ZANAFLEX) tablet 4 mg, 4 mg, Oral, Q8H PRN, Merly Jackson MD, 4 mg at 03/15/22 0435  •  traZODone (DESYREL) tablet 100 mg, 100 mg, Oral, Nightly, Estelle,  Luiz SUGGS MD, 100 mg at 03/14/22 2129  Review of Systems:    All systems were reviewed and negative except for:  Genitourinary: postivie for  pain    Objective     Vital Signs  Temp:  [97.5 °F (36.4 °C)-98.2 °F (36.8 °C)] 98 °F (36.7 °C)  Heart Rate:  [53-75] 71  Resp:  [18-20] 18  BP: (156-167)/() 162/83  Body mass index is 28.87 kg/m².    Intake/Output Summary (Last 24 hours) at 3/15/2022 0755  Last data filed at 3/14/2022 2311  Gross per 24 hour   Intake 1050 ml   Output 1550 ml   Net -500 ml     No intake/output data recorded.     Physical Exam:   General: patient awake, alert and cooperative   Abdomen: soft, nontender, nondistended; normal bowel sounds   Rectal: deferred   Extremities: no rash or edema   Psychiatric: Normal mood and behavior; memory intact     Results Review:     I reviewed the patient's new clinical results.    Results from last 7 days   Lab Units 03/15/22  0444 03/13/22  1007 03/12/22  0514   WBC 10*3/mm3 7.87 5.71 7.74   HEMOGLOBIN g/dL 11.5* 9.1* 9.5*   HEMATOCRIT % 35.4 28.1* 27.8*   PLATELETS 10*3/mm3 215 148 142     Results from last 7 days   Lab Units 03/15/22  0444 03/14/22  1028 03/13/22  1158 03/09/22  1757 03/09/22  1416   SODIUM mmol/L 139 141 139   < > 132*   POTASSIUM mmol/L 3.9 4.7 3.8   < > 5.4*   CHLORIDE mmol/L 94* 101 103   < > 95*   CO2 mmol/L 33.1* 31.0* 28.0   < > 22.2   BUN mg/dL 16 13 15   < > 55*   CREATININE mg/dL 0.89 0.81 0.86   < > 3.30*   CALCIUM mg/dL 9.9 8.9 8.9   < > 8.2*   BILIRUBIN mg/dL  --   --   --   --  0.2   ALK PHOS U/L  --   --   --   --  95   ALT (SGPT) U/L  --   --   --   --  6   AST (SGOT) U/L  --   --   --   --  7   GLUCOSE mg/dL 129* 129* 113*   < > 133*    < > = values in this interval not displayed.         Lab Results   Lab Value Date/Time    LIPASE 23 03/10/2022 1242    LIPASE 36 10/01/2020 0524    LIPASE 11 (L) 09/30/2020 1738    LIPASE 17 08/02/2019 2200    LIPASE 87 02/05/2015 0009       Reviewed KUB and CT A/P this admission - non  obstructive bowel gas pattern    Assessment/Plan   Assessment:   1.  Nausea/vomiting, diarrhea  2.  Dysphagia, long standing  3.  Hypotensive shock  4.  COPD    Plan:   Her acute GI symptoms seem to have resolved.  Some ? Of chronic diarrhea, but may have had an element of acute GE on presentation.  Continue bentyl for element of intestinal spasm.  She will eventually need consideration for EGD/colonoscopy as an outpt with Dr Viera.  GI will sign off.     I discussed the patient's findings and my recommendations with patient.         Josué Garces M.D.  [facility]  [address] [place] [address].[facility]. [facility]  [phone]

## 2022-03-16 ENCOUNTER — TRANSITIONAL CARE MANAGEMENT TELEPHONE ENCOUNTER (OUTPATIENT)
Dept: CALL CENTER | Facility: HOSPITAL | Age: 72
End: 2022-03-16

## 2022-03-16 NOTE — CASE MANAGEMENT/SOCIAL WORK
Case Management Discharge Note      Final Note: Return to [facility] with orders for outpatient PT/OT    Provided Post Acute Provider List?: N/A  Provided Post Acute Provider Quality & Resource List?: N/A    Selected Continued Care - Discharged on 3/15/2022 Admission date: 3/9/2022 - Discharge disposition: Home or Self Care    Destination    No services have been selected for the patient.              Durable Medical Equipment    No services have been selected for the patient.              Dialysis/Infusion    No services have been selected for the patient.              Home Medical Care    No services have been selected for the patient.              Therapy    No services have been selected for the patient.              Community Resources    No services have been selected for the patient.              Community & DME    No services have been selected for the patient.                  Transportation Services  Private: Car    Final Discharge Disposition Code: 01 - home or self-care

## 2022-03-16 NOTE — OUTREACH NOTE
Call Center TCM Note    Flowsheet Row Responses   [facility] patient discharged from? [place]   Does the patient have one of the following disease processes/diagnoses(primary or secondary)? Other   TCM attempt successful? Yes   Discharge diagnosis Hypovolemic shock   Meds reviewed with patient/caregiver? Yes   Is the patient having any side effects they believe may be caused by any medication additions or changes? No   Does the patient have all medications ordered at discharge? Yes   Is the patient taking all medications as directed (includes completed medication regime)? Yes   Does the patient have a primary care provider?  Yes   Does the patient have an appointment with their PCP within 7 days of discharge? Greater than 7 days   Comments regarding PCP TCM FWP with PCP Dr Calderon is 03/28/2022.    What is preventing the patient from scheduling follow up appointments within 7 days of discharge? Earlier appointment not available   Has the patient kept scheduled appointments due by today? N/A   Has home health visited the patient within 72 hours of discharge? N/A   Home health comments Pt will be going to outpt P.T., O.T.   Psychosocial issues? No   Did the patient receive a copy of their discharge instructions? Yes   Nursing interventions Reviewed instructions with patient   What is the patient's perception of their health status since discharge? Improving   Is the patient/caregiver able to teach back signs and symptoms related to disease process for when to call PCP? Yes   Is the patient/caregiver able to teach back signs and symptoms related to disease process for when to call 911? Yes   Is the patient/caregiver able to teach back the hierarchy of who to call/visit for symptoms/problems? PCP, Specialist, Home health nurse, Urgent Care, ED, 911 Yes   TCM call completed? Yes   Wrap up additional comments Pt is feeling better, eating and drinking. states understanding of all medication changes. Pt will be going  to outpt P.T., O.T. No questions at this time. TCM FWP with PCP Dr Calderon is 03/28/2022.           Zuleika Guevara MA    3/16/2022, 09:41 EDT

## 2022-03-17 DIAGNOSIS — M79.89 LEG SWELLING: ICD-10-CM

## 2022-03-17 LAB
BACTERIA SPEC AEROBE CULT: NORMAL
BACTERIA SPEC AEROBE CULT: NORMAL

## 2022-03-17 RX ORDER — FUROSEMIDE 40 MG/1
TABLET ORAL
Qty: 60 TABLET | Refills: 1 | Status: SHIPPED | OUTPATIENT
Start: 2022-03-17

## 2022-03-17 RX ORDER — FUROSEMIDE 20 MG/1
TABLET ORAL
Qty: 60 TABLET | Refills: 0 | OUTPATIENT
Start: 2022-03-17

## 2022-03-17 NOTE — TELEPHONE ENCOUNTER
Rx Refill Note  Requested Prescriptions     Pending Prescriptions Disp Refills   • furosemide (LASIX) 40 MG tablet 60 tablet 1     Sig: TAKE 1 TABLET DAILY and ON M/W/F TAKE BID     Refused Prescriptions Disp Refills   • furosemide (LASIX) 20 MG tablet [Pharmacy Med Name: FUROSEMIDE 20MG TABLETS] 60 tablet 0     Sig: TAKE 2 TABLETS BY MOUTH TWICE DAILY      Last office visit with prescribing clinician: 3/7/2022      Next office visit with prescribing clinician: 3/28/2022     Brenda Zamora MA  03/17/22, 16:26 [facility] released her back on the furosemide

## 2022-03-22 PROBLEM — R29.6 REPEATED FALLS: Status: RESOLVED | Noted: 2019-03-08 | Resolved: 2022-03-22

## 2022-03-22 PROBLEM — S22.39XA RIB FRACTURE: Status: RESOLVED | Noted: 2019-12-05 | Resolved: 2022-03-22

## 2022-03-22 PROBLEM — E11.49 TYPE II DIABETES MELLITUS WITH NEUROLOGICAL MANIFESTATIONS (HCC): Status: ACTIVE | Noted: 2022-03-22

## 2022-03-22 PROBLEM — Z86.19 HISTORY OF CLOSTRIDIOIDES DIFFICILE INFECTION: Status: RESOLVED | Noted: 2019-02-04 | Resolved: 2022-03-22

## 2022-03-22 PROBLEM — J96.12 CHRONIC RESPIRATORY FAILURE WITH HYPOXIA AND HYPERCAPNIA (HCC): Status: RESOLVED | Noted: 2017-09-30 | Resolved: 2022-03-22

## 2022-03-22 PROBLEM — E11.42 DIABETIC PERIPHERAL NEUROPATHY (HCC): Status: RESOLVED | Noted: 2019-04-05 | Resolved: 2022-03-22

## 2022-03-22 PROBLEM — J01.00 SUBACUTE MAXILLARY SINUSITIS: Status: RESOLVED | Noted: 2021-07-09 | Resolved: 2022-03-22

## 2022-03-22 PROBLEM — E78.5 HYPERLIPIDEMIA: Status: RESOLVED | Noted: 2019-12-05 | Resolved: 2022-03-22

## 2022-03-22 PROBLEM — E78.00 HIGH CHOLESTEROL: Status: RESOLVED | Noted: 2021-04-19 | Resolved: 2022-03-22

## 2022-03-22 PROBLEM — E66.9 DIABETES MELLITUS TYPE 2 IN OBESE (HCC): Status: RESOLVED | Noted: 2017-09-30 | Resolved: 2022-03-22

## 2022-03-22 PROBLEM — J45.909 ASTHMA: Status: RESOLVED | Noted: 2019-10-28 | Resolved: 2022-03-22

## 2022-03-22 PROBLEM — I65.22 OCCLUSION AND STENOSIS OF LEFT CAROTID ARTERY: Status: RESOLVED | Noted: 2019-10-28 | Resolved: 2022-03-22

## 2022-03-22 PROBLEM — N18.2 CKD (CHRONIC KIDNEY DISEASE) STAGE 2, GFR 60-89 ML/MIN: Status: RESOLVED | Noted: 2017-09-20 | Resolved: 2022-03-22

## 2022-03-22 PROBLEM — R53.83 OTHER FATIGUE: Status: RESOLVED | Noted: 2020-06-17 | Resolved: 2022-03-22

## 2022-03-22 PROBLEM — R50.9 FEVER: Status: RESOLVED | Noted: 2021-08-05 | Resolved: 2022-03-22

## 2022-03-22 PROBLEM — E43 UNSPECIFIED SEVERE PROTEIN-CALORIE MALNUTRITION (HCC): Status: RESOLVED | Noted: 2021-04-19 | Resolved: 2022-03-22

## 2022-03-22 PROBLEM — R30.0 DYSURIA: Status: RESOLVED | Noted: 2020-11-11 | Resolved: 2022-03-22

## 2022-03-22 PROBLEM — R07.2 PRECORDIAL PAIN: Status: RESOLVED | Noted: 2020-11-11 | Resolved: 2022-03-22

## 2022-03-22 PROBLEM — Z78.0 POST-MENOPAUSAL: Status: RESOLVED | Noted: 2021-04-19 | Resolved: 2022-03-22

## 2022-03-22 PROBLEM — R21 RASH: Status: RESOLVED | Noted: 2021-04-19 | Resolved: 2022-03-22

## 2022-03-22 PROBLEM — R42 DIZZY: Status: RESOLVED | Noted: 2020-12-18 | Resolved: 2022-03-22

## 2022-03-22 PROBLEM — R25.2 SPASM: Status: RESOLVED | Noted: 2021-08-05 | Resolved: 2022-03-22

## 2022-03-22 PROBLEM — L29.9 ITCHING: Status: RESOLVED | Noted: 2020-03-23 | Resolved: 2022-03-22

## 2022-03-22 PROBLEM — A04.72 COLITIS, CLOSTRIDIUM DIFFICILE: Status: RESOLVED | Noted: 2018-10-16 | Resolved: 2022-03-22

## 2022-03-22 PROBLEM — I65.29 STENOSIS OF CAROTID ARTERY: Status: RESOLVED | Noted: 2019-10-28 | Resolved: 2022-03-22

## 2022-03-22 PROBLEM — I10 ESSENTIAL HYPERTENSION, BENIGN: Status: ACTIVE | Noted: 2022-03-22

## 2022-03-22 PROBLEM — J30.9 ATOPIC RHINITIS: Status: RESOLVED | Noted: 2021-04-19 | Resolved: 2022-03-22

## 2022-03-22 PROBLEM — E11.29 TYPE II DIABETES MELLITUS WITH RENAL MANIFESTATIONS (HCC): Status: ACTIVE | Noted: 2022-03-22

## 2022-03-22 PROBLEM — Z23 ENCOUNTER FOR IMMUNIZATION: Status: RESOLVED | Noted: 2020-12-10 | Resolved: 2022-03-22

## 2022-03-22 PROBLEM — M06.9 RHEUMATOID ARTHRITIS (HCC): Status: ACTIVE | Noted: 2021-10-15

## 2022-03-22 PROBLEM — N39.0 URINARY TRACT INFECTION WITH HEMATURIA: Status: RESOLVED | Noted: 2020-06-17 | Resolved: 2022-03-22

## 2022-03-22 PROBLEM — E04.2 MULTINODULAR GOITER: Status: RESOLVED | Noted: 2019-02-07 | Resolved: 2022-03-22

## 2022-03-22 PROBLEM — I21.9 ACUTE MYOCARDIAL INFARCTION (HCC): Status: RESOLVED | Noted: 2019-02-04 | Resolved: 2022-03-22

## 2022-03-22 PROBLEM — R31.9 URINARY TRACT INFECTION WITH HEMATURIA: Status: RESOLVED | Noted: 2020-06-17 | Resolved: 2022-03-22

## 2022-03-22 PROBLEM — Z12.31 ENCOUNTER FOR SCREENING MAMMOGRAM FOR MALIGNANT NEOPLASM OF BREAST: Status: RESOLVED | Noted: 2019-10-28 | Resolved: 2022-03-22

## 2022-03-22 PROBLEM — S32.019A L1 VERTEBRAL FRACTURE (HCC): Status: RESOLVED | Noted: 2019-12-05 | Resolved: 2022-03-22

## 2022-03-22 PROBLEM — IMO0002 TYPE II DIABETES MELLITUS, UNCONTROLLED: Status: ACTIVE | Noted: 2022-03-22

## 2022-03-22 PROBLEM — Z00.00 MEDICARE ANNUAL WELLNESS VISIT, SUBSEQUENT: Status: RESOLVED | Noted: 2021-04-19 | Resolved: 2022-03-22

## 2022-03-22 PROBLEM — Z79.899 HIGH RISK MEDICATION USE: Status: RESOLVED | Noted: 2020-11-11 | Resolved: 2022-03-22

## 2022-03-22 PROBLEM — R57.9 SHOCK (HCC): Status: RESOLVED | Noted: 2022-03-09 | Resolved: 2022-03-22

## 2022-03-22 PROBLEM — M54.9 ACUTE MIDLINE BACK PAIN: Status: RESOLVED | Noted: 2021-08-05 | Resolved: 2022-03-22

## 2022-03-22 PROBLEM — E11.59 TYPE 2 DIABETES MELLITUS WITH VASCULAR DISEASE (HCC): Status: ACTIVE | Noted: 2022-03-22

## 2022-03-22 PROBLEM — E11.69 DIABETES MELLITUS TYPE 2 IN OBESE (HCC): Status: RESOLVED | Noted: 2017-09-30 | Resolved: 2022-03-22

## 2022-03-22 PROBLEM — E66.2 OBESITY HYPOVENTILATION SYNDROME: Status: RESOLVED | Noted: 2018-03-17 | Resolved: 2022-03-22

## 2022-03-22 PROBLEM — R94.4 DECREASED GFR: Status: RESOLVED | Noted: 2019-02-07 | Resolved: 2022-03-22

## 2022-03-22 PROBLEM — R05.9 COUGH: Status: RESOLVED | Noted: 2021-07-09 | Resolved: 2022-03-22

## 2022-03-22 PROBLEM — J02.9 SORE THROAT: Status: RESOLVED | Noted: 2021-08-05 | Resolved: 2022-03-22

## 2022-03-22 PROBLEM — Z98.61 CORONARY ANGIOPLASTY STATUS: Status: RESOLVED | Noted: 2019-02-04 | Resolved: 2022-03-22

## 2022-03-22 PROBLEM — M79.604 RIGHT LEG PAIN: Status: RESOLVED | Noted: 2020-11-11 | Resolved: 2022-03-22

## 2022-03-22 PROBLEM — M79.89 LEG SWELLING: Status: RESOLVED | Noted: 2020-12-10 | Resolved: 2022-03-22

## 2022-03-22 PROBLEM — R11.0 NAUSEA: Status: RESOLVED | Noted: 2021-04-19 | Resolved: 2022-03-22

## 2022-03-22 PROBLEM — E78.00 HYPERCHOLESTEROLEMIA: Status: ACTIVE | Noted: 2022-03-22

## 2022-03-22 PROBLEM — Z79.899 POLYPHARMACY: Status: RESOLVED | Noted: 2019-12-07 | Resolved: 2022-03-22

## 2022-03-22 PROBLEM — Z72.0 TOBACCO USE: Status: RESOLVED | Noted: 2018-03-17 | Resolved: 2022-03-22

## 2022-03-22 PROBLEM — J30.2 SEASONAL ALLERGIES: Status: RESOLVED | Noted: 2019-10-28 | Resolved: 2022-03-22

## 2022-03-22 PROBLEM — N18.30 CRI (CHRONIC RENAL INSUFFICIENCY), STAGE 3 (MODERATE) (HCC): Status: ACTIVE | Noted: 2022-03-22

## 2022-03-22 PROBLEM — E66.9 OBESITY (BMI 30-39.9): Status: RESOLVED | Noted: 2017-09-30 | Resolved: 2022-03-22

## 2022-03-22 PROBLEM — M21.619 BUNION: Status: RESOLVED | Noted: 2021-04-19 | Resolved: 2022-03-22

## 2022-03-22 PROBLEM — Z00.00 WELLNESS EXAMINATION: Status: RESOLVED | Noted: 2019-10-28 | Resolved: 2022-03-22

## 2022-03-22 PROBLEM — B37.2 CANDIDIASIS OF SKIN: Status: RESOLVED | Noted: 2021-04-19 | Resolved: 2022-03-22

## 2022-03-22 PROBLEM — M1A.9XX1 CHRONIC GOUT WITH TOPHUS: Status: RESOLVED | Noted: 2019-10-28 | Resolved: 2022-03-22

## 2022-03-22 PROBLEM — Z91.89 OTHER SPECIFIED PERSONAL RISK FACTORS, NOT ELSEWHERE CLASSIFIED: Status: RESOLVED | Noted: 2020-11-11 | Resolved: 2022-03-22

## 2022-03-22 PROBLEM — G62.9 NEUROPATHY: Status: RESOLVED | Noted: 2021-10-28 | Resolved: 2022-03-22

## 2022-03-22 PROBLEM — J96.11 CHRONIC RESPIRATORY FAILURE WITH HYPOXIA AND HYPERCAPNIA (HCC): Status: RESOLVED | Noted: 2017-09-30 | Resolved: 2022-03-22

## 2022-03-23 DIAGNOSIS — F51.01 PRIMARY INSOMNIA: ICD-10-CM

## 2022-03-23 DIAGNOSIS — F32.A DEPRESSION, UNSPECIFIED DEPRESSION TYPE: ICD-10-CM

## 2022-03-23 RX ORDER — TRAZODONE HYDROCHLORIDE 100 MG/1
100 TABLET ORAL NIGHTLY
Qty: 90 TABLET | Refills: 3 | Status: SHIPPED | OUTPATIENT
Start: 2022-03-23 | End: 2022-08-26 | Stop reason: SDUPTHER

## 2022-03-23 NOTE — TELEPHONE ENCOUNTER
Rx Refill Note  Requested Prescriptions     Pending Prescriptions Disp Refills   • traZODone (DESYREL) 100 MG tablet [Pharmacy Med Name: TRAZODONE 100MG TABLETS] 90 tablet 3     Sig: TAKE 1 TABLET BY MOUTH EVERY NIGHT      Last office visit with prescribing clinician: 3/7/2022      Next office visit with prescribing clinician: 3/28/2022     Brenda Zamora MA  03/23/22, 14:19 EDT

## 2022-03-24 ENCOUNTER — READMISSION MANAGEMENT (OUTPATIENT)
Dept: CALL CENTER | Facility: HOSPITAL | Age: 72
End: 2022-03-24

## 2022-03-24 NOTE — OUTREACH NOTE
Medical Week 2 Survey    Flowsheet Row Responses   [facility] patient discharged from? [place]   Does the patient have one of the following disease processes/diagnoses(primary or secondary)? Other   Week 2 attempt successful? No   Unsuccessful attempts Attempt 1          TANYA ELIZABETH - Registered Nurse

## 2022-03-28 ENCOUNTER — READMISSION MANAGEMENT (OUTPATIENT)
Dept: CALL CENTER | Facility: HOSPITAL | Age: 72
End: 2022-03-28

## 2022-03-28 NOTE — OUTREACH NOTE
Medical Week 2 Survey    Flowsheet Row Responses   [facility] patient discharged from? [place]   Does the patient have one of the following disease processes/diagnoses(primary or secondary)? Other   Week 2 attempt successful? Yes   Call start time 1816   Discharge diagnosis Hypovolemic shock   Call end time 1822   Meds reviewed with patient/caregiver? Yes   Is the patient taking all medications as directed (includes completed medication regime)? Yes   Does the patient have a primary care provider?  Yes   Has the patient kept scheduled appointments due by today? Yes   Comments Saw Cardiology last week, states her HR was in the 40's and he changed her medications, put her on MEtoprolol and her BP went up to 200/100's states she called his office and spoke to someone and he did not call her back.  States she decreased her metoprolol to 25 mg BID and started taking her Hydralazine.    Has home health visited the patient within 72 hours of discharge? N/A   Home health comments Pt will be going to outpt P.T., O.T.   Psychosocial issues? No   Did the patient receive a copy of their discharge instructions? Yes   Nursing interventions Reviewed instructions with patient   What is the patient's perception of their health status since discharge? Improving  [States she is very tired. ]   Is the patient/caregiver able to teach back signs and symptoms related to disease process for when to call PCP? Yes   Is the patient/caregiver able to teach back signs and symptoms related to disease process for when to call 911? Yes   Is the patient/caregiver able to teach back the hierarchy of who to call/visit for symptoms/problems? PCP, Specialist, Home health nurse, Urgent Care, ED, 911 Yes   Additional teach back comments Has cut down on her smoking, has Nicotine patches at home. States her BG has been good.    Week 2 Call Completed? Yes   Wrap up additional comments Has rescheduled her PCP apptment to 04/04/2022. States she is  "\"just wiped out\".           TRACEY MADISON - Registered Nurse  "

## 2022-04-04 ENCOUNTER — OFFICE VISIT (OUTPATIENT)
Dept: FAMILY MEDICINE CLINIC | Facility: CLINIC | Age: 72
End: 2022-04-04

## 2022-04-04 ENCOUNTER — HOSPITAL ENCOUNTER (OUTPATIENT)
Dept: CARDIOLOGY | Facility: HOSPITAL | Age: 72
Discharge: HOME | End: 2022-04-04
Admitting: FAMILY MEDICINE

## 2022-04-04 VITALS
SYSTOLIC BLOOD PRESSURE: 125 MMHG | DIASTOLIC BLOOD PRESSURE: 82 MMHG | BODY MASS INDEX: 29.53 KG/M2 | HEIGHT: 64 IN | TEMPERATURE: 98 F | WEIGHT: 173 LBS | RESPIRATION RATE: 15 BRPM | HEART RATE: 59 BPM | OXYGEN SATURATION: 97 %

## 2022-04-04 DIAGNOSIS — G62.9 NEUROPATHY: ICD-10-CM

## 2022-04-04 DIAGNOSIS — D64.9 ANEMIA, UNSPECIFIED TYPE: ICD-10-CM

## 2022-04-04 DIAGNOSIS — I83.813 VARICOSE VEINS OF BOTH LOWER EXTREMITIES WITH PAIN: ICD-10-CM

## 2022-04-04 DIAGNOSIS — N17.9 AKI (ACUTE KIDNEY INJURY): Primary | ICD-10-CM

## 2022-04-04 DIAGNOSIS — I10 HYPERTENSION, UNSPECIFIED TYPE: ICD-10-CM

## 2022-04-04 DIAGNOSIS — Z79.899 HIGH RISK MEDICATION USE: ICD-10-CM

## 2022-04-04 DIAGNOSIS — Z79.4 TYPE 2 DIABETES MELLITUS WITH OTHER SPECIFIED COMPLICATION, WITH LONG-TERM CURRENT USE OF INSULIN: ICD-10-CM

## 2022-04-04 DIAGNOSIS — M79.605 LEFT LEG PAIN: ICD-10-CM

## 2022-04-04 DIAGNOSIS — E11.69 TYPE 2 DIABETES MELLITUS WITH OTHER SPECIFIED COMPLICATION, WITH LONG-TERM CURRENT USE OF INSULIN: ICD-10-CM

## 2022-04-04 PROBLEM — E11.9 DIABETES MELLITUS (HCC): Status: ACTIVE | Noted: 2022-04-04

## 2022-04-04 LAB
BH CV LOW VAS LEFT LESSER SAPH VESSEL: 1
BH CV LOWER VASCULAR LEFT COMMON FEMORAL AUGMENT: NORMAL
BH CV LOWER VASCULAR LEFT COMMON FEMORAL COMPETENT: NORMAL
BH CV LOWER VASCULAR LEFT COMMON FEMORAL COMPRESS: NORMAL
BH CV LOWER VASCULAR LEFT COMMON FEMORAL PHASIC: NORMAL
BH CV LOWER VASCULAR LEFT COMMON FEMORAL SPONT: NORMAL
BH CV LOWER VASCULAR LEFT DISTAL FEMORAL COMPRESS: NORMAL
BH CV LOWER VASCULAR LEFT GASTRONEMIUS COMPRESS: NORMAL
BH CV LOWER VASCULAR LEFT GREATER SAPH AK COMPRESS: NORMAL
BH CV LOWER VASCULAR LEFT GREATER SAPH BK COMPRESS: NORMAL
BH CV LOWER VASCULAR LEFT LESSER SAPH COMPRESS: NORMAL
BH CV LOWER VASCULAR LEFT LESSER SAPH THROMBUS: NORMAL
BH CV LOWER VASCULAR LEFT MID FEMORAL AUGMENT: NORMAL
BH CV LOWER VASCULAR LEFT MID FEMORAL COMPETENT: NORMAL
BH CV LOWER VASCULAR LEFT MID FEMORAL COMPRESS: NORMAL
BH CV LOWER VASCULAR LEFT MID FEMORAL PHASIC: NORMAL
BH CV LOWER VASCULAR LEFT MID FEMORAL SPONT: NORMAL
BH CV LOWER VASCULAR LEFT PERONEAL COMPRESS: NORMAL
BH CV LOWER VASCULAR LEFT POPLITEAL AUGMENT: NORMAL
BH CV LOWER VASCULAR LEFT POPLITEAL COMPETENT: NORMAL
BH CV LOWER VASCULAR LEFT POPLITEAL COMPRESS: NORMAL
BH CV LOWER VASCULAR LEFT POPLITEAL PHASIC: NORMAL
BH CV LOWER VASCULAR LEFT POPLITEAL SPONT: NORMAL
BH CV LOWER VASCULAR LEFT POSTERIOR TIBIAL COMPRESS: NORMAL
BH CV LOWER VASCULAR LEFT PROFUNDA FEMORAL COMPRESS: NORMAL
BH CV LOWER VASCULAR LEFT PROXIMAL FEMORAL COMPRESS: NORMAL
BH CV LOWER VASCULAR LEFT SAPHENOFEMORAL JUNCTION COMPRESS: NORMAL
BH CV LOWER VASCULAR RIGHT COMMON FEMORAL AUGMENT: NORMAL
BH CV LOWER VASCULAR RIGHT COMMON FEMORAL COMPETENT: NORMAL
BH CV LOWER VASCULAR RIGHT COMMON FEMORAL COMPRESS: NORMAL
BH CV LOWER VASCULAR RIGHT COMMON FEMORAL PHASIC: NORMAL
BH CV LOWER VASCULAR RIGHT COMMON FEMORAL SPONT: NORMAL
MAXIMAL PREDICTED HEART RATE: 149 BPM
STRESS TARGET HR: 127 BPM

## 2022-04-04 PROCEDURE — 1111F DSCHRG MED/CURRENT MED MERGE: CPT | Performed by: FAMILY MEDICINE

## 2022-04-04 PROCEDURE — 99214 OFFICE O/P EST MOD 30 MIN: CPT | Performed by: FAMILY MEDICINE

## 2022-04-04 PROCEDURE — 93971 EXTREMITY STUDY: CPT

## 2022-04-04 PROCEDURE — 99495 TRANSJ CARE MGMT MOD F2F 14D: CPT | Performed by: FAMILY MEDICINE

## 2022-04-04 RX ORDER — GABAPENTIN 300 MG/1
600 CAPSULE ORAL 3 TIMES DAILY
Qty: 180 CAPSULE | Refills: 0 | Status: SHIPPED | OUTPATIENT
Start: 2022-04-04 | End: 2022-05-13 | Stop reason: SDUPTHER

## 2022-04-04 RX ORDER — GABAPENTIN 300 MG/1
600 CAPSULE ORAL 3 TIMES DAILY
Qty: 180 CAPSULE | Refills: 0 | Status: SHIPPED | OUTPATIENT
Start: 2022-04-04 | End: 2022-04-04

## 2022-04-04 NOTE — PROGRESS NOTES
Subjective   Vidhi Lopez is a 72 y.o. female.     Chief Complaint   Patient presents with   • Hospital Follow Up Visit     Hyperkalemia-diagnosis       History of Present Illness     Patient was admitted to [facility]   ALL records were obtained and reviewed and /or discussed with admitting physician  Date of admission 3/9/22  Date of discharge 3/15/22  Diagnosis GASPER and hypovolemic shock  Medications upon discharge Losartan 50 mg bid   Disposition stable   Follow up today  Currently c/o left leg pain since Friday, + DVTs before   Condition stable  Low back pain, pain L thight rated 10/10   The following portions of the patient's history were reviewed and updated as appropriate: allergies, current medications, past family history, past medical history, past social history, past surgical history and problem list.    Past Medical History:   Diagnosis Date   • Acute renal injury (HCC) 03/2022   • Allergic rhinitis    • Anemia    • Asthma with COPD ([facility])     Asthma/COPD   • Balance problem    • Bilateral ovarian cysts    • C. difficile colitis 09/2018   • CAD (coronary artery disease)    • Carotid artery stenosis    • Chronic back pain    • Chronic narcotic use    • Chronic sinusitis    • Compression fracture of L1 lumbar vertebra ([facility])    • COPD (chronic obstructive pulmonary disease) ([facility])    • Coronary artery disease    • CRI (chronic renal insufficiency), stage 3 (moderate) ([facility])     stage 3B to 2 3/22   • CVA (cerebral vascular accident) ([facility]) 2000    GENERALIZED UPPER BODY WEAKNESS RESIDUAL PER PT   • Depression with anxiety     Depression/Anxiety   • Edema, lower extremity    • Endometriosis     Dr. Jin; estradiol    • ESR raised 03/20/2018   • Essential hypertension, benign    • Fibromyalgia    • Frequent falls    • Frequent UTI    • Gout    • History of myocardial infarction     mxl   • Hypercholesterolemia    • Hypocalcemia    • Insomnia    • Interstitial cystitis    • Migraines    • Nicotine dependence    •  Nodular goiter    • On home oxygen therapy     2 L NC   • URIEL on CPAP    • Rheumatoid arthritis (HCC)    • RLS (restless legs syndrome)    • Seizure disorder, nonconvulsive, with status epilepticus (HCC) 03/20/2018   • Septic joint of right knee joint (HCC) 03/21/2018   • Type 2 diabetes mellitus with vascular disease (HCC)    • Type II diabetes mellitus with neurological manifestations (HCC)     likely multifactorial in nature   • Type II diabetes mellitus with renal manifestations (HCC)    • Type II diabetes mellitus, uncontrolled    • Urinary incontinence    • Yeast dermatitis        Past Surgical History:   Procedure Laterality Date   • ARTERIOGRAM N/A 2/12/2018    Procedure: Renal Arteriogram;  Surgeon: Lito Flores MD;  Location:  [facility] CATH INVASIVE LOCATION;  Service:    • BREAST BIOPSY Right 1991   • CARDIAC CATHETERIZATION N/A 2/12/2018    Procedure: Right Heart Cath;  Surgeon: Lito Flores MD;  Location:  [facility] CATH INVASIVE LOCATION;  Service:    • CAROTID STENT      Transcath    • CAROTID STENT Left    • CHOLECYSTECTOMY     • CYSTOSTOMY W/ BLADDER BIOPSY     • DILATATION AND CURETTAGE     • KNEE ARTHROSCOPY Right 3/23/2018    Procedure: ARTHROSCOPY, RIGHT KNEE  INCISION AND DRAINAGE LOWER EXTREMITY WITH SYNOVIAL BIOPSY;  Surgeon: Dilip Giron MD;  Location:  [facility] OR;  Service: Orthopedics   • LAPAROSCOPIC CHOLECYSTECTOMY  1994    Lap rolando   • OOPHORECTOMY     • PAP SMEAR  05/10/2016   • SUBTOTAL HYSTERECTOMY         Family History   Problem Relation Age of Onset   • Cancer Other    • Diabetes Other    • Heart attack Other    • Hyperlipidemia Other    • Hypertension Other    • Osteoporosis Other    • Stroke Other    • Breast cancer Mother    • Osteoporosis Mother    • Colon cancer Father    • Colon polyps Father    • Irritable bowel syndrome Neg Hx    • Ulcerative colitis Neg Hx    • Crohn's disease Neg Hx        Social History     Socioeconomic History   • Marital status:    Tobacco  Use   • Smoking status: Current Some Day Smoker     Packs/day: 0.25     Years: 40.00     Pack years: 10.00     Types: Cigarettes     Last attempt to quit: 2020     Years since quittin.2   • Smokeless tobacco: Never Used   • Tobacco comment: in process of quiting--AVERAGE 1 PPD, DOWN TO 6 CIG PER DAY X2 WEEKS    Substance and Sexual Activity   • Alcohol use: Yes     Alcohol/week: 1.0 standard drink     Types: 1 Glasses of wine per week     Comment: occasional   • Drug use: No   • Sexual activity: Defer       Review of Systems   Constitutional: Negative.    HENT: Negative.    Eyes: Negative.    Respiratory: Negative.    Cardiovascular: Negative.    Gastrointestinal: Negative.    Endocrine: Negative.    Genitourinary: Negative.    Musculoskeletal: Negative.         Left leg pain   Skin: Negative.    Allergic/Immunologic: Negative.    Neurological: Negative.    Hematological: Negative.    Psychiatric/Behavioral: Negative.    All other systems reviewed and are negative.      Objective   Vitals:    22 1415   BP: 125/82   Pulse: 59   Resp: 15   Temp: 98 °F (36.7 °C)   SpO2: 97%     Body mass index is 29.7 kg/m².  Physical Exam  Vitals and nursing note reviewed.   Constitutional:       Appearance: She is well-developed.   HENT:      Head: Normocephalic and atraumatic.      Right Ear: External ear normal.      Left Ear: External ear normal.      Nose: Nose normal.   Eyes:      General: No scleral icterus.        Right eye: No discharge.         Left eye: No discharge.      Conjunctiva/sclera: Conjunctivae normal.      Pupils: Pupils are equal, round, and reactive to light.   Neck:      Thyroid: No thyromegaly.      Vascular: No JVD.      Trachea: No tracheal deviation.   Cardiovascular:      Rate and Rhythm: Normal rate and regular rhythm.      Heart sounds: Normal heart sounds. No murmur heard.    No friction rub. No gallop.   Pulmonary:      Effort: Pulmonary effort is normal. No respiratory distress.      Breath  sounds: Normal breath sounds. No wheezing or rales.   Chest:      Chest wall: No tenderness.   Abdominal:      General: Bowel sounds are normal. There is no distension.      Palpations: Abdomen is soft. There is no mass.      Tenderness: There is no abdominal tenderness. There is no guarding or rebound.      Hernia: No hernia is present.   Musculoskeletal:         General: No tenderness. Normal range of motion.      Cervical back: Normal range of motion and neck supple.   Lymphadenopathy:      Cervical: No cervical adenopathy.   Skin:     General: Skin is warm and dry.   Neurological:      General: No focal deficit present.      Mental Status: She is alert.      Cranial Nerves: No cranial nerve deficit.      Sensory: No sensory deficit.      Motor: No abnormal muscle tone.      Coordination: Coordination normal.      Deep Tendon Reflexes: Reflexes normal.   Psychiatric:         Mood and Affect: Mood normal.         Behavior: Behavior normal.         Thought Content: Thought content normal.         Judgment: Judgment normal.           Assessment/Plan   Diagnoses and all orders for this visit:    1. GASPER (acute kidney injury) (HCC) (Primary)  -     CBC & Differential  -     Comprehensive Metabolic Panel  -     Iron and TIBC  -     Ferritin  -     Vitamin B12  -     Folate RBC    2. Left leg pain  -     Duplex Venous Lower Extremity - Left CAR  -     Discontinue: gabapentin (NEURONTIN) 300 MG capsule; Take 2 capsules by mouth 3 (Three) Times a Day.  Dispense: 180 capsule; Refill: 0  -     gabapentin (NEURONTIN) 300 MG capsule; Take 2 capsules by mouth 3 (Three) Times a Day.  Dispense: 180 capsule; Refill: 0    3. Anemia, unspecified type  -     CBC & Differential  -     Comprehensive Metabolic Panel  -     Iron and TIBC  -     Ferritin  -     Vitamin B12  -     Folate RBC    4. High risk medication use  -     Compliance Drug Analysis, Ur - Urine, Clean Catch    5. Type 2 diabetes mellitus with other specified  complication, with long-term current use of insulin (HCC)    6. Neuropathy  -     Discontinue: gabapentin (NEURONTIN) 300 MG capsule; Take 2 capsules by mouth 3 (Three) Times a Day.  Dispense: 180 capsule; Refill: 0  -     gabapentin (NEURONTIN) 300 MG capsule; Take 2 capsules by mouth 3 (Three) Times a Day.  Dispense: 180 capsule; Refill: 0    Side effects discussed with patient in detail, all including but not limited to every single topic discussed   Patient agreed to go to the ER if no improvement  Vascular labs called me negative x DVT, I did call pt, patient has superficial thrombophlebitis, to apply hot compresses, and also vascular specialist referral done due to varicose veins

## 2022-04-05 LAB
ALBUMIN SERPL-MCNC: 4.3 G/DL (ref 3.7–4.7)
ALBUMIN/GLOB SERPL: 1.9 {RATIO} (ref 1.2–2.2)
ALP SERPL-CCNC: 103 IU/L (ref 44–121)
ALT SERPL-CCNC: 7 IU/L (ref 0–32)
AST SERPL-CCNC: 9 IU/L (ref 0–40)
BASOPHILS # BLD AUTO: 0.1 X10E3/UL (ref 0–0.2)
BASOPHILS NFR BLD AUTO: 1 %
BILIRUB SERPL-MCNC: 0.2 MG/DL (ref 0–1.2)
BUN SERPL-MCNC: 21 MG/DL (ref 8–27)
BUN/CREAT SERPL: 18 (ref 12–28)
CALCIUM SERPL-MCNC: 9.3 MG/DL (ref 8.7–10.3)
CHLORIDE SERPL-SCNC: 94 MMOL/L (ref 96–106)
CO2 SERPL-SCNC: 29 MMOL/L (ref 20–29)
CREAT SERPL-MCNC: 1.14 MG/DL (ref 0.57–1)
EGFRCR SERPLBLD CKD-EPI 2021: 51 ML/MIN/1.73
EOSINOPHIL # BLD AUTO: 0.3 X10E3/UL (ref 0–0.4)
EOSINOPHIL NFR BLD AUTO: 4 %
ERYTHROCYTE [DISTWIDTH] IN BLOOD BY AUTOMATED COUNT: 13.1 % (ref 11.7–15.4)
FERRITIN SERPL-MCNC: 17 NG/ML (ref 15–150)
FOLATE BLD-MCNC: 392 NG/ML
FOLATE RBC-MCNC: 1146 NG/ML
GLOBULIN SER CALC-MCNC: 2.3 G/DL (ref 1.5–4.5)
GLUCOSE SERPL-MCNC: 102 MG/DL (ref 65–99)
HCT VFR BLD AUTO: 34.2 % (ref 34–46.6)
HGB BLD-MCNC: 11.1 G/DL (ref 11.1–15.9)
IMM GRANULOCYTES # BLD AUTO: 0 X10E3/UL (ref 0–0.1)
IMM GRANULOCYTES NFR BLD AUTO: 0 %
IRON SATN MFR SERPL: 15 % (ref 15–55)
IRON SERPL-MCNC: 55 UG/DL (ref 27–139)
LYMPHOCYTES # BLD AUTO: 1.9 X10E3/UL (ref 0.7–3.1)
LYMPHOCYTES NFR BLD AUTO: 23 %
MCH RBC QN AUTO: 29.4 PG (ref 26.6–33)
MCHC RBC AUTO-ENTMCNC: 32.5 G/DL (ref 31.5–35.7)
MCV RBC AUTO: 91 FL (ref 79–97)
MONOCYTES # BLD AUTO: 0.6 X10E3/UL (ref 0.1–0.9)
MONOCYTES NFR BLD AUTO: 7 %
NEUTROPHILS # BLD AUTO: 5.3 X10E3/UL (ref 1.4–7)
NEUTROPHILS NFR BLD AUTO: 65 %
PLATELET # BLD AUTO: 174 X10E3/UL (ref 150–450)
POTASSIUM SERPL-SCNC: 4.9 MMOL/L (ref 3.5–5.2)
PROT SERPL-MCNC: 6.6 G/DL (ref 6–8.5)
RBC # BLD AUTO: 3.78 X10E6/UL (ref 3.77–5.28)
SODIUM SERPL-SCNC: 138 MMOL/L (ref 134–144)
TIBC SERPL-MCNC: 370 UG/DL (ref 250–450)
UIBC SERPL-MCNC: 315 UG/DL (ref 118–369)
VIT B12 SERPL-MCNC: 267 PG/ML (ref 232–1245)
WBC # BLD AUTO: 8.2 X10E3/UL (ref 3.4–10.8)

## 2022-04-05 NOTE — TELEPHONE ENCOUNTER
Rx Refill Note  Requested Prescriptions     Pending Prescriptions Disp Refills   • metoprolol tartrate (LOPRESSOR) 25 MG tablet [Pharmacy Med Name: METOPROLOL TARTRATE 25MG TABLETS] 60 tablet 0     Sig: TAKE 1 TABLET BY MOUTH TWICE DAILY      Last office visit with prescribing clinician: 4/4/2022      Next office visit with prescribing clinician: 6/8/2022       Last prescribed on 02/21/2022.         Helder Perry MA/TIFFANIE  04/05/22, 07:23 EDT

## 2022-04-06 ENCOUNTER — READMISSION MANAGEMENT (OUTPATIENT)
Dept: CALL CENTER | Facility: HOSPITAL | Age: 72
End: 2022-04-06

## 2022-04-06 ENCOUNTER — TELEPHONE (OUTPATIENT)
Dept: FAMILY MEDICINE CLINIC | Facility: CLINIC | Age: 72
End: 2022-04-06

## 2022-04-06 DIAGNOSIS — M79.605 LEFT LEG PAIN: ICD-10-CM

## 2022-04-06 DIAGNOSIS — M79.605 LEFT LEG PAIN: Primary | ICD-10-CM

## 2022-04-06 RX ORDER — HYDROCODONE BITARTRATE AND ACETAMINOPHEN 5; 325 MG/1; MG/1
1 TABLET ORAL EVERY 6 HOURS PRN
Qty: 30 TABLET | Refills: 0 | Status: SHIPPED | OUTPATIENT
Start: 2022-04-06 | End: 2022-04-06 | Stop reason: SDUPTHER

## 2022-04-06 RX ORDER — ROPINIROLE 0.25 MG/1
0.25 TABLET, FILM COATED ORAL NIGHTLY
Qty: 90 TABLET | Refills: 3 | Status: SHIPPED | OUTPATIENT
Start: 2022-04-06 | End: 2022-04-06 | Stop reason: SDUPTHER

## 2022-04-06 RX ORDER — HYDROCODONE BITARTRATE AND ACETAMINOPHEN 5; 325 MG/1; MG/1
1 TABLET ORAL EVERY 6 HOURS PRN
Qty: 30 TABLET | Refills: 0 | Status: SHIPPED | OUTPATIENT
Start: 2022-04-06 | End: 2022-04-28

## 2022-04-06 RX ORDER — ROPINIROLE 0.25 MG/1
0.25 TABLET, FILM COATED ORAL NIGHTLY
Qty: 90 TABLET | Refills: 3 | Status: SHIPPED | OUTPATIENT
Start: 2022-04-06

## 2022-04-06 NOTE — TELEPHONE ENCOUNTER
Rx Refill Note  Requested Prescriptions     Pending Prescriptions Disp Refills   • rOPINIRole (REQUIP) 0.25 MG tablet 90 tablet 3     Sig: Take 1 tablet by mouth Every Night.   • HYDROcodone-acetaminophen (Norco) 5-325 MG per tablet 30 tablet 0     Sig: Take 1 tablet by mouth Every 6 (Six) Hours As Needed for Moderate Pain .      Last office visit with prescribing clinician: 4/4/2022      Next office visit with prescribing clinician: 6/8/2022         Last prescribed on        Helder Perry MA/LMR  04/06/22, 15:40 EDT

## 2022-04-06 NOTE — TELEPHONE ENCOUNTER
.    Caller: John Vidhi RAMYA    Relationship: Self    Best call back number:[phone]    Requested Prescriptions:   Requested Prescriptions     Pending Prescriptions Disp Refills   • rOPINIRole (REQUIP) 0.25 MG tablet 90 tablet 3     Sig: Take 1 tablet by mouth Every Night.   • HYDROcodone-acetaminophen (Norco) 5-325 MG per tablet 30 tablet 0     Sig: Take 1 tablet by mouth Every 6 (Six) Hours As Needed for Moderate Pain .        Pharmacy where request should be sent: [facility] AT [facility] & ([facility] [phone] [facility] [phone] FX     Additional details provided by patient: PATIENT STATES SHE CANNOT DRIVE AT NIGHT AND IS IN PAIN. WOULD LIKE TO GET THIS ASAP.     Does the patient have less than a 3 day supply:  [x] Yes  [] No    Agapito Bhakta Rep   04/06/22 15:17 EDT

## 2022-04-06 NOTE — TELEPHONE ENCOUNTER
----- Message from Abiodun Calderon MD sent at 4/5/2022  4:19 PM EDT -----  Please call the patient regarding her abnormal result.Creatinine still elevated, plenty of water, avoid NSAIDs and recheck in 3 weeks, rest of the labs are ok

## 2022-04-06 NOTE — TELEPHONE ENCOUNTER
Patient was read the message, voiced understanding & would like to speak with you () because she is still in a lot of pain & would like something called in for the pain, the neurontin is not helping at all.

## 2022-04-06 NOTE — OUTREACH NOTE
Medical Week 3 Survey    Flowsheet Row Responses   [facility] patient discharged from? [place]   Does the patient have one of the following disease processes/diagnoses(primary or secondary)? Other   Week 3 attempt successful? No   Unsuccessful attempts Attempt 1          FAREED Velez Registered Nurse

## 2022-04-07 ENCOUNTER — TELEPHONE (OUTPATIENT)
Dept: FAMILY MEDICINE CLINIC | Facility: CLINIC | Age: 72
End: 2022-04-07

## 2022-04-07 DIAGNOSIS — G62.9 NEUROPATHY: Primary | ICD-10-CM

## 2022-04-07 NOTE — TELEPHONE ENCOUNTER
Caller: Vidhi Lopez    Relationship: Self    Best call back number: [phone]    What is the best time to reach you: ANY     Who are you requesting to speak with (clinical staff, provider,  specific staff member): CLINICAL       What was the call regarding: PATIENT CALLED AND STATED SHE IS STILL IN PAIN WITH HER LEFT HIP GOING ALL THE WAY DOWN HER LEG. PATIENT STATED SHE WANTING SOME MEDICAL ADVISE IN REGARDS TO HER PAIN.     PATIENT IS OKAY WITH COMING IN IF IT IS NECCESSARY         Do you require a callback: YES

## 2022-04-07 NOTE — TELEPHONE ENCOUNTER
Spoke with patient and Dr. Young added two prescriptions and she is still in excruciating pain and upset, she needs relief from the pain, please call patient.

## 2022-04-08 ENCOUNTER — HOSPITAL ENCOUNTER (OUTPATIENT)
Dept: GENERAL RADIOLOGY | Facility: HOSPITAL | Age: 72
Discharge: HOME | End: 2022-04-08
Admitting: FAMILY MEDICINE

## 2022-04-08 DIAGNOSIS — M79.605 LEFT LEG PAIN: ICD-10-CM

## 2022-04-08 PROCEDURE — 73552 X-RAY EXAM OF FEMUR 2/>: CPT

## 2022-04-09 LAB — DRUGS UR: NORMAL

## 2022-04-11 ENCOUNTER — TELEPHONE (OUTPATIENT)
Dept: FAMILY MEDICINE CLINIC | Facility: CLINIC | Age: 72
End: 2022-04-11

## 2022-04-11 NOTE — TELEPHONE ENCOUNTER
[facility] RELAYED INFORMATION AND PATIENT STATED THAT SHE IS ALREADY AND WILL CONTACT [facility] ORTH[facility] REGARDING HER LEFT FEMUR ISSUE.  4/11/22  @ 12:27 PM    SUKUMAR     OK for CASSIE to read.     X-ray shows only arthritis of the left knee, but there is nothing on the left femur, if no better I can refer patient to an orthopedist

## 2022-04-11 NOTE — TELEPHONE ENCOUNTER
ANITHATCCLARA    OK for [facility] to read.    X-ray shows only arthritis of the left knee, but there is nothing on the left femur, if no better I can refer patient to an orthopedist

## 2022-04-12 ENCOUNTER — TELEPHONE (OUTPATIENT)
Dept: NEUROLOGY | Facility: OTHER | Age: 72
End: 2022-04-12

## 2022-04-12 ENCOUNTER — READMISSION MANAGEMENT (OUTPATIENT)
Dept: CALL CENTER | Facility: HOSPITAL | Age: 72
End: 2022-04-12

## 2022-04-12 ENCOUNTER — TELEPHONE (OUTPATIENT)
Dept: NEUROLOGY | Facility: CLINIC | Age: 72
End: 2022-04-12

## 2022-04-12 NOTE — OUTREACH NOTE
Medical Week 3 Survey    Flowsheet Row Responses   [facility] patient discharged from? [place]   Does the patient have one of the following disease processes/diagnoses(primary or secondary)? Other   Week 3 attempt successful? Yes   Call start time 0932   Call end time 0935   Discharge diagnosis Hypovolemic shock   Does the patient have a primary care provider?  Yes   Has the patient kept scheduled appointments due by today? Yes   Has home health visited the patient within 72 hours of discharge? N/A   Home health comments Still having Therapy in at [place].    Psychosocial issues? No   Did the patient receive a copy of their discharge instructions? Yes   What is the patient's perception of their health status since discharge? New symptoms unrelated to diagnosis  [Left leg pain]   Is the patient/caregiver able to teach back signs and symptoms related to disease process for when to call PCP? Yes   Is the patient/caregiver able to teach back signs and symptoms related to disease process for when to call 911? Yes   Is the patient/caregiver able to teach back the hierarchy of who to call/visit for symptoms/problems? PCP, Specialist, Home health nurse, Urgent Care, ED, 911 Yes   Week 3 Call Completed? Yes   Wrap up additional comments Call short as Pt states someone just came in. Pt reports that she is better from what she was in hosp for, however she is having issues with left leg pain and is being treated.           DEEDEE CHURCHILL - Registered Nurse

## 2022-04-12 NOTE — TELEPHONE ENCOUNTER
Caller:  NEW REFERRAL IN QUE   ATTN PRACTICE MANAGER             Who is your current provider:  [facility]     Who would you like your new provider to be:  NONE NAMED ONLY LOCATION      What are your reasons for transferring care:  NOT DISCLOSED  MESSAGE RELAYED FROM REFERRING      Additional notes: MESSAGE ALSO SENT TO [facility]OD[facility]          BONNIE

## 2022-04-18 PROBLEM — IMO0002 TYPE II DIABETES MELLITUS, UNCONTROLLED: Status: RESOLVED | Noted: 2022-03-22 | Resolved: 2022-04-18

## 2022-04-18 PROBLEM — E11.65 TYPE 2 DIABETES MELLITUS WITH HYPERGLYCEMIA (HCC): Status: ACTIVE | Noted: 2022-04-18

## 2022-04-21 ENCOUNTER — READMISSION MANAGEMENT (OUTPATIENT)
Dept: CALL CENTER | Facility: HOSPITAL | Age: 72
End: 2022-04-21

## 2022-04-21 NOTE — OUTREACH NOTE
Medical Week 4 Survey    Flowsheet Row Responses   [facility] patient discharged from? [place]   Does the patient have one of the following disease processes/diagnoses(primary or secondary)? Other   Week 4 attempt successful? Yes   Call start time 1636   Call end time 1639   Discharge diagnosis Hypovolemic shock   Meds reviewed with patient/caregiver? Yes   Is the patient having any side effects they believe may be caused by any medication additions or changes? No   Is the patient taking all medications as directed (includes completed medication regime)? Yes   Has the patient kept scheduled appointments due by today? Yes   Psychosocial issues? No   What is the patient's perception of their health status since discharge? Improving   Is the patient/caregiver able to teach back signs and symptoms related to disease process for when to call PCP? Yes   Is the patient/caregiver able to teach back signs and symptoms related to disease process for when to call 911? Yes   Is the patient/caregiver able to teach back the hierarchy of who to call/visit for symptoms/problems? PCP, Specialist, Home health nurse, Urgent Care, ED, 911 Yes   Week 4 Call Completed? Yes   Would the patient like one additional call? No   Graduated Yes   Is the patient interested in additional calls from an ambulatory ?  NOTE:  applies to high risk patients requiring additional follow-up. No   Did the patient feel the follow up calls were helpful during their recovery period? Yes   Was the number of calls appropriate? Yes   Wrap up additional comments Patient reports she is doing well . No issues or concerns.           JACOBO RAVI - Registered Nurse

## 2022-04-28 ENCOUNTER — OFFICE VISIT (OUTPATIENT)
Dept: FAMILY MEDICINE CLINIC | Facility: CLINIC | Age: 72
End: 2022-04-28

## 2022-04-28 VITALS
HEIGHT: 64 IN | SYSTOLIC BLOOD PRESSURE: 123 MMHG | WEIGHT: 173.4 LBS | BODY MASS INDEX: 29.6 KG/M2 | DIASTOLIC BLOOD PRESSURE: 77 MMHG | TEMPERATURE: 97.7 F | HEART RATE: 48 BPM | OXYGEN SATURATION: 97 % | RESPIRATION RATE: 14 BRPM

## 2022-04-28 DIAGNOSIS — M54.50 CHRONIC LOW BACK PAIN, UNSPECIFIED BACK PAIN LATERALITY, UNSPECIFIED WHETHER SCIATICA PRESENT: Primary | ICD-10-CM

## 2022-04-28 DIAGNOSIS — G89.29 CHRONIC LOW BACK PAIN, UNSPECIFIED BACK PAIN LATERALITY, UNSPECIFIED WHETHER SCIATICA PRESENT: Primary | ICD-10-CM

## 2022-04-28 DIAGNOSIS — Z79.899 HIGH RISK MEDICATION USE: ICD-10-CM

## 2022-04-28 PROCEDURE — 99213 OFFICE O/P EST LOW 20 MIN: CPT | Performed by: FAMILY MEDICINE

## 2022-04-28 RX ORDER — HYDROCODONE BITARTRATE AND ACETAMINOPHEN 7.5; 325 MG/1; MG/1
1 TABLET ORAL EVERY 6 HOURS PRN
Qty: 60 TABLET | Refills: 0 | Status: SHIPPED | OUTPATIENT
Start: 2022-04-28 | End: 2022-10-04 | Stop reason: HOSPADM

## 2022-04-28 NOTE — PROGRESS NOTES
"Chief Complaint  Sciatica (Left sided-pain radiating down the left leg x 3 wks)    Subjective          Vidhi Lopez presents to [facility]  History of Present Illness  Sciatica LLE, got Toradol injection and oral also 2 weeks ago at  , has low back pain x years, and L hip pain, does now want Sx , on Gabapentin and Hydrocodone , + dysuria this week , after lifting an air conditioner 30 years ago, BS running fine per pt needs hydrocodone refill, patient would like a higher dose  Objective   Vital Signs:   /77 (BP Location: Left arm, Patient Position: Sitting, Cuff Size: Large Adult)   Pulse (!) 48   Temp 97.7 °F (36.5 °C) (Infrared)   Resp 14   Ht 162.6 cm (64\")   Wt 78.7 kg (173 lb 6.4 oz)   SpO2 97%   BMI 29.76 kg/m²     Physical Exam  Vitals and nursing note reviewed.   Constitutional:       Appearance: She is well-developed.   HENT:      Head: Normocephalic and atraumatic.      Right Ear: External ear normal.      Left Ear: External ear normal.      Nose: Nose normal.   Eyes:      General: No scleral icterus.        Right eye: No discharge.         Left eye: No discharge.      Conjunctiva/sclera: Conjunctivae normal.      Pupils: Pupils are equal, round, and reactive to light.   Neck:      Thyroid: No thyromegaly.      Vascular: No JVD.      Trachea: No tracheal deviation.   Cardiovascular:      Rate and Rhythm: Normal rate and regular rhythm.      Heart sounds: Normal heart sounds. No murmur heard.    No friction rub. No gallop.   Pulmonary:      Effort: Pulmonary effort is normal. No respiratory distress.      Breath sounds: Normal breath sounds. No wheezing or rales.   Chest:      Chest wall: No tenderness.   Abdominal:      General: Bowel sounds are normal. There is no distension.      Palpations: Abdomen is soft. There is no mass.      Tenderness: There is no abdominal tenderness. There is left CVA tenderness. There is no right CVA tenderness, guarding or " rebound.      Hernia: No hernia is present.   Musculoskeletal:         General: No tenderness. Normal range of motion.      Cervical back: Normal range of motion and neck supple.   Lymphadenopathy:      Cervical: No cervical adenopathy.   Skin:     General: Skin is warm and dry.   Neurological:      General: No focal deficit present.      Mental Status: She is alert.      Cranial Nerves: No cranial nerve deficit.      Sensory: No sensory deficit.      Motor: No abnormal muscle tone.      Coordination: Coordination normal.      Deep Tendon Reflexes: Reflexes normal.   Psychiatric:         Behavior: Behavior normal.         Thought Content: Thought content normal.         Judgment: Judgment normal.        Result Review :                 Assessment and Plan    Diagnoses and all orders for this visit:    1. Chronic low back pain, unspecified back pain laterality, unspecified whether sciatica present (Primary)  -     Cancel: POC Urinalysis Dipstick, Automated  -     XR Spine Lumbar 2 or 3 View; Future  -     HYDROcodone-acetaminophen (Norco) 7.5-325 MG per tablet; Take 1 tablet by mouth Every 6 (Six) Hours As Needed for Moderate Pain .  Dispense: 60 tablet; Refill: 0  -     Cancel: POC Urinalysis Dipstick, Automated; Future  -     CBC & Differential; Future  -     Comprehensive Metabolic Panel; Future  -     POC Urinalysis Dipstick, Automated; Future  -     Levetiracetam Level (Keppra); Future    2. High risk medication use  -     Cancel: Comprehensive Metabolic Panel; Future  -     Cancel: CBC & Differential; Future  -     CBC & Differential; Future  -     Comprehensive Metabolic Panel; Future  -     POC Urinalysis Dipstick, Automated; Future  -     Levetiracetam Level (Keppra); Future               Follow Up   Return in about 3 months (around 7/28/2022).  Patient was given instructions and counseling regarding her condition or for health maintenance advice. Please see specific information pulled into the AVS if  appropriate.   disCussed with patient interaction between gabapentin and Norco, include but not limited to drowsiness, shortness of breath, etc.  Stop Toradol, patient is taking Plavix and has chronic kidney disease ,  no anti-inflammatory medications  Pt wants CMP bc took NSAIDs and Nephrologist took her off potassium

## 2022-05-02 DIAGNOSIS — Z79.899 HIGH RISK MEDICATION USE: ICD-10-CM

## 2022-05-02 DIAGNOSIS — G89.29 CHRONIC LOW BACK PAIN, UNSPECIFIED BACK PAIN LATERALITY, UNSPECIFIED WHETHER SCIATICA PRESENT: ICD-10-CM

## 2022-05-02 DIAGNOSIS — M54.50 CHRONIC LOW BACK PAIN, UNSPECIFIED BACK PAIN LATERALITY, UNSPECIFIED WHETHER SCIATICA PRESENT: ICD-10-CM

## 2022-05-04 DIAGNOSIS — G62.9 NEUROPATHY: ICD-10-CM

## 2022-05-04 DIAGNOSIS — M79.605 LEFT LEG PAIN: ICD-10-CM

## 2022-05-04 NOTE — TELEPHONE ENCOUNTER
PT NEEDS APPROVAL TO SWITCH PROVIDERS AND LOCATION AS SHE IS WANTING TO TRANSFER TO [place] ;    MESSAGE WAS SENT TO [facility] FOR APPROVAL ALSO .

## 2022-05-04 NOTE — TELEPHONE ENCOUNTER
Patient called the [facility] asking about a referral for transfer to [place]. Stated she had not herd anything back. Called patient and informed her that a referral had been placed. Patient states that she will just continue to see Eusebia for now.

## 2022-05-05 ENCOUNTER — TELEPHONE (OUTPATIENT)
Dept: FAMILY MEDICINE CLINIC | Facility: CLINIC | Age: 72
End: 2022-05-05

## 2022-05-05 NOTE — TELEPHONE ENCOUNTER
Patient is wanting her lab results / they are visible to me but I don't see a msg from provider  Calling for lab results ( done 5/2/22 )

## 2022-05-06 ENCOUNTER — TELEPHONE (OUTPATIENT)
Dept: FAMILY MEDICINE CLINIC | Facility: CLINIC | Age: 72
End: 2022-05-06

## 2022-05-06 LAB
ALBUMIN SERPL-MCNC: 4.1 G/DL (ref 3.7–4.7)
ALBUMIN/GLOB SERPL: 1.6 {RATIO} (ref 1.2–2.2)
ALP SERPL-CCNC: 102 IU/L (ref 44–121)
ALT SERPL-CCNC: 7 IU/L (ref 0–32)
AST SERPL-CCNC: 12 IU/L (ref 0–40)
BASOPHILS # BLD AUTO: 0.1 X10E3/UL (ref 0–0.2)
BASOPHILS NFR BLD AUTO: 1 %
BILIRUB SERPL-MCNC: <0.2 MG/DL (ref 0–1.2)
BUN SERPL-MCNC: 43 MG/DL (ref 8–27)
BUN/CREAT SERPL: 28 (ref 12–28)
CALCIUM SERPL-MCNC: 9.5 MG/DL (ref 8.7–10.3)
CHLORIDE SERPL-SCNC: 100 MMOL/L (ref 96–106)
CO2 SERPL-SCNC: 24 MMOL/L (ref 20–29)
CREAT SERPL-MCNC: 1.54 MG/DL (ref 0.57–1)
EGFRCR SERPLBLD CKD-EPI 2021: 36 ML/MIN/1.73
EOSINOPHIL # BLD AUTO: 0.4 X10E3/UL (ref 0–0.4)
EOSINOPHIL NFR BLD AUTO: 5 %
ERYTHROCYTE [DISTWIDTH] IN BLOOD BY AUTOMATED COUNT: 13.9 % (ref 11.7–15.4)
GLOBULIN SER CALC-MCNC: 2.6 G/DL (ref 1.5–4.5)
GLUCOSE SERPL-MCNC: 116 MG/DL (ref 65–99)
HCT VFR BLD AUTO: 34.2 % (ref 34–46.6)
HGB BLD-MCNC: 10.7 G/DL (ref 11.1–15.9)
IMM GRANULOCYTES # BLD AUTO: 0 X10E3/UL (ref 0–0.1)
IMM GRANULOCYTES NFR BLD AUTO: 0 %
LEVETIRACETAM SERPL-MCNC: 25.9 UG/ML (ref 10–40)
LYMPHOCYTES # BLD AUTO: 1.9 X10E3/UL (ref 0.7–3.1)
LYMPHOCYTES NFR BLD AUTO: 24 %
MCH RBC QN AUTO: 28.3 PG (ref 26.6–33)
MCHC RBC AUTO-ENTMCNC: 31.3 G/DL (ref 31.5–35.7)
MCV RBC AUTO: 91 FL (ref 79–97)
MONOCYTES # BLD AUTO: 0.6 X10E3/UL (ref 0.1–0.9)
MONOCYTES NFR BLD AUTO: 8 %
NEUTROPHILS # BLD AUTO: 5.1 X10E3/UL (ref 1.4–7)
NEUTROPHILS NFR BLD AUTO: 62 %
PLATELET # BLD AUTO: 198 X10E3/UL (ref 150–450)
POTASSIUM SERPL-SCNC: 5.4 MMOL/L (ref 3.5–5.2)
PROT SERPL-MCNC: 6.7 G/DL (ref 6–8.5)
RBC # BLD AUTO: 3.78 X10E6/UL (ref 3.77–5.28)
SODIUM SERPL-SCNC: 139 MMOL/L (ref 134–144)
WBC # BLD AUTO: 8.1 X10E3/UL (ref 3.4–10.8)

## 2022-05-06 NOTE — TELEPHONE ENCOUNTER
Left voicemail regarding her lab results from gastro. Dr Mckeon did call patient regarding her latest labs, it was not ordered by gastro. it was ordered by me, informed her potassium is elevated, will need to decrease potassium to 1 a day, and recheck in 2 weeks, also her anemia persists, Dr Mckeon would like to refer her to hematologist, rest of the labs are okay.

## 2022-05-06 NOTE — TELEPHONE ENCOUNTER
Spoke to her regarding lab results. Informed her results are in chart, not addressed yet. Please advise. Thank you.

## 2022-05-09 DIAGNOSIS — D64.9 ANEMIA, UNSPECIFIED TYPE: ICD-10-CM

## 2022-05-09 DIAGNOSIS — E87.5 HYPERKALEMIA: Primary | ICD-10-CM

## 2022-05-09 NOTE — TELEPHONE ENCOUNTER
Patient has questions about why her potassium would be elevated when she hasn't been taking her potassium chloride  since 4/18/22 when Dr. Ricks took her off of it, she has appointment on 5/11/22 with gastro & maybe they can look into her hemoglobin levels And patient is needing all her physicians to communicate

## 2022-05-11 ENCOUNTER — PREP FOR SURGERY (OUTPATIENT)
Dept: SURGERY | Facility: SURGERY CENTER | Age: 72
End: 2022-05-11

## 2022-05-11 ENCOUNTER — OFFICE VISIT (OUTPATIENT)
Dept: GASTROENTEROLOGY | Facility: CLINIC | Age: 72
End: 2022-05-11

## 2022-05-11 VITALS
DIASTOLIC BLOOD PRESSURE: 68 MMHG | TEMPERATURE: 97.8 F | HEART RATE: 69 BPM | WEIGHT: 169.8 LBS | BODY MASS INDEX: 29.15 KG/M2 | SYSTOLIC BLOOD PRESSURE: 128 MMHG | OXYGEN SATURATION: 96 %

## 2022-05-11 DIAGNOSIS — K21.9 GASTROESOPHAGEAL REFLUX DISEASE, UNSPECIFIED WHETHER ESOPHAGITIS PRESENT: Primary | ICD-10-CM

## 2022-05-11 DIAGNOSIS — K62.5 RECTAL BLEEDING: ICD-10-CM

## 2022-05-11 DIAGNOSIS — Z80.0 FAMILY HISTORY OF COLON CANCER IN FATHER: ICD-10-CM

## 2022-05-11 DIAGNOSIS — R19.7 DIARRHEA, UNSPECIFIED TYPE: Primary | Chronic | ICD-10-CM

## 2022-05-11 DIAGNOSIS — K21.9 GASTROESOPHAGEAL REFLUX DISEASE, UNSPECIFIED WHETHER ESOPHAGITIS PRESENT: Chronic | ICD-10-CM

## 2022-05-11 DIAGNOSIS — R19.7 DIARRHEA, UNSPECIFIED TYPE: ICD-10-CM

## 2022-05-11 PROCEDURE — 99214 OFFICE O/P EST MOD 30 MIN: CPT | Performed by: NURSE PRACTITIONER

## 2022-05-11 RX ORDER — SODIUM CHLORIDE 0.9 % (FLUSH) 0.9 %
10 SYRINGE (ML) INJECTION AS NEEDED
Status: CANCELLED | OUTPATIENT
Start: 2022-05-11

## 2022-05-11 RX ORDER — DULOXETIN HYDROCHLORIDE 30 MG/1
30 CAPSULE, DELAYED RELEASE ORAL DAILY
COMMUNITY

## 2022-05-11 RX ORDER — CLOPIDOGREL BISULFATE 75 MG/1
1 TABLET ORAL DAILY
COMMUNITY
Start: 2022-03-24

## 2022-05-11 RX ORDER — SODIUM CHLORIDE, SODIUM LACTATE, POTASSIUM CHLORIDE, CALCIUM CHLORIDE 600; 310; 30; 20 MG/100ML; MG/100ML; MG/100ML; MG/100ML
30 INJECTION, SOLUTION INTRAVENOUS CONTINUOUS PRN
Status: CANCELLED | OUTPATIENT
Start: 2022-05-11

## 2022-05-11 RX ORDER — LOSARTAN POTASSIUM 50 MG/1
50 TABLET ORAL DAILY
COMMUNITY
End: 2022-07-01

## 2022-05-11 RX ORDER — DICYCLOMINE HYDROCHLORIDE 10 MG/1
10 CAPSULE ORAL 4 TIMES DAILY PRN
Qty: 90 CAPSULE | Refills: 5 | Status: SHIPPED | OUTPATIENT
Start: 2022-05-11

## 2022-05-11 RX ORDER — GABAPENTIN 400 MG/1
1 CAPSULE ORAL EVERY 8 HOURS
COMMUNITY
End: 2022-09-02 | Stop reason: SDUPTHER

## 2022-05-11 RX ORDER — SODIUM CHLORIDE 0.9 % (FLUSH) 0.9 %
3 SYRINGE (ML) INJECTION EVERY 12 HOURS SCHEDULED
Status: CANCELLED | OUTPATIENT
Start: 2022-05-11

## 2022-05-11 RX ORDER — HYDROXYZINE 50 MG/1
50 TABLET, FILM COATED ORAL EVERY 8 HOURS PRN
COMMUNITY
Start: 2022-04-12 | End: 2022-05-13 | Stop reason: SDUPTHER

## 2022-05-11 NOTE — PROGRESS NOTES
No chief complaint on file.          History of Present Illness  72-year-old female presents the office today for follow-up.  She was last seen in office on 3/3/2022.  She has a history of diarrhea, C. difficile 4 years ago after antibiotic therapy, and GERD.    For worsening GERD at her last office visit, she was placed on as omeprazole 40 mg once daily and an EGD was ordered.  Since starting the esomeprazole she reports that her epigastric discomfort has greatly improved.  She no longer experiences any dysphagia.  She denies any nausea or vomiting.  We were awaiting findings from her stool studies to determine if we needed to add a colonoscopy to her procedure.  Stool studies were not performed and she was admitted to [facility] for hypovolemic shock secondary to a viral gastroenteritis with secondary hyperkalemia and hypophosphatemia.  Stool studies were negative for infectious etiology.    She describes bowel habits as irregular with episodes of constipation and episodes of profuse diarrhea.  She has begun taking daily 1/2 capful of miralax 2 days ago to promote regular BM to help manage possible rectal bleeding.  She is unsure if this is hemorrhoidal in nature.  She reports the blood is bright red and of small volume.  She denies melena.    .Review of Systems   Constitutional: Negative for fever and unexpected weight change.   HENT: Negative for trouble swallowing.    Cardiovascular: Negative for chest pain.   Gastrointestinal: Positive for anal bleeding and diarrhea. Negative for abdominal distention, abdominal pain, blood in stool, constipation, nausea, rectal pain and vomiting.      Result Review :       Office Visit with Stephanie Almanzar APRN (03/03/2022)  CT Abdomen Pelvis With Contrast (03/11/2022 17:08)    Vital Signs:   /68   Pulse 69   Temp 97.8 °F (36.6 °C)   Wt 77 kg (169 lb 12.8 oz)   SpO2 96%   BMI 29.15 kg/m²     Body mass index is 29.15 kg/m².     Physical Exam  Vitals  reviewed.   Constitutional:       Appearance: Normal appearance.   Pulmonary:      Effort: Pulmonary effort is normal. No respiratory distress.   Abdominal:      General: Abdomen is flat. Bowel sounds are normal. There is no distension.      Palpations: Abdomen is soft. There is no mass.      Tenderness: There is abdominal tenderness. There is no guarding.      Comments: Mild diffuse abdominal tenderness and on palpation   Musculoskeletal:         General: Normal range of motion.   Skin:     General: Skin is warm and dry.   Neurological:      General: No focal deficit present.      Mental Status: She is alert and oriented to person, place, and time.   Psychiatric:         Mood and Affect: Mood normal.         Thought Content: Thought content normal.         Judgment: Judgment normal.       Assessment and Plan    Diagnoses and all orders for this visit:    1. Diarrhea, unspecified type (Primary)    2. Gastroesophageal reflux disease, unspecified whether esophagitis present    3. Rectal bleeding       Documentation was completed by JANI Middleton acting as a scribe in the following sections (Assessment, Plan) on behalf of the billing provider. Amanda        Patient Instructions   1.  For GERD, continue esomeprazole 40 mg once daily.  We will plan to proceed with EGD for further evaluation as previously ordered for upper GI symptoms.    2.  Continue daily probiotic.    3.  For intermittent diarrhea, we have sent in a prescription for dicyclomine 10 mg capsules.  You may take 1 capsule up to 4 times daily as needed for diarrhea, fecal urgency, abdominal cramping.  This medication works well taken 1 hour before meals to help prevent the fecal urgency that can occur after eating.  However, you can take the medication only when needed as well.    4.  Due to persistent diarrhea and negative stool studies during her hospitalization, we will add a flexible sigmoidoscopy and plan to take random biopsies to assess  for microscopic colitis at the same time that you have your EGD.    5.  Plan for office follow-up 4 weeks after procedures to discuss results, reassess symptoms, and devise plan of care moving forward.     Discussion:    Patient to continue as omeprazole 40 mg once daily for GERD we will plan to proceed with EGD for further evaluation.    Stool studies were negative for infectious etiology during hospitalization for hypovolemic shock.  Prescription for dicyclomine was sent in for the patient today to help  management of fecal urgency, diarrhea, and abdominal discomfort.  We will plan to add a flexible sigmoidoscopy with biopsy to the patient's already scheduled EGD and plan to take random biopsies to assess for possible microscopic colitis.    We will plan for office follow-up 4 weeks after procedures to discuss results, reassess symptoms, and devise plan of care moving forward.  Patient verbalized understand above plan of care and is in agreement.  All questions answered and support provided.    EMR Dragon/Transcription Disclaimer:  This document has been Dictated utilizing Dragon dictation.

## 2022-05-11 NOTE — PATIENT INSTRUCTIONS
1.  For GERD, continue esomeprazole 40 mg once daily.  We will plan to proceed with EGD for further evaluation as previously ordered for upper GI symptoms.    2.  Continue daily probiotic.    3.  For intermittent diarrhea, we have sent in a prescription for dicyclomine 10 mg capsules.  You may take 1 capsule up to 4 times daily as needed for diarrhea, fecal urgency, abdominal cramping.  This medication works well taken 1 hour before meals to help prevent the fecal urgency that can occur after eating.  However, you can take the medication only when needed as well.    4.  Due to persistent diarrhea and negative stool studies during her hospitalization, we will add a flexible sigmoidoscopy and plan to take random biopsies to assess for microscopic colitis at the same time that you have your EGD.    5.  Plan for office follow-up 4 weeks after procedures to discuss results, reassess symptoms, and devise plan of care moving forward.

## 2022-05-13 DIAGNOSIS — G62.9 NEUROPATHY: ICD-10-CM

## 2022-05-13 DIAGNOSIS — M79.605 LEFT LEG PAIN: ICD-10-CM

## 2022-05-16 RX ORDER — HYDROXYZINE 50 MG/1
50 TABLET, FILM COATED ORAL EVERY 8 HOURS PRN
Qty: 30 TABLET | Refills: 1 | Status: SHIPPED | OUTPATIENT
Start: 2022-05-16 | End: 2022-06-09 | Stop reason: SDUPTHER

## 2022-05-16 RX ORDER — GABAPENTIN 300 MG/1
600 CAPSULE ORAL 3 TIMES DAILY
Qty: 180 CAPSULE | Refills: 0 | Status: SHIPPED | OUTPATIENT
Start: 2022-05-16 | End: 2022-07-27

## 2022-06-01 DIAGNOSIS — D64.9 ANEMIA, UNSPECIFIED TYPE: Primary | ICD-10-CM

## 2022-06-09 ENCOUNTER — OFFICE VISIT (OUTPATIENT)
Dept: FAMILY MEDICINE CLINIC | Facility: CLINIC | Age: 72
End: 2022-06-09

## 2022-06-09 VITALS
SYSTOLIC BLOOD PRESSURE: 122 MMHG | OXYGEN SATURATION: 95 % | HEART RATE: 63 BPM | DIASTOLIC BLOOD PRESSURE: 66 MMHG | HEIGHT: 64 IN | RESPIRATION RATE: 15 BRPM | WEIGHT: 172.2 LBS | TEMPERATURE: 98.4 F | BODY MASS INDEX: 29.4 KG/M2

## 2022-06-09 DIAGNOSIS — F41.9 ANXIETY: ICD-10-CM

## 2022-06-09 DIAGNOSIS — I10 HYPERTENSION, UNSPECIFIED TYPE: ICD-10-CM

## 2022-06-09 DIAGNOSIS — N39.0 URINARY TRACT INFECTION WITHOUT HEMATURIA, SITE UNSPECIFIED: Primary | ICD-10-CM

## 2022-06-09 DIAGNOSIS — E78.00 HIGH CHOLESTEROL: ICD-10-CM

## 2022-06-09 DIAGNOSIS — E11.69 TYPE 2 DIABETES MELLITUS WITH OTHER SPECIFIED COMPLICATION, WITH LONG-TERM CURRENT USE OF INSULIN: ICD-10-CM

## 2022-06-09 DIAGNOSIS — Z79.4 TYPE 2 DIABETES MELLITUS WITH OTHER SPECIFIED COMPLICATION, WITH LONG-TERM CURRENT USE OF INSULIN: ICD-10-CM

## 2022-06-09 LAB
BILIRUB BLD-MCNC: NEGATIVE MG/DL
CLARITY, POC: CLEAR
COLOR UR: YELLOW
EXPIRATION DATE: ABNORMAL
GLUCOSE UR STRIP-MCNC: NEGATIVE MG/DL
KETONES UR QL: NEGATIVE
LEUKOCYTE EST, POC: ABNORMAL
Lab: ABNORMAL
NITRITE UR-MCNC: NEGATIVE MG/ML
PH UR: 6 [PH] (ref 5–8)
PROT UR STRIP-MCNC: NEGATIVE MG/DL
RBC # UR STRIP: NEGATIVE /UL
SP GR UR: 1.01 (ref 1–1.03)
UROBILINOGEN UR QL: NORMAL

## 2022-06-09 PROCEDURE — 99214 OFFICE O/P EST MOD 30 MIN: CPT | Performed by: FAMILY MEDICINE

## 2022-06-09 PROCEDURE — 81003 URINALYSIS AUTO W/O SCOPE: CPT | Performed by: FAMILY MEDICINE

## 2022-06-09 RX ORDER — SULFAMETHOXAZOLE AND TRIMETHOPRIM 400; 80 MG/1; MG/1
1 TABLET ORAL 2 TIMES DAILY
Qty: 14 TABLET | Refills: 0 | Status: ON HOLD | OUTPATIENT
Start: 2022-06-09 | End: 2022-06-22

## 2022-06-09 RX ORDER — HYDROXYZINE 50 MG/1
50 TABLET, FILM COATED ORAL EVERY 8 HOURS PRN
Qty: 90 TABLET | Refills: 1 | Status: SHIPPED | OUTPATIENT
Start: 2022-06-09 | End: 2022-09-21 | Stop reason: SDUPTHER

## 2022-06-09 RX ORDER — ATORVASTATIN CALCIUM 80 MG/1
80 TABLET, FILM COATED ORAL DAILY
Qty: 90 TABLET | Refills: 3 | Status: SHIPPED | OUTPATIENT
Start: 2022-06-09 | End: 2022-10-04 | Stop reason: HOSPADM

## 2022-06-13 DIAGNOSIS — E11.69 TYPE 2 DIABETES MELLITUS WITH OTHER SPECIFIED COMPLICATION, WITH LONG-TERM CURRENT USE OF INSULIN: ICD-10-CM

## 2022-06-13 DIAGNOSIS — Z79.4 TYPE 2 DIABETES MELLITUS WITH OTHER SPECIFIED COMPLICATION, WITH LONG-TERM CURRENT USE OF INSULIN: ICD-10-CM

## 2022-06-13 DIAGNOSIS — E78.00 HIGH CHOLESTEROL: ICD-10-CM

## 2022-06-13 DIAGNOSIS — I10 HYPERTENSION, UNSPECIFIED TYPE: ICD-10-CM

## 2022-06-14 LAB
ALBUMIN SERPL-MCNC: 4.2 G/DL (ref 3.7–4.7)
ALBUMIN/GLOB SERPL: 1.6 {RATIO} (ref 1.2–2.2)
ALP SERPL-CCNC: 130 IU/L (ref 44–121)
ALT SERPL-CCNC: 10 IU/L (ref 0–32)
AST SERPL-CCNC: 11 IU/L (ref 0–40)
BASOPHILS # BLD AUTO: 0.1 X10E3/UL (ref 0–0.2)
BASOPHILS NFR BLD AUTO: 1 %
BILIRUB SERPL-MCNC: <0.2 MG/DL (ref 0–1.2)
BUN SERPL-MCNC: 25 MG/DL (ref 8–27)
BUN/CREAT SERPL: 16 (ref 12–28)
CALCIUM SERPL-MCNC: 9.7 MG/DL (ref 8.7–10.3)
CHLORIDE SERPL-SCNC: 100 MMOL/L (ref 96–106)
CHOLEST SERPL-MCNC: 232 MG/DL (ref 100–199)
CO2 SERPL-SCNC: 23 MMOL/L (ref 20–29)
CREAT SERPL-MCNC: 1.59 MG/DL (ref 0.57–1)
EGFRCR SERPLBLD CKD-EPI 2021: 35 ML/MIN/1.73
EOSINOPHIL # BLD AUTO: 0.4 X10E3/UL (ref 0–0.4)
EOSINOPHIL NFR BLD AUTO: 5 %
ERYTHROCYTE [DISTWIDTH] IN BLOOD BY AUTOMATED COUNT: 13.6 % (ref 11.7–15.4)
GLOBULIN SER CALC-MCNC: 2.6 G/DL (ref 1.5–4.5)
GLUCOSE SERPL-MCNC: 112 MG/DL (ref 65–99)
HBA1C MFR BLD: 6.9 % (ref 4.8–5.6)
HCT VFR BLD AUTO: 36.8 % (ref 34–46.6)
HDLC SERPL-MCNC: 38 MG/DL
HGB BLD-MCNC: 11.4 G/DL (ref 11.1–15.9)
IMM GRANULOCYTES # BLD AUTO: 0 X10E3/UL (ref 0–0.1)
IMM GRANULOCYTES NFR BLD AUTO: 0 %
LDLC SERPL CALC-MCNC: 128 MG/DL (ref 0–99)
LYMPHOCYTES # BLD AUTO: 2.2 X10E3/UL (ref 0.7–3.1)
LYMPHOCYTES NFR BLD AUTO: 25 %
MCH RBC QN AUTO: 27.4 PG (ref 26.6–33)
MCHC RBC AUTO-ENTMCNC: 31 G/DL (ref 31.5–35.7)
MCV RBC AUTO: 89 FL (ref 79–97)
MONOCYTES # BLD AUTO: 0.6 X10E3/UL (ref 0.1–0.9)
MONOCYTES NFR BLD AUTO: 7 %
NEUTROPHILS # BLD AUTO: 5.6 X10E3/UL (ref 1.4–7)
NEUTROPHILS NFR BLD AUTO: 62 %
PLATELET # BLD AUTO: 261 X10E3/UL (ref 150–450)
POTASSIUM SERPL-SCNC: 5.2 MMOL/L (ref 3.5–5.2)
PROT SERPL-MCNC: 6.8 G/DL (ref 6–8.5)
RBC # BLD AUTO: 4.16 X10E6/UL (ref 3.77–5.28)
SODIUM SERPL-SCNC: 140 MMOL/L (ref 134–144)
TRIGL SERPL-MCNC: 367 MG/DL (ref 0–149)
TSH SERPL DL<=0.005 MIU/L-ACNC: 1.92 UIU/ML (ref 0.45–4.5)
VLDLC SERPL CALC-MCNC: 66 MG/DL (ref 5–40)
WBC # BLD AUTO: 8.9 X10E3/UL (ref 3.4–10.8)

## 2022-06-16 LAB
BACTERIA UR CULT: ABNORMAL
BACTERIA UR CULT: ABNORMAL
OTHER ANTIBIOTIC SUSC ISLT: ABNORMAL

## 2022-06-17 ENCOUNTER — TELEPHONE (OUTPATIENT)
Dept: GASTROENTEROLOGY | Facility: CLINIC | Age: 72
End: 2022-06-17

## 2022-06-17 ENCOUNTER — TELEPHONE (OUTPATIENT)
Dept: FAMILY MEDICINE CLINIC | Facility: CLINIC | Age: 72
End: 2022-06-17

## 2022-06-17 ENCOUNTER — APPOINTMENT (OUTPATIENT)
Dept: LAB | Facility: HOSPITAL | Age: 72
End: 2022-06-17

## 2022-06-17 NOTE — TELEPHONE ENCOUNTER
Caller: Vidhi Lopez    Relationship: Self    Best call back number: [phone]    What is the best time to reach you: ANY     Who are you requesting to speak with (clinical staff, provider,  specific staff member): CLINICAL     Do you know the name of the person who called: PATIENT     What was the call regarding: PATIENT WOULD LIKE A CALLTO GO OVER LAB RESULTS     Do you require a callback: YES

## 2022-06-17 NOTE — TELEPHONE ENCOUNTER
Mrs. Lopez called in needs to have prep information for procedure. Mrs. Lopez can be reached at [phone].

## 2022-06-20 NOTE — TELEPHONE ENCOUNTER
ANITHATCCLARA    OK for HUB to read.    Please call the patient regarding her abnormal result.Sugar slightly elevated, creatinine elevated, avoid NSAIDS and already referred to a Nephrologist A1c went up a little continue therapy and diet/exercise,  Cholesterol specially triglycerides very high, already on highest dose of Statin, Dr Mckeon can refer her to an Endocrinologist if patient is in agreement, let Dr Mckeon know

## 2022-06-21 ENCOUNTER — ANESTHESIA EVENT (OUTPATIENT)
Dept: PERIOP | Facility: HOSPITAL | Age: 72
End: 2022-06-21

## 2022-06-22 ENCOUNTER — ANESTHESIA (OUTPATIENT)
Dept: PERIOP | Facility: HOSPITAL | Age: 72
End: 2022-06-22

## 2022-06-22 ENCOUNTER — HOSPITAL ENCOUNTER (OUTPATIENT)
Facility: HOSPITAL | Age: 72
Setting detail: HOSPITAL OUTPATIENT SURGERY
Discharge: HOME | End: 2022-06-22
Attending: INTERNAL MEDICINE | Admitting: INTERNAL MEDICINE

## 2022-06-22 VITALS
RESPIRATION RATE: 12 BRPM | TEMPERATURE: 97.9 F | WEIGHT: 172.4 LBS | DIASTOLIC BLOOD PRESSURE: 106 MMHG | HEART RATE: 58 BPM | BODY MASS INDEX: 29.59 KG/M2 | OXYGEN SATURATION: 97 % | SYSTOLIC BLOOD PRESSURE: 140 MMHG

## 2022-06-22 DIAGNOSIS — K21.9 GASTROESOPHAGEAL REFLUX DISEASE, UNSPECIFIED WHETHER ESOPHAGITIS PRESENT: ICD-10-CM

## 2022-06-22 DIAGNOSIS — Z80.0 FAMILY HISTORY OF COLON CANCER IN FATHER: ICD-10-CM

## 2022-06-22 DIAGNOSIS — R19.7 DIARRHEA, UNSPECIFIED TYPE: ICD-10-CM

## 2022-06-22 LAB — GLUCOSE BLDC GLUCOMTR-MCNC: 123 MG/DL (ref 70–130)

## 2022-06-22 PROCEDURE — 87081 CULTURE SCREEN ONLY: CPT | Performed by: INTERNAL MEDICINE

## 2022-06-22 PROCEDURE — 82962 GLUCOSE BLOOD TEST: CPT

## 2022-06-22 PROCEDURE — 25010000002 PROPOFOL 10 MG/ML EMULSION: Performed by: NURSE ANESTHETIST, CERTIFIED REGISTERED

## 2022-06-22 PROCEDURE — 43239 EGD BIOPSY SINGLE/MULTIPLE: CPT | Performed by: INTERNAL MEDICINE

## 2022-06-22 PROCEDURE — 88305 TISSUE EXAM BY PATHOLOGIST: CPT | Performed by: INTERNAL MEDICINE

## 2022-06-22 PROCEDURE — 45331 SIGMOIDOSCOPY AND BIOPSY: CPT | Performed by: INTERNAL MEDICINE

## 2022-06-22 RX ORDER — MAGNESIUM HYDROXIDE 1200 MG/15ML
LIQUID ORAL AS NEEDED
Status: DISCONTINUED | OUTPATIENT
Start: 2022-06-22 | End: 2022-06-22 | Stop reason: HOSPADM

## 2022-06-22 RX ORDER — SODIUM CHLORIDE 0.9 % (FLUSH) 0.9 %
10 SYRINGE (ML) INJECTION EVERY 12 HOURS SCHEDULED
Status: DISCONTINUED | OUTPATIENT
Start: 2022-06-22 | End: 2022-06-22 | Stop reason: HOSPADM

## 2022-06-22 RX ORDER — SODIUM CHLORIDE, SODIUM LACTATE, POTASSIUM CHLORIDE, CALCIUM CHLORIDE 600; 310; 30; 20 MG/100ML; MG/100ML; MG/100ML; MG/100ML
30 INJECTION, SOLUTION INTRAVENOUS CONTINUOUS PRN
Status: DISCONTINUED | OUTPATIENT
Start: 2022-06-22 | End: 2022-06-22 | Stop reason: HOSPADM

## 2022-06-22 RX ORDER — LIDOCAINE HYDROCHLORIDE 20 MG/ML
INJECTION, SOLUTION INFILTRATION; PERINEURAL AS NEEDED
Status: DISCONTINUED | OUTPATIENT
Start: 2022-06-22 | End: 2022-06-22 | Stop reason: SURG

## 2022-06-22 RX ORDER — PROPOFOL 10 MG/ML
VIAL (ML) INTRAVENOUS AS NEEDED
Status: DISCONTINUED | OUTPATIENT
Start: 2022-06-22 | End: 2022-06-22 | Stop reason: SURG

## 2022-06-22 RX ORDER — SODIUM CHLORIDE 9 MG/ML
40 INJECTION, SOLUTION INTRAVENOUS AS NEEDED
Status: DISCONTINUED | OUTPATIENT
Start: 2022-06-22 | End: 2022-06-22 | Stop reason: HOSPADM

## 2022-06-22 RX ORDER — SODIUM CHLORIDE 0.9 % (FLUSH) 0.9 %
3 SYRINGE (ML) INJECTION EVERY 12 HOURS SCHEDULED
Status: DISCONTINUED | OUTPATIENT
Start: 2022-06-22 | End: 2022-06-22 | Stop reason: HOSPADM

## 2022-06-22 RX ORDER — SODIUM CHLORIDE, SODIUM LACTATE, POTASSIUM CHLORIDE, CALCIUM CHLORIDE 600; 310; 30; 20 MG/100ML; MG/100ML; MG/100ML; MG/100ML
100 INJECTION, SOLUTION INTRAVENOUS CONTINUOUS
Status: DISCONTINUED | OUTPATIENT
Start: 2022-06-22 | End: 2022-06-22 | Stop reason: HOSPADM

## 2022-06-22 RX ORDER — SODIUM CHLORIDE, SODIUM LACTATE, POTASSIUM CHLORIDE, CALCIUM CHLORIDE 600; 310; 30; 20 MG/100ML; MG/100ML; MG/100ML; MG/100ML
9 INJECTION, SOLUTION INTRAVENOUS CONTINUOUS
Status: DISCONTINUED | OUTPATIENT
Start: 2022-06-22 | End: 2022-06-22 | Stop reason: HOSPADM

## 2022-06-22 RX ORDER — HYDRALAZINE HYDROCHLORIDE 25 MG/1
25 TABLET, FILM COATED ORAL 2 TIMES DAILY
COMMUNITY
End: 2022-10-04 | Stop reason: HOSPADM

## 2022-06-22 RX ORDER — SODIUM CHLORIDE 0.9 % (FLUSH) 0.9 %
10 SYRINGE (ML) INJECTION AS NEEDED
Status: DISCONTINUED | OUTPATIENT
Start: 2022-06-22 | End: 2022-06-22 | Stop reason: HOSPADM

## 2022-06-22 RX ORDER — LIDOCAINE HYDROCHLORIDE 10 MG/ML
0.5 INJECTION, SOLUTION EPIDURAL; INFILTRATION; INTRACAUDAL; PERINEURAL ONCE AS NEEDED
Status: DISCONTINUED | OUTPATIENT
Start: 2022-06-22 | End: 2022-06-22 | Stop reason: HOSPADM

## 2022-06-22 RX ORDER — ONDANSETRON 2 MG/ML
4 INJECTION INTRAMUSCULAR; INTRAVENOUS ONCE AS NEEDED
Status: DISCONTINUED | OUTPATIENT
Start: 2022-06-22 | End: 2022-06-22 | Stop reason: HOSPADM

## 2022-06-22 RX ADMIN — SODIUM CHLORIDE, POTASSIUM CHLORIDE, SODIUM LACTATE AND CALCIUM CHLORIDE: 600; 310; 30; 20 INJECTION, SOLUTION INTRAVENOUS at 11:05

## 2022-06-22 RX ADMIN — PROPOFOL 75 MG: 10 INJECTION, EMULSION INTRAVENOUS at 11:08

## 2022-06-22 RX ADMIN — PROPOFOL 25 MG: 10 INJECTION, EMULSION INTRAVENOUS at 11:13

## 2022-06-22 RX ADMIN — LIDOCAINE HYDROCHLORIDE 100 MG: 20 INJECTION, SOLUTION INFILTRATION; PERINEURAL at 11:06

## 2022-06-22 RX ADMIN — PROPOFOL 25 MG: 10 INJECTION, EMULSION INTRAVENOUS at 11:17

## 2022-06-22 NOTE — ANESTHESIA POSTPROCEDURE EVALUATION
Patient: Vidhi Lopez    Procedure Summary     Date: 06/22/22 Room / Location: [facility] ENDOSCOPY 2 /  [facility]    Anesthesia Start: 1105 Anesthesia Stop: 1126    Procedures:       ESOPHAGOGASTRODUODENOSCOPY (N/A )      SIGMOIDOSCOPY FLEXIBLE (N/A ) Diagnosis:       Gastroesophageal reflux disease, unspecified whether esophagitis present      Diarrhea, unspecified type      Family history of colon cancer in father      (Gastroesophageal reflux disease, unspecified whether esophagitis present [K21.9])      (Diarrhea, unspecified type [R19.7])      (Family history of colon cancer in father [Z80.0])    Surgeons: Sahil Viera MD Provider:     Anesthesia Type: MAC ASA Status: 3          Anesthesia Type: MAC    Vitals  Vitals Value Taken Time   /61 06/22/22 1130   Temp 97.9 °F (36.6 °C) 06/22/22 1126   Pulse 55 06/22/22 1130   Resp 10 06/22/22 1130   SpO2 95 % 06/22/22 1130           Post Anesthesia Care and Evaluation    Patient location during evaluation: PHASE II  Patient participation: complete - patient participated  Level of consciousness: awake  Pain management: adequate    Airway patency: patent  Anesthetic complications: No anesthetic complications  PONV Status: none  Cardiovascular status: acceptable  Respiratory status: acceptable  Hydration status: acceptable

## 2022-06-22 NOTE — ANESTHESIA PREPROCEDURE EVALUATION
Anesthesia Evaluation     Patient summary reviewed and Nursing notes reviewed   no history of anesthetic complications:  NPO Solid Status: > 8 hours  NPO Liquid Status: < 2 hours           Airway   Mallampati: III  Neck ROM: full  Possible difficult intubation  Dental      Comment: Broken and missing    Pulmonary - normal exam   (+) a smoker Current, COPD (no inhalers today) moderate, asthma,home oxygen (at night,exertion), sleep apnea (snores) on CPAP,   Cardiovascular   Exercise tolerance: good (4-7 METS)    ECG reviewed  PT is on anticoagulation therapy  Patient on routine beta blocker and Beta blocker not taken-may be given intraoperatively  Rhythm: regular  Rate: abnormal    (+) hypertension (no meds this am) well controlled 2 medications or greater, past MI  >12 months, CAD, cardiac stents more than 12 months ago dysrhythmias (history of bradycardia to 40's), hyperlipidemia,  carotid artery disease (stenosis, left stent) carotid bilateral    ROS comment: Status: (Other)  HEART RATE= 57  bpm  RR Interval= 1052  ms  AZ Interval= 204  ms  P Horizontal Axis= 23  deg  P Front Axis= 45  deg  QRSD Interval= 96  ms  QT Interval= 453  ms  QRS Axis= 41  deg  T Wave Axis= 57  deg  - NORMAL ECG -  Sinus rhythm  NO SIGNIFICANT CHANGE FROM PREVIOUS ECG  Electronically Signed By: Mark Barry ([facility]) 13-Mar-2022 12:29:07  Date and Time of Study: 2022-03-13 07:25:27        PE comment: HR 50's    Neuro/Psych  (+) seizures (associated with sepsis due to gout, no meds) well controlled, CVA (decreased upper body strength), headaches (migraines), psychiatric history Anxiety and Depression,    GI/Hepatic/Renal/Endo    (+)  GERD,  renal disease (interstitial cystitis) CRI, diabetes mellitus (diet control, ) well controlled,   Thyroid problem: nodules.    ROS Comment: gout    Musculoskeletal     (+) back pain, neck pain,   Radiculopathy: history of sciatica   Abdominal  - normal exam   Substance History   Alcohol use:  occ.     OB/GYN negative ob/gyn ROS         Other   arthritis (hips, knees-injection by rheumatologist), autoimmune disease rheumatoid arthritis,      Blood dyscrasia: bruises easily.  ROS/Med Hx Other: Water 0830  NO plavix for 5 days                Anesthesia Plan    ASA 3     MAC     intravenous induction     Anesthetic plan, risks, benefits, and alternatives have been provided, discussed and informed consent has been obtained with: patient and child.  Use of blood products discussed with patient and child  Consented to blood products.       CODE STATUS:

## 2022-06-23 LAB — UREASE TISS QL: NEGATIVE

## 2022-06-24 LAB
LAB AP CASE REPORT: NORMAL
PATH REPORT.FINAL DX SPEC: NORMAL
PATH REPORT.GROSS SPEC: NORMAL

## 2022-07-01 ENCOUNTER — OFFICE VISIT (OUTPATIENT)
Dept: ENDOCRINOLOGY | Age: 72
End: 2022-07-01

## 2022-07-01 VITALS
SYSTOLIC BLOOD PRESSURE: 110 MMHG | HEART RATE: 58 BPM | BODY MASS INDEX: 29.6 KG/M2 | HEIGHT: 64 IN | WEIGHT: 173.4 LBS | TEMPERATURE: 99.1 F | OXYGEN SATURATION: 93 % | DIASTOLIC BLOOD PRESSURE: 52 MMHG

## 2022-07-01 DIAGNOSIS — I10 ESSENTIAL HYPERTENSION, BENIGN: ICD-10-CM

## 2022-07-01 DIAGNOSIS — E78.00 HYPERCHOLESTEROLEMIA: ICD-10-CM

## 2022-07-01 DIAGNOSIS — E11.29 TYPE 2 DIABETES MELLITUS WITH OTHER KIDNEY COMPLICATION, UNSPECIFIED WHETHER LONG TERM INSULIN USE: ICD-10-CM

## 2022-07-01 DIAGNOSIS — E11.49 TYPE II DIABETES MELLITUS WITH NEUROLOGICAL MANIFESTATIONS: ICD-10-CM

## 2022-07-01 DIAGNOSIS — D64.9 ANEMIA, UNSPECIFIED TYPE: ICD-10-CM

## 2022-07-01 DIAGNOSIS — E11.65 TYPE 2 DIABETES MELLITUS WITH HYPERGLYCEMIA, UNSPECIFIED WHETHER LONG TERM INSULIN USE: Primary | ICD-10-CM

## 2022-07-01 DIAGNOSIS — N18.30 CRI (CHRONIC RENAL INSUFFICIENCY), STAGE 3 (MODERATE): ICD-10-CM

## 2022-07-01 DIAGNOSIS — E11.59 TYPE 2 DIABETES MELLITUS WITH VASCULAR DISEASE: ICD-10-CM

## 2022-07-01 PROBLEM — Z79.899 HIGH RISK MEDICATION USE: Status: RESOLVED | Noted: 2022-04-04 | Resolved: 2022-07-01

## 2022-07-01 PROBLEM — G62.9 NEUROPATHY: Status: RESOLVED | Noted: 2022-04-04 | Resolved: 2022-07-01

## 2022-07-01 PROBLEM — G25.81 RESTLESS LEG SYNDROME: Status: RESOLVED | Noted: 2019-10-28 | Resolved: 2022-07-01

## 2022-07-01 PROBLEM — Z80.0 FAMILY HISTORY OF COLON CANCER IN FATHER: Status: RESOLVED | Noted: 2022-05-11 | Resolved: 2022-07-01

## 2022-07-01 PROBLEM — E11.9 DIABETES MELLITUS (HCC): Status: RESOLVED | Noted: 2022-04-04 | Resolved: 2022-07-01

## 2022-07-01 PROBLEM — M79.605 LEFT LEG PAIN: Status: RESOLVED | Noted: 2022-04-04 | Resolved: 2022-07-01

## 2022-07-01 PROBLEM — N17.9 AKI (ACUTE KIDNEY INJURY) (HCC): Status: RESOLVED | Noted: 2022-04-04 | Resolved: 2022-07-01

## 2022-07-01 PROBLEM — M19.90 OSTEOARTHRITIS: Status: RESOLVED | Noted: 2020-10-01 | Resolved: 2022-07-01

## 2022-07-01 PROBLEM — I63.9 CEREBROVASCULAR ACCIDENT (CVA): Status: RESOLVED | Noted: 2019-02-04 | Resolved: 2022-07-01

## 2022-07-01 PROBLEM — R19.7 DIARRHEA: Status: RESOLVED | Noted: 2022-05-11 | Resolved: 2022-07-01

## 2022-07-01 LAB — GLUCOSE BLDC GLUCOMTR-MCNC: 138 MG/DL (ref 70–130)

## 2022-07-01 PROCEDURE — 82962 GLUCOSE BLOOD TEST: CPT | Performed by: INTERNAL MEDICINE

## 2022-07-01 PROCEDURE — 99204 OFFICE O/P NEW MOD 45 MIN: CPT | Performed by: INTERNAL MEDICINE

## 2022-07-01 RX ORDER — MECLIZINE HCL 25MG 25 MG/1
25 TABLET, CHEWABLE ORAL 3 TIMES DAILY PRN
COMMUNITY

## 2022-07-01 NOTE — PATIENT INSTRUCTIONS
As discussed related to prior anemia and renal status overall control is not certain at times with standard A1c.  Anemia has resolved so current labs are good with A1c at goal.  For now does not appear any directed treatment is indicated.  Goal A1c for your situation is <8% aggressive <7% and as you are already at that level no treatment needed at this time.  If medication is needed again with renal status and other complications insulin would be the primary medication to resume.

## 2022-07-18 ENCOUNTER — APPOINTMENT (OUTPATIENT)
Dept: LAB | Facility: HOSPITAL | Age: 72
End: 2022-07-18

## 2022-07-26 DIAGNOSIS — G62.9 NEUROPATHY: ICD-10-CM

## 2022-07-26 DIAGNOSIS — M79.605 LEFT LEG PAIN: ICD-10-CM

## 2022-07-27 RX ORDER — GABAPENTIN 300 MG/1
CAPSULE ORAL
Qty: 180 CAPSULE | Refills: 0 | Status: SHIPPED | OUTPATIENT
Start: 2022-07-27 | End: 2022-08-30

## 2022-07-27 NOTE — TELEPHONE ENCOUNTER
Is gabapentin okay to refill? Please advise.    Rx Refill Note  Requested Prescriptions     Pending Prescriptions Disp Refills   • gabapentin (NEURONTIN) 300 MG capsule [Pharmacy Med Name: GABAPENTIN 300 MG CAPSULE] 180 capsule      Sig: TAKE TWO CAPSULES BY MOUTH THREE TIMES A DAY      Last office visit with prescribing clinician: 6/9/2022      Next office visit with prescribing clinician: Visit date not found            Helder Perry MA/LMR  07/27/22, 07:02 EDT

## 2022-07-28 ENCOUNTER — TELEPHONE (OUTPATIENT)
Dept: FAMILY MEDICINE CLINIC | Facility: CLINIC | Age: 72
End: 2022-07-28

## 2022-07-28 DIAGNOSIS — R26.89 BALANCE DISORDER: Primary | ICD-10-CM

## 2022-07-28 NOTE — TELEPHONE ENCOUNTER
Gretel from [facility] was calling to ask Dr. Mckeon if they could restart out patient physical therapy. The patient was discharged from them so she could get other medical care for her back and neck problems. They are wanting to restart the PT because the patient is still complaining of neck and back pain. She is also have balance issues. You can call her back at [phone].

## 2022-08-04 ENCOUNTER — APPOINTMENT (OUTPATIENT)
Dept: LAB | Facility: HOSPITAL | Age: 72
End: 2022-08-04

## 2022-08-04 ENCOUNTER — TELEPHONE (OUTPATIENT)
Dept: ONCOLOGY | Facility: CLINIC | Age: 72
End: 2022-08-04

## 2022-08-24 RX ORDER — LOSARTAN POTASSIUM 50 MG/1
50 TABLET ORAL 2 TIMES DAILY
Qty: 60 TABLET | Refills: 0 | Status: SHIPPED | OUTPATIENT
Start: 2022-08-24 | End: 2022-09-26

## 2022-08-24 NOTE — TELEPHONE ENCOUNTER
Caller: Vidhi Lopez    Relationship: Self    Best call back number:[phone]        Requested Prescriptions:   Requested Prescriptions     Pending Prescriptions Disp Refills   • losartan (COZAAR) 50 MG tablet 60 tablet 0     Sig: Take 1 tablet by mouth 2 (Two) Times a Day.        Pharmacy where request should be sent: [facility] [address] [place] [address] RD AT [facility] & ([facility] [phone] [facility] [phone] FX     Additional details provided by patient: PATIENT IS OUT OF THIS MEDICATION     Does the patient have less than a 3 day supply:  [x] Yes  [] No    Agapito Oropeza Rep   08/24/22 14:00 EDT

## 2022-08-26 ENCOUNTER — OFFICE VISIT (OUTPATIENT)
Dept: FAMILY MEDICINE CLINIC | Facility: CLINIC | Age: 72
End: 2022-08-26

## 2022-08-26 VITALS
HEART RATE: 71 BPM | WEIGHT: 169.2 LBS | DIASTOLIC BLOOD PRESSURE: 66 MMHG | HEIGHT: 64 IN | OXYGEN SATURATION: 95 % | BODY MASS INDEX: 28.89 KG/M2 | SYSTOLIC BLOOD PRESSURE: 127 MMHG | RESPIRATION RATE: 17 BRPM | TEMPERATURE: 97.3 F

## 2022-08-26 DIAGNOSIS — R07.9 CHEST PAIN, UNSPECIFIED TYPE: ICD-10-CM

## 2022-08-26 DIAGNOSIS — Z12.31 ENCOUNTER FOR SCREENING MAMMOGRAM FOR MALIGNANT NEOPLASM OF BREAST: ICD-10-CM

## 2022-08-26 DIAGNOSIS — H61.20 CERUMEN DEBRIS ON TYMPANIC MEMBRANE, UNSPECIFIED LATERALITY: ICD-10-CM

## 2022-08-26 DIAGNOSIS — R07.2 PRECORDIAL PAIN: ICD-10-CM

## 2022-08-26 DIAGNOSIS — Z00.00 MEDICARE ANNUAL WELLNESS VISIT, SUBSEQUENT: Primary | ICD-10-CM

## 2022-08-26 DIAGNOSIS — R30.0 DYSURIA: ICD-10-CM

## 2022-08-26 DIAGNOSIS — F32.A DEPRESSION, UNSPECIFIED DEPRESSION TYPE: ICD-10-CM

## 2022-08-26 DIAGNOSIS — Z78.0 POST-MENOPAUSAL: ICD-10-CM

## 2022-08-26 DIAGNOSIS — Z00.00 WELLNESS EXAMINATION: ICD-10-CM

## 2022-08-26 DIAGNOSIS — F51.01 PRIMARY INSOMNIA: ICD-10-CM

## 2022-08-26 LAB
BILIRUB BLD-MCNC: NEGATIVE MG/DL
CLARITY, POC: CLEAR
COLOR UR: YELLOW
EXPIRATION DATE: ABNORMAL
GLUCOSE UR STRIP-MCNC: NEGATIVE MG/DL
KETONES UR QL: NEGATIVE
LEUKOCYTE EST, POC: ABNORMAL
Lab: ABNORMAL
NITRITE UR-MCNC: NEGATIVE MG/ML
PH UR: 6 [PH] (ref 5–8)
PROT UR STRIP-MCNC: NEGATIVE MG/DL
RBC # UR STRIP: NEGATIVE /UL
SP GR UR: 1.02 (ref 1–1.03)
UROBILINOGEN UR QL: NORMAL

## 2022-08-26 PROCEDURE — 93000 ELECTROCARDIOGRAM COMPLETE: CPT | Performed by: FAMILY MEDICINE

## 2022-08-26 PROCEDURE — 71046 X-RAY EXAM CHEST 2 VIEWS: CPT | Performed by: FAMILY MEDICINE

## 2022-08-26 PROCEDURE — 99214 OFFICE O/P EST MOD 30 MIN: CPT | Performed by: FAMILY MEDICINE

## 2022-08-26 PROCEDURE — G0439 PPPS, SUBSEQ VISIT: HCPCS | Performed by: FAMILY MEDICINE

## 2022-08-26 PROCEDURE — 1170F FXNL STATUS ASSESSED: CPT | Performed by: FAMILY MEDICINE

## 2022-08-26 PROCEDURE — 81003 URINALYSIS AUTO W/O SCOPE: CPT | Performed by: FAMILY MEDICINE

## 2022-08-26 PROCEDURE — 0052A COVID-19 (PFIZER) 12+ YRS: CPT | Performed by: FAMILY MEDICINE

## 2022-08-26 PROCEDURE — 1159F MED LIST DOCD IN RCRD: CPT | Performed by: FAMILY MEDICINE

## 2022-08-26 PROCEDURE — 91305 COVID-19 (PFIZER) 12+ YRS: CPT | Performed by: FAMILY MEDICINE

## 2022-08-26 RX ORDER — SULFAMETHOXAZOLE AND TRIMETHOPRIM 800; 160 MG/1; MG/1
1 TABLET ORAL 2 TIMES DAILY
Qty: 14 TABLET | Refills: 0 | Status: SHIPPED | OUTPATIENT
Start: 2022-08-26 | End: 2022-10-04 | Stop reason: HOSPADM

## 2022-08-26 RX ORDER — TRAZODONE HYDROCHLORIDE 100 MG/1
100 TABLET ORAL NIGHTLY
Qty: 90 TABLET | Refills: 3 | Status: SHIPPED | OUTPATIENT
Start: 2022-08-26

## 2022-08-26 NOTE — PROGRESS NOTES
"Chief Complaint  Urinary Tract Infection (Urgency, burning sensation, odor x 2 wks)    Subjective        Vidhi Lopez presents to [facility]  History of Present Illness  Burning with urination x 2 weeks, and urgency, sinuses pressure, occasional CP mid sternal , on/off, Mom with MI , non smoker.procdoc  Pain on L mid back to L rib cage area x a month, no cough x a month  Ear Cerumen Removal    Date/Time: 8/26/2022 1:40 PM  Performed by: Abiodun Vila MD  Authorized by: Abiodun Vila MD   Location details: left ear and right ear  Comments: Instrumentation done  Procedure type: instrumentation, irrigation   Sedation:  Patient sedated: no        ECG 12 Lead    Date/Time: 8/26/2022 1:40 PM  Performed by: Abiodun Vila MD  Authorized by: Abiodun Vila MD   Comparison: compared with previous ECG from 3/9/2022  Rhythm: sinus rhythm        ECG 12 Lead    Date/Time: 8/26/2022 2:06 PM  Performed by: Abiodun Vila MD  Authorized by: Abiodun Vila MD   Comparison: compared with previous ECG from 3/10/2022  Rhythm: sinus rhythm  Rate: normal  Conduction: conduction normal  ST Segments: ST segments normal  T Waves: T waves normal  QRS axis: normal  Other: no other findings    Clinical impression: normal ECG          Objective   Vital Signs:  /66 (BP Location: Left arm, Patient Position: Sitting, Cuff Size: Large Adult)   Pulse 71   Temp 97.3 °F (36.3 °C) (Infrared)   Resp 17   Ht 162.6 cm (64\")   Wt 76.7 kg (169 lb 3.2 oz)   SpO2 95%   BMI 29.04 kg/m²   Estimated body mass index is 29.04 kg/m² as calculated from the following:    Height as of this encounter: 162.6 cm (64\").    Weight as of this encounter: 76.7 kg (169 lb 3.2 oz).          Physical Exam  Vitals and nursing note reviewed.   Constitutional:       General: She is not in acute distress.     Appearance: Normal appearance. She is well-developed. She is not " ill-appearing, toxic-appearing or diaphoretic.   HENT:      Head: Normocephalic and atraumatic.      Right Ear: External ear normal. There is impacted cerumen.      Left Ear: External ear normal. There is impacted cerumen.      Nose: Nose normal. No congestion or rhinorrhea.      Mouth/Throat:      Mouth: Mucous membranes are moist.      Pharynx: Oropharynx is clear. No oropharyngeal exudate or posterior oropharyngeal erythema.   Eyes:      General: No scleral icterus.        Right eye: No discharge.         Left eye: No discharge.      Extraocular Movements: Extraocular movements intact.      Conjunctiva/sclera: Conjunctivae normal.      Pupils: Pupils are equal, round, and reactive to light.   Neck:      Thyroid: No thyromegaly.      Vascular: No carotid bruit or JVD.      Trachea: No tracheal deviation.   Cardiovascular:      Rate and Rhythm: Normal rate and regular rhythm.      Heart sounds: Normal heart sounds. No murmur heard.    No friction rub. No gallop.   Pulmonary:      Effort: Pulmonary effort is normal. No respiratory distress.      Breath sounds: Normal breath sounds. No stridor. No wheezing, rhonchi or rales.   Chest:      Chest wall: No tenderness.   Abdominal:      General: Bowel sounds are normal. There is no distension.      Palpations: Abdomen is soft. There is no mass.      Tenderness: There is no abdominal tenderness. There is no right CVA tenderness, left CVA tenderness, guarding or rebound.      Hernia: No hernia is present.   Musculoskeletal:         General: Normal range of motion.      Cervical back: Normal range of motion and neck supple. No rigidity or tenderness.      Right lower leg: No edema.      Left lower leg: No edema.   Lymphadenopathy:      Cervical: No cervical adenopathy.   Skin:     General: Skin is warm and dry.      Coloration: Skin is not jaundiced.   Neurological:      General: No focal deficit present.      Mental Status: She is alert and oriented to person, place, and  time. Mental status is at baseline.      Sensory: No sensory deficit.      Motor: No weakness or abnormal muscle tone.      Coordination: Coordination normal.      Gait: Gait normal.      Deep Tendon Reflexes: Reflexes normal.   Psychiatric:         Mood and Affect: Mood normal.         Behavior: Behavior normal.         Thought Content: Thought content normal.         Judgment: Judgment normal.          Result Review :                Assessment and Plan   Diagnoses and all orders for this visit:    1. Dysuria (Primary)  -     POC Urinalysis Dipstick, Automated  -     Cancel: Urine Culture - Urine, Urine, Clean Catch  -     sulfamethoxazole-trimethoprim (Bactrim DS) 800-160 MG per tablet; Take 1 tablet by mouth 2 (Two) Times a Day.  Dispense: 14 tablet; Refill: 0  -     Urine Culture - Urine, Urine, Clean Catch    2. Precordial pain  -     XR Chest PA & Lateral  -     ECG 12 Lead  -     ECG 12 Lead    3. Cerumen debris on tympanic membrane, unspecified laterality  Comments:  to see her ENT  Orders:  -     Cerumen Removal    4. Chest pain, unspecified type  -     CT Chest Without Contrast; Future    Other orders  -     Cancel: ECG 12 Lead    pt declined cerumen removal  A Chest X-Ray was ordered. My reading of this film is negative (No comparison films available: pending review by Radiologist.)           Follow Up   No follow-ups on file.  Patient was given instructions and counseling regarding her condition or for health maintenance advice. Please see specific information pulled into the AVS if appropriate.     CrCl 39

## 2022-08-26 NOTE — PROGRESS NOTES
The ABCs of the Annual Wellness Visit  Subsequent Medicare Wellness Visit    Chief Complaint   Patient presents with   • Urinary Tract Infection     Urgency, burning sensation, odor x 2 wks      Subjective    History of Present Illness:  Vidhi Lopez is a 72 y.o. female who presents for a Subsequent Medicare Wellness Visit.    The following portions of the patient's history were reviewed and   updated as appropriate: allergies, current medications, past family history, past medical history, past social history, past surgical history and problem list.    Compared to one year ago, the patient feels her physical   health is better.    Compared to one year ago, the patient feels her mental   health is better.    Recent Hospitalizations:  This patient has had a [facility] admission record on file within the last 365 days.    Current Medical Providers:  Patient Care Team:  Abiodun Vila MD as PCP - General (Family Medicine)  Lito Flores MD as Consulting Physician (Cardiology)  Jacky Hernandez MD as Consulting Physician (Infectious Diseases)  Stephanie Almanzar APRN as Nurse Practitioner (Gastroenterology)  Smiley Zarco MD PhD as Consulting Physician (Hematology and Oncology)  Abiodun Vila MD as Referring Physician (Family Medicine)  Jose De Jesus Page MD as Consulting Physician (Hematology and Oncology)    Outpatient Medications Prior to Visit   Medication Sig Dispense Refill   • albuterol sulfate  (90 Base) MCG/ACT inhaler Inhale 2 puffs Every 4 (Four) Hours As Needed for Wheezing. for wheezing 8.5 g 1   • aspirin 81 MG EC tablet Take 81 mg by mouth Daily.     • atorvastatin (LIPITOR) 80 MG tablet Take 1 tablet by mouth Daily. 90 tablet 3   • clopidogrel (PLAVIX) 75 MG tablet Take 1 tablet by mouth Daily.     • Diclofenac Sodium (VOLTAREN) 1 % gel gel Apply  topically to the appropriate area as directed.     • dicyclomine (BENTYL) 10 MG capsule Take 1 capsule by mouth 4  (Four) Times a Day As Needed (abdominal pain/diarrhea, and fecal urgency). 90 capsule 5   • DULoxetine (CYMBALTA) 30 MG capsule Take 30 mg by mouth Daily.     • esomeprazole (nexIUM) 40 MG capsule Take 1 capsule by mouth Every Morning Before Breakfast. 90 capsule 3   • ferrous sulfate 325 (65 FE) MG tablet TK 1 T PO  QD     • furosemide (LASIX) 40 MG tablet TAKE 1 TABLET DAILY and ON M/W/F TAKE BID 60 tablet 1   • gabapentin (NEURONTIN) 300 MG capsule TAKE TWO CAPSULES BY MOUTH THREE TIMES A  capsule 0   • gabapentin (NEURONTIN) 400 MG capsule Take 1 capsule by mouth Every 8 (Eight) Hours.     • hydrALAZINE (APRESOLINE) 25 MG tablet Take 25 mg by mouth 2 (Two) Times a Day.     • HYDROcodone-acetaminophen (Norco) 7.5-325 MG per tablet Take 1 tablet by mouth Every 6 (Six) Hours As Needed for Moderate Pain . 60 tablet 0   • hydrOXYzine (ATARAX) 50 MG tablet Take 1 tablet by mouth Every 8 (Eight) Hours As Needed for Anxiety. 90 tablet 1   • Insulin Lispro, 1 Unit Dial, (HUMALOG) 100 UNIT/ML solution pen-injector Inject 10 Units under the skin into the appropriate area as directed 3 (Three) Times a Day Before Meals.     • ipratropium-albuterol (DUO-NEB) 0.5-2.5 mg/3 ml nebulizer Take 3 mL by nebulization 4 (Four) Times a Day. 360 mL 3   • levETIRAcetam (KEPPRA) 500 MG tablet Take 1 tablet by mouth 2 (Two) Times a Day. 180 tablet 3   • losartan (COZAAR) 50 MG tablet Take 1 tablet by mouth 2 (Two) Times a Day. 60 tablet 0   • meclizine 25 MG chewable tablet chewable tablet Chew 25 mg 3 (Three) Times a Day As Needed. PRN     • metoprolol tartrate (LOPRESSOR) 25 MG tablet TAKE 1 TABLET BY MOUTH TWICE DAILY 60 tablet 1   • nitroglycerin (NITROSTAT) 0.4 MG SL tablet Place 0.4 mg under the tongue Every 5 (Five) Minutes As Needed.  0   • nystatin (nystatin) 889473 UNIT/GM powder Apply  topically to the appropriate area as directed See Admin Instructions. Apply topically to the affected area twice daily as directed 30 g 1    • ondansetron (Zofran) 4 MG tablet Take 1 tablet by mouth Every 8 (Eight) Hours As Needed for Nausea or Vomiting. 60 tablet 1   • Probiotic Product (PROBIOTIC DAILY PO) Take 1 tablet by mouth Daily.     • promethazine (PHENERGAN) 25 MG tablet Take 25 mg by mouth Every 6 (Six) Hours As Needed.     • rOPINIRole (REQUIP) 0.25 MG tablet Take 1 tablet by mouth Every Night. 90 tablet 3   • tiZANidine (ZANAFLEX) 4 MG tablet Take 1 tablet by mouth Every 8 (Eight) Hours As Needed for Muscle Spasms. 30 tablet 1   • traZODone (DESYREL) 100 MG tablet TAKE 1 TABLET BY MOUTH EVERY NIGHT 90 tablet 3     No facility-administered medications prior to visit.       Opioid medication/s are on active medication list.  and I have evaluated her active treatment plan and pain score trends (see table).  There were no vitals filed for this visit.  I have reviewed the chart for potential of high risk medication and harmful drug interactions in the elderly.            Aspirin is on active medication list. Aspirin use is indicated based on review of current medical condition/s. Pros and cons of this therapy have been discussed today. Benefits of this medication outweigh potential harm.  Patient has been encouraged to continue taking this medication.  .      Patient Active Problem List   Diagnosis   • Anxiety (Chronic benzos)   • Insomnia   • GERD (gastroesophageal reflux disease)   • Fibromyalgia   • RLS (restless legs syndrome)   • Migraines   • COPD (chronic obstructive pulmonary disease) ([facility])   • CAD (coronary artery disease)   • Bilateral carotid artery stenosis   • URIEL (obstructive sleep apnea)   • Chronic pain (Chronic narcotics)   • Recurrent UTI/interstitial cystitis on chronic methenamine   • Demand ischemia ([facility])   • Hypoxemia requiring supplemental oxygen   • Depression   • Edema   • Seizure disorder ([facility])   • Lumbar compression fracture ([facility])   • Anemia   • Balance problem   • Hypercalcemia   • Bilateral inguinal hernia, without  "obstruction or gangrene, not specified as recurrent   • Rheumatoid arthritis (HCC)   • 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  ACP discussion was held with the patient during this visit. Patient does not have an advance directive, information provided.          Objective    Vitals:    08/26/22 1328   BP: 127/66   BP Location: Left arm   Patient Position: Sitting   Cuff Size: Large Adult   Pulse: 71   Resp: 17   Temp: 97.3 °F (36.3 °C)   TempSrc: Infrared   SpO2: 95%   Weight: 76.7 kg (169 lb 3.2 oz)   Height: 162.6 cm (64\")     Estimated body mass index is 29.04 kg/m² as calculated from the following:    Height as of this encounter: 162.6 cm (64\").    Weight as of this encounter: 76.7 kg (169 lb 3.2 oz).    BMI is >= 25 and <30. (Overweight) The following options were offered after discussion;: exercise counseling/recommendations      Does the patient have evidence of cognitive impairment? No    Physical Exam  Vitals and nursing note reviewed.   Constitutional:       General: She is not in acute distress.     Appearance: Normal appearance. She is well-developed. She is not ill-appearing, toxic-appearing or diaphoretic.   HENT:      Head: Normocephalic and atraumatic.      Right Ear: Tympanic membrane, ear canal and external ear normal. There is no impacted cerumen.      Left Ear: Tympanic membrane, ear canal and external ear normal. There is no impacted cerumen.      Nose: Nose normal. No congestion or " rhinorrhea.      Mouth/Throat:      Mouth: Mucous membranes are moist.      Pharynx: Oropharynx is clear. No oropharyngeal exudate or posterior oropharyngeal erythema.   Eyes:      General: No scleral icterus.        Right eye: No discharge.         Left eye: No discharge.      Extraocular Movements: Extraocular movements intact.      Conjunctiva/sclera: Conjunctivae normal.      Pupils: Pupils are equal, round, and reactive to light.   Neck:      Thyroid: No thyromegaly.      Vascular: No carotid bruit or JVD.      Trachea: No tracheal deviation.   Cardiovascular:      Rate and Rhythm: Normal rate and regular rhythm.      Heart sounds: Normal heart sounds. No murmur heard.    No friction rub. No gallop.   Pulmonary:      Effort: Pulmonary effort is normal. No respiratory distress.      Breath sounds: Normal breath sounds. No stridor. No wheezing, rhonchi or rales.   Chest:      Chest wall: No tenderness.   Abdominal:      General: Bowel sounds are normal. There is no distension.      Palpations: Abdomen is soft. There is no mass.      Tenderness: There is no abdominal tenderness. There is no right CVA tenderness, left CVA tenderness, guarding or rebound.      Hernia: No hernia is present.   Musculoskeletal:         General: Normal range of motion.      Cervical back: Normal range of motion and neck supple. No rigidity or tenderness.      Right lower leg: No edema.      Left lower leg: No edema.      Comments: Second toe over first on le side    Lymphadenopathy:      Cervical: No cervical adenopathy.   Skin:     General: Skin is warm and dry.      Coloration: Skin is not jaundiced.   Neurological:      General: No focal deficit present.      Mental Status: She is alert and oriented to person, place, and time. Mental status is at baseline.      Sensory: No sensory deficit.      Motor: No weakness or abnormal muscle tone.      Coordination: Coordination normal.      Gait: Gait normal.      Deep Tendon Reflexes:  Reflexes normal.      Comments: decreased monofilamnet on BLEs   Psychiatric:         Mood and Affect: Mood normal.         Behavior: Behavior normal.         Thought Content: Thought content normal.         Judgment: Judgment normal.       Lab Results   Component Value Date    CHLPL 232 (H) 2022    TRIG 367 (H) 2022    HDL 38 (L) 2022     (H) 2022    VLDL 66 (H) 2022    HGBA1C 6.9 (H) 2022            HEALTH RISK ASSESSMENT    Smoking Status:  Social History     Tobacco Use   Smoking Status Current Some Day Smoker   • Packs/day: 0.25   • Years: 40.00   • Pack years: 10.00   • Types: Cigarettes   • Last attempt to quit:    • Years since quittin.6   Smokeless Tobacco Never Used   Tobacco Comment    in process of quiting--AVERAGE 1 PPD, DOWN TO 6 CIG PER DAY X2 WEEKS      Alcohol Consumption:  Social History     Substance and Sexual Activity   Alcohol Use Not Currently   • Alcohol/week: 1.0 standard drink   • Types: 1 Glasses of wine per week    Comment: occasional     Fall Risk Screen:    [facility] Fall Risk Assessment has not been completed.    Depression Screening:  PHQ-2/PHQ-9 Depression Screening 2022   Retired PHQ-9 Total Score -   Retired Total Score -   Little Interest or Pleasure in Doing Things 0-->not at all   Feeling Down, Depressed or Hopeless 0-->not at all   PHQ-9: Brief Depression Severity Measure Score 0       Health Habits and Functional and Cognitive Screening:  Functional & Cognitive Status 2021   Do you have difficulty preparing food and eating? No   Do you have difficulty bathing yourself, getting dressed or grooming yourself? No   Do you have difficulty using the toilet? No   Do you have difficulty moving around from place to place? No   Do you have trouble with steps or getting out of a bed or a chair? No   Current Diet Well Balanced Diet   Dental Exam Not up to date   Eye Exam Not up to date   Exercise (times per week) 4 times per week    Current Exercises Include Aerobics   Current Exercise Activities Include -   Do you need help using the phone?  No   Are you deaf or do you have serious difficulty hearing?  No   Do you need help with transportation? No   Do you need help shopping? No   Do you need help preparing meals?  No   Do you need help with housework?  No   Do you need help with laundry? No   Do you need help taking your medications? No   Do you need help managing money? No   Do you ever drive or ride in a car without wearing a seat belt? No   Have you felt unusual stress, anger or loneliness in the last month? No   Who do you live with? Alone   If you need help, do you have trouble finding someone available to you? No   Have you been bothered in the last four weeks by sexual problems? No   Do you have difficulty concentrating, remembering or making decisions? No       Age-appropriate Screening Schedule:  Refer to the list below for future screening recommendations based on patient's age, sex and/or medical conditions. Orders for these recommended tests are listed in the plan section. The patient has been provided with a written plan.    Health Maintenance   Topic Date Due   • DXA SCAN  Never done   • MAMMOGRAM  10/28/2021   • INFLUENZA VACCINE  10/01/2022   • HEMOGLOBIN A1C  12/13/2022   • URINE MICROALBUMIN  03/07/2023   • DIABETIC EYE EXAM  04/27/2023   • LIPID PANEL  06/13/2023   • DIABETIC FOOT EXAM  08/26/2023   • TDAP/TD VACCINES (3 - Td or Tdap) 09/18/2027   • ZOSTER VACCINE  Discontinued              Assessment & Plan   CMS Preventative Services Quick Reference  Risk Factors Identified During Encounter  Cardiovascular Disease  The above risks/problems have been discussed with the patient.  Follow up actions/plans if indicated are seen below in the Assessment/Plan Section.  Pertinent information has been shared with the patient in the After Visit Summary.    Diagnoses and all orders for this visit:    1. Dysuria (Primary)  -     POC  Urinalysis Dipstick, Automated  -     Cancel: Urine Culture - Urine, Urine, Clean Catch  -     sulfamethoxazole-trimethoprim (Bactrim DS) 800-160 MG per tablet; Take 1 tablet by mouth 2 (Two) Times a Day.  Dispense: 14 tablet; Refill: 0  -     Urine Culture - Urine, Urine, Clean Catch    2. Precordial pain  -     XR Chest PA & Lateral  -     ECG 12 Lead  -     ECG 12 Lead    3. Cerumen debris on tympanic membrane, unspecified laterality  Comments:  to see her ENT  Orders:  -     Cerumen Removal    4. Chest pain, unspecified type  -     CT Chest Without Contrast; Future    5. Wellness examination  -     DEXA Bone Density Axial    6. Post-menopausal  -     DEXA Bone Density Axial    7. Encounter for screening mammogram for malignant neoplasm of breast     Other orders  -     Cancel: ECG 12 Lead  -     SCANNED EKG        Follow Up:   No follow-ups on file.     An After Visit Summary and PPPS were made available to the patient.    Dad with Colon cancer last Colonoscopy 2 years ago      I spent 30 minutes caring for Vidhi on this date of service. This time includes time spent by me in the following activities:reviewing tests         Declined podiatrist

## 2022-08-29 ENCOUNTER — TRANSCRIBE ORDERS (OUTPATIENT)
Dept: ADMINISTRATIVE | Facility: HOSPITAL | Age: 72
End: 2022-08-29

## 2022-08-29 DIAGNOSIS — Z12.31 BREAST CANCER SCREENING BY MAMMOGRAM: Primary | ICD-10-CM

## 2022-08-30 ENCOUNTER — TELEPHONE (OUTPATIENT)
Dept: FAMILY MEDICINE CLINIC | Facility: CLINIC | Age: 72
End: 2022-08-30

## 2022-08-30 DIAGNOSIS — M79.605 LEFT LEG PAIN: ICD-10-CM

## 2022-08-30 DIAGNOSIS — G62.9 NEUROPATHY: ICD-10-CM

## 2022-08-30 LAB
BACTERIA UR CULT: NORMAL
BACTERIA UR CULT: NORMAL

## 2022-08-30 RX ORDER — GABAPENTIN 300 MG/1
CAPSULE ORAL
Qty: 180 CAPSULE | Refills: 0 | Status: SHIPPED | OUTPATIENT
Start: 2022-08-30 | End: 2022-09-28

## 2022-08-30 NOTE — TELEPHONE ENCOUNTER
Rx Refill Note  Requested Prescriptions     Pending Prescriptions Disp Refills   • gabapentin (NEURONTIN) 300 MG capsule [Pharmacy Med Name: GABAPENTIN 300 MG CAPSULE] 180 capsule      Sig: TAKE TWO CAPSULES BY MOUTH THREE TIMES A DAY      Last office visit with prescribing clinician: 8/26/2022      Next office visit with prescribing clinician: Visit date not found  Last refill 07/27/2022

## 2022-08-30 NOTE — TELEPHONE ENCOUNTER
Caller: Vidhi Lopez    Relationship: Self    Best call back number:[phone]    What orders are you requesting (i.e. lab or imaging): CT SCAN OF ABDOMEN     In what timeframe would the patient need to come in: ASAP    Where will you receive your lab/imaging services: [facility]    Additional notes: PATIENT STATES SHE HAS A CT SCAN OF HER CHEST ALREADY ORDERED, BUT SHE NEEDS ONE OF HER ABDOMEN SO THEY CAN SEE WHERE THE PAIN IS COMING FROM. SHE STATES THAT THE PAIN RADIATES FROM HER FLANK AND GOES ACROSS AND IT'S UNDER HER LEFT BREAST.

## 2022-08-31 ENCOUNTER — TELEPHONE (OUTPATIENT)
Dept: FAMILY MEDICINE CLINIC | Facility: CLINIC | Age: 72
End: 2022-08-31

## 2022-08-31 DIAGNOSIS — R10.84 GENERALIZED ABDOMINAL PAIN: Primary | ICD-10-CM

## 2022-08-31 DIAGNOSIS — R11.0 NAUSEA: ICD-10-CM

## 2022-08-31 LAB
BACTERIA UR CULT: ABNORMAL
BACTERIA UR CULT: ABNORMAL
OTHER ANTIBIOTIC SUSC ISLT: ABNORMAL

## 2022-08-31 RX ORDER — ONDANSETRON HYDROCHLORIDE 8 MG/1
8 TABLET, FILM COATED ORAL EVERY 8 HOURS PRN
Qty: 30 TABLET | Refills: 1 | Status: SHIPPED | OUTPATIENT
Start: 2022-08-31

## 2022-08-31 NOTE — TELEPHONE ENCOUNTER
Patient was given two different results due to having an abnormal urine culture test but a normal urinalysis test. Please call patient to clarify concerns

## 2022-08-31 NOTE — TELEPHONE ENCOUNTER
OK FOR HUB     LMB    Spoke with PCP and the lab. There is two different orders for the urine culture. Per Dr. Mckeon Since both cultures resulted differently patient is to finish antibiotic and come back in for a repeat of the urine culture.     Please schedule patient for recheck.

## 2022-08-31 NOTE — TELEPHONE ENCOUNTER
LMTCB    OK for HUB to read    Normal urine culture, ok to stop ATB, if no better I can refer her to a Urologist    Urine culture shows bacteria, sensitive to antibiotic given, does she want to cancel the CT of the abdomen? please let me know

## 2022-08-31 NOTE — TELEPHONE ENCOUNTER
Patient called back regarding her messages.  She talked to Dr Lopez (see earlier message regarding urine results).  He talked about abnormal results and then Douglas called with the results in this message.  She is a little confused at the conflicting results and would like clarification.

## 2022-08-31 NOTE — TELEPHONE ENCOUNTER
I called pt bc had 2 urine cultures one normal and one abnormal , to finish ATB and also saw Urologist, to have a cystoscopy also want Zofran 8 mg refill

## 2022-08-31 NOTE — TELEPHONE ENCOUNTER
I did call patient with urine culture, she is aware about positive bacteria, and Bactrim is the right antibiotic, but she still wants to proceed with CT of the abdomen and chest

## 2022-08-31 NOTE — TELEPHONE ENCOUNTER
----- Message from Abiodun Calderon MD sent at 4/11/2022  8:37 AM EDT -----  Please call the patient regarding her abnormal result.  Dexter shows only arthritis of the left knee, but there is nothing on the left femur, if no better I can refer patient to an orthopedist   SSKI Counseling:  I discussed with the patient the risks of SSKI including but not limited to thyroid abnormalities, metallic taste, GI upset, fever, headache, acne, arthralgias, paraesthesias, lymphadenopathy, easy bleeding, arrhythmias, and allergic reaction.

## 2022-08-31 NOTE — TELEPHONE ENCOUNTER
----- Message from Abiodun Calderon MD sent at 8/30/2022  9:39 AM EDT -----  Please call the patient regarding her abnormal result. Normal urine culture, ok to stop ATB, if no better I can refer her to a Urologist

## 2022-08-31 NOTE — TELEPHONE ENCOUNTER
Patient requested CT of the abdomen due to pain on the left flank that radiates to left rib cage area, order  placed

## 2022-09-02 ENCOUNTER — LAB (OUTPATIENT)
Dept: LAB | Facility: HOSPITAL | Age: 72
End: 2022-09-02

## 2022-09-02 ENCOUNTER — OFFICE VISIT (OUTPATIENT)
Dept: GASTROENTEROLOGY | Facility: CLINIC | Age: 72
End: 2022-09-02

## 2022-09-02 VITALS
HEART RATE: 79 BPM | OXYGEN SATURATION: 94 % | HEIGHT: 64 IN | SYSTOLIC BLOOD PRESSURE: 112 MMHG | DIASTOLIC BLOOD PRESSURE: 62 MMHG | WEIGHT: 171.4 LBS | TEMPERATURE: 97.6 F | BODY MASS INDEX: 29.26 KG/M2

## 2022-09-02 DIAGNOSIS — D64.9 ANEMIA, UNSPECIFIED TYPE: ICD-10-CM

## 2022-09-02 DIAGNOSIS — K21.9 GASTROESOPHAGEAL REFLUX DISEASE, UNSPECIFIED WHETHER ESOPHAGITIS PRESENT: Primary | ICD-10-CM

## 2022-09-02 DIAGNOSIS — Z80.0 FAMILY HISTORY OF COLON CANCER: ICD-10-CM

## 2022-09-02 DIAGNOSIS — R19.7 DIARRHEA, UNSPECIFIED TYPE: ICD-10-CM

## 2022-09-02 LAB
BASOPHILS # BLD AUTO: 0.05 10*3/MM3 (ref 0–0.2)
BASOPHILS NFR BLD AUTO: 0.8 % (ref 0–1.5)
DEPRECATED RDW RBC AUTO: 45.2 FL (ref 37–54)
EOSINOPHIL # BLD AUTO: 0.34 10*3/MM3 (ref 0–0.4)
EOSINOPHIL NFR BLD AUTO: 5.1 % (ref 0.3–6.2)
ERYTHROCYTE [DISTWIDTH] IN BLOOD BY AUTOMATED COUNT: 14.4 % (ref 12.3–15.4)
HCT VFR BLD AUTO: 34.2 % (ref 34–46.6)
HGB BLD-MCNC: 10.5 G/DL (ref 12–15.9)
IMM GRANULOCYTES # BLD AUTO: 0.03 10*3/MM3 (ref 0–0.05)
IMM GRANULOCYTES NFR BLD AUTO: 0.5 % (ref 0–0.5)
LYMPHOCYTES # BLD AUTO: 1.56 10*3/MM3 (ref 0.7–3.1)
LYMPHOCYTES NFR BLD AUTO: 23.5 % (ref 19.6–45.3)
MCH RBC QN AUTO: 26.6 PG (ref 26.6–33)
MCHC RBC AUTO-ENTMCNC: 30.7 G/DL (ref 31.5–35.7)
MCV RBC AUTO: 86.8 FL (ref 79–97)
MONOCYTES # BLD AUTO: 0.38 10*3/MM3 (ref 0.1–0.9)
MONOCYTES NFR BLD AUTO: 5.7 % (ref 5–12)
NEUTROPHILS NFR BLD AUTO: 4.28 10*3/MM3 (ref 1.7–7)
NEUTROPHILS NFR BLD AUTO: 64.4 % (ref 42.7–76)
NRBC BLD AUTO-RTO: 0 /100 WBC (ref 0–0.2)
PLATELET # BLD AUTO: 202 10*3/MM3 (ref 140–450)
PMV BLD AUTO: 8.9 FL (ref 6–12)
RBC # BLD AUTO: 3.94 10*6/MM3 (ref 3.77–5.28)
WBC NRBC COR # BLD: 6.64 10*3/MM3 (ref 3.4–10.8)

## 2022-09-02 PROCEDURE — 36415 COLL VENOUS BLD VENIPUNCTURE: CPT

## 2022-09-02 PROCEDURE — 85025 COMPLETE CBC W/AUTO DIFF WBC: CPT

## 2022-09-02 PROCEDURE — 99214 OFFICE O/P EST MOD 30 MIN: CPT

## 2022-09-02 RX ORDER — ESOMEPRAZOLE MAGNESIUM 40 MG/1
40 CAPSULE, DELAYED RELEASE ORAL
Qty: 90 CAPSULE | Refills: 3 | Status: SHIPPED | OUTPATIENT
Start: 2022-09-02

## 2022-09-02 NOTE — PATIENT INSTRUCTIONS
Continue taking esomeprazole 40 mg once daily as prescribed    Will obtain stool studies today and contact you upon receiving the results of these test.    Blood work today     Once we receive these results, our office will contact you to discuss updating your treatment plan as indicated

## 2022-09-02 NOTE — PROGRESS NOTES
"Chief Complaint   Patient presents with   • Diarrhea           History of Present Illness    Patient is a 72 year old female who presents to the office today for follow up evaluation.  She has a history of diarrhea, c. Difficile colitis s/p antibiotic treatment (2018), GERD, and RA.  6/22/22 inpatient admission for hypovolemic shock secondary to gastroenteritis Last in office visit was on 5/1/2022 with JANI Oliver.      Last EGD and flexible sigmoidoscopy were performed on 6/22/22 during inpatient admission and remarkable for scattered moderate inflammation in gastric antrum- biopsy evaluation noted mild reactive/chemical gastropathy and repair without evidence of metaplasia, dysplasia, nor malignancy, negative for h.pylori organisms, other wise unremarkable exam.     Random biopsies obtained from left colon negative for microscopic colitis.     Last colonoscopy for colon cancer screening 2019 with Dr. Maldonado.  Next due in 2029. Denies polyps present at that time.      For GERD, patient is currently taking Nexium 40 mg once daily approximately 30 to 60 minutes prior to breakfast.  She denies heartburn, nausea, or vomiting, or dysphagia.    At time of today's visit she verbalizes concern over bowel movements occurring 2-3 times a day that is a cow andrzej like consistency without melena or hematochezia.    She continues to take MiraLAX 1 capful every other day for previously experienced constipation.    Denies abdominal pain or bloating.  She does however report recent antibiotic use for treatment of UTI.  Denies fecal urgency or incontinence.      Positive family history of colon cancer in father        Result Review : { Labs  Result Review  Imaging  Med Tab  Media :23}      Office Visit with Stephanie Almanzar APRN (05/11/2022)  UPPER GI ENDOSCOPY (06/22/2022 11:03)  FLEXIBLE SIGMOIDOSCOPY (06/22/2022 11:02)    Vital Signs:   /62   Pulse 79   Temp 97.6 °F (36.4 °C)   Ht 162.6 cm (64\")   Wt 77.7 " kg (171 lb 6.4 oz)   SpO2 94%   BMI 29.42 kg/m²     Body mass index is 29.42 kg/m².     Physical Exam  Vitals reviewed.   Constitutional:       General: She is not in acute distress.     Appearance: She is well-developed.   HENT:      Head: Normocephalic and atraumatic.   Pulmonary:      Effort: Pulmonary effort is normal. No respiratory distress.   Abdominal:      General: Abdomen is flat. Bowel sounds are normal. There is no distension or abdominal bruit. There are no signs of injury.      Palpations: Abdomen is soft.      Tenderness: There is no abdominal tenderness.      Hernia: No hernia is present.   Skin:     General: Skin is dry.      Coloration: Skin is not pale.   Neurological:      Mental Status: She is alert and oriented to person, place, and time.   Psychiatric:         Thought Content: Thought content normal.           Assessment and Plan    Diagnoses and all orders for this visit:    1. Gastroesophageal reflux disease, unspecified whether esophagitis present (Primary)  -     esomeprazole (nexIUM) 40 MG capsule; Take 1 capsule by mouth Every Morning Before Breakfast.  Dispense: 90 capsule; Refill: 3    2. Family history of colon cancer    3. Diarrhea, unspecified type  -     Stool Culture (Reference Lab) - Stool, Per Rectum  -     Clostridioides difficile Toxin, PCR - Stool, Per Rectum  -     CBC & Differential           Discussion:    Patient is a pleasant 72-year-old female who presents the office today for follow-up evaluation.  At today's visit she reports persistent soft mushy stools occurring 2-3 times a day.  Verbalizes concern of a possible recurrence of C. difficile secondary to taking Bactrim for urinary tract infection.  Denies melena or hematochezia.    Denies fever or chills.  We will obtain stool studies to rule out C. difficile infection as well as CBC to assess inflammatory markers.    I have also encouraged patient to decrease frequency of dosing of MiraLAX to achieve regular soft  formed bowel movements as she is currently taking half capful every other day.     Upon receiving these results we will make additional recommendations as indicated.  Patient notified to contact office for any new abdominal pain or bloating for further evaluation.    Patient is agreeable to the outlined above treatment plan and will contact her office for any new or worsening GI concerns.  All questions answered and provided.    Patient Instructions   Continue taking esomeprazole 40 mg once daily as prescribed    Will obtain stool studies today and contact you upon receiving the results of these test.    Blood work today     Once we receive these results, our office will contact you to discuss updating your treatment plan as indicated            EMR Dragon/Transcription Disclaimer:  This document has been Dictated utilizing Dragon dictation.

## 2022-09-21 DIAGNOSIS — F41.9 ANXIETY: ICD-10-CM

## 2022-09-21 RX ORDER — HYDROXYZINE 50 MG/1
50 TABLET, FILM COATED ORAL EVERY 8 HOURS PRN
Qty: 90 TABLET | Refills: 1 | Status: SHIPPED | OUTPATIENT
Start: 2022-09-21

## 2022-09-21 NOTE — TELEPHONE ENCOUNTER
Caller: JohnMagida RAMYA    Relationship: Self    Best call back number: [phone]    Requested Prescriptions:   Requested Prescriptions     Pending Prescriptions Disp Refills   • hydrOXYzine (ATARAX) 50 MG tablet 90 tablet 1     Sig: Take 1 tablet by mouth Every 8 (Eight) Hours As Needed for Anxiety.        Pharmacy where request should be sent: [facility] [address] AT [facility] & ([facility] [phone] [facility] [phone] FX     Additional details provided by patient:PATIENT IS STILL NOT FEELING RELIEF WITH THIS MEDICATION, AND WANTED TO TRY TO GET A DOSE INCREASE TO HELP WITH HER ITCHING, DOSAGE AND AMOUNT GIVEN.       Does the patient have less than a 3 day supply:  [x] Yes  [] No    Agapito ACEVES Rep   09/21/22 14:13 EDT

## 2022-09-26 RX ORDER — LOSARTAN POTASSIUM 50 MG/1
TABLET ORAL
Qty: 60 TABLET | Refills: 0 | Status: SHIPPED | OUTPATIENT
Start: 2022-09-26

## 2022-09-26 NOTE — TELEPHONE ENCOUNTER
Rx Refill Note  Requested Prescriptions     Pending Prescriptions Disp Refills   • losartan (COZAAR) 50 MG tablet [Pharmacy Med Name: LOSARTAN POTASSIUM 50 MG TAB] 60 tablet 0     Sig: TAKE ONE TABLET BY MOUTH TWICE A DAY      Last office visit with prescribing clinician: 8/26/2022      Next office visit with prescribing clinician: Visit date not found            Elziabeth Charlton MA  09/26/22, 10:46 EDT

## 2022-09-27 DIAGNOSIS — G62.9 NEUROPATHY: ICD-10-CM

## 2022-09-27 DIAGNOSIS — M79.605 LEFT LEG PAIN: ICD-10-CM

## 2022-09-28 RX ORDER — GABAPENTIN 300 MG/1
CAPSULE ORAL
Qty: 180 CAPSULE | Refills: 0 | Status: ON HOLD | OUTPATIENT
Start: 2022-09-28 | End: 2022-10-04 | Stop reason: SDUPTHER

## 2022-09-28 NOTE — TELEPHONE ENCOUNTER
Rx Refill Note  Requested Prescriptions     Pending Prescriptions Disp Refills   • gabapentin (NEURONTIN) 300 MG capsule [Pharmacy Med Name: GABAPENTIN 300 MG CAPSULE] 180 capsule      Sig: TAKE TWO CAPSULES BY MOUTH THREE TIMES A DAY      Last office visit with prescribing clinician: 8/26/2022      Next office visit with prescribing clinician: Visit date not found  Last refill 08/30/2022

## 2022-09-30 ENCOUNTER — APPOINTMENT (OUTPATIENT)
Dept: CT IMAGING | Facility: HOSPITAL | Age: 72
End: 2022-09-30

## 2022-09-30 ENCOUNTER — APPOINTMENT (OUTPATIENT)
Dept: GENERAL RADIOLOGY | Facility: HOSPITAL | Age: 72
End: 2022-09-30

## 2022-09-30 ENCOUNTER — HOSPITAL ENCOUNTER (INPATIENT)
Facility: HOSPITAL | Age: 72
LOS: 4 days | Discharge: NURSING, REHAB OR LONG-TERM CARE | End: 2022-10-04
Attending: EMERGENCY MEDICINE | Admitting: STUDENT IN AN ORGANIZED HEALTH CARE EDUCATION/TRAINING PROGRAM

## 2022-09-30 DIAGNOSIS — G62.9 NEUROPATHY: ICD-10-CM

## 2022-09-30 DIAGNOSIS — I63.9 ACUTE STROKE DUE TO ISCHEMIA: ICD-10-CM

## 2022-09-30 DIAGNOSIS — R41.82 ALTERED MENTAL STATUS, UNSPECIFIED ALTERED MENTAL STATUS TYPE: Primary | ICD-10-CM

## 2022-09-30 DIAGNOSIS — M79.605 LEFT LEG PAIN: ICD-10-CM

## 2022-09-30 LAB
ALBUMIN SERPL-MCNC: 4.1 G/DL (ref 3.5–5.2)
ALBUMIN/GLOB SERPL: 1.1 G/DL
ALP SERPL-CCNC: 131 U/L (ref 39–117)
ALT SERPL W P-5'-P-CCNC: 14 U/L (ref 1–33)
AMPHET+METHAMPHET UR QL: NEGATIVE
ANION GAP SERPL CALCULATED.3IONS-SCNC: 14.3 MMOL/L (ref 5–15)
AST SERPL-CCNC: 15 U/L (ref 1–32)
BACTERIA UR QL AUTO: NORMAL /HPF
BARBITURATES UR QL SCN: NEGATIVE
BASOPHILS # BLD AUTO: 0.05 10*3/MM3 (ref 0–0.2)
BASOPHILS NFR BLD AUTO: 0.4 % (ref 0–1.5)
BENZODIAZ UR QL SCN: NEGATIVE
BILIRUB SERPL-MCNC: 0.7 MG/DL (ref 0–1.2)
BILIRUB UR QL STRIP: NEGATIVE
BUN SERPL-MCNC: 25 MG/DL (ref 8–23)
BUN/CREAT SERPL: 24 (ref 7–25)
CALCIUM SPEC-SCNC: 10.3 MG/DL (ref 8.6–10.5)
CANNABINOIDS SERPL QL: NEGATIVE
CHLORIDE SERPL-SCNC: 94 MMOL/L (ref 98–107)
CLARITY UR: CLEAR
CO2 SERPL-SCNC: 29.7 MMOL/L (ref 22–29)
COCAINE UR QL: NEGATIVE
COLOR UR: YELLOW
CREAT SERPL-MCNC: 1.04 MG/DL (ref 0.57–1)
DEPRECATED RDW RBC AUTO: 43.6 FL (ref 37–54)
EGFRCR SERPLBLD CKD-EPI 2021: 57.6 ML/MIN/1.73
EOSINOPHIL # BLD AUTO: 0.03 10*3/MM3 (ref 0–0.4)
EOSINOPHIL NFR BLD AUTO: 0.3 % (ref 0.3–6.2)
ERYTHROCYTE [DISTWIDTH] IN BLOOD BY AUTOMATED COUNT: 15.5 % (ref 12.3–15.4)
ETHANOL BLD-MCNC: <10 MG/DL (ref 0–10)
ETHANOL UR QL: <0.01 %
GLOBULIN UR ELPH-MCNC: 3.8 GM/DL
GLUCOSE SERPL-MCNC: 128 MG/DL (ref 65–99)
GLUCOSE UR STRIP-MCNC: NEGATIVE MG/DL
HCT VFR BLD AUTO: 33.3 % (ref 34–46.6)
HGB BLD-MCNC: 11.4 G/DL (ref 12–15.9)
HGB UR QL STRIP.AUTO: NEGATIVE
HYALINE CASTS UR QL AUTO: NORMAL /LPF
IMM GRANULOCYTES # BLD AUTO: 0.07 10*3/MM3 (ref 0–0.05)
IMM GRANULOCYTES NFR BLD AUTO: 0.6 % (ref 0–0.5)
KETONES UR QL STRIP: NEGATIVE
LEUKOCYTE ESTERASE UR QL STRIP.AUTO: NEGATIVE
LYMPHOCYTES # BLD AUTO: 1.27 10*3/MM3 (ref 0.7–3.1)
LYMPHOCYTES NFR BLD AUTO: 11.3 % (ref 19.6–45.3)
MCH RBC QN AUTO: 26.9 PG (ref 26.6–33)
MCHC RBC AUTO-ENTMCNC: 34.2 G/DL (ref 31.5–35.7)
MCV RBC AUTO: 78.5 FL (ref 79–97)
METHADONE UR QL SCN: NEGATIVE
MONOCYTES # BLD AUTO: 0.72 10*3/MM3 (ref 0.1–0.9)
MONOCYTES NFR BLD AUTO: 6.4 % (ref 5–12)
NEUTROPHILS NFR BLD AUTO: 81 % (ref 42.7–76)
NEUTROPHILS NFR BLD AUTO: 9.08 10*3/MM3 (ref 1.7–7)
NITRITE UR QL STRIP: NEGATIVE
NRBC BLD AUTO-RTO: 0 /100 WBC (ref 0–0.2)
OPIATES UR QL: NEGATIVE
OXYCODONE UR QL SCN: NEGATIVE
PH UR STRIP.AUTO: 5.5 [PH] (ref 5–8)
PLATELET # BLD AUTO: 219 10*3/MM3 (ref 140–450)
PMV BLD AUTO: 9.3 FL (ref 6–12)
POTASSIUM SERPL-SCNC: 3.8 MMOL/L (ref 3.5–5.2)
PROT SERPL-MCNC: 7.9 G/DL (ref 6–8.5)
PROT UR QL STRIP: ABNORMAL
QT INTERVAL: 417 MS
RBC # BLD AUTO: 4.24 10*6/MM3 (ref 3.77–5.28)
RBC # UR STRIP: NORMAL /HPF
REF LAB TEST METHOD: NORMAL
SODIUM SERPL-SCNC: 138 MMOL/L (ref 136–145)
SP GR UR STRIP: 1.02 (ref 1–1.03)
SQUAMOUS #/AREA URNS HPF: NORMAL /HPF
TROPONIN T SERPL-MCNC: <0.01 NG/ML (ref 0–0.03)
UROBILINOGEN UR QL STRIP: ABNORMAL
WBC # UR STRIP: NORMAL /HPF
WBC NRBC COR # BLD: 11.22 10*3/MM3 (ref 3.4–10.8)

## 2022-09-30 PROCEDURE — 99285 EMERGENCY DEPT VISIT HI MDM: CPT

## 2022-09-30 PROCEDURE — 93005 ELECTROCARDIOGRAM TRACING: CPT | Performed by: NURSE PRACTITIONER

## 2022-09-30 PROCEDURE — 80307 DRUG TEST PRSMV CHEM ANLYZR: CPT | Performed by: NURSE PRACTITIONER

## 2022-09-30 PROCEDURE — 70450 CT HEAD/BRAIN W/O DYE: CPT

## 2022-09-30 PROCEDURE — 81001 URINALYSIS AUTO W/SCOPE: CPT | Performed by: NURSE PRACTITIONER

## 2022-09-30 PROCEDURE — 82077 ASSAY SPEC XCP UR&BREATH IA: CPT | Performed by: NURSE PRACTITIONER

## 2022-09-30 PROCEDURE — P9612 CATHETERIZE FOR URINE SPEC: HCPCS

## 2022-09-30 PROCEDURE — 83036 HEMOGLOBIN GLYCOSYLATED A1C: CPT | Performed by: STUDENT IN AN ORGANIZED HEALTH CARE EDUCATION/TRAINING PROGRAM

## 2022-09-30 PROCEDURE — 93010 ELECTROCARDIOGRAM REPORT: CPT | Performed by: INTERNAL MEDICINE

## 2022-09-30 PROCEDURE — 80053 COMPREHEN METABOLIC PANEL: CPT | Performed by: NURSE PRACTITIONER

## 2022-09-30 PROCEDURE — 71045 X-RAY EXAM CHEST 1 VIEW: CPT

## 2022-09-30 PROCEDURE — 85025 COMPLETE CBC W/AUTO DIFF WBC: CPT | Performed by: NURSE PRACTITIONER

## 2022-09-30 PROCEDURE — 84484 ASSAY OF TROPONIN QUANT: CPT | Performed by: NURSE PRACTITIONER

## 2022-09-30 RX ORDER — SODIUM CHLORIDE 0.9 % (FLUSH) 0.9 %
10 SYRINGE (ML) INJECTION AS NEEDED
Status: DISCONTINUED | OUTPATIENT
Start: 2022-09-30 | End: 2022-10-04 | Stop reason: HOSPADM

## 2022-09-30 RX ORDER — NICOTINE POLACRILEX 4 MG
15 LOZENGE BUCCAL
Status: DISCONTINUED | OUTPATIENT
Start: 2022-09-30 | End: 2022-10-04 | Stop reason: HOSPADM

## 2022-09-30 RX ORDER — ASPIRIN 325 MG
325 TABLET, DELAYED RELEASE (ENTERIC COATED) ORAL ONCE
Status: COMPLETED | OUTPATIENT
Start: 2022-09-30 | End: 2022-10-01

## 2022-09-30 RX ORDER — INSULIN LISPRO 100 [IU]/ML
0-9 INJECTION, SOLUTION INTRAVENOUS; SUBCUTANEOUS
Status: DISCONTINUED | OUTPATIENT
Start: 2022-10-01 | End: 2022-10-04 | Stop reason: HOSPADM

## 2022-09-30 RX ORDER — DEXTROSE MONOHYDRATE 25 G/50ML
25 INJECTION, SOLUTION INTRAVENOUS
Status: DISCONTINUED | OUTPATIENT
Start: 2022-09-30 | End: 2022-10-04 | Stop reason: HOSPADM

## 2022-09-30 RX ORDER — SODIUM CHLORIDE 0.9 % (FLUSH) 0.9 %
10 SYRINGE (ML) INJECTION EVERY 12 HOURS SCHEDULED
Status: DISCONTINUED | OUTPATIENT
Start: 2022-09-30 | End: 2022-10-04 | Stop reason: HOSPADM

## 2022-09-30 RX ADMIN — Medication 10 ML: at 21:59

## 2022-10-01 ENCOUNTER — APPOINTMENT (OUTPATIENT)
Dept: MRI IMAGING | Facility: HOSPITAL | Age: 72
End: 2022-10-01

## 2022-10-01 PROBLEM — R09.89 SUSPECTED CEREBROVASCULAR ACCIDENT (CVA): Status: ACTIVE | Noted: 2022-10-01

## 2022-10-01 LAB
ANION GAP SERPL CALCULATED.3IONS-SCNC: 13.6 MMOL/L (ref 5–15)
BUN SERPL-MCNC: 33 MG/DL (ref 8–23)
BUN/CREAT SERPL: 27.5 (ref 7–25)
CALCIUM SPEC-SCNC: 9.6 MG/DL (ref 8.6–10.5)
CHLORIDE SERPL-SCNC: 92 MMOL/L (ref 98–107)
CO2 SERPL-SCNC: 32.4 MMOL/L (ref 22–29)
CREAT SERPL-MCNC: 1.2 MG/DL (ref 0.57–1)
DEPRECATED RDW RBC AUTO: 43.6 FL (ref 37–54)
EGFRCR SERPLBLD CKD-EPI 2021: 48.5 ML/MIN/1.73
ERYTHROCYTE [DISTWIDTH] IN BLOOD BY AUTOMATED COUNT: 15.2 % (ref 12.3–15.4)
GLUCOSE BLDC GLUCOMTR-MCNC: 127 MG/DL (ref 70–130)
GLUCOSE BLDC GLUCOMTR-MCNC: 135 MG/DL (ref 70–130)
GLUCOSE BLDC GLUCOMTR-MCNC: 154 MG/DL (ref 70–130)
GLUCOSE BLDC GLUCOMTR-MCNC: 173 MG/DL (ref 70–130)
GLUCOSE SERPL-MCNC: 120 MG/DL (ref 65–99)
HBA1C MFR BLD: 11.1 % (ref 4.8–5.6)
HCT VFR BLD AUTO: 34.4 % (ref 34–46.6)
HGB BLD-MCNC: 11.5 G/DL (ref 12–15.9)
MAGNESIUM SERPL-MCNC: 2.3 MG/DL (ref 1.6–2.4)
MCH RBC QN AUTO: 26.6 PG (ref 26.6–33)
MCHC RBC AUTO-ENTMCNC: 33.4 G/DL (ref 31.5–35.7)
MCV RBC AUTO: 79.6 FL (ref 79–97)
PHOSPHATE SERPL-MCNC: 4.4 MG/DL (ref 2.5–4.5)
PLATELET # BLD AUTO: 201 10*3/MM3 (ref 140–450)
PMV BLD AUTO: 9.1 FL (ref 6–12)
POTASSIUM SERPL-SCNC: 3.6 MMOL/L (ref 3.5–5.2)
RBC # BLD AUTO: 4.32 10*6/MM3 (ref 3.77–5.28)
SODIUM SERPL-SCNC: 138 MMOL/L (ref 136–145)
VIT B12 BLD-MCNC: 217 PG/ML (ref 211–946)
WBC NRBC COR # BLD: 11.03 10*3/MM3 (ref 3.4–10.8)

## 2022-10-01 PROCEDURE — 85027 COMPLETE CBC AUTOMATED: CPT | Performed by: STUDENT IN AN ORGANIZED HEALTH CARE EDUCATION/TRAINING PROGRAM

## 2022-10-01 PROCEDURE — 70547 MR ANGIOGRAPHY NECK W/O DYE: CPT

## 2022-10-01 PROCEDURE — 82607 VITAMIN B-12: CPT | Performed by: STUDENT IN AN ORGANIZED HEALTH CARE EDUCATION/TRAINING PROGRAM

## 2022-10-01 PROCEDURE — 94760 N-INVAS EAR/PLS OXIMETRY 1: CPT

## 2022-10-01 PROCEDURE — 70551 MRI BRAIN STEM W/O DYE: CPT

## 2022-10-01 PROCEDURE — 92610 EVALUATE SWALLOWING FUNCTION: CPT

## 2022-10-01 PROCEDURE — 94799 UNLISTED PULMONARY SVC/PX: CPT

## 2022-10-01 PROCEDURE — 94761 N-INVAS EAR/PLS OXIMETRY MLT: CPT

## 2022-10-01 PROCEDURE — 70544 MR ANGIOGRAPHY HEAD W/O DYE: CPT

## 2022-10-01 PROCEDURE — 92523 SPEECH SOUND LANG COMPREHEN: CPT

## 2022-10-01 PROCEDURE — 80048 BASIC METABOLIC PNL TOTAL CA: CPT | Performed by: STUDENT IN AN ORGANIZED HEALTH CARE EDUCATION/TRAINING PROGRAM

## 2022-10-01 PROCEDURE — 83735 ASSAY OF MAGNESIUM: CPT | Performed by: STUDENT IN AN ORGANIZED HEALTH CARE EDUCATION/TRAINING PROGRAM

## 2022-10-01 PROCEDURE — 63710000001 INSULIN LISPRO (HUMAN) PER 5 UNITS: Performed by: STUDENT IN AN ORGANIZED HEALTH CARE EDUCATION/TRAINING PROGRAM

## 2022-10-01 PROCEDURE — 99223 1ST HOSP IP/OBS HIGH 75: CPT | Performed by: PSYCHIATRY & NEUROLOGY

## 2022-10-01 PROCEDURE — 82962 GLUCOSE BLOOD TEST: CPT

## 2022-10-01 PROCEDURE — 84100 ASSAY OF PHOSPHORUS: CPT | Performed by: STUDENT IN AN ORGANIZED HEALTH CARE EDUCATION/TRAINING PROGRAM

## 2022-10-01 RX ORDER — LOSARTAN POTASSIUM 50 MG/1
50 TABLET ORAL 2 TIMES DAILY
Status: DISCONTINUED | OUTPATIENT
Start: 2022-10-01 | End: 2022-10-01

## 2022-10-01 RX ORDER — ONDANSETRON 4 MG/1
8 TABLET, FILM COATED ORAL EVERY 8 HOURS PRN
Status: DISCONTINUED | OUTPATIENT
Start: 2022-10-01 | End: 2022-10-04 | Stop reason: HOSPADM

## 2022-10-01 RX ORDER — GABAPENTIN 300 MG/1
600 CAPSULE ORAL 3 TIMES DAILY
Status: DISCONTINUED | OUTPATIENT
Start: 2022-10-01 | End: 2022-10-01

## 2022-10-01 RX ORDER — ASPIRIN 81 MG/1
81 TABLET ORAL DAILY
Status: DISCONTINUED | OUTPATIENT
Start: 2022-10-01 | End: 2022-10-04 | Stop reason: HOSPADM

## 2022-10-01 RX ORDER — SODIUM CHLORIDE 9 MG/ML
75 INJECTION, SOLUTION INTRAVENOUS CONTINUOUS
Status: DISCONTINUED | OUTPATIENT
Start: 2022-10-01 | End: 2022-10-02

## 2022-10-01 RX ORDER — LORAZEPAM 0.5 MG/1
0.5 TABLET ORAL ONCE AS NEEDED
Status: DISCONTINUED | OUTPATIENT
Start: 2022-10-01 | End: 2022-10-04 | Stop reason: HOSPADM

## 2022-10-01 RX ORDER — DULOXETIN HYDROCHLORIDE 30 MG/1
30 CAPSULE, DELAYED RELEASE ORAL DAILY
Status: DISCONTINUED | OUTPATIENT
Start: 2022-10-01 | End: 2022-10-04 | Stop reason: HOSPADM

## 2022-10-01 RX ORDER — FERROUS SULFATE 325(65) MG
325 TABLET ORAL DAILY
Status: DISCONTINUED | OUTPATIENT
Start: 2022-10-01 | End: 2022-10-04 | Stop reason: HOSPADM

## 2022-10-01 RX ORDER — LEVETIRACETAM 500 MG/1
500 TABLET ORAL 2 TIMES DAILY
Status: DISCONTINUED | OUTPATIENT
Start: 2022-10-01 | End: 2022-10-04 | Stop reason: HOSPADM

## 2022-10-01 RX ORDER — CLOPIDOGREL BISULFATE 75 MG/1
75 TABLET ORAL DAILY
Status: DISCONTINUED | OUTPATIENT
Start: 2022-10-01 | End: 2022-10-04 | Stop reason: HOSPADM

## 2022-10-01 RX ORDER — HYDROXYZINE HYDROCHLORIDE 25 MG/1
50 TABLET, FILM COATED ORAL EVERY 8 HOURS PRN
Status: DISCONTINUED | OUTPATIENT
Start: 2022-10-01 | End: 2022-10-04 | Stop reason: HOSPADM

## 2022-10-01 RX ORDER — IPRATROPIUM BROMIDE AND ALBUTEROL SULFATE 2.5; .5 MG/3ML; MG/3ML
3 SOLUTION RESPIRATORY (INHALATION)
Status: DISCONTINUED | OUTPATIENT
Start: 2022-10-01 | End: 2022-10-04 | Stop reason: HOSPADM

## 2022-10-01 RX ORDER — PANTOPRAZOLE SODIUM 40 MG/1
40 TABLET, DELAYED RELEASE ORAL EVERY MORNING
Refills: 3 | Status: DISCONTINUED | OUTPATIENT
Start: 2022-10-01 | End: 2022-10-04 | Stop reason: HOSPADM

## 2022-10-01 RX ORDER — ROPINIROLE 0.5 MG/1
0.25 TABLET, FILM COATED ORAL NIGHTLY
Status: DISCONTINUED | OUTPATIENT
Start: 2022-10-01 | End: 2022-10-04 | Stop reason: HOSPADM

## 2022-10-01 RX ORDER — ATORVASTATIN CALCIUM 80 MG/1
80 TABLET, FILM COATED ORAL DAILY
Status: DISCONTINUED | OUTPATIENT
Start: 2022-10-01 | End: 2022-10-02

## 2022-10-01 RX ORDER — DICYCLOMINE HYDROCHLORIDE 10 MG/1
10 CAPSULE ORAL 4 TIMES DAILY PRN
Status: DISCONTINUED | OUTPATIENT
Start: 2022-10-01 | End: 2022-10-04 | Stop reason: HOSPADM

## 2022-10-01 RX ORDER — TRAZODONE HYDROCHLORIDE 100 MG/1
100 TABLET ORAL NIGHTLY
Status: DISCONTINUED | OUTPATIENT
Start: 2022-10-01 | End: 2022-10-04 | Stop reason: HOSPADM

## 2022-10-01 RX ORDER — GABAPENTIN 300 MG/1
300 CAPSULE ORAL 3 TIMES DAILY
Status: DISCONTINUED | OUTPATIENT
Start: 2022-10-01 | End: 2022-10-04 | Stop reason: HOSPADM

## 2022-10-01 RX ORDER — HYDRALAZINE HYDROCHLORIDE 25 MG/1
25 TABLET, FILM COATED ORAL 2 TIMES DAILY
Status: DISCONTINUED | OUTPATIENT
Start: 2022-10-01 | End: 2022-10-01

## 2022-10-01 RX ADMIN — ASPIRIN 81 MG: 81 TABLET, COATED ORAL at 08:21

## 2022-10-01 RX ADMIN — HYDRALAZINE HYDROCHLORIDE 25 MG: 25 TABLET, FILM COATED ORAL at 01:11

## 2022-10-01 RX ADMIN — Medication 10 ML: at 14:53

## 2022-10-01 RX ADMIN — TRAZODONE HYDROCHLORIDE 100 MG: 100 TABLET ORAL at 02:35

## 2022-10-01 RX ADMIN — DULOXETINE HYDROCHLORIDE 30 MG: 30 CAPSULE, DELAYED RELEASE ORAL at 08:22

## 2022-10-01 RX ADMIN — ROPINIROLE HYDROCHLORIDE 0.25 MG: 0.5 TABLET, FILM COATED ORAL at 02:35

## 2022-10-01 RX ADMIN — LOSARTAN POTASSIUM 50 MG: 50 TABLET, FILM COATED ORAL at 02:35

## 2022-10-01 RX ADMIN — GABAPENTIN 300 MG: 300 CAPSULE ORAL at 08:21

## 2022-10-01 RX ADMIN — CLOPIDOGREL 75 MG: 75 TABLET, FILM COATED ORAL at 08:22

## 2022-10-01 RX ADMIN — IPRATROPIUM BROMIDE AND ALBUTEROL SULFATE 3 ML: .5; 3 SOLUTION RESPIRATORY (INHALATION) at 19:56

## 2022-10-01 RX ADMIN — METOPROLOL TARTRATE 25 MG: 25 TABLET, FILM COATED ORAL at 01:11

## 2022-10-01 RX ADMIN — ROPINIROLE HYDROCHLORIDE 0.25 MG: 0.5 TABLET, FILM COATED ORAL at 21:35

## 2022-10-01 RX ADMIN — PANTOPRAZOLE SODIUM 40 MG: 40 TABLET, DELAYED RELEASE ORAL at 06:43

## 2022-10-01 RX ADMIN — ASPIRIN 325 MG: 325 TABLET, COATED ORAL at 00:04

## 2022-10-01 RX ADMIN — IPRATROPIUM BROMIDE AND ALBUTEROL SULFATE 3 ML: .5; 3 SOLUTION RESPIRATORY (INHALATION) at 08:54

## 2022-10-01 RX ADMIN — INSULIN LISPRO 2 UNITS: 100 INJECTION, SOLUTION INTRAVENOUS; SUBCUTANEOUS at 17:06

## 2022-10-01 RX ADMIN — LEVETIRACETAM 500 MG: 500 TABLET, FILM COATED ORAL at 21:35

## 2022-10-01 RX ADMIN — HYDRALAZINE HYDROCHLORIDE 25 MG: 25 TABLET, FILM COATED ORAL at 08:22

## 2022-10-01 RX ADMIN — LEVETIRACETAM 500 MG: 500 TABLET, FILM COATED ORAL at 02:35

## 2022-10-01 RX ADMIN — METOPROLOL TARTRATE 25 MG: 25 TABLET, FILM COATED ORAL at 21:35

## 2022-10-01 RX ADMIN — LOSARTAN POTASSIUM 50 MG: 50 TABLET, FILM COATED ORAL at 08:22

## 2022-10-01 RX ADMIN — GABAPENTIN 300 MG: 300 CAPSULE ORAL at 15:56

## 2022-10-01 RX ADMIN — IPRATROPIUM BROMIDE AND ALBUTEROL SULFATE 3 ML: .5; 3 SOLUTION RESPIRATORY (INHALATION) at 15:05

## 2022-10-01 RX ADMIN — FERROUS SULFATE TAB 325 MG (65 MG ELEMENTAL FE) 325 MG: 325 (65 FE) TAB at 08:22

## 2022-10-01 RX ADMIN — LEVETIRACETAM 500 MG: 500 TABLET, FILM COATED ORAL at 08:22

## 2022-10-01 RX ADMIN — GABAPENTIN 300 MG: 300 CAPSULE ORAL at 21:35

## 2022-10-01 RX ADMIN — Medication 10 ML: at 21:35

## 2022-10-01 RX ADMIN — ATORVASTATIN CALCIUM 80 MG: 80 TABLET, FILM COATED ORAL at 08:21

## 2022-10-01 RX ADMIN — SODIUM CHLORIDE 75 ML/HR: 9 INJECTION, SOLUTION INTRAVENOUS at 14:52

## 2022-10-01 NOTE — CONSULTS
Stroke Consult Note    Patient Name: Vidhi Lopez   MRN: [id number]  Age: 72 y.o.  Sex: female  : 1950    Primary Care Physician: Abiodun Vila MD  Referring Physician:  Yanick Tatum MD    Handedness: Right  Race: White    Chief Complaint/Reason for Consultation: Slurred speech and word finding difficulty    Subjective .  HPI: 72-year-old right-handed white female with known diagnosis of hypertension, diabetes, left CEA, smoking, COPD, CAD, CKD, comes in with 1 day of word finding difficulty and slurred speech that since she woke up yesterday.  Denies any focal weakness/numbness.  No history of stroke/MI.  She takes Plavix 75 mg every day, but is not on aspirin.    Last Known Normal Date/Time: 2022, 11 PM EST     Review of Systems   Neurological: Positive for speech difficulty.   Psychiatric/Behavioral: Positive for decreased concentration.   All other systems reviewed and are negative.     Past Medical History:   Diagnosis Date   • Acute renal injury (HCC) 2022   • Allergic rhinitis    • Anemia    • Balance problem    • Bilateral ovarian cysts    • Bulging lumbar disc    • C. difficile colitis 2018   • CAD (coronary artery disease)    • Carotid artery stenosis    • Chronic back pain    • Chronic narcotic use    • Chronic sinusitis    • Compression fracture of L1 lumbar vertebra ([facility])    • Coronary artery disease    • CRI (chronic renal insufficiency), stage 3 (moderate) ([facility])     stage 3B as of  sees nephrology   • CVA (cerebral vascular accident) ([facility])     GENERALIZED UPPER BODY WEAKNESS RESIDUAL PER PT   • Depression with anxiety     Depression/Anxiety   • Edema, lower extremity    • Endometriosis     Dr. Jin; estradiol    • Essential hypertension, benign    • Fibromyalgia    • Frequent falls    • Frequent UTI    • GERD (gastroesophageal reflux disease)    • Gout    • H/O C. difficile diarrhea    • History of myocardial infarction     mxl   • Hypercholesterolemia    •  Hypocalcemia    • Insomnia    • Interstitial cystitis    • Migraines    • Myocardial infarction (HCC)     2003   • Nicotine dependence    • Nodular goiter    • On home oxygen therapy     2 L NC   • URIEL on CPAP    • Rheumatoid arthritis (HCC)    • RLS (restless legs syndrome)    • Sciatica    • Seizure disorder, nonconvulsive, with status epilepticus (HCC) 03/20/2018    last swx 2017   • Septic joint of right knee joint (HCC) 03/21/2018   • Type 2 diabetes mellitus with hyperglycemia (HCC) 2013    insulin started soon after dx stopped as of early '22   • Type 2 diabetes mellitus with vascular disease (HCC)    • Type II diabetes mellitus with neurological manifestations (HCC)     likely multifactorial in nature   • Type II diabetes mellitus with renal manifestations (HCC)    • Urinary incontinence    • Varicose vein of leg    • Yeast dermatitis      Past Surgical History:   Procedure Laterality Date   • ARTERIOGRAM N/A 02/12/2018    Procedure: Renal Arteriogram;  Surgeon: Lito Flores MD;  Location:  [facility] CATH INVASIVE LOCATION;  Service:    • BREAST BIOPSY Right 1991   • CARDIAC CATHETERIZATION N/A 02/12/2018    Procedure: Right Heart Cath;  Surgeon: Lito Flores MD;  Location:  [facility] CATH INVASIVE LOCATION;  Service:    • CAROTID STENT      Transcath    • CAROTID STENT Left    • CHOLECYSTECTOMY     • COLONOSCOPY     • CYSTOSTOMY W/ BLADDER BIOPSY     • DILATATION AND CURETTAGE     • ENDOSCOPY N/A 6/22/2022    Procedure: ESOPHAGOGASTRODUODENOSCOPY;  Surgeon: Sahil Viera MD;  Location:  [facility] OR;  Service: Gastroenterology;  Laterality: N/A;  GASTRITIS  LUPILLO TEST  ANTRUM BIOPSY  SMALL BOWEL BIOPSY   • KNEE ARTHROSCOPY Right 03/23/2018    Procedure: ARTHROSCOPY, RIGHT KNEE  INCISION AND DRAINAGE LOWER EXTREMITY WITH SYNOVIAL BIOPSY;  Surgeon: Dilip Giron MD;  Location:  [facility] OR;  Service: Orthopedics   • LAPAROSCOPIC CHOLECYSTECTOMY  1994    Lap rolando   • OOPHORECTOMY     • PAP SMEAR  05/10/2016    • SIGMOIDOSCOPY N/A 2022    Procedure: SIGMOIDOSCOPY FLEXIBLE;  Surgeon: Sahil Viera MD;  Location: [facility] OR;  Service: Gastroenterology;  Laterality: N/A;  RANDOM COLON BIOPSY   • SUBTOTAL HYSTERECTOMY       Family History   Problem Relation Age of Onset   • Hypertension Mother    • Hyperlipidemia Mother    • Breast cancer Mother    • Osteoporosis Mother    • Anxiety disorder Mother    • Hypertension Father    • Hyperlipidemia Father    • Colon cancer Father    • Colon polyps Father    • Migraines Father    • Depression Daughter    • Asthma Daughter    • Cancer Other    • Diabetes Other    • Heart attack Other    • Hyperlipidemia Other    • Hypertension Other    • Osteoporosis Other    • Stroke Other    • Irritable bowel syndrome Neg Hx    • Ulcerative colitis Neg Hx    • Crohn's disease Neg Hx      Social History     Socioeconomic History   • Marital status:    Tobacco Use   • Smoking status: Current Some Day Smoker     Packs/day: 0.25     Years: 40.00     Pack years: 10.00     Types: Cigarettes     Last attempt to quit: 2020     Years since quittin.7   • Smokeless tobacco: Never Used   • Tobacco comment: in process of quiting--AVERAGE 1 PPD, DOWN TO 6 CIG PER DAY X2 WEEKS    Substance and Sexual Activity   • Alcohol use: Not Currently     Alcohol/week: 1.0 standard drink     Types: 1 Glasses of wine per week     Comment: occasional   • Drug use: No   • Sexual activity: Defer     Allergies   Allergen Reactions   • Amlodipine Swelling   • Reglan [Metoclopramide] Unknown - High Severity     DYSTONIC REACTION     Prior to Admission medications    Medication Sig Start Date End Date Taking? Authorizing Provider   aspirin 81 MG EC tablet Take 81 mg by mouth Daily.   Yes Provider, MD [facility]   atorvastatin (LIPITOR) 80 MG tablet Take 1 tablet by mouth Daily. 22  Yes Abiodun Vila MD   clopidogrel (PLAVIX) 75 MG tablet Take 1 tablet by mouth Daily. 3/24/22  Yes Provider,  MD [facility]   dicyclomine (BENTYL) 10 MG capsule Take 1 capsule by mouth 4 (Four) Times a Day As Needed (abdominal pain/diarrhea, and fecal urgency). 5/11/22  Yes Stephanie Almanzar APRN   esomeprazole (nexIUM) 40 MG capsule Take 1 capsule by mouth Every Morning Before Breakfast. 9/2/22  Yes Riri Tripp APRN   furosemide (LASIX) 40 MG tablet TAKE 1 TABLET DAILY and ON M/W/F TAKE BID 3/17/22  Yes Abiodun Vila MD   gabapentin (NEURONTIN) 300 MG capsule TAKE TWO CAPSULES BY MOUTH THREE TIMES A DAY 9/28/22  Yes Abiodun Vila MD   hydrALAZINE (APRESOLINE) 25 MG tablet Take 25 mg by mouth 2 (Two) Times a Day.   Yes [facility] Zhao MD   HYDROcodone-acetaminophen (Norco) 7.5-325 MG per tablet Take 1 tablet by mouth Every 6 (Six) Hours As Needed for Moderate Pain . 4/28/22  Yes Abiodun Vila MD   hydrOXYzine (ATARAX) 50 MG tablet Take 1 tablet by mouth Every 8 (Eight) Hours As Needed for Anxiety. 9/21/22  Yes Abiodun Vila MD   ipratropium-albuterol (DUO-NEB) 0.5-2.5 mg/3 ml nebulizer Take 3 mL by nebulization 4 (Four) Times a Day. 9/9/20  Yes Oliver Garcia MD   levETIRAcetam (KEPPRA) 500 MG tablet Take 1 tablet by mouth 2 (Two) Times a Day. 3/7/22  Yes Abiodun Vila MD   losartan (COZAAR) 50 MG tablet TAKE ONE TABLET BY MOUTH TWICE A DAY 9/26/22  Yes Abiodun Vila MD   meclizine 25 MG chewable tablet chewable tablet Chew 25 mg 3 (Three) Times a Day As Needed. PRN   Yes [facility] Zhao MD   metoprolol tartrate (LOPRESSOR) 25 MG tablet TAKE 1 TABLET BY MOUTH TWICE DAILY 4/5/22  Yes Abiodun Vila MD   Probiotic Product (PROBIOTIC DAILY PO) Take 1 tablet by mouth Daily.   Yes [facility] Zhao MD   promethazine (PHENERGAN) 25 MG tablet Take 25 mg by mouth Every 6 (Six) Hours As Needed.   Yes Provider, MD [facility]   rOPINIRole (REQUIP) 0.25 MG tablet Take 1 tablet by mouth Every Night. 4/6/22  Yes Helder Young  MD   traZODone (DESYREL) 100 MG tablet Take 1 tablet by mouth Every Night. 8/26/22  Yes Abiodun Vila MD   albuterol sulfate  (90 Base) MCG/ACT inhaler Inhale 2 puffs Every 4 (Four) Hours As Needed for Wheezing. for wheezing 4/12/21   Ana Mena APRN   Diclofenac Sodium (VOLTAREN) 1 % gel gel Apply  topically to the appropriate area as directed.    [facility] Zhao MD   DULoxetine (CYMBALTA) 30 MG capsule Take 30 mg by mouth Daily.    [facility] Zhao MD   ferrous sulfate 325 (65 FE) MG tablet TK 1 T PO  QD 10/28/20   [facility] Zhao MD   Insulin Lispro, 1 Unit Dial, (HUMALOG) 100 UNIT/ML solution pen-injector Inject 10 Units under the skin into the appropriate area as directed 3 (Three) Times a Day Before Meals.    [facility] Zhao MD   nitroglycerin (NITROSTAT) 0.4 MG SL tablet Place 0.4 mg under the tongue Every 5 (Five) Minutes As Needed. 2/11/18   [facility] Zhao MD   nystatin (nystatin) 318960 UNIT/GM powder Apply  topically to the appropriate area as directed See Admin Instructions. Apply topically to the affected area twice daily as directed 3/3/22   Stephanie Almanzar APRN   ondansetron (Zofran) 8 MG tablet Take 1 tablet by mouth Every 8 (Eight) Hours As Needed for Nausea or Vomiting. 8/31/22   Abiodun Vila MD   sulfamethoxazole-trimethoprim (Bactrim DS) 800-160 MG per tablet Take 1 tablet by mouth 2 (Two) Times a Day. 8/26/22   Abiodun Vila MD   tiZANidine (ZANAFLEX) 4 MG tablet Take 1 tablet by mouth Every 8 (Eight) Hours As Needed for Muscle Spasms. 8/5/21   Abiodun Vila MD             Objective     Temp:  [97.6 °F (36.4 °C)-98.4 °F (36.9 °C)] 97.8 °F (36.6 °C)  Heart Rate:  [62-89] 75  Resp:  [16-18] 18  BP: (108-162)/(55-85) 108/63  Neurological Exam  Mental Status  Awake, alert and oriented to person, place and time.Alert. Mild dysarthria present. Language: Mild expressive aphasia, difficult naming, but able to  repeat well, comprehension is intact.    Cranial Nerves  CN II: Visual fields full to confrontation.  CN III, IV, VI: Extraocular movements intact bilaterally. Pupils equal round and reactive to light bilaterally.  CN V: Facial sensation is normal.  CN VII: Full and symmetric facial movement.  CN VIII: Equal hearing bilaterally.  CN IX, X: Palate elevates symmetrically  CN XI: Shoulder shrug strength is normal.  CN XII: Tongue midline without atrophy or fasciculations.    Motor  Normal muscle bulk throughout. No fasciculations present. Normal muscle tone.  No obvious weakness in any extremities, 5/5 to all.    Sensory  Light touch is normal in upper and lower extremities.     Reflexes  Deep tendon reflexes are 2+ and symmetric in all four extremities.    Coordination    Dysmetria in right upper extremity, right lower extremity is okay.    Gait    Not assessed.      Physical Exam  Vitals and nursing note reviewed.   Constitutional:       Appearance: Normal appearance.   HENT:      Head: Normocephalic and atraumatic.      Mouth/Throat:      Mouth: Mucous membranes are moist.      Pharynx: Oropharynx is clear.   Eyes:      Extraocular Movements: Extraocular movements intact.      Pupils: Pupils are equal, round, and reactive to light.   Cardiovascular:      Rate and Rhythm: Normal rate and regular rhythm.   Pulmonary:      Effort: Pulmonary effort is normal. No respiratory distress.   Musculoskeletal:      Cervical back: Normal range of motion and neck supple.   Neurological:      Mental Status: She is alert.      Cranial Nerves: Dysarthria present.      Deep Tendon Reflexes: Reflexes are normal and symmetric.   Psychiatric:         Mood and Affect: Mood normal.         Behavior: Behavior normal.         Acute Stroke Data    IV Thrombolytic (TPA/Tenecteplase) Inclusion / Exclusion Criteria    Time: 11:27 EDT  Person Administering Scale: Albert Tatum MD    Inclusion Criteria  [x]   18 years of age or greater   []    Onset of symptoms < 4.5 hours before beginning treatment (stroke onset = time patient was last seen well or without symptoms).   [x]   Diagnosis of acute ischemic stroke causing measurable disabling deficit (Complete Hemianopia, Any Aphasia, Visual or Sensory Extinction, Any weakness limiting sustained effort against gravity)   [x]   Any remaining deficit considered potentially disabling in view of patient and practitioner   Exclusion criteria (Do not proceed with Alteplase if any are checked under exclusion criteria)  [x]   Onset unknown or GREATER than 4.5 hours   []   ICH on CT/MRI   []   CT demonstrates hypodensity representing acute or subacute infarct   []   Significant head trauma or prior stroke in the previous 3 months   []   Symptoms suggestive of subarachnoid hemorrhage   []   History of un-ruptured intracranial aneurysm GREATER than 10 mm   []   Recent intracranial or intraspinal surgery within the last 3 months   []   Arterial puncture at a non-compressible site in the previous 7 days   []   Active internal bleeding   []   Acute bleeding tendency   []   Platelet count LESS than 100,000 for known hematological diseases such as leukemia, thrombocytopenia or chronic cirrhosis   []   Current use of anticoagulant with INR GREATER than 1.7 or PT GREATER than 15 seconds, aPTT GREATER than 40 seconds   []   Heparin received within 48 hours, resulting in abnormally elevated aPTT GREATER than upper limit of normal   []   Current use of direct thrombin inhibitors or direct factor Xa inhibitors in the past 48 hours   []   Elevated blood pressure refractory to treatment (systolic GREATER than 185 mm/Hg or diastolic  GREATER than 110 mm/Hg   []   Suspected infective endocarditis and aortic arch dissection   []   Current use of therapeutic treatment dose of low-molecular-weight heparin (LMWH) within the previous 24 hours   []   Structural GI malignancy or bleed   Relative exclusion for all patients  []   Only minor  nondisabling symptoms   []   Pregnancy   []   Seizure at onset with postictal residual neurological impairments   []   Major surgery or previous trauma within past 14 days   []   History of previous spontaneous ICH, intracranial neoplasm, or AV malformation   []   Postpartum (within previous 14 days)   []   Recent GI or urinary tract hemorrhage (within previous 21 days)   []   Recent acute MI (within previous 3 months)   []   History of unruptured intracranial aneurysm LESS than 10 mm   []   History of ruptured intracranial aneurysm   []   Blood glucose LESS than 50 mg/dL (2.7 mmol/L)   []   Dural puncture within the last 7 days   []   Known GREATER than 10 cerebral microbleeds   Additional exclusions for patients with symptoms onset between 3 and 4.5 hours.  []   Age > 80.   []   On any anticoagulants regardless of INR  >>> Warfarin (Coumadin), Heparin, Enoxaparin (Lovenox), fondaparinux (Arixtra), bivalirudin (Angiomax), Argatroban, dabigatran (Pradaxa), rivaroxaban (Xarelto), or apixaban (Eliquis)   []   Severe stroke (NIHSS > 25).   []   History of BOTH diabetes and previous ischemic stroke.   []   The risks and benefits have been discussed with the patient or family related to the administration of IV alteplase for stroke symptoms.   []   I have discussed and reviewed the patient's case and imaging with the attending prior to IV Thrombolytic (TPA/Tenecteplase).    Time Thrombolytic administered       Hospital Meds:  Scheduled- aspirin, 81 mg, Oral, Daily  atorvastatin, 80 mg, Oral, Daily  clopidogrel, 75 mg, Oral, Daily  DULoxetine, 30 mg, Oral, Daily  ferrous sulfate, 325 mg, Oral, Daily  gabapentin, 300 mg, Oral, TID  insulin lispro, 0-9 Units, Subcutaneous, TID AC  ipratropium-albuterol, 3 mL, Nebulization, 4x Daily - RT  levETIRAcetam, 500 mg, Oral, BID  metoprolol tartrate, 25 mg, Oral, BID  pantoprazole, 40 mg, Oral, QAM  rOPINIRole, 0.25 mg, Oral, Nightly  sodium chloride, 10 mL, Intravenous,  Q12H  traZODone, 100 mg, Oral, Nightly      Infusions- sodium chloride, 75 mL/hr       PRNs- dextrose  •  dextrose  •  dicyclomine  •  glucagon (human recombinant)  •  hydrOXYzine  •  influenza vaccine  •  LORazepam  •  ondansetron  •  [COMPLETED] Insert peripheral IV **AND** sodium chloride  •  sodium chloride    Functional Status Prior to Current Stroke/Nikki Score: 0    NIH Stroke Scale  Time: 11:27 EDT  Person Administering Scale: Albert Tatum MD    1a  Level of consciousness: 0=alert; keenly responsive   1b. LOC questions:  0=Performs both tasks correctly   1c. LOC commands: 0=Performs both tasks correctly   2.  Best Gaze: 0=normal   3.  Visual: 0=No visual loss   4. Facial Palsy: 0=Normal symmetric movement   5a.  Motor left arm: 0=No drift, limb holds 90 (or 45) degrees for full 10 seconds   5b.  Motor right arm: 0=No drift, limb holds 90 (or 45) degrees for full 10 seconds   6a. motor left le=No drift, limb holds 90 (or 45) degrees for full 10 seconds   6b  Motor right le=No drift, limb holds 90 (or 45) degrees for full 10 seconds   7. Limb Ataxia: 1=Present in one limb   8.  Sensory: 0=Normal; no sensory loss   9. Best Language:  1=Mild to moderate aphasia; some obvious loss of fluency or facility of comprehension without significant limitation on ideas expressed or form of expression.   10. Dysarthria: 1=Mild to moderate, patient slurs at least some words and at worst, can be understood with some difficulty   11. Extinction and Inattention: 0=No abnormality    Total:   3       Results Reviewed:  I have personally reviewed current lab, radiology, and data   Her CT head shows a left thalamic and right cerebellar stroke.  Left thalamic looks subacute, right cerebellar looks chronic.  A1c is 11.1.  Other labs were reviewed.    Results for orders placed during the hospital encounter of 22    Adult Transthoracic Echo Complete W/ Cont if Necessary Per Protocol    Interpretation Summary  ·  Estimated left ventricular EF = 65% Left ventricular systolic function is normal.  · Left ventricular diastolic function was normal.  · Calculated right ventricular systolic pressure from tricuspid regurgitation is 45 mmHg.            Assessment/Plan:      1. Left thalamic stroke.  Likely causing her slurred speech and word finding difficulty along with some dysmetria.  Likely lacunar in etiology.  Recommend continuing her on aspirin 81 mg, Plavix 75 mg and Lipitor 80 mg daily for secondary stroke prevention.  Recommend MRI brain without contrast and MRA of the head and neck without contrast.  Her CT head also shows a right cerebellar stroke, but this is likely chronic.  Follow-up on 2D echocardiogram, and will likely need prolonged cardiac monitoring.  2. Essential hypertension.  Relatively well controlled.  Normal blood pressure goals for her.  No need for permissive hypertension.  3. Diabetes mellitus type 2 uncontrolled with hyperglycemia.  Her A1c is 11.1, last A1c she knows about 6 months ago was 7.  Goal A1c of less than 7.  Maintain normoglycemia.  4. Smoking greater than 40 pack years.  Encouraged her to quit smoking.  5. History of CAD and COPD.  Continue home medications.  6. Increase activity with PT/OT.    Case was discussed with patient, nursing and will let the primary team know.  Thank you for the consult.      Albert Tatum MD  October 1, 2022  11:27 EDT

## 2022-10-01 NOTE — THERAPY EVALUATION
Acute Care - Speech Language Pathology   Swallow Initial Evaluation [facility]     Patient Name: Vidhi Lopez  : 1950  MRN: [id number]  Today's Date: 10/1/2022               Admit Date: 2022    Visit Dx:     ICD-10-CM ICD-9-CM   1. Altered mental status, unspecified altered mental status type  R41.82 780.97   2. Acute stroke due to ischemia ([facility])  I63.9 434.91     Patient Active Problem List   Diagnosis   • Anxiety (Chronic benzos)   • Insomnia   • GERD (gastroesophageal reflux disease)   • Fibromyalgia   • RLS (restless legs syndrome)   • Migraines   • COPD (chronic obstructive pulmonary disease) ([facility])   • CAD (coronary artery disease)   • Bilateral carotid artery stenosis   • URIEL (obstructive sleep apnea)   • Chronic pain (Chronic narcotics)   • Recurrent UTI/interstitial cystitis on chronic methenamine   • Demand ischemia ([facility])   • Hypoxemia requiring supplemental oxygen   • Depression   • Edema   • Seizure disorder ([facility])   • Lumbar compression fracture ([facility])   • Dysarthria   • Anemia   • Balance problem   • Hypercalcemia   • Bilateral inguinal hernia, without obstruction or gangrene, not specified as recurrent   • Rheumatoid arthritis ([facility])   • Type II diabetes mellitus with renal manifestations ([facility])   • Type II diabetes mellitus with neurological manifestations ([facility])   • Type 2 diabetes mellitus with vascular disease ([facility])   • Essential hypertension, benign   • CRI (chronic renal insufficiency), stage 3 (moderate) ([facility])   • Hypercholesterolemia   • Type 2 diabetes mellitus with hyperglycemia ([facility])   • Urinary tract infection without hematuria   • Dysuria   • Precordial pain   • Cerumen debris on tympanic membrane   • Chest pain   • Medicare annual wellness visit, subsequent   • Wellness examination   • Post-menopausal   • Encounter for screening mammogram for malignant neoplasm of breast    • Altered mental status   • Suspected cerebrovascular accident (CVA)     Past Medical History:    Diagnosis Date   • Acute renal injury (HCC) 03/2022   • Allergic rhinitis    • Anemia    • Balance problem    • Bilateral ovarian cysts    • Bulging lumbar disc    • C. difficile colitis 09/2018   • CAD (coronary artery disease)    • Carotid artery stenosis    • Chronic back pain    • Chronic narcotic use    • Chronic sinusitis    • Compression fracture of L1 lumbar vertebra ([facility])    • Coronary artery disease    • CRI (chronic renal insufficiency), stage 3 (moderate) ([facility])     stage 3B as of '22 sees nephrology   • CVA (cerebral vascular accident) ([facility]) 2000    GENERALIZED UPPER BODY WEAKNESS RESIDUAL PER PT   • Depression with anxiety     Depression/Anxiety   • Edema, lower extremity    • Endometriosis     Dr. Jin; estradiol    • Essential hypertension, benign    • Fibromyalgia    • Frequent falls    • Frequent UTI    • GERD (gastroesophageal reflux disease)    • Gout    • H/O C. difficile diarrhea    • History of myocardial infarction     mxl   • Hypercholesterolemia    • Hypocalcemia    • Insomnia    • Interstitial cystitis    • Migraines    • Myocardial infarction ([facility])     2003   • Nicotine dependence    • Nodular goiter    • On home oxygen therapy     2 L NC   • URIEL on CPAP    • Rheumatoid arthritis ([facility])    • RLS (restless legs syndrome)    • Sciatica    • Seizure disorder, nonconvulsive, with status epilepticus ([facility]) 03/20/2018    last swx 2017   • Septic joint of right knee joint ([facility]) 03/21/2018   • Type 2 diabetes mellitus with hyperglycemia ([facility]) 2013    insulin started soon after dx stopped as of early '22   • Type 2 diabetes mellitus with vascular disease ([facility])    • Type II diabetes mellitus with neurological manifestations ([facility])     likely multifactorial in nature   • Type II diabetes mellitus with renal manifestations ([facility])    • Urinary incontinence    • Varicose vein of leg    • Yeast dermatitis      Past Surgical History:   Procedure Laterality Date   • ARTERIOGRAM N/A 02/12/2018    Procedure:  Renal Arteriogram;  Surgeon: Lito Flores MD;  Location:  [facility] CATH [facility]VASIVE [facility];  Service:    • BREAST BIOPSY Right 1991   • CARDIAC CATHETERIZATION N/A 02/12/2018    Procedure: Right Heart Cath;  Surgeon: Lito Flores MD;  Location:  [facility] CATH [facility]VASIVE [facility]ATION;  Service:    • CAROTID STENT      Transcath    • CAROTID STENT Left    • CHOLECYSTECTOMY     • COLONOSCOPY     • CYSTOSTOMY W/ BLADDER BIOPSY     • DILATATION AND CURETTAGE     • ENDOSCOPY N/A 6/22/2022    Procedure: ESOPHAGOGASTRODUODENOSCOPY;  Surgeon: Sahil Viera MD;  Location: [facility] OR;  Service: Gastroenterology;  Laterality: N/A;  GASTRITIS  LUPILLO TEST  ANTRUM BIOPSY  SMALL BOWEL BIOPSY   • KNEE ARTHROSCOPY Right 03/23/2018    Procedure: ARTHROSCOPY, RIGHT KNEE  INCISION AND DRAINAGE LOWER EXTREMITY WITH SYNOVIAL BIOPSY;  Surgeon: Dilip Giron MD;  Location: [facility] OR;  Service: Orthopedics   • LAPAROSCOPIC CHOLECYSTECTOMY  1994    Lap rolando   • OOPHORECTOMY     • PAP SMEAR  05/10/2016   • SIGMOIDOSCOPY N/A 6/22/2022    Procedure: SIGMOIDOSCOPY FLEXIBLE;  Surgeon: Sahil Viera MD;  Location: [facility] OR;  Service: Gastroenterology;  Laterality: N/A;  RANDOM COLON BIOPSY   • SUBTOTAL HYSTERECTOMY         SLP Recommendation and Plan  Recommend: regular and thin liquid diet (pt to self select softer foods as needed). Medications: with thin liquids. Compensations: small bites/sips, upright for all PO, check for buccal pocketing.       Recommend: speech language services targeting motor speech, expressive language, additional cognitive assessment while in house and at next level of care. Pt highly motivated for return to [facility], excellent rehab candidate.      SWALLOW EVALUATION (last 72 hours)     SLP Adult Swallow Evaluation     Row Name 10/01/22 1000       Rehab Evaluation    Document Type evaluation  -AB    Subjective Information no complaints  -AB    Patient Observations alert;cooperative;agree to therapy  -AB     Patient/Family/Caregiver Comments/Observations pt seen in room s506, cooperative and pleasant throughout  -AB    Patient Effort excellent  -AB    Symptoms Noted During/After Treatment none  -AB            General Information    Patient Profile Reviewed yes  -AB    Pertinent History Of Current Problem 72-year-old woman with a past medical history of CKD stage III, type 2 diabetes, seizure disorder, hypertension, COPD, coronary artery disease, and bilateral carotid artery stenosis who comes to the hospital 2 days after an episode of word finding difficulties upon waking up.  She lives at an assisted living facility and reportedly states that when her symptoms first occurred, she was encouraged not to go to the ER.  However when she started experiencing slurred speech and right upper extremity dysmetria, she was brought to the ER on the day of presentation.  In the ER she was noted to have a slight leukocytosis to 11.2, negative urine toxicology, a CT head did show a new area of decreased attenuation involving the anterior aspect of the thalamus, with appearance consistent with a subacute infarct.  -AB    Current Method of Nutrition regular textures;thin liquids  -AB    Precautions/Limitations, Vision WFL with corrective lenses  -AB    Precautions/Limitations, Hearing WFL  -AB    Prior Level of Function-Communication WFL  -AB    Prior Level of Function-Swallowing regular textures;thin liquids  -AB    Plans/Goals Discussed with patient;agreed upon  -AB    Barriers to Rehab none identified  -AB    Patient's Goals for Discharge return to all previous roles/activities;take care of myself at home  -AB            Pain    Additional Documentation Pain Scale: Numbers Pre/Post-Treatment (Group)  -AB            Pain Scale: Numbers Pre/Post-Treatment    Pretreatment Pain Rating 0/10 - no pain  -AB    Posttreatment Pain Rating 0/10 - no pain  -AB            Oral Motor Structure and Function    Oral Lesions or Structural  Abnormalities and/or variants none  -AB    Dentition Assessment edentulous, does not have dentures  -AB    Secretion Management WNL/WFL  -AB    Mucosal Quality moist, healthy  -AB    Gag Response --  did not assess  -AB    Volitional Swallow WFL  -AB    Volitional Cough WFL  -AB            Oral Musculature and Cranial Nerve Assessment    Oral Motor General Assessment oral labial or buccal impairment  -AB    Oral Labial or Buccal Impairment, Detail, Cranial Nerve VII (Facial): right labial droop  -AB            General Eating/Swallowing Observations    Respiratory Support Currently in Use nasal cannula  -AB    O2 Liters 2L  -AB    Eating/Swallowing Skills fed by SLP  -AB    Positioning During Eating upright 90 degree  -AB    Utensils Used spoon;cup;straw  -AB            Clinical Swallow Eval    Clinical Swallow Evaluation Summary Pt requires assist feeding with all consistencies. Significant labial asymmetry, with no anterior loss for tsp, cup, or straw presentations. Pt edentulous, does not have dentures. Mastication extended and similar for regular and soft solids. Pt reports typically choosing softer foods PRN. No buccal pocketing present, however primarily L sided mastication. Pharyngeal phase with no overt s/s aspiration. Pt with no reports of globus or odynophgia.   Recommend: regular and thin liquid diet (pt to self select softer foods as needed). Medications: with thin liquids. Compensations: small bites/sips, upright for all PO, check for buccal pocketing.  -AB            SLP Evaluation Clinical Impression    SLP Swallowing Diagnosis mild;oral dysphagia;R/O pharyngeal dysphagia  -AB    Functional Impact risk of aspiration/pneumonia  -AB    Rehab Potential/Prognosis, Swallowing good, to achieve stated therapy goals  -AB    Swallow Criteria for Skilled Therapeutic Interventions Met demonstrates skilled criteria  -AB            SLP Treatment Clinical Impressions    Care Plan Review evaluation/treatment results  reviewed;care plan/treatment goals reviewed;risks/benefits reviewed;current/potential barriers reviewed;patient/other agree to care plan  -AB            Recommendations    Therapy Frequency (Swallow) PRN  -AB    Predicted Duration Therapy Intervention (Days) 2 weeks  -AB    SLP Diet Recommendation regular textures;thin liquids  -AB    Recommended Precautions and Strategies upright posture during/after eating;small bites of food and sips of liquid;check mouth frequently for oral residue/pocketing  -AB    Oral Care Recommendations Oral Care BID/PRN  -AB    SLP Rec. for Method of Medication Administration with thin liquids  -AB    Monitor for Signs of Aspiration yes;notify SLP if any concerns  -AB    Anticipated Discharge Disposition (SLP) unknown;anticipate therapy at next level of care  -AB            Swallow Goals (SLP)    Swallow LTGs Swallow Long Term Goal (free text)  -AB    Swallow STGs diet tolerance goal selection (SLP)  -AB    Diet Tolerance Goal Selection (SLP) Patient will tolerate trials of  -AB            (LTG) Swallow    (LTG) Swallow pt will increase functional outcome measure from black noms level VI to black noms level VII (premorbid) evidenced by no oral stage difficulties or complications associated with aspiration  -AB    [place] (Swallow Long Term Goal) independently (over 90% accuracy)  -AB    Time Frame (Swallow Long Term Goal) by discharge  -AB    Progress/Outcomes (Swallow Long Term Goal) new goal  -AB            (STG) Patient will tolerate trials of    Consistencies Trialed (Tolerate trials) regular textures;thin liquids  -AB    Desired Outcome (Tolerate trials) without signs/symptoms of aspiration;with adequate oral prep/transit/clearance  -AB    [place] (Tolerate trials) independently (over 90% accuracy)  -AB    Time Frame (Tolerate trials) by discharge  -AB    Progress/Outcomes (Tolerate trials) new goal  -AB          User Key  (r) = Recorded By, (t) = Taken By, (c) = Cosigned By     Initials Name Effective Dates    AB Karin Connors, MS CCC-SLP 08/01/21 -                 EDUCATION  The patient has been educated in the following areas:   Dysphagia (Swallowing Impairment).        SLP GOALS     Row Name 10/01/22 1054 10/01/22 1000          (STG) Patient will tolerate trials of    Consistencies Trialed (Tolerate trials) -- regular textures;thin liquids  -AB     Desired Outcome (Tolerate trials) -- without signs/symptoms of aspiration;with adequate oral prep/transit/clearance  -AB     [place] (Tolerate trials) -- independently (over 90% accuracy)  -AB     Time Frame (Tolerate trials) -- by discharge  -AB     Progress/Outcomes (Tolerate trials) -- new goal  -AB            (STG) Swallow 1    (LTG) Swallow -- pt will increase functional outcome measure from black noms level VI to black noms level VII (premorbid) evidenced by no oral stage difficulties or complications associated with aspiration  -AB     [place] (Swallow Long Term Goal) -- independently (over 90% accuracy)  -AB     Time Frame (Swallow Long Term Goal) -- by discharge  -AB     Progress/Outcomes (Swallow Long Term Goal) -- new goal  -AB            Word Retrieval Skills Goal 1 (SLP)    Improve Word Retrieval Skills By Goal 1 (SLP) confrontational naming task;80%  -AB --     Time Frame (Word Retrieval Goal 1, SLP) short term goal (STG)  -AB --     Progress/Outcomes (Word Retrieval Goal 1, SLP) new goal  -AB --            Word Retrieval Skills Goal 2 (SLP)    Improve Word Retrieval Skills By Goal 2 (SLP) completing functional word finding tasks;80%  -AB --     Time Frame (Word Retrieval Goal 2, SLP) short term goal (STG)  -AB --     Progress/Outcomes (Word Retrieval Goal 2, SLP) new goal  -AB --            Ability to Construct Phrase and Sentence Level Response Goal 1 (SLP)    Improve Ability to Construct Phrase and Sentence Level Responses By Goal 1 (SLP) answering question with phrase;80%  -AB --     Time Frame (Phrase and  Sentence Level Response Goal 1, SLP) short term goal (STG)  -AB --     Progress/Outcomes (Phrase and Sentence Level Response Goal 1, SLP) new goal  -AB --            Patient Will Implement Strategies Goal 1 (SLP)    Implement Strategies of Goal 1 (SLP) imitating gestures;using a gesture when requested;80%  -AB --     Time Frame (Strategy Implementation Goal 1, SLP) short term goal (STG)  -AB --     Progress/Outcomes (Strategy Implementation Goal 1, SLP) new goal  -AB --            Phonation Goal 1 (SLP)    Improve Phonation By Goal 1 (SLP) using loud speech;80%  -AB --     Time Frame (Phonation Goal 1, SLP) short term goal (STG)  -AB --     Progress/Outcomes (Phonation Goal 1, SLP) new goal  -AB --            Articulation Goal 1 (SLP)    Improve Articulation Goal 1 (SLP) of specific sounds in connected speech;by over-articulating in connected speech;80%  -AB --     Time Frame (Articulation Goal 1, SLP) short term goal (STG)  -AB --            Planning and Execution of Connected Speech Goal 1 (SLP)    Improve Planning and Execution of Connected Speech by Goal 1 (SLP) repeating phrases;repeating contrasting word pairs;80%  -AB --     Time Frame (Planning and Execution of Connected Speech Goal 1, SLP) short term goal (STG)  -AB --     Progress/Outcomes (Planning and Execution of Connected Speech Goal 1, SLP) new goal  -AB --            SLC STG Goal 1 (SLP)    Additional Goal 1, SLP cognitive assessment  -AB --     Time Frame (Additional Goal 1, SLP) short term goal (STG)  -AB --     Progress/Outcomes (Additional Goal 1, SLP) new goal  -AB --           User Key  (r) = Recorded By, (t) = Taken By, (c) = Cosigned By    Initials Name Provider Type    Karin Hardin, MS CCC-SLP Speech and Language Pathologist              SLP Outcome Measures (last 72 hours)     SLP Outcome Measures     Row Name 10/01/22 1100             SLP Outcome Measures    Outcome Measure Used? Adult NOMS  -AB              Adult FCM Scores    FCM  Chosen Swallowing;Verbal Expression  -AB      Swallowing FCM Score 6  -AB      Verbal Expression FCM Score 4  -AB            User Key  (r) = Recorded By, (t) = Taken By, (c) = Cosigned By    Initials Name Effective Dates    AB Karin Connors MS CCC-SLP 08/01/21 -                  Time Calculation:    Time Calculation- SLP     Row Name 10/01/22 1137             Time Calculation- SLP    SLP Received On 10/01/22  -AB              Untimed Charges    17894-CR Eval Speech and Production w/ Language Minutes 60  -AB      [id number] Eval Oral Pharyng Swallow Minutes 60  -AB              Total Minutes    Untimed Charges Total Minutes 120  -AB       Total Minutes 120  -AB            User Key  (r) = Recorded By, (t) = Taken By, (c) = Cosigned By    Initials Name Provider Type    AB Karin Connors, MS CCC-SLP Speech and Language Pathologist                Therapy Charges for Today     Code Description Service Date Service Provider Modifiers Qty    59758689183 HC ST EVAL ORAL PHARYNG SWALLOW 4 10/1/2022 Karin Connors MS CCC-SLP GN 1    13221005458 HC ST EVAL SPEECH AND PROD W LANG  4 10/1/2022 Karin Connors, MS CCC-SLP GN 1             PPE  Patient was not wearing a face mask during this therapy encounter. Therapist used appropriate personal protective equipment including mask, eye protection and gloves.  Mask used was standard procedure mask. Appropriate PPE was worn during the entire therapy session. The patient coughed during this evaluation. Therapist was within 6 feet for 15 minutes or more during the evaluation. Hand hygiene was completed before and after therapy session. Patient is not in enhanced droplet precautions.       Karin Connors MS CCC-SLP  10/1/2022

## 2022-10-01 NOTE — SIGNIFICANT NOTE
10/01/22 1432   OTHER   Discipline physical therapist   Rehab Time/Intention   Session Not Performed patient unavailable for evaluation  (pt off floor to MRI this afternoon, will follow up tomorrow.)   Recommendation   PT - Next Appointment 10/02/22

## 2022-10-01 NOTE — PLAN OF CARE
Goal Outcome Evaluation:  Plan of Care Reviewed With: patient           Outcome Evaluation: patient came from the ER AOX4, on room air slurred speech, denies of pain and no distress noted

## 2022-10-01 NOTE — PLAN OF CARE
Goal Outcome Evaluation:  Plan of Care Reviewed With: patient        Progress: no change  Outcome Evaluation: Bedside swallow evaluation and speech language evaluation completed this date.     Pt requires assist feeding with all consistencies. Significant labial asymmetry, with no anterior loss for tsp, cup, or straw presentations. Pt edentulous, does not have dentures. Mastication extended and similar for regular and soft solids. Pt reports typically choosing softer foods PRN. No buccal pocketing present, however primarily L sided mastication. Pharyngeal phase with no overt s/s aspiration. Pt with no reports of globus or odynophgia.     Recommend: regular and thin liquid diet (pt to self select softer foods as needed). Medications: with thin liquids. Compensations: small bites/sips, upright for all PO, check for buccal pocketing.     Speech language evaluation revealing moderate expressive aphasia, moderate dysarthria. Question motor planning component versus atypical paraphasias/neologisms (i.e. spider for lid, gronk for egg). Pt aware, frustrated. Cognition informal d/t expressive language deficits with pt delayed recall and orientation intact. Pt previously lived in independent living facility, continued driving and was responsible for all IADLS per her report.     Recommend: speech language services targeting motor speech, expressive language, additional cognitive assessment while in house and at next level of care. Pt highly motivated for return to [facility], excellent rehab candidate.

## 2022-10-01 NOTE — PROGRESS NOTES
" LOS: 1 day     Name: Vidhi Lopez  Age: 72 y.o.  Sex: female  :  1950  MRN: [id number]         Primary Care Physician: Abiodun Vila MD    Subjective   Subjective  Still with some slurred speech but overall feels that it is getting better.  No other acute overnight events or new complaints today.    Objective   Vital Signs  Temp:  [97.6 °F (36.4 °C)-98.4 °F (36.9 °C)] 97.8 °F (36.6 °C)  Heart Rate:  [62-89] 75  Resp:  [16-18] 18  BP: (108-162)/(55-85) 108/63  Body mass index is 27.84 kg/m².    Objective:  General Appearance:  Comfortable and in no acute distress.    Vital signs: (most recent): Blood pressure 108/63, pulse 75, temperature 97.8 °F (36.6 °C), temperature source Oral, resp. rate 18, height 162.6 cm (64.02\"), weight 73.6 kg (162 lb 4.1 oz), SpO2 94 %, not currently breastfeeding.    Lungs:  Normal effort and normal respiratory rate.    Heart: Normal rate.  Regular rhythm.    Abdomen: Abdomen is soft.  Bowel sounds are normal.   There is no abdominal tenderness.     Extremities: There is no dependent edema or local swelling.    Neurological: Patient is alert and oriented to person, place and time.  (Mild dysarthria).    Skin:  Warm and dry.              Results Review:       I reviewed the patient's new clinical results.    Results from last 7 days   Lab Units 10/01/22  0604 22  1458   WBC 10*3/mm3 11.03* 11.22*   HEMOGLOBIN g/dL 11.5* 11.4*   PLATELETS 10*3/mm3 201 219     Results from last 7 days   Lab Units 10/01/22  0604 22  1458   SODIUM mmol/L 138 138   POTASSIUM mmol/L 3.6 3.8   CHLORIDE mmol/L 92* 94*   CO2 mmol/L 32.4* 29.7*   BUN mg/dL 33* 25*   CREATININE mg/dL 1.20* 1.04*   CALCIUM mg/dL 9.6 10.3   GLUCOSE mg/dL 120* 128*                 Scheduled Meds:   aspirin, 81 mg, Oral, Daily  atorvastatin, 80 mg, Oral, Daily  clopidogrel, 75 mg, Oral, Daily  DULoxetine, 30 mg, Oral, Daily  ferrous sulfate, 325 mg, Oral, Daily  gabapentin, 300 mg, Oral, TID  insulin " lispro, 0-9 Units, Subcutaneous, TID AC  ipratropium-albuterol, 3 mL, Nebulization, 4x Daily - RT  levETIRAcetam, 500 mg, Oral, BID  metoprolol tartrate, 25 mg, Oral, BID  pantoprazole, 40 mg, Oral, QAM  rOPINIRole, 0.25 mg, Oral, Nightly  sodium chloride, 10 mL, Intravenous, Q12H  traZODone, 100 mg, Oral, Nightly      PRN Meds:   dextrose  •  dextrose  •  dicyclomine  •  glucagon (human recombinant)  •  hydrOXYzine  •  influenza vaccine  •  LORazepam  •  ondansetron  •  [COMPLETED] Insert peripheral IV **AND** sodium chloride  •  sodium chloride  Continuous Infusions:  sodium chloride, 75 mL/hr        Assessment & Plan   Active Hospital Problems    Diagnosis  POA   • **Dysarthria [R47.1]  Unknown   • Suspected cerebrovascular accident (CVA) [R09.89]  Unknown   • Altered mental status [R41.82]  Yes   • Type 2 diabetes mellitus with hyperglycemia ([facility]) [E11.65]  Yes   • Seizure disorder ([facility]) [G40.909]  Yes   • CAD (coronary artery disease) [I25.10]  Yes   • COPD (chronic obstructive pulmonary disease) ([facility]) [J44.9]  Yes      Resolved Hospital Problems   No resolved problems to display.       Assessment & Plan    -Brain MRI MRI/MRA ordered.  Neurology has been consulted.  On aspirin, Plavix, statin.  -Blood pressure on the low end.  Hold losartan and hydralazine.  Gentle IV fluids with normal saline for now  -Monitor renal function with slight rise of creatinine today.  Initiate IV fluids  -Continue Keppra from her home regimen  -Blood sugars controlled.  Continue sliding scale  -Therapy services        I wore full protective equipment throughout the patient encounter including eye protection and facemask.  Hand hygiene was performed before donning protective equipment and after removal when leaving the room.    Raymond Peterson MD  [facility]  10/01/22  11:00 EDT

## 2022-10-02 ENCOUNTER — APPOINTMENT (OUTPATIENT)
Dept: CARDIOLOGY | Facility: HOSPITAL | Age: 72
End: 2022-10-02

## 2022-10-02 LAB
ANION GAP SERPL CALCULATED.3IONS-SCNC: 10.3 MMOL/L (ref 5–15)
BUN SERPL-MCNC: 38 MG/DL (ref 8–23)
BUN/CREAT SERPL: 35.5 (ref 7–25)
CALCIUM SPEC-SCNC: 9.4 MG/DL (ref 8.6–10.5)
CHLORIDE SERPL-SCNC: 99 MMOL/L (ref 98–107)
CO2 SERPL-SCNC: 28.7 MMOL/L (ref 22–29)
CREAT SERPL-MCNC: 1.07 MG/DL (ref 0.57–1)
DEPRECATED RDW RBC AUTO: 44.8 FL (ref 37–54)
EGFRCR SERPLBLD CKD-EPI 2021: 55.6 ML/MIN/1.73
ERYTHROCYTE [DISTWIDTH] IN BLOOD BY AUTOMATED COUNT: 15.3 % (ref 12.3–15.4)
GLUCOSE BLDC GLUCOMTR-MCNC: 157 MG/DL (ref 70–130)
GLUCOSE BLDC GLUCOMTR-MCNC: 168 MG/DL (ref 70–130)
GLUCOSE BLDC GLUCOMTR-MCNC: 170 MG/DL (ref 70–130)
GLUCOSE BLDC GLUCOMTR-MCNC: 193 MG/DL (ref 70–130)
GLUCOSE SERPL-MCNC: 136 MG/DL (ref 65–99)
HCT VFR BLD AUTO: 30.4 % (ref 34–46.6)
HGB BLD-MCNC: 10.1 G/DL (ref 12–15.9)
MCH RBC QN AUTO: 26.6 PG (ref 26.6–33)
MCHC RBC AUTO-ENTMCNC: 33.2 G/DL (ref 31.5–35.7)
MCV RBC AUTO: 80.2 FL (ref 79–97)
PLATELET # BLD AUTO: 186 10*3/MM3 (ref 140–450)
PMV BLD AUTO: 9 FL (ref 6–12)
POTASSIUM SERPL-SCNC: 4.3 MMOL/L (ref 3.5–5.2)
RBC # BLD AUTO: 3.79 10*6/MM3 (ref 3.77–5.28)
SODIUM SERPL-SCNC: 138 MMOL/L (ref 136–145)
WBC NRBC COR # BLD: 10.25 10*3/MM3 (ref 3.4–10.8)

## 2022-10-02 PROCEDURE — 80048 BASIC METABOLIC PNL TOTAL CA: CPT | Performed by: INTERNAL MEDICINE

## 2022-10-02 PROCEDURE — 93306 TTE W/DOPPLER COMPLETE: CPT

## 2022-10-02 PROCEDURE — 97110 THERAPEUTIC EXERCISES: CPT

## 2022-10-02 PROCEDURE — 94799 UNLISTED PULMONARY SVC/PX: CPT

## 2022-10-02 PROCEDURE — 25010000002 PERFLUTREN (DEFINITY) 8.476 MG IN SODIUM CHLORIDE (PF) 0.9 % 10 ML INJECTION: Performed by: INTERNAL MEDICINE

## 2022-10-02 PROCEDURE — 94664 DEMO&/EVAL PT USE INHALER: CPT

## 2022-10-02 PROCEDURE — 63710000001 INSULIN LISPRO (HUMAN) PER 5 UNITS: Performed by: STUDENT IN AN ORGANIZED HEALTH CARE EDUCATION/TRAINING PROGRAM

## 2022-10-02 PROCEDURE — 85027 COMPLETE CBC AUTOMATED: CPT | Performed by: INTERNAL MEDICINE

## 2022-10-02 PROCEDURE — 82962 GLUCOSE BLOOD TEST: CPT

## 2022-10-02 PROCEDURE — 97162 PT EVAL MOD COMPLEX 30 MIN: CPT

## 2022-10-02 PROCEDURE — 93306 TTE W/DOPPLER COMPLETE: CPT | Performed by: INTERNAL MEDICINE

## 2022-10-02 PROCEDURE — 99233 SBSQ HOSP IP/OBS HIGH 50: CPT | Performed by: NURSE PRACTITIONER

## 2022-10-02 RX ORDER — ROSUVASTATIN CALCIUM 40 MG/1
40 TABLET, COATED ORAL DAILY
Status: DISCONTINUED | OUTPATIENT
Start: 2022-10-03 | End: 2022-10-04 | Stop reason: HOSPADM

## 2022-10-02 RX ORDER — LOSARTAN POTASSIUM 50 MG/1
50 TABLET ORAL
Status: DISCONTINUED | OUTPATIENT
Start: 2022-10-02 | End: 2022-10-03

## 2022-10-02 RX ADMIN — PERFLUTREN 3 ML: 6.52 INJECTION, SUSPENSION INTRAVENOUS at 14:54

## 2022-10-02 RX ADMIN — GABAPENTIN 300 MG: 300 CAPSULE ORAL at 20:26

## 2022-10-02 RX ADMIN — INSULIN LISPRO 2 UNITS: 100 INJECTION, SOLUTION INTRAVENOUS; SUBCUTANEOUS at 08:33

## 2022-10-02 RX ADMIN — LEVETIRACETAM 500 MG: 500 TABLET, FILM COATED ORAL at 08:33

## 2022-10-02 RX ADMIN — GABAPENTIN 300 MG: 300 CAPSULE ORAL at 16:11

## 2022-10-02 RX ADMIN — ASPIRIN 81 MG: 81 TABLET, COATED ORAL at 08:33

## 2022-10-02 RX ADMIN — FERROUS SULFATE TAB 325 MG (65 MG ELEMENTAL FE) 325 MG: 325 (65 FE) TAB at 08:33

## 2022-10-02 RX ADMIN — IPRATROPIUM BROMIDE AND ALBUTEROL SULFATE 3 ML: .5; 3 SOLUTION RESPIRATORY (INHALATION) at 15:17

## 2022-10-02 RX ADMIN — INSULIN LISPRO 2 UNITS: 100 INJECTION, SOLUTION INTRAVENOUS; SUBCUTANEOUS at 12:31

## 2022-10-02 RX ADMIN — LOSARTAN POTASSIUM 50 MG: 50 TABLET, FILM COATED ORAL at 12:31

## 2022-10-02 RX ADMIN — Medication 10 ML: at 21:40

## 2022-10-02 RX ADMIN — DULOXETINE HYDROCHLORIDE 30 MG: 30 CAPSULE, DELAYED RELEASE ORAL at 08:33

## 2022-10-02 RX ADMIN — INSULIN LISPRO 2 UNITS: 100 INJECTION, SOLUTION INTRAVENOUS; SUBCUTANEOUS at 18:36

## 2022-10-02 RX ADMIN — METOPROLOL TARTRATE 25 MG: 25 TABLET, FILM COATED ORAL at 08:33

## 2022-10-02 RX ADMIN — PANTOPRAZOLE SODIUM 40 MG: 40 TABLET, DELAYED RELEASE ORAL at 06:40

## 2022-10-02 RX ADMIN — IPRATROPIUM BROMIDE AND ALBUTEROL SULFATE 3 ML: .5; 3 SOLUTION RESPIRATORY (INHALATION) at 11:18

## 2022-10-02 RX ADMIN — CLOPIDOGREL 75 MG: 75 TABLET, FILM COATED ORAL at 08:33

## 2022-10-02 RX ADMIN — IPRATROPIUM BROMIDE AND ALBUTEROL SULFATE 3 ML: .5; 3 SOLUTION RESPIRATORY (INHALATION) at 19:17

## 2022-10-02 RX ADMIN — METOPROLOL TARTRATE 25 MG: 25 TABLET, FILM COATED ORAL at 20:26

## 2022-10-02 RX ADMIN — GABAPENTIN 300 MG: 300 CAPSULE ORAL at 08:33

## 2022-10-02 RX ADMIN — ATORVASTATIN CALCIUM 80 MG: 80 TABLET, FILM COATED ORAL at 08:33

## 2022-10-02 RX ADMIN — LEVETIRACETAM 500 MG: 500 TABLET, FILM COATED ORAL at 20:26

## 2022-10-02 RX ADMIN — IPRATROPIUM BROMIDE AND ALBUTEROL SULFATE 3 ML: .5; 3 SOLUTION RESPIRATORY (INHALATION) at 08:06

## 2022-10-02 NOTE — THERAPY EVALUATION
Patient Name: Vidhi Lopez  : 1950    MRN: [id number]                              Today's Date: 10/2/2022       Admit Date: 2022    Visit Dx:     ICD-10-CM ICD-9-CM   1. Altered mental status, unspecified altered mental status type  R41.82 780.97   2. Acute stroke due to ischemia ([facility])  I63.9 434.91     Patient Active Problem List   Diagnosis   • Anxiety (Chronic benzos)   • Insomnia   • GERD (gastroesophageal reflux disease)   • Fibromyalgia   • RLS (restless legs syndrome)   • Migraines   • COPD (chronic obstructive pulmonary disease) ([facility])   • CAD (coronary artery disease)   • Bilateral carotid artery stenosis   • URIEL (obstructive sleep apnea)   • Chronic pain (Chronic narcotics)   • Recurrent UTI/interstitial cystitis on chronic methenamine   • Demand ischemia ([facility])   • Hypoxemia requiring supplemental oxygen   • Depression   • Edema   • Seizure disorder ([facility])   • Lumbar compression fracture ([facility])   • Dysarthria   • Anemia   • Balance problem   • Hypercalcemia   • Bilateral inguinal hernia, without obstruction or gangrene, not specified as recurrent   • Acute CVA (cerebrovascular accident) ([facility])   • Rheumatoid arthritis ([facility])   • Type II diabetes mellitus with renal manifestations ([facility])   • Type II diabetes mellitus with neurological manifestations ([facility])   • Type 2 diabetes mellitus with vascular disease ([facility])   • Essential hypertension, benign   • CRI (chronic renal insufficiency), stage 3 (moderate) ([facility])   • Hypercholesterolemia   • Type 2 diabetes mellitus with hyperglycemia ([facility])   • Urinary tract infection without hematuria   • Dysuria   • Precordial pain   • Cerumen debris on tympanic membrane   • Chest pain   • Medicare annual wellness visit, subsequent   • Wellness examination   • Post-menopausal   • Encounter for screening mammogram for malignant neoplasm of breast    • Altered mental status     Past Medical History:   Diagnosis Date   • Acute renal injury (HCC) 2022   • Allergic  rhinitis    • Anemia    • Balance problem    • Bilateral ovarian cysts    • Bulging lumbar disc    • C. difficile colitis 09/2018   • CAD (coronary artery disease)    • Carotid artery stenosis    • Chronic back pain    • Chronic narcotic use    • Chronic sinusitis    • Compression fracture of L1 lumbar vertebra ([facility])    • Coronary artery disease    • CRI (chronic renal insufficiency), stage 3 (moderate) ([facility])     stage 3B as of '22 sees nephrology   • CVA (cerebral vascular accident) ([facility]) 2000    GENERALIZED UPPER BODY WEAKNESS RESIDUAL PER PT   • Depression with anxiety     Depression/Anxiety   • Edema, lower extremity    • Endometriosis     Dr. Jin; estradiol    • Essential hypertension, benign    • Fibromyalgia    • Frequent falls    • Frequent UTI    • GERD (gastroesophageal reflux disease)    • Gout    • H/O C. difficile diarrhea    • History of myocardial infarction     mxl   • Hypercholesterolemia    • Hypocalcemia    • Insomnia    • Interstitial cystitis    • Migraines    • Myocardial infarction ([facility])     2003   • Nicotine dependence    • Nodular goiter    • On home oxygen therapy     2 L NC   • URIEL on CPAP    • Rheumatoid arthritis ([facility])    • RLS (restless legs syndrome)    • Sciatica    • Seizure disorder, nonconvulsive, with status epilepticus (HCC) 03/20/2018    last swx 2017   • Septic joint of right knee joint (HCC) 03/21/2018   • Type 2 diabetes mellitus with hyperglycemia (HCC) 2013    insulin started soon after dx stopped as of early '22   • Type 2 diabetes mellitus with vascular disease (HCC)    • Type II diabetes mellitus with neurological manifestations (HCC)     likely multifactorial in nature   • Type II diabetes mellitus with renal manifestations (HCC)    • Urinary incontinence    • Varicose vein of leg    • Yeast dermatitis      Past Surgical History:   Procedure Laterality Date   • ARTERIOGRAM N/A 02/12/2018    Procedure: Renal Arteriogram;  Surgeon: Lito Flores MD;  Location: [facility]  CATH INVASIVE LOCATION;  Service:    • BREAST BIOPSY Right 1991   • CARDIAC CATHETERIZATION N/A 02/12/2018    Procedure: Right Heart Cath;  Surgeon: Lito Flores MD;  Location:  [facility] CATH INVASIVE LOCATION;  Service:    • CAROTID STENT      Transcath    • CAROTID STENT Left    • CHOLECYSTECTOMY     • COLONOSCOPY     • CYSTOSTOMY W/ BLADDER BIOPSY     • DILATATION AND CURETTAGE     • ENDOSCOPY N/A 6/22/2022    Procedure: ESOPHAGOGASTRODUODENOSCOPY;  Surgeon: Sahil Viera MD;  Location: [facility] OR;  Service: Gastroenterology;  Laterality: N/A;  GASTRITIS  LUPILLO TEST  ANTRUM BIOPSY  SMALL BOWEL BIOPSY   • KNEE ARTHROSCOPY Right 03/23/2018    Procedure: ARTHROSCOPY, RIGHT KNEE  INCISION AND DRAINAGE LOWER EXTREMITY WITH SYNOVIAL BIOPSY;  Surgeon: Dilip Giron MD;  Location: [facility] OR;  Service: Orthopedics   • LAPAROSCOPIC CHOLECYSTECTOMY  1994    Lap [place]   • OOPHORECTOMY     • PAP SMEAR  05/10/2016   • SIGMOIDOSCOPY N/A 6/22/2022    Procedure: SIGMOIDOSCOPY FLEXIBLE;  Surgeon: Sahil Viera MD;  Location: [facility] OR;  Service: Gastroenterology;  Laterality: N/A;  RANDOM COLON BIOPSY   • SUBTOTAL HYSTERECTOMY        General Information     Row Name 10/02/22 1135          Physical Therapy Time and Intention    Document Type evaluation  -EJ     Mode of Treatment physical therapy  -EJ     Row Name 10/02/22 1135          General Information    Patient Profile Reviewed yes  -EJ     Prior Level of Function independent:;ADL's;all household mobility  -EJ     Existing Precautions/Restrictions fall  -EJ     Row Name 10/02/22 1135          Living Environment    People in Home facility resident  independent living  -EJ     Row Name 10/02/22 1135          Cognition    Orientation Status (Cognition) oriented x 3  -EJ     Row Name 10/02/22 1135          Safety Issues, Functional Mobility    Impairments Affecting Function (Mobility) balance;postural/trunk control;range of motion (ROM);endurance/activity  tolerance;strength  -EJ           User Key  (r) = Recorded By, (t) = Taken By, (c) = Cosigned By    Initials Name Provider Type    EJ Sheri Campos, PT Physical Therapist               Mobility     Row Name 10/02/22 1136          Bed Mobility    Bed Mobility supine-sit;sit-supine  -EJ     Supine-Sit [place] (Bed Mobility) verbal cues;nonverbal cues (demo/gesture);moderate assist (50% patient effort);maximum assist (25% patient effort);2 person assist  -EJ     Sit-Supine [place] (Bed Mobility) verbal cues;nonverbal cues (demo/gesture);maximum assist (25% patient effort);2 person assist  -EJ     Assistive Device (Bed Mobility) head of bed elevated;draw sheet;bed rails  -EJ     Row Name 10/02/22 1136          Sit-Stand Transfer    Sit-Stand [place] (Transfers) verbal cues;nonverbal cues (demo/gesture);moderate assist (50% patient effort);maximum assist (25% patient effort);2 person assist  -EJ     Assistive Device (Sit-Stand Transfers) walker, front-wheeled  -EJ     Comment, (Sit-Stand Transfer) performed x 2; less assist required on 2nd attempt, cues for hand placement as well as upright posture, R sided weakness, therapist blocking B knees  -EJ     Row Name 10/02/22 1136          Gait/Stairs (Locomotion)    Comment, (Gait/Stairs) unable to take steps  -EJ           User Key  (r) = Recorded By, (t) = Taken By, (c) = Cosigned By    Initials Name Provider Type    EJ Sheri Campos, PT Physical Therapist               Obj/Interventions     Row Name 10/02/22 1139          Range of Motion Comprehensive    Comment, General Range of Motion limited AROM on RLE  -EJ     Row Name 10/02/22 1139          Strength Comprehensive (MMT)    Comment, General Manual Muscle Testing (MMT) Assessment generalized weakness, increased weakness RUE/RLE - grossly 3-/5  -EJ     Row Name 10/02/22 1139          Balance    Balance Assessment sitting static balance;standing static balance  -EJ     Static Sitting Balance  contact guard;minimal assist  -EJ     Static Standing Balance moderate assist  -EJ     Position/Device Used, Standing Balance walker, rolling  -EJ           User Key  (r) = Recorded By, (t) = Taken By, (c) = Cosigned By    Initials Name Provider Type    Sheri Luke, PT Physical Therapist               Goals/Plan     Row Name 10/02/22 1144          Bed Mobility Goal 1 (PT)    Activity/Assistive Device (Bed Mobility Goal 1, PT) bed mobility activities, all  -EJ     [place] Level/Cues Needed (Bed Mobility Goal 1, PT) minimum assist (75% or more patient effort)  -EJ     Time Frame (Bed Mobility Goal 1, PT) 1 week  -EJ     Row Name 10/02/22 1144          Transfer Goal 1 (PT)    Activity/Assistive Device (Transfer Goal 1, PT) transfers, all;walker, rolling;sit-to-stand/stand-to-sit;bed-to-chair/chair-to-bed  -EJ     [place] Level/Cues Needed (Transfer Goal 1, PT) minimum assist (75% or more patient effort)  -EJ     Time Frame (Transfer Goal 1, PT) 1 week  -EJ     Row Name 10/02/22 1144          Gait Training Goal 1 (PT)    Activity/Assistive Device (Gait Training Goal 1, PT) gait (walking locomotion);walker, rolling  -EJ     [place] Level (Gait Training Goal 1, PT) minimum assist (75% or more patient effort)  -EJ     Distance (Gait Training Goal 1, PT) 20  -EJ     Time Frame (Gait Training Goal 1, PT) 1 week  -EJ     Row Name 10/02/22 1144          Therapy Assessment/Plan (PT)    Planned Therapy Interventions (PT) balance training;bed mobility training;gait training;home exercise program;patient/family education;stretching;strengthening;ROM (range of motion);transfer training  -EJ           User Key  (r) = Recorded By, (t) = Taken By, (c) = Cosigned By    Initials Name Provider Type    Sheri Luke, PT Physical Therapist               Clinical Impression     Row Name 10/02/22 1140          Pain    Pretreatment Pain Rating 0/10 - no pain  -EJ     Row Name 10/02/22 1140          Plan of  Care Review    Plan of Care Reviewed With patient  -EJ     Outcome Evaluation Pt seen for PT eval this am. She was admitted on 9/30/22 w acute CVA and slurred speech. Pt found to have L thalamic stroke. She has hx of chronic R cerebellar stroke as well. At baseline, pt lives at independent living facilty and reports independence w mobility and ADLS. She does state she recently started using a walker or cane. Today, pt presents w increased weakness (R > L), decreased balance, decreased activity tolerance, and overall decreased functional mobility. SHe is requiring mod/max A x 2 for supine <> sit. She did perform 2 standing trials, initially requiring max A x 2, but improved to mod A x 2 on 2nd attempt. She was unable to take any true steps at this time. Pt will need rehab at [place]. She will continue to benefit from skilled PT to maximize safety, strength, and independence w mobility.  -EJ     Row Name 10/02/22 6749          Therapy Assessment/Plan (PT)    Patient/Family Therapy Goals Statement (PT) get better  -EJ     Rehab Potential (PT) good, to achieve stated therapy goals  -EJ     Criteria for Skilled Interventions Met (PT) yes  -EJ     Therapy Frequency (PT) 6 times/wk  -EJ     Row Name 10/02/22 4095          Positioning and Restraints    Pre-Treatment Position in bed  -EJ     Post Treatment Position bed  -EJ     In Bed notified nsg;supine;call light within reach;encouraged to call for assist;exit alarm on  -EJ           User Key  (r) = Recorded By, (t) = Taken By, (c) = Cosigned By    Initials Name Provider Type    Sheri Luke, PT Physical Therapist               Outcome Measures     Row Name 10/02/22 1000          How much help from another person do you currently need...    Turning from your back to your side while in flat bed without using bedrails? 3  -EJ     Moving from lying on back to sitting on the side of a flat bed without bedrails? 2  -EJ     Moving to and from a bed to a chair (including a  wheelchair)? 2  -EJ     Standing up from a chair using your arms (e.g., wheelchair, bedside chair)? 2  -EJ     To walk in hospital room? 1  -     Row Name 10/02/22 1144          Modified Nikki Scale    Modified Nikki Scale 4 - Moderately severe disability.  Unable to walk without assistance, and unable to attend to own bodily needs without assistance.  -     Row Name 10/02/22 1144          Functional Assessment    Outcome Measure Options AM-PAC 6 Clicks Basic Mobility (PT);Modified [place]  -EJ           User Key  (r) = Recorded By, (t) = Taken By, (c) = Cosigned By    Initials Name Provider Type    EJ Sheri Campos, PT Physical Therapist                             Physical Therapy Education                 Title: PT OT SLP Therapies (In Progress)     Topic: Physical Therapy (In Progress)     Point: Mobility training (Done)     Learning Progress Summary           Patient Acceptance, E,TB,D, VU,NR by  at 10/2/2022 1145                   Point: Home exercise program (Not Started)     Learner Progress:  Not documented in this visit.          Point: Body mechanics (Not Started)     Learner Progress:  Not documented in this visit.          Point: Precautions (Not Started)     Learner Progress:  Not documented in this visit.                      User Key     Initials Effective Dates Name Provider Type Discipline     06/16/21 -  Sheri Campos, PT Physical Therapist PT              PT Recommendation and Plan  Planned Therapy Interventions (PT): balance training, bed mobility training, gait training, home exercise program, patient/family education, stretching, strengthening, ROM (range of motion), transfer training  Plan of Care Reviewed With: patient  Outcome Evaluation: Pt seen for PT eval this am. She was admitted on 9/30/22 w acute CVA and slurred speech. Pt found to have L thalamic stroke. She has hx of chronic R cerebellar stroke as well. At baseline, pt lives at independent living facilty and  reports independence w mobility and ADLS. She does state she recently started using a walker or cane. Today, pt presents w increased weakness (R > L), decreased balance, decreased activity tolerance, and overall decreased functional mobility. SHe is requiring mod/max A x 2 for supine <> sit. She did perform 2 standing trials, initially requiring max A x 2, but improved to mod A x 2 on 2nd attempt. She was unable to take any true steps at this time. Pt will need rehab at [place]. She will continue to benefit from skilled PT to maximize safety, strength, and independence w mobility.     Time Calculation:    PT Charges     Row Name 10/02/22 1146             Time Calculation    Start Time 1025  -EJ      Stop Time 1045  -EJ      Time Calculation (min) 20 min  -EJ      PT Received On 10/02/22  -EJ      PT - Next Appointment 10/03/22  -EJ      PT Goal Re-Cert Due Date 10/09/22  -EJ              Time Calculation- PT    Total Timed Code Minutes- PT 15 minute(s)  -EJ            User Key  (r) = Recorded By, (t) = Taken By, (c) = Cosigned By    Initials Name Provider Type    Sheri Luke, PT Physical Therapist              Therapy Charges for Today     Code Description Service Date Service Provider Modifiers Qty    27874765587 HC PT EVAL MOD COMPLEXITY 3 10/2/2022 Sheri Campos, PT GP 1    10907709398 HC PT THER PROC EA 15 MIN 10/2/2022 Sheri Campos, PT GP 1    33776867058 HC PT THER SUPP EA 15 MIN 10/2/2022 Sheri Campos, PT GP 1          PT G-Codes  Outcome Measure Options: AM-PAC 6 Clicks Basic Mobility (PT), Modified [place]  Modified Nikki Scale: 4 - Moderately severe disability.  Unable to walk without assistance, and unable to attend to own bodily needs without assistance.    Sheri Campos PT  10/2/2022

## 2022-10-02 NOTE — PLAN OF CARE
Goal Outcome Evaluation:  Plan of Care Reviewed With: patient           Outcome Evaluation: Pt seen for PT eval this am. She was admitted on 9/30/22 w acute CVA and slurred speech. Pt found to have L thalamic stroke. She has hx of chronic R cerebellar stroke as well. At baseline, pt lives at independent living facilty and reports independence w mobility and ADLS. She does state she recently started using a walker or cane. Today, pt presents w increased weakness (R > L), decreased balance, decreased activity tolerance, and overall decreased functional mobility. SHe is requiring mod/max A x 2 for supine <> sit. She did perform 2 standing trials, initially requiring max A x 2, but improved to mod A x 2 on 2nd attempt. She was unable to take any true steps at this time. Pt will need rehab at [place]. She will continue to benefit from skilled PT to maximize safety, strength, and independence w mobility.

## 2022-10-02 NOTE — PROGRESS NOTES
"DOS: 10/2/2022  NAME: Vidhi Lopez   : 1950  PCP: Abiodun Vila MD  Chief Complaint   Patient presents with   • Weakness - Generalized   Patient seen in follow-up today; new to me        Stroke        Subjective: No acute events overnight; right-sided weakness and dysarthria still present on my exam.  Patient denies any new complaints and or concerns.    No family at bedside    Objective:  Vital signs: /73 (BP Location: Left arm, Patient Position: Lying)   Pulse 70   Temp 98.1 °F (36.7 °C) (Oral)   Resp 16   Ht 162.6 cm (64.02\")   Wt 73.6 kg (162 lb 4.1 oz)   LMP  (LMP Unknown) Comment: LAST MAMMOGRAM 2017  SpO2 94%   BMI 27.84 kg/m²       HEENT: Normocephalic, atraumatic   COR: RRR  Resp: Even and unlabored  Extremities: Equal pulses, nondistal embolization    Neurological:   MS: AO. Language: Disorder. No obvious neglect. Higher integrative function normal  CN: II-XII normal   Motor: 5/5, normal tone on left; right lower extremity 3+/5 right upper extremity 4+/5 right hand 4 -/5  Reflexes: Toes downgoing  Sensory: Intact-to light touch  Coordination: Normal except right upper extremity dysmetria/clumsiness noted    Laboratory results:  Lab Results   Component Value Date    GLUCOSE 136 (H) 10/02/2022    CALCIUM 9.4 10/02/2022     10/02/2022    K 4.3 10/02/2022    CO2 28.7 10/02/2022    CL 99 10/02/2022    BUN 38 (H) 10/02/2022    CREATININE 1.07 (H) 10/02/2022    EGFRIFAFRI 45 (L) 2020    EGFRIFNONA 37 (L) 2020    BCR 35.5 (H) 10/02/2022    ANIONGAP 10.3 10/02/2022     Lab Results   Component Value Date    WBC 10.25 10/02/2022    HGB 10.1 (L) 10/02/2022    HCT 30.4 (L) 10/02/2022    MCV 80.2 10/02/2022     10/02/2022     Lab Results   Component Value Date    CHOL 157 2020    CHOL 139 2018    CHOL 145 2018     Lab Results   Component Value Date    HDL 38 (L) 2022    HDL 37 (L) 2022    HDL 36 (L) 10/01/2020     Lab Results "   Component Value Date     (H) 06/13/2022     (H) 03/07/2022     (H) 10/01/2020     Lab Results   Component Value Date    TRIG 367 (H) 06/13/2022    TRIG 326 (H) 03/07/2022    TRIG 163 (H) 10/01/2020         Lab 09/30/22  1458   HEMOGLOBIN A1C 11.10*      Review and interpretation of imaging:  PROCEDURE:  MRI ANGIOGRAM HEAD WO CONTRAST-, MRI ANGIOGRAM NECK WO  CONTRAST-, MRI BRAIN WO CONTRAST-     CLINICAL HISTORY:  Weakness, altered mental status, recent fall.     TECHNIQUE: Multiplanar T1 and T2-weighted images of the brain were  obtained without intravenous contrast. MR angiography of the head and  neck was performed without intravenous contrast. 3-D sequences were  obtained.     COMPARISON:  CT head without contrast 9/30/2022. MR brain 5/16/2020.     FINDINGS:       BRAIN:  Involving the left thalamus and posterior limb of the left internal  capsule, there is a 2.2 x 1.7 x 1.2 cm focal oval area of diffusion  restriction with corresponding T2/FLAIR hyperintense signal and central  susceptibility, which may reflect microhemorrhage. There are old  posterior right cerebellar infarcts. Additional scattered foci of  subcortical and periventricular T2/FLAIR hyperintense signal are  suggestive of background mild chronic small vessel ischemic disease.  The ventricles and sulci are within normal limits for patient age. No  pathologic extra-axial fluid collection is identified.  There is no  midline shift or mass effect.  The basal cisterns are intact.    No fluid is seen in the paranasal sinuses or mastoid air cells.     ANGIOGRAPHY:     Limited angiographic evaluation of the vessels without intravenous  contrast, especially involving the neck vasculature. There is a 2 vessel  aortic arch with the brachiocephalic and left common carotid arteries  sharing a common origin, a normal variant. Great vessel origins are  patent. There is susceptibility involving the left common carotid artery  and bulb,  likely due to combination of calcific atherosclerosis and a  vascular stent graft, which limits evaluation. There is likely short  segment severe narrowing of the left carotid bulb by NASCET criteria,  severe narrowing of the mid left common carotid artery, and focal at  least moderate narrowing of the distal left common carotid artery when  accounting for this limitation. However, evaluation with  contrast-enhanced imaging would be more sensitive for evaluation. There  is moderate narrowing of the right carotid bulb.   The vertebral arteries originate from the subclavian arteries. The  proximal right vertebral artery is tortuous. The left vertebral artery  is slightly tortuous. There is at least mild focal narrowing of the  proximal right vertebral artery.     The right A1 segment is asymmetrically diminutive, which may be  developmental. There is mild focal narrowing of a distal right internal  artery branch. The visible internal carotid and middle cerebral arteries  demonstrate normal flow-related signal. There is focal severe narrowing  of the left P2 segment. The basilar artery is normal in caliber. The  vertebral arteries are within normal limits.        IMPRESSION:     1.  Small acute infarct involving the left thalamus and posterior limb  of the left internal capsule with central susceptibility, which may  reflect microhemorrhage.   2.  Limited evaluation of the vasculature without intravenous contrast  and due to artifact from a left carotid stent graft within the neck.  When accounting for this limitation, there is likely short segment  severe narrowing of the left carotid bulb and left common carotid  artery, as detailed above. Additional areas of narrowing in the  intracranial vasculature are also detailed above. Angiographic  evaluation with intravenous contrast would be more sensitive for  evaluation.     Discussed with Riri, the patient's nurse at 3:32 PM.     This report was finalized on 10/1/2022  4:24 PM by Dr. Emma Fall M.D.     CT HEAD WITHOUT CONTRAST     HISTORY: Fall, hit head.     COMPARISON: CT head 03/09/2022.     FINDINGS: An area of encephalomalacia is appreciated involving the right  cerebral hemisphere posteriorly measuring 3.3 cm in size, present  previously. There is an ovoid area of decreased attenuation involving  the anterolateral aspect of the thalamus extending to and involving the  anterior limb of the internal capsule measuring 1.6 x 1.0 cm in the  transverse planes. The appearance is consistent with an infarct. It is  age indeterminate. An acute to subacute infarct cannot be excluded.  Clinical correlation is recommended. Moderate vascular calcification is  noted.     IMPRESSION:  There is no evidence of fracture or of intracranial  hemorrhage. There is a new area of decreased attenuation involving the  anterior aspect of the thalamus extending to and involving the medial  aspect of the internal capsule on the left measuring 10 x 16 mm in  transverse planes. The margins are relatively well delineated. The  appearance is consistent with an infarct. It is age indeterminate. An  acute to subacute infarct cannot be excluded. Clinical correlation is  recommended. Further evaluation with a MRI examination of the brain is  suggested.           Radiation dose reduction techniques were utilized, including automated  exposure control and exposure modulation based on body size.        This result has not been signed. Information might be incomplete.  ONE VIEW PORTABLE CHEST AT 3:39 PM     HISTORY: Weakness. Shortness of breath.     FINDINGS: The patient is rotated to the right. Allowing for the  rotation, the lungs are well-expanded and clear and unchanged from  03/13/2022. The heart remains slightly enlarged. There is no overt CHF.     This report was finalized on 9/30/2022 3:50 PM by Dr. Obed Rodarte M.D.       Impression: This is a 72-year-old female with known diagnoses of hypertension,  diabetes mellitus, left CEA, tobacco abuse, COPD, CAD, CKD who presented to [facility] on 9/30 due to report of 1 day history of word finding difficulty/slurred speech upon awakening day prior to arrival for which our service was consulted.  Prior to arrival patient on Plavix.  Ischial CT of the head showed left thalamic and right cerebellar stroke; left thalamic.  Subacute and right cerebellar chronic in appearance.  MRI of the brain since completed which revealed small acute infarct in the left thalamus and posterior limb of the left internal capsule with evidence of mild hemorrhagic transformation.  MRA of the head and neck showed presence left carotid stent within the neck evidence of short segment severe narrowing of the left carotid bulb and left common carotid artery-normal areas of narrowing noted in the anterior cranial vasculature.  Will recommend long-term dual oral and platelet and high-dose statin along with avoiding hypotension and dehydration.     Neurologically, stable with persistent right side weakness and dysarthria still present.  Recommendations below. No further neurology workup indicated. We will sign off and see again upon request.      Diagnosis:  1.  Acute left thalamic stroke  2.  History of chronic right cerebellar stroke-not known to patient  3.  Hypertension; essential  4.  Diabetes mellitus; type II uncontrolled-A1c 11 point-patient previously seen by endocrinology and discharged due to sufficient control.  A1c 6 months ago 7.  5.  History of tobacco abuse; current smoker-smoking cessation information provided  6.  At risk for obstructive sleep apnea-recommend consideration of outpatient [facility]EL evaluation    Plan:  Avoid hypotension and dehydration  recommend acute to subacute rehab  Aspirin 81 mg recommend long-term dual oral antiplatelet  Plavix 75 mg daily-on prior to arrival  Servcfs53 mg daily-  Neurochecks  BP control  Stroke Education  TERENCE/SCDs  PT/OT/ST  Follow-up with  me in 3 months in outpatient clinic      Case reviewed with attending neurologist Dr. Tatum and he agrees with treatment plan above    Patricia Winter APRN

## 2022-10-02 NOTE — DISCHARGE INSTRUCTIONS
At discharge: to prevent recurrent stroke we recommend :  Blood pressure < 130/80  B12 > 500   TSH in normal range   LDL < 70; HbA1c < 6.5 and smoking and alcohol cessation if applicable.

## 2022-10-02 NOTE — PLAN OF CARE
Problem: Fall Injury Risk  Goal: Absence of Fall and Fall-Related Injury  Intervention: Identify and Manage Contributors  Description: Develop a fall prevention plan with the patient and caregiver/family.  Provide reorientation, appropriate sensory stimulation and routines with changes in mental status to decrease risk of fall.  Promote use of personal vision and auditory aids.  Assess assistance level required for safe and effective self-care; provide support as needed, such as toileting, mobilization. For age 65 and older, implement timed toileting with assistance.  Encourage physical activity, such as performance of mobility and self-care at highest level of patient ability, multicomponent exercise program and provision of appropriate assistive devices.  If fall occurs, assess the severity of injury; implement fall injury protocol. Determine the cause and revise fall injury prevention plan.  Regularly review medication contribution to fall risk; adjust medication administration times to minimize risk of falling.  Consider risk related to polypharmacy and age.  Balance adequate pain management with potential for oversedation.  Recent Flowsheet Documentation  Taken 10/2/2022 0401 by Rohit Lieberman, RN  Medication Review/Management: medications reviewed  Taken 10/2/2022 0219 by Rohit Lieberman, RN  Medication Review/Management: medications reviewed  Taken 10/2/2022 0034 by Rohit Lieberman, RN  Medication Review/Management: medications reviewed  Taken 10/1/2022 2055 by Rohit Lieberman, RN  Medication Review/Management: medications reviewed  Intervention: Promote Injury-Free Environment  Description: Provide a safe, barrier-free environment that encourages independent activity.  Keep care area uncluttered and well-lighted.  Determine need for increased observation or monitoring.  Avoid use of devices that minimize mobility, such as restraints or indwelling urinary catheter.  Recent Flowsheet Documentation  Taken  10/2/2022 0401 by Rohit Lieberman RN  Safety Promotion/Fall Prevention:   assistive device/personal items within reach   activity supervised   clutter free environment maintained   safety round/check completed   room organization consistent  Taken 10/2/2022 0219 by Rohit Lieberman RN  Safety Promotion/Fall Prevention:   activity supervised   assistive device/personal items within reach   room organization consistent   safety round/check completed  Taken 10/2/2022 0034 by Rohit Lieberman RN  Safety Promotion/Fall Prevention:   activity supervised   assistive device/personal items within reach   clutter free environment maintained   safety round/check completed   room organization consistent  Taken 10/1/2022 2243 by Rohit Lieberman RN  Safety Promotion/Fall Prevention:   assistive device/personal items within reach   clutter free environment maintained   activity supervised   room organization consistent   safety round/check completed  Taken 10/1/2022 2055 by Rohit Lieberman RN  Safety Promotion/Fall Prevention:   activity supervised   assistive device/personal items within reach   clutter free environment maintained   room organization consistent   safety round/check completed   Goal Outcome Evaluation:  Patient NIH 2 this shift, VSS, bed alarm set, call light in reach.

## 2022-10-02 NOTE — PROGRESS NOTES
" LOS: 2 days     Name: Vidhi Lopez  Age: 72 y.o.  Sex: female  :  1950  MRN: [id number]         Primary Care Physician: Abiodun Vila MD    Subjective   Subjective  Feels that her speech is better today.  No new complaints or acute overnight events.    Objective   Vital Signs  Temp:  [98.1 °F (36.7 °C)-98.8 °F (37.1 °C)] 98.1 °F (36.7 °C)  Heart Rate:  [67-80] 70  Resp:  [16-20] 16  BP: (101-141)/(56-93) 141/73  Body mass index is 27.84 kg/m².    Objective:  General Appearance:  Comfortable and in no acute distress.    Vital signs: (most recent): Blood pressure 141/73, pulse 70, temperature 98.1 °F (36.7 °C), temperature source Oral, resp. rate 16, height 162.6 cm (64.02\"), weight 73.6 kg (162 lb 4.1 oz), SpO2 94 %, not currently breastfeeding.    Lungs:  Normal effort and normal respiratory rate.    Heart: Normal rate.  Regular rhythm.    Abdomen: Abdomen is soft.  Bowel sounds are normal.   There is no abdominal tenderness.     Extremities: There is no dependent edema or local swelling.    Neurological: Patient is alert and oriented to person, place and time.    Skin:  Warm and dry.              Results Review:       I reviewed the patient's new clinical results.    Results from last 7 days   Lab Units 10/02/22  0555 10/01/22  0604 22  1458   WBC 10*3/mm3 10.25 11.03* 11.22*   HEMOGLOBIN g/dL 10.1* 11.5* 11.4*   PLATELETS 10*3/mm3 186 201 219     Results from last 7 days   Lab Units 10/02/22  0555 10/01/22  0604 22  1458   SODIUM mmol/L 138 138 138   POTASSIUM mmol/L 4.3 3.6 3.8   CHLORIDE mmol/L 99 92* 94*   CO2 mmol/L 28.7 32.4* 29.7*   BUN mg/dL 38* 33* 25*   CREATININE mg/dL 1.07* 1.20* 1.04*   CALCIUM mg/dL 9.4 9.6 10.3   GLUCOSE mg/dL 136* 120* 128*                 Scheduled Meds:   aspirin, 81 mg, Oral, Daily  atorvastatin, 80 mg, Oral, Daily  clopidogrel, 75 mg, Oral, Daily  DULoxetine, 30 mg, Oral, Daily  ferrous sulfate, 325 mg, Oral, Daily  gabapentin, 300 mg, Oral, " TID  insulin lispro, 0-9 Units, Subcutaneous, TID AC  ipratropium-albuterol, 3 mL, Nebulization, 4x Daily - RT  levETIRAcetam, 500 mg, Oral, BID  metoprolol tartrate, 25 mg, Oral, BID  pantoprazole, 40 mg, Oral, QAM  rOPINIRole, 0.25 mg, Oral, Nightly  sodium chloride, 10 mL, Intravenous, Q12H  traZODone, 100 mg, Oral, Nightly      PRN Meds:   dextrose  •  dextrose  •  dicyclomine  •  glucagon (human recombinant)  •  hydrOXYzine  •  influenza vaccine  •  LORazepam  •  ondansetron  •  [COMPLETED] Insert peripheral IV **AND** sodium chloride  •  sodium chloride  Continuous Infusions:  sodium chloride, 75 mL/hr, Last Rate: 75 mL/hr (10/01/22 1452)        Assessment & Plan   Active Hospital Problems    Diagnosis  POA   • **Acute CVA (cerebrovascular accident) ([facility]) [I63.9]  Unknown   • Altered mental status [R41.82]  Yes   • Type 2 diabetes mellitus with hyperglycemia ([facility]) [E11.65]  Yes   • Dysarthria [R47.1]  Unknown   • Seizure disorder ([facility]) [G40.909]  Yes   • CAD (coronary artery disease) [I25.10]  Yes   • COPD (chronic obstructive pulmonary disease) ([facility]) [J44.9]  Yes      Resolved Hospital Problems   No resolved problems to display.       Assessment & Plan    -Brain MRI MRI/MRA noted.    Neurology following. On aspirin, Plavix, statin.  Awaiting 2D echocardiogram.  -Blood pressure has started to rise and will add back her losartan.  Hydralazine still on hold.  -Renal function stable.  Stop IV fluids.  -Continue Keppra from her home regimen  -Blood sugars controlled.  Continue sliding scale  -Therapy services  -Possible SNF at discharge      I wore full protective equipment throughout the patient encounter including eye protection and facemask.  Hand hygiene was performed before donning protective equipment and after removal when leaving the room.    Raymond Peterson MD  [facility]  10/02/22  11:01 EDT

## 2022-10-03 LAB
AORTIC ARCH: 2.4 CM
ASCENDING AORTA: 2.4 CM
BH CV ECHO MEAS - ACS: 1.63 CM
BH CV ECHO MEAS - AO MAX PG: 9.9 MMHG
BH CV ECHO MEAS - AO MEAN PG: 4.9 MMHG
BH CV ECHO MEAS - AO ROOT DIAM: 2.8 CM
BH CV ECHO MEAS - AO V2 MAX: 157.5 CM/SEC
BH CV ECHO MEAS - AO V2 VTI: 35.6 CM
BH CV ECHO MEAS - AVA(I,D): 2.15 CM2
BH CV ECHO MEAS - EDV(CUBED): 119.6 ML
BH CV ECHO MEAS - EDV(MOD-SP2): 137 ML
BH CV ECHO MEAS - EDV(MOD-SP4): 135 ML
BH CV ECHO MEAS - EF(MOD-BP): 70.2 %
BH CV ECHO MEAS - EF(MOD-SP2): 70.8 %
BH CV ECHO MEAS - EF(MOD-SP4): 70.4 %
BH CV ECHO MEAS - ESV(CUBED): 33.4 ML
BH CV ECHO MEAS - ESV(MOD-SP2): 40 ML
BH CV ECHO MEAS - ESV(MOD-SP4): 40 ML
BH CV ECHO MEAS - FS: 34.7 %
BH CV ECHO MEAS - IVS/LVPW: 1.01 CM
BH CV ECHO MEAS - IVSD: 1.14 CM
BH CV ECHO MEAS - LAT PEAK E' VEL: 7.4 CM/SEC
BH CV ECHO MEAS - LV MASS(C)D: 211 GRAMS
BH CV ECHO MEAS - LV MAX PG: 7.9 MMHG
BH CV ECHO MEAS - LV MEAN PG: 4 MMHG
BH CV ECHO MEAS - LV V1 MAX: 140.5 CM/SEC
BH CV ECHO MEAS - LV V1 VTI: 34.9 CM
BH CV ECHO MEAS - LVIDD: 4.9 CM
BH CV ECHO MEAS - LVIDS: 3.2 CM
BH CV ECHO MEAS - LVOT AREA: 2.19 CM2
BH CV ECHO MEAS - LVOT DIAM: 1.67 CM
BH CV ECHO MEAS - LVPWD: 1.13 CM
BH CV ECHO MEAS - MED PEAK E' VEL: 6.4 CM/SEC
BH CV ECHO MEAS - MV A DUR: 0.15 SEC
BH CV ECHO MEAS - MV A MAX VEL: 130.6 CM/SEC
BH CV ECHO MEAS - MV DEC SLOPE: 377.5 CM/SEC2
BH CV ECHO MEAS - MV DEC TIME: 0.24 MSEC
BH CV ECHO MEAS - MV E MAX VEL: 108 CM/SEC
BH CV ECHO MEAS - MV E/A: 0.83
BH CV ECHO MEAS - MV MAX PG: 9.7 MMHG
BH CV ECHO MEAS - MV MEAN PG: 2.9 MMHG
BH CV ECHO MEAS - MV P1/2T: 85.3 MSEC
BH CV ECHO MEAS - MV V2 VTI: 40.6 CM
BH CV ECHO MEAS - MVA(P1/2T): 2.6 CM2
BH CV ECHO MEAS - MVA(VTI): 1.88 CM2
BH CV ECHO MEAS - PA ACC TIME: 0.09 SEC
BH CV ECHO MEAS - PA PR(ACCEL): 38.6 MMHG
BH CV ECHO MEAS - PA V2 MAX: 114 CM/SEC
BH CV ECHO MEAS - PULM A REVS DUR: 0.14 SEC
BH CV ECHO MEAS - PULM A REVS VEL: 36.8 CM/SEC
BH CV ECHO MEAS - PULM DIAS VEL: 45.7 CM/SEC
BH CV ECHO MEAS - PULM S/D: 1.38
BH CV ECHO MEAS - PULM SYS VEL: 62.9 CM/SEC
BH CV ECHO MEAS - QP/QS: 0.55
BH CV ECHO MEAS - RAP SYSTOLE: 3 MMHG
BH CV ECHO MEAS - RV MAX PG: 4.1 MMHG
BH CV ECHO MEAS - RV V1 MAX: 101.5 CM/SEC
BH CV ECHO MEAS - RV V1 VTI: 25.2 CM
BH CV ECHO MEAS - RVOT DIAM: 1.46 CM
BH CV ECHO MEAS - RVSP: 29.3 MMHG
BH CV ECHO MEAS - SUP REN AO DIAM: 2.8 CM
BH CV ECHO MEAS - SV(LVOT): 76.5 ML
BH CV ECHO MEAS - SV(MOD-SP2): 97 ML
BH CV ECHO MEAS - SV(MOD-SP4): 95 ML
BH CV ECHO MEAS - SV(RVOT): 42 ML
BH CV ECHO MEAS - TAPSE (>1.6): 3.4 CM
BH CV ECHO MEAS - TR MAX PG: 26.3 MMHG
BH CV ECHO MEAS - TR MAX VEL: 256.5 CM/SEC
BH CV ECHO MEASUREMENTS AVERAGE E/E' RATIO: 15.65
BH CV XLRA - RV BASE: 3.7 CM
BH CV XLRA - RV LENGTH: 7.2 CM
BH CV XLRA - RV MID: 2.45 CM
BH CV XLRA - TDI S': 12.3 CM/SEC
GLUCOSE BLDC GLUCOMTR-MCNC: 120 MG/DL (ref 70–130)
GLUCOSE BLDC GLUCOMTR-MCNC: 178 MG/DL (ref 70–130)
GLUCOSE BLDC GLUCOMTR-MCNC: 191 MG/DL (ref 70–130)
GLUCOSE BLDC GLUCOMTR-MCNC: 239 MG/DL (ref 70–130)
LEFT ATRIUM VOLUME INDEX: 30.5 ML/M2
MAXIMAL PREDICTED HEART RATE: 149 BPM
SARS-COV-2 RNA RESP QL NAA+PROBE: NOT DETECTED
SINUS: 2.44 CM
STJ: 2.16 CM
STRESS TARGET HR: 127 BPM

## 2022-10-03 PROCEDURE — U0003 INFECTIOUS AGENT DETECTION BY NUCLEIC ACID (DNA OR RNA); SEVERE ACUTE RESPIRATORY SYNDROME CORONAVIRUS 2 (SARS-COV-2) (CORONAVIRUS DISEASE [COVID-19]), AMPLIFIED PROBE TECHNIQUE, MAKING USE OF HIGH THROUGHPUT TECHNOLOGIES AS DESCRIBED BY CMS-2020-01-R: HCPCS | Performed by: INTERNAL MEDICINE

## 2022-10-03 PROCEDURE — 97110 THERAPEUTIC EXERCISES: CPT

## 2022-10-03 PROCEDURE — 94664 DEMO&/EVAL PT USE INHALER: CPT

## 2022-10-03 PROCEDURE — U0005 INFEC AGEN DETEC AMPLI PROBE: HCPCS | Performed by: INTERNAL MEDICINE

## 2022-10-03 PROCEDURE — 97530 THERAPEUTIC ACTIVITIES: CPT

## 2022-10-03 PROCEDURE — 94799 UNLISTED PULMONARY SVC/PX: CPT

## 2022-10-03 PROCEDURE — 63710000001 INSULIN LISPRO (HUMAN) PER 5 UNITS: Performed by: STUDENT IN AN ORGANIZED HEALTH CARE EDUCATION/TRAINING PROGRAM

## 2022-10-03 PROCEDURE — 82962 GLUCOSE BLOOD TEST: CPT

## 2022-10-03 PROCEDURE — 97166 OT EVAL MOD COMPLEX 45 MIN: CPT

## 2022-10-03 RX ORDER — LOSARTAN POTASSIUM 50 MG/1
50 TABLET ORAL 2 TIMES DAILY
Status: DISCONTINUED | OUTPATIENT
Start: 2022-10-03 | End: 2022-10-04 | Stop reason: HOSPADM

## 2022-10-03 RX ORDER — HYDRALAZINE HYDROCHLORIDE 25 MG/1
25 TABLET, FILM COATED ORAL EVERY 8 HOURS SCHEDULED
Status: DISCONTINUED | OUTPATIENT
Start: 2022-10-03 | End: 2022-10-04

## 2022-10-03 RX ADMIN — IPRATROPIUM BROMIDE AND ALBUTEROL SULFATE 3 ML: .5; 3 SOLUTION RESPIRATORY (INHALATION) at 15:31

## 2022-10-03 RX ADMIN — DULOXETINE HYDROCHLORIDE 30 MG: 30 CAPSULE, DELAYED RELEASE ORAL at 08:44

## 2022-10-03 RX ADMIN — IPRATROPIUM BROMIDE AND ALBUTEROL SULFATE 3 ML: .5; 3 SOLUTION RESPIRATORY (INHALATION) at 07:36

## 2022-10-03 RX ADMIN — IPRATROPIUM BROMIDE AND ALBUTEROL SULFATE 3 ML: .5; 3 SOLUTION RESPIRATORY (INHALATION) at 19:47

## 2022-10-03 RX ADMIN — Medication 10 ML: at 20:13

## 2022-10-03 RX ADMIN — HYDRALAZINE HYDROCHLORIDE 25 MG: 25 TABLET, FILM COATED ORAL at 14:48

## 2022-10-03 RX ADMIN — FERROUS SULFATE TAB 325 MG (65 MG ELEMENTAL FE) 325 MG: 325 (65 FE) TAB at 08:44

## 2022-10-03 RX ADMIN — INSULIN GLARGINE-YFGN 10 UNITS: 100 INJECTION, SOLUTION SUBCUTANEOUS at 22:25

## 2022-10-03 RX ADMIN — ASPIRIN 81 MG: 81 TABLET, COATED ORAL at 08:44

## 2022-10-03 RX ADMIN — GABAPENTIN 300 MG: 300 CAPSULE ORAL at 20:12

## 2022-10-03 RX ADMIN — HYDRALAZINE HYDROCHLORIDE 25 MG: 25 TABLET, FILM COATED ORAL at 20:11

## 2022-10-03 RX ADMIN — METOPROLOL TARTRATE 25 MG: 25 TABLET, FILM COATED ORAL at 08:44

## 2022-10-03 RX ADMIN — LOSARTAN POTASSIUM 50 MG: 50 TABLET, FILM COATED ORAL at 22:25

## 2022-10-03 RX ADMIN — CLOPIDOGREL 75 MG: 75 TABLET, FILM COATED ORAL at 08:43

## 2022-10-03 RX ADMIN — IPRATROPIUM BROMIDE AND ALBUTEROL SULFATE 3 ML: .5; 3 SOLUTION RESPIRATORY (INHALATION) at 11:40

## 2022-10-03 RX ADMIN — PANTOPRAZOLE SODIUM 40 MG: 40 TABLET, DELAYED RELEASE ORAL at 06:00

## 2022-10-03 RX ADMIN — METOPROLOL TARTRATE 25 MG: 25 TABLET, FILM COATED ORAL at 20:12

## 2022-10-03 RX ADMIN — ROSUVASTATIN CALCIUM 40 MG: 40 TABLET, FILM COATED ORAL at 08:44

## 2022-10-03 RX ADMIN — INSULIN LISPRO 2 UNITS: 100 INJECTION, SOLUTION INTRAVENOUS; SUBCUTANEOUS at 14:47

## 2022-10-03 RX ADMIN — GABAPENTIN 300 MG: 300 CAPSULE ORAL at 08:43

## 2022-10-03 RX ADMIN — GABAPENTIN 300 MG: 300 CAPSULE ORAL at 14:48

## 2022-10-03 RX ADMIN — LOSARTAN POTASSIUM 50 MG: 50 TABLET, FILM COATED ORAL at 08:44

## 2022-10-03 RX ADMIN — LEVETIRACETAM 500 MG: 500 TABLET, FILM COATED ORAL at 08:44

## 2022-10-03 RX ADMIN — Medication 10 ML: at 08:46

## 2022-10-03 RX ADMIN — TRAZODONE HYDROCHLORIDE 100 MG: 100 TABLET ORAL at 20:11

## 2022-10-03 RX ADMIN — ROPINIROLE HYDROCHLORIDE 0.25 MG: 0.5 TABLET, FILM COATED ORAL at 20:11

## 2022-10-03 RX ADMIN — LEVETIRACETAM 500 MG: 500 TABLET, FILM COATED ORAL at 20:10

## 2022-10-03 NOTE — PROGRESS NOTES
" LOS: 3 days     Name: Vidhi Lopez  Age: 72 y.o.  Sex: female  :  1950  MRN: [id number]         Primary Care Physician: Abiodun Vila MD    Subjective   Subjective  Continues to feel weak on her right side.  Feels that her dysarthria has resolved.  No new complaints or acute overnight events.    Objective   Vital Signs  Temp:  [98.2 °F (36.8 °C)-98.5 °F (36.9 °C)] 98.4 °F (36.9 °C)  Heart Rate:  [54-71] 65  Resp:  [18] 18  BP: (160-177)/(62-92) 162/72  Body mass index is 27.81 kg/m².    Objective:  General Appearance:  Comfortable and in no acute distress.    Vital signs: (most recent): Blood pressure 162/72, pulse 65, temperature 98.4 °F (36.9 °C), temperature source Oral, resp. rate 18, height 162.6 cm (64\"), weight 73.5 kg (162 lb), SpO2 99 %, not currently breastfeeding.    Lungs:  Normal effort and normal respiratory rate.    Heart: Normal rate.  Regular rhythm.    Abdomen: Abdomen is soft.  Bowel sounds are normal.   There is no abdominal tenderness.     Extremities: There is no dependent edema or local swelling.    Neurological: Patient is alert and oriented to person, place and time.    Skin:  Warm and dry.              Results Review:       I reviewed the patient's new clinical results.    Results from last 7 days   Lab Units 10/02/22  0555 10/01/22  0604 22  1458   WBC 10*3/mm3 10.25 11.03* 11.22*   HEMOGLOBIN g/dL 10.1* 11.5* 11.4*   PLATELETS 10*3/mm3 186 201 219     Results from last 7 days   Lab Units 10/02/22  0555 10/01/22  0604 22  1458   SODIUM mmol/L 138 138 138   POTASSIUM mmol/L 4.3 3.6 3.8   CHLORIDE mmol/L 99 92* 94*   CO2 mmol/L 28.7 32.4* 29.7*   BUN mg/dL 38* 33* 25*   CREATININE mg/dL 1.07* 1.20* 1.04*   CALCIUM mg/dL 9.4 9.6 10.3   GLUCOSE mg/dL 136* 120* 128*                 Scheduled Meds:   aspirin, 81 mg, Oral, Daily  clopidogrel, 75 mg, Oral, Daily  DULoxetine, 30 mg, Oral, Daily  ferrous sulfate, 325 mg, Oral, Daily  gabapentin, 300 mg, Oral, " TID  insulin lispro, 0-9 Units, Subcutaneous, TID AC  ipratropium-albuterol, 3 mL, Nebulization, 4x Daily - RT  levETIRAcetam, 500 mg, Oral, BID  losartan, 50 mg, Oral, Q24H  metoprolol tartrate, 25 mg, Oral, BID  pantoprazole, 40 mg, Oral, QAM  rOPINIRole, 0.25 mg, Oral, Nightly  rosuvastatin, 40 mg, Oral, Daily  sodium chloride, 10 mL, Intravenous, Q12H  traZODone, 100 mg, Oral, Nightly      PRN Meds:   dextrose  •  dextrose  •  dicyclomine  •  glucagon (human recombinant)  •  hydrOXYzine  •  influenza vaccine  •  LORazepam  •  ondansetron  •  [COMPLETED] Insert peripheral IV **AND** sodium chloride  •  sodium chloride  Continuous Infusions:       Assessment & Plan   Active Hospital Problems    Diagnosis  POA   • **Acute CVA (cerebrovascular accident) ([facility]) [I63.9]  Unknown   • Altered mental status [R41.82]  Yes   • Type 2 diabetes mellitus with hyperglycemia ([facility]) [E11.65]  Yes   • Dysarthria [R47.1]  Unknown   • Seizure disorder ([facility]) [G40.909]  Yes   • CAD (coronary artery disease) [I25.10]  Yes   • COPD (chronic obstructive pulmonary disease) ([facility]) [J44.9]  Yes      Resolved Hospital Problems   No resolved problems to display.       Assessment & Plan    -Brain MRI MRI/MRA noted.    Neurology following. On aspirin, Plavix, statin.   2D echocardiogram unremarkable.  Neurology recommends long-term dual antiplatelet therapy with aspirin and Plavix.  Follow-up in neurology office in 3 months.  -Blood pressure has started to rise and will add back her hydralazine.  -Renal function stable.  -Continue Keppra from her home regimen  -Blood sugars uncontrolled.  Hemoglobin A1c noted at 11.  On sliding scale.  I am going to add scheduled long-acting insulin as well.  -Therapy services  -Will need rehab upon discharge.  SNF versus acute      I wore full protective equipment throughout the patient encounter including eye protection and facemask.  Hand hygiene was performed before donning protective equipment and after  removal when leaving the room.    Raymond Peterson MD  [facility]  10/03/22  11:38 EDT

## 2022-10-03 NOTE — PLAN OF CARE
Goal Outcome Evaluation:  Plan of Care Reviewed With: patient           Outcome Evaluation: Pt participated with PT this AM. She continues to have R sided weakness and impaired coordination. She required mod to max A x2 for bed mobility and stood twice with mod/max A x2. Session focused on standing weight shifting activity and attempted lateral steps. She required R knee block due to buckling but was able to take 2 small side steps. Will continue to follow and progress as able. Recommending acute rehab.

## 2022-10-03 NOTE — CASE MANAGEMENT/SOCIAL WORK
Continued Stay Note  Psychiatric     Patient Name: Vidhi Lopez  MRN: [id number]  Today's Date: 10/3/2022    Admit Date: 9/30/2022    Plan: [facility] skilled bed available tomorrow, 10/4   Discharge Plan     Row Name 10/03/22 1622       Plan    Plan [facility] skilled bed available tomorrow, 10/4    Patient/Family in Agreement with Plan yes    Plan Comments CCP spoke with Kristine/Yuliya who accepted patient with a bed available tomorrow, 10/4. COVID test ordered. Patient updated and agreeable. CCP left VMM for daughter, Dorina. [facility] stretcher transport at 2PM arranged tomorrow. Packet in CCP office. Mary ELIZABETH RN CCP               Discharge Codes    No documentation.               Expected Discharge Date and Time     Expected Discharge Date Expected Discharge Time    Oct 4, 2022             Mary Davis RN

## 2022-10-03 NOTE — THERAPY TREATMENT NOTE
Patient Name: Vidhi Lopez  : 1950    MRN: [id number]                              Today's Date: 10/3/2022       Admit Date: 2022    Visit Dx:     ICD-10-CM ICD-9-CM   1. Altered mental status, unspecified altered mental status type  R41.82 780.97   2. Acute stroke due to ischemia ([facility])  I63.9 434.91     Patient Active Problem List   Diagnosis   • Anxiety (Chronic benzos)   • Insomnia   • GERD (gastroesophageal reflux disease)   • Fibromyalgia   • RLS (restless legs syndrome)   • Migraines   • COPD (chronic obstructive pulmonary disease) ([facility])   • CAD (coronary artery disease)   • Bilateral carotid artery stenosis   • URILE (obstructive sleep apnea)   • Chronic pain (Chronic narcotics)   • Recurrent UTI/interstitial cystitis on chronic methenamine   • Demand ischemia ([facility])   • Hypoxemia requiring supplemental oxygen   • Depression   • Edema   • Seizure disorder ([facility])   • Lumbar compression fracture ([facility])   • Dysarthria   • Anemia   • Balance problem   • Hypercalcemia   • Bilateral inguinal hernia, without obstruction or gangrene, not specified as recurrent   • Acute CVA (cerebrovascular accident) ([facility])   • Rheumatoid arthritis ([facility])   • Type II diabetes mellitus with renal manifestations ([facility])   • Type II diabetes mellitus with neurological manifestations ([facility])   • Type 2 diabetes mellitus with vascular disease ([facility])   • Essential hypertension, benign   • CRI (chronic renal insufficiency), stage 3 (moderate) ([facility])   • Hypercholesterolemia   • Type 2 diabetes mellitus with hyperglycemia ([facility])   • Urinary tract infection without hematuria   • Dysuria   • Precordial pain   • Cerumen debris on tympanic membrane   • Chest pain   • Medicare annual wellness visit, subsequent   • Wellness examination   • Post-menopausal   • Encounter for screening mammogram for malignant neoplasm of breast    • Altered mental status     Past Medical History:   Diagnosis Date   • Acute renal injury (HCC) 2022   • Allergic  rhinitis    • Anemia    • Balance problem    • Bilateral ovarian cysts    • Bulging lumbar disc    • C. difficile colitis 09/2018   • CAD (coronary artery disease)    • Carotid artery stenosis    • Chronic back pain    • Chronic narcotic use    • Chronic sinusitis    • Compression fracture of L1 lumbar vertebra ([facility])    • Coronary artery disease    • CRI (chronic renal insufficiency), stage 3 (moderate) ([facility])     stage 3B as of '22 sees nephrology   • CVA (cerebral vascular accident) ([facility]) 2000    GENERALIZED UPPER BODY WEAKNESS RESIDUAL PER PT   • Depression with anxiety     Depression/Anxiety   • Edema, lower extremity    • Endometriosis     Dr. iJn; estradiol    • Essential hypertension, benign    • Fibromyalgia    • Frequent falls    • Frequent UTI    • GERD (gastroesophageal reflux disease)    • Gout    • H/O C. difficile diarrhea    • History of myocardial infarction     mxl   • Hypercholesterolemia    • Hypocalcemia    • Insomnia    • Interstitial cystitis    • Migraines    • Myocardial infarction ([facility])     2003   • Nicotine dependence    • Nodular goiter    • On home oxygen therapy     2 L NC   • URIEL on CPAP    • Rheumatoid arthritis ([facility])    • RLS (restless legs syndrome)    • Sciatica    • Seizure disorder, nonconvulsive, with status epilepticus (HCC) 03/20/2018    last swx 2017   • Septic joint of right knee joint (HCC) 03/21/2018   • Type 2 diabetes mellitus with hyperglycemia (HCC) 2013    insulin started soon after dx stopped as of early '22   • Type 2 diabetes mellitus with vascular disease (HCC)    • Type II diabetes mellitus with neurological manifestations (HCC)     likely multifactorial in nature   • Type II diabetes mellitus with renal manifestations (HCC)    • Urinary incontinence    • Varicose vein of leg    • Yeast dermatitis      Past Surgical History:   Procedure Laterality Date   • ARTERIOGRAM N/A 02/12/2018    Procedure: Renal Arteriogram;  Surgeon: Lito Flores MD;  Location: [facility]  CATH INVASIVE LOCATION;  Service:    • BREAST BIOPSY Right 1991   • CARDIAC CATHETERIZATION N/A 02/12/2018    Procedure: Right Heart Cath;  Surgeon: Lito Flores MD;  Location:  [facility] CATH INVASIVE LOCATION;  Service:    • CAROTID STENT      Transcath    • CAROTID STENT Left    • CHOLECYSTECTOMY     • COLONOSCOPY     • CYSTOSTOMY W/ BLADDER BIOPSY     • DILATATION AND CURETTAGE     • ENDOSCOPY N/A 6/22/2022    Procedure: ESOPHAGOGASTRODUODENOSCOPY;  Surgeon: Sahil Viera MD;  Location: [facility] OR;  Service: Gastroenterology;  Laterality: N/A;  GASTRITIS  LUPILLO TEST  ANTRUM BIOPSY  SMALL BOWEL BIOPSY   • KNEE ARTHROSCOPY Right 03/23/2018    Procedure: ARTHROSCOPY, RIGHT KNEE  INCISION AND DRAINAGE LOWER EXTREMITY WITH SYNOVIAL BIOPSY;  Surgeon: Dilip Giron MD;  Location: [facility] OR;  Service: Orthopedics   • LAPAROSCOPIC CHOLECYSTECTOMY  1994    Lap rolando   • OOPHORECTOMY     • PAP SMEAR  05/10/2016   • SIGMOIDOSCOPY N/A 6/22/2022    Procedure: SIGMOIDOSCOPY FLEXIBLE;  Surgeon: Sahil Viera MD;  Location: [facility] OR;  Service: Gastroenterology;  Laterality: N/A;  RANDOM COLON BIOPSY   • SUBTOTAL HYSTERECTOMY        General Information     Row Name 10/03/22 1050          Physical Therapy Time and Intention    Document Type therapy note (daily note)  -CF     Mode of Treatment physical therapy;co-treatment  -CF     Row Name 10/03/22 1050          General Information    Patient Profile Reviewed yes  -CF     Existing Precautions/Restrictions fall  -CF     Row Name 10/03/22 1050          Cognition    Orientation Status (Cognition) oriented x 3  -CF     Row Name 10/03/22 1050          Safety Issues, Functional Mobility    Impairments Affecting Function (Mobility) balance;postural/trunk control;range of motion (ROM);endurance/activity tolerance;strength  -CF           User Key  (r) = Recorded By, (t) = Taken By, (c) = Cosigned By    Initials Name Provider Type    CF Eliana James PT Physical Therapist                Mobility     Row Name 10/03/22 1051          Bed Mobility    Bed Mobility supine-sit;sit-supine  -CF     Supine-Sit Phillips (Bed Mobility) moderate assist (50% patient effort);2 person assist;verbal cues;nonverbal cues (demo/gesture)  -CF     Sit-Supine Phillips (Bed Mobility) moderate assist (50% patient effort);2 person assist;verbal cues;nonverbal cues (demo/gesture)  -CF     Assistive Device (Bed Mobility) head of bed elevated;bed rails;draw sheet  -CF     Row Name 10/03/22 1051          Sit-Stand Transfer    Sit-Stand Phillips (Transfers) moderate assist (50% patient effort);maximum assist (25% patient effort);2 person assist;verbal cues;nonverbal cues (demo/gesture)  -CF     Assistive Device (Sit-Stand Transfers) walker, front-wheeled  -CF     Comment, (Sit-Stand Transfer) STS x2 reps  -CF     Row Name 10/03/22 1051          Gait/Stairs (Locomotion)    Phillips Level (Gait) moderate assist (50% patient effort);2 person assist;verbal cues;nonverbal cues (demo/gesture)  -CF     Assistive Device (Gait) --  HHA x2  -CF     Distance in Feet (Gait) 2 small steps towards L  -CF     Deviations/Abnormal Patterns (Gait) jannie decreased;gait speed decreased;stride length decreased;ataxic  -CF     Bilateral Gait Deviations forward flexed posture  -CF     Right Sided Gait Deviations knee buckling, right side;heel strike decreased  -CF     Comment, (Gait/Stairs) Attempted side steps, able to take 2 small lateral steps with R knee blocked  -CF           User Key  (r) = Recorded By, (t) = Taken By, (c) = Cosigned By    Initials Name Provider Type    CF Eliana James PT Physical Therapist               Obj/Interventions     Row Name 10/03/22 1053          Balance    Balance Assessment standing static balance;sitting static balance;sitting dynamic balance;standing dynamic balance  -CF     Static Sitting Balance minimal assist;contact guard  -CF     Dynamic Sitting Balance minimal assist   -CF     Position, Sitting Balance sitting edge of bed  -CF     Static Standing Balance moderate assist  -CF     Dynamic Standing Balance moderate assist;2-person assist  -CF     Position/Device Used, Standing Balance supported  HHA x2  -CF           User Key  (r) = Recorded By, (t) = Taken By, (c) = Cosigned By    Initials Name Provider Type    Eliana Guzman, PT Physical Therapist               Goals/Plan    No documentation.                Clinical Impression     Row Name 10/03/22 1053          Pain    Pretreatment Pain Rating 0/10 - no pain  -CF     Posttreatment Pain Rating 0/10 - no pain  -CF     Row Name 10/03/22 1053          Plan of Care Review    Plan of Care Reviewed With patient  -CF     Outcome Evaluation Pt participated with PT this AM. She continues to have R sided weakness and impaired coordination. She required mod to max A x2 for bed mobility and stood twice with mod/max A x2. Session focused on standing weight shifting activity and attempted lateral steps. She required R knee block due to buckling but was able to take 2 small side steps. Will continue to follow and progress as able. Recommending acute rehab.  -CF     Row Name 10/03/22 1053          Vital Signs    O2 Delivery Pre Treatment room air  -CF     O2 Delivery Intra Treatment room air  -CF     O2 Delivery Post Treatment room air  -CF     Row Name 10/03/22 1053          Positioning and Restraints    Pre-Treatment Position in bed  -CF     Post Treatment Position bed  -CF     In Bed notified nsg;call light within reach;encouraged to call for assist;exit alarm on;fowlers  -CF           User Key  (r) = Recorded By, (t) = Taken By, (c) = Cosigned By    Initials Name Provider Type    Eliana Guzman PT Physical Therapist               Outcome Measures     Row Name 10/03/22 1058          How much help from another person do you currently need...    Turning from your back to your side while in flat bed without using bedrails? 3  -CF      Moving from lying on back to sitting on the side of a flat bed without bedrails? 2  -CF     Moving to and from a bed to a chair (including a wheelchair)? 2  -CF     Standing up from a chair using your arms (e.g., wheelchair, bedside chair)? 2  -CF     Climbing 3-5 steps with a railing? 1  -CF     To walk in hospital room? 2  -CF     AM-PAC 6 Clicks Score (PT) 12  -CF     Highest level of mobility 4 --> Transferred to chair/commode  -CF     Row Name 10/03/22 1058          Functional Assessment    Outcome Measure Options AM-PAC 6 Clicks Basic Mobility (PT)  -CF           User Key  (r) = Recorded By, (t) = Taken By, (c) = Cosigned By    Initials Name Provider Type    CF Eliana James, DARIUS Physical Therapist                             Physical Therapy Education                 Title: PT OT SLP Therapies (In Progress)     Topic: Physical Therapy (In Progress)     Point: Mobility training (Done)     Learning Progress Summary           Patient Acceptance, E, VU,NR by  at 10/3/2022 1058    Acceptance, E,TB,D, VU,NR by  at 10/2/2022 1145                   Point: Home exercise program (Not Started)     Learner Progress:  Not documented in this visit.          Point: Body mechanics (Done)     Learning Progress Summary           Patient Acceptance, E, VU,NR by  at 10/3/2022 1058                   Point: Precautions (Not Started)     Learner Progress:  Not documented in this visit.                      User Key     Initials Effective Dates Name Provider Type Discipline     06/16/21 -  Sheri Campos, PT Physical Therapist PT     06/16/21 -  Eliana Jmaes PT Physical Therapist PT              PT Recommendation and Plan     Plan of Care Reviewed With: patient  Outcome Evaluation: Pt participated with PT this AM. She continues to have R sided weakness and impaired coordination. She required mod to max A x2 for bed mobility and stood twice with mod/max A x2. Session focused on standing weight shifting activity  and attempted lateral steps. She required R knee block due to buckling but was able to take 2 small side steps. Will continue to follow and progress as able. Recommending acute rehab.     Time Calculation:    PT Charges     Row Name 10/03/22 1019             Time Calculation    Start Time 0932  -CF      Stop Time 0956  -CF      Time Calculation (min) 24 min  -CF      PT Received On 10/03/22  -CF      PT - Next Appointment 10/04/22  -CF            User Key  (r) = Recorded By, (t) = Taken By, (c) = Cosigned By    Initials Name Provider Type    CF Eliana James, DARIUS Physical Therapist              Therapy Charges for Today     Code Description Service Date Service Provider Modifiers Qty    [id number]  PT THERAPEUTIC ACT EA 15 MIN 10/3/2022 Eliana James, PT GP 1    [id number]  PT THER PROC EA 15 MIN 10/3/2022 Eliana James, PT GP 1          PT G-Codes  Outcome Measure Options: AM-PAC 6 Clicks Basic Mobility (PT)  AM-PAC 6 Clicks Score (PT): 12  Modified [place] Scale: 4 - Moderately severe disability.  Unable to walk without assistance, and unable to attend to own bodily needs without assistance.    Eliana James PT  10/3/2022

## 2022-10-03 NOTE — DISCHARGE PLACEMENT REQUEST
"Vidhi Lopez (72 y.o. Female)             Date of Birth   1950    Social Security Number       Address   [address] [facility] [address] [place] [address]    Home Phone   [phone]    MRN   [id number]       Jain   [employer] [employer]    Marital Status                               Admission Date   9/30/22    Admission Type   Emergency    Admitting Provider       Attending Provider   Raymond Peterson MD    Department, Room/Bed   [address], [address]       Discharge Date       Discharge Disposition       Discharge Destination                               Attending Provider: Raymond Peterson MD    Allergies: Amlodipine, Reglan [Metoclopramide]    Isolation: None   Infection: None   Code Status: CPR   Advance Care Planning Activity    Ht: 162.6 cm (64\")   Wt: 73.5 kg (162 lb)    Admission Cmt: None   Principal Problem: Acute CVA (cerebrovascular accident) (HCC) [I63.9] More...                 Active Insurance as of 9/30/2022     Primary Coverage     Payor Plan Insurance Group Employer/Plan Group    MEDICARE MEDICARE A & B      Payor Plan Address Payor Plan Phone Number Payor Plan Fax Number Effective Dates    [address] [phone]  3/1/2016 - None Entered    [facility] [id number]       Subscriber Name Subscriber Birth Date Member ID       VIDHI LOPEZ 1950 [id number]           Secondary Coverage     Payor Plan Insurance Group Employer/Plan Group    [facility] SUPP [id number]     Payor Plan Address Payor Plan Phone Number Payor Plan Fax Number Effective Dates    [address]   6/1/2016 - None Entered    [facility] [id number]       Subscriber Name Subscriber Birth Date Member ID       VIDHI LOPEZ 1950 [id number]                 Emergency Contacts      (Rel.) Home Phone Work Phone Mobile Phone    SHARON KONG (Sister) [phone] -- [phone]    GONZÁLESNATALYA (Daughter) -- -- [phone]    "         {Outbreak/Travel/Exposure Documentation......;  Question Available Choices Patient Response   COVID-19 Outbreak Screen:  Do you currently have a new onset of the following symptoms?        Fever/Chills, Cough, Shortness of air, Loss of taste or smell, No, Unknown  No (09/30/22 1940)   COVID-19 Outbreak Screen: In the last 14 days, have you had contact with anyone who is ill, has show any of the symptoms listed above and/or has been diagnosis with the 2019 Novel Coronavirus? This includes any immediate household members but excludes any patients with whom you have been in contact within your normal work duties wearing proper PPE, if you are a healthcare worker.  Yes, No, Unknown              No (09/30/22 1940)   COVID-19 Outbreak Screen: Who was notified? Free text (not recorded)   Ebola Screening Outbreak Screen: Have you traveled to the [place] the [place] or [place] within the past 21 days?  Yes, No, Unknown (not recorded)   Ebola Screening Outbreak Screen: Do you have ANY of the following symptoms: Fever/Chills, Vomiting, Diarrhea, Fatigue, Headache, Muscle pain, Unexplained bleeding, Abdominal (stomach) pain, No, Unknown (not recorded)   Ebola Screening Outbreak Screen: Name of Person notified Free text (not recorded)   Travel Screen: Have you traveled in the last month? If so, to what country have you traveled? If [place] what state? Yes, No, Unknown  List of all countries  List of all States No (09/30/22 1947)  (not recorded)  (not recorded)   Infection Risk: Do you currently have the following symptoms?  (If cough is selected, the Tuberculosis Screen is performed.) Cough, Fever, Rash, No No (09/30/22 1947)   Tuberculosis Screen: Do you have any of the following Tuberculosis Risks?  · Have you lived or spent time with anyone who had or may have TB?  · Have you lived in or visited any of the following areas for more than one month: [place], [place], [place], [place] or [place], the [place] or  [place]?  · Do you have HIV/AIDS?  · Have you lived in or worked in a nursing home, homeless shelter, correctional facility, or substance abuse treatment facility?   · No    If Yes do you have any of the following symptoms? Yes responses display to the right    If Yes, symptoms listed are:  Cough greater than or equal to 3 weeks, Loss of appetite, Unexplained weight loss, Night sweats, Bloody sputum or hemoptysis, Hoarseness, Fever, Fatigue, Chest pain, No (not recorded)  (not recorded)   Exposure Screen: Have you been exposed to any of these contagious diseases in the last month? Measles, Chickenpox, Meningitis, Pertussis, Whooping Cough, No No (09/30/22 1947)

## 2022-10-03 NOTE — PLAN OF CARE
Goal Outcome Evaluation:  Plan of Care Reviewed With: patient        Progress: no change  Outcome Evaluation: Pt seen by OT this date for evaluation. Pt is a 73 y/o female admit to [facility] for acute CVA and slurred speech. Pt found to have L thalamic stroke. She has hx of chronic R cerebellar stroke as well. Pt reports that she lives at an (I) living facility and is typically (I) with all ADLs at baseline and does not use any AD for functional mobility but does have rollator if necessary. Upon arrival pt lying supine in bed, no c/o pain, A&O x3. Pt requiring Mod A x2 for sup>sit>sup transition with verbal cues for sequencing. Once sitting EOB pt Dependent for donning socks. Pt performed sit>stand transition x2 with Max>Mod A x2 with difficulty side stepping this date with RLE knee buckling. Pt noted with impaired ROM in RUE, decrease coordination, decrease strength, decrease balance, decrease functional mobility, decrease endurance. Pt left in supine, needs in reach, alarm on. Pt to benefit from skilled OT services to address goals and deficits. OT wore mask, gloves, glasses, hand hygiene performed. OT rec rehab vs [facility] as D/C.

## 2022-10-03 NOTE — PLAN OF CARE
Problem: Fall Injury Risk  Goal: Absence of Fall and Fall-Related Injury  Intervention: Identify and Manage Contributors  Description: Develop a fall prevention plan with the patient and caregiver/family.  Provide reorientation, appropriate sensory stimulation and routines with changes in mental status to decrease risk of fall.  Promote use of personal vision and auditory aids.  Assess assistance level required for safe and effective self-care; provide support as needed, such as toileting, mobilization. For age 65 and older, implement timed toileting with assistance.  Encourage physical activity, such as performance of mobility and self-care at highest level of patient ability, multicomponent exercise program and provision of appropriate assistive devices.  If fall occurs, assess the severity of injury; implement fall injury protocol. Determine the cause and revise fall injury prevention plan.  Regularly review medication contribution to fall risk; adjust medication administration times to minimize risk of falling.  Consider risk related to polypharmacy and age.  Balance adequate pain management with potential for oversedation.  Recent Flowsheet Documentation  Taken 10/3/2022 0248 by Rohit Lieberman RN  Medication Review/Management: medications reviewed  Taken 10/3/2022 0013 by Rohit Lieberman RN  Medication Review/Management: medications reviewed  Taken 10/2/2022 2204 by Rohit Lieberman RN  Medication Review/Management: medications reviewed  Taken 10/2/2022 2044 by Rohit Lieberman RN  Medication Review/Management: medications reviewed  Taken 10/2/2022 2026 by Rohit Lieberman RN  Medication Review/Management: medications reviewed   Goal Outcome Evaluation:   Patient NIH 2, patient not complaining of pain or discomfort. VSS, bed alarm set, and call light in reach.

## 2022-10-03 NOTE — THERAPY EVALUATION
Patient Name: Vidhi Lopez  : 1950    MRN: [id number]                              Today's Date: 10/3/2022       Admit Date: 2022    Visit Dx:     ICD-10-CM ICD-9-CM   1. Altered mental status, unspecified altered mental status type  R41.82 780.97   2. Acute stroke due to ischemia (formerly [facility])  I63.9 434.91     Patient Active Problem List   Diagnosis   • Anxiety (Chronic benzos)   • Insomnia   • GERD (gastroesophageal reflux disease)   • Fibromyalgia   • RLS (restless legs syndrome)   • Migraines   • COPD (chronic obstructive pulmonary disease) (formerly [facility])   • CAD (coronary artery disease)   • Bilateral carotid artery stenosis   • URIEL (obstructive sleep apnea)   • Chronic pain (Chronic narcotics)   • Recurrent UTI/interstitial cystitis on chronic methenamine   • Demand ischemia (formerly [facility])   • Hypoxemia requiring supplemental oxygen   • Depression   • Edema   • Seizure disorder (formerly [facility])   • Lumbar compression fracture (formerly [facility])   • Dysarthria   • Anemia   • Balance problem   • Hypercalcemia   • Bilateral inguinal hernia, without obstruction or gangrene, not specified as recurrent   • Acute CVA (cerebrovascular accident) (formerly [facility])   • Rheumatoid arthritis (formerly [facility])   • Type II diabetes mellitus with renal manifestations (formerly [facility])   • Type II diabetes mellitus with neurological manifestations (formerly [facility])   • Type 2 diabetes mellitus with vascular disease (formerly [facility])   • Essential hypertension, benign   • CRI (chronic renal insufficiency), stage 3 (moderate) (formerly [facility])   • Hypercholesterolemia   • Type 2 diabetes mellitus with hyperglycemia (formerly [facility])   • Urinary tract infection without hematuria   • Dysuria   • Precordial pain   • Cerumen debris on tympanic membrane   • Chest pain   • Medicare annual wellness visit, subsequent   • Wellness examination   • Post-menopausal   • Encounter for screening mammogram for malignant neoplasm of breast    • Altered mental status     Past Medical History:   Diagnosis Date   • Acute renal injury (HCC) 2022   • Allergic  rhinitis    • Anemia    • Balance problem    • Bilateral ovarian cysts    • Bulging lumbar disc    • C. difficile colitis 09/2018   • CAD (coronary artery disease)    • Carotid artery stenosis    • Chronic back pain    • Chronic narcotic use    • Chronic sinusitis    • Compression fracture of L1 lumbar vertebra ([facility])    • Coronary artery disease    • CRI (chronic renal insufficiency), stage 3 (moderate) ([facility])     stage 3B as of '22 sees nephrology   • CVA (cerebral vascular accident) ([facility]) 2000    GENERALIZED UPPER BODY WEAKNESS RESIDUAL PER PT   • Depression with anxiety     Depression/Anxiety   • Edema, lower extremity    • Endometriosis     Dr. Jin; estradiol    • Essential hypertension, benign    • Fibromyalgia    • Frequent falls    • Frequent UTI    • GERD (gastroesophageal reflux disease)    • Gout    • H/O C. difficile diarrhea    • History of myocardial infarction     mxl   • Hypercholesterolemia    • Hypocalcemia    • Insomnia    • Interstitial cystitis    • Migraines    • Myocardial infarction ([facility])     2003   • Nicotine dependence    • Nodular goiter    • On home oxygen therapy     2 L NC   • URIEL on CPAP    • Rheumatoid arthritis ([facility])    • RLS (restless legs syndrome)    • Sciatica    • Seizure disorder, nonconvulsive, with status epilepticus (HCC) 03/20/2018    last swx 2017   • Septic joint of right knee joint (HCC) 03/21/2018   • Type 2 diabetes mellitus with hyperglycemia (HCC) 2013    insulin started soon after dx stopped as of early '22   • Type 2 diabetes mellitus with vascular disease (HCC)    • Type II diabetes mellitus with neurological manifestations (HCC)     likely multifactorial in nature   • Type II diabetes mellitus with renal manifestations (HCC)    • Urinary incontinence    • Varicose vein of leg    • Yeast dermatitis      Past Surgical History:   Procedure Laterality Date   • ARTERIOGRAM N/A 02/12/2018    Procedure: Renal Arteriogram;  Surgeon: Lito Flores MD;  Location: [facility]  CATH INVASIVE LOCATION;  Service:    • BREAST BIOPSY Right 1991   • CARDIAC CATHETERIZATION N/A 02/12/2018    Procedure: Right Heart Cath;  Surgeon: Lito Flores MD;  Location:  [facility] CATH INVASIVE LOCATION;  Service:    • CAROTID STENT      Transcath    • CAROTID STENT Left    • CHOLECYSTECTOMY     • COLONOSCOPY     • CYSTOSTOMY W/ BLADDER BIOPSY     • DILATATION AND CURETTAGE     • ENDOSCOPY N/A 6/22/2022    Procedure: ESOPHAGOGASTRODUODENOSCOPY;  Surgeon: Sahil Viera MD;  Location: [facility] OR;  Service: Gastroenterology;  Laterality: N/A;  GASTRITIS  LUPILLO TEST  ANTRUM BIOPSY  SMALL BOWEL BIOPSY   • KNEE ARTHROSCOPY Right 03/23/2018    Procedure: ARTHROSCOPY, RIGHT KNEE  INCISION AND DRAINAGE LOWER EXTREMITY WITH SYNOVIAL BIOPSY;  Surgeon: Dilip Giron MD;  Location: [facility] OR;  Service: Orthopedics   • LAPAROSCOPIC CHOLECYSTECTOMY  1994    Lap rolando   • OOPHORECTOMY     • PAP SMEAR  05/10/2016   • SIGMOIDOSCOPY N/A 6/22/2022    Procedure: SIGMOIDOSCOPY FLEXIBLE;  Surgeon: Shail Viera MD;  Location: [facility] OR;  Service: Gastroenterology;  Laterality: N/A;  RANDOM COLON BIOPSY   • SUBTOTAL HYSTERECTOMY        General Information     Row Name 10/03/22 1255          OT Time and Intention    Document Type evaluation  -BL     Mode of Treatment occupational therapy;co-treatment  -BL     Row Name 10/03/22 1255          General Information    Patient Profile Reviewed yes  -BL     Prior Level of Function independent:;ADL's  -BL     Existing Precautions/Restrictions fall  -BL     Barriers to Rehab none identified  -BL     Row Name 10/03/22 1255          Living Environment    People in Home alone  -BL     Row Name 10/03/22 1255          Cognition    Orientation Status (Cognition) oriented x 3  -BL     Row Name 10/03/22 1255          Safety Issues, Functional Mobility    Safety Issues Affecting Function (Mobility) awareness of need for assistance;insight into deficits/self-awareness;safety precaution  awareness;problem-solving;sequencing abilities  -     Impairments Affecting Function (Mobility) balance;coordination;endurance/activity tolerance;postural/trunk control;strength;range of motion (ROM);motor planning  -           User Key  (r) = Recorded By, (t) = Taken By, (c) = Cosigned By    Initials Name Provider Type     Grecia Charlton OT Occupational Therapist                 Mobility/ADL's     Row Name 10/03/22 1256          Bed Mobility    Bed Mobility supine-sit;sit-supine  -     Supine-Sit [place] (Bed Mobility) moderate assist (50% patient effort);2 person assist;verbal cues;nonverbal cues (demo/gesture)  -     Sit-Supine [place] (Bed Mobility) moderate assist (50% patient effort);2 person assist;verbal cues;nonverbal cues (demo/gesture)  -     Bed Mobility, Safety Issues decreased use of arms for pushing/pulling  -     Assistive Device (Bed Mobility) bed rails;draw sheet;head of bed elevated  -     Comment, (Bed Mobility) Pt with noted difficulty with trunk elevation  -     Row Name 10/03/22 1256          Transfers    Transfers sit-stand transfer;stand-sit transfer  -     Sit-Stand [place] (Transfers) moderate assist (50% patient effort);maximum assist (25% patient effort);2 person assist;verbal cues;nonverbal cues (demo/gesture)  -     Stand-Sit [place] (Transfers) moderate assist (50% patient effort);maximum assist (25% patient effort);2 person assist;verbal cues;nonverbal cues (demo/gesture)  -     Row Name 10/03/22 1256          Sit-Stand Transfer    Comment, (Sit-Stand Transfer) HHA x2  -     Row Name 10/03/22 1256          Activities of Daily Living    BADL Assessment/Intervention lower body dressing  -     Row Name 10/03/22 1256          Lower Body Dressing Assessment/Training    [place] Level (Lower Body Dressing) doff;don;socks;dependent (less than 25% patient effort)  -     Position (Lower Body Dressing) edge of bed sitting  -            User Key  (r) = Recorded By, (t) = Taken By, (c) = Cosigned By    Initials Name Provider Type     Grecia Charlton OT Occupational Therapist               Obj/Interventions     Row Name 10/03/22 1258          Sensory Assessment (Somatosensory)    Sensory Assessment (Somatosensory) UE sensation intact  -     Row Name 10/03/22 1258          Vision Assessment/Intervention    Visual Impairment/Limitations WFL  -     Row Name 10/03/22 1258          Range of Motion Comprehensive    Comment, General Range of Motion RUE ~45% AROM shoulder, LUE WFL  -     Row Name 10/03/22 1258          Strength Comprehensive (MMT)    Comment, General Manual Muscle Testing (MMT) Assessment RUE 2/5, LUE 3/5  -BL     Row Name 10/03/22 1258          Balance    Balance Assessment sitting static balance;sitting dynamic balance;sit to stand dynamic balance;standing static balance;standing dynamic balance  -BL     Static Sitting Balance minimal assist;contact guard  -BL     Dynamic Sitting Balance minimal assist  -BL     Position, Sitting Balance unsupported;sitting edge of bed  -BL     Sit to Stand Dynamic Balance maximum assist;2-person assist  -BL     Static Standing Balance moderate assist;verbal cues  -BL     Dynamic Standing Balance moderate assist;2-person assist;verbal cues  -BL     Position/Device Used, Standing Balance supported  -BL     Balance Interventions sitting;standing;sit to stand;supported;static;dynamic;occupation based/functional task  -BL           User Key  (r) = Recorded By, (t) = Taken By, (c) = Cosigned By    Initials Name Provider Type     Grecia Charlton OT Occupational Therapist               Goals/Plan     Row Name 10/03/22 1306          Bed Mobility Goal 1 (OT)    Activity/Assistive Device (Bed Mobility Goal 1, OT) bed mobility activities, all  -BL     [place] Level/Cues Needed (Bed Mobility Goal 1, OT) moderate assist (50-74% patient effort);minimum assist (75% or more patient effort)  -     Time  Frame (Bed Mobility Goal 1, OT) short term goal (STG);2 weeks  -BL     Progress/Outcomes (Bed Mobility Goal 1, OT) continuing progress toward goal  -BL     Row Name 10/03/22 1306          Transfer Goal 1 (OT)    Activity/Assistive Device (Transfer Goal 1, OT) sit-to-stand/stand-to-sit;bed-to-chair/chair-to-bed  -BL     [place] Level/Cues Needed (Transfer Goal 1, OT) moderate assist (50-74% patient effort)  -BL     Time Frame (Transfer Goal 1, OT) short term goal (STG);2 weeks  -BL     Progress/Outcome (Transfer Goal 1, OT) continuing progress toward goal  -BL     Row Name 10/03/22 1306          Dressing Goal 1 (OT)    Activity/Device (Dressing Goal 1, OT) dressing skills, all  -BL     [place]/Cues Needed (Dressing Goal 1, OT) maximum assist (25-49% patient effort)  -BL     Time Frame (Dressing Goal 1, OT) short term goal (STG);2 weeks  -BL     Progress/Outcome (Dressing Goal 1, OT) continuing progress toward goal  -BL     Row Name 10/03/22 1306          Grooming Goal 1 (OT)    Activity/Device (Grooming Goal 1, OT) grooming skills, all  -BL     [place] (Grooming Goal 1, OT) minimum assist (75% or more patient effort)  -BL     Time Frame (Grooming Goal 1, OT) short term goal (STG);2 weeks  -BL     Progress/Outcome (Grooming Goal 1, OT) continuing progress toward goal  -BL     Row Name 10/03/22 1306          Self-Feeding Goal 1 (OT)    Activity/Device (Self-Feeding Goal 1, OT) self-feeding skills, all  -BL     [place] Level/Cues Needed (Self-Feeding Goal 1, OT) minimum assist (75% or more patient effort)  -BL     Time Frame (Self-Feeding Goal 1, OT) short term goal (STG);2 weeks  -BL     Progress/Outcomes (Self-Feeding Goal 1, OT) continuing progress toward goal  -BL     Row Name 10/03/22 1306          ROM Goal 1 (OT)    ROM Goal 1 (OT) Pt will be (I) with BUE AROM to prevent contracture and improve joint mobility for completing ADLs  -BL     Time Frame (ROM Goal 1, OT) short term goal (STG);2 weeks   -BL     Progress/Outcome (ROM Goal 1, OT) continuing progress toward goal  -BL     Row Name 10/03/22 2902          Therapy Assessment/Plan (OT)    Planned Therapy Interventions (OT) BADL retraining;IADL retraining;occupation/activity based interventions;ROM/therapeutic exercise;transfer/mobility retraining;patient/caregiver education/training;neuromuscular control/coordination retraining  -BL           User Key  (r) = Recorded By, (t) = Taken By, (c) = Cosigned By    Initials Name Provider Type    BL Grecia Charlton OT Occupational Therapist               Clinical Impression     Row Name 10/03/22 1256          Pain Assessment    Pretreatment Pain Rating 0/10 - no pain  -BL     Posttreatment Pain Rating 0/10 - no pain  -BL     Row Name 10/03/22 1254          Plan of Care Review    Plan of Care Reviewed With patient  -BL     Progress no change  -BL     Outcome Evaluation Pt seen by OT this date for evaluation. Pt is a 71 y/o female admit to [facility] for acute CVA and slurred speech. Pt found to have L thalamic stroke. She has hx of chronic R cerebellar stroke as well. Pt reports that she lives at an (I) living facility and is typically (I) with all ADLs at baseline and does not use any AD for functional mobility but does have rollator if necessary. Upon arrival pt lying supine in bed, no c/o pain, A&O x3. Pt requiring Mod A x2 for sup>sit>sup transition with verbal cues for sequencing. Once sitting EOB pt Dependent for donning socks. Pt performed sit>stand transition x2 with Max>Mod A x2 with difficulty side stepping this date with RLE knee buckling. Pt noted with impaired ROM in RUE, decrease coordination, decrease strength, decrease balance, decrease functional mobility, decrease endurance. Pt left in supine, needs in reach, alarm on. Pt to benefit from skilled OT services to address goals and deficits. OT wore mask, gloves, glasses, hand hygiene performed. OT rec rehab vs [facility] as D/C.  -     Row Name 10/03/22 0222           Therapy Assessment/Plan (OT)    Rehab Potential (OT) good, to achieve stated therapy goals  -     Criteria for Skilled Therapeutic Interventions Met (OT) yes;skilled treatment is necessary  -BL     Therapy Frequency (OT) 5 times/wk  -BL     Row Name 10/03/22 1259          Therapy Plan Review/Discharge Plan (OT)    Anticipated Discharge Disposition (OT) skilled nursing facility;inpatient rehabilitation facility  -     Row Name 10/03/22 1259          Vital Signs    Pre Patient Position Supine  -BL     Intra Patient Position Standing  -BL     Post Patient Position Supine  -BL     Row Name 10/03/22 1259          Positioning and Restraints    Pre-Treatment Position in bed  -BL     Post Treatment Position bed  -BL     In Bed supine;call light within reach;encouraged to call for assist;exit alarm on  -BL           User Key  (r) = Recorded By, (t) = Taken By, (c) = Cosigned By    Initials Name Provider Type    Grecia Stewart, OT Occupational Therapist               Outcome Measures     Row Name 10/03/22 1308          How much help from another is currently needed...    Putting on and taking off regular lower body clothing? 1  -BL     Bathing (including washing, rinsing, and drying) 2  -BL     Toileting (which includes using toilet bed pan or urinal) 1  -BL     Putting on and taking off regular upper body clothing 2  -BL     Taking care of personal grooming (such as brushing teeth) 2  -BL     Eating meals 2  -BL     AM-PAC 6 Clicks Score (OT) 10  -BL     Row Name 10/03/22 1058          How much help from another person do you currently need...    Turning from your back to your side while in flat bed without using bedrails? 3  -CF     Moving from lying on back to sitting on the side of a flat bed without bedrails? 2  -CF     Moving to and from a bed to a chair (including a wheelchair)? 2  -CF     Standing up from a chair using your arms (e.g., wheelchair, bedside chair)? 2  -CF     Climbing 3-5 steps with a  railing? 1  -CF     To walk in hospital room? 2  -CF     AM-PAC 6 Clicks Score (PT) 12  -CF     Highest level of mobility 4 --> Transferred to chair/commode  -CF     Row Name 10/03/22 1308          Modified Nikki Scale    Modified [place] Scale 4 - Moderately severe disability.  Unable to walk without assistance, and unable to attend to own bodily needs without assistance.  -     Row Name 10/03/22 1308 10/03/22 1058       Functional Assessment    Outcome Measure Options AM-PAC 6 Clicks Daily Activity (OT);Modified Nikki  -BL AM-PAC 6 Clicks Basic Mobility (PT)  -CF          User Key  (r) = Recorded By, (t) = Taken By, (c) = Cosigned By    Initials Name Provider Type    CF Eliana James, PT Physical Therapist    Grecia Stewart OT Occupational Therapist                Occupational Therapy Education                 Title: PT OT SLP Therapies (In Progress)     Topic: Occupational Therapy (In Progress)     Point: ADL training (Done)     Description:   Instruct learner(s) on proper safety adaptation and remediation techniques during self care or transfers.   Instruct in proper use of assistive devices.              Learning Progress Summary           Patient Acceptance, E, VU by  at 10/3/2022 1308    Comment: the role of OT                   Point: Home exercise program (Not Started)     Description:   Instruct learner(s) on appropriate technique for monitoring, assisting and/or progressing therapeutic exercises/activities.              Learner Progress:  Not documented in this visit.          Point: Precautions (Not Started)     Description:   Instruct learner(s) on prescribed precautions during self-care and functional transfers.              Learner Progress:  Not documented in this visit.          Point: Body mechanics (Not Started)     Description:   Instruct learner(s) on proper positioning and spine alignment during self-care, functional mobility activities and/or exercises.              Learner  Progress:  Not documented in this visit.                      User Key     Initials Effective Dates Name Provider Type Discipline     01/05/21 -  Grecia Charlton OT Occupational Therapist OT              OT Recommendation and Plan  Planned Therapy Interventions (OT): BADL retraining, IADL retraining, occupation/activity based interventions, ROM/therapeutic exercise, transfer/mobility retraining, patient/caregiver education/training, neuromuscular control/coordination retraining  Therapy Frequency (OT): 5 times/wk  Plan of Care Review  Plan of Care Reviewed With: patient  Progress: no change  Outcome Evaluation: Pt seen by OT this date for evaluation. Pt is a 73 y/o female admit to [facility] for acute CVA and slurred speech. Pt found to have L thalamic stroke. She has hx of chronic R cerebellar stroke as well. Pt reports that she lives at an (I) living facility and is typically (I) with all ADLs at baseline and does not use any AD for functional mobility but does have rollator if necessary. Upon arrival pt lying supine in bed, no c/o pain, A&O x3. Pt requiring Mod A x2 for sup>sit>sup transition with verbal cues for sequencing. Once sitting EOB pt Dependent for donning socks. Pt performed sit>stand transition x2 with Max>Mod A x2 with difficulty side stepping this date with RLE knee buckling. Pt noted with impaired ROM in RUE, decrease coordination, decrease strength, decrease balance, decrease functional mobility, decrease endurance. Pt left in supine, needs in reach, alarm on. Pt to benefit from skilled OT services to address goals and deficits. OT wore mask, gloves, glasses, hand hygiene performed. OT rec rehab vs [facility] as D/C.     Time Calculation:    Time Calculation- OT     Row Name 10/03/22 1309             Time Calculation- OT    OT Start Time 0933  -BL      OT Stop Time 0954  -BL      OT Time Calculation (min) 21 min  -BL      Total Timed Code Minutes- OT 15 minute(s)  -BL      OT Received On 10/03/22  -BL       OT - Next Appointment 10/04/22  -BL      OT Goal Re-Cert Due Date 10/17/22  -BL              Timed Charges    18362 - OT Therapeutic Activity Minutes 15  -BL              Untimed Charges    OT Eval/Re-eval Minutes 6  -BL              Total Minutes    Timed Charges Total Minutes 15  -BL      Untimed Charges Total Minutes 6  -BL       Total Minutes 21  -BL            User Key  (r) = Recorded By, (t) = Taken By, (c) = Cosigned By    Initials Name Provider Type     Grecia Charlton OT Occupational Therapist              Therapy Charges for Today     Code Description Service Date Service Provider Modifiers Qty    42283027481 HC OT THERAPEUTIC ACT EA 15 MIN 10/3/2022 Grecia Charlton OT GO 1    99768317452 HC OT EVAL MOD COMPLEXITY 2 10/3/2022 Grecia Charlton OT GO 1               Grecia Charlton OT  10/3/2022

## 2022-10-03 NOTE — CASE MANAGEMENT/SOCIAL WORK
Discharge Planning Assessment  [facility]     Patient Name: Vidhi Lpoez  MRN: [id number]  Today's Date: 10/3/2022    Admit Date: 9/30/2022    Plan: SNF- referrals pending   Discharge Needs Assessment     Row Name 10/03/22 1134       Living Environment    People in Home alone    Current Living Arrangements home    Primary Care Provided by self    Provides Primary Care For no one       Resource/Environmental Concerns    Resource/Environmental Concerns none       Transition Planning    Patient/Family Anticipates Transition to inpatient rehabilitation facility    Patient/Family Anticipated Services at Transition skilled nursing    Transportation Anticipated health plan transportation       Discharge Needs Assessment    Readmission Within the Last 30 Days no previous admission in last 30 days    Equipment Currently Used at Home nebulizer;cane, straight;walker, rolling;oxygen    Concerns to be Addressed discharge planning    Discharge Facility/Level of Care Needs nursing facility, skilled               Discharge Plan     Row Name 10/03/22 1135       Plan    Plan SNF- referrals pending    Patient/Family in Agreement with Plan yes    Plan Comments CCP met with the patient to verify facesheet and discuss discharge planning. CCP role explained. The patient lives in Independent Living at [facility]. She is IADL's and still drives. She uses a walker, has a cane, nebulizer, Trilogy Vent (she seldom uses), and oxygen through [employer] at 2-3L. She has a private PT seeing her 2-3x/week. She has been to SNF at [facility]. CCP mentioned PT recommending rehab at discharge and patient asked about acute rehab also. CCP mentioned discharge plan needed at acute rehab. The patient has no one who can stay with her or assist her. She is agreeable to SNF referrals at [facility] and if neither have beds follow back up with patient for more options near her home. CCP placed referrals. She will need transport at  discharge. Mary ELIZABETH RN [facility]              Continued Care and Services - Admitted Since 9/30/2022     Destination     Service Provider Request Status Selected Services Address Phone Fax Patient Preferred    [facility]  Pending - Request Sent N/A [address] [facility] [address] [phone] --    [facility]  Pending - Request Sent N/A [address] [address] [phone] --              Expected Discharge Date and Time     Expected Discharge Date Expected Discharge Time    Oct 4, 2022          Demographic Summary     Row Name 10/03/22 1134       General Information    Arrived From home    Preferred Language English               Functional Status     Row Name 10/03/22 1134       Functional Status    Usual Activity Tolerance good    Current Activity Tolerance good       Functional Status, IADL    Medications independent    Meal Preparation independent    Housekeeping independent    Laundry independent    Shopping independent               Psychosocial    No documentation.                Abuse/Neglect    No documentation.                Legal    No documentation.                Substance Abuse    No documentation.                Patient Forms    No documentation.                   Mary Davis RN

## 2022-10-04 VITALS
RESPIRATION RATE: 18 BRPM | SYSTOLIC BLOOD PRESSURE: 133 MMHG | HEART RATE: 64 BPM | HEIGHT: 64 IN | BODY MASS INDEX: 27.51 KG/M2 | OXYGEN SATURATION: 92 % | DIASTOLIC BLOOD PRESSURE: 101 MMHG | TEMPERATURE: 98.5 F | WEIGHT: 161.16 LBS

## 2022-10-04 LAB
GLUCOSE BLDC GLUCOMTR-MCNC: 148 MG/DL (ref 70–130)
GLUCOSE BLDC GLUCOMTR-MCNC: 173 MG/DL (ref 70–130)

## 2022-10-04 PROCEDURE — G0008 ADMIN INFLUENZA VIRUS VAC: HCPCS | Performed by: INTERNAL MEDICINE

## 2022-10-04 PROCEDURE — 63710000001 INSULIN LISPRO (HUMAN) PER 5 UNITS: Performed by: STUDENT IN AN ORGANIZED HEALTH CARE EDUCATION/TRAINING PROGRAM

## 2022-10-04 PROCEDURE — 94799 UNLISTED PULMONARY SVC/PX: CPT

## 2022-10-04 PROCEDURE — 94761 N-INVAS EAR/PLS OXIMETRY MLT: CPT

## 2022-10-04 PROCEDURE — 82962 GLUCOSE BLOOD TEST: CPT

## 2022-10-04 PROCEDURE — 90662 IIV NO PRSV INCREASED AG IM: CPT | Performed by: INTERNAL MEDICINE

## 2022-10-04 PROCEDURE — 25010000002 INFLUENZA VAC HIGH-DOSE QUAD 0.7 ML SUSPENSION PREFILLED SYRINGE: Performed by: INTERNAL MEDICINE

## 2022-10-04 PROCEDURE — 94664 DEMO&/EVAL PT USE INHALER: CPT

## 2022-10-04 RX ORDER — HYDRALAZINE HYDROCHLORIDE 20 MG/ML
10 INJECTION INTRAMUSCULAR; INTRAVENOUS EVERY 6 HOURS PRN
Status: DISCONTINUED | OUTPATIENT
Start: 2022-10-04 | End: 2022-10-04 | Stop reason: HOSPADM

## 2022-10-04 RX ORDER — HYDRALAZINE HYDROCHLORIDE 25 MG/1
25 TABLET, FILM COATED ORAL ONCE
Status: COMPLETED | OUTPATIENT
Start: 2022-10-04 | End: 2022-10-04

## 2022-10-04 RX ORDER — ROSUVASTATIN CALCIUM 40 MG/1
40 TABLET, COATED ORAL DAILY
Qty: 90 TABLET
Start: 2022-10-05

## 2022-10-04 RX ORDER — HYDRALAZINE HYDROCHLORIDE 50 MG/1
50 TABLET, FILM COATED ORAL EVERY 8 HOURS SCHEDULED
Status: DISCONTINUED | OUTPATIENT
Start: 2022-10-04 | End: 2022-10-04 | Stop reason: HOSPADM

## 2022-10-04 RX ORDER — HYDRALAZINE HYDROCHLORIDE 50 MG/1
50 TABLET, FILM COATED ORAL EVERY 8 HOURS SCHEDULED
Start: 2022-10-04

## 2022-10-04 RX ORDER — GABAPENTIN 300 MG/1
600 CAPSULE ORAL 3 TIMES DAILY
Qty: 9 CAPSULE | Refills: 0 | Status: SHIPPED | OUTPATIENT
Start: 2022-10-04

## 2022-10-04 RX ADMIN — Medication 10 ML: at 08:39

## 2022-10-04 RX ADMIN — HYDRALAZINE HYDROCHLORIDE 25 MG: 25 TABLET, FILM COATED ORAL at 14:49

## 2022-10-04 RX ADMIN — PANTOPRAZOLE SODIUM 40 MG: 40 TABLET, DELAYED RELEASE ORAL at 06:19

## 2022-10-04 RX ADMIN — INSULIN LISPRO 2 UNITS: 100 INJECTION, SOLUTION INTRAVENOUS; SUBCUTANEOUS at 08:43

## 2022-10-04 RX ADMIN — GABAPENTIN 300 MG: 300 CAPSULE ORAL at 08:43

## 2022-10-04 RX ADMIN — IPRATROPIUM BROMIDE AND ALBUTEROL SULFATE 3 ML: .5; 3 SOLUTION RESPIRATORY (INHALATION) at 10:56

## 2022-10-04 RX ADMIN — INFLUENZA A VIRUS A/VICTORIA/2570/2019 IVR-215 (H1N1) ANTIGEN (FORMALDEHYDE INACTIVATED), INFLUENZA A VIRUS A/DARWIN/9/2021 SAN-010 (H3N2) ANTIGEN (FORMALDEHYDE INACTIVATED), INFLUENZA B VIRUS B/PHUKET/3073/2013 ANTIGEN (FORMALDEHYDE INACTIVATED), AND INFLUENZA B VIRUS B/MICHIGAN/01/2021 ANTIGEN (FORMALDEHYDE INACTIVATED) 0.7 ML: 60; 60; 60; 60 INJECTION, SUSPENSION INTRAMUSCULAR at 14:50

## 2022-10-04 RX ADMIN — METOPROLOL TARTRATE 25 MG: 25 TABLET, FILM COATED ORAL at 08:43

## 2022-10-04 RX ADMIN — HYDRALAZINE HYDROCHLORIDE 25 MG: 25 TABLET, FILM COATED ORAL at 05:06

## 2022-10-04 RX ADMIN — ROSUVASTATIN CALCIUM 40 MG: 40 TABLET, FILM COATED ORAL at 08:36

## 2022-10-04 RX ADMIN — LOSARTAN POTASSIUM 50 MG: 50 TABLET, FILM COATED ORAL at 08:36

## 2022-10-04 RX ADMIN — FERROUS SULFATE TAB 325 MG (65 MG ELEMENTAL FE) 325 MG: 325 (65 FE) TAB at 08:36

## 2022-10-04 RX ADMIN — CLOPIDOGREL 75 MG: 75 TABLET, FILM COATED ORAL at 08:38

## 2022-10-04 RX ADMIN — ASPIRIN 81 MG: 81 TABLET, COATED ORAL at 08:43

## 2022-10-04 RX ADMIN — DULOXETINE HYDROCHLORIDE 30 MG: 30 CAPSULE, DELAYED RELEASE ORAL at 08:37

## 2022-10-04 RX ADMIN — HYDROXYZINE HYDROCHLORIDE 50 MG: 25 TABLET ORAL at 08:43

## 2022-10-04 RX ADMIN — LEVETIRACETAM 500 MG: 500 TABLET, FILM COATED ORAL at 08:36

## 2022-10-04 RX ADMIN — GABAPENTIN 300 MG: 300 CAPSULE ORAL at 14:49

## 2022-10-04 NOTE — DISCHARGE SUMMARY
Date of Admission: 9/30/2022  Date of Discharge:  10/4/2022  Primary Care Physician: Abiodun Vila MD     Discharge Diagnosis:  Active Hospital Problems    Diagnosis  POA   • **Acute CVA (cerebrovascular accident) ([facility]) [I63.9]  Unknown   • Altered mental status [R41.82]  Yes   • Type 2 diabetes mellitus with hyperglycemia ([facility]) [E11.65]  Yes   • Dysarthria [R47.1]  Unknown   • Seizure disorder ([facility]) [G40.909]  Yes   • CAD (coronary artery disease) [I25.10]  Yes   • COPD (chronic obstructive pulmonary disease) ([facility]) [J44.9]  Yes      Resolved Hospital Problems   No resolved problems to display.       DETAILS OF HOSPITAL STAY     Pertinent Test Results and Procedures Performed    Chest x-ray showed no acute infiltrate, consolidation, or effusion    Head CT:  There is no evidence of fracture or of intracranial   hemorrhage. There is a new area of decreased attenuation involving the   anterior aspect of the thalamus extending to and involving the medial   aspect of the internal capsule on the left measuring 10 x 16 mm in   transverse planes. The margins are relatively well delineated. The   appearance is consistent with an infarct. It is age indeterminate. An   acute to subacute infarct cannot be excluded. Clinical correlation is   recommended. Further evaluation with a MRI examination of the brain is   suggested.     Brain MRI with MRA of the head and neck:  1.  Small acute infarct involving the left thalamus and posterior limb   of the left internal capsule with central susceptibility, which may   reflect microhemorrhage.   2.  Limited evaluation of the vasculature without intravenous contrast   and due to artifact from a left carotid stent graft within the neck.   When accounting for this limitation, there is likely short segment   severe narrowing of the left carotid bulb and left common carotid   artery, as detailed above. Additional areas of narrowing in the   intracranial vasculature are also  detailed above. Angiographic   evaluation with intravenous contrast would be more sensitive for   evaluation.     2D echocardiogram:  · Left ventricular ejection fraction appears to be 66 - 70%. Left ventricular systolic function is normal.Left ventricular wall thickness is consistent with mild concentric hypertrophy.  · Left ventricular diastolic function is consistent with (grade II w/high LAP) pseudonormalization.  · Saline test results are negative for right to left atrial level shunt.        Hospital Course  This is a 72-year-old female who presented to the emergency room with slurred speech and right-sided weakness.  Please see H&P for full details of admission.  She does not have a small infarct of the left thalamus and posterior limb of the left internal capsule.  She was evaluated by neurology who recommended long-term dual antiplatelet therapy with aspirin and Plavix.  She is on high-dose statin.  Her symptoms are resolving.  2D echocardiogram was unremarkable as described above aside from some diastolic dysfunction.  She is appropriate for rehab and will transition there today in stable condition.  While she was here her antihypertensive regimen was increased as outlined below.  She will follow-up with neurology in the stroke clinic here at [facility], [place] in 3 months.    Physical Exam at Discharge:  General: No acute distress, AAOx3  HEENT: EOMI, PERRL  Cardiovascular: +s1 and s2, RRR  Lungs: No rhonchi or wheezing  Abdomen: soft, nontender  Neuro: No cranial nerve deficits at this time.  Mild right-sided weakness.    Consults:   Consults     Date and Time Order Name Status Description    9/30/2022  7:42 PM Inpatient Neurology Consult Stroke Completed     9/30/2022  5:32 PM [facility] (on-call MD unless specified) Details              Condition on Discharge: Stable, improving    Discharge Disposition  Skilled Nursing Facility (DC - External)    Discharge Medications     Discharge Medications      New Medications       Instructions Start Date   insulin glargine 100 UNIT/ML injection  Commonly known as: LANTUS, SEMGLEE   10 Units, Subcutaneous, Nightly      rosuvastatin 40 MG tablet  Commonly known as: CRESTOR   40 mg, Oral, Daily   Start Date: October 5, 2022        Changes to Medications      Instructions Start Date   hydrALAZINE 50 MG tablet  Commonly known as: APRESOLINE  What changed:   · medication strength  · how much to take  · when to take this   50 mg, Oral, Every 8 Hours Scheduled         Continue These Medications      Instructions Start Date   albuterol sulfate  (90 Base) MCG/ACT inhaler  Commonly known as: PROVENTIL HFA;VENTOLIN HFA;PROAIR HFA   2 puffs, Inhalation, Every 4 Hours PRN, for wheezing      aspirin 81 MG EC tablet   81 mg, Oral, Daily      clopidogrel 75 MG tablet  Commonly known as: PLAVIX   1 tablet, Oral, Daily      Diclofenac Sodium 1 % gel gel  Commonly known as: VOLTAREN   Topical      dicyclomine 10 MG capsule  Commonly known as: BENTYL   10 mg, Oral, 4 Times Daily PRN      DULoxetine 30 MG capsule  Commonly known as: CYMBALTA   30 mg, Oral, Daily      esomeprazole 40 MG capsule  Commonly known as: nexIUM   40 mg, Oral, Every Morning Before Breakfast      ferrous sulfate 325 (65 FE) MG tablet   TK 1 T PO  QD      furosemide 40 MG tablet  Commonly known as: LASIX   TAKE 1 TABLET DAILY and ON M/W/F TAKE BID      gabapentin 300 MG capsule  Commonly known as: NEURONTIN   600 mg, Oral, 3 Times Daily      hydrOXYzine 50 MG tablet  Commonly known as: ATARAX   50 mg, Oral, Every 8 Hours PRN      ipratropium-albuterol 0.5-2.5 mg/3 ml nebulizer  Commonly known as: DUO-NEB   3 mL, Nebulization, 4 Times Daily - RT      levETIRAcetam 500 MG tablet  Commonly known as: KEPPRA   500 mg, Oral, 2 Times Daily      losartan 50 MG tablet  Commonly known as: COZAAR   TAKE ONE TABLET BY MOUTH TWICE A DAY      meclizine 25 MG chewable tablet chewable tablet   25 mg, Oral, 3 Times Daily PRN, PRN      metoprolol  tartrate 25 MG tablet  Commonly known as: LOPRESSOR   TAKE 1 TABLET BY MOUTH TWICE DAILY      nitroglycerin 0.4 MG SL tablet  Commonly known as: NITROSTAT   0.4 mg, Sublingual, Every 5 Minutes PRN      nystatin 391249 UNIT/GM powder  Commonly known as: nystatin   Topical, See Admin Instructions, Apply topically to the affected area twice daily as directed      ondansetron 8 MG tablet  Commonly known as: Zofran   8 mg, Oral, Every 8 Hours PRN      PROBIOTIC DAILY PO   1 tablet, Oral, Daily      promethazine 25 MG tablet  Commonly known as: PHENERGAN   25 mg, Oral, Every 6 Hours PRN      rOPINIRole 0.25 MG tablet  Commonly known as: REQUIP   0.25 mg, Oral, Nightly      traZODone 100 MG tablet  Commonly known as: DESYREL   100 mg, Oral, Nightly         Stop These Medications    atorvastatin 80 MG tablet  Commonly known as: LIPITOR     HYDROcodone-acetaminophen 7.5-325 MG per tablet  Commonly known as: Norco     Insulin Lispro (1 Unit Dial) 100 UNIT/ML solution pen-injector  Commonly known as: HUMALOG     sulfamethoxazole-trimethoprim 800-160 MG per tablet  Commonly known as: Bactrim DS     tiZANidine 4 MG tablet  Commonly known as: ZANAFLEX            Discharge Diet:   Diet Instructions     Diet: Regular, Consistent Carbohydrate      Discharge Diet:  Regular  Consistent Carbohydrate             Activity at Discharge:   Activity Instructions     Activity as Tolerated            Follow-up Appointments  Future Appointments   Date Time Provider Department [place]   12/23/2022  1:00 PM [facility] 1  [facility] [place]   3/3/2023  1:00 PM Stephanie Almanzar APRN [facility]     Additional Instructions for the Follow-ups that You Need to Schedule     Discharge Follow-up with PCP   As directed       Currently Documented PCP:    Abiodun Vila MD    PCP Phone Number:    [phone]     Follow Up Details: 1 week         Discharge Follow-up with Specialty: [facility] stroke clinic in 3 months   As  directed      Specialty: [facility] [place] [facility] in 3 months               I have examined and discussed discharge planning with the patient today.    I wore full protective equipment throughout the patient encounter including eye protection and facemask.  Hand hygiene was performed before donning protective equipment and after removal when leaving the room.     Raymond Peterson MD  10/04/22  11:37 EDT    Time: Discharge greater than 30 min

## 2022-10-04 NOTE — CASE MANAGEMENT/SOCIAL WORK
Case Management Discharge Note      Final Note: patient discharging to [facility] via family transport[facility] completed and negative. Packet placed in patient's chart. Mary ELIZABETH RN CCP         Selected Continued Care - Admitted Since 9/30/2022     Destination Coordination complete.    Service Provider Selected Services Address Phone Fax Patient Preferred    [address], [facility] [id number] [phone] --          Durable Medical Equipment    No services have been selected for the patient.              Dialysis/Infusion    No services have been selected for the patient.              Home Medical Care    No services have been selected for the patient.              Therapy    No services have been selected for the patient.              Community Resources    No services have been selected for the patient.              Community & DME    No services have been selected for the patient.                  Transportation Services  Private: Car    Final Discharge Disposition Code: 03 - skilled nursing facility (SNF)

## 2022-10-04 NOTE — PLAN OF CARE
Goal Outcome Evaluation:  Plan of Care Reviewed With: patient           Outcome Evaluation: pt AOX3 has slurred speech on 2L nasal cannula when asleep, room air when awake denies pain patient will benefit on reharb

## 2022-10-04 NOTE — NURSING NOTE
DC instructions given to the pt snd her daughter. Belongings transported with the pt.Pt is calm cooperative, AOx4 and very pleasant at discharge.

## 2022-10-04 NOTE — PROGRESS NOTES
"Enter Query Response Below      Query Response:       Acute left thalamic stroke       If applicable, please update the problem list.         Patient: Vidhi Lopez        : 1950  Account: [id number]           Admit Date:         Options to Respond to Query:    1. Access the Encounter     a. From the To-Do Side bar, click Respond With Note.     b. Click New Note     c. Answer query within the yellow box.                d. Update the Problem List if applicable.     Dr. Peterson,    Patient is a 72 year old female admitted on 22 with slurred speech and right-sided weakness. Neurology consult documents, \"Left thalamic stroke.  Likely causing her slurred speech and word finding difficulty along with some dysmetria.  Likely lacunar in etiology.  Recommend continuing her on aspirin 81 mg, Plavix 75 mg and Lipitor 80 mg daily for secondary stroke prevention.  Recommend MRI brain without contrast and MRA of the head and neck without contrast.  Her CT head also shows a right cerebellar stroke, but this is likely chronic.  Follow-up on 2D echocardiogram, and will likely need prolonged cardiac monitoring.\" Medicine progress notes on 10/2, 10/3 and discharge summary documents acute CVA. Hospital course on discharge summary documents, \"She does not have a small infarct of the left thalamus and posterior limb of the left internal capsule.  She was evaluated by neurology who recommended long-term dual antiplatelet therapy with aspirin and Plavix.  She is on high-dose statin.\"    Can this be further clarified as:    Acute left thalamic stroke  Other, please specify___________  Unable to determine       By submitting this query, we are merely seeking further clarification of documentation to accurately reflect all conditions that you are monitoring, evaluating, treating or that extend the hospitalization or utilize additional resources of care. Please utilize your independent clinical judgment when addressing the " question(s) above.     This query and your response, once completed, will be entered into the legal medical record.    Sincerely,  [email or web address]  Clinical Documentation Integrity Program

## 2022-12-08 ENCOUNTER — HOSPITAL ENCOUNTER (EMERGENCY)
Facility: HOSPITAL | Age: 72
Discharge: NURSING, REHAB OR LONG-TERM CARE | End: 2022-12-08
Attending: EMERGENCY MEDICINE | Admitting: EMERGENCY MEDICINE

## 2022-12-08 ENCOUNTER — APPOINTMENT (OUTPATIENT)
Dept: CT IMAGING | Facility: HOSPITAL | Age: 72
End: 2022-12-08

## 2022-12-08 VITALS
TEMPERATURE: 98.1 F | HEART RATE: 72 BPM | OXYGEN SATURATION: 99 % | SYSTOLIC BLOOD PRESSURE: 117 MMHG | DIASTOLIC BLOOD PRESSURE: 51 MMHG | RESPIRATION RATE: 16 BRPM

## 2022-12-08 DIAGNOSIS — R10.9 FLANK PAIN: Primary | ICD-10-CM

## 2022-12-08 DIAGNOSIS — K59.00 CONSTIPATION, UNSPECIFIED CONSTIPATION TYPE: ICD-10-CM

## 2022-12-08 DIAGNOSIS — D64.9 ANEMIA, UNSPECIFIED TYPE: ICD-10-CM

## 2022-12-08 LAB
ALBUMIN SERPL-MCNC: 3.7 G/DL (ref 3.5–5.2)
ALBUMIN/GLOB SERPL: 1.3 G/DL
ALP SERPL-CCNC: 102 U/L (ref 39–117)
ALT SERPL W P-5'-P-CCNC: <5 U/L (ref 1–33)
ANION GAP SERPL CALCULATED.3IONS-SCNC: 9 MMOL/L (ref 5–15)
AST SERPL-CCNC: 6 U/L (ref 1–32)
BACTERIA UR QL AUTO: NORMAL /HPF
BASOPHILS # BLD AUTO: 0.05 10*3/MM3 (ref 0–0.2)
BASOPHILS NFR BLD AUTO: 0.5 % (ref 0–1.5)
BILIRUB SERPL-MCNC: 0.2 MG/DL (ref 0–1.2)
BILIRUB UR QL STRIP: NEGATIVE
BUN SERPL-MCNC: 16 MG/DL (ref 8–23)
BUN/CREAT SERPL: 21.9 (ref 7–25)
CALCIUM SPEC-SCNC: 9.6 MG/DL (ref 8.6–10.5)
CHLORIDE SERPL-SCNC: 95 MMOL/L (ref 98–107)
CLARITY UR: CLEAR
CO2 SERPL-SCNC: 29 MMOL/L (ref 22–29)
COLOR UR: YELLOW
CREAT SERPL-MCNC: 0.73 MG/DL (ref 0.57–1)
DEPRECATED RDW RBC AUTO: 47.3 FL (ref 37–54)
EGFRCR SERPLBLD CKD-EPI 2021: 88 ML/MIN/1.73
EOSINOPHIL # BLD AUTO: 0.13 10*3/MM3 (ref 0–0.4)
EOSINOPHIL NFR BLD AUTO: 1.3 % (ref 0.3–6.2)
ERYTHROCYTE [DISTWIDTH] IN BLOOD BY AUTOMATED COUNT: 15.7 % (ref 12.3–15.4)
GLOBULIN UR ELPH-MCNC: 2.8 GM/DL
GLUCOSE SERPL-MCNC: 209 MG/DL (ref 65–99)
GLUCOSE UR STRIP-MCNC: NEGATIVE MG/DL
HCT VFR BLD AUTO: 28.6 % (ref 34–46.6)
HGB BLD-MCNC: 9.1 G/DL (ref 12–15.9)
HGB UR QL STRIP.AUTO: NEGATIVE
HYALINE CASTS UR QL AUTO: NORMAL /LPF
IMM GRANULOCYTES # BLD AUTO: 0.05 10*3/MM3 (ref 0–0.05)
IMM GRANULOCYTES NFR BLD AUTO: 0.5 % (ref 0–0.5)
KETONES UR QL STRIP: NEGATIVE
LEUKOCYTE ESTERASE UR QL STRIP.AUTO: NEGATIVE
LYMPHOCYTES # BLD AUTO: 1.41 10*3/MM3 (ref 0.7–3.1)
LYMPHOCYTES NFR BLD AUTO: 13.9 % (ref 19.6–45.3)
MCH RBC QN AUTO: 26.4 PG (ref 26.6–33)
MCHC RBC AUTO-ENTMCNC: 31.8 G/DL (ref 31.5–35.7)
MCV RBC AUTO: 82.9 FL (ref 79–97)
MONOCYTES # BLD AUTO: 0.51 10*3/MM3 (ref 0.1–0.9)
MONOCYTES NFR BLD AUTO: 5 % (ref 5–12)
NEUTROPHILS NFR BLD AUTO: 78.8 % (ref 42.7–76)
NEUTROPHILS NFR BLD AUTO: 8.01 10*3/MM3 (ref 1.7–7)
NITRITE UR QL STRIP: NEGATIVE
NRBC BLD AUTO-RTO: 0 /100 WBC (ref 0–0.2)
PH UR STRIP.AUTO: 6 [PH] (ref 5–8)
PLATELET # BLD AUTO: 231 10*3/MM3 (ref 140–450)
PMV BLD AUTO: 8.9 FL (ref 6–12)
POTASSIUM SERPL-SCNC: 3.9 MMOL/L (ref 3.5–5.2)
PROT SERPL-MCNC: 6.5 G/DL (ref 6–8.5)
PROT UR QL STRIP: ABNORMAL
RBC # BLD AUTO: 3.45 10*6/MM3 (ref 3.77–5.28)
RBC # UR STRIP: NORMAL /HPF
REF LAB TEST METHOD: NORMAL
SODIUM SERPL-SCNC: 133 MMOL/L (ref 136–145)
SP GR UR STRIP: 1.03 (ref 1–1.03)
SQUAMOUS #/AREA URNS HPF: NORMAL /HPF
UROBILINOGEN UR QL STRIP: ABNORMAL
WBC # UR STRIP: NORMAL /HPF
WBC NRBC COR # BLD: 10.16 10*3/MM3 (ref 3.4–10.8)

## 2022-12-08 PROCEDURE — 85025 COMPLETE CBC W/AUTO DIFF WBC: CPT | Performed by: EMERGENCY MEDICINE

## 2022-12-08 PROCEDURE — 96375 TX/PRO/DX INJ NEW DRUG ADDON: CPT

## 2022-12-08 PROCEDURE — 25010000002 MORPHINE PER 10 MG: Performed by: EMERGENCY MEDICINE

## 2022-12-08 PROCEDURE — 25010000002 ONDANSETRON PER 1 MG: Performed by: EMERGENCY MEDICINE

## 2022-12-08 PROCEDURE — 99284 EMERGENCY DEPT VISIT MOD MDM: CPT

## 2022-12-08 PROCEDURE — 80053 COMPREHEN METABOLIC PANEL: CPT | Performed by: EMERGENCY MEDICINE

## 2022-12-08 PROCEDURE — 81001 URINALYSIS AUTO W/SCOPE: CPT | Performed by: EMERGENCY MEDICINE

## 2022-12-08 PROCEDURE — P9612 CATHETERIZE FOR URINE SPEC: HCPCS

## 2022-12-08 PROCEDURE — 74176 CT ABD & PELVIS W/O CONTRAST: CPT

## 2022-12-08 PROCEDURE — 96374 THER/PROPH/DIAG INJ IV PUSH: CPT

## 2022-12-08 RX ORDER — TRAMADOL HYDROCHLORIDE 50 MG/1
50 TABLET ORAL EVERY 8 HOURS PRN
Qty: 15 TABLET | Refills: 0 | Status: SHIPPED | OUTPATIENT
Start: 2022-12-08 | End: 2022-12-13

## 2022-12-08 RX ORDER — MORPHINE SULFATE 2 MG/ML
4 INJECTION, SOLUTION INTRAMUSCULAR; INTRAVENOUS ONCE
Status: COMPLETED | OUTPATIENT
Start: 2022-12-08 | End: 2022-12-08

## 2022-12-08 RX ORDER — SODIUM CHLORIDE 0.9 % (FLUSH) 0.9 %
10 SYRINGE (ML) INJECTION AS NEEDED
Status: DISCONTINUED | OUTPATIENT
Start: 2022-12-08 | End: 2022-12-08 | Stop reason: HOSPADM

## 2022-12-08 RX ORDER — ONDANSETRON 2 MG/ML
4 INJECTION INTRAMUSCULAR; INTRAVENOUS ONCE
Status: COMPLETED | OUTPATIENT
Start: 2022-12-08 | End: 2022-12-08

## 2022-12-08 RX ORDER — MAGNESIUM CARB/ALUMINUM HYDROX 105-160MG
150 TABLET,CHEWABLE ORAL ONCE
Status: DISCONTINUED | OUTPATIENT
Start: 2022-12-08 | End: 2022-12-08 | Stop reason: HOSPADM

## 2022-12-08 RX ORDER — DOCUSATE SODIUM 100 MG/1
100 CAPSULE, LIQUID FILLED ORAL 2 TIMES DAILY
Qty: 30 CAPSULE | Refills: 0 | Status: SHIPPED | OUTPATIENT
Start: 2022-12-08

## 2022-12-08 RX ADMIN — ONDANSETRON 4 MG: 2 INJECTION INTRAMUSCULAR; INTRAVENOUS at 10:38

## 2022-12-08 RX ADMIN — MORPHINE SULFATE 4 MG: 2 INJECTION, SOLUTION INTRAMUSCULAR; INTRAVENOUS at 10:39

## 2022-12-08 NOTE — CASE MANAGEMENT/SOCIAL WORK
Spoke with patient's daughter on phone. Updated on condition and informed that patient is being discharged from the ER. Per daughter, transportation is usually required given mobility issues. EMS arranged for 1700. MRODDY Morgan RN

## 2022-12-08 NOTE — ED PROVIDER NOTES
" EMERGENCY DEPARTMENT ENCOUNTER    Room Number:  13/13  Date seen:  12/8/2022  PCP: Abiodun Vila MD  Historian: Patient      HPI:  Chief Complaint: Flank pain, abdominal pain, dysuria  A complete HPI/ROS/PMH/PSH/SH/FH are unobtainable due to: N/A  Context: Vidhi Lopze is a 72 y.o. female who presents to the ED via EMS from [facility] for evaluation of worsening flank pain as well as abdominal pain and dysuria symptoms over the past 2 days.  Patient says she could not sleep at all last night because her pain was unbearable.  She had tried some Tylenol for the pain but it did not seem to offer any relief.  She says she has had kidney problems in the past that feel this way.  She has also complained of some constipation symptoms and has been taking some stool softeners but she expresses concerns about the possibility of a \"bowel blockage.\"  She has had some nausea but no vomiting.  She has not seen any hematuria.  Nursing home reported a fever of 102 degrees today.  Patient does take Lasix every other day and she has been recovering from a stroke that affected her right side approximately 2 months ago.        PAST MEDICAL HISTORY  Active Ambulatory Problems     Diagnosis Date Noted   • Anxiety (Chronic benzos) 07/14/2016   • Insomnia 07/14/2016   • GERD (gastroesophageal reflux disease) 07/14/2016   • Fibromyalgia 07/14/2016   • RLS (restless legs syndrome) 07/14/2016   • Migraines 07/14/2016   • COPD (chronic obstructive pulmonary disease) ([facility]) 07/14/2016   • CAD (coronary artery disease) 07/14/2016   • Bilateral carotid artery stenosis 09/20/2017   • URIEL (obstructive sleep apnea) 09/30/2017   • Chronic pain (Chronic narcotics) 10/15/2018   • Recurrent UTI/interstitial cystitis on chronic methenamine 10/15/2018   • Demand ischemia ([facility]) 11/06/2018   • Hypoxemia requiring supplemental oxygen 01/25/2019   • Depression 10/28/2019   • Edema 10/28/2019   • Seizure disorder ([facility]) 12/05/2019   • " Lumbar compression fracture ([facility]) 12/06/2019   • Dysarthria 05/15/2020   • Anemia 06/17/2020   • Balance problem 06/17/2020   • Hypercalcemia 11/11/2020   • Bilateral inguinal hernia, without obstruction or gangrene, not specified as recurrent 02/27/2019   • Acute CVA (cerebrovascular accident) ([facility]) 02/04/2019   • Rheumatoid arthritis ([facility]) 10/15/2021   • Type II diabetes mellitus with renal manifestations ([facility]) 03/22/2022   • Type II diabetes mellitus with neurological manifestations ([facility]) 03/22/2022   • Type 2 diabetes mellitus with vascular disease ([facility]) 03/22/2022   • Essential hypertension, benign 03/22/2022   • CRI (chronic renal insufficiency), stage 3 (moderate) ([facility]) 03/22/2022   • Hypercholesterolemia 03/22/2022   • Type 2 diabetes mellitus with hyperglycemia ([facility]) 04/18/2022   • Urinary tract infection without hematuria 06/09/2022   • Dysuria 08/26/2022   • Precordial pain 08/26/2022   • Cerumen debris on tympanic membrane 08/26/2022   • Chest pain 08/26/2022   • Medicare annual wellness visit, subsequent 08/26/2022   • Wellness examination 08/26/2022   • Post-menopausal 08/26/2022   • Encounter for screening mammogram for malignant neoplasm of breast  08/26/2022   • Altered mental status 09/30/2022     Resolved Ambulatory Problems     Diagnosis Date Noted   • Dyslipidemia 07/14/2016   • Hypertension 07/14/2016   • Diabetes mellitus, type 2 ([facility]) 07/14/2016   • Interstitial cystitis 07/14/2016   • History of acute myocardial infarction 07/14/2016   • History of cardiac murmur 07/14/2016   • History of stroke 07/14/2016   • Essential hypertension 01/12/2017   • Generalized edema 09/20/2017   • CKD (chronic kidney disease) stage 2, GFR 60-89 ml/min 09/20/2017   • Pneumonia of right lower lobe due to infectious organism 09/28/2017   • Chronic respiratory failure with hypoxia and hypercapnia ([facility]) 09/30/2017   • Obesity (BMI 30-39.9) 09/30/2017   • Diabetes mellitus type 2 in obese ([facility]) 09/30/2017   •  Obesity hypoventilation syndrome ([facility]) 03/17/2018   • Tobacco use 03/17/2018   • Acute metabolic encephalopathy due to hypercarbia, hypoxemia, benzodiazepines 03/17/2018   • GASPER (acute kidney injury) ([facility]) 03/17/2018   • SIRS (systemic inflammatory response syndrome) ([facility]) 03/17/2018   • Elevated LFTs 03/17/2018   • Right leg pain 03/19/2018   • Elevated d-dimer 03/19/2018   • ESR raised 03/20/2018   • Seizure disorder, nonconvulsive, with status epilepticus ([facility]) 03/20/2018   • Septic joint of right knee joint ([facility]) 03/21/2018   • R/O CAP (community acquired pneumonia) 10/15/2018   • Respiratory failure, acute and chronic ([facility]) 10/15/2018   • Clostridium difficile colitis diagnosed September 2018. Persistent diarrhea despite oral vancomycin 10/16/2018   • Acute and chronic respiratory failure with hypercapnia ([facility]) 11/06/2018   • Stenosis of carotid artery 10/28/2019   • Occlusion and stenosis of left carotid artery  10/28/2019   • Chronic gout with tophus 10/28/2019   • Restless leg syndrome 10/28/2019   • Asthma 10/28/2019   • Wellness examination 10/28/2019   • Encounter for screening mammogram for malignant neoplasm of breast  10/28/2019   • Seasonal allergies 10/28/2019   • Hyperlipidemia 12/05/2019   • L1 vertebral fracture ([facility]) 12/05/2019   • Rib fracture 12/05/2019   • Polypharmacy 12/07/2019   • Itching 03/23/2020   • Ataxia 05/15/2020   • Lethargy 05/15/2020   • Urinary tract infection with hematuria 06/17/2020   • Other fatigue 06/17/2020   • Dysuria 11/11/2020   • Right leg pain 11/11/2020   • High risk medication use 11/11/2020   • Precordial pain 11/11/2020   • Leg swelling 12/10/2020   • Encounter for immunization  12/10/2020   • Dizzy 12/18/2020   • Acute myocardial infarction ([facility]) 02/04/2019   • Atopic rhinitis 04/19/2021   • Candidiasis of skin 04/19/2021   • Coronary angioplasty status 02/04/2019   • Decreased GFR 02/07/2019   • Diabetic peripheral neuropathy ([facility]) 04/05/2019   • History of  Clostridioides difficile infection 02/04/2019   • Osteoarthritis 10/01/2020   • Other specified personal risk factors, not elsewhere classified 11/11/2020   • Repeated falls 03/08/2019   • Multinodular goiter 02/07/2019   • Medicare annual wellness visit, subsequent 04/19/2021   • Post-menopausal 04/19/2021   • High cholesterol 04/19/2021   • Bunion 04/19/2021   • Unspecified severe protein-calorie malnutrition (CMS/HCC)  04/19/2021   • Nausea 04/19/2021   • Rash 04/19/2021   • Subacute maxillary sinusitis 07/09/2021   • Cough 07/09/2021   • Fever 08/05/2021   • Sore throat 08/05/2021   • Spasm 08/05/2021   • Acute midline back pain 08/05/2021   • Neuropathy 10/28/2021   • Shock ([facility]) 03/09/2022   • Type II diabetes mellitus, uncontrolled 03/22/2022   • Left leg pain 04/04/2022   • GASPER (acute kidney injury) ([facility]) 04/04/2022   • High risk medication use 04/04/2022   • Diabetes mellitus ([facility]) 04/04/2022   • Neuropathy 04/04/2022   • Diarrhea 05/11/2022   • Family history of colon cancer in father 05/11/2022   • Hypertension 06/09/2022   • High cholesterol 06/09/2022     Past Medical History:   Diagnosis Date   • Acute renal injury ([facility]) 03/2022   • Allergic rhinitis    • Bilateral ovarian cysts    • Bulging lumbar disc    • C. difficile colitis 09/2018   • Carotid artery stenosis    • Chronic back pain    • Chronic narcotic use    • Chronic sinusitis    • Compression fracture of L1 lumbar vertebra ([facility])    • Coronary artery disease    • CVA (cerebral vascular accident) ([facility]) 2000   • Depression with anxiety    • Edema, lower extremity    • Endometriosis    • Frequent falls    • Frequent UTI    • Gout    • H/O C. difficile diarrhea    • History of myocardial infarction    • Hypocalcemia    • Myocardial infarction ([facility])    • Nicotine dependence    • Nodular goiter    • On home oxygen therapy    • URIEL on CPAP    • Sciatica    • Urinary incontinence    • Varicose vein of leg    • Yeast dermatitis          PAST SURGICAL  HISTORY  Past Surgical History:   Procedure Laterality Date   • ARTERIOGRAM N/A 02/12/2018    Procedure: Renal Arteriogram;  Surgeon: Lito Flores MD;  Location:  [facility] CATH [facility]VASIVE LOCATION;  Service:    • BREAST BIOPSY Right 1991   • CARDIAC CATHETERIZATION N/A 02/12/2018    Procedure: Right Heart Cath;  Surgeon: Lito Flores MD;  Location:  [facility] CATH INVASIVE LOCATION;  Service:    • CAROTID STENT      Transcath    • CAROTID STENT Left    • CHOLECYSTECTOMY     • COLONOSCOPY     • CYSTOSTOMY W/ BLADDER BIOPSY     • DILATATION AND CURETTAGE     • ENDOSCOPY N/A 6/22/2022    Procedure: ESOPHAGOGASTRODUODENOSCOPY;  Surgeon: Sahil Viera MD;  Location:  [facility] OR;  Service: Gastroenterology;  Laterality: N/A;  GASTRITIS  LUPILLO TEST  ANTRUM BIOPSY  SMALL BOWEL BIOPSY   • KNEE ARTHROSCOPY Right 03/23/2018    Procedure: ARTHROSCOPY, RIGHT KNEE  INCISION AND DRAINAGE LOWER EXTREMITY WITH SYNOVIAL BIOPSY;  Surgeon: Dilip Giron MD;  Location:  [facility] OR;  Service: Orthopedics   • LAPAROSCOPIC CHOLECYSTECTOMY  1994    Lap rolando   • OOPHORECTOMY     • PAP SMEAR  05/10/2016   • SIGMOIDOSCOPY N/A 6/22/2022    Procedure: SIGMOIDOSCOPY FLEXIBLE;  Surgeon: Sahil Viera MD;  Location:  [facility] OR;  Service: Gastroenterology;  Laterality: N/A;  RANDOM COLON BIOPSY   • SUBTOTAL HYSTERECTOMY           FAMILY HISTORY  Family History   Problem Relation Age of Onset   • Hypertension Mother    • Hyperlipidemia Mother    • Breast cancer Mother    • Osteoporosis Mother    • Anxiety disorder Mother    • Hypertension Father    • Hyperlipidemia Father    • Colon cancer Father    • Colon polyps Father    • Migraines Father    • Depression Daughter    • Asthma Daughter    • Cancer Other    • Diabetes Other    • Heart attack Other    • Hyperlipidemia Other    • Hypertension Other    • Osteoporosis Other    • Stroke Other    • Irritable bowel syndrome Neg Hx    • Ulcerative colitis Neg Hx    • Crohn's disease Neg Hx           SOCIAL HISTORY  Social History     Socioeconomic History   • Marital status:    Tobacco Use   • Smoking status: Some Days     Packs/day: 0.25     Years: 40.00     Pack years: 10.00     Types: Cigarettes     Last attempt to quit: 2020     Years since quittin.9   • Smokeless tobacco: Never   • Tobacco comments:     in process of quiting--AVERAGE 1 PPD, DOWN TO 6 CIG PER DAY X2 WEEKS    Substance and Sexual Activity   • Alcohol use: Not Currently     Alcohol/week: 1.0 standard drink     Types: 1 Glasses of wine per week     Comment: occasional   • Drug use: No   • Sexual activity: Defer         ALLERGIES  Amlodipine and Reglan [metoclopramide]        REVIEW OF SYSTEMS  Review of Systems   Constitutional: Positive for fever. Negative for activity change and diaphoresis.   HENT: Negative.    Eyes: Negative for pain and visual disturbance.   Respiratory: Negative for cough and shortness of breath.    Cardiovascular: Negative for chest pain.   Gastrointestinal: Positive for abdominal pain, constipation and nausea. Negative for blood in stool and vomiting.   Genitourinary: Positive for dysuria, flank pain, frequency and urgency.   Skin: Negative for color change.   Neurological: Negative for syncope and headaches.   Psychiatric/Behavioral: Positive for sleep disturbance. Negative for confusion.   All other systems reviewed and are negative.        PHYSICAL EXAM  ED Triage Vitals [22 0913]   Temp Heart Rate Resp BP SpO2   98.1 °F (36.7 °C) 73 16 140/63 96 %      Temp src Heart Rate Source Patient Position BP Location FiO2 (%)   Tympanic Monitor -- -- --         GENERAL: Pleasant lady, resting calmly in the bed, lying on her right side, appears uncomfortable but no acute distress  HENT: nares patent, normocephalic and atraumatic  EYES: no scleral icterus EOMI, normal conjunctiva  CV: regular rhythm, normal rate, no murmur  RESPIRATORY: normal effort, no stridor  ABDOMEN: soft, very mild diffuse  tenderness throughout all quadrants of the abdomen.  Bowel sounds are hypoactive.  No masses palpable.  No peritoneal features.  Bilateral CVA tenderness noted also  Rectal exam: Normal tone, no fissures or masses or hemorrhoids evident.  No gross blood.  Guaiac stain is negative  MUSCULOSKELETAL: no deformity, no asymmetry  NEURO: alert, moves all extremities, follows commands, findings consistent with known stroke that affected right-sided extremities  PSYCH:  calm, cooperative  SKIN: warm, dry    Vital signs and nursing notes reviewed.          LAB RESULTS  Recent Results (from the past 24 hour(s))   Comprehensive Metabolic Panel    Collection Time: 12/08/22 10:34 AM    Specimen: Blood   Result Value Ref Range    Glucose 209 (H) 65 - 99 mg/dL    BUN 16 8 - 23 mg/dL    Creatinine 0.73 0.57 - 1.00 mg/dL    Sodium 133 (L) 136 - 145 mmol/L    Potassium 3.9 3.5 - 5.2 mmol/L    Chloride 95 (L) 98 - 107 mmol/L    CO2 29.0 22.0 - 29.0 mmol/L    Calcium 9.6 8.6 - 10.5 mg/dL    Total Protein 6.5 6.0 - 8.5 g/dL    Albumin 3.70 3.50 - 5.20 g/dL    ALT (SGPT) <5 1 - 33 U/L    AST (SGOT) 6 1 - 32 U/L    Alkaline Phosphatase 102 39 - 117 U/L    Total Bilirubin 0.2 0.0 - 1.2 mg/dL    Globulin 2.8 gm/dL    A/G Ratio 1.3 g/dL    BUN/Creatinine Ratio 21.9 7.0 - 25.0    Anion Gap 9.0 5.0 - 15.0 mmol/L    eGFR 88.0 >60.0 mL/min/1.73   CBC Auto Differential    Collection Time: 12/08/22 10:34 AM    Specimen: Blood   Result Value Ref Range    WBC 10.16 3.40 - 10.80 10*3/mm3    RBC 3.45 (L) 3.77 - 5.28 10*6/mm3    Hemoglobin 9.1 (L) 12.0 - 15.9 g/dL    Hematocrit 28.6 (L) 34.0 - 46.6 %    MCV 82.9 79.0 - 97.0 fL    MCH 26.4 (L) 26.6 - 33.0 pg    MCHC 31.8 31.5 - 35.7 g/dL    RDW 15.7 (H) 12.3 - 15.4 %    RDW-SD 47.3 37.0 - 54.0 fl    MPV 8.9 6.0 - 12.0 fL    Platelets 231 140 - 450 10*3/mm3    Neutrophil % 78.8 (H) 42.7 - 76.0 %    Lymphocyte % 13.9 (L) 19.6 - 45.3 %    Monocyte % 5.0 5.0 - 12.0 %    Eosinophil % 1.3 0.3 - 6.2 %     Basophil % 0.5 0.0 - 1.5 %    Immature Grans % 0.5 0.0 - 0.5 %    Neutrophils, Absolute 8.01 (H) 1.70 - 7.00 10*3/mm3    Lymphocytes, Absolute 1.41 0.70 - 3.10 10*3/mm3    Monocytes, Absolute 0.51 0.10 - 0.90 10*3/mm3    Eosinophils, Absolute 0.13 0.00 - 0.40 10*3/mm3    Basophils, Absolute 0.05 0.00 - 0.20 10*3/mm3    Immature Grans, Absolute 0.05 0.00 - 0.05 10*3/mm3    nRBC 0.0 0.0 - 0.2 /100 WBC   Urinalysis With Culture If Indicated - Straight Cath    Collection Time: 12/08/22 11:47 AM    Specimen: Straight Cath; Urine   Result Value Ref Range    Color, UA Yellow Yellow, Straw    Appearance, UA Clear Clear    pH, UA 6.0 5.0 - 8.0    Specific Gravity, UA 1.027 1.005 - 1.030    Glucose, UA Negative Negative    Ketones, UA Negative Negative    Bilirubin, UA Negative Negative    Blood, UA Negative Negative    Protein, UA 30 mg/dL (1+) (A) Negative    Leuk Esterase, UA Negative Negative    Nitrite, UA Negative Negative    Urobilinogen, UA 0.2 E.U./dL 0.2 - 1.0 E.U./dL   Urinalysis, Microscopic Only - Straight Cath    Collection Time: 12/08/22 11:47 AM    Specimen: Straight Cath; Urine   Result Value Ref Range    RBC, UA None Seen None Seen, 0-2 /HPF    WBC, UA 0-2 None Seen, 0-2 /HPF    Bacteria, UA None Seen None Seen /HPF    Squamous Epithelial Cells, UA 0-2 None Seen, 0-2 /HPF    Hyaline Casts, UA None Seen None Seen /LPF    Methodology Manual Light Microscopy        Ordered the above labs and reviewed the results.        RADIOLOGY  CT Abdomen Pelvis Without Contrast    Result Date: 12/8/2022  CT ABDOMEN AND PELVIS WITHOUT CONTRAST  HISTORY: Flank pain.  TECHNIQUE: Axial CT images of the abdomen and pelvis were obtained without administration of intravenous contrast. The patient was given oral contrast. Coronal and sagittal reformats were obtained.  COMPARISON: CT abdomen and pelvis from 03/11/2022.  FINDINGS: Bilateral adrenal glands are normal. No renal calculi or hydronephrosis. No suspicious renal mass seen  on noncontrast imaging. Duplicated infrarenal IVC. The urinary bladder is partially distended with circumferential wall thickening, likely related to underdistention.  Noncontrast attenuation of liver is normal. The gallbladder is surgically absent. The spleen is normal. Pancreas is normal without ductal dilatation. The small and large bowel loops demonstrate normal caliber. Moderate to large amount of formed stool is seen within the colon. No pathological retroperitoneal lymphadenopathy. Moderate calcified atherosclerotic plaque is seen within the abdominal aorta and its branches. Areas of right lower lobe scarring and/or atelectasis are present. Superior endplate compression fracture of the L1 vertebral body without change from prior examination.      1. No renal calculi or hydronephrosis. 2. CT findings of constipation.  Radiation dose reduction techniques were utilized, including automated exposure control and exposure modulation based on body size.         Ordered the above noted radiological studies. Reviewed by me in PACS.            PROCEDURES  Procedures          MEDICATIONS GIVEN IN ER  Medications   sodium chloride 0.9 % flush 10 mL (has no administration in time range)   magnesium citrate solution 150 mL (has no administration in time range)   morphine injection 4 mg (4 mg Intravenous Given 12/8/22 1039)   ondansetron (ZOFRAN) injection 4 mg (4 mg Intravenous Given 12/8/22 1038)                   MEDICAL DECISION MAKING, PROGRESS, and CONSULTS    All labs have been independently reviewed by me.  All radiology studies have been reviewed by me and discussed with radiologist dictating the report.   EKG's independently viewed and interpreted by me.  Discussion below represents my analysis of pertinent findings related to patient's condition, differential diagnosis, treatment plan and final disposition.      My differential diagnosis for abdominal pain includes but is not limited to:  Gastritis,  gastroenteritis, peptic ulcer disease, GERD, esophageal perforation, acute appendicitis, mesenteric adenitis, Meckel’s diverticulum, epiploic appendagitis, diverticulitis, colon cancer, ulcerative colitis, Crohn’s disease, intussusception, small bowel obstruction, adhesions, ischemic bowel, perforated viscus, ileus, obstipation, biliary colic, cholecystitis, cholelithiasis, Tio-Juan Eulogio, hepatitis, pancreatitis, common bile duct obstruction, cholangitis, bile leak, splenic trauma, splenic rupture, splenic infarction, splenic abscess, abdominal abscess, ascites, spontaneous bacterial peritonitis, hernia, UTI, cystitis, prostatitis, ureterolithiasis, urinary obstruction, AAA, myocardial infarction, pneumonia, cancer, porphyria, DKA, medications, sickle cell, viral syndrome, zoster    My differential diagnosis for back pain includes but is not limited to:  Musculoskeletal strain, contusion, retroperitoneal hematoma, disc protrusion, vertebral fracture, transverse process fracture, rib fracture, facet syndrome, sacroiliac joint strain, sciatica, renal injury, splenic injury, pancreatic injury, osteoarthritis, lumbar spondylosis, spinal stenosis, ankylosing spondylitis, sacroiliac joint inflammation, pancreatitis, perforated peptic ulcer, diverticulitis, endometriosis, chronic PID, epidural abscess, osteomyelitis, retroperitoneal abscess, pyelonephritis, pneumonia, subphrenic abscess, tuberculosis, neurofibroma, meningioma, multiple myeloma, lymphoma, metastatic cancer, primary cancer, AAA, aortic dissection, spinal ischemia, referred pain, ureterolithiasis      ED Course as of 12/08/22 1342   Thu Dec 08, 2022   1016 Patient presents from [facility] home with flank pain and dysuria symptoms.  Also complaining of abdominal pain and constipation, however.  Will obtain CT scan of the abdomen as well as cath UA specimen.  Eval for pyelonephritis or other acute intra-abdominal findings. [PEDRO]   4463 Hemoglobin(!):  "9.1  Trending down [PEDRO]   1139 Glucose(!): 209 [PEDOR]   1340 CT scan reviewed and there is no particularly worrisome acute intra-abdominal process nor is there any evidence of kidney stone or primary urologic issue.  I see that her hemoglobin is lower than previous values but it has been in this range within the past year.  I performed a rectal exam and tested for blood but the Hemoccult was negative.  Therefore I do not think she has any active ongoing bleeding problems currently. [PEDRO]   1340 There does not appear to be any acute medical emergency right now.  She is resting much more comfortably in the bed and has actually been sleeping for the past couple of hours after 1 dose of morphine and Zofran.  Therefore, I think she is suitable for discharge back to the [facility] for her continued rehabilitation and routine care needs.  We will discussed with her and the receiving staff the usual \"return to ER\" instructions for any worsening signs or symptoms.  Advised use of stool softeners and occasional laxatives as needed for constipation symptoms. [PEDRO]      ED Course User Index  [PEDRO] Arden Rutherford MD              Patient was placed in face mask during triage.  Patient was wearing face mask throughout encounter.  I wore personal protective equipment throughout the encounter.  Hand hygiene was performed before and after patient encounter.     DIAGNOSIS  Final diagnoses:   Flank pain   Constipation, unspecified constipation type   Anemia, unspecified type         DISPOSITION  Discharge, return to rehab facility            Latest Documented Vital Signs:  As of 13:42 EST  BP- 117/51 HR- 72 Temp- 98.1 °F (36.7 °C) (Tympanic) O2 sat- 99%        --    Please note that portions of this were completed with a voice recognition program.          Arden Rutherford MD  12/08/22 1342    "

## 2022-12-08 NOTE — CASE MANAGEMENT/SOCIAL WORK
Discharge Planning Assessment  [facility]     Patient Name: Vidhi Lopez  MRN: [id number]  Today's Date: 12/8/2022    Admit Date: 12/8/2022        Discharge Needs Assessment    No documentation.                Discharge Plan     Row Name 12/08/22 1358       Plan    Plan Comments Call placed to patients daughter regarding transport back to [place]- no answer, left message; Call placed to [facility] to schedule transport of pt back to [place]- no availability until late this evening- call placed to [facility]- scheduled transport via w/c van for 1700- reservation number [id number]. Updating primary RN and pt.    Call placed to [facility] again to attempt to schedule- spoke w/Elaina to book an 1530 time for transport. Cancelling [facility] transport.               Continued Care and Services - Admitted Since 12/8/2022    Coordination has not been started for this encounter.     Selected Continued Care - Episodes Includes continued care and service providers with selected services from the active episodes listed below    High Risk Care Management Episode start date: 10/5/2022 (Paused)   There are no active outsourced providers for this episode.             Selected Continued Care - Prior Encounters Includes continued care and service providers with selected services from prior encounters from 9/9/2022 to 12/8/2022    Discharged on 10/4/2022 Admission date: 9/30/2022 - Discharge disposition: Skilled Nursing Facility (DC - External)    Destination     Service Provider Selected Services Address Phone Fax Patient Preferred    [facility] [address] [place] [id number] [phone] --                       Demographic Summary    No documentation.                Functional Status    No documentation.                Psychosocial    No documentation.                Abuse/Neglect    No documentation.                Legal    No documentation.                Substance  Abuse    No documentation.                Patient Forms    No documentation.                   Zuleika Johnson RN

## 2022-12-08 NOTE — DISCHARGE INSTRUCTIONS
Must follow-up with PCP for repeat evaluation and outpatient labs to recheck your hemoglobin and hematocrit as we discussed.  Continue taking stool softeners regularly and may also take laxative medication as needed for constipation symptoms.  Please return to the emergency room for any worsening pain, fevers, nausea, vomiting, weakness or any other concerns.

## 2022-12-08 NOTE — ED TRIAGE NOTES
All triage performed with this RN wearing appropriate PPE.  Pt placed in mask upon arrival to ED.    Patient c/o low back pain, urinary frequency, and painful urination for 2 days. NH reported a temp of 102. She is from [place].

## 2022-12-23 ENCOUNTER — APPOINTMENT (OUTPATIENT)
Dept: BONE DENSITY | Facility: HOSPITAL | Age: 72
End: 2022-12-23

## 2023-03-28 NOTE — TELEPHONE ENCOUNTER
I tried calling pt, no answer. Lmtcb.     Message from Dr. Mckeon on results. I need to notify pt and inquire about antibiotic  
Unable to answer due to medical condition/unresponsive/etc...

## 2023-04-23 NOTE — THERAPY EVALUATION
Acute Care - Speech Language Pathology Initial Evaluation  [facility]     Patient Name: Vidhi Lopez  : 1950  MRN: [id number]  Today's Date: 10/1/2022               Admit Date: 2022     Visit Dx:    ICD-10-CM ICD-9-CM   1. Altered mental status, unspecified altered mental status type  R41.82 780.97   2. Acute stroke due to ischemia ([facility])  I63.9 434.91     Patient Active Problem List   Diagnosis   • Anxiety (Chronic benzos)   • Insomnia   • GERD (gastroesophageal reflux disease)   • Fibromyalgia   • RLS (restless legs syndrome)   • Migraines   • COPD (chronic obstructive pulmonary disease) ([facility])   • CAD (coronary artery disease)   • Bilateral carotid artery stenosis   • URIEL (obstructive sleep apnea)   • Chronic pain (Chronic narcotics)   • Recurrent UTI/interstitial cystitis on chronic methenamine   • Demand ischemia ([facility])   • Hypoxemia requiring supplemental oxygen   • Depression   • Edema   • Seizure disorder ([facility])   • Lumbar compression fracture ([facility])   • Dysarthria   • Anemia   • Balance problem   • Hypercalcemia   • Bilateral inguinal hernia, without obstruction or gangrene, not specified as recurrent   • Rheumatoid arthritis ([facility])   • Type II diabetes mellitus with renal manifestations ([facility])   • Type II diabetes mellitus with neurological manifestations ([facility])   • Type 2 diabetes mellitus with vascular disease ([facility])   • Essential hypertension, benign   • CRI (chronic renal insufficiency), stage 3 (moderate) ([facility])   • Hypercholesterolemia   • Type 2 diabetes mellitus with hyperglycemia ([facility])   • Urinary tract infection without hematuria   • Dysuria   • Precordial pain   • Cerumen debris on tympanic membrane   • Chest pain   • Medicare annual wellness visit, subsequent   • Wellness examination   • Post-menopausal   • Encounter for screening mammogram for malignant neoplasm of breast    • Altered mental status   • Suspected cerebrovascular accident (CVA)     Past Medical History:   Diagnosis Date  Pt returned to room from xray       • Acute renal injury (HCC) 03/2022   • Allergic rhinitis    • Anemia    • Balance problem    • Bilateral ovarian cysts    • Bulging lumbar disc    • C. difficile colitis 09/2018   • CAD (coronary artery disease)    • Carotid artery stenosis    • Chronic back pain    • Chronic narcotic use    • Chronic sinusitis    • Compression fracture of L1 lumbar vertebra ([facility])    • Coronary artery disease    • CRI (chronic renal insufficiency), stage 3 (moderate) ([facility])     stage 3B as of '22 sees nephrology   • CVA (cerebral vascular accident) ([facility]) 2000    GENERALIZED UPPER BODY WEAKNESS RESIDUAL PER PT   • Depression with anxiety     Depression/Anxiety   • Edema, lower extremity    • Endometriosis     Dr. Jin; estradiol    • Essential hypertension, benign    • Fibromyalgia    • Frequent falls    • Frequent UTI    • GERD (gastroesophageal reflux disease)    • Gout    • H/O C. difficile diarrhea    • History of myocardial infarction     mxl   • Hypercholesterolemia    • Hypocalcemia    • Insomnia    • Interstitial cystitis    • Migraines    • Myocardial infarction ([facility])     2003   • Nicotine dependence    • Nodular goiter    • On home oxygen therapy     2 L NC   • URIEL on CPAP    • Rheumatoid arthritis ([facility])    • RLS (restless legs syndrome)    • Sciatica    • Seizure disorder, nonconvulsive, with status epilepticus ([facility]) 03/20/2018    last swx 2017   • Septic joint of right knee joint ([facility]) 03/21/2018   • Type 2 diabetes mellitus with hyperglycemia ([facility]) 2013    insulin started soon after dx stopped as of early '22   • Type 2 diabetes mellitus with vascular disease ([facility])    • Type II diabetes mellitus with neurological manifestations ([facility])     likely multifactorial in nature   • Type II diabetes mellitus with renal manifestations ([facility])    • Urinary incontinence    • Varicose vein of leg    • Yeast dermatitis      Past Surgical History:   Procedure Laterality Date   • ARTERIOGRAM N/A 02/12/2018    Procedure: Renal Arteriogram;   Surgeon: Lito Flores MD;  Location:  [facility] CATH INVASIVE LOCATION;  Service:    • BREAST BIOPSY Right 1991   • CARDIAC CATHETERIZATION N/A 02/12/2018    Procedure: Right Heart Cath;  Surgeon: Lito Flores MD;  Location:  [facility] CATH INVASIVE LOCATION;  Service:    • CAROTID STENT      Transcath    • CAROTID STENT Left    • CHOLECYSTECTOMY     • COLONOSCOPY     • CYSTOSTOMY W/ BLADDER BIOPSY     • DILATATION AND CURETTAGE     • ENDOSCOPY N/A 6/22/2022    Procedure: ESOPHAGOGASTRODUODENOSCOPY;  Surgeon: Sahil Viera MD;  Location:  [facility] OR;  Service: Gastroenterology;  Laterality: N/A;  GASTRITIS  LUPILLO TEST  ANTRUM BIOPSY  SMALL BOWEL BIOPSY   • KNEE ARTHROSCOPY Right 03/23/2018    Procedure: ARTHROSCOPY, RIGHT KNEE  INCISION AND DRAINAGE LOWER EXTREMITY WITH SYNOVIAL BIOPSY;  Surgeon: Dilip Giron MD;  Location: [facility] OR;  Service: Orthopedics   • LAPAROSCOPIC CHOLECYSTECTOMY  1994    Lap rolando   • OOPHORECTOMY     • PAP SMEAR  05/10/2016   • SIGMOIDOSCOPY N/A 6/22/2022    Procedure: SIGMOIDOSCOPY FLEXIBLE;  Surgeon: Sahil Viera MD;  Location: [facility] OR;  Service: Gastroenterology;  Laterality: N/A;  RANDOM COLON BIOPSY   • SUBTOTAL HYSTERECTOMY         SLP Recommendation and Plan  Speech language evaluation revealing moderate expressive aphasia, moderate dysarthria. Question motor planning component versus atypical paraphasias/neologisms (i.e. spider for lid, gronk for egg). Pt aware, frustrated. Cognition informal d/t expressive language deficits with pt delayed recall and orientation intact. Pt previously lived in independent living facility, continued driving and was responsible for all IADLS per her report.       Recommend: speech language services targeting motor speech, expressive language, additional cognitive assessment while in house and at next level of care. Pt highly motivated for return to [facility], excellent rehab candidate.      SLP EVALUATION (last 72 hours)     SLP SLC  Evaluation     Row Name 10/01/22 1054       Communication Assessment/Intervention    Document Type evaluation  -AB    Subjective Information no complaints  -AB    Patient Observations alert;cooperative;agree to therapy  -AB    Patient/Family/Caregiver Comments/Observations pt seen s/p swallow evaluation for speech language evaluation  -AB    Patient Effort excellent  -AB    Symptoms Noted During/After Treatment none  -AB            General Information    Patient Profile Reviewed yes  -AB    Pertinent History Of Current Problem 72-year-old woman with a past medical history of CKD stage III, type 2 diabetes, seizure disorder, hypertension, COPD, coronary artery disease, and bilateral carotid artery stenosis who comes to the hospital 2 days after an episode of word finding difficulties upon waking up.  She lives at an assisted living facility and reportedly states that when her symptoms first occurred, she was encouraged not to go to the ER.  However when she started experiencing slurred speech and right upper extremity dysmetria, she was brought to the ER on the day of presentation.  In the ER she was noted to have a slight leukocytosis to 11.2, negative urine toxicology, a CT head did show a new area of decreased attenuation involving the anterior aspect of the thalamus, with appearance consistent with a subacute infarct.  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  -AB    Prior Level of Function-Communication WFL  -AB    Plans/Goals Discussed with patient;agreed upon  -AB    Barriers to Rehab none identified  -AB    Patient's Goals for Discharge take care of myself at home;return to all previous roles/activities  -AB            Pain    Additional Documentation Pain Scale: Numbers Pre/Post-Treatment (Group)  -AB            Pain Scale: Numbers  Pre/Post-Treatment    Pretreatment Pain Rating 0/10 - no pain  -AB    Posttreatment Pain Rating 0/10 - no pain  -AB            Comprehension Assessment/Intervention    Comprehension Assessment/Intervention Auditory Comprehension  -AB            Auditory Comprehension Assessment/Intervention    Auditory Comprehension (Communication) WFL  -AB    Able to Identify Objects/Pictures (Communication) WFL  -AB    Answers Questions (Communication) WFL;yes/no;wh questions  -AB    Able to Follow Commands (Communication) WFL;1-step;2-step  -AB    Auditory Comprehension Communication, Comment Auditory comprehension adequate with pt providing appropriate verbal responses to wh-, yes/no questions (with verbal expression difficulties as outlined below). Can follow 1 and 2 step commands with 100% accuracy  -AB            Expression Assessment/Intervention    Expression Assessment/Intervention verbal expression  -AB            Verbal Expression Assessment/Intervention    Verbal Expression moderate impairment  -AB    Automatic Speech (Communication) WFL  -AB    Repetition moderate impairment;semantic paraphasias;phonemic paraphasias  atypical errors for word level repetition, varying, inconsistent  -AB    Phrase Completion WFL;automatic/predictable  -AB    Confrontational Naming moderate impairment;neologisms;semantic paraphasias;phonemic paraphasias  atypical, unrelated paraphasia intermittent neologism (i.e. spider for lid, gronk for egg). pt aware, frustrated  -AB    Spontaneous/Functional Words mild impairment;moderate impairment  able to state functional needs, common responses intact (i.e. biographical information and predictable information) needs intact with increased time (i.e. expressing she was on bedpan and needed off over x6 attempts, no use of gesture)  -AB    Sentence Formulation mild impairment  short utterance length  -AB    Conversational Discourse/Fluency moderate impairment;neologisms;semantic paraphasias;phonemic  paraphasias  -AB    Verbal Expression, Comment Moderate expressive aphasia present characterized by unrelated paraphasias, neologisms, inconsistent errors (i.e. spider for lid, gronk for egg). significant breakdown for word level repetitions, question motor planning component. pt aware, frustrated. naming with 3/10, 30% accuracy. rote responses and phrase level improved with effective communication 75% opps, intermittent increased time  (i.e. biographical information and predictable information)  (i.e. expressing she was on bedpan and needed off over x6 attempts, no use of gesture). Pt can communicate biographical information, respond to greetings/pleasantries with 75% accuracy.  -AB            Oral Motor Structure and Function    Oral Motor Structure and Function mild impairment  -AB    Oral Lesions or Structural Abnormalities and/or variants none  -AB    Dentition Assessment edentulous, does not have dentures  -AB    Mucosal Quality moist, healthy  -AB            Oral Musculature and Cranial Nerve Assessment    Oral Motor General Assessment oral labial or buccal impairment  -AB    Oral Labial or Buccal Impairment, Detail, Cranial Nerve VII (Facial): right labial droop  -AB            Motor Speech Assessment/Intervention    Motor Speech Function moderate impairment;severe impairment  -AB    Characteristics Consistent with Dysarthria decreased articulation;slurred speech  -AB    Characteristics Consistent with Apraxia groping;inconsistent errors  -AB    Motor Speech, Comment speech approximately 75-80% impaired at conversation level d/t articulatory imprecision, poor intelligibility and distortions, atypical errors and substitutions. as aforementioned, question motor planning component versus atypical paraphasias as pt can follow all oral motor commands and imitiate oral motor postures, breakdown with repetition, naming  -AB            Cognitive Assessment Intervention- SLP    Cognitive Function (Cognition) [facility]   additional assessment  -AB    Orientation Status (Cognition) WFL;awareness of basic personal information;person;place;time;situation  -AB    Memory (Cognitive) WFL;delayed  -AB    Cognition, Comment cognition intact for orientation, biographical information, and delayed recall 3 words and functional information from MD with approx. 45 min delay  -AB            SLP Evaluation Clinical Impressions    SLP Diagnosis moderate expressive aphasia, moderate dysarthria  -AB    Rehab Potential/Prognosis excellent  -AB    SLC Criteria for Skilled Therapy Interventions Met yes  -AB    Functional Impact difficulty communicating wants, needs;difficulty communicating in an emergency;difficulty in expressing complex messages;restrictions in personal and social life;difficulty completing home management task  -AB            SLP Treatment Clinical Impressions    Care Plan Review evaluation/treatment results reviewed;care plan/treatment goals reviewed;risks/benefits reviewed;current/potential barriers reviewed;patient/other agree to care plan  -AB            Recommendations    Therapy Frequency (SLP SLC) PRN  -AB    Predicted Duration Therapy Intervention (Days) 2 weeks  -AB    Anticipated Discharge Disposition (SLP) unknown;anticipate therapy at next level of care  -AB    Further Assessment Needed in the Following Areas cognitive-linguistic;higher-level cognitive-linguistic  -AB            Communication Treatment Objective and Progress Goals (SLP)    Verbal Expression Treatment Objectives Verbal Expression Treatment Objectives (Group)  -AB    Motor Speech/Dysarthria Treatment Objectives Motor Speech/Dysarthria Treatment Objectives (Group)  -AB    Motor Speech/Apraxia Treatment Objectives Motor Speech/Apraxia Treatment Objectives (Group)  -AB    SLC STGs Additional Goals (Group)  -AB            Verbal Expression Treatment Objectives    Word Retrieval Skills Selection word retrieval, SLP goal 1;word retrieval, SLP goal 2  -AB    Phrase  and Sentence Level Response Selection phrase and sentence level response, SLP goal 1  -AB    Patient Will Implement Strategies Selection strategy implementation, SLP goal 1  -AB            Word Retrieval Skills Goal 1 (SLP)    Improve Word Retrieval Skills By Goal 1 (SLP) confrontational naming task;80%  -AB    Time Frame (Word Retrieval Goal 1, SLP) short term goal (STG)  -AB    Progress/Outcomes (Word Retrieval Goal 1, SLP) new goal  -AB            Word Retrieval Skills Goal 2 (SLP)    Improve Word Retrieval Skills By Goal 2 (SLP) completing functional word finding tasks;80%  -AB    Time Frame (Word Retrieval Goal 2, SLP) short term goal (STG)  -AB    Progress/Outcomes (Word Retrieval Goal 2, SLP) new goal  -AB            Ability to Construct Phrase and Sentence Level Response Goal 1 (SLP)    Improve Ability to Construct Phrase and Sentence Level Responses By Goal 1 (SLP) answering question with phrase;80%  -AB    Time Frame (Phrase and Sentence Level Response Goal 1, SLP) short term goal (STG)  -AB    Progress/Outcomes (Phrase and Sentence Level Response Goal 1, SLP) new goal  -AB            Patient Will Implement Strategies Goal 1 (SLP)    Implement Strategies of Goal 1 (SLP) imitating gestures;using a gesture when requested;80%  -AB    Time Frame (Strategy Implementation Goal 1, SLP) short term goal (STG)  -AB    Progress/Outcomes (Strategy Implementation Goal 1, SLP) new goal  -AB            Motor Speech/Dysarthria Treatment Objectives    Phonation Selection phonation, SLP goal 1  -AB    Articulation Selection articulation, SLP goal 1  -AB            Phonation Goal 1 (SLP)    Improve Phonation By Goal 1 (SLP) using loud speech;80%  -AB    Time Frame (Phonation Goal 1, SLP) short term goal (STG)  -AB    Progress/Outcomes (Phonation Goal 1, SLP) new goal  -AB            Articulation Goal 1 (SLP)    Improve Articulation Goal 1 (SLP) of specific sounds in connected speech;by over-articulating in connected  speech;80%  -AB    Time Frame (Articulation Goal 1, SLP) short term goal (STG)  -AB            Motor Speech/Apraxia Treatment Objectives    Planning and Execution of Connected Speech Selection planning and execution of connected speech, SLP goal 1  -AB            Planning and Execution of Connected Speech Goal 1 (SLP)    Improve Planning and Execution of Connected Speech by Goal 1 (SLP) repeating phrases;repeating contrasting word pairs;80%  -AB    Time Frame (Planning and Execution of Connected Speech Goal 1, SLP) short term goal (STG)  -AB    Progress/Outcomes (Planning and Execution of Connected Speech Goal 1, SLP) new goal  -AB            SLC STGs    Additional Goal Selection additional goal, SLP goal 1  -AB            SLC STG Goal 1 (SLP)    Additional Goal 1, SLP cognitive assessment  -AB    Time Frame (Additional Goal 1, SLP) short term goal (STG)  -AB    Progress/Outcomes (Additional Goal 1, SLP) new goal  -AB          User Key  (r) = Recorded By, (t) = Taken By, (c) = Cosigned By    Initials Name Effective Dates    Karin Hardin, MS CCC-SLP 08/01/21 -                    EDUCATION  The patient has been educated in the following areas:     Cognitive Impairment Communication Impairment.           SLP GOALS     Row Name 10/01/22 1054              Word Retrieval Skills Goal 1 (SLP)    Improve Word Retrieval Skills By Goal 1 (SLP) confrontational naming task;80%  -AB    Time Frame (Word Retrieval Goal 1, SLP) short term goal (STG)  -AB    Progress/Outcomes (Word Retrieval Goal 1, SLP) new goal  -AB           Word Retrieval Skills Goal 2 (SLP)    Improve Word Retrieval Skills By Goal 2 (SLP) completing functional word finding tasks;80%  -AB    Time Frame (Word Retrieval Goal 2, SLP) short term goal (STG)  -AB    Progress/Outcomes (Word Retrieval Goal 2, SLP) new goal  -AB           Ability to Construct Phrase and Sentence Level Response Goal 1 (SLP)    Improve Ability to Construct Phrase and Sentence Level  Responses By Goal 1 (SLP) answering question with phrase;80%  -AB    Time Frame (Phrase and Sentence Level Response Goal 1, SLP) short term goal (STG)  -AB    Progress/Outcomes (Phrase and Sentence Level Response Goal 1, SLP) new goal  -AB           Patient Will Implement Strategies Goal 1 (SLP)    Implement Strategies of Goal 1 (SLP) imitating gestures;using a gesture when requested;80%  -AB    Time Frame (Strategy Implementation Goal 1, SLP) short term goal (STG)  -AB    Progress/Outcomes (Strategy Implementation Goal 1, SLP) new goal  -AB           Phonation Goal 1 (SLP)    Improve Phonation By Goal 1 (SLP) using loud speech;80%  -AB    Time Frame (Phonation Goal 1, SLP) short term goal (STG)  -AB    Progress/Outcomes (Phonation Goal 1, SLP) new goal  -AB           Articulation Goal 1 (SLP)    Improve Articulation Goal 1 (SLP) of specific sounds in connected speech;by over-articulating in connected speech;80%  -AB    Time Frame (Articulation Goal 1, SLP) short term goal (STG)  -AB           Planning and Execution of Connected Speech Goal 1 (SLP)    Improve Planning and Execution of Connected Speech by Goal 1 (SLP) repeating phrases;repeating contrasting word pairs;80%  -AB    Time Frame (Planning and Execution of Connected Speech Goal 1, SLP) short term goal (STG)  -AB    Progress/Outcomes (Planning and Execution of Connected Speech Goal 1, SLP) new goal  -AB           SLC STG Goal 1 (SLP)    Additional Goal 1, SLP cognitive assessment  -AB    Time Frame (Additional Goal 1, SLP) short term goal (STG)  -AB    Progress/Outcomes (Additional Goal 1, SLP) new goal  -AB          User Key  (r) = Recorded By, (t) = Taken By, (c) = Cosigned By    Initials Name Provider Type    Karin Hardin MS CCC-SLP Speech and Language Pathologist                SLP Outcome Measures (last 72 hours)     SLP Outcome Measures     Row Name 10/01/22 1100             SLP Outcome Measures    Outcome Measure Used? Adult NOMS  -AB               Adult FCM Scores    FCM Chosen Swallowing;Verbal Expression  -AB      Swallowing FCM Score 6  -AB      Verbal Expression FCM Score 4  -AB            User Key  (r) = Recorded By, (t) = Taken By, (c) = Cosigned By    Initials Name Effective Dates    Karin Hardin MS CCC-SLP 08/01/21 -                     Time Calculation:      Time Calculation- SLP     Row Name 10/01/22 1137             Time Calculation- SLP    SLP Received On 10/01/22  -AB              Untimed Charges    33212-JM Eval Speech and Production w/ Language Minutes 60  -AB      [id number]-PE Eval Oral Pharyng Swallow Minutes 60  -AB              Total Minutes    Untimed Charges Total Minutes 120  -AB       Total Minutes 120  -AB            User Key  (r) = Recorded By, (t) = Taken By, (c) = Cosigned By    Initials Name Provider Type    Karin Hardin, MS CCC-SLP Speech and Language Pathologist                Therapy Charges for Today     Code Description Service Date Service Provider Modifiers Qty    [id number] HC ST EVAL ORAL PHARYNG SWALLOW 4 10/1/2022 Karin Connors, MS CCC-SLP GN 1    [id number] HC ST EVAL SPEECH AND PROD W LANG  4 10/1/2022 Karin Connors, MS CCC-SLP GN 1          ADULT NOMS (last 72 hours)     Adult NOMS     Row Name 10/01/22 1100                   Adult FCM Scores    FCM Chosen Swallowing;Verbal Expression  -AB        Swallowing FCM Score 6  -AB        Verbal Expression FCM Score 4  -AB              User Key  (r) = Recorded By, (t) = Taken By, (c) = Cosigned By    Initials Name Effective Dates    Karin Hardin MS CCC-SLP 08/01/21 -                      PPE  Patient was not wearing a face mask during this therapy encounter. Therapist used appropriate personal protective equipment including mask, eye protection and gloves.  Mask used was standard procedure mask. Appropriate PPE was worn during the entire therapy session. The patient coughed during this evaluation. Therapist was within 6 feet for 15 minutes or  more during the evaluation. Hand hygiene was completed before and after therapy session. Patient is not in enhanced droplet precautions.       Karin Connors, MS CCC-SLP  10/1/2022

## 2023-09-18 NOTE — MR AVS SNAPSHOT
Vidhi Lopez   1/12/2017 3:00 PM   Office Visit    Dept Phone:  [phone]   Encounter #:  [id number]    Provider:  Lito Flores MD   Department:  [facility] CARDIOLOGY                Your Full Care Plan              Today's Medication Changes          These changes are accurate as of: 1/12/17  4:32 PM.  If you have any questions, ask your nurse or doctor.               Medication(s)that have changed:     lisinopril 20 MG tablet   Commonly known as:  PRINIVIL,ZESTRIL   Take 1 tablet by mouth 2 (Two) Times a Day.   What changed:    - medication strength  - how much to take   Changed by:  Lito Flores MD         Stop taking medication(s)listed here:     calcium 600 MG tablet   Commonly known as:  OS-LE   Stopped by:  Lito Flores MD           estradiol 1 MG tablet   Commonly known as:  ESTRACE   Stopped by:  Lito Flores MD           fenofibrate micronized 43 MG capsule   Commonly known as:  ANTARA   Stopped by:  Lito Flores MD           fluconazole 200 MG tablet   Commonly known as:  DIFLUCAN   Stopped by:  Lito Flores MD           MYRBETRIQ 50 MG tablet sustained-release 24 hour   Generic drug:  Mirabegron ER   Stopped by:  Lito Flores MD           rOPINIRole 0.25 MG tablet   Commonly known as:  REQUIP   Stopped by:  Lito Flores MD                Where to Get Your Medications      These medications were sent to [address] - [phone]  - [phone] [address] [address]     Phone:  [phone]     lisinopril 20 MG tablet                  Your Updated Medication List          This list is accurate as of: 1/12/17  4:32 PM.  Always use your most recent med list.                ALPRAZolam 0.5 MG tablet   Commonly known as:  XANAX       atorvastatin 80 MG tablet   Commonly known as:  LIPITOR       Received word from Claudette Alpers at [facility] that they approved patient and have a bed tomorrow. Cm to initiate auth. Dr. Tonya Xavier made aware, pt made aware. [phone]  made two phone calls to pts insurance to initiate auth. The first call at 1145 cm was told cpt codes were needed to pursue auth request. Cm did not have those and yung also did not provide. Cm googled cpt codes and phoned back to intiate auth. Cm s/o letha and was told a fax with a bar code would be faxed to  to attach to clinicals and fax to [phone]. Awaiting fax with bar code. clotrimazole-betamethasone 1-0.05 % cream   Commonly known as:  LOTRISONE   Apply  topically 2 (Two) Times a Day.       ENABLEX 15 MG 24 hr tablet   Generic drug:  darifenacin       FLUoxetine 20 MG capsule   Commonly known as:  PROzac       freestyle lancets       gabapentin 800 MG tablet   Commonly known as:  NEURONTIN       glucose blood test strip       linagliptin 5 MG tablet tablet   Commonly known as:  TRADJENTA       lisinopril 20 MG tablet   Commonly known as:  PRINIVIL,ZESTRIL   Take 1 tablet by mouth 2 (Two) Times a Day.       metoprolol succinate XL 50 MG 24 hr tablet   Commonly known as:  TOPROL-XL       MULTIVITAMIN PO       pentosan polysulfate 100 MG capsule   Commonly known as:  ELMIRON       PREMARIN 0.625 MG/GM vaginal cream   Generic drug:  conjugated estrogens   apply vaginally two times a week as directed       RA VITAMIN D-3 5000 UNITS capsule   Generic drug:  vitamin d       SEROQUEL 25 MG tablet   Generic drug:  QUEtiapine       terconazole 0.8 % vaginal cream   Commonly known as:  TERAZOL 3   Insert 1 applicator into the vagina Every Night.       tiZANidine 4 MG tablet   Commonly known as:  ZANAFLEX               You Were Diagnosed With        Codes Comments    Essential hypertension    -  Primary ICD-10-CM: I10  ICD-9-CM: 401.9       Instructions     None    Patient Instructions History      Upcoming Appointments     Visit Type Date Time Department    FOLLOW UP 2017  3:00 PM [facility]    FOLLOW UP 2017  4:30 PM [facility]      [facility]     [facility] [employer] allows you to send messages to your doctor, view your test results, renew your prescriptions, schedule appointments, and more. To sign up, go to [employer] and click on the Sign Up Now link in the New User? box. Enter your [employer] Activation Code exactly as it appears below along with the last four digits of your Social Security Number and your Date of Birth () to complete the  "sign-up process. If you do not sign up before the expiration date, you must request a new code.    [employer] Activation Code: [email or web address]  Expires: 1/26/2017  4:32 PM    If you have questions, you can email [email or web address] or call [phone] to talk to our [employer] staff. Remember, [employer] is NOT to be used for urgent needs. For medical emergencies, dial 911.               Other Info from Your Visit           Your Appointments     Jul 25, 2017  4:30 PM EDT   Follow Up with Lito Flores MD   [facility] CARDIOLOGY (--)    [address] [place] [address] [address]  [facility] [address] [phone]           Arrive 15 minutes prior to appointment.              Allergies     Amlodipine      Reglan [Metoclopramide]        Reason for Visit     Follow-up HTN/CAD      Vital Signs     Blood Pressure Pulse Height Weight Body Mass Index Smoking Status    167/83 (BP Location: Left arm, Patient Position: Standing) 72 64\" (162.6 cm) 212 lb (96.2 kg) 36.39 kg/m2 Current Every Day Smoker      Problems and Diagnoses Noted     High blood pressure        "

## 2024-02-15 NOTE — TELEPHONE ENCOUNTER
Health Maintenance Due   Topic Date Due    Traditional Medicare- Medicare Wellness Visit  02/13/2024     Patient is due for the topics as listed above and wishes to proceed with them.     Recent PHQ 2/9 Score    PHQ 2:  PHQ 2 Score Adult PHQ 2 Score Adult PHQ 2 Interpretation Little interest or pleasure in activity?   2/8/2024   7:49 PM 0 No further screening needed 0      Pt left vm returning call

## 2024-09-20 ENCOUNTER — TRANSCRIBE ORDERS (OUTPATIENT)
Dept: ADMINISTRATIVE | Facility: HOSPITAL | Age: 74
End: 2024-09-20
Payer: MEDICARE

## 2024-09-20 DIAGNOSIS — R92.8 ABNORMAL MAMMOGRAM: Primary | ICD-10-CM

## 2024-10-04 ENCOUNTER — HOSPITAL ENCOUNTER (OUTPATIENT)
Facility: HOSPITAL | Age: 74
Discharge: HOME | End: 2024-10-04
Payer: MEDICARE

## 2024-10-04 DIAGNOSIS — R92.8 ABNORMAL MAMMOGRAM: ICD-10-CM

## 2024-10-04 PROCEDURE — 77066 DX MAMMO INCL CAD BI: CPT

## 2024-10-04 PROCEDURE — 76642 ULTRASOUND BREAST LIMITED: CPT

## 2024-10-04 PROCEDURE — G0279 TOMOSYNTHESIS, MAMMO: HCPCS

## 2024-10-07 ENCOUNTER — TRANSCRIBE ORDERS (OUTPATIENT)
Dept: ADMINISTRATIVE | Facility: HOSPITAL | Age: 74
End: 2024-10-07
Payer: MEDICARE

## 2024-10-07 DIAGNOSIS — R92.8 ABNORMAL MAMMOGRAM: Primary | ICD-10-CM

## 2024-10-08 ENCOUNTER — TELEPHONE (OUTPATIENT)
Facility: HOSPITAL | Age: 74
End: 2024-10-08
Payer: MEDICARE

## 2024-10-08 NOTE — TELEPHONE ENCOUNTER
Patient returned call. States she is taking Plavix, but no longer is taking ASA 81mg. States the prescribing provider is Edward Miranda MD with [facility] in [place].    Medication clearance form successfully faxed to Ruth at patient request. Provider office was contacted, spoke with Vidhya; verified fax was received; aware the patient will be scheduled for the procedure after the medication clearance is completed & returned. Verbalized understanding.

## 2024-10-11 ENCOUNTER — TELEPHONE (OUTPATIENT)
Facility: HOSPITAL | Age: 74
End: 2024-10-11
Payer: MEDICARE

## 2024-10-11 NOTE — TELEPHONE ENCOUNTER
Medication clearance received from provider is incomplete. Clearance successfully re-faxed with note requesting needed information.

## 2024-10-14 ENCOUNTER — TELEPHONE (OUTPATIENT)
Facility: HOSPITAL | Age: 74
End: 2024-10-14
Payer: MEDICARE

## 2024-10-14 NOTE — TELEPHONE ENCOUNTER
Returned patient call; medication clearance has not yet been returned from prescribing provider. Medication clearance procedure explained to patient. Told she would receive a call with instructions, then be contacted to schedule procedure. Patient states she will also contact prescribing provider concerning returning clearance form.  Spoke with Yvonne at Dr Miranda's office, states it was completed and they will fax it again, verified correct fax number.

## 2024-10-15 ENCOUNTER — TELEPHONE (OUTPATIENT)
Facility: HOSPITAL | Age: 74
End: 2024-10-15
Payer: MEDICARE

## 2024-10-15 ENCOUNTER — DOCUMENTATION (OUTPATIENT)
Facility: HOSPITAL | Age: 74
End: 2024-10-15
Payer: MEDICARE

## 2024-10-15 NOTE — TELEPHONE ENCOUNTER
Patient notified medication clearance received from patient's provider. Patient is aware to hold clopidogrel x 5 days prior to procedure and is to resume “ASAP post procedure.” Patient is aware to hold ASA 81 mg x 5 days prior to procedure and is to resume “ASAP post procedure.” [facility] scheduling will contact patient to schedule procedure. Questions answered, support given. Patient is to contact prescribing provider with any medication questions. Patient verbalized understanding.

## 2024-10-15 NOTE — NURSING NOTE
Medication clearance fax has not been returned from prescribing provider. Patient left a message requesting to know status of medication clearance. Contacted Dr Miranda's office, spoke with  Yvonne who stated the fax would not go through, verified number. Stated they will attempt to fax again. Attempted to contact patient with update, no answer, left in depth message telling her fax not received, Dr Miranda's office contacted & she will be notified when completed fax is received.

## 2024-10-15 NOTE — TELEPHONE ENCOUNTER
[facility] requested a written order for patient's medications to be held. Medication clearance from Dr Edward Miranda faxed to [employer]. Spoke with Gopi, states the fax was received.

## 2024-10-21 ENCOUNTER — HOSPITAL ENCOUNTER (OUTPATIENT)
Facility: HOSPITAL | Age: 74
Discharge: HOME | End: 2024-10-21
Admitting: NURSE PRACTITIONER
Payer: MEDICARE

## 2024-10-21 DIAGNOSIS — R92.8 ABNORMAL MAMMOGRAM: ICD-10-CM

## 2024-10-21 PROCEDURE — 76942 ECHO GUIDE FOR BIOPSY: CPT

## 2024-10-21 PROCEDURE — 76942 ECHO GUIDE FOR BIOPSY: CPT | Performed by: RADIOLOGY

## 2024-10-21 PROCEDURE — 19000 PUNCTURE ASPIR CYST BREAST: CPT | Performed by: RADIOLOGY

## 2024-10-21 PROCEDURE — 25010000002 LIDOCAINE 1 % SOLUTION: Performed by: NURSE PRACTITIONER

## 2024-10-21 RX ORDER — LIDOCAINE HYDROCHLORIDE 10 MG/ML
5 INJECTION, SOLUTION INFILTRATION; PERINEURAL ONCE
Status: COMPLETED | OUTPATIENT
Start: 2024-10-21 | End: 2024-10-21

## 2024-10-21 RX ADMIN — LIDOCAINE HYDROCHLORIDE 3 ML: 10 INJECTION, SOLUTION INFILTRATION; PERINEURAL at 14:26

## 2025-04-17 ENCOUNTER — TRANSCRIBE ORDERS (OUTPATIENT)
Dept: ADMINISTRATIVE | Facility: HOSPITAL | Age: 75
End: 2025-04-17
Payer: MEDICARE

## 2025-04-17 DIAGNOSIS — R92.8 ABNORMAL MAMMOGRAM: Primary | ICD-10-CM

## 2025-05-05 ENCOUNTER — OFFICE VISIT (OUTPATIENT)
Dept: CARDIOLOGY | Facility: CLINIC | Age: 75
End: 2025-05-05
Payer: MEDICARE

## 2025-05-05 VITALS
OXYGEN SATURATION: 97 % | SYSTOLIC BLOOD PRESSURE: 100 MMHG | BODY MASS INDEX: 27.96 KG/M2 | HEART RATE: 84 BPM | WEIGHT: 163.8 LBS | HEIGHT: 64 IN | DIASTOLIC BLOOD PRESSURE: 58 MMHG

## 2025-05-05 DIAGNOSIS — I25.10 CORONARY ARTERY DISEASE INVOLVING NATIVE CORONARY ARTERY OF NATIVE HEART WITHOUT ANGINA PECTORIS: Primary | ICD-10-CM

## 2025-05-05 DIAGNOSIS — I65.23 BILATERAL CAROTID ARTERY STENOSIS: ICD-10-CM

## 2025-05-05 DIAGNOSIS — I10 ESSENTIAL HYPERTENSION, BENIGN: ICD-10-CM

## 2025-05-05 DIAGNOSIS — E78.00 HYPERCHOLESTEROLEMIA: ICD-10-CM

## 2025-05-05 DIAGNOSIS — R07.2 PRECORDIAL PAIN: ICD-10-CM

## 2025-05-05 DIAGNOSIS — R07.9 CHEST PAIN, UNSPECIFIED TYPE: ICD-10-CM

## 2025-05-05 PROCEDURE — 99204 OFFICE O/P NEW MOD 45 MIN: CPT | Performed by: INTERNAL MEDICINE

## 2025-05-05 PROCEDURE — 93000 ELECTROCARDIOGRAM COMPLETE: CPT | Performed by: INTERNAL MEDICINE

## 2025-05-05 PROCEDURE — 1160F RVW MEDS BY RX/DR IN RCRD: CPT | Performed by: INTERNAL MEDICINE

## 2025-05-05 PROCEDURE — 3074F SYST BP LT 130 MM HG: CPT | Performed by: INTERNAL MEDICINE

## 2025-05-05 PROCEDURE — 3078F DIAST BP <80 MM HG: CPT | Performed by: INTERNAL MEDICINE

## 2025-05-05 PROCEDURE — 1159F MED LIST DOCD IN RCRD: CPT | Performed by: INTERNAL MEDICINE

## 2025-05-05 RX ORDER — SPIRONOLACTONE 50 MG/1
50 TABLET, FILM COATED ORAL DAILY
COMMUNITY

## 2025-05-05 RX ORDER — HYDROCODONE BITARTRATE AND ACETAMINOPHEN 5; 325 MG/1; MG/1
1 TABLET ORAL EVERY 6 HOURS PRN
COMMUNITY

## 2025-05-05 RX ORDER — NEOMYCIN SULFATE, POLYMYXIN B SULFATE AND HYDROCORTISONE 10; 3.5; 1 MG/ML; MG/ML; [USP'U]/ML
3 SUSPENSION/ DROPS AURICULAR (OTIC) 4 TIMES DAILY
COMMUNITY

## 2025-05-05 RX ORDER — METHENAMINE MANDELATE 1000 MG/1
1 TABLET, FILM COATED ORAL 4 TIMES DAILY
COMMUNITY

## 2025-05-05 NOTE — PROGRESS NOTES
OFFICE VISIT  NOTE  [facility] CARDIOLOGY      Name: Vidhi Lopez    Date: 2025  MRN:  [id number]  :  1950      REFERRING/PRIMARY PROVIDER:  Love De Souza APRN    Chief Complaint   Patient presents with    Coronary artery disease involving native coronary artery of       HPI: Vidhi Lopez is a 75 y.o. female who presents for CAD, hypertension, hyperlipidemia.  History of PCI of the mid/distal circumflex by Dr. Flores in 2018, with 2.5 mm stent.  Mild pulmonary hypertension the time with PA pressure of 36/20 and a mean of 28 mmHg.  Normal cardiac output at 4.5 L/min, mild disease of LAD and RCA, normal renal arteriogram at that time.  Complains of labile hypertension today although home blood pressure at [facility] average of 130/78 in general.  First in the morning when she wakes up around 830 to 9 AM blood pressures elevated 150s to 170s at times.  But comes down to the 110s to 130 range after medications.  Sometimes she is dizzy when she stands up, metoprolol stopped by PCP several weeks ago.  She has untreated sleep apnea, and does report poor sleep at night only sleeping 2 to 3 hours off-and-on throughout the night.  She also reports some chest discomfort squeezing sensation in her chest lasting 5 to 10 minutes occurred at rest, 2 to 3 weeks ago with some associated shortness of breath.    Past Medical History:   Diagnosis Date    Acute renal injury 2022    Allergic rhinitis     Anemia     Balance problem     Bilateral ovarian cysts     Bulging lumbar disc     C. difficile colitis 2018    CAD (coronary artery disease)     Carotid artery stenosis     Chronic back pain     Chronic narcotic use     Chronic sinusitis     Compression fracture of L1 lumbar vertebra     Coronary artery disease     CRI (chronic renal insufficiency), stage 3 (moderate)     stage 3B as of  sees nephrology    CVA (cerebral vascular accident)     GENERALIZED UPPER BODY  WEAKNESS RESIDUAL PER PT    Depression with anxiety     Depression/Anxiety    Edema, lower extremity     Endometriosis     Dr. Jin; estradiol     Essential hypertension, benign     Fibromyalgia     Frequent falls     Frequent UTI     GERD (gastroesophageal reflux disease)     Gout     H/O C. difficile diarrhea     History of myocardial infarction     mxl    Hypercholesterolemia     Hypocalcemia     Insomnia     Interstitial cystitis     Migraines     Myocardial infarction     2003    Nicotine dependence     Nodular goiter     On home oxygen therapy     2 L NC    URIEL on CPAP     Rheumatoid arthritis     RLS (restless legs syndrome)     Sciatica     Seizure disorder, nonconvulsive, with status epilepticus 03/20/2018    last swx 2017    Septic joint of right knee joint 03/21/2018    Type 2 diabetes mellitus with hyperglycemia 2013    insulin started soon after dx stopped as of early '22    Type 2 diabetes mellitus with vascular disease     Type II diabetes mellitus with neurological manifestations     likely multifactorial in nature    Type II diabetes mellitus with renal manifestations     Urinary incontinence     Varicose vein of leg     Yeast dermatitis        Past Surgical History:   Procedure Laterality Date    ARTERIOGRAM N/A 02/12/2018    Procedure: Renal Arteriogram;  Surgeon: Lito Flores MD;  Location:  [facility];  Service:     BREAST BIOPSY Right 1991    CARDIAC CATHETERIZATION N/A 02/12/2018    Procedure: Right Heart Cath;  Surgeon: Lito Folres MD;  Location:  [facility];  Service:     CAROTID STENT      Transcath     CAROTID STENT Left     CHOLECYSTECTOMY      COLONOSCOPY      CYSTOSTOMY W/ BLADDER BIOPSY      DILATATION AND CURETTAGE      ENDOSCOPY N/A 6/22/2022    Procedure: ESOPHAGOGASTRODUODENOSCOPY;  Surgeon: Sahil Viera MD;  Location: [facility] OR;  Service: Gastroenterology;  Laterality: N/A;  GASTRITIS  LUPILLO TEST  ANTRUM BIOPSY  SMALL BOWEL BIOPSY     KNEE ARTHROSCOPY Right 2018    Procedure: ARTHROSCOPY, RIGHT KNEE  INCISION AND DRAINAGE LOWER EXTREMITY WITH SYNOVIAL BIOPSY;  Surgeon: Dilip Giron MD;  Location:  [facility] OR;  Service: Orthopedics    LAPAROSCOPIC CHOLECYSTECTOMY      Lap rolando    OOPHORECTOMY      PAP SMEAR  05/10/2016    SIGMOIDOSCOPY N/A 2022    Procedure: SIGMOIDOSCOPY FLEXIBLE;  Surgeon: Sahil Viera MD;  Location:  [facility] OR;  Service: Gastroenterology;  Laterality: N/A;  RANDOM COLON BIOPSY    SUBTOTAL HYSTERECTOMY      US GUIDED CYST ASPIRATION BREAST N/A 10/21/2024       Social History     Socioeconomic History    Marital status:    Tobacco Use    Smoking status: Some Days     Current packs/day: 0.00     Average packs/day: 0.3 packs/day for 40.0 years (10.0 ttl pk-yrs)     Types: Cigarettes     Start date:      Last attempt to quit: 2020     Years since quittin.3    Smokeless tobacco: Never    Tobacco comments:     in process of quiting--AVERAGE 1 PPD, DOWN TO 6 CIG PER DAY X2 WEEKS    Substance and Sexual Activity    Alcohol use: Not Currently     Alcohol/week: 1.0 standard drink of alcohol     Types: 1 Glasses of wine per week     Comment: occasional    Drug use: No    Sexual activity: Defer       Family History   Problem Relation Age of Onset    Hypertension Mother     Hyperlipidemia Mother     Breast cancer Mother     Osteoporosis Mother     Anxiety disorder Mother     Hypertension Father     Hyperlipidemia Father     Colon cancer Father     Colon polyps Father     Migraines Father     Depression Daughter     Asthma Daughter     Cancer Other     Diabetes Other     Heart attack Other     Hyperlipidemia Other     Hypertension Other     Osteoporosis Other     Stroke Other     Irritable bowel syndrome Neg Hx     Ulcerative colitis Neg Hx     Crohn's disease Neg Hx         ROS:   Constitutional no fever,  no weight loss   Skin no rash, no subcutaneous nodules   Otolaryngeal no difficulty swallowing    Cardiovascular See HPI   Pulmonary no cough, no sputum production   Gastrointestinal no constipation, no diarrhea   Genitourinary no dysuria, no hematuria   Hematologic no easy bruisability, no abnormal bleeding   Musculoskeletal no muscle pain   Neurologic no dizziness, no falls         Allergies   Allergen Reactions    Amlodipine Swelling    Reglan [Metoclopramide] Unknown - High Severity     DYSTONIC REACTION         Current Outpatient Medications:     albuterol sulfate  (90 Base) MCG/ACT inhaler, Inhale 2 puffs Every 4 (Four) Hours As Needed for Wheezing. for wheezing, Disp: 8.5 g, Rfl: 1    aspirin 81 MG EC tablet, Take 1 tablet by mouth Daily., Disp: , Rfl:     clopidogrel (PLAVIX) 75 MG tablet, Take 1 tablet by mouth Daily., Disp: , Rfl:     Diclofenac Sodium (VOLTAREN) 1 % gel gel, Apply  topically to the appropriate area as directed., Disp: , Rfl:     dicyclomine (BENTYL) 10 MG capsule, Take 1 capsule by mouth 4 (Four) Times a Day As Needed (abdominal pain/diarrhea, and fecal urgency)., Disp: 90 capsule, Rfl: 5    DULoxetine (CYMBALTA) 30 MG capsule, Take 1 capsule by mouth Daily., Disp: , Rfl:     esomeprazole (nexIUM) 40 MG capsule, Take 1 capsule by mouth Every Morning Before Breakfast., Disp: 90 capsule, Rfl: 3    ferrous sulfate 325 (65 FE) MG tablet, TK 1 T PO  QD, Disp: , Rfl:     hydrALAZINE (APRESOLINE) 50 MG tablet, Take 1 tablet by mouth Every 8 (Eight) Hours., Disp: , Rfl:     HYDROcodone-acetaminophen (NORCO) 5-325 MG per tablet, Take 1 tablet by mouth Every 6 (Six) Hours As Needed., Disp: , Rfl:     hydrOXYzine (ATARAX) 50 MG tablet, Take 1 tablet by mouth Every 8 (Eight) Hours As Needed for Anxiety., Disp: 90 tablet, Rfl: 1    ipratropium-albuterol (DUO-NEB) 0.5-2.5 mg/3 ml nebulizer, Take 3 mL by nebulization 4 (Four) Times a Day., Disp: 360 mL, Rfl: 3    levETIRAcetam (KEPPRA) 500 MG tablet, Take 1 tablet by mouth 2 (Two) Times a Day., Disp: 180 tablet, Rfl: 3    meclizine 25 MG  "chewable tablet chewable tablet, Chew 1 tablet 3 (Three) Times a Day As Needed. PRN, Disp: , Rfl:     neomycin-polymyxin-hydrocortisone (CORTISPORIN) 3.5-98811-2 otic suspension, 3 drops 4 (Four) Times a Day., Disp: , Rfl:     nitroglycerin (NITROSTAT) 0.4 MG SL tablet, Place 1 tablet under the tongue Every 5 (Five) Minutes As Needed., Disp: , Rfl: 0    nystatin (nystatin) 374541 UNIT/GM powder, Apply  topically to the appropriate area as directed See Admin Instructions. Apply topically to the affected area twice daily as directed, Disp: 30 g, Rfl: 1    ondansetron (Zofran) 8 MG tablet, Take 1 tablet by mouth Every 8 (Eight) Hours As Needed for Nausea or Vomiting., Disp: 30 tablet, Rfl: 1    Probiotic Product (PROBIOTIC DAILY PO), Take 1 tablet by mouth Daily., Disp: , Rfl:     promethazine (PHENERGAN) 25 MG tablet, Take 1 tablet by mouth Every 6 (Six) Hours As Needed., Disp: , Rfl:     rOPINIRole (REQUIP) 0.25 MG tablet, Take 1 tablet by mouth Every Night., Disp: 90 tablet, Rfl: 3    rosuvastatin (CRESTOR) 40 MG tablet, Take 1 tablet by mouth Daily., Disp: 90 tablet, Rfl:     spironolactone (ALDACTONE) 50 MG tablet, Take 1 tablet by mouth Daily., Disp: , Rfl:     methenamine (MANDELAMINE) 1 GM tablet, Take 1 tablet by mouth 4 (Four) Times a Day., Disp: , Rfl:     Vitals:    05/05/25 1345   BP: 100/58   Pulse: 84   SpO2: 97%   Weight: 74.3 kg (163 lb 12.8 oz)   Height: 162.6 cm (64\")     Body mass index is 28.12 kg/m².    PHYSICAL EXAM:    General Appearance:   well developed  well nourished  HENT:   oropharynx moist  lips not cyanotic  Neck:  thyroid not enlarged  supple  Respiratory:  no respiratory distress  normal breath sounds  no rales  Cardiovascular:  no jugular venous distention  regular rhythm  apical impulse normal  S1 normal, S2 normal  no S3, no S4   no murmur  no rub, no thrill  carotid pulses normal; no bruit  lower extremity edema: none      Musculoskeletal:  no clubbing of fingers.   normocephalic, " "head atraumatic  Skin:   warm, dry  Psychiatric:  judgement and insight appropriate  normal mood and affect    RESULTS:     ECG 12 Lead    Date/Time: 5/5/2025 2:12 PM  Performed by: Raymond Larkin MD    Authorized by: Raymond Larkin MD  Comparison: compared with previous ECG from 9/30/2022  Similar to previous ECG  Rhythm: sinus rhythm  Rate: normal  BPM: 80  QRS axis: normal    Clinical impression: non-specific ECG          Results for orders placed during the hospital encounter of 09/30/22    Adult Transthoracic Echo Complete W/ Cont if Necessary Per Protocol    Interpretation Summary  · Left ventricular ejection fraction appears to be 66 - 70%. Left ventricular systolic function is normal.Left ventricular wall thickness is consistent with mild concentric hypertrophy.  · Left ventricular diastolic function is consistent with (grade II w/high LAP) pseudonormalization.  · Saline test results are negative for right to left atrial level shunt.        Labs:  Lab Results   Component Value Date    CHOL 157 05/16/2020    TRIG 367 (H) 06/13/2022    HDL 38 (L) 06/13/2022     (H) 06/13/2022    AST 6 12/08/2022    ALT <5 12/08/2022     Lab Results   Component Value Date    HGBA1C 11.10 (H) 09/30/2022     No components found for: \"CREATINININE\"  eGFR Non  Am   Date Value Ref Range Status   12/30/2020 37 (L) >60 mL/min/1.73 Final     Comment:     GFR Normal >60  Chronic Kidney Disease <60  Kidney Failure <15     11/11/2020 20 (L) >59 mL/min/1.73 Final   06/17/2020 37 (L) >60 mL/min/1.73 Final     eGFR Non  Amer   Date Value Ref Range Status   09/30/2020 32 (L) >60 mL/min/1.73 Final   08/20/2020 31 (L) >60 mL/min/1.73 Final   05/21/2020 66 >60 mL/min/1.73 Final       Most recent PCP note, imaging tests, and labs reviewed.    ASSESSMENT:  Problem List Items Addressed This Visit       CAD (coronary artery disease) - Primary    Overview   S/P CARDIAC STENT February 2018         Bilateral carotid artery " stenosis    Essential hypertension, benign    Relevant Medications    spironolactone (ALDACTONE) 50 MG tablet    Hypercholesterolemia    Precordial pain       PLAN:    1.  CAD:  History of PCI of the circumflex in 2018  Continue aspirin and statin therapy    Worsening chest pain 5/5/2025 pursue Lexiscan nuclear stress test and echocardiogram    2.  Primary hypertension:  Normal renal arteries on angiogram 2018  Labile hypertension recently advised her to only take her blood pressure about 1 to 2 hours after morning medicines.  Okay to continue spironolactone  No need for metoprolol at this time  Hydralazine only if blood pressures consistently greater than 150/90.    3.  Mixed hyperlipidemia:  Continue rosuvastatin, lipids at goal currently  LDL goal less than 70    4.  Orthostatic hypotension:  Patient will remain off metoprolol, okay to continue spironolactone but needs to stay adequately hydrated  Advised her to take her time when changing positions.    Advance Care Planning   ACP discussion was held with the patient during this visit. Patient has an advance directive (not in EMR), copy requested.         Return to clinic in 9 months, or sooner as needed.    Thank you for the opportunity to share in the care of your patient; please do not hesitate to call me with any questions.     Raymond Larkin MD, FACC, [facility]  Office: [phone] [address] [place] [address]    05/05/25

## 2025-06-02 ENCOUNTER — RESULTS FOLLOW-UP (OUTPATIENT)
Dept: CARDIOLOGY | Facility: CLINIC | Age: 75
End: 2025-06-02
Payer: MEDICARE

## 2025-06-03 NOTE — TELEPHONE ENCOUNTER
Spoke with patient about stress test results and Dr. Larkin recommendation of a heart cath. Patient states that she wants to think about it and will call back if she decides to move forward with heart cath.

## 2025-06-06 ENCOUNTER — DOCUMENTATION (OUTPATIENT)
Dept: CARDIOLOGY | Facility: CLINIC | Age: 75
End: 2025-06-06
Payer: MEDICARE

## 2025-06-06 DIAGNOSIS — E78.5 HYPERLIPIDEMIA LDL GOAL <70: ICD-10-CM

## 2025-06-06 DIAGNOSIS — R93.1 ABNORMAL CORONARY ANGIOGRAM: Primary | ICD-10-CM

## 2025-06-06 DIAGNOSIS — Z13.1 DIABETES MELLITUS SCREENING: ICD-10-CM

## 2025-06-06 NOTE — PROGRESS NOTES
I called the patient to discuss the abnormal nuclear stress test findings that shows moderate lateral ischemia.  Concerning due to history of PCI of the distal circumflex in 2018 and ongoing chest pain.  I answered all of her questions and she is agreeable to proceed with cardiac catheterization.  I informed her that our office would contact her regarding scheduling.

## 2025-06-13 ENCOUNTER — PREP FOR SURGERY (OUTPATIENT)
Dept: OTHER | Facility: HOSPITAL | Age: 75
End: 2025-06-13
Payer: MEDICARE

## 2025-06-13 ENCOUNTER — HOSPITAL ENCOUNTER (OUTPATIENT)
Facility: HOSPITAL | Age: 75
End: 2025-06-13
Payer: MEDICARE

## 2025-06-13 ENCOUNTER — HOSPITAL ENCOUNTER (OUTPATIENT)
Facility: HOSPITAL | Age: 75
Discharge: HOME | End: 2025-06-13
Payer: MEDICARE

## 2025-06-13 DIAGNOSIS — R92.8 ABNORMAL MAMMOGRAM: ICD-10-CM

## 2025-06-13 PROCEDURE — 77066 DX MAMMO INCL CAD BI: CPT

## 2025-06-13 RX ORDER — ACETAMINOPHEN 325 MG/1
650 TABLET ORAL EVERY 4 HOURS PRN
OUTPATIENT
Start: 2025-06-13

## 2025-06-13 RX ORDER — ASPIRIN 81 MG/1
324 TABLET, CHEWABLE ORAL ONCE
OUTPATIENT
Start: 2025-06-13 | End: 2025-06-13

## 2025-06-13 RX ORDER — SODIUM CHLORIDE 9 MG/ML
40 INJECTION, SOLUTION INTRAVENOUS AS NEEDED
OUTPATIENT
Start: 2025-06-13

## 2025-06-13 RX ORDER — SODIUM CHLORIDE 0.9 % (FLUSH) 0.9 %
10 SYRINGE (ML) INJECTION AS NEEDED
OUTPATIENT
Start: 2025-06-13

## 2025-06-13 RX ORDER — ASPIRIN 81 MG/1
81 TABLET ORAL DAILY
OUTPATIENT
Start: 2025-06-14

## 2025-06-13 RX ORDER — SODIUM CHLORIDE 0.9 % (FLUSH) 0.9 %
10 SYRINGE (ML) INJECTION EVERY 12 HOURS SCHEDULED
OUTPATIENT
Start: 2025-06-13

## 2025-06-13 RX ORDER — NITROGLYCERIN 0.4 MG/1
0.4 TABLET SUBLINGUAL
OUTPATIENT
Start: 2025-06-13

## 2025-06-17 ENCOUNTER — HOSPITAL ENCOUNTER (OUTPATIENT)
Facility: HOSPITAL | Age: 75
Setting detail: HOSPITAL OUTPATIENT SURGERY
Discharge: HOME | End: 2025-06-17
Attending: INTERNAL MEDICINE | Admitting: INTERNAL MEDICINE
Payer: MEDICARE

## 2025-06-17 ENCOUNTER — TRANSCRIBE ORDERS (OUTPATIENT)
Dept: CARDIAC REHAB | Facility: HOSPITAL | Age: 75
End: 2025-06-17
Payer: MEDICARE

## 2025-06-17 VITALS
HEART RATE: 66 BPM | DIASTOLIC BLOOD PRESSURE: 70 MMHG | RESPIRATION RATE: 16 BRPM | OXYGEN SATURATION: 93 % | HEIGHT: 64 IN | SYSTOLIC BLOOD PRESSURE: 145 MMHG | TEMPERATURE: 97 F | BODY MASS INDEX: 27.86 KG/M2 | WEIGHT: 163.2 LBS

## 2025-06-17 DIAGNOSIS — E78.5 HYPERLIPIDEMIA LDL GOAL <70: ICD-10-CM

## 2025-06-17 DIAGNOSIS — R93.1 ABNORMAL CORONARY ANGIOGRAM: ICD-10-CM

## 2025-06-17 DIAGNOSIS — I25.10 CORONARY ARTERY DISEASE INVOLVING NATIVE CORONARY ARTERY OF NATIVE HEART WITHOUT ANGINA PECTORIS: Primary | ICD-10-CM

## 2025-06-17 DIAGNOSIS — Z95.5 STENTED CORONARY ARTERY: Primary | ICD-10-CM

## 2025-06-17 DIAGNOSIS — Z13.1 DIABETES MELLITUS SCREENING: ICD-10-CM

## 2025-06-17 PROBLEM — R94.39 ABNORMAL NUCLEAR STRESS TEST: Status: ACTIVE | Noted: 2025-06-06

## 2025-06-17 LAB
ACT BLD: 256 SECONDS (ref 82–152)
ACT BLD: 320 SECONDS (ref 82–152)
ALBUMIN SERPL-MCNC: 4.1 G/DL (ref 3.5–5.2)
ALBUMIN/GLOB SERPL: 1.5 G/DL
ALP SERPL-CCNC: 84 U/L (ref 39–117)
ALT SERPL W P-5'-P-CCNC: 24 U/L (ref 1–33)
ANION GAP SERPL CALCULATED.3IONS-SCNC: 9 MMOL/L (ref 5–15)
AST SERPL-CCNC: 19 U/L (ref 1–32)
BILIRUB SERPL-MCNC: 0.4 MG/DL (ref 0–1.2)
BUN SERPL-MCNC: 21.5 MG/DL (ref 8–23)
BUN/CREAT SERPL: 23.6 (ref 7–25)
CALCIUM SPEC-SCNC: 9.1 MG/DL (ref 8.6–10.5)
CHLORIDE SERPL-SCNC: 103 MMOL/L (ref 98–107)
CHOLEST SERPL-MCNC: 108 MG/DL (ref 0–200)
CO2 SERPL-SCNC: 26 MMOL/L (ref 22–29)
CREAT SERPL-MCNC: 0.91 MG/DL (ref 0.57–1)
DEPRECATED RDW RBC AUTO: 42.7 FL (ref 37–54)
EGFRCR SERPLBLD CKD-EPI 2021: 66.3 ML/MIN/1.73
ERYTHROCYTE [DISTWIDTH] IN BLOOD BY AUTOMATED COUNT: 12.5 % (ref 12.3–15.4)
GLOBULIN UR ELPH-MCNC: 2.8 GM/DL
GLUCOSE SERPL-MCNC: 123 MG/DL (ref 65–99)
HBA1C MFR BLD: 6.9 % (ref 4.8–5.6)
HCT VFR BLD AUTO: 37.2 % (ref 34–46.6)
HDLC SERPL-MCNC: 35 MG/DL (ref 40–60)
HGB BLD-MCNC: 12.2 G/DL (ref 12–15.9)
LDLC SERPL CALC-MCNC: 48 MG/DL (ref 0–100)
LDLC/HDLC SERPL: 1.26 {RATIO}
MCH RBC QN AUTO: 31.4 PG (ref 26.6–33)
MCHC RBC AUTO-ENTMCNC: 32.8 G/DL (ref 31.5–35.7)
MCV RBC AUTO: 95.6 FL (ref 79–97)
PLATELET # BLD AUTO: 184 10*3/MM3 (ref 140–450)
PMV BLD AUTO: 9.4 FL (ref 6–12)
POTASSIUM SERPL-SCNC: 4.3 MMOL/L (ref 3.5–5.2)
PROT SERPL-MCNC: 6.9 G/DL (ref 6–8.5)
RBC # BLD AUTO: 3.89 10*6/MM3 (ref 3.77–5.28)
SODIUM SERPL-SCNC: 138 MMOL/L (ref 136–145)
TRIGL SERPL-MCNC: 144 MG/DL (ref 0–150)
VLDLC SERPL-MCNC: 25 MG/DL (ref 5–40)
WBC NRBC COR # BLD AUTO: 7.84 10*3/MM3 (ref 3.4–10.8)

## 2025-06-17 PROCEDURE — 92978 ENDOLUMINL IVUS OCT C 1ST: CPT | Performed by: INTERNAL MEDICINE

## 2025-06-17 PROCEDURE — C1725 CATH, TRANSLUMIN NON-LASER: HCPCS | Performed by: INTERNAL MEDICINE

## 2025-06-17 PROCEDURE — 83036 HEMOGLOBIN GLYCOSYLATED A1C: CPT | Performed by: PHYSICIAN ASSISTANT

## 2025-06-17 PROCEDURE — C1769 GUIDE WIRE: HCPCS | Performed by: INTERNAL MEDICINE

## 2025-06-17 PROCEDURE — C1894 INTRO/SHEATH, NON-LASER: HCPCS | Performed by: INTERNAL MEDICINE

## 2025-06-17 PROCEDURE — 36415 COLL VENOUS BLD VENIPUNCTURE: CPT

## 2025-06-17 PROCEDURE — 25510000001 IOPAMIDOL PER 1 ML: Performed by: INTERNAL MEDICINE

## 2025-06-17 PROCEDURE — 93458 L HRT ARTERY/VENTRICLE ANGIO: CPT | Performed by: INTERNAL MEDICINE

## 2025-06-17 PROCEDURE — C1887 CATHETER, GUIDING: HCPCS | Performed by: INTERNAL MEDICINE

## 2025-06-17 PROCEDURE — 93571 IV DOP VEL&/PRESS C FLO 1ST: CPT | Performed by: INTERNAL MEDICINE

## 2025-06-17 PROCEDURE — 25010000002 FENTANYL CITRATE (PF) 50 MCG/ML SOLUTION: Performed by: INTERNAL MEDICINE

## 2025-06-17 PROCEDURE — 25010000002 LIDOCAINE PF 1% 1 % SOLUTION: Performed by: INTERNAL MEDICINE

## 2025-06-17 PROCEDURE — 85347 COAGULATION TIME ACTIVATED: CPT

## 2025-06-17 PROCEDURE — 93799 UNLISTED CV SVC/PROCEDURE: CPT | Performed by: INTERNAL MEDICINE

## 2025-06-17 PROCEDURE — 25010000002 NICARDIPINE 2.5 MG/ML SOLUTION: Performed by: INTERNAL MEDICINE

## 2025-06-17 PROCEDURE — C9600 PERC DRUG-EL COR STENT SING: HCPCS | Performed by: INTERNAL MEDICINE

## 2025-06-17 PROCEDURE — 80061 LIPID PANEL: CPT | Performed by: PHYSICIAN ASSISTANT

## 2025-06-17 PROCEDURE — 25010000002 HEPARIN (PORCINE) PER 1000 UNITS: Performed by: INTERNAL MEDICINE

## 2025-06-17 PROCEDURE — 25810000003 SODIUM CHLORIDE 0.9 % SOLUTION: Performed by: INTERNAL MEDICINE

## 2025-06-17 PROCEDURE — 80053 COMPREHEN METABOLIC PANEL: CPT | Performed by: PHYSICIAN ASSISTANT

## 2025-06-17 PROCEDURE — 85027 COMPLETE CBC AUTOMATED: CPT | Performed by: PHYSICIAN ASSISTANT

## 2025-06-17 PROCEDURE — C1874 STENT, COATED/COV W/DEL SYS: HCPCS | Performed by: INTERNAL MEDICINE

## 2025-06-17 PROCEDURE — 25010000002 MIDAZOLAM PER 1 MG: Performed by: INTERNAL MEDICINE

## 2025-06-17 PROCEDURE — C1753 CATH, INTRAVAS ULTRASOUND: HCPCS | Performed by: INTERNAL MEDICINE

## 2025-06-17 PROCEDURE — 92928 PRQ TCAT PLMT NTRAC ST 1 LES: CPT | Performed by: INTERNAL MEDICINE

## 2025-06-17 DEVICE — XIENCE SKYPOINT™ EVEROLIMUS ELUTING CORONARY STENT SYSTEM 2.75 MM X 12 MM / RAPID-EXCHANGE
Type: IMPLANTABLE DEVICE | Status: FUNCTIONAL
Brand: XIENCE SKYPOINT™

## 2025-06-17 RX ORDER — CLOPIDOGREL BISULFATE 75 MG/1
75 TABLET ORAL DAILY
Qty: 90 TABLET | Refills: 1 | Status: SHIPPED | OUTPATIENT
Start: 2025-06-17 | End: 2025-06-17

## 2025-06-17 RX ORDER — ASPIRIN 81 MG/1
324 TABLET, CHEWABLE ORAL ONCE
Status: COMPLETED | OUTPATIENT
Start: 2025-06-17 | End: 2025-06-17

## 2025-06-17 RX ORDER — CALCIUM POLYCARBOPHIL 625 MG
TABLET ORAL 2 TIMES DAILY
COMMUNITY

## 2025-06-17 RX ORDER — PANTOPRAZOLE SODIUM 40 MG/1
40 TABLET, DELAYED RELEASE ORAL DAILY
COMMUNITY

## 2025-06-17 RX ORDER — IOPAMIDOL 755 MG/ML
INJECTION, SOLUTION INTRAVASCULAR
Status: DISCONTINUED | OUTPATIENT
Start: 2025-06-17 | End: 2025-06-17 | Stop reason: HOSPADM

## 2025-06-17 RX ORDER — SODIUM CHLORIDE 9 MG/ML
3 INJECTION, SOLUTION INTRAVENOUS CONTINUOUS
Status: ACTIVE | OUTPATIENT
Start: 2025-06-17 | End: 2025-06-17

## 2025-06-17 RX ORDER — NITROGLYCERIN 0.4 MG/1
0.4 TABLET SUBLINGUAL
Status: DISCONTINUED | OUTPATIENT
Start: 2025-06-17 | End: 2025-06-17 | Stop reason: HOSPADM

## 2025-06-17 RX ORDER — AMOXICILLIN 500 MG/1
1000 CAPSULE ORAL AS NEEDED
COMMUNITY

## 2025-06-17 RX ORDER — LOPERAMIDE HYDROCHLORIDE 2 MG/1
2 CAPSULE ORAL 4 TIMES DAILY PRN
COMMUNITY

## 2025-06-17 RX ORDER — FENTANYL CITRATE 50 UG/ML
INJECTION, SOLUTION INTRAMUSCULAR; INTRAVENOUS
Status: DISCONTINUED | OUTPATIENT
Start: 2025-06-17 | End: 2025-06-17 | Stop reason: HOSPADM

## 2025-06-17 RX ORDER — ACETAMINOPHEN 325 MG/1
650 TABLET ORAL EVERY 4 HOURS PRN
Status: DISCONTINUED | OUTPATIENT
Start: 2025-06-17 | End: 2025-06-17 | Stop reason: HOSPADM

## 2025-06-17 RX ORDER — SODIUM CHLORIDE 9 MG/ML
40 INJECTION, SOLUTION INTRAVENOUS AS NEEDED
Status: DISCONTINUED | OUTPATIENT
Start: 2025-06-17 | End: 2025-06-17 | Stop reason: HOSPADM

## 2025-06-17 RX ORDER — LIDOCAINE HYDROCHLORIDE 10 MG/ML
INJECTION, SOLUTION EPIDURAL; INFILTRATION; INTRACAUDAL; PERINEURAL
Status: DISCONTINUED | OUTPATIENT
Start: 2025-06-17 | End: 2025-06-17 | Stop reason: HOSPADM

## 2025-06-17 RX ORDER — LORATADINE 10 MG/1
10 CAPSULE, LIQUID FILLED ORAL DAILY PRN
COMMUNITY

## 2025-06-17 RX ORDER — ASPIRIN 81 MG/1
81 TABLET ORAL DAILY
Status: DISCONTINUED | OUTPATIENT
Start: 2025-06-18 | End: 2025-06-17 | Stop reason: HOSPADM

## 2025-06-17 RX ORDER — SODIUM CHLORIDE 0.9 % (FLUSH) 0.9 %
10 SYRINGE (ML) INJECTION AS NEEDED
Status: DISCONTINUED | OUTPATIENT
Start: 2025-06-17 | End: 2025-06-17 | Stop reason: HOSPADM

## 2025-06-17 RX ORDER — HYDRALAZINE HYDROCHLORIDE 10 MG/1
10 TABLET, FILM COATED ORAL 3 TIMES DAILY
COMMUNITY

## 2025-06-17 RX ORDER — CLONIDINE HYDROCHLORIDE 0.1 MG/1
0.1 TABLET ORAL 3 TIMES DAILY PRN
COMMUNITY

## 2025-06-17 RX ORDER — DIAZEPAM 10 MG/1
10 TABLET ORAL AS NEEDED
COMMUNITY

## 2025-06-17 RX ORDER — MIDAZOLAM HYDROCHLORIDE 1 MG/ML
INJECTION, SOLUTION INTRAMUSCULAR; INTRAVENOUS
Status: DISCONTINUED | OUTPATIENT
Start: 2025-06-17 | End: 2025-06-17 | Stop reason: HOSPADM

## 2025-06-17 RX ORDER — CLOPIDOGREL BISULFATE 75 MG/1
75 TABLET ORAL DAILY
Qty: 90 TABLET | Refills: 1 | Status: SHIPPED | OUTPATIENT
Start: 2025-06-17

## 2025-06-17 RX ORDER — SODIUM CHLORIDE 9 MG/ML
1 INJECTION, SOLUTION INTRAVENOUS CONTINUOUS
Status: ACTIVE | OUTPATIENT
Start: 2025-06-17 | End: 2025-06-17

## 2025-06-17 RX ORDER — CLOPIDOGREL BISULFATE 75 MG/1
TABLET ORAL
Status: DISCONTINUED | OUTPATIENT
Start: 2025-06-17 | End: 2025-06-17 | Stop reason: HOSPADM

## 2025-06-17 RX ORDER — SODIUM CHLORIDE 0.9 % (FLUSH) 0.9 %
10 SYRINGE (ML) INJECTION EVERY 12 HOURS SCHEDULED
Status: DISCONTINUED | OUTPATIENT
Start: 2025-06-17 | End: 2025-06-17 | Stop reason: HOSPADM

## 2025-06-17 RX ORDER — AMMONIUM LACTATE 12 G/100G
1 LOTION TOPICAL 2 TIMES DAILY
COMMUNITY

## 2025-06-17 RX ORDER — SENNOSIDES 8.6 MG
650 CAPSULE ORAL EVERY 8 HOURS PRN
COMMUNITY

## 2025-06-17 RX ORDER — HEPARIN SODIUM 1000 [USP'U]/ML
INJECTION, SOLUTION INTRAVENOUS; SUBCUTANEOUS
Status: DISCONTINUED | OUTPATIENT
Start: 2025-06-17 | End: 2025-06-17 | Stop reason: HOSPADM

## 2025-06-17 RX ADMIN — SODIUM CHLORIDE 3 ML/KG/HR: 9 INJECTION, SOLUTION INTRAVENOUS at 08:56

## 2025-06-17 RX ADMIN — ASPIRIN 81 MG 243 MG: 81 TABLET ORAL at 08:56

## 2025-06-17 NOTE — PROGRESS NOTES
Pt. Referred for Phase II Cardiac Rehab. Staff discussed benefits of exercise, program protocol, and educational material provided. Teach back verified.  Permission granted from patient for staff to fax referral information to outlying program at this time.  Staff faxed referral info to [facility].

## 2025-06-17 NOTE — Clinical Note
First balloon inflation max pressure = 12 vera. First balloon inflation duration = 15 seconds. Second inflation of balloon - Max pressure = 14 vera. 2nd Inflation of balloon - Duration = 15 seconds.

## 2025-06-17 NOTE — Clinical Note
First balloon inflation max pressure = 10 vera. First balloon inflation duration = 15 seconds. Second inflation of balloon - Max pressure = 14 vera. 2nd Inflation of balloon - Duration = 15 seconds.

## 2025-06-17 NOTE — Clinical Note
Hemostasis started on the right radial artery. R-Band was used in achieving hemostasis. Radial compression device applied to vessel. Hemostasis achieved successfully. Closure device additional comment: 14cc in the band

## 2025-06-17 NOTE — H&P
Pre-cardiac Catheterization History and Physical  [facility]      Patient:  Vidhi Lopez  :  1950  MRN: [id number]    PCP:  Love De Souza APRN  PHONE:  [phone]    DATE: 2025  ID: Vidhi Lopez is a 75 y.o. female resident of [place], [place]     CC: CAD/abnormal stress  MPS    PROBLEM LIST:   Active Hospital Problems    Diagnosis     Abnormal nuclear stress test     Hyperlipidemia LDL goal <70     Diabetes mellitus screening     CAD (coronary artery disease)      History of PCI of the mid/distal circumflex by Dr. Flores in 2018, with 2.5 mm stent        BRIEF HPI: Mrs. Lopez is a 74 y/o female with CAD, s/p prior PCI, HTN, HLD, who presents for cardiac cath. She has been having chest pain and shortness of breath. Stress MPS was obtained which was abnormal showed moderate sized area of ischemia in lateral wall. Cardiac cath was recommended and she presents in this regard.     Cardiac Risk Factors: known CAD, advanced age (older than 55 for men, 65 for women), dyslipidemia, hypertension, obesity (BMI >= 30 kg/m2), and sedentary lifestyle    Allergies:      Allergies   Allergen Reactions    Amlodipine Swelling    Reglan [Metoclopramide] Unknown - High Severity     DYSTONIC REACTION       MEDICATIONS:  Current Outpatient Medications   Medication Instructions    albuterol sulfate  (90 Base) MCG/ACT inhaler 2 puffs, Inhalation, Every 4 Hours PRN, for wheezing    aspirin 81 mg, Daily    clopidogrel (PLAVIX) 75 MG tablet 1 tablet, Daily    Diclofenac Sodium (VOLTAREN) 1 % gel gel Apply  topically to the appropriate area as directed.    dicyclomine (BENTYL) 10 mg, Oral, 4 Times Daily PRN    DULoxetine (CYMBALTA) 30 mg, Daily    esomeprazole (NEXIUM) 40 mg, Oral, Every Morning Before Breakfast    ferrous sulfate 325 (65 FE) MG tablet TK 1 T PO  QD    hydrALAZINE (APRESOLINE) 50 mg, Oral, Every 8 Hours Scheduled    HYDROcodone-acetaminophen (NORCO) 5-325  "MG per tablet 1 tablet, Every 6 Hours PRN    hydrOXYzine (ATARAX) 50 mg, Oral, Every 8 Hours PRN    ipratropium-albuterol (DUO-NEB) 0.5-2.5 mg/3 ml nebulizer 3 mL, Nebulization, 4 Times Daily - RT    levETIRAcetam (KEPPRA) 500 mg, Oral, 2 Times Daily    meclizine 25 mg, 3 Times Daily PRN    methenamine (MANDELAMINE) 1 g, Oral, 4 Times Daily    neomycin-polymyxin-hydrocortisone (CORTISPORIN) 3.5-32780-1 otic suspension 3 drops, 4 Times Daily    nitroglycerin (NITROSTAT) 0.4 mg, Every 5 Minutes PRN    nystatin (nystatin) 325863 UNIT/GM powder Topical, See Admin Instructions, Apply topically to the affected area twice daily as directed    ondansetron (ZOFRAN) 8 mg, Oral, Every 8 Hours PRN    Probiotic Product (PROBIOTIC DAILY PO) 1 tablet, Daily    promethazine (PHENERGAN) 25 mg, Every 6 Hours PRN    rOPINIRole (REQUIP) 0.25 mg, Oral, Nightly    rosuvastatin (CRESTOR) 40 mg, Oral, Daily    spironolactone (ALDACTONE) 50 mg, Daily       Past medical & surgical history, social and family history reviewed in the electronic medical record.    ROS: Pertinent positives listed in the HPI and problem list above. All others reviewed and negative.     Physical Exam:   /73 (BP Location: Left arm, Patient Position: Lying)   Pulse 67   Temp 97 °F (36.1 °C) (Temporal)   Resp 14   Ht 162.6 cm (64\")   Wt 74 kg (163 lb 3.2 oz)   LMP  (LMP Unknown) Comment: LAST MAMMOGRAM 2017  SpO2 95%   BMI 28.01 kg/m²     Constitutional:    Alert, cooperative, in no acute distress   Neck:     No Jugular venous distention, adenopathy, or thyromegaly noted.    Heart:    Regular rhythm and normal rate, normal S1 and S2, 1/6 systolic murmur, no gallops, rubs, or clicks. No distinct PMI noted.    Lungs:     Clear to auscultation bilaterally, respirations regular, even and unlabored    Extremities:   No gross deformities, no edema, clubbing, or cyanosis.      Barbaeu Test:  Left: Not Assessed  (oxymetric Allens) Right: Normal, however waveform " is slightly diminished     Labs and Diagnostic Data:      Results from last 7 days   Lab Units 06/17/25  0817   WBC 10*3/mm3 7.84   HEMOGLOBIN g/dL 12.2   HEMATOCRIT % 37.2   PLATELETS 10*3/mm3 184     Lab Results   Component Value Date    CHOL 157 05/16/2020    TRIG 367 (H) 06/13/2022    HDL 38 (L) 06/13/2022     (H) 06/13/2022    AST 6 12/08/2022    ALT <5 12/08/2022                 Stress MPS 5/27/25:  Interpretation Summary         Myocardial perfusion imaging indicates a moderate-sized, moderately severe area of ischemia located in the lateral wall.    Left ventricular ejection fraction is hyperdynamic (Calculated EF > 70%).    Impressions are consistent with an intermediate risk study.    If patient is still having chest pain symptoms we will recommend cardiac catheterization.       Tele: SR    IMPRESSION:  76 yo female with CAD, HTN, HLD, and recent chest pain with abnormal stress MPS.     PLAN:  Procedure to perform: LHC +/- CBI via RIGHT RADIAL. Risks, benefits and alternatives to the procedure explained to the patient and she understands and wishes to proceed.     '  Electronically signed by Felicity Mcgee PA-C, 06/17/25, 8:37 AM EDT.  The risks, benefits, and alternative options of cardiac catheterization were discussed with the patient.  The risks include death, MI, stroke, infection, vascular injury requiring surgical repair and/or blood transfusion, coronary dissection, renal dysfunction/failure, allergic reaction, emergent CABG.  If PCI is needed there is a 1-2% risk of emergent CABG.  The patient is agreeable for cardiac catheterization, possible PCI or CABG. Plan is to proceed with cardiac catheterization and possible PCI.     ACCESS: RIGHT RADIAL     Raymond Larkin MD, FAC, [facility]  Interventional Cardiology  06/17/25  08:52 EDT

## 2025-06-17 NOTE — Clinical Note
First balloon inflation max pressure = 14 vera. First balloon inflation duration = 10 seconds. Second inflation of balloon - Max pressure = 15 vera. 2nd Inflation of balloon - Duration = 13 seconds.

## 2025-06-18 ENCOUNTER — CALL CENTER PROGRAMS (OUTPATIENT)
Dept: CALL CENTER | Facility: HOSPITAL | Age: 75
End: 2025-06-18
Payer: MEDICARE

## 2025-06-18 NOTE — OUTREACH NOTE
PCI/Device Survey      Flowsheet Row Responses   Facility patient discharged from? [facility]   Procedure date 06/17/25   Procedure (if device, specify in description) PCI   PCI site Right, Arm   Performing MD Dr. Raymond Larkin   Attempt successful? Yes   Call start time 0809   Call end time 0821   Person spoke with today (if not patient) and relationship Patient   Has the patient had any of the following symptoms since discharge? --  [No issues]   Is the patient taking prescribed medications: ASA, Plavix   Nursing intervention Reminded to continue to take prescribed medications   Medication comments Patient will  medications from pharmacy today   Does the patient have any of the following symptoms related to the cath/surgical site? --  [Dressing dry and intact. Instructions given to patient on cath site.]   Nursing intervention Patient education provided   Does the patient have an appointment scheduled with the cardiologist? No   Did the patient feel prepared to go home on the same day as the procedure? Yes   Is the patient satisfied with the same day discharge process? Yes   PCI/Device call completed Yes   Wrap up additional comments Doing well.            Crow RAVI - Registered Nurse

## (undated) DEVICE — BOOTS FL BARRIER MED (50 PAIRS CS)

## (undated) DEVICE — BLD CUT FORMLA RESECTOR 4.0MM

## (undated) DEVICE — CATH DIAG EXPO M/ PK 5F FL4/FR4 PIG

## (undated) DEVICE — AIRWY 90MM NO9

## (undated) DEVICE — TB CASSET ARTHSCP CROSSFLOW INFLOW LF

## (undated) DEVICE — GUIDE CATHETER: Brand: MACH1™

## (undated) DEVICE — GW PRESSUREWIRE X WIRELESS FFR 175CM

## (undated) DEVICE — NC TREK NEO™ CORONARY DILATATION CATHETER 3.00 MM X 8 MM / RAPID-EXCHANGE: Brand: NC TREK NEO™

## (undated) DEVICE — Device

## (undated) DEVICE — HOLDER: Brand: DEROYAL

## (undated) DEVICE — KT CATH IMG DRAGONFLY/OPSTAR 2.7F 135CM

## (undated) DEVICE — PAD ARMBRD SURG CONVOL 7.5X20X2IN

## (undated) DEVICE — PK CATH CARD 10

## (undated) DEVICE — STARCLOSE SE VASCULAR CLOSURE SYSTEM: Brand: STARCLOSE SE

## (undated) DEVICE — GW PRESS VERRATA STR 185CM

## (undated) DEVICE — KT ORCA ORCAPOD DISP STRL

## (undated) DEVICE — GW LUGE .014 182 CM

## (undated) DEVICE — ST PRIM GRVTY NDLESS 3 INJ PORT 105IN

## (undated) DEVICE — MEDI-VAC YANKAUER SUCTION HANDLE W/BULBOUS TIP: Brand: CARDINAL HEALTH

## (undated) DEVICE — SYS COMPR FEMOSTOP GLD W/PUMP LF

## (undated) DEVICE — CVR PROB ULTRASND/TRANSD W/GEL 7X11IN STRL

## (undated) DEVICE — Device: Brand: ASAHI SION BLUE

## (undated) DEVICE — GLIDESHEATH SLENDER STAINLESS STEEL KIT: Brand: GLIDESHEATH SLENDER

## (undated) DEVICE — BW-412T DISP COMBO CLEANING BRUSH: Brand: SINGLE USE COMBINATION CLEANING BRUSH

## (undated) DEVICE — STCKNT IMPERV 12IN STRL

## (undated) DEVICE — 3M™ WARMING BLANKET, UPPER BODY, 10 PER CASE, 42268: Brand: BAIR HUGGER™

## (undated) DEVICE — SPNG GZ WOVN 4X4IN 12PLY 10/BX STRL

## (undated) DEVICE — DRSNG SURESITE WNDW 2.38X2.75

## (undated) DEVICE — INTRO SHEATH ENGAGE W/50 GW .038 8F12

## (undated) DEVICE — GLV SURG SIGNATURE TOUCH PF LTX 8 STRL BX/50

## (undated) DEVICE — DRSNG SURESITE123 4X4.8IN

## (undated) DEVICE — MEDI-VAC NON-CONDUCTIVE SUCTION TUBING: Brand: CARDINAL HEALTH

## (undated) DEVICE — SWAN-GANZ THERMODILUTION CATHETER: Brand: SWAN-GANZ

## (undated) DEVICE — TR BAND RADIAL ARTERY COMPRESSION DEVICE: Brand: TR BAND

## (undated) DEVICE — GW FC J TFE/COAT .025 3MM 180CM

## (undated) DEVICE — AIRLIFE™ ADULT OXYGEN MASK VINYL, UNDER THE CHIN STYLE, 3 IN 1 MASK WITHOUT SAFETY VENT, WITH 7 FEET (2.1 M) CRUSH RESISTANT OXYGEN TUBING: Brand: AIRLIFE™

## (undated) DEVICE — INTRO SHEATH ART/FEM ENGAGE .038 6F12CM

## (undated) DEVICE — SAFELINER SUCTION CANISTER 1000CC: Brand: DEROYAL

## (undated) DEVICE — THE BITE BLOCK MAXI, LATEX FREE STRAP IS USED TO PROTECT THE ENDOSCOPE INSERTION TUBE FROM BEING BITTEN BY THE PATIENT.

## (undated) DEVICE — GW PERIPH GUIDERIGHT STD/EXCHNG/J/TIP SS 0.035IN 5X260CM

## (undated) DEVICE — GLV SURG SENSICARE MICRO PF LF 6 STRL

## (undated) DEVICE — GW J TP FIX CORE .035 150

## (undated) DEVICE — MODEL AT P65, P/N 701554-001KIT CONTENTS: HAND CONTROLLER, 3-WAY HIGH-PRESSURE STOPCOCK WITH ROTATING END AND PREMIUM HIGH-PRESSURE TUBING: Brand: ANGIOTOUCH® KIT

## (undated) DEVICE — INF 5F 0.038 INCHES 100CM JL3: Brand: INFINITI

## (undated) DEVICE — GW PT 2 MS .014 185CM STR TP

## (undated) DEVICE — MINI TREK CORONARY DILATATION CATHETER 2.0 MM X 12 MM / RAPID-EXCHANGE: Brand: MINI TREK

## (undated) DEVICE — DEV INFL MONARCH 25W

## (undated) DEVICE — NC TREK CORONARY DILATATION CATHETER 2.5 MM X 8 MM / RAPID-EXCHANGE: Brand: NC TREK

## (undated) DEVICE — KT VLV HEMO MAP ACC PLS LG/BORE MTL/INTRO W/TORQ/DEV

## (undated) DEVICE — PK ARTHSCP KN 10

## (undated) DEVICE — ADAPT CLN BIOGUARD AIR/H2O DISP

## (undated) DEVICE — UNDERCAST PADDING: Brand: DEROYAL

## (undated) DEVICE — CONTAINER,SPECIMEN,OR STERILE,4OZ: Brand: MEDLINE

## (undated) DEVICE — DRAPE,TOP,102X53,STERILE: Brand: MEDLINE

## (undated) DEVICE — GLV SURG TRIUMPH CLASSIC PF LTX 8 STRL

## (undated) DEVICE — GOWN ,SIRUS,NONREINFORCED 3XL: Brand: MEDLINE

## (undated) DEVICE — FLUSH KIT W/3 WAY OFF STOPCOCK: Brand: ICU MEDICAL

## (undated) DEVICE — ADULT, W/LG. BACK PAD, RADIOTRANSPARENT ELEMENT AND LEAD WIRE COMPATIBLE W/: Brand: DEFIBRILLATION ELECTRODES

## (undated) DEVICE — CATH DIAG EXPO M/ PK 6FR FL4/FR4 PIG 3PK

## (undated) DEVICE — DRSNG PAD ABD 8X10IN STRL

## (undated) DEVICE — DISPOSABLE TOURNIQUET CUFF SINGLE BLADDER, DUAL PORT AND QUICK CONNECT CONNECTOR: Brand: COLOR CUFF

## (undated) DEVICE — ANGIOGRAPHIC CATHETER: Brand: IMAGER™ II

## (undated) DEVICE — KT MANIFOLD CATHLAB CUST

## (undated) DEVICE — MODEL BT2000 P/N 700287-012KIT CONTENTS: MANIFOLD WITH SALINE AND CONTRAST PORTS, SALINE TUBING WITH SPIKE AND HAND SYRINGE, TRANSDUCER: Brand: BT2000 AUTOMATED MANIFOLD KIT

## (undated) DEVICE — VBT GC 6F .070 XB 3 100CM: Brand: VISTA BRITE TIP

## (undated) DEVICE — SUT ETHLN 3/0 PC5 18IN 1893G

## (undated) DEVICE — SYR LL 3CC